# Patient Record
Sex: FEMALE | Race: WHITE | NOT HISPANIC OR LATINO | Employment: FULL TIME | URBAN - METROPOLITAN AREA
[De-identification: names, ages, dates, MRNs, and addresses within clinical notes are randomized per-mention and may not be internally consistent; named-entity substitution may affect disease eponyms.]

---

## 2017-03-21 ENCOUNTER — OFFICE VISIT (OUTPATIENT)
Dept: FAMILY MEDICINE | Facility: CLINIC | Age: 37
End: 2017-03-21
Payer: COMMERCIAL

## 2017-03-21 VITALS
OXYGEN SATURATION: 96 % | WEIGHT: 289 LBS | HEIGHT: 67 IN | BODY MASS INDEX: 45.36 KG/M2 | TEMPERATURE: 97.6 F | DIASTOLIC BLOOD PRESSURE: 86 MMHG | RESPIRATION RATE: 18 BRPM | SYSTOLIC BLOOD PRESSURE: 138 MMHG | HEART RATE: 82 BPM

## 2017-03-21 DIAGNOSIS — N89.8 VAGINAL DISCHARGE: Primary | ICD-10-CM

## 2017-03-21 DIAGNOSIS — Z71.6 ENCOUNTER FOR SMOKING CESSATION COUNSELING: ICD-10-CM

## 2017-03-21 DIAGNOSIS — I10 ESSENTIAL HYPERTENSION WITH GOAL BLOOD PRESSURE LESS THAN 140/90: ICD-10-CM

## 2017-03-21 DIAGNOSIS — G47.33 OSA (OBSTRUCTIVE SLEEP APNEA): ICD-10-CM

## 2017-03-21 LAB
ALBUMIN SERPL-MCNC: 3.5 G/DL (ref 3.4–5)
ALP SERPL-CCNC: 74 U/L (ref 40–150)
ALT SERPL W P-5'-P-CCNC: 24 U/L (ref 0–50)
ANION GAP SERPL CALCULATED.3IONS-SCNC: 7 MMOL/L (ref 3–14)
AST SERPL W P-5'-P-CCNC: 6 U/L (ref 0–45)
BILIRUB SERPL-MCNC: 0.3 MG/DL (ref 0.2–1.3)
BUN SERPL-MCNC: 8 MG/DL (ref 7–30)
CALCIUM SERPL-MCNC: 8.6 MG/DL (ref 8.5–10.1)
CHLORIDE SERPL-SCNC: 108 MMOL/L (ref 94–109)
CHOLEST SERPL-MCNC: 226 MG/DL
CO2 SERPL-SCNC: 26 MMOL/L (ref 20–32)
CREAT SERPL-MCNC: 0.62 MG/DL (ref 0.52–1.04)
GFR SERPL CREATININE-BSD FRML MDRD: NORMAL ML/MIN/1.7M2
GLUCOSE SERPL-MCNC: 92 MG/DL (ref 70–99)
HDLC SERPL-MCNC: 30 MG/DL
LDLC SERPL CALC-MCNC: 155 MG/DL
MICRO REPORT STATUS: NORMAL
NONHDLC SERPL-MCNC: 196 MG/DL
POTASSIUM SERPL-SCNC: 4 MMOL/L (ref 3.4–5.3)
PROT SERPL-MCNC: 6.9 G/DL (ref 6.8–8.8)
SODIUM SERPL-SCNC: 141 MMOL/L (ref 133–144)
SPECIMEN SOURCE: NORMAL
TRIGL SERPL-MCNC: 203 MG/DL
WET PREP SPEC: NORMAL

## 2017-03-21 PROCEDURE — 87624 HPV HI-RISK TYP POOLED RSLT: CPT | Performed by: FAMILY MEDICINE

## 2017-03-21 PROCEDURE — 80053 COMPREHEN METABOLIC PANEL: CPT | Performed by: FAMILY MEDICINE

## 2017-03-21 PROCEDURE — 99214 OFFICE O/P EST MOD 30 MIN: CPT | Performed by: FAMILY MEDICINE

## 2017-03-21 PROCEDURE — 87210 SMEAR WET MOUNT SALINE/INK: CPT | Performed by: FAMILY MEDICINE

## 2017-03-21 PROCEDURE — G0145 SCR C/V CYTO,THINLAYER,RESCR: HCPCS | Performed by: FAMILY MEDICINE

## 2017-03-21 PROCEDURE — 36415 COLL VENOUS BLD VENIPUNCTURE: CPT | Performed by: FAMILY MEDICINE

## 2017-03-21 PROCEDURE — 80061 LIPID PANEL: CPT | Performed by: FAMILY MEDICINE

## 2017-03-21 RX ORDER — LISINOPRIL 40 MG/1
40 TABLET ORAL DAILY
Qty: 90 TABLET | Refills: 3 | Status: SHIPPED | OUTPATIENT
Start: 2017-03-21 | End: 2017-10-13

## 2017-03-21 RX ORDER — HYDROCHLOROTHIAZIDE 25 MG/1
25 TABLET ORAL DAILY
Qty: 90 TABLET | Refills: 3 | Status: SHIPPED | OUTPATIENT
Start: 2017-03-21 | End: 2017-10-13

## 2017-03-21 NOTE — NURSING NOTE
"Chief Complaint   Patient presents with     Hypertension     Vaginal Problem     Referral     Sleep Study      /86  Pulse 82  Temp 97.6  F (36.4  C) (Oral)  Resp 18  Ht 5' 7\" (1.702 m)  Wt 289 lb (131.1 kg)  SpO2 96%  BMI 45.26 kg/m2 Estimated body mass index is 45.26 kg/(m^2) as calculated from the following:    Height as of this encounter: 5' 7\" (1.702 m).    Weight as of this encounter: 289 lb (131.1 kg).  bp completed using cuff size: large       Health Maintenance addressed:  Pap Smear    Possibly completing today per provider review.    Brandee Mesa MA     "

## 2017-03-21 NOTE — MR AVS SNAPSHOT
After Visit Summary   3/21/2017    Danitza Soto    MRN: [id number]           Patient Information     Date Of Birth          1980        Visit Information        Provider Department      3/21/2017 9:15 AM Polly Mcbride MD [facility]        Today's Diagnoses     Vaginal discharge    -  1    Essential hypertension with goal blood pressure less than 140/90        LEONARDO (obstructive sleep apnea)           Follow-ups after your visit        Additional Services     SLEEP EVALUATION & MANAGEMENT REFERRAL - ADULT       Please be aware that coverage of these services is subject to the terms and limitations of your health insurance plan.  Call member services at your health plan with any benefit or coverage questions.      Please bring the following to your appointment:    >>   List of current medications   >>   This referral request   >>   Any documents/labs given to you for this referral    [place] [facility] [place]   [phone] (Age 18 and up)                  Future tests that were ordered for you today     Open Future Orders        Priority Expected Expires Ordered    SLEEP EVALUATION & MANAGEMENT REFERRAL - ADULT Routine  3/21/2018 3/21/2017            Who to contact     If you have questions or need follow up information about today's clinic visit or your schedule please contact [facility] directly at [phone].  Normal or non-critical lab and imaging results will be communicated to you by MyChart, letter or phone within 4 business days after the clinic has received the results. If you do not hear from us within 7 days, please contact the clinic through MyChart or phone. If you have a critical or abnormal lab result, we will notify you by phone as soon as possible.  Submit refill requests through [employer] or call your pharmacy and they will forward the refill request to us. Please allow 3 business days for your refill to be completed.          Additional Information  "About Your Visit        MyChart Information     [employer] gives you secure access to your electronic health record. If you see a primary care provider, you can also send messages to your care team and make appointments. If you have questions, please call your primary care clinic.  If you do not have a primary care provider, please call [phone] and they will assist you.        Care EveryWhere ID     This is your Care EveryWhere ID. This could be used by other organizations to access your [place] medical records  [id number]        Your Vitals Were     Pulse Temperature Respirations Height Pulse Oximetry BMI (Body Mass Index)    82 97.6  F (36.4  C) (Oral) 18 5' 7\" (1.702 m) 96% 45.26 kg/m2       Blood Pressure from Last 3 Encounters:   03/21/17 138/86   08/24/16 134/84   01/13/16 140/83    Weight from Last 3 Encounters:   03/21/17 289 lb (131.1 kg)   08/24/16 275 lb 12.8 oz (125.1 kg)   01/13/16 297 lb (134.7 kg)              We Performed the Following     Comprehensive metabolic panel (BMP + Alb, Alk Phos, ALT, AST, Total. Bili, TP)     Lipid Profile (Chol, Trig, HDL, LDL calc)     Wet prep          Where to get your medicines      These medications were sent to [facility] [id number] [address] AT [facility] [place] [address]     Phone:  [phone]     hydrochlorothiazide 25 MG tablet    lisinopril 40 MG tablet          Primary Care Provider Office Phone # Fax #    Polly Mcbride [phone]       [facility] [address] [facility]   [place] [phone]        Thank you!     Thank you for choosing [facility]  for your care. Our goal is always to provide you with excellent care. Hearing back from our patients is one way we can continue to improve our services. Please take a few minutes to complete the written survey that you may receive in the mail after your visit with us. Thank you!           "   Your Updated Medication List - Protect others around you: Learn how to safely use, store and throw away your medicines at www.disposemymeds.org.          This list is accurate as of: 3/21/17  9:22 AM.  Always use your most recent med list.                   Brand Name Dispense Instructions for use    ADVIL 200 MG tablet   Generic drug:  ibuprofen      TAKE THREE TABLETS BY MOUTH QD-BID PRN       CENTRUM PO          clindamycin 1 % topical gel    CLINDAGEL    60 g    Apply topically daily       hydrochlorothiazide 25 MG tablet    HYDRODIURIL    90 tablet    Take 1 tablet (25 mg) by mouth daily       lisinopril 40 MG tablet    PRINIVIL/ZESTRIL    90 tablet    Take 1 tablet (40 mg) by mouth daily       * metroNIDAZOLE 500 MG tablet    FLAGYL    21 tablet    Take 1 tablet (500 mg) by mouth 3 times daily       * metroNIDAZOLE 500 MG tablet    FLAGYL    14 tablet    Take 1 tablet (500 mg) by mouth 2 times daily       NEXIUM PO          priLOSEC OTC 20 MG tablet   Generic drug:  omeprazole      1 TABLET DAILY       * varenicline 0.5 MG X 11 & 1 MG X 42 tablet    CHANTIX STARTING MONTH WILY    53 tablet    Take 0.5 mg tab daily for 3 days, then 0.5 mg tab twice daily for 4 days, then 1 mg twice daily.       * varenicline 1 MG tablet    CHANTIX    60 tablet    Take 1 tablet (1 mg) by mouth 2 times daily       * Notice:  This list has 4 medication(s) that are the same as other medications prescribed for you. Read the directions carefully, and ask your doctor or other care provider to review them with you.

## 2017-03-21 NOTE — PROGRESS NOTES
SUBJECTIVE:                                                    Danitza Soto is a 36 year old female who presents to clinic today for the following health issues:      Hypertension Follow-up      Outpatient blood pressures are not being checked.    Low Salt Diet: low salt       Amount of exercise or physical activity: 2-3 days/week for an average of 15-30 minutes    Problems taking medications regularly: No    Medication side effects: none    Diet: low salt    Vaginal Symptoms      Duration: a couple of months     Description  vaginal discharge - white clear creamy curd-like, states texture has changed.     Intensity:  moderate    Accompanying signs and symptoms (fever/dysuria/abdominal or back pain): None    History  Sexually active: not at present  Possibility of pregnancy: No  Recent antibiotic use: no     Precipitating or alleviating factors: None    Therapies tried and outcome: none        Patient would also like a sleep study referral states she is waking up more during the night         Problem list and histories reviewed & adjusted, as indicated.  Additional history: as documented    Patient Active Problem List   Diagnosis     Anxiety state     Esophageal reflux     Obesity     Tobacco use disorder     CARDIOVASCULAR SCREENING; LDL GOAL LESS THAN 130     Hypertension goal BP (blood pressure) < 140/90     Acne     LSIL (low grade squamous intraepithelial lesion) on Pap smear     Mixed hyperlipidemia     Past Surgical History:   Procedure Laterality Date     HC TOOTH EXTRACTION W/FORCEP  1995    Gans Teeth Extraction       Social History   Substance Use Topics     Smoking status: Current Every Day Smoker     Years: 1.00     Types: Cigarettes     Smokeless tobacco: Never Used      Comment:  pack or less a day     Alcohol use 0.0 oz/week     0 Standard drinks or equivalent per week      Comment: Occas.     Family History   Problem Relation Age of Onset     HEART DISEASE Mother      ,mother had emphesema  and  at 62     Hypertension Mother      Allergies Mother      Lipids Mother      Obesity Mother      Respiratory Mother      Emphysema     DIABETES Maternal Grandmother      Type 2?     Hypertension Maternal Grandmother      Circulatory Maternal Grandmother      Due to diabetes     Eye Disorder Maternal Grandmother      Macular degeneration/Macular degeneration     Obesity Maternal Grandmother      Hypertension Father      Lipids Father      CANCER Father      CEREBROVASCULAR DISEASE Paternal Grandmother      Alcohol/Drug Maternal Grandfather      Obesity Maternal Grandfather      Psychotic Disorder Paternal Grandfather      Committed Suicide     Allergies Sister      Genitourinary Problems Sister          Current Outpatient Prescriptions   Medication Sig Dispense Refill     hydrochlorothiazide (HYDRODIURIL) 25 MG tablet Take 1 tablet (25 mg) by mouth daily 90 tablet 3     lisinopril (PRINIVIL/ZESTRIL) 40 MG tablet Take 1 tablet (40 mg) by mouth daily 90 tablet 3     varenicline (CHANTIX STARTING MONTH WILY) 0.5 MG X 11 & 1 MG X 42 tablet Take 0.5 mg tab daily for 3 days, then 0.5 mg tab twice daily for 4 days, then 1 mg twice daily. 53 tablet 2     metroNIDAZOLE (FLAGYL) 500 MG tablet Take 1 tablet (500 mg) by mouth 2 times daily 14 tablet 1     clindamycin (CLINDAGEL) 1 % gel Apply topically daily 60 g 11     Esomeprazole Magnesium (NEXIUM PO)        varenicline (CHANTIX) 1 MG tablet Take 1 tablet (1 mg) by mouth 2 times daily 60 tablet 5     metroNIDAZOLE (FLAGYL) 500 MG tablet Take 1 tablet (500 mg) by mouth 3 times daily 21 tablet 1     varenicline (CHANTIX STARTING MONTH WILY) 0.5 MG X 11 & 1 MG X 42 tablet Take 0.5 mg tab daily for 3 days, then 0.5 mg tab twice daily for 4 days, then 1 mg twice daily. 53 tablet 2     Multiple Vitamins-Minerals (CENTRUM PO)        PRILOSEC OTC 20 MG OR TBEC 1 TABLET DAILY       ADVIL 200 MG OR TABS TAKE THREE TABLETS BY MOUTH QD-BID PRN       Allergies   Allergen Reactions  "    Wellbutrin [Bupropion]      Wellbutrin: Panic attacks, heart/chest pain     Recent Labs   Lab Test  03/21/17   0926  08/24/16   0915  01/13/16   1138   06/17/14   1019  10/08/13   0808   A1C   --    --    --    --    --   5.4   LDL  155*  174*  162*   --    --   149*   HDL  30*  29*  33*   --    --   33*   TRIG  203*  231*  207*   --    --   179*   ALT  24   --   26   --    --   26   CR  0.62  0.61  0.59   < >  0.70  0.60   GFRESTIMATED  >90  Non  GFR Calc    >90  Non  GFR Calc    >90  Non  GFR Calc     < >  >90  >90   GFRESTBLACK  >90   GFR Calc    >90   GFR Calc    >90   GFR Calc     < >  >90  >90   POTASSIUM  4.0  3.9  4.0   < >  4.2  3.8   TSH   --    --   1.79   --   1.99   --     < > = values in this interval not displayed.      BP Readings from Last 3 Encounters:   03/21/17 138/86   08/24/16 134/84   01/13/16 140/83    Wt Readings from Last 3 Encounters:   03/21/17 289 lb (131.1 kg)   08/24/16 275 lb 12.8 oz (125.1 kg)   01/13/16 297 lb (134.7 kg)                  Labs reviewed in EPIC    Reviewed and updated as needed this visit by clinical staff  Tobacco  Allergies  Meds  Med Hx  Surg Hx  Fam Hx  Soc Hx      Reviewed and updated as needed this visit by Provider         ROS:  Constitutional, HEENT, cardiovascular, pulmonary, GI, , musculoskeletal, neuro, skin, endocrine and psych systems are negative, except as otherwise noted.    OBJECTIVE:                                                    /86  Pulse 82  Temp 97.6  F (36.4  C) (Oral)  Resp 18  Ht 5' 7\" (1.702 m)  Wt 289 lb (131.1 kg)  SpO2 96%  BMI 45.26 kg/m2  Body mass index is 45.26 kg/(m^2).  GENERAL: healthy, alert and no distress  EYES: Eyes grossly normal to inspection, PERRL and conjunctivae and sclerae normal  HENT: ear canals and TM's normal, nose and mouth without ulcers or lesions  NECK: no adenopathy, no asymmetry, masses, " or scars and thyroid normal to palpation  RESP: lungs clear to auscultation - no rales, rhonchi or wheezes  BREAST: normal without masses, tenderness or nipple discharge and no palpable axillary masses or adenopathy  CV: regular rate and rhythm, normal S1 S2, no S3 or S4, no murmur, click or rub, no peripheral edema and peripheral pulses strong  ABDOMEN: soft, nontender, no hepatosplenomegaly, no masses and bowel sounds normal   (female): normal female external genitalia, normal urethral meatus, vaginal mucosa pink, moist, well rugated, and normal cervix/adnexa/uterus without masses or discharge  MS: no gross musculoskeletal defects noted, no edema  SKIN: no suspicious lesions or rashes  NEURO: Normal strength and tone, mentation intact and speech normal  PSYCH: mentation appears normal, affect normal/bright    Diagnostic Test Results:  Results for orders placed or performed in visit on 03/21/17   Comprehensive metabolic panel (BMP + Alb, Alk Phos, ALT, AST, Total. Bili, TP)   Result Value Ref Range    Sodium 141 133 - 144 mmol/L    Potassium 4.0 3.4 - 5.3 mmol/L    Chloride 108 94 - 109 mmol/L    Carbon Dioxide 26 20 - 32 mmol/L    Anion Gap 7 3 - 14 mmol/L    Glucose 92 70 - 99 mg/dL    Urea Nitrogen 8 7 - 30 mg/dL    Creatinine 0.62 0.52 - 1.04 mg/dL    GFR Estimate >90  Non  GFR Calc   >60 mL/min/1.7m2    GFR Estimate If Black >90   GFR Calc   >60 mL/min/1.7m2    Calcium 8.6 8.5 - 10.1 mg/dL    Bilirubin Total 0.3 0.2 - 1.3 mg/dL    Albumin 3.5 3.4 - 5.0 g/dL    Protein Total 6.9 6.8 - 8.8 g/dL    Alkaline Phosphatase 74 40 - 150 U/L    ALT 24 0 - 50 U/L    AST 6 0 - 45 U/L   Lipid Profile (Chol, Trig, HDL, LDL calc)   Result Value Ref Range    Cholesterol 226 (H) <200 mg/dL    Triglycerides 203 (H) <150 mg/dL    HDL Cholesterol 30 (L) >49 mg/dL    LDL Cholesterol Calculated 155 (H) <100 mg/dL    Non HDL Cholesterol 196 (H) <130 mg/dL   Pap imaged thin layer screen with HPV -  recommended age 30 - 65 years (select HPV order below)   Result Value Ref Range    PAP NIL     Copath Report         Patient Name: RONAN ANNA  MR#: [id number]  Specimen #: [id number]  Collected: 3/21/2017  Received: 3/22/2017  Reported: 3/23/2017 13:30  Ordering Phy(s): LONNY SOLANO    For improved result formatting, select 'View Enhanced Report Format'  under Linked Documents section.    SPECIMEN/STAIN PROCESS:  Pap imaged thin layer prep screening (Surepath, FocalPoint with guided  screening)       Pap-Cyto x 1, HPV ordered x 1    SOURCE: Cervical, endocervical  ----------------------------------------------------------------   Pap imaged thin layer prep screening (Surepath, FocalPoint with guided  screening)  SPECIMEN ADEQUACY:  Satisfactory for evaluation.  -Transformation zone component present.    CYTOLOGIC INTERPRETATION:    Negative for Intraepithelial Lesion or Malignancy    Electronically signed out by:  DAYNE Ratliff (ASCP)    Processed and screened at [facility]    CLINICAL HISTORY:    Previous ASC-US  Date of Last Pap: 1/ 13/16,    Papanicolaou Test Limitations:  Cervical cytology is a screening test  with limited sensitivity; regular screening is critical for cancer  prevention; Pap tests are primarily effective for the  diagnosis/prevention of squamous cell carcinoma, not adenocarcinomas or  other cancers.    TESTING LAB LOCATION:  [address]  [phone]    COLLECTION SITE:  Client:  [facility]  Location: [facility] (B)     Wet prep   Result Value Ref Range    Specimen Description Vagina     Wet Prep       No Trichomonas seen  No clue cells seen  No yeast seen      Micro Report Status FINAL 03/21/2017         ASSESSMENT/PLAN:                                                      1. Essential hypertension with goal blood pressure less  than 140/90  Discussed checking at home, also refilled her meds today , due for labs , will do them today .  - hydrochlorothiazide (HYDRODIURIL) 25 MG tablet; Take 1 tablet (25 mg) by mouth daily  Dispense: 90 tablet; Refill: 3  - lisinopril (PRINIVIL/ZESTRIL) 40 MG tablet; Take 1 tablet (40 mg) by mouth daily  Dispense: 90 tablet; Refill: 3  - Comprehensive metabolic panel (BMP + Alb, Alk Phos, ALT, AST, Total. Bili, TP)  - Lipid Profile (Chol, Trig, HDL, LDL calc)    2. Vaginal discharge  Does have BV and will go ahead and treat ., did her screening PAP today .  - Wet prep  - Pap imaged thin layer screen with HPV - recommended age 30 - 65 years (select HPV order below)    3. LEONARDO (obstructive sleep apnea)  Might have LEONARDO as she snores , daytime sleepiness and also HTN, gave her a referral for sleep study ,   - SLEEP EVALUATION & MANAGEMENT REFERRAL - ADULT; Future    4. Encounter for smoking cessation counseling  Discussed quitting  smoking , she has used the Chantix in the past with good results Rx sent   - varenicline (CHANTIX STARTING MONTH WILY) 0.5 MG X 11 & 1 MG X 42 tablet; Take 0.5 mg tab daily for 3 days, then 0.5 mg tab twice daily for 4 days, then 1 mg twice daily.  Dispense: 53 tablet; Refill: 2    F/u in couple of months, sooner if any concerns , Pt is aware  and comfortable with the current plan.      Polly Mcbride MD  [facility]

## 2017-03-23 LAB
COPATH REPORT: NORMAL
PAP: NORMAL

## 2017-03-24 ENCOUNTER — MYC MEDICAL ADVICE (OUTPATIENT)
Dept: FAMILY MEDICINE | Facility: CLINIC | Age: 37
End: 2017-03-24

## 2017-03-24 DIAGNOSIS — B96.89 BV (BACTERIAL VAGINOSIS): Primary | ICD-10-CM

## 2017-03-24 DIAGNOSIS — N76.0 BV (BACTERIAL VAGINOSIS): Primary | ICD-10-CM

## 2017-03-24 LAB
FINAL DIAGNOSIS: NORMAL
HPV HR 12 DNA CVX QL NAA+PROBE: NEGATIVE
HPV16 DNA SPEC QL NAA+PROBE: NEGATIVE
HPV18 DNA SPEC QL NAA+PROBE: NEGATIVE
SPECIMEN DESCRIPTION: NORMAL

## 2017-03-27 NOTE — TELEPHONE ENCOUNTER
LS,  Please see pt's message below.  I pended pharm and possible flagyl order.  Please advise.  Thanks,  Delia Carter RN    OV notes 3/21  2. Vaginal discharge  Does have BV and will go ahead and treat ., did her screening PAP today .  - Wet prep  - Pap imaged thin layer screen with HPV - recommended age 30 - 65 years (select HPV order below)

## 2017-03-28 RX ORDER — METRONIDAZOLE 500 MG/1
500 TABLET ORAL 2 TIMES DAILY
Qty: 14 TABLET | Refills: 0 | Status: SHIPPED | OUTPATIENT
Start: 2017-03-28 | End: 2017-06-15

## 2017-03-30 ENCOUNTER — OFFICE VISIT (OUTPATIENT)
Dept: SLEEP MEDICINE | Facility: CLINIC | Age: 37
End: 2017-03-30
Attending: FAMILY MEDICINE
Payer: COMMERCIAL

## 2017-03-30 VITALS
BODY MASS INDEX: 45.33 KG/M2 | OXYGEN SATURATION: 93 % | HEIGHT: 67 IN | SYSTOLIC BLOOD PRESSURE: 122 MMHG | WEIGHT: 288.8 LBS | TEMPERATURE: 98.4 F | HEART RATE: 86 BPM | DIASTOLIC BLOOD PRESSURE: 77 MMHG

## 2017-03-30 DIAGNOSIS — G47.33 OSA (OBSTRUCTIVE SLEEP APNEA): ICD-10-CM

## 2017-03-30 DIAGNOSIS — E66.01 MORBID OBESITY DUE TO EXCESS CALORIES (H): Primary | ICD-10-CM

## 2017-03-30 DIAGNOSIS — F17.200 TOBACCO USE DISORDER: ICD-10-CM

## 2017-03-30 PROCEDURE — 99244 OFF/OP CNSLTJ NEW/EST MOD 40: CPT | Performed by: INTERNAL MEDICINE

## 2017-03-30 RX ORDER — CHLORAL HYDRATE 500 MG
2 CAPSULE ORAL EVERY MORNING
COMMUNITY
End: 2021-10-01

## 2017-03-30 NOTE — PROGRESS NOTES
"  Sleep Consultation:    Date on this visit: 3/30/2017    Danitza Soto is sent by Polly Mcbride for a sleep consultation regarding possible LEONARDO.    Primary Physician: Polly Mcbride     She has a history of morbid obesity.    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  In office 7 AM - 3 PM M - F.  Alarm goes off at 5:20 AM and she gets up right away.  Avoids napping because it will ruin her sleep at night. In bed around 8:30 - 9 and reads until around 9:30 PM.  Usually falls asleep within 20 minutes, but sometimes will be awake for 2 - 3 hours.     Weekends going to sleep between 9:30 - 10:30 and sleeps in until 7 - 9 AM.  Doesn't feel better when sleeping in on weekends.      Sleep Disordered Breathing - Wife says she chokes and stops breathing.  Does have snort arousals.  Snoring is reported as \"not too bad\" except when she is on her back.  It is, however, audible on another floor.     Usually has nocturia 1 - 2.  No nocturnal GERD on medication.   Upon waking feels tired.  She reports occasional morning headaches.  Does get morning dry mouth.  No morning sinus congestion.   Patient was counseled on the importance of driving while alert, to pull over if drowsy, or nap before getting into the vehicle if sleepy.    Movement/Behaviors - Childhood somniloquy.  No somnambulism.  No sleep related eating.  No dream enactment behavior.   Patient denies typical restless legs syndrome symptoms.      Alcohol use - Very rare alcohol intake.  Caffeine intake - no regular caffeine.  Tobacco exposure - Just started Chantix 8 days ago and cutting down on cigarettes.  Wife smokes.  Illicit substances - None    Lives with wife (Jono) and step-daughter (age 10).  Has 6 pets, 4 cats and 2 dogs.     No known family history of snoring.     Allergies:    Allergies   Allergen Reactions     Wellbutrin [Bupropion]      Wellbutrin: Panic attacks, heart/chest pain       Medications:    Current Outpatient " Prescriptions   Medication Sig Dispense Refill     fish oil-omega-3 fatty acids 1000 MG capsule Take 2 g by mouth daily       Probiotic Product (PROBIOTIC ADVANCED PO)        metroNIDAZOLE (FLAGYL) 500 MG tablet Take 1 tablet (500 mg) by mouth 2 times daily 14 tablet 0     hydrochlorothiazide (HYDRODIURIL) 25 MG tablet Take 1 tablet (25 mg) by mouth daily 90 tablet 3     lisinopril (PRINIVIL/ZESTRIL) 40 MG tablet Take 1 tablet (40 mg) by mouth daily 90 tablet 3     varenicline (CHANTIX STARTING MONTH PAK) 0.5 MG X 11 & 1 MG X 42 tablet Take 0.5 mg tab daily for 3 days, then 0.5 mg tab twice daily for 4 days, then 1 mg twice daily. 53 tablet 2     metroNIDAZOLE (FLAGYL) 500 MG tablet Take 1 tablet (500 mg) by mouth 2 times daily 14 tablet 1     Esomeprazole Magnesium (NEXIUM PO)        varenicline (CHANTIX) 1 MG tablet Take 1 tablet (1 mg) by mouth 2 times daily 60 tablet 5     metroNIDAZOLE (FLAGYL) 500 MG tablet Take 1 tablet (500 mg) by mouth 3 times daily 21 tablet 1     varenicline (CHANTIX STARTING MONTH PAK) 0.5 MG X 11 & 1 MG X 42 tablet Take 0.5 mg tab daily for 3 days, then 0.5 mg tab twice daily for 4 days, then 1 mg twice daily. 53 tablet 2     Multiple Vitamins-Minerals (CENTRUM PO)        ADVIL 200 MG OR TABS TAKE THREE TABLETS BY MOUTH QD-BID PRN         Problem List:  Patient Active Problem List    Diagnosis Date Noted     Mixed hyperlipidemia 07/06/2015     Priority: Medium     LSIL (low grade squamous intraepithelial lesion) on Pap smear 06/17/2014     Priority: Medium     6/17/14: LSIL, + HPV, unable to type.   8/20/14:[place]:BRISEYDA I. Plan cotest pap & HPV in 1 year.   1/20116 Pap Ascus Neg HPV. Plan: [place].   5/13/16 Colposcopy not completed. Cancelled by patient  3/21/17 NIL/Neg HPV. Plan: pending provider review.        Acne 02/24/2012     Priority: Medium     Hypertension goal BP (blood pressure) < 140/90 01/12/2011     Priority: Medium     CARDIOVASCULAR SCREENING; LDL GOAL LESS THAN 130 10/31/2010      Priority: Medium     Obesity 05/17/2006     Priority: Medium     Problem list name updated by automated process. Provider to review       Tobacco use disorder 05/17/2006     Priority: Medium     Anxiety state      Priority: Medium     Esophageal reflux      Priority: Medium        Past Medical/Surgical History:  Past Medical History:   Diagnosis Date     Esophageal reflux      Hyperlipidemia LDL goal <130 7/6/2015     LSIL (low grade squamous intraepithelial lesion) on Pap smear 6/2014    + HPV, unable to type colp - [facility] I     Mixed hyperlipidemia 8/24/2016     Other anxiety states      Past Surgical History:   Procedure Laterality Date     HC TOOTH EXTRACTION W/FORCEP  1995    [place] Teeth Extraction       Social History:  Social History     Social History     Marital status:      Spouse name: N/A     Number of children: 0     Years of education: Some Colle     Occupational History     Mental Health Counselor      Social History Main Topics     Smoking status: Current Every Day Smoker     Years: 1.00     Types: Cigarettes     Smokeless tobacco: Never Used      Comment:  pack or less a day     Alcohol use 0.0 oz/week     0 Standard drinks or equivalent per week      Comment: Occas.     Drug use: No     Sexual activity: Yes     Partners: Female     Other Topics Concern     Parent/Sibling W/ Cabg, Mi Or Angioplasty Before 65f 55m? No     Social History Narrative    Social Documentation:        Balanced Diet: YES, sometimes    Calcium intake: 2 per day    Caffeine: 5-10 per day    Exercise:  type of activity walking, playtime;  5 times per week    Sunscreen: when remembered    Seatbelts:  Yes    Self Breast Exam:  No    Self Testicular Exam: n/a    Physical/Emotional/Sexual Abuse: No    Do you feel safe in your environment? Yes        Cholesterol screen up to date: No 2006    Eye Exam up to date: Yes    Dental Exam up to date: No    Pap smear up to date: No: 2006    Mammogram up to date: Does Not Apply     "Dexa Scan up to date: Does Not Apply    Colonoscopy up to date: Does Not Apply    Immunizations up to date: Yes,     Glucose screen if over 40:  N/a        Jamila Coyle MA                           Family History:  Family History   Problem Relation Age of Onset     HEART DISEASE Mother      ,mother had emphesema and  at 62     Hypertension Mother      Allergies Mother      Lipids Mother      Obesity Mother      Respiratory Mother      Emphysema     DIABETES Maternal Grandmother      Type 2?     Hypertension Maternal Grandmother      Circulatory Maternal Grandmother      Due to diabetes     Eye Disorder Maternal Grandmother      Macular degeneration/Macular degeneration     Obesity Maternal Grandmother      Hypertension Father      Lipids Father      CANCER Father      CEREBROVASCULAR DISEASE Paternal Grandmother      Alcohol/Drug Maternal Grandfather      Obesity Maternal Grandfather      Psychotic Disorder Paternal Grandfather      Committed Suicide     Allergies Sister      Genitourinary Problems Sister        Review of Systems:  A complete review of systems reviewed by me is negative with the exeption of what has been mentioned in the history of present illness.  Out of breath when lying down supine; high blood pressure  Shortness of breath activity.    Physical Examination:  Vitals: /77 (BP Location: Right arm, Patient Position: Chair, Cuff Size: Adult Regular)  Pulse 86  Temp 98.4  F (36.9  C) (Oral)  Ht 1.702 m (5' 7\")  Wt 131 kg (288 lb 12.8 oz)  SpO2 93%  BMI 45.23 kg/m2  BMI= Body mass index is 45.23 kg/(m^2).    Neck Cir (cm): 43 cm    [place] Total Score 3/30/2017   Total score - [place] 7     General appearance: No apparent distress, well groomed.    HEENT:   Head: Normocephalic, atraumatic.  Eyes: PERRL  Nose: Nares widely patent.  No exudate.  No septal deviation noted.  Mouth: Teeth: Healthy               Tongue: Normal  Oropharynx:  Mallampati Classification: I    Tonsils: Grade " "1 bilateral    Uvula: short    Neck: Supple without bruit.     Neck Cir (cm): 43 cm (3/30/2017  3:04 PM)    Cardiac: Regular rate and rhythm.  No murmurs.  Radial pulses are strong and symmetric.  Pulmonary: Symmetric air movement, lungs clear to auscultation bilaterally.  Musculoskeletal: No edema noted.  No clubbing or cyanosis.  Skin: Warm, dry, intact.  Neurologic: Alert and oriented to person, place and time   Gait is normal.  Psychiatric: Affect pleasant.  Mood \"fine\".     Impression/Plan:  1. LEONARDO (obstructive sleep apnea)  I have a high suspicion for LEONARDO based on presence of snoring, snort arousals, witnessed apneas, enlarged neck girth >= 40 cm for female, and obesity with excessive daytime sleepiness. We discussed pathophysiology of obstructive sleep apnea, testing with overnight polysomnography, and treatment modalities (CPAP only discussed at this visit). We discussed consequences of untreated LEONARDO. Patient is interested in treatment and willing to undergo overnight sleep testing.    Home sleep testing is appropriate for this patient  Study has been ordered today.    - SLEEP EVALUATION & MANAGEMENT REFERRAL - ADULT    2. Morbid obesity due to excess calories (H)  We discussed the link between obesity, sleep apnea, and health outcomes.  Patient was encouraged to decrease caloric intake and increase activity levels to try to move towards a normal weight.  She was encouraged to discuss further strategies with her primary care provider.     3. Tobacco use disorder  We discussed the importance of tobacco cessation, both due to overall health consequences, and also as how it relates to exacerbation of LEONARDO severity.  We discussed cessation strategies.  She was encouraged to discuss further strategies with her primary care provider.    Literature provided regarding sleep apnea.      She will follow up with me in approximately two weeks after her sleep study has been competed to review the results and discuss plan " of care.       Polysomnography reviewed.  Obstructive sleep apnea reviewed.  Complications of untreated sleep apnea were reviewed.    Derrick Coy MD, Sleep Physician  Mar 30, 2017     CC: Polly Mcbride

## 2017-03-30 NOTE — MR AVS SNAPSHOT
"              After Visit Summary   3/30/2017    Danitza Soto    MRN: [id number]           Patient Information     Date Of Birth          1980        Visit Information        Provider Department      3/30/2017 3:00 PM Derrick Coy MD [facility]        Today's Diagnoses     Morbid obesity due to excess calories (H)    -  1    LEONARDO (obstructive sleep apnea)        Tobacco use disorder          Care Instructions    MY TREATMENT INFORMATION FOR SLEEP APNEA-  Danitza Soto    DOCTOR : Derrick Coy  SLEEP CENTER :      MY CONTACT NUMBER:       If I haven't had a sleep study yet, what can I expect?  A personal story from [employer]  https://www.Crowdlinker.com/watch?v=AxPLmlRpnCs    Am I having a home sleep study?  Here is a video in case you get home and want to make sure you have done it correctly  https://www.Crowdlinker.com/watch?v=FTI8I5gLin4&feature=youtu.be    Frequently asked questions:  1. What is Obstructive Sleep Apnea (LEONARDO)? LEONARDO is the most common type of sleep apnea. Apnea literally means, \"without breath.\" It is characterized by repetitive pauses in breathing, despite continued effort to breathe, and is usually associated with a reduction in blood oxygen saturation. Apneas can last 10 to over 60 seconds. It is caused by narrowing or collapse of the upper airway as muscles relax during sleep. Severity of sleep apnea is determined by frequency of breathing events and their effect on your sleep and oxygen levels determined during sleep testing.   2. What are the consequences of LEONARDO? Symptoms include: daytime sleepiness- possibly increasing the risk of falling asleep while driving, unrefreshing/restless sleep, snoring, insomnia, waking frequently to urinate, waking with heartburn or reflux, reduced concentration and memory, and morning headaches. Other health consequences may include development of high blood pressure and other cardiovascular disease in persons who are " susceptible. Untreated LEONARDO  can contribute to heart disease, stroke and diabetes.   3. What are the treatment options? In most situations, sleep apnea is a lifelong disease that must be managed with daily therapy. Medications are not effective for sleep apnea and surgery is generally not performed until other therapies have been tried. Therapy is usually tailored to the individual patient based on many factors including your wishes as well as severity of sleep apnea and severity of obesity. Continuous Positive Airway (CPAP) is the most reliable treatment. An oral device to hold your jaw forward is usually the next most reliable option. Other options include postioning devices (to keep you off your back), weight loss, and surgery including a tongue pacing device. There is more detail about some of these options below.            1. CPAP-  WHAT DOES IT DO AND HOW CAN I LEARN TO WEAR IT?                               BEFORE I START, CAN I WATCH A MOVIE TO GET A PLAN ON HOW TO USE CPAP?  https://www.Elliptic.com/watch?q=z9E87fq085K      Continuous positive airway pressure, or CPAP, is the most effective treatment for obstructive sleep apnea. It works by blowing room air, through a mask, to hold your throat open. A decision to use CPAP is a major step forward in the pursuit of a healthier life. The successful use of CPAP will help you breathe easier, sleep better and live healthier. You can choose CPAP equipment from any durable medical equipment provider that meets your needs.  Using CPAP can be a positive experience if you keep these castrejon points in mind:  1. Commitment  CPAP is not a quick fix for your problem. It involves a long-term commitment to improve your sleep and your health.    2. Communication  Stay in close communication with both your sleep doctor and your CPAP supplier. Ask lots of questions and seek help when you need it.    3. Consistency  Use CPAP all night, every night and for every nap. You will receive  "the maximum health benefits from CPAP when you use it every time that you sleep. This will also make it easier for your body to adjust to the treatment.    4. Correction  The first machine and mask that you try may not be the best ones for you. Work with your sleep doctor and your CPAP supplier to make corrections to your equipment selection. Ask about trying a different type of machine or mask if you have ongoing problems. Make sure that your mask is a good fit and learn to use your equipment properly.    5. Challenge  Tell a family member or close friend to ask you each morning if you used your CPAP the previous night. Have someone to challenge you to give it your best effort.    6. Connection   Your adjustment to CPAP will be easier if you are able to connect with others who use the same treatment. Ask your sleep doctor if there is a support group in your area for people who have sleep apnea, or look for one on the Internet.  7. Comfort   Increase your level of comfort by using a saline spray, decongestant or heated humidifier if CPAP irritates your nose, mouth or throat. Use your unit's \"ramp\" setting to slowly get used to the air pressure level. There may be soft pads you can buy that will fit over your mask straps. Look on www.CPAP.com for accessories that can help make CPAP use more comfortable.  8. Cleaning   Clean your mask, tubing and headgear on a regular basis. Put this time in your schedule so that you don't forget to do it. Check and replace the filters for your CPAP unit and humidifier.    9. Completion   Although you are never finished with CPAP therapy, you should reward yourself by celebrating the completion of your first month of treatment. Expect this first month to be your hardest period of adjustment. It will involve some trial and error as you find the machine, mask and pressure settings that are right for you.    10. Continuation  After your first month of treatment, continue to make a daily " commitment to use your CPAP all night, every night and for every nap.    CPAP-Tips to starting with success:  Begin using your CPAP for short periods of time during the day while you watch TV or read.    Use CPAP every night and for every nap. Using it less often reduces the health benefits and makes it harder for your body to get used to it.    Make small adjustments to your mask, tubing, straps and headgear until you get the right fit. Tightening the mask may actually worsen the leak.  If it leaves significant marks on your face or irritates the bridge of your nose, it may not be the best mask for you.  Speak with the person who supplied the mask and consider trying other masks. Insurances will allow you to try different masks during the first month of starting CPAP.  Insurance also covers a new mask, hose and filter about every 6 months.    Use a saline nasal spray to ease mild nasal congestion. Neti-Pot or saline nasal rinses may also help. Nasal gel sprays can help reduce nasal dryness.  Biotene mouthwash can be helpful to protect your teeth if you experience frequent dry mouth.  Dry mouth may be a sign of air escaping out of your mouth or out of the mask in the case of a full face mask.  Speak with your provider if you expect that is the case.     Take a nasal decongestant to relieve more severe nasal or sinus congestion.  Do not use Afrin (oxymetazoline) nasal spray more than 3 days in a row.  Speak with your sleep doctor if your nasal congestion is chronic.    Use a heated humidifier that fits your CPAP model to enhance your breathing comfort. Adjust the heat setting up if you get a dry nose or throat, down if you get condensation in the hose or mask.  Position the CPAP lower than you so that any condensation in the hose drains back into the machine rather than towards the mask.    Try a system that uses nasal pillows if traditional masks give you problems.    Clean your mask, tubing and headgear once a  week. Make sure the equipment dries fully.    Regularly check and replace the filters for your CPAP unit and humidifier.    Work closely with your sleep provider and your CPAP supplier to make sure that you have the machine, mask and air pressure setting that works best for you. It is better to stop using it and call your provider to solve problems than to lay awake all night frustrated with the device.    BESIDES CPAP, WHAT OTHER THERAPIES ARE THERE?      Positioning Device  Positioning devices are generally used when sleep apnea is mild and only occurs on your back.This example shows a pillow that straps around the waist. It may be appropriate for those whose sleep study shows milder sleep apnea that occurs primarily when lying flat on one's back. Preliminary studies have shown benefit but effectiveness at home may need to be verified by a home sleep test. These devices are generally not covered by medical insurance.                      Oral Appliance  What is oral appliance therapy?  An oral appliance is a small acrylic device that fits over the upper and lower teeth or tongue (similar to an orthodontic retainer or a mouth guard). This device slightly advances the lower jaw or tongue, which moves the base of the tongue forward, opens the airway, improves breathing and can effectively treat snoring and obstructive sleep apnea sleep apnea. The appliance is fabricated and customized by a qualified dentist with experience in treating snoring and sleep apnea. Oral appliances are usually well tolerated and have relatively high compliance by patients1, 2, 3.  When is an oral appliance indicated?  Oral appliance therapy is recommended as a first-line treatment for patients with primary snoring, mild sleep apnea, and for patients with moderate sleep apnea who prefer appliance therapy to use of CPAP4, 5. Severity of sleep apnea is determined by sleep testing and is based on the number of respiratory events per hour of  sleep.   How successful is oral appliance therapy?  The success rate of oral appliance therapy in patients with mild sleep apnea is 75-80% while in patients with moderate sleep apnea it is 50-70%. The chance of success in patients with severe sleep apnea is 40-50%. The research also shows that oral appliances have a beneficial effect on the cardiovascular health of LEONARDO patients at the same magnitude as CPAP therapy7.  Oral appliances should be a second-line treatment in cases of severe sleep apnea, but if not completely successful then a combination therapy utilizing CPAP plus oral appliance therapy may be effective. Oral appliances tend to be effective in a broad range of patients although studies show that the patients who have the highest success are females, younger patients, those with milder disease, and less severe obesity. 3, 6.   The chances of success are lower in patients who have more severe LEONARDO, are older, and those who are morbidly obese.     Example of an oral appliance   Finding a dentist that practices dental sleep medicine  Specific training is available through the American Academy of Dental Sleep Medicine for dentists interested in working in the field of sleep. To find a dentist who is educated in the field of sleep and the use of oral appliances, near you, visit the Web site of the American Academy of Dental Sleep Medicine; also see   http://www.accpstorage.org/newOrganization/patients/oralAppliances.pdf  To search for a dentist certified in these practices:  Http://aadsm.org/FindADentist.aspx?1  1. Vaughn, et al. Objectively measured vs self-reported compliance during oral appliance therapy for sleep-disordered breathing. Chest 2013; 144(5): 7698-6028.  2. Raysa et al. Objective measurement of compliance during oral appliance therapy for sleep-disordered breathing. Thorax 2013; 68(1): 91-96.  3. Jannette et al. Mandibular advancement devices in 620 men and women with LEONARDO and  snoring: tolerability and predictors of treatment success. Chest 2004; 125: 8994-6904.  4. Kathy et al. Oral appliances for snoring and LEONARDO: a review. Sleep 2006; 29: 244-262.  5. German et al. Oral appliance treatment for LEONARDO: an update. J Clin Sleep Med 2014; 10(2): 215-227.  6. Yamila et al. Predictors of OSAH treatment outcome. J Dent Res 2007; 86: 0217-3946.      Weight Loss:    Weight management is a personal decision.  If you are interested in exploring weight loss strategies, the following discussion covers the impact on weight loss on sleep apnea and the approaches that may be successful.    Weight loss decreases severity of sleep apnea in most people with obesity. For those with mild obesity who have developed snoring with weight gain, even 15-30 pound weight loss can improve and occasionally eliminate sleep apnea.  Structured and life-long dietary and health habits are necessary to lose weight and keep healthier weight levels.     Though there may be significant health benefits from weight loss, long-term weight loss is very difficult to achieve- studies show success with dietary management in less than 10% of people. In addition, substantial weight loss may require years of dietary control and may be difficult if patients have severe obesity. In these cases, surgical management may be considered.  Finally, older individuals who have tolerated obesity without health complications may be less likely to benefit from weight loss strategies.    Your BMI is Body mass index is 45.23 kg/(m^2).  Body mass index (BMI) is one way to tell whether you are at a healthy weight, overweight, or obese. It measures your weight in relation to your height.  A BMI of 18.5 to 24.9 is in the healthy range. A person with a BMI of 25 to 29.9 is considered overweight, and someone with a BMI of 30 or greater is considered obese. More than two-thirds of American adults are considered overweight or obese.  Being overweight  or obese increases the risk for further weight gain. Excess weight may lead to heart disease and diabetes.  Creating and following plans for healthy eating and physical activity may help you improve your health.  Weight control is part of healthy lifestyle and includes exercise, emotional health, and healthy eating habits. Careful eating habits lifelong are the mainstay of weight control. Though there are significant health benefits from weight loss, long-term weight loss with diet alone may be very difficult to achieve- studies show long-term success with dietary management in less than 10% of people. Attaining a healthy weight may be especially difficult to achieve in those with severe obesity. In some cases, medications, devices and surgical management might be considered.  What can you do?  If you are overweight or obese and are interested in methods for weight loss, you should discuss this with your provider.     Consider reducing daily calorie intake by 500 calories.     Keep a food journal.     Avoiding skipping meals, consider cutting portions instead.    Diet combined with exercise helps maintain muscle while optimizing fat loss. Strength training is particularly important for building and maintaining muscle mass. Exercise helps reduce stress, increase energy, and improves fitness. Increasing exercise without diet control, however, may not burn enough calories to loose weight.       Start walking three days a week 10-20 minutes at a time    Work towards walking thirty minutes five days a week     Eventually, increase the speed of your walking for 1-2 minutes at time    In addition, we recommend that you review healthy lifestyles and methods for weight loss available through the National Institutes of Health patient information sites:  http://win.niddk.nih.gov/publications/index.htm    And look into health and wellness programs that may be available through your health insurance provider, employer, local  [facility], or Netnui.com.    Weight management plan: Patient was referred to their PCP to discuss a diet and exercise plan.    Surgery:    Upper Airway Surgery for LEONARDO  Surgery for LEONARDO is a second-line treatment option in the management of sleep apnea.  Surgery should be considered for patients who are having a difficult time tolerating CPAP.    Surgery for LEONARDO is directed at areas that are responsible for narrowing or complete obstruction of the airway during sleep.  There are a wide range of procedures available to enlarge and/or stabilize the airway to prevent blockage of breathing in the three major areas where it can occur: the palate, tongue, and nasal regions.  Successful surgical treatment depends on the accurate identification of the factors responsible for obstructive sleep apnea in each person.  A personalized approach is required because there is no single treatment that works well for everyone.  Because of anatomic variation, consultation with an examination by a sleep surgeon is a critical first step in determining what surgical options are best for each patient.  In some cases, examination during sedation may be recommended in order to guide the selection of procedures.  Patients will be counseled about risks and benefits as well as the typical recovery course after surgery. Surgery is typically not a cure for a person s LEONARDO.  However, surgery will often significantly improve one s LEONARDO severity (termed  success rate ).  Even in the absence of a cure, surgery will decrease the cardiovascular risk associated with OSA7; improve overall quality of life8 (sleepiness, functionality, sleep quality, etc).          Palate Procedures:  Patients with LEONARDO often have narrowing of their airway in the region of their tonsils and uvula.  The goals of palate procedures are to widen the airway in this region as well as to help the tissues resist collapse.  Modern palate procedure techniques focus on tissue  conservation and soft tissue rearrangement, rather than tissue removal.  Often the uvula is preserved in this procedure. Residual sleep apnea is common in patient after pharyngoplasty with an average reduction in sleep apnea events of 33%2.      Tongue Procedures:  While patients are awake, the muscles that surround the throat are active and keep this region open for breathing. These muscles relax during sleep, allowing the tongue and other structures to collapse and block breathing.  There are several different tongue procedures available.  Selection of a tongue base procedure depends on characteristics seen on physical exam.  Generally, procedures are aimed at removing bulky tissues in this area or preventing the back of the tongue from falling back during sleep.  Success rates for tongue surgery range from 50-62%3.    Hypoglossal Nerve Stimulation:  Hypoglossal nerve stimulation has recently received approval from the [place] Food and Drug Administration for the treatment of obstructive sleep apnea.  This is based on research showing that the system was safe and effective in treating sleep apnea6.  Results showed that the median AHI score decreased 68%, from 29.3 to 9.0. This therapy uses an implant system that senses breathing patterns and delivers mild stimulation to airway muscles, which keeps the airway open during sleep.  The system consists of three fully implanted components: a small generator (similar in size to a pacemaker), a breathing sensor, and a stimulation lead.  Using a small handheld remote, a patient turns the therapy on before bed and off upon awakening.    Candidates for this device must be greater than 22 years of age, have moderate to severe LEONARDO (AHI between 20-65), BMI less than 32, have tried CPAP/oral appliance without success, and have appropriate upper airway anatomy (determined by a sleep endoscopy performed by Dr. Morales).    Hypoglossal Nerve Stimulation Pathway:    The sleep  surgeon s office will work with the patient through the insurance prior-authorization process (including communications and appeals).    Nasal Procedures:  Nasal obstruction can interfere with nasal breathing during the day and night.  Studies have shown that relief of nasal obstruction can improve the ability of some patients to tolerate positive airway pressure therapy for obstructive sleep apnea1.  Treatment options include medications such as nasal saline, topical corticosteroid and antihistamine sprays, and oral medications such as antihistamines or decongestants. Non-surgical treatments can include external nasal dilators for selected patients. If these are not successful by themselves, surgery can improve the nasal airway either alone or in combination with these other options.      Combination Procedures:  Combination of surgical procedures and other treatments may be recommended, particularly if patients have more than one area of narrowing or persistent positional disease.  The success rate of combination surgery ranges from 66-80%2,3.      1. Santa PEREZ. The Role of the Nose in Snoring and Obstructive Sleep Apnoea: An Update.  Eur Arch Otorhinolaryngol. 2011; 268: 1365-73.  2.  Sol SM; Mora JA; Charline JR; Pallanch JF; Graham MB; Vandana SG; Shawnee BUTLER. Surgical modifications of the upper airway for obstructive sleep apnea in adults: a systematic review and meta-analysis. SLEEP 2010;33(10):1122-5185. Nell ORTIZ. Hypopharyngeal surgery in obstructive sleep apnea: an evidence-based medicine review.  Arch Otolaryngol Head Neck Surg. 2006 Feb;132(2):206-13.  3. Edgardo YH1, Jeremías Y, Richardson CORIE. The efficacy of anatomically based multilevel surgery for obstructive sleep apnea. Otolaryngol Head Neck Surg. 2003 Oct;129(4):327-35.  4. Nell ORTIZ, Goldberg A. Hypopharyngeal Surgery in Obstructive Sleep Apnea: An Evidence-Based Medicine Review. Arch Otolaryngol Head Neck Surg. 2006 Feb;132(2):206-13.  5. Silvia EDMOND  et al. Upper-Airway Stimulation for Obstructive Sleep Apnea.  N Engl J Med. 2014 Jan 9;370(2):139-49.  6. Steve Y et al. Increased Incidence of Cardiovascular Disease in Middle-aged Men with Obstructive Sleep Apnea. Am J Respir Crit Care Med; 2002 166: 159-165  7. Mikey GAMBOA et al. Studying Life Effects and Effectiveness of Palatopharyngoplasty (SLEEP) study: Subjective Outcomes of Isolated Uvulopalatopharyngoplasty. Otolaryngol Head Neck Surg. 2011; 144: 623-631.                      Follow-ups after your visit        Future tests that were ordered for you today     Open Future Orders        Priority Expected Expires Ordered    HST-Home Sleep Apnea Test Routine  9/29/2017 3/30/2017            Who to contact     If you have questions or need follow up information about today's clinic visit or your schedule please contact [facility] directly at [phone].  Normal or non-critical lab and imaging results will be communicated to you by Zazomhart, letter or phone within 4 business days after the clinic has received the results. If you do not hear from us within 7 days, please contact the clinic through Engagort or phone. If you have a critical or abnormal lab result, we will notify you by phone as soon as possible.  Submit refill requests through iwi or call your pharmacy and they will forward the refill request to us. Please allow 3 business days for your refill to be completed.          Additional Information About Your Visit        ZazomharZemanta Information     iwi gives you secure access to your electronic health record. If you see a primary care provider, you can also send messages to your care team and make appointments. If you have questions, please call your primary care clinic.  If you do not have a primary care provider, please call [phone] and they will assist you.        Care EveryWhere ID     This is your Care EveryWhere ID. This could be used by other organizations to access your  "[place] medical records  [id number]        Your Vitals Were     Pulse Temperature Height Pulse Oximetry BMI (Body Mass Index)       86 98.4  F (36.9  C) (Oral) 1.702 m (5' 7\") 93% 45.23 kg/m2        Blood Pressure from Last 3 Encounters:   03/30/17 122/77   03/21/17 138/86   08/24/16 134/84    Weight from Last 3 Encounters:   03/30/17 131 kg (288 lb 12.8 oz)   03/21/17 131.1 kg (289 lb)   08/24/16 125.1 kg (275 lb 12.8 oz)              We Performed the Following     SLEEP EVALUATION & MANAGEMENT REFERRAL - ADULT          Today's Medication Changes          These changes are accurate as of: 3/30/17  4:04 PM.  If you have any questions, ask your nurse or doctor.               Stop taking these medicines if you haven't already. Please contact your care team if you have questions.     clindamycin 1 % topical gel   Commonly known as:  CLINDAGEL   Stopped by:  Derrick Coy MD           priLOSEC OTC 20 MG tablet   Generic drug:  omeprazole   Stopped by:  Derrick Coy MD                    Primary Care Provider Office Phone # Fax #    Polly Mcbride [phone]       [place] [address]        Thank you!     Thank you for choosing [facility]  for your care. Our goal is always to provide you with excellent care. Hearing back from our patients is one way we can continue to improve our services. Please take a few minutes to complete the written survey that you may receive in the mail after your visit with us. Thank you!             Your Updated Medication List - Protect others around you: Learn how to safely use, store and throw away your medicines at www.disposemymeds.org.          This list is accurate as of: 3/30/17  4:04 PM.  Always use your most recent med list.                   Brand Name Dispense Instructions for use    ADVIL 200 MG tablet   Generic drug:  ibuprofen      TAKE THREE TABLETS BY MOUTH " QD-BID PRN       CENTRUM PO          fish oil-omega-3 fatty acids 1000 MG capsule      Take 2 g by mouth daily       hydrochlorothiazide 25 MG tablet    HYDRODIURIL    90 tablet    Take 1 tablet (25 mg) by mouth daily       lisinopril 40 MG tablet    PRINIVIL/ZESTRIL    90 tablet    Take 1 tablet (40 mg) by mouth daily       * metroNIDAZOLE 500 MG tablet    FLAGYL    21 tablet    Take 1 tablet (500 mg) by mouth 3 times daily       * metroNIDAZOLE 500 MG tablet    FLAGYL    14 tablet    Take 1 tablet (500 mg) by mouth 2 times daily       * metroNIDAZOLE 500 MG tablet    FLAGYL    14 tablet    Take 1 tablet (500 mg) by mouth 2 times daily       NEXIUM PO          PROBIOTIC ADVANCED PO          * varenicline 0.5 MG X 11 & 1 MG X 42 tablet    CHANTIX STARTING MONTH WILY    53 tablet    Take 0.5 mg tab daily for 3 days, then 0.5 mg tab twice daily for 4 days, then 1 mg twice daily.       * varenicline 1 MG tablet    CHANTIX    60 tablet    Take 1 tablet (1 mg) by mouth 2 times daily       * varenicline 0.5 MG X 11 & 1 MG X 42 tablet    CHANTIX STARTING MONTH WILY    53 tablet    Take 0.5 mg tab daily for 3 days, then 0.5 mg tab twice daily for 4 days, then 1 mg twice daily.       * Notice:  This list has 6 medication(s) that are the same as other medications prescribed for you. Read the directions carefully, and ask your doctor or other care provider to review them with you.

## 2017-03-30 NOTE — NURSING NOTE
"Chief Complaint   Patient presents with     Sleep Problem     consult       Initial /77 (BP Location: Right arm, Patient Position: Chair, Cuff Size: Adult Regular)  Pulse 86  Temp 98.4  F (36.9  C) (Oral)  Ht 1.702 m (5' 7\")  Wt 131 kg (288 lb 12.8 oz)  SpO2 93%  BMI 45.23 kg/m2 Estimated body mass index is 45.23 kg/(m^2) as calculated from the following:    Height as of this encounter: 1.702 m (5' 7\").    Weight as of this encounter: 131 kg (288 lb 12.8 oz).  Medication Reconciliation: complete     Neck Circumference: 43 CM  ESS: 7  Coral Alba MA  [facility]        "

## 2017-03-30 NOTE — PATIENT INSTRUCTIONS
"MY TREATMENT INFORMATION FOR SLEEP APNEA-  Danitza Soto    DOCTOR : Derrick Coy  SLEEP CENTER :      MY CONTACT NUMBER:       If I haven't had a sleep study yet, what can I expect?  A personal story from Nathan  https://www.Nuritasube.com/watch?v=AxPLmlRpnCs    Am I having a home sleep study?  Here is a video in case you get home and want to make sure you have done it correctly  https://www.Nuritasube.com/watch?v=SXQ0W6aBkp4&feature=youtu.be    Frequently asked questions:  1. What is Obstructive Sleep Apnea (LEONARDO)? LEONARDO is the most common type of sleep apnea. Apnea literally means, \"without breath.\" It is characterized by repetitive pauses in breathing, despite continued effort to breathe, and is usually associated with a reduction in blood oxygen saturation. Apneas can last 10 to over 60 seconds. It is caused by narrowing or collapse of the upper airway as muscles relax during sleep. Severity of sleep apnea is determined by frequency of breathing events and their effect on your sleep and oxygen levels determined during sleep testing.   2. What are the consequences of LEONARDO? Symptoms include: daytime sleepiness- possibly increasing the risk of falling asleep while driving, unrefreshing/restless sleep, snoring, insomnia, waking frequently to urinate, waking with heartburn or reflux, reduced concentration and memory, and morning headaches. Other health consequences may include development of high blood pressure and other cardiovascular disease in persons who are susceptible. Untreated LEONARDO  can contribute to heart disease, stroke and diabetes.   3. What are the treatment options? In most situations, sleep apnea is a lifelong disease that must be managed with daily therapy. Medications are not effective for sleep apnea and surgery is generally not performed until other therapies have been tried. Therapy is usually tailored to the individual patient based on many factors including your wishes as well as severity of sleep " apnea and severity of obesity. Continuous Positive Airway (CPAP) is the most reliable treatment. An oral device to hold your jaw forward is usually the next most reliable option. Other options include postioning devices (to keep you off your back), weight loss, and surgery including a tongue pacing device. There is more detail about some of these options below.            1. CPAP-  WHAT DOES IT DO AND HOW CAN I LEARN TO WEAR IT?                               BEFORE I START, CAN I WATCH A MOVIE TO GET A PLAN ON HOW TO USE CPAP?  https://www.Haozu.com.com/watch?p=t3Z11qk371P      Continuous positive airway pressure, or CPAP, is the most effective treatment for obstructive sleep apnea. It works by blowing room air, through a mask, to hold your throat open. A decision to use CPAP is a major step forward in the pursuit of a healthier life. The successful use of CPAP will help you breathe easier, sleep better and live healthier. You can choose CPAP equipment from any durable medical equipment provider that meets your needs.  Using CPAP can be a positive experience if you keep these castrejon points in mind:  1. Commitment  CPAP is not a quick fix for your problem. It involves a long-term commitment to improve your sleep and your health.    2. Communication  Stay in close communication with both your sleep doctor and your CPAP supplier. Ask lots of questions and seek help when you need it.    3. Consistency  Use CPAP all night, every night and for every nap. You will receive the maximum health benefits from CPAP when you use it every time that you sleep. This will also make it easier for your body to adjust to the treatment.    4. Correction  The first machine and mask that you try may not be the best ones for you. Work with your sleep doctor and your CPAP supplier to make corrections to your equipment selection. Ask about trying a different type of machine or mask if you have ongoing problems. Make sure that your mask is a good  "fit and learn to use your equipment properly.    5. Challenge  Tell a family member or close friend to ask you each morning if you used your CPAP the previous night. Have someone to challenge you to give it your best effort.    6. Connection   Your adjustment to CPAP will be easier if you are able to connect with others who use the same treatment. Ask your sleep doctor if there is a support group in your area for people who have sleep apnea, or look for one on the Internet.  7. Comfort   Increase your level of comfort by using a saline spray, decongestant or heated humidifier if CPAP irritates your nose, mouth or throat. Use your unit's \"ramp\" setting to slowly get used to the air pressure level. There may be soft pads you can buy that will fit over your mask straps. Look on www.CPAP.com for accessories that can help make CPAP use more comfortable.  8. Cleaning   Clean your mask, tubing and headgear on a regular basis. Put this time in your schedule so that you don't forget to do it. Check and replace the filters for your CPAP unit and humidifier.    9. Completion   Although you are never finished with CPAP therapy, you should reward yourself by celebrating the completion of your first month of treatment. Expect this first month to be your hardest period of adjustment. It will involve some trial and error as you find the machine, mask and pressure settings that are right for you.    10. Continuation  After your first month of treatment, continue to make a daily commitment to use your CPAP all night, every night and for every nap.    CPAP-Tips to starting with success:  Begin using your CPAP for short periods of time during the day while you watch TV or read.    Use CPAP every night and for every nap. Using it less often reduces the health benefits and makes it harder for your body to get used to it.    Make small adjustments to your mask, tubing, straps and headgear until you get the right fit. Tightening the mask " may actually worsen the leak.  If it leaves significant marks on your face or irritates the bridge of your nose, it may not be the best mask for you.  Speak with the person who supplied the mask and consider trying other masks. Insurances will allow you to try different masks during the first month of starting CPAP.  Insurance also covers a new mask, hose and filter about every 6 months.    Use a saline nasal spray to ease mild nasal congestion. Neti-Pot or saline nasal rinses may also help. Nasal gel sprays can help reduce nasal dryness.  Biotene mouthwash can be helpful to protect your teeth if you experience frequent dry mouth.  Dry mouth may be a sign of air escaping out of your mouth or out of the mask in the case of a full face mask.  Speak with your provider if you expect that is the case.     Take a nasal decongestant to relieve more severe nasal or sinus congestion.  Do not use Afrin (oxymetazoline) nasal spray more than 3 days in a row.  Speak with your sleep doctor if your nasal congestion is chronic.    Use a heated humidifier that fits your CPAP model to enhance your breathing comfort. Adjust the heat setting up if you get a dry nose or throat, down if you get condensation in the hose or mask.  Position the CPAP lower than you so that any condensation in the hose drains back into the machine rather than towards the mask.    Try a system that uses nasal pillows if traditional masks give you problems.    Clean your mask, tubing and headgear once a week. Make sure the equipment dries fully.    Regularly check and replace the filters for your CPAP unit and humidifier.    Work closely with your sleep provider and your CPAP supplier to make sure that you have the machine, mask and air pressure setting that works best for you. It is better to stop using it and call your provider to solve problems than to lay awake all night frustrated with the device.    BESIDES CPAP, WHAT OTHER THERAPIES ARE  THERE?      Positioning Device  Positioning devices are generally used when sleep apnea is mild and only occurs on your back.This example shows a pillow that straps around the waist. It may be appropriate for those whose sleep study shows milder sleep apnea that occurs primarily when lying flat on one's back. Preliminary studies have shown benefit but effectiveness at home may need to be verified by a home sleep test. These devices are generally not covered by medical insurance.                      Oral Appliance  What is oral appliance therapy?  An oral appliance is a small acrylic device that fits over the upper and lower teeth or tongue (similar to an orthodontic retainer or a mouth guard). This device slightly advances the lower jaw or tongue, which moves the base of the tongue forward, opens the airway, improves breathing and can effectively treat snoring and obstructive sleep apnea sleep apnea. The appliance is fabricated and customized by a qualified dentist with experience in treating snoring and sleep apnea. Oral appliances are usually well tolerated and have relatively high compliance by patients1, 2, 3.  When is an oral appliance indicated?  Oral appliance therapy is recommended as a first-line treatment for patients with primary snoring, mild sleep apnea, and for patients with moderate sleep apnea who prefer appliance therapy to use of CPAP4, 5. Severity of sleep apnea is determined by sleep testing and is based on the number of respiratory events per hour of sleep.   How successful is oral appliance therapy?  The success rate of oral appliance therapy in patients with mild sleep apnea is 75-80% while in patients with moderate sleep apnea it is 50-70%. The chance of success in patients with severe sleep apnea is 40-50%. The research also shows that oral appliances have a beneficial effect on the cardiovascular health of LEONARDO patients at the same magnitude as CPAP therapy7.  Oral appliances should be a  second-line treatment in cases of severe sleep apnea, but if not completely successful then a combination therapy utilizing CPAP plus oral appliance therapy may be effective. Oral appliances tend to be effective in a broad range of patients although studies show that the patients who have the highest success are females, younger patients, those with milder disease, and less severe obesity. 3, 6.   The chances of success are lower in patients who have more severe LEONARDO, are older, and those who are morbidly obese.     Example of an oral appliance   Finding a dentist that practices dental sleep medicine  Specific training is available through the American Academy of Dental Sleep Medicine for dentists interested in working in the field of sleep. To find a dentist who is educated in the field of sleep and the use of oral appliances, near you, visit the Web site of the American Academy of Dental Sleep Medicine; also see   http://www.accpstorage.org/newOrganization/patients/oralAppliances.pdf  To search for a dentist certified in these practices:  Http://aadsm.org/FindADentist.aspx?1  1. Vaughn et al. Objectively measured vs self-reported compliance during oral appliance therapy for sleep-disordered breathing. Chest 2013; 144(5): 7255-5503.  2. Raysa, et al. Objective measurement of compliance during oral appliance therapy for sleep-disordered breathing. Thorax 2013; 68(1): 91-96.  3. Jannette, et al. Mandibular advancement devices in 620 men and women with LEONARDO and snoring: tolerability and predictors of treatment success. Chest 2004; 125: 6872-7870.  4. Kathy, et al. Oral appliances for snoring and LEONARDO: a review. Sleep 2006; 29: 244-262.  5. German et al. Oral appliance treatment for LEONARDO: an update. J Clin Sleep Med 2014; 10(2): 215-227.  6. Yamila et al. Predictors of OSAH treatment outcome. J Dent Res 2007; 86: 4755-9344.      Weight Loss:    Weight management is a personal decision.  If you are  interested in exploring weight loss strategies, the following discussion covers the impact on weight loss on sleep apnea and the approaches that may be successful.    Weight loss decreases severity of sleep apnea in most people with obesity. For those with mild obesity who have developed snoring with weight gain, even 15-30 pound weight loss can improve and occasionally eliminate sleep apnea.  Structured and life-long dietary and health habits are necessary to lose weight and keep healthier weight levels.     Though there may be significant health benefits from weight loss, long-term weight loss is very difficult to achieve- studies show success with dietary management in less than 10% of people. In addition, substantial weight loss may require years of dietary control and may be difficult if patients have severe obesity. In these cases, surgical management may be considered.  Finally, older individuals who have tolerated obesity without health complications may be less likely to benefit from weight loss strategies.    Your BMI is Body mass index is 45.23 kg/(m^2).  Body mass index (BMI) is one way to tell whether you are at a healthy weight, overweight, or obese. It measures your weight in relation to your height.  A BMI of 18.5 to 24.9 is in the healthy range. A person with a BMI of 25 to 29.9 is considered overweight, and someone with a BMI of 30 or greater is considered obese. More than two-thirds of American adults are considered overweight or obese.  Being overweight or obese increases the risk for further weight gain. Excess weight may lead to heart disease and diabetes.  Creating and following plans for healthy eating and physical activity may help you improve your health.  Weight control is part of healthy lifestyle and includes exercise, emotional health, and healthy eating habits. Careful eating habits lifelong are the mainstay of weight control. Though there are significant health benefits from weight  loss, long-term weight loss with diet alone may be very difficult to achieve- studies show long-term success with dietary management in less than 10% of people. Attaining a healthy weight may be especially difficult to achieve in those with severe obesity. In some cases, medications, devices and surgical management might be considered.  What can you do?  If you are overweight or obese and are interested in methods for weight loss, you should discuss this with your provider.     Consider reducing daily calorie intake by 500 calories.     Keep a food journal.     Avoiding skipping meals, consider cutting portions instead.    Diet combined with exercise helps maintain muscle while optimizing fat loss. Strength training is particularly important for building and maintaining muscle mass. Exercise helps reduce stress, increase energy, and improves fitness. Increasing exercise without diet control, however, may not burn enough calories to loose weight.       Start walking three days a week 10-20 minutes at a time    Work towards walking thirty minutes five days a week     Eventually, increase the speed of your walking for 1-2 minutes at time    In addition, we recommend that you review healthy lifestyles and methods for weight loss available through the National Institutes of Health patient information sites:  http://win.niddk.nih.gov/publications/index.htm    And look into health and wellness programs that may be available through your health insurance provider, employer, local community center, or sriram club.    Weight management plan: Patient was referred to their PCP to discuss a diet and exercise plan.    Surgery:    Upper Airway Surgery for LEONARDO  Surgery for LEONARDO is a second-line treatment option in the management of sleep apnea.  Surgery should be considered for patients who are having a difficult time tolerating CPAP.    Surgery for LEONARDO is directed at areas that are responsible for narrowing or complete obstruction  of the airway during sleep.  There are a wide range of procedures available to enlarge and/or stabilize the airway to prevent blockage of breathing in the three major areas where it can occur: the palate, tongue, and nasal regions.  Successful surgical treatment depends on the accurate identification of the factors responsible for obstructive sleep apnea in each person.  A personalized approach is required because there is no single treatment that works well for everyone.  Because of anatomic variation, consultation with an examination by a sleep surgeon is a critical first step in determining what surgical options are best for each patient.  In some cases, examination during sedation may be recommended in order to guide the selection of procedures.  Patients will be counseled about risks and benefits as well as the typical recovery course after surgery. Surgery is typically not a cure for a person s LEONARDO.  However, surgery will often significantly improve one s LEONARDO severity (termed  success rate ).  Even in the absence of a cure, surgery will decrease the cardiovascular risk associated with OSA7; improve overall quality of life8 (sleepiness, functionality, sleep quality, etc).          Palate Procedures:  Patients with LEONARDO often have narrowing of their airway in the region of their tonsils and uvula.  The goals of palate procedures are to widen the airway in this region as well as to help the tissues resist collapse.  Modern palate procedure techniques focus on tissue conservation and soft tissue rearrangement, rather than tissue removal.  Often the uvula is preserved in this procedure. Residual sleep apnea is common in patient after pharyngoplasty with an average reduction in sleep apnea events of 33%2.      Tongue Procedures:  While patients are awake, the muscles that surround the throat are active and keep this region open for breathing. These muscles relax during sleep, allowing the tongue and other structures  to collapse and block breathing.  There are several different tongue procedures available.  Selection of a tongue base procedure depends on characteristics seen on physical exam.  Generally, procedures are aimed at removing bulky tissues in this area or preventing the back of the tongue from falling back during sleep.  Success rates for tongue surgery range from 50-62%3.    Hypoglossal Nerve Stimulation:  Hypoglossal nerve stimulation has recently received approval from the [place] Food and Drug Administration for the treatment of obstructive sleep apnea.  This is based on research showing that the system was safe and effective in treating sleep apnea6.  Results showed that the median AHI score decreased 68%, from 29.3 to 9.0. This therapy uses an implant system that senses breathing patterns and delivers mild stimulation to airway muscles, which keeps the airway open during sleep.  The system consists of three fully implanted components: a small generator (similar in size to a pacemaker), a breathing sensor, and a stimulation lead.  Using a small handheld remote, a patient turns the therapy on before bed and off upon awakening.    Candidates for this device must be greater than 22 years of age, have moderate to severe LEONARDO (AHI between 20-65), BMI less than 32, have tried CPAP/oral appliance without success, and have appropriate upper airway anatomy (determined by a sleep endoscopy performed by Dr. Morales).    Hypoglossal Nerve Stimulation Pathway:    The sleep surgeon s office will work with the patient through the insurance prior-authorization process (including communications and appeals).    Nasal Procedures:  Nasal obstruction can interfere with nasal breathing during the day and night.  Studies have shown that relief of nasal obstruction can improve the ability of some patients to tolerate positive airway pressure therapy for obstructive sleep apnea1.  Treatment options include medications such as nasal  saline, topical corticosteroid and antihistamine sprays, and oral medications such as antihistamines or decongestants. Non-surgical treatments can include external nasal dilators for selected patients. If these are not successful by themselves, surgery can improve the nasal airway either alone or in combination with these other options.      Combination Procedures:  Combination of surgical procedures and other treatments may be recommended, particularly if patients have more than one area of narrowing or persistent positional disease.  The success rate of combination surgery ranges from 66-80%2,3.      1. Santa PEREZ. The Role of the Nose in Snoring and Obstructive Sleep Apnoea: An Update.  Eur Arch Otorhinolaryngol. 2011; 268: 1365-73.  2.  Sol SM; Mora JA; Charline JR; Pallanch JF; Graham MB; Vandana SG; Shawnee BUTLER. Surgical modifications of the upper airway for obstructive sleep apnea in adults: a systematic review and meta-analysis. SLEEP 2010;33(10):6772-5173. Nell ORTIZ. Hypopharyngeal surgery in obstructive sleep apnea: an evidence-based medicine review.  Arch Otolaryngol Head Neck Surg. 2006 Feb;132(2):206-13.  3. Edgardo YH1, Jeremías Y, Richardson CORIE. The efficacy of anatomically based multilevel surgery for obstructive sleep apnea. Otolaryngol Head Neck Surg. 2003 Oct;129(4):327-35.  4. Nell ORTIZ, Goldberg A. Hypopharyngeal Surgery in Obstructive Sleep Apnea: An Evidence-Based Medicine Review. Arch Otolaryngol Head Neck Surg. 2006 Feb;132(2):206-13.  5. Silvia EDMOND et al. Upper-Airway Stimulation for Obstructive Sleep Apnea.  N Engl J Med. 2014 Jan 9;370(2):139-49.  6. Steve Y et al. Increased Incidence of Cardiovascular Disease in Middle-aged Men with Obstructive Sleep Apnea. Am J Respir Crit Care Med; 2002 166: 159-165  7. Mikey GAMBOA et al. Studying Life Effects and Effectiveness of Palatopharyngoplasty (SLEEP) study: Subjective Outcomes of Isolated Uvulopalatopharyngoplasty. Otolaryngol Head Neck Surg. 2011;  144: 623631.

## 2017-04-10 ENCOUNTER — OFFICE VISIT (OUTPATIENT)
Dept: SLEEP MEDICINE | Facility: CLINIC | Age: 37
End: 2017-04-10
Payer: COMMERCIAL

## 2017-04-10 DIAGNOSIS — E66.01 MORBID OBESITY DUE TO EXCESS CALORIES (H): ICD-10-CM

## 2017-04-10 DIAGNOSIS — E66.2 OBESITY HYPOVENTILATION SYNDROME (H): ICD-10-CM

## 2017-04-10 DIAGNOSIS — G47.33 OSA (OBSTRUCTIVE SLEEP APNEA): Primary | ICD-10-CM

## 2017-04-10 PROCEDURE — G0399 HOME SLEEP TEST/TYPE 3 PORTA: HCPCS | Performed by: INTERNAL MEDICINE

## 2017-04-10 NOTE — MR AVS SNAPSHOT
After Visit Summary   4/10/2017    Danitza Soto    MRN: [id number]           Patient Information     Date Of Birth          1980        Visit Information        Provider Department      4/10/2017 11:30 AM BED 7  SLEEP [facility]        Today's Diagnoses     LEONARDO (obstructive sleep apnea)    -  1       Follow-ups after your visit        Your next 10 appointments already scheduled     Apr 11, 2017  3:30 PM CDT   HST Drop Off with  SLEEP CENTER DME   [facility] ([facility])    [address] [id number] [phone]              Who to contact     If you have questions or need follow up information about today's clinic visit or your schedule please contact [facility] directly at [phone].  Normal or non-critical lab and imaging results will be communicated to you by Matchpinhart, letter or phone within 4 business days after the clinic has received the results. If you do not hear from us within 7 days, please contact the clinic through Matchpinhart or phone. If you have a critical or abnormal lab result, we will notify you by phone as soon as possible.  Submit refill requests through CourseWeaver or call your pharmacy and they will forward the refill request to us. Please allow 3 business days for your refill to be completed.          Additional Information About Your Visit        MyChart Information     CourseWeaver gives you secure access to your electronic health record. If you see a primary care provider, you can also send messages to your care team and make appointments. If you have questions, please call your primary care clinic.  If you do not have a primary care provider, please call [phone] and they will assist you.        Care EveryWhere ID     This is your Care EveryWhere ID. This could be used by other organizations to access your [facility] medical records  [id number]         Blood  Pressure from Last 3 Encounters:   03/30/17 122/77   03/21/17 138/86   08/24/16 134/84    Weight from Last 3 Encounters:   03/30/17 131 kg (288 lb 12.8 oz)   03/21/17 131.1 kg (289 lb)   08/24/16 125.1 kg (275 lb 12.8 oz)              Today, you had the following     No orders found for display       Primary Care Provider Office Phone # Fax #    Polly Mcbride [phone]       [facility] [address] [facility]   [facility] [address]        Thank you!     Thank you for choosing [facility]  for your care. Our goal is always to provide you with excellent care. Hearing back from our patients is one way we can continue to improve our services. Please take a few minutes to complete the written survey that you may receive in the mail after your visit with us. Thank you!             Your Updated Medication List - Protect others around you: Learn how to safely use, store and throw away your medicines at www.disposemymeds.org.          This list is accurate as of: 4/10/17 11:59 PM.  Always use your most recent med list.                   Brand Name Dispense Instructions for use    ADVIL 200 MG tablet   Generic drug:  ibuprofen      TAKE THREE TABLETS BY MOUTH QD-BID PRN       CENTRUM PO          fish oil-omega-3 fatty acids 1000 MG capsule      Take 2 g by mouth daily       hydrochlorothiazide 25 MG tablet    HYDRODIURIL    90 tablet    Take 1 tablet (25 mg) by mouth daily       lisinopril 40 MG tablet    PRINIVIL/ZESTRIL    90 tablet    Take 1 tablet (40 mg) by mouth daily       * metroNIDAZOLE 500 MG tablet    FLAGYL    21 tablet    Take 1 tablet (500 mg) by mouth 3 times daily       * metroNIDAZOLE 500 MG tablet    FLAGYL    14 tablet    Take 1 tablet (500 mg) by mouth 2 times daily       * metroNIDAZOLE 500 MG tablet    FLAGYL    14 tablet    Take 1 tablet (500 mg) by mouth 2 times daily       NEXIUM PO          PROBIOTIC ADVANCED PO          * varenicline 0.5 MG X 11 &  1 MG X 42 tablet    CHANTIX STARTING MONTH WILY    53 tablet    Take 0.5 mg tab daily for 3 days, then 0.5 mg tab twice daily for 4 days, then 1 mg twice daily.       * varenicline 1 MG tablet    CHANTIX    60 tablet    Take 1 tablet (1 mg) by mouth 2 times daily       * varenicline 0.5 MG X 11 & 1 MG X 42 tablet    CHANTIX STARTING MONTH WILY    53 tablet    Take 0.5 mg tab daily for 3 days, then 0.5 mg tab twice daily for 4 days, then 1 mg twice daily.       * Notice:  This list has 6 medication(s) that are the same as other medications prescribed for you. Read the directions carefully, and ask your doctor or other care provider to review them with you.

## 2017-04-11 ENCOUNTER — DOCUMENTATION ONLY (OUTPATIENT)
Dept: SLEEP MEDICINE | Facility: CLINIC | Age: 37
End: 2017-04-11

## 2017-04-11 NOTE — PROGRESS NOTES
Patient instructed on HST use. Patient demonstrated and verbalized knowledge of use. Device programmed to start at 9:30PM. Device will be returned tomorrow before noon.      Hien Carroll

## 2017-04-13 ENCOUNTER — TELEPHONE (OUTPATIENT)
Dept: SLEEP MEDICINE | Facility: CLINIC | Age: 37
End: 2017-04-13

## 2017-04-13 DIAGNOSIS — E66.2 OBESITY HYPOVENTILATION SYNDROME (H): ICD-10-CM

## 2017-04-13 DIAGNOSIS — G47.33 OSA (OBSTRUCTIVE SLEEP APNEA): ICD-10-CM

## 2017-04-13 NOTE — TELEPHONE ENCOUNTER
Called to review Home Sleep Apnea Testing results.  Moderate Obstructive Sleep Apnea.  However, she also had prolonged sustained hypoxia episodes concerning for obesity hypoventilation.  Based on findings, has high likelihood of being ineffective. In that situation, the safest and appropriate procedure is to perform in-lab titration with TCM.    Discussed with patient and she is in agreement with plan.    ICD-10-CM    1. Obesity hypoventilation syndrome (H) E66.2 Comprehensive Sleep Study   2. LEONARDO (obstructive sleep apnea) G47.33 Comprehensive Sleep Study    Derrick Coy MD, Sleep Physician  Apr 13, 2017

## 2017-04-13 NOTE — PROCEDURES
"HOME SLEEP STUDY INTERPRETATION    Patient: Danitza Soto  MRN: [id number]  YOB: 1980  Study Date: 4/10/2017  Referring Provider: Polly Mcbride;   Ordering Provider: Derrick Coy MD     Indications for Home Study: Danitza Soto is a 36 year old female with a history of morbid obesity who presents with symptoms suggestive of obstructive sleep apnea.    Estimated body mass index is 45.23 kg/(m^2) as calculated from the following:    Height as of 3/30/17: 1.702 m (5' 7\").    Weight as of 3/30/17: 131 kg (288 lb 12.8 oz).  Total score - [place]: 7 (3/30/2017  3:04 PM)  StopBang Total Score: 6 (3/30/2017  4:00 PM)    Data: A full night home sleep study was performed recording the standard physiologic parameters including body position, movement, sound, nasal pressure, thermal oral airflow, chest and abdominal movements with respiratory inductance plethysmography, and oxygen saturation by pulse oximetry. Pulse rate was estimated by oximetry recording. This study was considered adequate based on > 4 hours of quality oximetry and respiratory recording. As specified by the AASM Manual for the Scoring of Sleep and Associated events, version 2.3, Rule VIII.D 1B, 4% oxygen desaturation scoring for hypopneas is used as a standard of care on all home sleep apnea testing.    Analysis Time:  469 minutes    Respiration:   Sleep Associated Hypoxemia: sustained hypoxemia was present. Baseline oxygen saturation was 92.1%.  Time with saturation less than or equal to 88% was 33.8 minutes. The lowest oxygen saturation was 77%.   Snoring: Snoring was present.  Respiratory events: The home study revealed a presence of 17 obstructive apneas and 3 mixed and central apneas. There were 101 hypopneas resulting in a combined apnea/hypopnea index [AHI] of 15.5 events per hour.  AHI was 12.9 per hour supine, - per hour prone, 20 per hour on left side, and 13.4 per hour on right side.   Pattern: Excluding events noted " above, respiratory rate and pattern was Normal.    Position: Percent of time spent: supine - 39.5%, prone - 0%, on left - 34.6%, on right - 25.7%.    Heart Rate: By pulse oximetry normal rate was noted.     Assessment:   Moderate obstructive sleep apnea.  Evidence of hypoventilation consistent with obesity hypoventilation.  Sleep associated hypoxemia was present.    Recommendations:  - polysomnography with full night PAP titration.  - Suggest optimizing sleep hygiene and avoiding sleep deprivation.  - Weight management.    Diagnosis Code(s): Obstructive Sleep Apnea G47.33, Hypoxemia G47.36, Obesity Hypoventilation Syndrome E66.2    Derrick Coy MD, April 13, 2017   Diplomate, American Board of Internal Medicine, Sleep Medicine

## 2017-04-19 ENCOUNTER — THERAPY VISIT (OUTPATIENT)
Dept: SLEEP MEDICINE | Facility: CLINIC | Age: 37
End: 2017-04-19
Attending: INTERNAL MEDICINE
Payer: COMMERCIAL

## 2017-04-19 ENCOUNTER — MYC MEDICAL ADVICE (OUTPATIENT)
Dept: FAMILY MEDICINE | Facility: CLINIC | Age: 37
End: 2017-04-19

## 2017-04-19 DIAGNOSIS — F17.200 TOBACCO USE DISORDER: Primary | ICD-10-CM

## 2017-04-19 DIAGNOSIS — R06.89 HYPOVENTILATION SYNDROME: ICD-10-CM

## 2017-04-19 DIAGNOSIS — E66.01 MORBID OBESITY DUE TO EXCESS CALORIES (H): ICD-10-CM

## 2017-04-19 DIAGNOSIS — G47.33 OSA (OBSTRUCTIVE SLEEP APNEA): Primary | ICD-10-CM

## 2017-04-19 DIAGNOSIS — E66.2 OBESITY HYPOVENTILATION SYNDROME (H): ICD-10-CM

## 2017-04-19 PROCEDURE — 95811 POLYSOM 6/>YRS CPAP 4/> PARM: CPT | Mod: ZF

## 2017-04-19 PROCEDURE — 95811 POLYSOM 6/>YRS CPAP 4/> PARM: CPT | Performed by: INTERNAL MEDICINE

## 2017-04-20 ENCOUNTER — OFFICE VISIT (OUTPATIENT)
Dept: SLEEP MEDICINE | Facility: CLINIC | Age: 37
End: 2017-04-20
Payer: COMMERCIAL

## 2017-04-20 VITALS
WEIGHT: 288 LBS | HEIGHT: 67 IN | OXYGEN SATURATION: 96 % | BODY MASS INDEX: 45.2 KG/M2 | SYSTOLIC BLOOD PRESSURE: 146 MMHG | DIASTOLIC BLOOD PRESSURE: 76 MMHG

## 2017-04-20 DIAGNOSIS — E66.01 MORBID OBESITY DUE TO EXCESS CALORIES (H): Primary | ICD-10-CM

## 2017-04-20 DIAGNOSIS — E66.2 OBESITY HYPOVENTILATION SYNDROME (H): ICD-10-CM

## 2017-04-20 DIAGNOSIS — I10 HYPERTENSION GOAL BP (BLOOD PRESSURE) < 140/90: ICD-10-CM

## 2017-04-20 DIAGNOSIS — G47.33 OSA (OBSTRUCTIVE SLEEP APNEA): ICD-10-CM

## 2017-04-20 PROCEDURE — 99213 OFFICE O/P EST LOW 20 MIN: CPT | Performed by: INTERNAL MEDICINE

## 2017-04-20 ASSESSMENT — PAIN SCALES - GENERAL: PAINLEVEL: NO PAIN (0)

## 2017-04-20 NOTE — PROCEDURES
"SLEEP STUDY INTERPRETATION  TITRATION STUDY      Patient: Danitza Soto  YOB: 1980  Study Date: 4/19/2017  MRN: [id number]  Referring Provider: Polly Mcbride  Ordering Provider: Derrick Coy MD    Indications for Polysomnography: The patient is a 36 y old Female who is 5' 7\" and weighs 288.0 lbs.  Her BMI is 45.2, [place] sleepiness scale is 7.0 and neck size is 43.0.  Relevant medical history includes morbid obesity.  A diagnostic polysomnogram PAP titration was performed for LEONARDO, hypoventilation and hypoxemia.    Polysomnogram Data:  A full night polysomnogram recorded the standard physiologic parameters including EEG, EOG, EMG, ECG, nasal and oral airflow.  Respiratory parameters of chest and abdominal movements were recorded with respiratory inductance plethysmography.  Oxygen saturation was recorded by pulse oximetry.      Treatment PSG:  Sleep Architecture: Increased sleep latency without sleep aid, normal arousal index, and decreased sleep efficiency.  The total recording time of the study was 479.3 minutes.  The total sleep time was 383.0 minutes.  Sleep latency was increased at 28.4 minutes without the use of a sleep aid.  REM latency was 98.5 minutes.  Arousal index was normal at 15.2 arousals per hour.  Sleep efficiency was decreased at 79.9%.  Wake after sleep onset was 37.5 minutes.   The patient spent 8.7% of total sleep time in Stage N1, 40.9% in Stage N2, 28.6% in Stage N3 and 21.8% in REM.     Respiration: Optimal titration with control of hypoxemia even at low CPAP pressures, and notable absence of hypoventilation on CPAP.    The patient was titrated at pressures ranging from 5 cmH2O up to 11 cmH2O.  The optimal pressure achieved was 11 cmH2O with a residual AHI of 3.7 events per hour.  Time in REM supine on final pressure was 33.5 minutes.     This titration was considered:  - optimal (residual AHI < 5 events per hour and including REM-supine sleep at final " pressure).    Respiratory rate and pattern - was normal.    Sustained Sleep Associated Hypoventilation - Transcutaneous carbon dioxide monitoring was used, however significant hypoventilation was not present with a maximum change of 7 mmHg.    Sleep Associated Hypoxemia - (Greater than 5 minutes O2 sat below 89%) was present.  Baseline oxygen saturation was 93.9%. Lowest oxygen saturation was 83.4%.  Time spent less than or equal to 88% was 0.8 minutes.  Time spent less than or equal to 89% was 1.3 minutes.    Movement Activity: Marked increase in PLM index primarily clustered at the beginning of the study.    Periodic Limb Activity - There were 270 PLMs during the entire study. The PLM index was 42.3 movements per hour.  The PLM Arousal Index was 3.6 per hour.    REM EMG Activity - Excessive muscle activity was not present.    Nocturnal Behavior - Abnormal sleep related behaviors were not noted.    Bruxism - None apparent.    Cardiac Summary: No arrhythmias noted.  The average pulse rate was 77.9 bpm.  The minimum pulse rate was 53.2 bpm while the maximum pulse rate was 103.1 bpm. The rhythm is normal sinus. Arrhythmias were not noted.    Assessment:     Increased sleep latency without sleep aid, normal arousal index, and decreased sleep efficiency.    Optimal titration with control of hypoxemia even at low CPAP pressures, and notable absence of hypoventilation on CPAP.     Marked increase in PLM index primarily clustered at the beginning of the study.     Recommendations:    Treatment of LEONARDO with Auto-titrating PAP therapy with a range of 7 - 15 cmH2O.  Recommend clinical follow up with sleep management team, including review of compliance measures.    Advise regarding the risks of drowsy driving.    Suggest optimizing sleep schedule and avoiding sleep deprivation.    Weight management (if BMI > 30).    Pharmacologic therapy should be used for management of restless legs syndrome only if present and clinically  indicated and not based on the presence of periodic limb movements alone.    Cardiac Comments: Normal Sinus Rhythm  Diagnostic Codes:      Obstructive Sleep Apnea G47.33    Sleep Hypoxemia/Hypoventilation G47.36       _____________________________________   Electronically Signed By: Derrick Coy MD 4/20/2017

## 2017-04-20 NOTE — NURSING NOTE
Completed a all night titration PSG per provider order.    Preliminary AHI 15.5.  A final therapeutic PAP pressure was achieved.    Supine REM was seen on therapeutic pressure.    Patient reports feeling refreshed in AM.

## 2017-04-20 NOTE — PROGRESS NOTES
Sleep Study Follow-Up Visit:    Date on this visit: 4/20/2017    Danitza Soto comes in today for follow-up of her sleep study done on 4/19/2017 at the [facility]  Sleep Center for titration of CPAP/BiPAP for LEONARDO and hypoxia/hypoventilation.    Sleep latency 28 minutes without Ambien.  REM achieved.Sleep efficiency 79.9%.    Sleep Architecture: Increased sleep latency without sleep aid, normal arousal index, and decreased sleep efficiency. The total recording time of the study was 479.3 minutes.  The   total sleep time was 383.0 minutes.  Sleep latency was increased at 28.4 minutes without the use of a sleep aid.  REM latency was 98.5 minutes.  Arousal index was normal at 15.2 arousals per hour.  Sleep efficiency was decreased at 79.9%.  Wake after sleep onset was 37.5 minutes.   The patient spent 8.7% of total sleep time in Stage N1, 40.9% in Stage N2, 28.6% in Stage N3 and 21.8% in REM.     Respiration: Optimal titration with control of hypoxemia even at low CPAP pressures, and notable absence of hypoventilation on CPAP.  The patient was titrated at pressures ranging from 5 cmH2O up to 11 cmH2O.  The optimal pressure achieved was 11 cmH2O with a residual AHI of 3.7 events per hour.  Time in REM supine on final pressure was 33.5 minutes.   This titration was considered:   - optimal (residual AHI < 5 events per hour and including REM-supine sleep at final pressure).  Respiratory rate and pattern - was normal.  Sustained Sleep Associated Hypoventilation - Transcutaneous carbon dioxide monitoring was used, however significant hypoventilation was not present with a maximum change of 7 mmHg.   Sleep Associated Hypoxemia - (Greater than 5 minutes O2 sat below 89%) was present.  Baseline oxygen saturation was 93.9%. Lowest oxygen saturation was 83.4%.  Time spent less than or equal to 88% was 0.8 minutes.  Time spent less than or equal to 89% was 1.3 minutes.    Movement Activity: Marked increase in PLM index  "primarily clustered at the beginning of the study.  Periodic Limb Activity - There were 270 PLMs during the entire study. The PLM index was 42.3 movements per hour.  The PLM Arousal Index was 3.6 per hour.    These findings were reviewed with patient.     Past medical/surgical history, family history, social history, medications and allergies were reviewed.      Problem List:  Patient Active Problem List    Diagnosis Date Noted     LEONARDO (obstructive sleep apnea) 04/13/2017     Priority: Medium     4/10/2017 - Home Sleep Apnea Testing - AHI 15.5/hr; Supine AHI 12.9/hr; SpO2 <= 88% for 33.8 minutes.  Titration Sleep Study 4/19/2017 - (288.0 lbs) CPAP was titrated to a pressure of 11 with an AHI of 3.7.  Time Spent in REM supine at this pressure was 33.5.  Recommending Auto-CPAP 7 - 15 cmH2O.       Obesity hypoventilation syndrome (H) 04/13/2017     Priority: Medium     Mixed hyperlipidemia 07/06/2015     Priority: Medium     LSIL (low grade squamous intraepithelial lesion) on Pap smear 06/17/2014     Priority: Medium     6/17/14: LSIL, + HPV, unable to type.   8/20/14:[facility]:BRISEYDA I. Plan cotest pap & HPV in 1 year.   1/20116 Pap Ascus Neg HPV. Plan: Riva.   5/13/16 Colposcopy not completed. Cancelled by patient  3/21/17 NIL/Neg HPV. Plan: pending provider review.        Acne 02/24/2012     Priority: Medium     Hypertension goal BP (blood pressure) < 140/90 01/12/2011     Priority: Medium     CARDIOVASCULAR SCREENING; LDL GOAL LESS THAN 130 10/31/2010     Priority: Medium     Morbid obesity due to excess calories (H) 05/17/2006     Priority: Medium     Problem list name updated by automated process. Provider to review       Tobacco use disorder 05/17/2006     Priority: Medium     Anxiety state      Priority: Medium     Esophageal reflux      Priority: Medium     /76  Ht 1.702 m (5' 7\")  Wt 130.6 kg (288 lb)  SpO2 96%  BMI 45.11 kg/m2     Impression/Plan:  1. Obesity hypoventilation syndrome (H)  Controlled on " CPAP per #2    2. LEONARDO (obstructive sleep apnea)  I reviewed the diagnosis of obstructive sleep apnea with patient.  We discussed health consequences of untreated sleep apnea and benefits of treatment.  She is interested in starting treatment of LEONARDO with PAP therapy.  Reviewed importance of nightly therapy for effective treatment of LEONARDO, and she voiced understanding and agreement.  We also reviewed importance of using PAP during the entire sleep duration to achieve maximal benefits, but reviewed that a minimum of 4 hours per night was required.  She is confident that she can achieve these goals and is willing to start therapy as soon as possible.  - Comprehensive DME    3. Morbid obesity due to excess calories (H)  We discussed the link between obesity, sleep apnea, and health outcomes.  Patient was encouraged to decrease caloric intake and increase activity levels to try to move towards a normal weight.  She was encouraged to discuss further strategies with her primary care provider.     4. Hypertension goal BP (blood pressure) < 140/90  Patient was counseled on the effects of untreated/sub-optimally treated hypertension.  She was told to discuss findings with her PCP.   Patient to follow up with Primary Care provider regarding elevated blood pressure.   - Comprehensive DME    She will follow up with me per [facility] care plan.    Fifteen minutes spent with patient, all of which were spent face-to-face counseling, consulting, coordinating plan of care.      Derrick Coy MD, Sleep Physician  Apr 20, 2017     CC: Polly Mcbride

## 2017-04-20 NOTE — PATIENT INSTRUCTIONS
"MY TREATMENT INFORMATION FOR SLEEP APNEA-  Danitza Soto    DOCTOR : Derrick Coy  SLEEP CENTER :      MY CONTACT NUMBER:       If I haven't had a sleep study yet, what can I expect?  A personal story from Nathan  https://www.Compliance Scienceube.com/watch?v=AxPLmlRpnCs    Am I having a home sleep study?  Here is a video in case you get home and want to make sure you have done it correctly  https://www.[employer]iance Science[employer].com/watch?v=ICB2L9iYor1&feature=youtu.be    Frequently asked questions:  1. What is Obstructive Sleep Apnea (LEONARDO)? LEONARDO is the most common type of sleep apnea. Apnea literally means, \"without breath.\" It is characterized by repetitive pauses in breathing, despite continued effort to breathe, and is usually associated with a reduction in blood oxygen saturation. Apneas can last 10 to over 60 seconds. It is caused by narrowing or collapse of the upper airway as muscles relax during sleep. Severity of sleep apnea is determined by frequency of breathing events and their effect on your sleep and oxygen levels determined during sleep testing.   2. What are the consequences of LEONARDO? Symptoms include: daytime sleepiness- possibly increasing the risk of falling asleep while driving, unrefreshing/restless sleep, snoring, insomnia, waking frequently to urinate, waking with heartburn or reflux, reduced concentration and memory, and morning headaches. Other health consequences may include development of high blood pressure and other cardiovascular disease in persons who are susceptible. Untreated LEONARDO  can contribute to heart disease, stroke and diabetes.   3. What are the treatment options? In most situations, sleep apnea is a lifelong disease that must be managed with daily therapy. Medications are not effective for sleep apnea and surgery is generally not performed until other therapies have been tried. Therapy is usually tailored to the individual patient based on many factors including your wishes as well as severity of sleep " apnea and severity of obesity. Continuous Positive Airway (CPAP) is the most reliable treatment. An oral device to hold your jaw forward is usually the next most reliable option. Other options include postioning devices (to keep you off your back), weight loss, and surgery including a tongue pacing device. There is more detail about some of these options below.            1. CPAP-  WHAT DOES IT DO AND HOW CAN I LEARN TO WEAR IT?                               BEFORE I START, CAN I WATCH A MOVIE TO GET A PLAN ON HOW TO USE CPAP?  https://www.Schoo.com/watch?x=x3Y92oi064D      Continuous positive airway pressure, or CPAP, is the most effective treatment for obstructive sleep apnea. It works by blowing room air, through a mask, to hold your throat open. A decision to use CPAP is a major step forward in the pursuit of a healthier life. The successful use of CPAP will help you breathe easier, sleep better and live healthier. You can choose CPAP equipment from any durable medical equipment provider that meets your needs.  Using CPAP can be a positive experience if you keep these castrejon points in mind:  1. Commitment  CPAP is not a quick fix for your problem. It involves a long-term commitment to improve your sleep and your health.    2. Communication  Stay in close communication with both your sleep doctor and your CPAP supplier. Ask lots of questions and seek help when you need it.    3. Consistency  Use CPAP all night, every night and for every nap. You will receive the maximum health benefits from CPAP when you use it every time that you sleep. This will also make it easier for your body to adjust to the treatment.    4. Correction  The first machine and mask that you try may not be the best ones for you. Work with your sleep doctor and your CPAP supplier to make corrections to your equipment selection. Ask about trying a different type of machine or mask if you have ongoing problems. Make sure that your mask is a good  "fit and learn to use your equipment properly.    5. Challenge  Tell a family member or close friend to ask you each morning if you used your CPAP the previous night. Have someone to challenge you to give it your best effort.    6. Connection   Your adjustment to CPAP will be easier if you are able to connect with others who use the same treatment. Ask your sleep doctor if there is a support group in your area for people who have sleep apnea, or look for one on the Internet.  7. Comfort   Increase your level of comfort by using a saline spray, decongestant or heated humidifier if CPAP irritates your nose, mouth or throat. Use your unit's \"ramp\" setting to slowly get used to the air pressure level. There may be soft pads you can buy that will fit over your mask straps. Look on www.CPAP.com for accessories that can help make CPAP use more comfortable.  8. Cleaning   Clean your mask, tubing and headgear on a regular basis. Put this time in your schedule so that you don't forget to do it. Check and replace the filters for your CPAP unit and humidifier.    9. Completion   Although you are never finished with CPAP therapy, you should reward yourself by celebrating the completion of your first month of treatment. Expect this first month to be your hardest period of adjustment. It will involve some trial and error as you find the machine, mask and pressure settings that are right for you.    10. Continuation  After your first month of treatment, continue to make a daily commitment to use your CPAP all night, every night and for every nap.    CPAP-Tips to starting with success:  Begin using your CPAP for short periods of time during the day while you watch TV or read.    Use CPAP every night and for every nap. Using it less often reduces the health benefits and makes it harder for your body to get used to it.    Make small adjustments to your mask, tubing, straps and headgear until you get the right fit. Tightening the mask " may actually worsen the leak.  If it leaves significant marks on your face or irritates the bridge of your nose, it may not be the best mask for you.  Speak with the person who supplied the mask and consider trying other masks. Insurances will allow you to try different masks during the first month of starting CPAP.  Insurance also covers a new mask, hose and filter about every 6 months.    Use a saline nasal spray to ease mild nasal congestion. Neti-Pot or saline nasal rinses may also help. Nasal gel sprays can help reduce nasal dryness.  Biotene mouthwash can be helpful to protect your teeth if you experience frequent dry mouth.  Dry mouth may be a sign of air escaping out of your mouth or out of the mask in the case of a full face mask.  Speak with your provider if you expect that is the case.     Take a nasal decongestant to relieve more severe nasal or sinus congestion.  Do not use Afrin (oxymetazoline) nasal spray more than 3 days in a row.  Speak with your sleep doctor if your nasal congestion is chronic.    Use a heated humidifier that fits your CPAP model to enhance your breathing comfort. Adjust the heat setting up if you get a dry nose or throat, down if you get condensation in the hose or mask.  Position the CPAP lower than you so that any condensation in the hose drains back into the machine rather than towards the mask.    Try a system that uses nasal pillows if traditional masks give you problems.    Clean your mask, tubing and headgear once a week. Make sure the equipment dries fully.    Regularly check and replace the filters for your CPAP unit and humidifier.    Work closely with your sleep provider and your CPAP supplier to make sure that you have the machine, mask and air pressure setting that works best for you. It is better to stop using it and call your provider to solve problems than to lay awake all night frustrated with the device.    BESIDES CPAP, WHAT OTHER THERAPIES ARE THERE?       Positioning Device  Positioning devices are generally used when sleep apnea is mild and only occurs on your back.This example shows a pillow that straps around the waist. It may be appropriate for those whose sleep study shows milder sleep apnea that occurs primarily when lying flat on one's back. Preliminary studies have shown benefit but effectiveness at home may need to be verified by a home sleep test. These devices are generally not covered by medical insurance.                      Oral Appliance  What is oral appliance therapy?  An oral appliance is a small acrylic device that fits over the upper and lower teeth or tongue (similar to an orthodontic retainer or a mouth guard). This device slightly advances the lower jaw or tongue, which moves the base of the tongue forward, opens the airway, improves breathing and can effectively treat snoring and obstructive sleep apnea sleep apnea. The appliance is fabricated and customized by a qualified dentist with experience in treating snoring and sleep apnea. Oral appliances are usually well tolerated and have relatively high compliance by patients1, 2, 3.  When is an oral appliance indicated?  Oral appliance therapy is recommended as a first-line treatment for patients with primary snoring, mild sleep apnea, and for patients with moderate sleep apnea who prefer appliance therapy to use of CPAP4, 5. Severity of sleep apnea is determined by sleep testing and is based on the number of respiratory events per hour of sleep.   How successful is oral appliance therapy?  The success rate of oral appliance therapy in patients with mild sleep apnea is 75-80% while in patients with moderate sleep apnea it is 50-70%. The chance of success in patients with severe sleep apnea is 40-50%. The research also shows that oral appliances have a beneficial effect on the cardiovascular health of LEONARDO patients at the same magnitude as CPAP therapy7.  Oral appliances should be a second-line  treatment in cases of severe sleep apnea, but if not completely successful then a combination therapy utilizing CPAP plus oral appliance therapy may be effective. Oral appliances tend to be effective in a broad range of patients although studies show that the patients who have the highest success are females, younger patients, those with milder disease, and less severe obesity. 3, 6.   The chances of success are lower in patients who have more severe LEONARDO, are older, and those who are morbidly obese.     Example of an oral appliance   Finding a dentist that practices dental sleep medicine  Specific training is available through the American Academy of Dental Sleep Medicine for dentists interested in working in the field of sleep. To find a dentist who is educated in the field of sleep and the use of oral appliances, near you, visit the Web site of the American Academy of Dental Sleep Medicine; also see   http://www.accpstorage.org/newOrganization/patients/oralAppliances.pdf  To search for a dentist certified in these practices:  Http://aadsm.org/FindADentist.aspx?1  1. Vaughn et al. Objectively measured vs self-reported compliance during oral appliance therapy for sleep-disordered breathing. Chest 2013; 144(5): 6944-4896.  2. Raysa, et al. Objective measurement of compliance during oral appliance therapy for sleep-disordered breathing. Thorax 2013; 68(1): 91-96.  3. Jannette et al. Mandibular advancement devices in 620 men and women with LEONARDO and snoring: tolerability and predictors of treatment success. Chest 2004; 125: 1491-9225.  4. Chavze, et al. Oral appliances for snoring and LEONARDO: a review. Sleep 2006; 29: 244-262.  5. German, et al. Oral appliance treatment for LEONARDO: an update. J Clin Sleep Med 2014; 10(2): 215-227.  6. Yamila, et al. Predictors of OSAH treatment outcome. J Dent Res 2007; 86: 9155-3908.      Weight Loss:    Weight management is a personal decision.  If you are interested in  exploring weight loss strategies, the following discussion covers the impact on weight loss on sleep apnea and the approaches that may be successful.    Weight loss decreases severity of sleep apnea in most people with obesity. For those with mild obesity who have developed snoring with weight gain, even 15-30 pound weight loss can improve and occasionally eliminate sleep apnea.  Structured and life-long dietary and health habits are necessary to lose weight and keep healthier weight levels.     Though there may be significant health benefits from weight loss, long-term weight loss is very difficult to achieve- studies show success with dietary management in less than 10% of people. In addition, substantial weight loss may require years of dietary control and may be difficult if patients have severe obesity. In these cases, surgical management may be considered.  Finally, older individuals who have tolerated obesity without health complications may be less likely to benefit from weight loss strategies.    Your BMI is There is no height or weight on file to calculate BMI.  Body mass index (BMI) is one way to tell whether you are at a healthy weight, overweight, or obese. It measures your weight in relation to your height.  A BMI of 18.5 to 24.9 is in the healthy range. A person with a BMI of 25 to 29.9 is considered overweight, and someone with a BMI of 30 or greater is considered obese. More than two-thirds of American adults are considered overweight or obese.  Being overweight or obese increases the risk for further weight gain. Excess weight may lead to heart disease and diabetes.  Creating and following plans for healthy eating and physical activity may help you improve your health.  Weight control is part of healthy lifestyle and includes exercise, emotional health, and healthy eating habits. Careful eating habits lifelong are the mainstay of weight control. Though there are significant health benefits from  weight loss, long-term weight loss with diet alone may be very difficult to achieve- studies show long-term success with dietary management in less than 10% of people. Attaining a healthy weight may be especially difficult to achieve in those with severe obesity. In some cases, medications, devices and surgical management might be considered.  What can you do?  If you are overweight or obese and are interested in methods for weight loss, you should discuss this with your provider.     Consider reducing daily calorie intake by 500 calories.     Keep a food journal.     Avoiding skipping meals, consider cutting portions instead.    Diet combined with exercise helps maintain muscle while optimizing fat loss. Strength training is particularly important for building and maintaining muscle mass. Exercise helps reduce stress, increase energy, and improves fitness. Increasing exercise without diet control, however, may not burn enough calories to loose weight.       Start walking three days a week 10-20 minutes at a time    Work towards walking thirty minutes five days a week     Eventually, increase the speed of your walking for 1-2 minutes at time    In addition, we recommend that you review healthy lifestyles and methods for weight loss available through the National Institutes of Health patient information sites:  http://win.niddk.nih.gov/publications/index.htm    And look into health and wellness programs that may be available through your health insurance provider, employer, local community center, or sriram club.    Weight management plan: Patient was referred to their PCP to discuss a diet and exercise plan.    Surgery:    Upper Airway Surgery for LEONARDO  Surgery for LEONARDO is a second-line treatment option in the management of sleep apnea.  Surgery should be considered for patients who are having a difficult time tolerating CPAP.    Surgery for LEONARDO is directed at areas that are responsible for narrowing or complete  obstruction of the airway during sleep.  There are a wide range of procedures available to enlarge and/or stabilize the airway to prevent blockage of breathing in the three major areas where it can occur: the palate, tongue, and nasal regions.  Successful surgical treatment depends on the accurate identification of the factors responsible for obstructive sleep apnea in each person.  A personalized approach is required because there is no single treatment that works well for everyone.  Because of anatomic variation, consultation with an examination by a sleep surgeon is a critical first step in determining what surgical options are best for each patient.  In some cases, examination during sedation may be recommended in order to guide the selection of procedures.  Patients will be counseled about risks and benefits as well as the typical recovery course after surgery. Surgery is typically not a cure for a person s LEONARDO.  However, surgery will often significantly improve one s LEONARDO severity (termed  success rate ).  Even in the absence of a cure, surgery will decrease the cardiovascular risk associated with OSA7; improve overall quality of life8 (sleepiness, functionality, sleep quality, etc).          Palate Procedures:  Patients with LEONARDO often have narrowing of their airway in the region of their tonsils and uvula.  The goals of palate procedures are to widen the airway in this region as well as to help the tissues resist collapse.  Modern palate procedure techniques focus on tissue conservation and soft tissue rearrangement, rather than tissue removal.  Often the uvula is preserved in this procedure. Residual sleep apnea is common in patient after pharyngoplasty with an average reduction in sleep apnea events of 33%2.      Tongue Procedures:  While patients are awake, the muscles that surround the throat are active and keep this region open for breathing. These muscles relax during sleep, allowing the tongue and other  structures to collapse and block breathing.  There are several different tongue procedures available.  Selection of a tongue base procedure depends on characteristics seen on physical exam.  Generally, procedures are aimed at removing bulky tissues in this area or preventing the back of the tongue from falling back during sleep.  Success rates for tongue surgery range from 50-62%3.    Hypoglossal Nerve Stimulation:  Hypoglossal nerve stimulation has recently received approval from the [place] Food and Drug Administration for the treatment of obstructive sleep apnea.  This is based on research showing that the system was safe and effective in treating sleep apnea6.  Results showed that the median AHI score decreased 68%, from 29.3 to 9.0. This therapy uses an implant system that senses breathing patterns and delivers mild stimulation to airway muscles, which keeps the airway open during sleep.  The system consists of three fully implanted components: a small generator (similar in size to a pacemaker), a breathing sensor, and a stimulation lead.  Using a small handheld remote, a patient turns the therapy on before bed and off upon awakening.    Candidates for this device must be greater than 22 years of age, have moderate to severe LEONARDO (AHI between 20-65), BMI less than 32, have tried CPAP/oral appliance without success, and have appropriate upper airway anatomy (determined by a sleep endoscopy performed by Dr. Morales).    Hypoglossal Nerve Stimulation Pathway:    The sleep surgeon s office will work with the patient through the insurance prior-authorization process (including communications and appeals).    Nasal Procedures:  Nasal obstruction can interfere with nasal breathing during the day and night.  Studies have shown that relief of nasal obstruction can improve the ability of some patients to tolerate positive airway pressure therapy for obstructive sleep apnea1.  Treatment options include medications such  as nasal saline, topical corticosteroid and antihistamine sprays, and oral medications such as antihistamines or decongestants. Non-surgical treatments can include external nasal dilators for selected patients. If these are not successful by themselves, surgery can improve the nasal airway either alone or in combination with these other options.      Combination Procedures:  Combination of surgical procedures and other treatments may be recommended, particularly if patients have more than one area of narrowing or persistent positional disease.  The success rate of combination surgery ranges from 66-80%2,3.      1. Santa PEREZ. The Role of the Nose in Snoring and Obstructive Sleep Apnoea: An Update.  Eur Arch Otorhinolaryngol. 2011; 268: 1365-73.  2.  Sol SM; Mora JA; Charline JR; Pallanch JF; Graham MB; Vandana SG; Shawnee BUTLER. Surgical modifications of the upper airway for obstructive sleep apnea in adults: a systematic review and meta-analysis. SLEEP 2010;33(10):0260-6238. Nell ORTIZ. Hypopharyngeal surgery in obstructive sleep apnea: an evidence-based medicine review.  Arch Otolaryngol Head Neck Surg. 2006 Feb;132(2):206-13.  3. Edgardo YH1, Jeremías Y, Richardson CORIE. The efficacy of anatomically based multilevel surgery for obstructive sleep apnea. Otolaryngol Head Neck Surg. 2003 Oct;129(4):327-35.  4. Nell ORTIZ, Goldberg A. Hypopharyngeal Surgery in Obstructive Sleep Apnea: An Evidence-Based Medicine Review. Arch Otolaryngol Head Neck Surg. 2006 Feb;132(2):206-13.  5. Silvia EDMOND et al. Upper-Airway Stimulation for Obstructive Sleep Apnea.  N Engl J Med. 2014 Jan 9;370(2):139-49.  6. Steve Y et al. Increased Incidence of Cardiovascular Disease in Middle-aged Men with Obstructive Sleep Apnea. Am J Respir Crit Care Med; 2002 166: 159-165  7. Mikey GAMBOA et al. Studying Life Effects and Effectiveness of Palatopharyngoplasty (SLEEP) study: Subjective Outcomes of Isolated Uvulopalatopharyngoplasty. Otolaryngol Head Neck Surg.  2011; 144: 623-631.          Your BMI is Body mass index is 45.11 kg/(m^2).  Weight management is a personal decision.  If you are interested in exploring weight loss strategies, the following discussion covers the approaches that may be successful. Body mass index (BMI) is one way to tell whether you are at a healthy weight, overweight, or obese. It measures your weight in relation to your height.  A BMI of 18.5 to 24.9 is in the healthy range. A person with a BMI of 25 to 29.9 is considered overweight, and someone with a BMI of 30 or greater is considered obese. More than two-thirds of American adults are considered overweight or obese.  Being overweight or obese increases the risk for further weight gain. Excess weight may lead to heart disease and diabetes.  Creating and following plans for healthy eating and physical activity may help you improve your health.  Weight control is part of healthy lifestyle and includes exercise, emotional health, and healthy eating habits. Careful eating habits lifelong are the mainstay of weight control. Though there are significant health benefits from weight loss, long-term weight loss with diet alone may be very difficult to achieve- studies show long-term success with dietary management in less than 10% of people. Attaining a healthy weight may be especially difficult to achieve in those with severe obesity. In some cases, medications, devices and surgical management might be considered.  What can you do?  If you are overweight or obese and are interested in methods for weight loss, you should discuss this with your provider.     Consider reducing daily calorie intake by 500 calories.     Keep a food journal.     Avoiding skipping meals, consider cutting portions instead.    Diet combined with exercise helps maintain muscle while optimizing fat loss. Strength training is particularly important for building and maintaining muscle mass. Exercise helps reduce stress, increase  energy, and improves fitness. Increasing exercise without diet control, however, may not burn enough calories to loose weight.       Start walking three days a week 10-20 minutes at a time    Work towards walking thirty minutes five days a week     Eventually, increase the speed of your walking for 1-2 minutes at time    In addition, we recommend that you review healthy lifestyles and methods for weight loss available through the National Institutes of Health patient information sites:  http://win.niddk.nih.gov/publications/index.htm    And look into health and wellness programs that may be available through your health insurance provider, employer, local community center, or [employer]rir[employer] club.    Weight management plan: Patient was referred to their PCP to discuss a diet and exercise plan.

## 2017-04-20 NOTE — PATIENT INSTRUCTIONS
[place] [facility]    1. Your sleep study will be reviewed by a sleep physician within the next few days.     2. Please follow up in the sleep clinic as scheduled, or, make an appointment with your sleep provider to be seen within two weeks to discuss the results of the sleep study.    3. If you have any questions or problems with your treatment plan, please contact your sleep clinic provider at [phone] to further manage your condition.    4. Please review your attached medication list, and, at your follow-up appointment advise your sleep clinic provider about any changes.    5. Go to [email or web address] for more information about your sleep [email or web address]    Geri Molina, RPSGT  April 19, 2017

## 2017-04-20 NOTE — MR AVS SNAPSHOT
"              After Visit Summary   4/20/2017    Danitza Soto    MRN: [id number]           Patient Information     Date Of Birth          1980        Visit Information        Provider Department      4/20/2017 1:00 PM Derrick Coy MD [facility]        Today's Diagnoses     Morbid obesity due to excess calories (H)    -  1    Obesity hypoventilation syndrome (H)        LEONARDO (obstructive sleep apnea)        Hypertension goal BP (blood pressure) < 140/90          Care Instructions    MY TREATMENT INFORMATION FOR SLEEP APNEA-  Danitza Soto    DOCTOR : Derrick Coy  SLEEP CENTER :      MY CONTACT NUMBER:       If I haven't had a sleep study yet, what can I expect?  A personal story from [employer]  https://www.Recorrido.com/watch?v=AxPLmlRpnCs    Am I having a home sleep study?  Here is a video in case you get home and want to make sure you have done it correctly  https://www.Recorrido.com/watch?v=UNN3J9gJxf1&feature=youtu.be    Frequently asked questions:  1. What is Obstructive Sleep Apnea (LEONARDO)? LEONARDO is the most common type of sleep apnea. Apnea literally means, \"without breath.\" It is characterized by repetitive pauses in breathing, despite continued effort to breathe, and is usually associated with a reduction in blood oxygen saturation. Apneas can last 10 to over 60 seconds. It is caused by narrowing or collapse of the upper airway as muscles relax during sleep. Severity of sleep apnea is determined by frequency of breathing events and their effect on your sleep and oxygen levels determined during sleep testing.   2. What are the consequences of LEONARDO? Symptoms include: daytime sleepiness- possibly increasing the risk of falling asleep while driving, unrefreshing/restless sleep, snoring, insomnia, waking frequently to urinate, waking with heartburn or reflux, reduced concentration and memory, and morning headaches. Other health consequences may include development of high " blood pressure and other cardiovascular disease in persons who are susceptible. Untreated LEONARDO  can contribute to heart disease, stroke and diabetes.   3. What are the treatment options? In most situations, sleep apnea is a lifelong disease that must be managed with daily therapy. Medications are not effective for sleep apnea and surgery is generally not performed until other therapies have been tried. Therapy is usually tailored to the individual patient based on many factors including your wishes as well as severity of sleep apnea and severity of obesity. Continuous Positive Airway (CPAP) is the most reliable treatment. An oral device to hold your jaw forward is usually the next most reliable option. Other options include postioning devices (to keep you off your back), weight loss, and surgery including a tongue pacing device. There is more detail about some of these options below.            1. CPAP-  WHAT DOES IT DO AND HOW CAN I LEARN TO WEAR IT?                               BEFORE I START, CAN I WATCH A MOVIE TO GET A PLAN ON HOW TO USE CPAP?  https://www.Flypaper.com/watch?p=d2Q59xo534I      Continuous positive airway pressure, or CPAP, is the most effective treatment for obstructive sleep apnea. It works by blowing room air, through a mask, to hold your throat open. A decision to use CPAP is a major step forward in the pursuit of a healthier life. The successful use of CPAP will help you breathe easier, sleep better and live healthier. You can choose CPAP equipment from any durable medical equipment provider that meets your needs.  Using CPAP can be a positive experience if you keep these castrejon points in mind:  1. Commitment  CPAP is not a quick fix for your problem. It involves a long-term commitment to improve your sleep and your health.    2. Communication  Stay in close communication with both your sleep doctor and your CPAP supplier. Ask lots of questions and seek help when you need  "it.    3. Consistency  Use CPAP all night, every night and for every nap. You will receive the maximum health benefits from CPAP when you use it every time that you sleep. This will also make it easier for your body to adjust to the treatment.    4. Correction  The first machine and mask that you try may not be the best ones for you. Work with your sleep doctor and your CPAP supplier to make corrections to your equipment selection. Ask about trying a different type of machine or mask if you have ongoing problems. Make sure that your mask is a good fit and learn to use your equipment properly.    5. Challenge  Tell a family member or close friend to ask you each morning if you used your CPAP the previous night. Have someone to challenge you to give it your best effort.    6. Connection   Your adjustment to CPAP will be easier if you are able to connect with others who use the same treatment. Ask your sleep doctor if there is a support group in your area for people who have sleep apnea, or look for one on the Internet.  7. Comfort   Increase your level of comfort by using a saline spray, decongestant or heated humidifier if CPAP irritates your nose, mouth or throat. Use your unit's \"ramp\" setting to slowly get used to the air pressure level. There may be soft pads you can buy that will fit over your mask straps. Look on www.CPAP.com for accessories that can help make CPAP use more comfortable.  8. Cleaning   Clean your mask, tubing and headgear on a regular basis. Put this time in your schedule so that you don't forget to do it. Check and replace the filters for your CPAP unit and humidifier.    9. Completion   Although you are never finished with CPAP therapy, you should reward yourself by celebrating the completion of your first month of treatment. Expect this first month to be your hardest period of adjustment. It will involve some trial and error as you find the machine, mask and pressure settings that are right " for you.    10. Continuation  After your first month of treatment, continue to make a daily commitment to use your CPAP all night, every night and for every nap.    CPAP-Tips to starting with success:  Begin using your CPAP for short periods of time during the day while you watch TV or read.    Use CPAP every night and for every nap. Using it less often reduces the health benefits and makes it harder for your body to get used to it.    Make small adjustments to your mask, tubing, straps and headgear until you get the right fit. Tightening the mask may actually worsen the leak.  If it leaves significant marks on your face or irritates the bridge of your nose, it may not be the best mask for you.  Speak with the person who supplied the mask and consider trying other masks. Insurances will allow you to try different masks during the first month of starting CPAP.  Insurance also covers a new mask, hose and filter about every 6 months.    Use a saline nasal spray to ease mild nasal congestion. Neti-Pot or saline nasal rinses may also help. Nasal gel sprays can help reduce nasal dryness.  Biotene mouthwash can be helpful to protect your teeth if you experience frequent dry mouth.  Dry mouth may be a sign of air escaping out of your mouth or out of the mask in the case of a full face mask.  Speak with your provider if you expect that is the case.     Take a nasal decongestant to relieve more severe nasal or sinus congestion.  Do not use Afrin (oxymetazoline) nasal spray more than 3 days in a row.  Speak with your sleep doctor if your nasal congestion is chronic.    Use a heated humidifier that fits your CPAP model to enhance your breathing comfort. Adjust the heat setting up if you get a dry nose or throat, down if you get condensation in the hose or mask.  Position the CPAP lower than you so that any condensation in the hose drains back into the machine rather than towards the mask.    Try a system that uses nasal pillows  if traditional masks give you problems.    Clean your mask, tubing and headgear once a week. Make sure the equipment dries fully.    Regularly check and replace the filters for your CPAP unit and humidifier.    Work closely with your sleep provider and your CPAP supplier to make sure that you have the machine, mask and air pressure setting that works best for you. It is better to stop using it and call your provider to solve problems than to lay awake all night frustrated with the device.    BESIDES CPAP, WHAT OTHER THERAPIES ARE THERE?      Positioning Device  Positioning devices are generally used when sleep apnea is mild and only occurs on your back.This example shows a pillow that straps around the waist. It may be appropriate for those whose sleep study shows milder sleep apnea that occurs primarily when lying flat on one's back. Preliminary studies have shown benefit but effectiveness at home may need to be verified by a home sleep test. These devices are generally not covered by medical insurance.                      Oral Appliance  What is oral appliance therapy?  An oral appliance is a small acrylic device that fits over the upper and lower teeth or tongue (similar to an orthodontic retainer or a mouth guard). This device slightly advances the lower jaw or tongue, which moves the base of the tongue forward, opens the airway, improves breathing and can effectively treat snoring and obstructive sleep apnea sleep apnea. The appliance is fabricated and customized by a qualified dentist with experience in treating snoring and sleep apnea. Oral appliances are usually well tolerated and have relatively high compliance by patients1, 2, 3.  When is an oral appliance indicated?  Oral appliance therapy is recommended as a first-line treatment for patients with primary snoring, mild sleep apnea, and for patients with moderate sleep apnea who prefer appliance therapy to use of CPAP4, 5. Severity of sleep apnea is  determined by sleep testing and is based on the number of respiratory events per hour of sleep.   How successful is oral appliance therapy?  The success rate of oral appliance therapy in patients with mild sleep apnea is 75-80% while in patients with moderate sleep apnea it is 50-70%. The chance of success in patients with severe sleep apnea is 40-50%. The research also shows that oral appliances have a beneficial effect on the cardiovascular health of LEONARDO patients at the same magnitude as CPAP therapy7.  Oral appliances should be a second-line treatment in cases of severe sleep apnea, but if not completely successful then a combination therapy utilizing CPAP plus oral appliance therapy may be effective. Oral appliances tend to be effective in a broad range of patients although studies show that the patients who have the highest success are females, younger patients, those with milder disease, and less severe obesity. 3, 6.   The chances of success are lower in patients who have more severe LEONARDO, are older, and those who are morbidly obese.     Example of an oral appliance   Finding a dentist that practices dental sleep medicine  Specific training is available through the American Academy of Dental Sleep Medicine for dentists interested in working in the field of sleep. To find a dentist who is educated in the field of sleep and the use of oral appliances, near you, visit the Web site of the American Academy of Dental Sleep Medicine; also see   http://www.accpstorage.org/newOrganization/patients/oralAppliances.pdf  To search for a dentist certified in these practices:  Http://aadsm.org/FindADentist.aspx?1  1. Vaughn, et al. Objectively measured vs self-reported compliance during oral appliance therapy for sleep-disordered breathing. Chest 2013; 144(5): 8763-7465.  2. Raysa et al. Objective measurement of compliance during oral appliance therapy for sleep-disordered breathing. Thorax 2013; 68(1): 91-96.  3.  Jannette et al. Mandibular advancement devices in 620 men and women with LEONARDO and snoring: tolerability and predictors of treatment success. Chest 2004; 125: 6983-1364.  4. Kathy et al. Oral appliances for snoring and LEONARDO: a review. Sleep 2006; 29: 244-262.  5. German et al. Oral appliance treatment for LEONARDO: an update. J Clin Sleep Med 2014; 10(2): 215-227.  6. Yamila et al. Predictors of OSAH treatment outcome. J Dent Res 2007; 86: 5727-7572.      Weight Loss:    Weight management is a personal decision.  If you are interested in exploring weight loss strategies, the following discussion covers the impact on weight loss on sleep apnea and the approaches that may be successful.    Weight loss decreases severity of sleep apnea in most people with obesity. For those with mild obesity who have developed snoring with weight gain, even 15-30 pound weight loss can improve and occasionally eliminate sleep apnea.  Structured and life-long dietary and health habits are necessary to lose weight and keep healthier weight levels.     Though there may be significant health benefits from weight loss, long-term weight loss is very difficult to achieve- studies show success with dietary management in less than 10% of people. In addition, substantial weight loss may require years of dietary control and may be difficult if patients have severe obesity. In these cases, surgical management may be considered.  Finally, older individuals who have tolerated obesity without health complications may be less likely to benefit from weight loss strategies.    Your BMI is There is no height or weight on file to calculate BMI.  Body mass index (BMI) is one way to tell whether you are at a healthy weight, overweight, or obese. It measures your weight in relation to your height.  A BMI of 18.5 to 24.9 is in the healthy range. A person with a BMI of 25 to 29.9 is considered overweight, and someone with a BMI of 30 or greater is  considered obese. More than two-thirds of American adults are considered overweight or obese.  Being overweight or obese increases the risk for further weight gain. Excess weight may lead to heart disease and diabetes.  Creating and following plans for healthy eating and physical activity may help you improve your health.  Weight control is part of healthy lifestyle and includes exercise, emotional health, and healthy eating habits. Careful eating habits lifelong are the mainstay of weight control. Though there are significant health benefits from weight loss, long-term weight loss with diet alone may be very difficult to achieve- studies show long-term success with dietary management in less than 10% of people. Attaining a healthy weight may be especially difficult to achieve in those with severe obesity. In some cases, medications, devices and surgical management might be considered.  What can you do?  If you are overweight or obese and are interested in methods for weight loss, you should discuss this with your provider.     Consider reducing daily calorie intake by 500 calories.     Keep a food journal.     Avoiding skipping meals, consider cutting portions instead.    Diet combined with exercise helps maintain muscle while optimizing fat loss. Strength training is particularly important for building and maintaining muscle mass. Exercise helps reduce stress, increase energy, and improves fitness. Increasing exercise without diet control, however, may not burn enough calories to loose weight.       Start walking three days a week 10-20 minutes at a time    Work towards walking thirty minutes five days a week     Eventually, increase the speed of your walking for 1-2 minutes at time    In addition, we recommend that you review healthy lifestyles and methods for weight loss available through the National Institutes of Health patient information sites:  http://win.niddk.nih.gov/publications/index.htm    And look  into health and wellness programs that may be available through your health insurance provider, employer, local community center, or sriram club.    Weight management plan: Patient was referred to their PCP to discuss a diet and exercise plan.    Surgery:    Upper Airway Surgery for LEONARDO  Surgery for LEONARDO is a second-line treatment option in the management of sleep apnea.  Surgery should be considered for patients who are having a difficult time tolerating CPAP.    Surgery for LEONARDO is directed at areas that are responsible for narrowing or complete obstruction of the airway during sleep.  There are a wide range of procedures available to enlarge and/or stabilize the airway to prevent blockage of breathing in the three major areas where it can occur: the palate, tongue, and nasal regions.  Successful surgical treatment depends on the accurate identification of the factors responsible for obstructive sleep apnea in each person.  A personalized approach is required because there is no single treatment that works well for everyone.  Because of anatomic variation, consultation with an examination by a sleep surgeon is a critical first step in determining what surgical options are best for each patient.  In some cases, examination during sedation may be recommended in order to guide the selection of procedures.  Patients will be counseled about risks and benefits as well as the typical recovery course after surgery. Surgery is typically not a cure for a person s LEONARDO.  However, surgery will often significantly improve one s LEONARDO severity (termed  success rate ).  Even in the absence of a cure, surgery will decrease the cardiovascular risk associated with OSA7; improve overall quality of life8 (sleepiness, functionality, sleep quality, etc).          Palate Procedures:  Patients with LEONARDO often have narrowing of their airway in the region of their tonsils and uvula.  The goals of palate procedures are to widen the airway in this  region as well as to help the tissues resist collapse.  Modern palate procedure techniques focus on tissue conservation and soft tissue rearrangement, rather than tissue removal.  Often the uvula is preserved in this procedure. Residual sleep apnea is common in patient after pharyngoplasty with an average reduction in sleep apnea events of 33%2.      Tongue Procedures:  While patients are awake, the muscles that surround the throat are active and keep this region open for breathing. These muscles relax during sleep, allowing the tongue and other structures to collapse and block breathing.  There are several different tongue procedures available.  Selection of a tongue base procedure depends on characteristics seen on physical exam.  Generally, procedures are aimed at removing bulky tissues in this area or preventing the back of the tongue from falling back during sleep.  Success rates for tongue surgery range from 50-62%3.    Hypoglossal Nerve Stimulation:  Hypoglossal nerve stimulation has recently received approval from the [place] Food and Drug Administration for the treatment of obstructive sleep apnea.  This is based on research showing that the system was safe and effective in treating sleep apnea6.  Results showed that the median AHI score decreased 68%, from 29.3 to 9.0. This therapy uses an implant system that senses breathing patterns and delivers mild stimulation to airway muscles, which keeps the airway open during sleep.  The system consists of three fully implanted components: a small generator (similar in size to a pacemaker), a breathing sensor, and a stimulation lead.  Using a small handheld remote, a patient turns the therapy on before bed and off upon awakening.    Candidates for this device must be greater than 22 years of age, have moderate to severe LEONARDO (AHI between 20-65), BMI less than 32, have tried CPAP/oral appliance without success, and have appropriate upper airway anatomy  (determined by a sleep endoscopy performed by Dr. Morales).    Hypoglossal Nerve Stimulation Pathway:    The sleep surgeon s office will work with the patient through the insurance prior-authorization process (including communications and appeals).    Nasal Procedures:  Nasal obstruction can interfere with nasal breathing during the day and night.  Studies have shown that relief of nasal obstruction can improve the ability of some patients to tolerate positive airway pressure therapy for obstructive sleep apnea1.  Treatment options include medications such as nasal saline, topical corticosteroid and antihistamine sprays, and oral medications such as antihistamines or decongestants. Non-surgical treatments can include external nasal dilators for selected patients. If these are not successful by themselves, surgery can improve the nasal airway either alone or in combination with these other options.      Combination Procedures:  Combination of surgical procedures and other treatments may be recommended, particularly if patients have more than one area of narrowing or persistent positional disease.  The success rate of combination surgery ranges from 66-80%2,3.      1. Santa PEREZ. The Role of the Nose in Snoring and Obstructive Sleep Apnoea: An Update.  Eur Arch Otorhinolaryngol. 2011; 268: 1365-73.  2.  Sol SM; Mora JA; Charline JR; Pallanch JF; Graham MB; Vandana SG; Shawnee BUTLER. Surgical modifications of the upper airway for obstructive sleep apnea in adults: a systematic review and meta-analysis. SLEEP 2010;33(10):3049-1029. Nell ORTIZ. Hypopharyngeal surgery in obstructive sleep apnea: an evidence-based medicine review.  Arch Otolaryngol Head Neck Surg. 2006 Feb;132(2):206-13.  3. Edgardo YH1, Jeremías Y, Richardson CORIE. The efficacy of anatomically based multilevel surgery for obstructive sleep apnea. Otolaryngol Head Neck Surg. 2003 Oct;129(4):327-35.  4. Nell ORTIZ, Goldberg A. Hypopharyngeal Surgery in Obstructive Sleep  Apnea: An Evidence-Based Medicine Review. Arch Otolaryngol Head Neck Surg. 2006 Feb;132(2):206-13.  5. Silvia EDMOND et al. Upper-Airway Stimulation for Obstructive Sleep Apnea.  N Engl J Med. 2014 Jan 9;370(2):139-49.  6. Steve Y et al. Increased Incidence of Cardiovascular Disease in Middle-aged Men with Obstructive Sleep Apnea. Am J Respir Crit Care Med; 2002 166: 159-165  7. Mikey EM et al. Studying Life Effects and Effectiveness of Palatopharyngoplasty (SLEEP) study: Subjective Outcomes of Isolated Uvulopalatopharyngoplasty. Otolaryngol Head Neck Surg. 2011; 144: 623-631.          Your BMI is Body mass index is 45.11 kg/(m^2).  Weight management is a personal decision.  If you are interested in exploring weight loss strategies, the following discussion covers the approaches that may be successful. Body mass index (BMI) is one way to tell whether you are at a healthy weight, overweight, or obese. It measures your weight in relation to your height.  A BMI of 18.5 to 24.9 is in the healthy range. A person with a BMI of 25 to 29.9 is considered overweight, and someone with a BMI of 30 or greater is considered obese. More than two-thirds of American adults are considered overweight or obese.  Being overweight or obese increases the risk for further weight gain. Excess weight may lead to heart disease and diabetes.  Creating and following plans for healthy eating and physical activity may help you improve your health.  Weight control is part of healthy lifestyle and includes exercise, emotional health, and healthy eating habits. Careful eating habits lifelong are the mainstay of weight control. Though there are significant health benefits from weight loss, long-term weight loss with diet alone may be very difficult to achieve- studies show long-term success with dietary management in less than 10% of people. Attaining a healthy weight may be especially difficult to achieve in those with severe obesity. In some cases,  medications, devices and surgical management might be considered.  What can you do?  If you are overweight or obese and are interested in methods for weight loss, you should discuss this with your provider.     Consider reducing daily calorie intake by 500 calories.     Keep a food journal.     Avoiding skipping meals, consider cutting portions instead.    Diet combined with exercise helps maintain muscle while optimizing fat loss. Strength training is particularly important for building and maintaining muscle mass. Exercise helps reduce stress, increase energy, and improves fitness. Increasing exercise without diet control, however, may not burn enough calories to loose weight.       Start walking three days a week 10-20 minutes at a time    Work towards walking thirty minutes five days a week     Eventually, increase the speed of your walking for 1-2 minutes at time    In addition, we recommend that you review healthy lifestyles and methods for weight loss available through the National Institutes of Health patient information sites:  http://win.niddk.nih.gov/publications/index.htm    And look into health and wellness programs that may be available through your health insurance provider, employer, local community center, or sriram club.    Weight management plan: Patient was referred to their PCP to discuss a diet and exercise plan.                  Follow-ups after your visit        Who to contact     If you have questions or need follow up information about today's clinic visit or your schedule please contact [facility] directly at [phone].  Normal or non-critical lab and imaging results will be communicated to you by MyChart, letter or phone within 4 business days after the clinic has received the results. If you do not hear from us within 7 days, please contact the clinic through MyChart or phone. If you have a critical or abnormal lab result, we will notify you by phone as soon as  "possible.  Submit refill requests through [employer] or call your pharmacy and they will forward the refill request to us. Please allow 3 business days for your refill to be completed.          Additional Information About Your Visit        [employer] Information     [employer] gives you secure access to your electronic health record. If you see a primary care provider, you can also send messages to your care team and make appointments. If you have questions, please call your primary care clinic.  If you do not have a primary care provider, please call [phone] and they will assist you.        Care EveryWhere ID     This is your Care EveryWhere ID. This could be used by other organizations to access your [place] medical records  [id number]        Your Vitals Were     Height Pulse Oximetry BMI (Body Mass Index)             1.702 m (5' 7\") 96% 45.11 kg/m2          Blood Pressure from Last 3 Encounters:   04/20/17 146/76   03/30/17 122/77   03/21/17 138/86    Weight from Last 3 Encounters:   04/20/17 130.6 kg (288 lb)   03/30/17 131 kg (288 lb 12.8 oz)   03/21/17 131.1 kg (289 lb)              We Performed the Following     Comprehensive DME        Primary Care Provider Office Phone # Fax #    Polly Mcbride [phone]       [facility] [address] [place] [address]        Thank you!     Thank you for choosing [facility]  for your care. Our goal is always to provide you with excellent care. Hearing back from our patients is one way we can continue to improve our services. Please take a few minutes to complete the written survey that you may receive in the mail after your visit with us. Thank you!             Your Updated Medication List - Protect others around you: Learn how to safely use, store and throw away your medicines at www.disposemymeds.org.          This list is accurate as of: 4/20/17  1:09 PM.  Always use your most recent med list.             "       Brand Name Dispense Instructions for use    ADVIL 200 MG tablet   Generic drug:  ibuprofen      TAKE THREE TABLETS BY MOUTH QD-BID PRN       CENTRUM PO          fish oil-omega-3 fatty acids 1000 MG capsule      Take 2 g by mouth daily       hydrochlorothiazide 25 MG tablet    HYDRODIURIL    90 tablet    Take 1 tablet (25 mg) by mouth daily       lisinopril 40 MG tablet    PRINIVIL/ZESTRIL    90 tablet    Take 1 tablet (40 mg) by mouth daily       * metroNIDAZOLE 500 MG tablet    FLAGYL    21 tablet    Take 1 tablet (500 mg) by mouth 3 times daily       * metroNIDAZOLE 500 MG tablet    FLAGYL    14 tablet    Take 1 tablet (500 mg) by mouth 2 times daily       * metroNIDAZOLE 500 MG tablet    FLAGYL    14 tablet    Take 1 tablet (500 mg) by mouth 2 times daily       NEXIUM PO          PROBIOTIC ADVANCED PO          * varenicline 0.5 MG X 11 & 1 MG X 42 tablet    CHANTIX STARTING MONTH WILY    53 tablet    Take 0.5 mg tab daily for 3 days, then 0.5 mg tab twice daily for 4 days, then 1 mg twice daily.       * varenicline 1 MG tablet    CHANTIX    60 tablet    Take 1 tablet (1 mg) by mouth 2 times daily       * varenicline 0.5 MG X 11 & 1 MG X 42 tablet    CHANTIX STARTING MONTH WILY    53 tablet    Take 0.5 mg tab daily for 3 days, then 0.5 mg tab twice daily for 4 days, then 1 mg twice daily.       * Notice:  This list has 6 medication(s) that are the same as other medications prescribed for you. Read the directions carefully, and ask your doctor or other care provider to review them with you.

## 2017-04-20 NOTE — TELEPHONE ENCOUNTER
LS,  See below [employer] message.  Pt needing Rx for Chantix continuing pushpa.  Starting pushpa Rx sent 3/21/2017  Order pended if you approve.  Mady LOO RN

## 2017-04-21 ENCOUNTER — TELEPHONE (OUTPATIENT)
Dept: FAMILY MEDICINE | Facility: CLINIC | Age: 37
End: 2017-04-21

## 2017-04-21 RX ORDER — VARENICLINE TARTRATE 1 MG/1
1 TABLET, FILM COATED ORAL 2 TIMES DAILY
Qty: 56 TABLET | Refills: 2 | Status: SHIPPED | OUTPATIENT
Start: 2017-04-21 | End: 2017-10-13

## 2017-04-21 NOTE — TELEPHONE ENCOUNTER
Notes Recorded by Janett Lock RN on 4/21/2017 at 9:12 AM  3rd request.  Janett Lock  Pap Tracking RN    ------    Notes Recorded by Janett Lock RN on 4/7/2017 at 10:35 AM  2nd request.  Janett Lock  Pap Tracking RN    ------    Notes Recorded by Maribell Bazan, RN on 3/28/2017 at 9:30 AM  Dr. Mcbride    Please review and advise.    NIL/Neg HPV in 36 yr old.  Was to have colposcopy completed last year, but never did.   See pap hx below.  What would you recommend for follow up?    6/17/14: LSIL, + HPV, unable to type.   8/20/14:[place]:BRISEYDA I. Plan cotest pap & HPV in 1 year.   1/20116 Pap Ascus Neg HPV. Plan: [place].   5/13/16 Colposcopy not completed. Cancelled by patient  3/21/17 NIL/Neg HPV. Plan: pending provider review.     Thanks!  Maribell Bazan, RN, BSN  Pap Tracking

## 2017-04-25 ENCOUNTER — TELEPHONE (OUTPATIENT)
Dept: SLEEP MEDICINE | Facility: CLINIC | Age: 37
End: 2017-04-25

## 2017-04-26 ENCOUNTER — DOCUMENTATION ONLY (OUTPATIENT)
Dept: SLEEP MEDICINE | Facility: CLINIC | Age: 37
End: 2017-04-26

## 2017-04-26 DIAGNOSIS — E66.2 OBESITY HYPOVENTILATION SYNDROME (H): ICD-10-CM

## 2017-04-26 DIAGNOSIS — G47.33 OSA (OBSTRUCTIVE SLEEP APNEA): ICD-10-CM

## 2017-04-26 NOTE — PROGRESS NOTES
Patient was offered choice of vendor and chose [facility].  Patient Danitza Soto was set up at [place] on April 26, 2017. Patient received a Radha Respironics DreamStation Auto. Pressures were set at 7-15 cm H2O.   Patient s ramp is 7 cm H2O for Auto and FLEX/EPR is 2.  Patient received a Resmed Mask name: AIRFIT N20  Nasal mask Size Medium, heated tubing and heated humidifier.  Patient is enrolled in the STM Program and does need to meet compliance.   Tiana Rizzo

## 2017-05-01 ENCOUNTER — DOCUMENTATION ONLY (OUTPATIENT)
Dept: SLEEP MEDICINE | Facility: CLINIC | Age: 37
End: 2017-05-01

## 2017-05-01 DIAGNOSIS — E66.2 OBESITY HYPOVENTILATION SYNDROME (H): ICD-10-CM

## 2017-05-01 DIAGNOSIS — G47.33 OSA (OBSTRUCTIVE SLEEP APNEA): ICD-10-CM

## 2017-05-01 NOTE — PROGRESS NOTES
3 DAY STM VISIT    Patient contacted for 3 day STM visit  Message left for patient to return call    Current settings:  EPAP Min Auto CPAP: 7 (CPAP Min Auto CPAP )      EPAP Max Auto CPAP: 15 (CPAP Max Auto CPAP)       Assessment: NIghtly usage over four hours.  Action plan: Pt to have f/u 14 day STM visit.

## 2017-05-01 NOTE — PROGRESS NOTES
Patient returned call.    Subjective measures:  Pt having some issues with soreness.  Pt is feeling she is sleeping better.  Sometimes she does feel that she is not starting high enough but wants to give it a little more time before any changes.    Action plan:   Pt to try and use barrier between mask and face. Pt to have 14 day STM and will call sooner with any additional questions or concerns.

## 2017-05-11 ENCOUNTER — DOCUMENTATION ONLY (OUTPATIENT)
Dept: SLEEP MEDICINE | Facility: CLINIC | Age: 37
End: 2017-05-11

## 2017-05-11 DIAGNOSIS — E66.2 OBESITY HYPOVENTILATION SYNDROME (H): ICD-10-CM

## 2017-05-11 DIAGNOSIS — G47.33 OSA (OBSTRUCTIVE SLEEP APNEA): Primary | ICD-10-CM

## 2017-05-11 NOTE — Clinical Note
PLease sign pended order to increase pressure for comfort.  She is a patient of Dr. CINDA hawkins and he is out so can you please sign for him?  THanks  Minnie

## 2017-05-11 NOTE — PROGRESS NOTES
14 DAY [facility] VISIT    Subjective measures:   Pt feels that things are going great.  She does feels air hunger when she is first starting out and would like an increase in pressure.     Assessment: Pt meeting objective benchmarks.  Patient meeting subjective benchmarks. pressure issues  Action plan: Pt to have 30 day [facility] visit. and Order placed to provider for pressure change (her normal provider is out and chart routed to Dr. Sauer for signature)    Device settings:    EPAP Min Auto CPAP: 7 (CPAP Min Auto CPAP)    EPAP Max Auto CPAP: 15 (CPAP Max Auto CPAP)    Avgpressure (90th %ile) 14 day average (Radha): 9.9cm H20    Objective measures: 14 day rolling measures      % compliance greater than four hours rolling average 14 days: 100 %     Average % of night in large leak Rolling Average 14 days (RADHA):0%  last data upload     AHI Rolling Average 14 Day: 2.46 last data upload      Time mask on face 14 day average: 525 min          Objective measure goal  Compliance   Goal >70%  Leak   Goal < 10%  AHI  Goal < 5  Usage  Goal >240

## 2017-05-26 ENCOUNTER — DOCUMENTATION ONLY (OUTPATIENT)
Dept: SLEEP MEDICINE | Facility: CLINIC | Age: 37
End: 2017-05-26

## 2017-05-26 DIAGNOSIS — G47.33 OSA (OBSTRUCTIVE SLEEP APNEA): ICD-10-CM

## 2017-05-26 DIAGNOSIS — E66.2 OBESITY HYPOVENTILATION SYNDROME (H): ICD-10-CM

## 2017-05-26 NOTE — PROGRESS NOTES
30 DAY STM VISIT    Subjective measures:   Pt states things are going well and has no issues or complaints.  Pt is benefiting from therapy.      Assessment: Pt meeting objective benchmarks.  Patient meeting subjective benchmarks.   Action plan: Pt to have 6 month STM visit  Patient has a follow up visit with Dr. Coy on 06/15/2017.   Device settings:    EPAP Min Auto CPAP: 10 (CPAP Min Auto CPAP)    EPAP Max Auto CPAP: 15 (CPAP Max Auto CPAP)    Avg pressure (90th %ile) 14 day average (Radha): 11.4cm H20    Objective measures: 14 day rolling measures        % compliance greater than four hours rolling average 14 days: 100 %         Average % of night in large leak Rolling Average 14 days (RADHA): 0%  last upload      AHI Rolling Average 14 Day: 2.11 last upload       Time mask on face 14 day average: 500 min        Objective measure goal  Compliance   Goal >70%  Leak   Goal < 10%  AHI  Goal < 5  Usage  Goal >240

## 2017-06-15 ENCOUNTER — OFFICE VISIT (OUTPATIENT)
Dept: SLEEP MEDICINE | Facility: CLINIC | Age: 37
End: 2017-06-15
Payer: COMMERCIAL

## 2017-06-15 VITALS
HEIGHT: 67 IN | OXYGEN SATURATION: 97 % | WEIGHT: 293 LBS | TEMPERATURE: 97.8 F | SYSTOLIC BLOOD PRESSURE: 112 MMHG | HEART RATE: 75 BPM | DIASTOLIC BLOOD PRESSURE: 65 MMHG | BODY MASS INDEX: 45.99 KG/M2

## 2017-06-15 DIAGNOSIS — G47.33 OSA (OBSTRUCTIVE SLEEP APNEA): ICD-10-CM

## 2017-06-15 DIAGNOSIS — E66.2 OBESITY HYPOVENTILATION SYNDROME (H): ICD-10-CM

## 2017-06-15 PROCEDURE — 99213 OFFICE O/P EST LOW 20 MIN: CPT | Performed by: INTERNAL MEDICINE

## 2017-06-15 NOTE — PROGRESS NOTES
Obstructive Sleep Apnea - PAP Follow-Up Visit:    Chief Complaint   Patient presents with     CPAP Follow Up     follow up candis       Danitza Soto comes in today for follow-up of their moderate sleep apnea, managed with CPAP.     Overall, she rates the experience with PAP as 9 (0 poor, 10 great). The mask is comfortable. The mask is leaking, a few nights per week.  She is snoring with the mask on. She is having gasp arousals.  She is having significant oral/nasal dryness. The pressure settings are comfortable.     She uses nasal mask.     She does feel rested in the morning.    Total score - [place]: 0 (6/15/2017  1:00 PM)    Respironics  Auto-PAP 10-15 cmH2O download:  30 total days of use. 0 nonuse days.  Average use 8 hours 25 minutes per day.  30 days with >4 hours use.  Large leak 0 per day average. CPAP 90% pressure 11.7 cm. AHI 1.8          Reviewed by team: Allergies  Meds  Problems       Reviewed by provider: Allergies  Meds  Problems         Problem List:  Patient Active Problem List    Diagnosis Date Noted     CANDIS (obstructive sleep apnea) 04/13/2017     Priority: Medium     4/10/2017 - Home Sleep Apnea Testing - AHI 15.5/hr; Supine AHI 12.9/hr; SpO2 <= 88% for 33.8 minutes.  Titration Sleep Study 4/19/2017 - (288.0 lbs) CPAP was titrated to a pressure of 11 with an AHI of 3.7.  Time Spent in REM supine at this pressure was 33.5.  Recommending Auto-CPAP 7 - 15 cmH2O.       Obesity hypoventilation syndrome (H) 04/13/2017     Priority: Medium     Mixed hyperlipidemia 07/06/2015     Priority: Medium     Papanicolaou smear of cervix with low grade squamous intraepithelial lesion (LGSIL) 06/17/2014     Priority: Medium     6/17/14: LSIL, + HPV, unable to type.   8/20/14:[place]:BRISEYDA I. Plan cotest pap & HPV in 1 year.   1/20116 Pap Ascus Neg HPV. Plan: [place].   5/13/16 Colposcopy not completed. Cancelled by patient  3/21/17 NIL/Neg HPV. Plan: cotest in 1 year per provider.       Acne 02/24/2012      "Priority: Medium     Hypertension goal BP (blood pressure) < 140/90 01/12/2011     Priority: Medium     CARDIOVASCULAR SCREENING; LDL GOAL LESS THAN 130 10/31/2010     Priority: Medium     Morbid obesity due to excess calories (H) 05/17/2006     Priority: Medium     Problem list name updated by automated process. Provider to review       Tobacco use disorder 05/17/2006     Priority: Medium     Anxiety state      Priority: Medium     Esophageal reflux      Priority: Medium        /65  Pulse 75  Temp 97.8  F (36.6  C) (Oral)  Ht 1.702 m (5' 7\")  Wt 134.7 kg (297 lb)  SpO2 97%  BMI 46.52 kg/m2    Impression/Plan:  1. Obesity hypoventilation syndrome (H)  2. LEONARDO (obstructive sleep apnea)     Moderate sleep apnea. Tolerating PAP well. Daytime symptoms are improved..     Danitza Soto will follow up in about 1 year(s).     Fifteen minutes spent with patient, all of which were spent face-to-face counseling, consulting, coordinating plan of care.      Derrick Coy MD    CC:  Polly Mcbride,   "

## 2017-06-15 NOTE — LETTER
[facility]    [place]   [address] [place] [address]  [phone]       Etelvina 15, 2017    Danitza Soto  [address]  [facility] [address]  [id number]  : 1980    To whom it may concern,    Danitza Soto requires the regular use of her CPAP as a life-sustaining device for her medical condition.  If you have any questions, please feel free to contact us at the number listed above.    Sincerely,      Derrick Coy MD    [facility]                    To

## 2017-06-15 NOTE — PATIENT INSTRUCTIONS

## 2017-06-15 NOTE — NURSING NOTE
"Chief Complaint   Patient presents with     CPAP Follow Up     follow up candis       Initial /65  Pulse 75  Temp 97.8  F (36.6  C) (Oral)  Ht 1.702 m (5' 7\")  Wt 134.7 kg (297 lb)  SpO2 97%  BMI 46.52 kg/m2 Estimated body mass index is 46.52 kg/(m^2) as calculated from the following:    Height as of this encounter: 1.702 m (5' 7\").    Weight as of this encounter: 134.7 kg (297 lb).  Medication Reconciliation: complete   ESS 0/24  Laxmi Hernández MA      "

## 2017-06-15 NOTE — MR AVS SNAPSHOT
After Visit Summary   6/15/2017    Danitza Soto    MRN: [id number]           Patient Information     Date Of Birth          1980        Visit Information        Provider Department      6/15/2017 1:30 PM Derrick Coy MD [facility]        Today's Diagnoses     Obesity hypoventilation syndrome (H)        LEONARDO (obstructive sleep apnea)          Care Instructions      Your BMI is Body mass index is 46.52 kg/(m^2).  Weight management is a personal decision.  If you are interested in exploring weight loss strategies, the following discussion covers the approaches that may be successful. Body mass index (BMI) is one way to tell whether you are at a healthy weight, overweight, or obese. It measures your weight in relation to your height.  A BMI of 18.5 to 24.9 is in the healthy range. A person with a BMI of 25 to 29.9 is considered overweight, and someone with a BMI of 30 or greater is considered obese. More than two-thirds of American adults are considered overweight or obese.  Being overweight or obese increases the risk for further weight gain. Excess weight may lead to heart disease and diabetes.  Creating and following plans for healthy eating and physical activity may help you improve your health.  Weight control is part of healthy lifestyle and includes exercise, emotional health, and healthy eating habits. Careful eating habits lifelong are the mainstay of weight control. Though there are significant health benefits from weight loss, long-term weight loss with diet alone may be very difficult to achieve- studies show long-term success with dietary management in less than 10% of people. Attaining a healthy weight may be especially difficult to achieve in those with severe obesity. In some cases, medications, devices and surgical management might be considered.  What can you do?  If you are overweight or obese and are interested in methods for weight loss, you  should discuss this with your provider.     Consider reducing daily calorie intake by 500 calories.     Keep a food journal.     Avoiding skipping meals, consider cutting portions instead.    Diet combined with exercise helps maintain muscle while optimizing fat loss. Strength training is particularly important for building and maintaining muscle mass. Exercise helps reduce stress, increase energy, and improves fitness. Increasing exercise without diet control, however, may not burn enough calories to loose weight.       Start walking three days a week 10-20 minutes at a time    Work towards walking thirty minutes five days a week     Eventually, increase the speed of your walking for 1-2 minutes at time    In addition, we recommend that you review healthy lifestyles and methods for weight loss available through the National Institutes of Health patient information sites:  http://win.niddk.nih.gov/publications/index.htm    And look into health and wellness programs that may be available through your health insurance provider, employer, local community center, or sriram club.    Weight management plan: Patient was referred to their PCP to discuss a diet and exercise plan.              Follow-ups after your visit        Follow-up notes from your care team     Return in 1 year (on 6/15/2018) for PAP follow up.      Who to contact     If you have questions or need follow up information about today's clinic visit or your schedule please contact [facility] directly at [phone].  Normal or non-critical lab and imaging results will be communicated to you by MyChart, letter or phone within 4 business days after the clinic has received the results. If you do not hear from us within 7 days, please contact the clinic through MyChart or phone. If you have a critical or abnormal lab result, we will notify you by phone as soon as possible.  Submit refill requests through [employer] or call your pharmacy and they  "will forward the refill request to us. Please allow 3 business days for your refill to be completed.          Additional Information About Your Visit        [employer] Information     [employer] gives you secure access to your electronic health record. If you see a primary care provider, you can also send messages to your care team and make appointments. If you have questions, please call your primary care clinic.  If you do not have a primary care provider, please call [phone] and they will assist you.        Care EveryWhere ID     This is your Care EveryWhere ID. This could be used by other organizations to access your [place] medical records  [id number]        Your Vitals Were     Pulse Temperature Height Pulse Oximetry BMI (Body Mass Index)       75 97.8  F (36.6  C) (Oral) 1.702 m (5' 7\") 97% 46.52 kg/m2        Blood Pressure from Last 3 Encounters:   06/15/17 112/65   04/20/17 146/76   03/30/17 122/77    Weight from Last 3 Encounters:   06/15/17 134.7 kg (297 lb)   04/20/17 130.6 kg (288 lb)   03/30/17 131 kg (288 lb 12.8 oz)              Today, you had the following     No orders found for display         Today's Medication Changes          These changes are accurate as of: 6/15/17  1:48 PM.  If you have any questions, ask your nurse or doctor.               These medicines have changed or have updated prescriptions.        Dose/Directions    varenicline 1 MG tablet   Commonly known as:  CHANTIX   This may have changed:  Another medication with the same name was removed. Continue taking this medication, and follow the directions you see here.   Used for:  Tobacco use disorder        Dose:  1 mg   Take 1 tablet (1 mg) by mouth 2 times daily   Quantity:  56 tablet   Refills:  2         Stop taking these medicines if you haven't already. Please contact your care team if you have questions.     metroNIDAZOLE 500 MG tablet   Commonly known as:  FLAGYL                    Primary Care Provider Office Phone # Fax # "    Polly Mcbride [phone]       [facility] [address]   [facility] [address]        Thank you!     Thank you for choosing [facility]  for your care. Our goal is always to provide you with excellent care. Hearing back from our patients is one way we can continue to improve our services. Please take a few minutes to complete the written survey that you may receive in the mail after your visit with us. Thank you!             Your Updated Medication List - Protect others around you: Learn how to safely use, store and throw away your medicines at www.disposemymeds.org.          This list is accurate as of: 6/15/17  1:48 PM.  Always use your most recent med list.                   Brand Name Dispense Instructions for use    ADVIL 200 MG tablet   Generic drug:  ibuprofen      TAKE THREE TABLETS BY MOUTH QD-BID PRN       CENTRUM PO          fish oil-omega-3 fatty acids 1000 MG capsule      Take 2 g by mouth daily       hydrochlorothiazide 25 MG tablet    HYDRODIURIL    90 tablet    Take 1 tablet (25 mg) by mouth daily       lisinopril 40 MG tablet    PRINIVIL/ZESTRIL    90 tablet    Take 1 tablet (40 mg) by mouth daily       NEXIUM PO          PROBIOTIC ADVANCED PO          varenicline 1 MG tablet    CHANTIX    56 tablet    Take 1 tablet (1 mg) by mouth 2 times daily

## 2017-06-16 DIAGNOSIS — I10 ESSENTIAL HYPERTENSION WITH GOAL BLOOD PRESSURE LESS THAN 140/90: ICD-10-CM

## 2017-06-19 RX ORDER — LISINOPRIL 40 MG/1
TABLET ORAL
Start: 2017-06-19

## 2017-06-19 NOTE — TELEPHONE ENCOUNTER
Too soon.  Rx sent to [facility] #[id number] on 3/21/17 for #90 with 3 refills.  Another [facility] requesting today.  Informed them of where Rx is and to transfer if patient requests.  Ami Garcia RN

## 2017-08-10 ENCOUNTER — TRANSFERRED RECORDS (OUTPATIENT)
Dept: HEALTH INFORMATION MANAGEMENT | Facility: CLINIC | Age: 37
End: 2017-08-10

## 2017-10-13 ENCOUNTER — OFFICE VISIT (OUTPATIENT)
Dept: FAMILY MEDICINE | Facility: CLINIC | Age: 37
End: 2017-10-13
Payer: COMMERCIAL

## 2017-10-13 VITALS
WEIGHT: 293 LBS | TEMPERATURE: 98.5 F | DIASTOLIC BLOOD PRESSURE: 74 MMHG | OXYGEN SATURATION: 96 % | RESPIRATION RATE: 14 BRPM | HEART RATE: 95 BPM | HEIGHT: 67 IN | BODY MASS INDEX: 45.99 KG/M2 | SYSTOLIC BLOOD PRESSURE: 110 MMHG

## 2017-10-13 DIAGNOSIS — F17.200 TOBACCO USE DISORDER: ICD-10-CM

## 2017-10-13 DIAGNOSIS — N89.8 VAGINAL DISCHARGE: Primary | ICD-10-CM

## 2017-10-13 DIAGNOSIS — N76.0 BV (BACTERIAL VAGINOSIS): ICD-10-CM

## 2017-10-13 DIAGNOSIS — F41.9 ANXIETY: ICD-10-CM

## 2017-10-13 DIAGNOSIS — B96.89 BV (BACTERIAL VAGINOSIS): ICD-10-CM

## 2017-10-13 DIAGNOSIS — I10 ESSENTIAL HYPERTENSION WITH GOAL BLOOD PRESSURE LESS THAN 140/90: ICD-10-CM

## 2017-10-13 PROCEDURE — 90686 IIV4 VACC NO PRSV 0.5 ML IM: CPT | Performed by: FAMILY MEDICINE

## 2017-10-13 PROCEDURE — 90471 IMMUNIZATION ADMIN: CPT | Performed by: FAMILY MEDICINE

## 2017-10-13 PROCEDURE — 36415 COLL VENOUS BLD VENIPUNCTURE: CPT | Performed by: FAMILY MEDICINE

## 2017-10-13 PROCEDURE — 80053 COMPREHEN METABOLIC PANEL: CPT | Performed by: FAMILY MEDICINE

## 2017-10-13 PROCEDURE — 99214 OFFICE O/P EST MOD 30 MIN: CPT | Mod: 25 | Performed by: FAMILY MEDICINE

## 2017-10-13 RX ORDER — METRONIDAZOLE 500 MG/1
500 TABLET ORAL 2 TIMES DAILY
Qty: 14 TABLET | Refills: 0 | Status: SHIPPED | OUTPATIENT
Start: 2017-10-13 | End: 2018-09-24

## 2017-10-13 RX ORDER — MULTIVIT-MIN/IRON/FOLIC ACID/K 18-600-40
2 CAPSULE ORAL DAILY
COMMUNITY
End: 2018-11-06

## 2017-10-13 RX ORDER — HYDROCHLOROTHIAZIDE 25 MG/1
25 TABLET ORAL DAILY
Qty: 90 TABLET | Refills: 3 | Status: SHIPPED | OUTPATIENT
Start: 2017-10-13 | End: 2018-06-19

## 2017-10-13 RX ORDER — VARENICLINE TARTRATE 1 MG/1
1 TABLET, FILM COATED ORAL 2 TIMES DAILY
Qty: 56 TABLET | Refills: 4 | Status: SHIPPED | OUTPATIENT
Start: 2017-10-13 | End: 2018-08-06

## 2017-10-13 RX ORDER — LISINOPRIL 40 MG/1
40 TABLET ORAL DAILY
Qty: 90 TABLET | Refills: 3 | Status: SHIPPED | OUTPATIENT
Start: 2017-10-13 | End: 2018-06-19

## 2017-10-13 RX ORDER — LORAZEPAM 0.5 MG/1
0.5 TABLET ORAL EVERY 8 HOURS PRN
Qty: 20 TABLET | Refills: 0 | Status: SHIPPED | OUTPATIENT
Start: 2017-10-13 | End: 2020-12-03

## 2017-10-13 NOTE — PROGRESS NOTES
SUBJECTIVE:   Danitza Soto is a 37 year old female who presents to clinic today for the following health issues:      Vaginal Symptoms- she has a hx of BV and thinks it is the same , same discharge and odor and last treatment was back in June .    Duration: 1 yr-chronic problem    Description  vaginal discharge - white creamy and odor    Intensity:  moderate    Accompanying signs and symptoms (fever/dysuria/abdominal or back pain): None flagyl given previously  2x did not work    History  Sexually active: not at present  Possibility of pregnancy: No  Recent antibiotic use: no     Precipitating or alleviating factors: None    Therapies tried and outcome: Flagyl (metronidazole)   Outcome: no help    Discuss stress and smoking cessation- she used Chantix in the past and feels towards the end of the third month almost quit smoking but then restarted again and this time she wants to continue Chantix for four or five months so that she is able to quit   3) anxiety , she seems stressed out at work and at times gets panic attacks so bad she has to go home , not daily but wondering if she can use Ativan as needed .          Problem list and histories reviewed & adjusted, as indicated.  Additional history: as documented    Patient Active Problem List   Diagnosis     Anxiety state     Esophageal reflux     Morbid obesity due to excess calories (H)     Tobacco use disorder     CARDIOVASCULAR SCREENING; LDL GOAL LESS THAN 130     Hypertension goal BP (blood pressure) < 140/90     Acne     Papanicolaou smear of cervix with low grade squamous intraepithelial lesion (LGSIL)     Mixed hyperlipidemia     LEONARDO (obstructive sleep apnea)     Obesity hypoventilation syndrome (H)     Past Surgical History:   Procedure Laterality Date     HC TOOTH EXTRACTION W/FORCEP  1995    [facility]       Social History   Substance Use Topics     Smoking status: Current Every Day Smoker     Years: 1.00     Types: Cigarettes      Smokeless tobacco: Never Used      Comment:  pack or less a day     Alcohol use 0.0 oz/week     0 Standard drinks or equivalent per week      Comment: Occas.     Family History   Problem Relation Age of Onset     HEART DISEASE Mother      ,mother had emphesema and  at 62     Hypertension Mother      Allergies Mother      Lipids Mother      Obesity Mother      Respiratory Mother      Emphysema     DIABETES Maternal Grandmother      Type 2?     Hypertension Maternal Grandmother      Circulatory Maternal Grandmother      Due to diabetes     Eye Disorder Maternal Grandmother      Macular degeneration/Macular degeneration     Obesity Maternal Grandmother      Hypertension Father      Lipids Father      CANCER Father      CEREBROVASCULAR DISEASE Paternal Grandmother      Alcohol/Drug Maternal Grandfather      Obesity Maternal Grandfather      Psychotic Disorder Paternal Grandfather      Committed Suicide     Allergies Sister      Genitourinary Problems Sister          Current Outpatient Prescriptions   Medication Sig Dispense Refill     Vitamin D, Cholecalciferol, 1000 UNITS TABS Take 2 capsules by mouth daily       varenicline (CHANTIX) 1 MG tablet Take 1 tablet (1 mg) by mouth 2 times daily 56 tablet 4     hydrochlorothiazide (HYDRODIURIL) 25 MG tablet Take 1 tablet (25 mg) by mouth daily 90 tablet 3     lisinopril (PRINIVIL/ZESTRIL) 40 MG tablet Take 1 tablet (40 mg) by mouth daily 90 tablet 3     clindamycin (CLEOCIN) 100 MG vaginal suppository Place 1 suppository (100 mg) vaginally At Bedtime for 3 days 3 suppository 0     metroNIDAZOLE (FLAGYL) 500 MG tablet Take 1 tablet (500 mg) by mouth 2 times daily 14 tablet 0     LORazepam (ATIVAN) 0.5 MG tablet Take 1 tablet (0.5 mg) by mouth every 8 hours as needed for anxiety 20 tablet 0     fish oil-omega-3 fatty acids 1000 MG capsule Take 2 g by mouth daily       Esomeprazole Magnesium (NEXIUM PO)        Multiple Vitamins-Minerals (CENTRUM PO)        ADVIL 200 MG OR  "TABS TAKE THREE TABLETS BY MOUTH QD-BID PRN       Probiotic Product (PROBIOTIC ADVANCED PO)        [DISCONTINUED] hydrochlorothiazide (HYDRODIURIL) 25 MG tablet Take 1 tablet (25 mg) by mouth daily 90 tablet 3     [DISCONTINUED] lisinopril (PRINIVIL/ZESTRIL) 40 MG tablet Take 1 tablet (40 mg) by mouth daily 90 tablet 3     Allergies   Allergen Reactions     Wellbutrin [Bupropion]      Wellbutrin: Panic attacks, heart/chest pain     Recent Labs   Lab Test  03/21/17   0926  08/24/16   0915  01/13/16   1138   06/17/14   1019  10/08/13   0808   A1C   --    --    --    --    --   5.4   LDL  155*  174*  162*   --    --   149*   HDL  30*  29*  33*   --    --   33*   TRIG  203*  231*  207*   --    --   179*   ALT  24   --   26   --    --   26   CR  0.62  0.61  0.59   < >  0.70  0.60   GFRESTIMATED  >90  Non  GFR Calc    >90  Non  GFR Calc    >90  Non  GFR Calc     < >  >90  >90   GFRESTBLACK  >90   GFR Calc    >90   GFR Calc    >90   GFR Calc     < >  >90  >90   POTASSIUM  4.0  3.9  4.0   < >  4.2  3.8   TSH   --    --   1.79   --   1.99   --     < > = values in this interval not displayed.      BP Readings from Last 3 Encounters:   10/13/17 110/74   06/15/17 112/65   04/20/17 146/76    Wt Readings from Last 3 Encounters:   10/13/17 300 lb (136.1 kg)   06/15/17 297 lb (134.7 kg)   04/20/17 288 lb (130.6 kg)                  Labs reviewed in EPIC          Reviewed and updated as needed this visit by clinical staffTobacco  Allergies  Meds  Med Hx  Surg Hx  Fam Hx  Soc Hx      Reviewed and updated as needed this visit by Provider         ROS:  Constitutional, HEENT, cardiovascular, pulmonary, GI, , musculoskeletal, neuro, skin, endocrine and psych systems are negative, except as otherwise noted.      OBJECTIVE:   /74  Pulse 95  Temp 98.5  F (36.9  C) (Oral)  Resp 14  Ht 5' 7\" (1.702 m)  Wt 300 lb (136.1 kg)  SpO2 " 96%  Breastfeeding? No  BMI 46.99 kg/m2  Body mass index is 46.99 kg/(m^2).  GENERAL: healthy, alert and no distress  EYES: Eyes grossly normal to inspection, PERRL and conjunctivae and sclerae normal  NECK: no adenopathy, no asymmetry, masses, or scars and thyroid normal to palpation  RESP: lungs clear to auscultation - no rales, rhonchi or wheezes  CV: regular rate and rhythm, normal S1 S2, no S3 or S4, no murmur, click or rub, no peripheral edema and peripheral pulses strong  ABDOMEN: soft, nontender, no hepatosplenomegaly, no masses and bowel sounds normal  MS: no gross musculoskeletal defects noted, no edema  NEURO: Normal strength and tone, mentation intact and speech normal    Diagnostic Test Results:  No results found for this or any previous visit (from the past 24 hour(s)).    ASSESSMENT/PLAN:       1. Tobacco use disorder  Will start again   - varenicline (CHANTIX) 1 MG tablet; Take 1 tablet (1 mg) by mouth 2 times daily  Dispense: 56 tablet; Refill: 4  - HC FLU VAC PRESRV FREE QUAD SPLIT VIR 3+YRS IM      3. Anxiety  Will only use for panic attacks and not daily , discussed risks and side effects .  - LORazepam (ATIVAN) 0.5 MG tablet; Take 1 tablet (0.5 mg) by mouth every 8 hours as needed for anxiety  Dispense: 20 tablet; Refill: 0    4. Essential hypertension with goal blood pressure less than 140/90  Continue same , she will check labs today but she does not have any side effects .  - hydrochlorothiazide (HYDRODIURIL) 25 MG tablet; Take 1 tablet (25 mg) by mouth daily  Dispense: 90 tablet; Refill: 3  - lisinopril (PRINIVIL/ZESTRIL) 40 MG tablet; Take 1 tablet (40 mg) by mouth daily  Dispense: 90 tablet; Refill: 3  - Comprehensive metabolic panel (BMP + Alb, Alk Phos, ALT, AST, Total. Bili, TP)    5. BV (bacterial vaginosis)  lelo try the clindamycin first and of no results then use the Flagyl   - clindamycin (CLEOCIN) 100 MG vaginal suppository; Place 1 suppository (100 mg) vaginally At Bedtime for 3  days  Dispense: 3 suppository; Refill: 0  - metroNIDAZOLE (FLAGYL) 500 MG tablet; Take 1 tablet (500 mg) by mouth 2 times daily  Dispense: 14 tablet; Refill: 0    RTC if no improving or worsening.  Pt is aware  and comfortable with the current plan.      Polly Mcbride MD  [facility]

## 2017-10-13 NOTE — NURSING NOTE
"Chief Complaint   Patient presents with     Vaginal Problem     Stress     Smoking Cessation       Initial /74  Pulse 95  Temp 98.5  F (36.9  C) (Oral)  Resp 14  Ht 5' 7\" (1.702 m)  Wt 300 lb (136.1 kg)  SpO2 96%  Breastfeeding? No  BMI 46.99 kg/m2 Estimated body mass index is 46.99 kg/(m^2) as calculated from the following:    Height as of this encounter: 5' 7\" (1.702 m).    Weight as of this encounter: 300 lb (136.1 kg).  BP completed using cuff size: large    Health Maintenance that is potentially due pending provider review:  Health Maintenance Due   Topic Date Due     INFLUENZA VACCINE (SYSTEM ASSIGNED)  09/01/2017         Given today  "

## 2017-10-13 NOTE — MR AVS SNAPSHOT
"              After Visit Summary   10/13/2017    Danitza Soto    MRN: [id number]           Patient Information     Date Of Birth          1980        Visit Information        Provider Department      10/13/2017 2:00 PM Polly Mcbride MD [facility]        Today's Diagnoses     Vaginal discharge    -  1    Tobacco use disorder        Anxiety        Essential hypertension with goal blood pressure less than 140/90        BV (bacterial vaginosis)           Follow-ups after your visit        Who to contact     If you have questions or need follow up information about today's clinic visit or your schedule please contact [facility] directly at [phone].  Normal or non-critical lab and imaging results will be communicated to you by MyChart, letter or phone within 4 business days after the clinic has received the results. If you do not hear from us within 7 days, please contact the clinic through [employer] or phone. If you have a critical or abnormal lab result, we will notify you by phone as soon as possible.  Submit refill requests through [employer] or call your pharmacy and they will forward the refill request to us. Please allow 3 business days for your refill to be completed.          Additional Information About Your Visit        MyChart Information     [employer] gives you secure access to your electronic health record. If you see a primary care provider, you can also send messages to your care team and make appointments. If you have questions, please call your primary care clinic.  If you do not have a primary care provider, please call [phone] and they will assist you.        Care EveryWhere ID     This is your Care EveryWhere ID. This could be used by other organizations to access your [facility] medical records  [id number]        Your Vitals Were     Pulse Temperature Respirations Height Pulse Oximetry Breastfeeding?    95 98.5  F (36.9  C) (Oral) 14 5' 7\" (1.702 m) 96% No    BMI " (Body Mass Index)                   46.99 kg/m2            Blood Pressure from Last 3 Encounters:   10/13/17 110/74   06/15/17 112/65   04/20/17 146/76    Weight from Last 3 Encounters:   10/13/17 300 lb (136.1 kg)   06/15/17 297 lb (134.7 kg)   04/20/17 288 lb (130.6 kg)              We Performed the Following     Comprehensive metabolic panel (BMP + Alb, Alk Phos, ALT, AST, Total. Bili, TP)     HC FLU VAC PRESRV FREE QUAD SPLIT VIR 3+YRS IM          Today's Medication Changes          These changes are accurate as of: 10/13/17  3:00 PM.  If you have any questions, ask your nurse or doctor.               Start taking these medicines.        Dose/Directions    clindamycin 100 MG vaginal suppository   Commonly known as:  CLEOCIN   Used for:  BV (bacterial vaginosis)   Started by:  Polly Mcbride MD        Dose:  100 mg   Place 1 suppository (100 mg) vaginally At Bedtime for 3 days   Quantity:  3 suppository   Refills:  0       LORazepam 0.5 MG tablet   Commonly known as:  ATIVAN   Used for:  Anxiety   Started by:  Polly Mcbride MD        Dose:  0.5 mg   Take 1 tablet (0.5 mg) by mouth every 8 hours as needed for anxiety   Quantity:  20 tablet   Refills:  0       metroNIDAZOLE 500 MG tablet   Commonly known as:  FLAGYL   Used for:  BV (bacterial vaginosis)   Started by:  Polly Mcbride MD        Dose:  500 mg   Take 1 tablet (500 mg) by mouth 2 times daily   Quantity:  14 tablet   Refills:  0            Where to get your medicines      These medications were sent to [facility] [address] [place] [address] [place], [place] - [address] AT [facility] OF  [address]  [address] [facility] [place] [address]     Phone:  [phone]     clindamycin 100 MG vaginal suppository    hydrochlorothiazide 25 MG tablet    lisinopril 40 MG tablet    metroNIDAZOLE 500 MG tablet    varenicline 1 MG tablet         Some of these will need a paper prescription and others can be bought over the counter.  Ask your nurse if you have  questions.     Bring a paper prescription for each of these medications     LORazepam 0.5 MG tablet                Primary Care Provider Office Phone # Fax #    Polly Mcbride [phone]       [facility] [address]        Equal Access to Services     [facility] ESTER : Hadii deepika ku hadmarciao Soomaali, waaxda luqadaha, qaybta kaalmada adeegyada, waxnba meier antoinettenilton mossmariahsourav shah. So [facility] [phone].    ATENCIÓN: Si habla español, tiene a bailey disposición servicios gratuitos de asistencia lingüística. Marie al [phone].    We comply with applicable federal civil rights laws and [place] laws. We do not discriminate on the basis of race, color, national origin, age, disability, sex, sexual orientation, or gender identity.            Thank you!     Thank you for choosing [facility]  for your care. Our goal is always to provide you with excellent care. Hearing back from our patients is one way we can continue to improve our services. Please take a few minutes to complete the written survey that you may receive in the mail after your visit with us. Thank you!             Your Updated Medication List - Protect others around you: Learn how to safely use, store and throw away your medicines at www.disposemymeds.org.          This list is accurate as of: 10/13/17  3:00 PM.  Always use your most recent med list.                   Brand Name Dispense Instructions for use Diagnosis    ADVIL 200 MG tablet   Generic drug:  ibuprofen      TAKE THREE TABLETS BY MOUTH QD-BID PRN        CENTRUM PO           clindamycin 100 MG vaginal suppository    CLEOCIN    3 suppository    Place 1 suppository (100 mg) vaginally At Bedtime for 3 days    BV (bacterial vaginosis)       fish oil-omega-3 fatty acids 1000 MG capsule      Take 2 g by mouth daily        hydrochlorothiazide 25 MG tablet    HYDRODIURIL    90 tablet    Take 1 tablet (25 mg) by mouth daily    Essential hypertension  with goal blood pressure less than 140/90       lisinopril 40 MG tablet    PRINIVIL/ZESTRIL    90 tablet    Take 1 tablet (40 mg) by mouth daily    Essential hypertension with goal blood pressure less than 140/90       LORazepam 0.5 MG tablet    ATIVAN    20 tablet    Take 1 tablet (0.5 mg) by mouth every 8 hours as needed for anxiety    Anxiety       metroNIDAZOLE 500 MG tablet    FLAGYL    14 tablet    Take 1 tablet (500 mg) by mouth 2 times daily    BV (bacterial vaginosis)       NEXIUM PO           PROBIOTIC ADVANCED PO           varenicline 1 MG tablet    CHANTIX    56 tablet    Take 1 tablet (1 mg) by mouth 2 times daily    Tobacco use disorder       Vitamin D (Cholecalciferol) 1000 UNITS Tabs      Take 2 capsules by mouth daily

## 2017-10-14 LAB
ALBUMIN SERPL-MCNC: 3.8 G/DL (ref 3.4–5)
ALP SERPL-CCNC: 79 U/L (ref 40–150)
ALT SERPL W P-5'-P-CCNC: 24 U/L (ref 0–50)
ANION GAP SERPL CALCULATED.3IONS-SCNC: 7 MMOL/L (ref 3–14)
AST SERPL W P-5'-P-CCNC: 11 U/L (ref 0–45)
BILIRUB SERPL-MCNC: 0.3 MG/DL (ref 0.2–1.3)
BUN SERPL-MCNC: 12 MG/DL (ref 7–30)
CALCIUM SERPL-MCNC: 9.3 MG/DL (ref 8.5–10.1)
CHLORIDE SERPL-SCNC: 105 MMOL/L (ref 94–109)
CO2 SERPL-SCNC: 26 MMOL/L (ref 20–32)
CREAT SERPL-MCNC: 0.72 MG/DL (ref 0.52–1.04)
GFR SERPL CREATININE-BSD FRML MDRD: >90 ML/MIN/1.7M2
GLUCOSE SERPL-MCNC: 72 MG/DL (ref 70–99)
POTASSIUM SERPL-SCNC: 3.8 MMOL/L (ref 3.4–5.3)
PROT SERPL-MCNC: 7.7 G/DL (ref 6.8–8.8)
SODIUM SERPL-SCNC: 138 MMOL/L (ref 133–144)

## 2017-12-04 ENCOUNTER — OFFICE VISIT (OUTPATIENT)
Dept: AUDIOLOGY | Facility: CLINIC | Age: 37
End: 2017-12-04
Payer: COMMERCIAL

## 2017-12-04 ENCOUNTER — OFFICE VISIT (OUTPATIENT)
Dept: OTOLARYNGOLOGY | Facility: CLINIC | Age: 37
End: 2017-12-04
Payer: COMMERCIAL

## 2017-12-04 VITALS — WEIGHT: 293 LBS | RESPIRATION RATE: 12 BRPM | HEIGHT: 67 IN | BODY MASS INDEX: 45.99 KG/M2

## 2017-12-04 DIAGNOSIS — H90.3 SENSORINEURAL HEARING LOSS (SNHL) OF BOTH EARS: Primary | ICD-10-CM

## 2017-12-04 DIAGNOSIS — H90.A12 CONDUCTIVE HEARING LOSS OF LEFT EAR WITH RESTRICTED HEARING OF RIGHT EAR: Primary | ICD-10-CM

## 2017-12-04 PROCEDURE — 99203 OFFICE O/P NEW LOW 30 MIN: CPT | Performed by: OTOLARYNGOLOGY

## 2017-12-04 PROCEDURE — 92567 TYMPANOMETRY: CPT | Performed by: AUDIOLOGIST

## 2017-12-04 PROCEDURE — 99207 ZZC NO CHARGE LOS: CPT | Performed by: AUDIOLOGIST

## 2017-12-04 PROCEDURE — 92557 COMPREHENSIVE HEARING TEST: CPT | Performed by: AUDIOLOGIST

## 2017-12-04 NOTE — LETTER
12/4/2017         RE: Danitza Soto  [address]  [facility] [address]        Dear Colleague,    Thank you for referring your patient, Danitza Soto, to the [facility]. Please see a copy of my visit note below.    History of Present Illness - Danitza Soto is a 37 year old female here to see me for the first time for ear issues. Specifically she reports that for the last ten years she has perceived that she cannot seem to understand people and has to ask people to repeat themselves a lot more.  She thinks that it has slowly gotten worse.     She has had really no noise exposures, no chemo, no radiation therapy, no head injuries, no IV abx.  No pain, bleeding, no drainage, from the ears.    Past Medical History -   Patient Active Problem List   Diagnosis     Anxiety state     Esophageal reflux     Morbid obesity due to excess calories (H)     Tobacco use disorder     CARDIOVASCULAR SCREENING; LDL GOAL LESS THAN 130     Hypertension goal BP (blood pressure) < 140/90     Acne     Papanicolaou smear of cervix with low grade squamous intraepithelial lesion (LGSIL)     Mixed hyperlipidemia     LEONARDO (obstructive sleep apnea)     Obesity hypoventilation syndrome (H)       Current Medications -   Current Outpatient Prescriptions:      Vitamin D, Cholecalciferol, 1000 UNITS TABS, Take 2 capsules by mouth daily, Disp: , Rfl:      varenicline (CHANTIX) 1 MG tablet, Take 1 tablet (1 mg) by mouth 2 times daily, Disp: 56 tablet, Rfl: 4     hydrochlorothiazide (HYDRODIURIL) 25 MG tablet, Take 1 tablet (25 mg) by mouth daily, Disp: 90 tablet, Rfl: 3     lisinopril (PRINIVIL/ZESTRIL) 40 MG tablet, Take 1 tablet (40 mg) by mouth daily, Disp: 90 tablet, Rfl: 3     metroNIDAZOLE (FLAGYL) 500 MG tablet, Take 1 tablet (500 mg) by mouth 2 times daily, Disp: 14 tablet, Rfl: 0     LORazepam (ATIVAN) 0.5 MG tablet, Take 1 tablet (0.5 mg) by mouth every 8 hours as needed for anxiety, Disp: 20 tablet, Rfl:  0     fish oil-omega-3 fatty acids 1000 MG capsule, Take 2 g by mouth daily, Disp: , Rfl:      Probiotic Product (PROBIOTIC ADVANCED PO), , Disp: , Rfl:      Esomeprazole Magnesium (NEXIUM PO), , Disp: , Rfl:      Multiple Vitamins-Minerals (CENTRUM PO), , Disp: , Rfl:      ADVIL 200 MG OR TABS, TAKE THREE TABLETS BY MOUTH QD-BID PRN, Disp: , Rfl:     Allergies -   Allergies   Allergen Reactions     Wellbutrin [Bupropion]      Wellbutrin: Panic attacks, heart/chest pain       Social History -   Social History     Social History     Marital status:      Spouse name: N/A     Number of children: 0     Years of education: Some Colle     Occupational History     Mental Health Counselor      Social History Main Topics     Smoking status: Current Every Day Smoker     Years: 1.00     Types: Cigarettes     Smokeless tobacco: Never Used      Comment:  pack or less a day     Alcohol use 0.0 oz/week     0 Standard drinks or equivalent per week      Comment: Occas.     Drug use: No     Sexual activity: Yes     Partners: Female     Other Topics Concern     Parent/Sibling W/ Cabg, Mi Or Angioplasty Before 65f 55m? No     Social History Narrative    Social Documentation:        Balanced Diet: YES, sometimes    Calcium intake: 2 per day    Caffeine: 5-10 per day    Exercise:  type of activity walking, playtime;  5 times per week    Sunscreen: when remembered    Seatbelts:  Yes    Self Breast Exam:  No    Self Testicular Exam: n/a    Physical/Emotional/Sexual Abuse: No    Do you feel safe in your environment? Yes        Cholesterol screen up to date: No 2006    Eye Exam up to date: Yes    Dental Exam up to date: No    Pap smear up to date: No: 2006    Mammogram up to date: Does Not Apply    Dexa Scan up to date: Does Not Apply    Colonoscopy up to date: Does Not Apply    Immunizations up to date: Yes, 2003    Glucose screen if over 40:  N/a        Jamila Coyle MA                           Family History -   Family History  "  Problem Relation Age of Onset     HEART DISEASE Mother      ,mother had emphesema and  at 62     Hypertension Mother      Allergies Mother      Lipids Mother      Obesity Mother      Respiratory Mother      Emphysema     DIABETES Maternal Grandmother      Type 2?     Hypertension Maternal Grandmother      Circulatory Maternal Grandmother      Due to diabetes     Eye Disorder Maternal Grandmother      Macular degeneration/Macular degeneration     Obesity Maternal Grandmother      Hypertension Father      Lipids Father      CANCER Father      CEREBROVASCULAR DISEASE Paternal Grandmother      Alcohol/Drug Maternal Grandfather      Obesity Maternal Grandfather      Psychotic Disorder Paternal Grandfather      Committed Suicide     Allergies Sister      Genitourinary Problems Sister        Review of Systems - As per HPI and PMHx, otherwise 10+ system review of the head and neck, and general constitution is negative.    Physical Exam  B/P: Data Unavailable, T: Data Unavailable, P: Data Unavailable, R: 12  Vitals: Resp 12  Ht 1.702 m (5' 7\")  Wt (!) 140.6 kg (310 lb)  BMI 48.55 kg/m2  BMI= Body mass index is 48.55 kg/(m^2).    General - The patient is well nourished and well developed, and appears to have good nutritional status.  Alert and oriented to person and place, answers questions and cooperates with examination appropriately.   Head and Face - Normocephalic and atraumatic, with no gross asymmetry noted of the contour of the facial features.  The facial nerve is intact, with strong symmetric movements.  Voice and Breathing - The patient was breathing comfortably without the use of accessory muscles. There was no wheezing, stridor, or stertor.  The patients voice was clear and strong, and had appropriate pitch and quality.  Ears - The tympanic membranes are normal in appearance, bony landmarks are intact.  No retraction, perforation, or masses.  Normal mobility on valsalva maneuver, with no reports of " dizziness on insuflation.  No fluid or purulence was seen in the external canal or the middle ear. No evidence of infection of the middle ear or external canal, cerumen was normal in appearance.  Eyes - Extraocular movements intact, and the pupils were reactive to light.  Sclera were not icteric or injected, conjunctiva were pink and moist.  Mouth - Examination of the oral cavity showed pink, healthy oral mucosa. No lesions or ulcerations noted.  The tongue was mobile and midline, and the dentition were in good condition.    Throat - The walls of the oropharynx were smooth, pink, moist, symmetric, and had no lesions or ulcerations.  The tonsillar pillars and soft palate were symmetric.  The uvula was midline on elevation.    Neck - Normal midline excursion of the laryngotracheal complex during swallowing.  Full range of motion on passive movement.  Palpation of the occipital, submental, submandibular, internal jugular chain, and supraclavicular nodes did not demonstrate any abnormal lymph nodes or masses.  The carotid pulse was palpable bilaterally.  Palpation of the thyroid was soft and smooth, with no nodules or goiter appreciated.  The trachea was mobile and midline.  Nose - External contour is symmetric, no gross deflection or scars.  Nasal mucosa is pink and moist with no abnormal mucus.  The septum was midline and non-obstructive, turbinates of normal size and position.  No polyps, masses, or purulence noted on examination.    Audiologic Studies - An audiogram and tympanogram were performed today as part of the evaluation and personally reviewed. The tympanogram shows a normal Type A curve, with normal canal volume and middle ear pressure.  There is no sign of eustachian tube dysfunction or middle ear effusion.  The audiogram showed a symmetric cookie bite at 3000Hz, down to 40dB threshold, with no conductive hearing loss.      A/P - Danitza Soto is a 37 year old female  (H90.3) Sensorineural hearing loss  (SNHL) of both ears  (primary encounter diagnosis)  Comment: With no explanation other than genetics, it seems she is having early onset sensorineural hearing loss. I discussed further work up such as MRI or CT, but at this point I don't think there are any signs of acute CNS disease.    We spent the remainder of today's visit discussing the current leading theory on tinnitus, in that it originates from the central nervous system itself, similar to Phantom Limb Syndrome.  Also discussed were steps that can be taken to mask the noise, such as a low volume de-tuned radio, a fan in the background, and hearing aids.  Correlation with stress, anxiety, depression, and high blood pressure were also discussed.  The patient was also cautioned on the numerous expensive non-pharmaceutical options that are advertised, and have no proven benefit.    The patient will follow up as necessary for worsening symptoms, changes in symptoms, or signs of infection.  I have also recommended yearly audiograms, and consideration of a hearing aid evaluation.    Follow up in one year with a new hearing test.    Again, thank you for allowing me to participate in the care of your patient.        Sincerely,        Artis Rasmussen MD

## 2017-12-04 NOTE — PROGRESS NOTES
History of Present Illness - Danitza Soto is a 37 year old female here to see me for the first time for ear issues. Specifically she reports that for the last ten years she has perceived that she cannot seem to understand people and has to ask people to repeat themselves a lot more.  She thinks that it has slowly gotten worse.     She has had really no noise exposures, no chemo, no radiation therapy, no head injuries, no IV abx.  No pain, bleeding, no drainage, from the ears.    Past Medical History -   Patient Active Problem List   Diagnosis     Anxiety state     Esophageal reflux     Morbid obesity due to excess calories (H)     Tobacco use disorder     CARDIOVASCULAR SCREENING; LDL GOAL LESS THAN 130     Hypertension goal BP (blood pressure) < 140/90     Acne     Papanicolaou smear of cervix with low grade squamous intraepithelial lesion (LGSIL)     Mixed hyperlipidemia     LEONARDO (obstructive sleep apnea)     Obesity hypoventilation syndrome (H)       Current Medications -   Current Outpatient Prescriptions:      Vitamin D, Cholecalciferol, 1000 UNITS TABS, Take 2 capsules by mouth daily, Disp: , Rfl:      varenicline (CHANTIX) 1 MG tablet, Take 1 tablet (1 mg) by mouth 2 times daily, Disp: 56 tablet, Rfl: 4     hydrochlorothiazide (HYDRODIURIL) 25 MG tablet, Take 1 tablet (25 mg) by mouth daily, Disp: 90 tablet, Rfl: 3     lisinopril (PRINIVIL/ZESTRIL) 40 MG tablet, Take 1 tablet (40 mg) by mouth daily, Disp: 90 tablet, Rfl: 3     metroNIDAZOLE (FLAGYL) 500 MG tablet, Take 1 tablet (500 mg) by mouth 2 times daily, Disp: 14 tablet, Rfl: 0     LORazepam (ATIVAN) 0.5 MG tablet, Take 1 tablet (0.5 mg) by mouth every 8 hours as needed for anxiety, Disp: 20 tablet, Rfl: 0     fish oil-omega-3 fatty acids 1000 MG capsule, Take 2 g by mouth daily, Disp: , Rfl:      Probiotic Product (PROBIOTIC ADVANCED PO), , Disp: , Rfl:      Esomeprazole Magnesium (NEXIUM PO), , Disp: , Rfl:      Multiple Vitamins-Minerals (CENTRUM  PO), , Disp: , Rfl:      ADVIL 200 MG OR TABS, TAKE THREE TABLETS BY MOUTH QD-BID PRN, Disp: , Rfl:     Allergies -   Allergies   Allergen Reactions     Wellbutrin [Bupropion]      Wellbutrin: Panic attacks, heart/chest pain       Social History -   Social History     Social History     Marital status:      Spouse name: N/A     Number of children: 0     Years of education: Some Colle     Occupational History     Mental Health Counselor      Social History Main Topics     Smoking status: Current Every Day Smoker     Years: 1.00     Types: Cigarettes     Smokeless tobacco: Never Used      Comment:  pack or less a day     Alcohol use 0.0 oz/week     0 Standard drinks or equivalent per week      Comment: Occas.     Drug use: No     Sexual activity: Yes     Partners: Female     Other Topics Concern     Parent/Sibling W/ Cabg, Mi Or Angioplasty Before 65f 55m? No     Social History Narrative    Social Documentation:        Balanced Diet: YES, sometimes    Calcium intake: 2 per day    Caffeine: 5-10 per day    Exercise:  type of activity walking, playtime;  5 times per week    Sunscreen: when remembered    Seatbelts:  Yes    Self Breast Exam:  No    Self Testicular Exam: n/a    Physical/Emotional/Sexual Abuse: No    Do you feel safe in your environment? Yes        Cholesterol screen up to date: No 2006    Eye Exam up to date: Yes    Dental Exam up to date: No    Pap smear up to date: No:     Mammogram up to date: Does Not Apply    Dexa Scan up to date: Does Not Apply    Colonoscopy up to date: Does Not Apply    Immunizations up to date: Yes,     Glucose screen if over 40:  N/a        Jamila Coyle MA                           Family History -   Family History   Problem Relation Age of Onset     HEART DISEASE Mother      ,mother had emphesema and  at 62     Hypertension Mother      Allergies Mother      Lipids Mother      Obesity Mother      Respiratory Mother      Emphysema     DIABETES Maternal  "Grandmother      Type 2?     Hypertension Maternal Grandmother      Circulatory Maternal Grandmother      Due to diabetes     Eye Disorder Maternal Grandmother      Macular degeneration/Macular degeneration     Obesity Maternal Grandmother      Hypertension Father      Lipids Father      CANCER Father      CEREBROVASCULAR DISEASE Paternal Grandmother      Alcohol/Drug Maternal Grandfather      Obesity Maternal Grandfather      Psychotic Disorder Paternal Grandfather      Committed Suicide     Allergies Sister      Genitourinary Problems Sister        Review of Systems - As per HPI and PMHx, otherwise 10+ system review of the head and neck, and general constitution is negative.    Physical Exam  B/P: Data Unavailable, T: Data Unavailable, P: Data Unavailable, R: 12  Vitals: Resp 12  Ht 1.702 m (5' 7\")  Wt (!) 140.6 kg (310 lb)  BMI 48.55 kg/m2  BMI= Body mass index is 48.55 kg/(m^2).    General - The patient is well nourished and well developed, and appears to have good nutritional status.  Alert and oriented to person and place, answers questions and cooperates with examination appropriately.   Head and Face - Normocephalic and atraumatic, with no gross asymmetry noted of the contour of the facial features.  The facial nerve is intact, with strong symmetric movements.  Voice and Breathing - The patient was breathing comfortably without the use of accessory muscles. There was no wheezing, stridor, or stertor.  The patients voice was clear and strong, and had appropriate pitch and quality.  Ears - The tympanic membranes are normal in appearance, bony landmarks are intact.  No retraction, perforation, or masses.  Normal mobility on valsalva maneuver, with no reports of dizziness on insuflation.  No fluid or purulence was seen in the external canal or the middle ear. No evidence of infection of the middle ear or external canal, cerumen was normal in appearance.  Eyes - Extraocular movements intact, and the pupils " were reactive to light.  Sclera were not icteric or injected, conjunctiva were pink and moist.  Mouth - Examination of the oral cavity showed pink, healthy oral mucosa. No lesions or ulcerations noted.  The tongue was mobile and midline, and the dentition were in good condition.    Throat - The walls of the oropharynx were smooth, pink, moist, symmetric, and had no lesions or ulcerations.  The tonsillar pillars and soft palate were symmetric.  The uvula was midline on elevation.    Neck - Normal midline excursion of the laryngotracheal complex during swallowing.  Full range of motion on passive movement.  Palpation of the occipital, submental, submandibular, internal jugular chain, and supraclavicular nodes did not demonstrate any abnormal lymph nodes or masses.  The carotid pulse was palpable bilaterally.  Palpation of the thyroid was soft and smooth, with no nodules or goiter appreciated.  The trachea was mobile and midline.  Nose - External contour is symmetric, no gross deflection or scars.  Nasal mucosa is pink and moist with no abnormal mucus.  The septum was midline and non-obstructive, turbinates of normal size and position.  No polyps, masses, or purulence noted on examination.    Audiologic Studies - An audiogram and tympanogram were performed today as part of the evaluation and personally reviewed. The tympanogram shows a normal Type A curve, with normal canal volume and middle ear pressure.  There is no sign of eustachian tube dysfunction or middle ear effusion.  The audiogram showed a symmetric cookie bite at 3000Hz, down to 40dB threshold, with no conductive hearing loss.      A/P - Danitza Soto is a 37 year old female  (H90.3) Sensorineural hearing loss (SNHL) of both ears  (primary encounter diagnosis)  Comment: With no explanation other than genetics, it seems she is having early onset sensorineural hearing loss. I discussed further work up such as MRI or CT, but at this point I don't think  there are any signs of acute CNS disease.    We spent the remainder of today's visit discussing the current leading theory on tinnitus, in that it originates from the central nervous system itself, similar to Phantom Limb Syndrome.  Also discussed were steps that can be taken to mask the noise, such as a low volume de-tuned radio, a fan in the background, and hearing aids.  Correlation with stress, anxiety, depression, and high blood pressure were also discussed.  The patient was also cautioned on the numerous expensive non-pharmaceutical options that are advertised, and have no proven benefit.    The patient will follow up as necessary for worsening symptoms, changes in symptoms, or signs of infection.  I have also recommended yearly audiograms, and consideration of a hearing aid evaluation.    Follow up in one year with a new hearing test.

## 2017-12-04 NOTE — PATIENT INSTRUCTIONS
Scheduling Information  To schedule your CT/MRI scan, please contact [facility] at [phone] OR [facility] at [phone]    To schedule your Surgery, please contact our Specialty Schedulers at [phone]      ENT Clinic Locations Clinic Hours Telephone Number     [facility]  [address] [place] [address]   Monday:           1:00pm -- 5:00pm    Friday:              8:00am - 12:00pm   To schedule/reschedule an appointment with   Dr. Rasmussen,   please contact our   Specialty Scheduling Department at:     [phone]       [facility]  [address] [place] [address] Tuesday:          8:00am -- 2:00pm         Urgent Care Locations Clinic Hours Telephone Numbers     [facility]  [address] [place] [address]     Monday-Friday:     11:00am - 9:00pm    Saturday-Sunday:  9:00am - 5:00pm   [phone]     [facility] [place] [facility]  [address] [place] [address]     Monday-Friday:      5:00pm - 9:00pm     Saturday-Sunday:  9:00am - 5:00pm   [phone]

## 2017-12-04 NOTE — MR AVS SNAPSHOT
After Visit Summary   12/4/2017    Danitza Soto    MRN: [id number]           Patient Information     Date Of Birth          1980        Visit Information        Provider Department      12/4/2017 2:45 PM Ignacio Allison AuD [facility]        Today's Diagnoses     Conductive hearing loss of left ear with restricted hearing of right ear    -  1       Follow-ups after your visit        Your next 10 appointments already scheduled     Dec 04, 2017  3:15 PM CST   New Visit with Artis Rasmussen MD   [facility] ([facility])    [address] [place] [id number]   [phone]              Who to contact     If you have questions or need follow up information about today's clinic visit or your schedule please contact [facility] directly at [phone].  Normal or non-critical lab and imaging results will be communicated to you by MyChart, letter or phone within 4 business days after the clinic has received the results. If you do not hear from us within 7 days, please contact the clinic through [employer] or phone. If you have a critical or abnormal lab result, we will notify you by phone as soon as possible.  Submit refill requests through [employer] or call your pharmacy and they will forward the refill request to us. Please allow 3 business days for your refill to be completed.          Additional Information About Your Visit        MyChart Information     [employer] gives you secure access to your electronic health record. If you see a primary care provider, you can also send messages to your care team and make appointments. If you have questions, please call your primary care clinic.  If you do not have a primary care provider, please call [phone] and they will assist you.        Care EveryWhere ID     This is your Care EveryWhere ID. This could be used by other organizations to access your [facility]  records  [id number]         Blood Pressure from Last 3 Encounters:   10/13/17 110/74   06/15/17 112/65   04/20/17 146/76    Weight from Last 3 Encounters:   10/13/17 300 lb (136.1 kg)   06/15/17 297 lb (134.7 kg)   04/20/17 288 lb (130.6 kg)              We Performed the Following     AUDIOGRAM/TYMPANOGRAM - INTERFACE     COMPREHENSIVE HEARING TEST     TYMPANOMETRY        Primary Care Provider Office Phone # Fax #    Polly Mcbride [phone] [address] [facility]   [facility] [address]        Equal Access to Services     [facility] : Hadii aad ku hadasho Sosangitaali, waaxda luqadaha, qaybta kaalmada adecristianoyada, christiano garcia . So [facility] [phone].    ATENCIÓN: Si habla español, tiene a bailey disposición servicios gratuitos de asistencia lingüística. [facility] [phone].    We comply with applicable federal civil rights laws and [place] laws. We do not discriminate on the basis of race, color, national origin, age, disability, sex, sexual orientation, or gender identity.            Thank you!     Thank you for choosing [facility] [place]  for your care. Our goal is always to provide you with excellent care. Hearing back from our patients is one way we can continue to improve our services. Please take a few minutes to complete the written survey that you may receive in the mail after your visit with us. Thank you!             Your Updated Medication List - Protect others around you: Learn how to safely use, store and throw away your medicines at www.disposemymeds.org.          This list is accurate as of: 12/4/17  3:11 PM.  Always use your most recent med list.                   Brand Name Dispense Instructions for use Diagnosis    ADVIL 200 MG tablet   Generic drug:  ibuprofen      TAKE THREE TABLETS BY MOUTH QD-BID PRN        CENTRUM PO           fish oil-omega-3 fatty acids 1000 MG capsule      Take 2 g by mouth daily        hydrochlorothiazide 25 MG  tablet    HYDRODIURIL    90 tablet    Take 1 tablet (25 mg) by mouth daily    Essential hypertension with goal blood pressure less than 140/90       lisinopril 40 MG tablet    PRINIVIL/ZESTRIL    90 tablet    Take 1 tablet (40 mg) by mouth daily    Essential hypertension with goal blood pressure less than 140/90       LORazepam 0.5 MG tablet    ATIVAN    20 tablet    Take 1 tablet (0.5 mg) by mouth every 8 hours as needed for anxiety    Anxiety       metroNIDAZOLE 500 MG tablet    FLAGYL    14 tablet    Take 1 tablet (500 mg) by mouth 2 times daily    BV (bacterial vaginosis)       NEXIUM PO           PROBIOTIC ADVANCED PO           varenicline 1 MG tablet    CHANTIX    56 tablet    Take 1 tablet (1 mg) by mouth 2 times daily    Tobacco use disorder       Vitamin D (Cholecalciferol) 1000 UNITS Tabs      Take 2 capsules by mouth daily

## 2017-12-04 NOTE — NURSING NOTE
"Chief Complaint   Patient presents with     Consult     hearing problem       Initial Resp 12  Ht 1.702 m (5' 7\")  Wt (!) 140.6 kg (310 lb)  BMI 48.55 kg/m2 Estimated body mass index is 48.55 kg/(m^2) as calculated from the following:    Height as of this encounter: 1.702 m (5' 7\").    Weight as of this encounter: 140.6 kg (310 lb).  Medication Reconciliation: complete     Gilmar Yen CMA      "

## 2017-12-04 NOTE — MR AVS SNAPSHOT
After Visit Summary   12/4/2017    Danitza Soto    MRN: [id number]           Patient Information     Date Of Birth          1980        Visit Information        Provider Department      12/4/2017 3:15 PM [facility]        Today's Diagnoses     Sensorineural hearing loss (SNHL) of both ears    -  1      Care Instructions    Scheduling Information  To schedule your CT/MRI scan, please contact [facility] at [phone] OR [facility] at [phone]    To schedule your Surgery, please contact our Specialty Schedulers at [phone]      ENT Clinic Locations Clinic Hours Telephone Number     [facility]  [address]  [place] [address]   Monday:           1:00pm -- 5:00pm    Friday:              8:00am - 12:00pm   To schedule/reschedule an appointment with   Dr. Rasmussen,   please contact our   Specialty Scheduling Department at:     [phone]       [facility] [place]  [address] [place] [address] Tuesday:          8:00am -- 2:00pm         Urgent Care Locations Clinic Hours Telephone Numbers     [facility] [place]  [address] [place] [address]     Monday-Friday:     11:00am - 9:00pm    Saturday-Sunday:  9:00am - 5:00pm   [phone]     [facility]  [address] [place] [address]     Monday-Friday:      5:00pm - 9:00pm     Saturday-Sunday:  9:00am - 5:00pm   [phone]                 Follow-ups after your visit        Who to contact     If you have questions or need follow up information about today's clinic visit or your schedule please contact [facility] directly at [phone].  Normal or non-critical lab and imaging results will be communicated to you by MyChart, letter or phone within 4 business days after the clinic has received the results. If you do not hear from us within 7 days, please contact the clinic through MyChart or phone. If you have a critical  "or abnormal lab result, we will notify you by phone as soon as possible.  Submit refill requests through [employer] or call your pharmacy and they will forward the refill request to us. Please allow 3 business days for your refill to be completed.          Additional Information About Your Visit        [employer] Information     [employer] gives you secure access to your electronic health record. If you see a primary care provider, you can also send messages to your care team and make appointments. If you have questions, please call your primary care clinic.  If you do not have a primary care provider, please call [phone] and they will assist you.        Care EveryWhere ID     This is your Care EveryWhere ID. This could be used by other organizations to access your [place] medical records  [id number]        Your Vitals Were     Respirations Height BMI (Body Mass Index)             12 1.702 m (5' 7\") 48.55 kg/m2          Blood Pressure from Last 3 Encounters:   10/13/17 110/74   06/15/17 112/65   04/20/17 146/76    Weight from Last 3 Encounters:   12/04/17 (!) 140.6 kg (310 lb)   10/13/17 136.1 kg (300 lb)   06/15/17 134.7 kg (297 lb)              Today, you had the following     No orders found for display       Primary Care Provider Office Phone # Fax #    Polly Mcbride [phone] [address] [address]        Equal Access to Services     [facility] : Hadii deepika cheungo Sodeni, waaxda luqadaha, qaybta kaalmada adeegyada, christiano shah. So [facility] [phone].    ATENCIÓN: Si habla español, tiene a bailey disposición servicios gratuitos de asistencia lingüística. Llame al [phone].    We comply with applicable federal civil rights laws and [place] laws. We do not discriminate on the basis of race, color, national origin, age, disability, sex, sexual orientation, or gender identity.            Thank you!     Thank you for choosing " [facility]  for your care. Our goal is always to provide you with excellent care. Hearing back from our patients is one way we can continue to improve our services. Please take a few minutes to complete the written survey that you may receive in the mail after your visit with us. Thank you!             Your Updated Medication List - Protect others around you: Learn how to safely use, store and throw away your medicines at www.disposemymeds.org.          This list is accurate as of: 12/4/17  3:41 PM.  Always use your most recent med list.                   Brand Name Dispense Instructions for use Diagnosis    ADVIL 200 MG tablet   Generic drug:  ibuprofen      TAKE THREE TABLETS BY MOUTH QD-BID PRN        CENTRUM PO           fish oil-omega-3 fatty acids 1000 MG capsule      Take 2 g by mouth daily        hydrochlorothiazide 25 MG tablet    HYDRODIURIL    90 tablet    Take 1 tablet (25 mg) by mouth daily    Essential hypertension with goal blood pressure less than 140/90       lisinopril 40 MG tablet    PRINIVIL/ZESTRIL    90 tablet    Take 1 tablet (40 mg) by mouth daily    Essential hypertension with goal blood pressure less than 140/90       LORazepam 0.5 MG tablet    ATIVAN    20 tablet    Take 1 tablet (0.5 mg) by mouth every 8 hours as needed for anxiety    Anxiety       metroNIDAZOLE 500 MG tablet    FLAGYL    14 tablet    Take 1 tablet (500 mg) by mouth 2 times daily    BV (bacterial vaginosis)       NEXIUM PO           PROBIOTIC ADVANCED PO           varenicline 1 MG tablet    CHANTIX    56 tablet    Take 1 tablet (1 mg) by mouth 2 times daily    Tobacco use disorder       Vitamin D (Cholecalciferol) 1000 UNITS Tabs      Take 2 capsules by mouth daily

## 2017-12-04 NOTE — PROGRESS NOTES
"AUDIOLOGY REPORT:    Patient was referred to Audiology from ENT by Dr. Rasmussen for a hearing examination. Patient reports increased difficulty hearing over the past year. Patient reports having her hearing tested 10 years ago and she was informed she was \"borderline\" normal at that time. Patient reports she works in an environment with significant background noise and she is having difficulty communicating.      Testing:    Otoscopy:   Otoscopic exam indicates ears are clear of cerumen bilaterally     Tympanograms:    RIGHT: normal eardrum mobility     LEFT:   normal eardrum mobility    Thresholds:   Pure Tone Thresholds assessed using conventional audiometry with good  reliability from 250-8000 Hz bilaterally using insert earphones     RIGHT:  normal and borderline-normal hearing sensitivity for most frequencies tested with the exception of a mild sensorineural hearing loss at 2000 & 3000 Hz only.     LEFT:    normal and borderline-normal hearing sensitivity for most all frequencies tested with the exception of a mild conductive hearing loss at 3000 Hz only    NOTE: air-bone gaps of 15 dB present at 1000 & 3000 Hz    Speech Reception Threshold:    RIGHT: 25 dB HL    LEFT:   20 dB HL    Word Recognition Score:     RIGHT: 96% at 60 dB HL using NU-6 recorded word list.    LEFT:   100% at 60 dB HL using NU-6 recorded word list.    Discussed results with the patient.     Patient was returned to ENT for follow up.     Ignacio May CCC-A  Licensed Audiologist  12/4/2017                                            "

## 2018-03-21 DIAGNOSIS — I10 ESSENTIAL HYPERTENSION WITH GOAL BLOOD PRESSURE LESS THAN 140/90: ICD-10-CM

## 2018-03-21 RX ORDER — HYDROCHLOROTHIAZIDE 25 MG/1
TABLET ORAL
Start: 2018-03-21

## 2018-03-21 RX ORDER — LISINOPRIL 40 MG/1
TABLET ORAL
Start: 2018-03-21

## 2018-03-21 NOTE — TELEPHONE ENCOUNTER
"Too soon.  Rx sent for both 10/13/17 for #90 with 3 refills.  Ami Garcia RN  Requested Prescriptions   Refused Prescriptions Disp Refills     lisinopril (PRINIVIL/ZESTRIL) 40 MG tablet [Pharmacy Med Name: LISINOPRIL 40MG TABLETS]       Sig: TAKE 1 TABLET(40 MG) BY MOUTH DAILY    ACE Inhibitors (Including Combos) Protocol Passed    3/21/2018 10:07 AM       Passed - Blood pressure under 140/90 in past 12 months    BP Readings from Last 3 Encounters:   10/13/17 110/74   06/15/17 112/65   04/20/17 146/76                Passed - Recent (12 mo) or future (30 days) visit within the authorizing provider's specialty    Patient had office visit in the last 12 months or has a visit in the next 30 days with authorizing provider or within the authorizing provider's specialty.  See \"Patient Info\" tab in inbasket, or \"Choose Columns\" in Meds & Orders section of the refill encounter.           Passed - Patient is age 18 or older       Passed - No active pregnancy on record       Passed - Normal serum creatinine on file in past 12 months    Recent Labs   Lab Test  10/13/17   1445   CR  0.72            Passed - Normal serum potassium on file in past 12 months    Recent Labs   Lab Test  10/13/17   1445   POTASSIUM  3.8            Passed - No positive pregnancy test in past 12 months        hydrochlorothiazide (HYDRODIURIL) 25 MG tablet [Pharmacy Med Name: HYDROCHLOROTHIAZIDE 25MG TABLETS]       Sig: TAKE 1 TABLET(25 MG) BY MOUTH DAILY    Diuretics (Including Combos) Protocol Passed    3/21/2018 10:07 AM       Passed - Blood pressure under 140/90 in past 12 months    BP Readings from Last 3 Encounters:   10/13/17 110/74   06/15/17 112/65   04/20/17 146/76                Passed - Recent (12 mo) or future (30 days) visit within the authorizing provider's specialty    Patient had office visit in the last 12 months or has a visit in the next 30 days with authorizing provider or within the authorizing provider's specialty.  See \"Patient " "Info\" tab in inbasket, or \"Choose Columns\" in Meds & Orders section of the refill encounter.           Passed - Patient is age 18 or older       Passed - No active pregancy on record       Passed - Normal serum creatinine on file in past 12 months    Recent Labs   Lab Test  10/13/17   1445   CR  0.72             Passed - Normal serum potassium on file in past 12 months    Recent Labs   Lab Test  10/13/17   1445   POTASSIUM  3.8                   Passed - Normal serum sodium on file in past 12 months    Recent Labs   Lab Test  10/13/17   1445   NA  138             Passed - No positive pregnancy test in past 12 months          "

## 2018-04-23 ENCOUNTER — HEALTH MAINTENANCE LETTER (OUTPATIENT)
Age: 38
End: 2018-04-23

## 2018-05-27 ENCOUNTER — HEALTH MAINTENANCE LETTER (OUTPATIENT)
Age: 38
End: 2018-05-27

## 2018-06-19 ENCOUNTER — OFFICE VISIT (OUTPATIENT)
Dept: FAMILY MEDICINE | Facility: CLINIC | Age: 38
End: 2018-06-19
Payer: COMMERCIAL

## 2018-06-19 ENCOUNTER — RADIANT APPOINTMENT (OUTPATIENT)
Dept: GENERAL RADIOLOGY | Facility: CLINIC | Age: 38
End: 2018-06-19
Attending: FAMILY MEDICINE
Payer: COMMERCIAL

## 2018-06-19 VITALS
RESPIRATION RATE: 20 BRPM | HEIGHT: 67 IN | OXYGEN SATURATION: 95 % | TEMPERATURE: 98.6 F | DIASTOLIC BLOOD PRESSURE: 80 MMHG | HEART RATE: 81 BPM | WEIGHT: 293 LBS | SYSTOLIC BLOOD PRESSURE: 138 MMHG | BODY MASS INDEX: 45.99 KG/M2

## 2018-06-19 DIAGNOSIS — I10 ESSENTIAL HYPERTENSION WITH GOAL BLOOD PRESSURE LESS THAN 140/90: ICD-10-CM

## 2018-06-19 DIAGNOSIS — E66.01 MORBID OBESITY DUE TO EXCESS CALORIES (H): ICD-10-CM

## 2018-06-19 DIAGNOSIS — R07.89 CHEST PRESSURE: ICD-10-CM

## 2018-06-19 DIAGNOSIS — Z71.6 ENCOUNTER FOR SMOKING CESSATION COUNSELING: Primary | ICD-10-CM

## 2018-06-19 DIAGNOSIS — I10 HYPERTENSION GOAL BP (BLOOD PRESSURE) < 140/90: ICD-10-CM

## 2018-06-19 LAB
ALBUMIN SERPL-MCNC: 3.8 G/DL (ref 3.4–5)
ALP SERPL-CCNC: 76 U/L (ref 40–150)
ALT SERPL W P-5'-P-CCNC: 33 U/L (ref 0–50)
ANION GAP SERPL CALCULATED.3IONS-SCNC: 10 MMOL/L (ref 3–14)
AST SERPL W P-5'-P-CCNC: 15 U/L (ref 0–45)
BILIRUB SERPL-MCNC: 0.4 MG/DL (ref 0.2–1.3)
BUN SERPL-MCNC: 14 MG/DL (ref 7–30)
CALCIUM SERPL-MCNC: 10 MG/DL (ref 8.5–10.1)
CHLORIDE SERPL-SCNC: 106 MMOL/L (ref 94–109)
CO2 SERPL-SCNC: 24 MMOL/L (ref 20–32)
CREAT SERPL-MCNC: 0.65 MG/DL (ref 0.52–1.04)
GFR SERPL CREATININE-BSD FRML MDRD: >90 ML/MIN/1.7M2
GLUCOSE SERPL-MCNC: 95 MG/DL (ref 70–99)
POTASSIUM SERPL-SCNC: 3.9 MMOL/L (ref 3.4–5.3)
PROT SERPL-MCNC: 7.8 G/DL (ref 6.8–8.8)
SODIUM SERPL-SCNC: 140 MMOL/L (ref 133–144)

## 2018-06-19 PROCEDURE — 80053 COMPREHEN METABOLIC PANEL: CPT | Performed by: FAMILY MEDICINE

## 2018-06-19 PROCEDURE — 71046 X-RAY EXAM CHEST 2 VIEWS: CPT | Mod: FY

## 2018-06-19 PROCEDURE — 99215 OFFICE O/P EST HI 40 MIN: CPT | Performed by: FAMILY MEDICINE

## 2018-06-19 PROCEDURE — 36415 COLL VENOUS BLD VENIPUNCTURE: CPT | Performed by: FAMILY MEDICINE

## 2018-06-19 PROCEDURE — 93000 ELECTROCARDIOGRAM COMPLETE: CPT | Performed by: FAMILY MEDICINE

## 2018-06-19 RX ORDER — NICOTINE 21 MG/24HR
1 PATCH, TRANSDERMAL 24 HOURS TRANSDERMAL EVERY 24 HOURS
Qty: 30 PATCH | Refills: 0 | Status: SHIPPED | OUTPATIENT
Start: 2018-06-19 | End: 2018-11-06

## 2018-06-19 RX ORDER — LISINOPRIL 40 MG/1
40 TABLET ORAL DAILY
Qty: 90 TABLET | Refills: 3 | Status: SHIPPED | OUTPATIENT
Start: 2018-06-19 | End: 2019-08-08

## 2018-06-19 RX ORDER — HYDROCHLOROTHIAZIDE 25 MG/1
25 TABLET ORAL DAILY
Qty: 90 TABLET | Refills: 3 | Status: SHIPPED | OUTPATIENT
Start: 2018-06-19 | End: 2018-10-22

## 2018-06-19 NOTE — MR AVS SNAPSHOT
After Visit Summary   6/19/2018    Danitza Soto    MRN: [id number]           Patient Information     Date Of Birth          1980        Visit Information        Provider Department      6/19/2018 9:00 AM Polly Mcbride MD [facility]        Today's Diagnoses     Encounter for smoking cessation counseling    -  1    Morbid obesity due to excess calories (H)        Chest pressure        Hypertension goal BP (blood pressure) < 140/90        Essential hypertension with goal blood pressure less than 140/90           Follow-ups after your visit        Additional Services     BARIATRIC ADULT REFERRAL       Your provider has referred you to: FMG: [facility] [place] [phone]  [email or web address]  [facility]: [facility] [phone]. [email or web address]    Please be aware that coverage of these services is subject to the terms and limitations of your health insurance plan.  Call member services at your health plan with any benefit or coverage questions.      Please bring the following with you to your appointment:      (1) List of current medications   (2) This referral request   (3) Any documents/labs given to you for this referral            CALL IT QUITS (QUITPLAN) REFERRAL       [place] [employer]AC[employer] QUITLINES FAX FORM  Fax form to: [phone]    The clinic will facilitate the referral to the quitline.    Provider Information:  ===============================================================  Polly Mcbride MD  ID#: [id number] - [facility]: [facility] [place] [phone] Fax: [phone]   [email or web address]  Payor: [employer] / Plan: tab ticketbroker [place] ADVANTAGE / Product Type: HMO /    ===============================================================    The Public Health Service Guideline does not recommend providing over-the-counter nicotine replacement therapy products without physician authorization to patients with the following conditions: pregnancy, uncontrolled high blood pressure, or cardiovascular diseases.     I authorize the [place] Tobacco Quitlines to provide over-the-counter nicotine replacement products for the patient listed below if the patient's health plan benefits cover NRT or if the patient is eligible for QUITPLAN services.    Patient Consented to:  ===============================================================  - YES - I am ready to quit tobacco and request the above information be given to the quitline so they may contact me.  I understand that one of [place]'s Tobacco Quitlines will inform my provider about my participation.  ===============================================================  Please check the BEST 3-hour call window for them to reach you: 5pm - 8pm  May we leave a message?  YES  Language Preference:  English  Phone Number: Home Phone      [phone]  Mobile          [phone]     E-mail Address: [email or web address]    ========================================================================  FOR QUITLINE USE ONLY:  THIS INFORMATION WILL BE PROVIDED BACK TO THE PROVIDER  Contact date: __/ __/__ or ____ Did not reach after three attempts.    Outcome:  __ Enrolled in telephone counseling program  __ Declined  __ Not Reached    Stage of readiness: _______________________  Planned Quit Date: ___/ ___/ ___  Comments:      2011 [facility]   This message funded by [employer] [place], an independent licensee of the Blue Cross and Blue [employer] Association. Rev. 11/1/12            CARDIOLOGY EVAL ADULT REFERRAL       Preferred location:  [facility] [phone]    [email or web address]    Please be aware that coverage of these services is subject to the terms and limitations of your health insurance plan.  Call member services at your health plan with any benefit or coverage questions.      Please bring the following to your appointment:  Any x-rays, CTs or MRIs which have been performed. Contact the facility where they were done to arrange for  prior to your scheduled appointment.    List of current medications  This referral request   Any documents/labs given to you for this referral                  Who to contact     If you have questions or need follow up information about today's clinic visit or your schedule please contact [facility] directly at [phone].  Normal or non-critical lab and imaging results will be communicated to you by MyChart, letter or phone within 4 business days after the clinic has received the results. If you do not hear from us within 7 days, please contact the clinic through [employer] or phone. If you have a critical or abnormal lab result, we will notify you by phone as soon as possible.  Submit refill requests through [employer] or call your pharmacy and they will forward the refill request to us. Please allow 3 business days for your refill to be completed.          Additional Information About Your Visit        [employer] Information     [employer] gives you secure access to your electronic health record. If you see a primary care provider, you can also send messages to your care team and make appointments. If you have questions, please call your primary care clinic.  If you do not have a primary care provider, please call [phone] and they will assist you.        Care EveryWhere ID     This is your Care EveryWhere ID. This could be used by other organizations to access your [place] medical records  [id number]        Your Vitals Were     Pulse Temperature Respirations Height  "Pulse Oximetry Breastfeeding?    81 98.6  F (37  C) (Oral) 20 5' 6.5\" (1.689 m) 95% No    BMI (Body Mass Index)                   49.13 kg/m2            Blood Pressure from Last 3 Encounters:   06/19/18 138/80   10/13/17 110/74   06/15/17 112/65    Weight from Last 3 Encounters:   06/19/18 309 lb (140.2 kg)   12/04/17 (!) 310 lb (140.6 kg)   10/13/17 300 lb (136.1 kg)              We Performed the Following     BARIATRIC ADULT REFERRAL     CALL IT QUITS (QUITPLAN) REFERRAL     CARDIOLOGY EVAL ADULT REFERRAL     Comprehensive metabolic panel (BMP + Alb, Alk Phos, ALT, AST, Total. Bili, TP)     EKG 12-lead complete w/read - Clinics          Today's Medication Changes          These changes are accurate as of 6/19/18  9:48 AM.  If you have any questions, ask your nurse or doctor.               Start taking these medicines.        Dose/Directions    * nicotine 21 MG/24HR 24 hr patch   Commonly known as:  NICODERM CQ   Used for:  Encounter for smoking cessation counseling   Started by:  Polly Mcbride MD        Dose:  1 patch   Place 1 patch onto the skin every 24 hours   Quantity:  30 patch   Refills:  0       * nicotine 14 MG/24HR 24 hr patch   Commonly known as:  NICODERM CQ   Used for:  Encounter for smoking cessation counseling   Started by:  Polly Mcbride MD        Dose:  1 patch   Place 1 patch onto the skin every 24 hours   Quantity:  30 patch   Refills:  0       * nicotine 7 MG/24HR 24 hr patch   Commonly known as:  NICODERM CQ   Used for:  Encounter for smoking cessation counseling   Started by:  Polly Mcbride MD        Dose:  1 patch   Place 1 patch onto the skin every 24 hours   Quantity:  30 patch   Refills:  0       * Notice:  This list has 3 medication(s) that are the same as other medications prescribed for you. Read the directions carefully, and ask your doctor or other care provider to review them with you.         Where to get your medicines      These medications were sent to [facility] " [id number] [facility] [place] - [address] AT [facility] [address]  [address] [address]     Phone:  [phone]     hydrochlorothiazide 25 MG tablet    lisinopril 40 MG tablet    nicotine 14 MG/24HR 24 hr patch    nicotine 21 MG/24HR 24 hr patch    nicotine 7 MG/24HR 24 hr patch                Primary Care Provider Office Phone # Fax #    Polly Mcbride [phone]       [facility] [address] [address]        Equal Access to Services     [facility]: Hadii aad ku hadasho Soomaali, waaxda luqadaha, qaybta kaalmada adeegyada, waxay idiin hayaan adeeg kharasourav lashelby . So [facility] [phone].    ATENCIÓN: Si habla español, tiene a bailey disposición servicios gratuitos de asistencia lingüística. [facility] [phone].    We comply with applicable federal civil rights laws and [place] laws. We do not discriminate on the basis of race, color, national origin, age, disability, sex, sexual orientation, or gender identity.            Thank you!     Thank you for choosing [facility]  for your care. Our goal is always to provide you with excellent care. Hearing back from our patients is one way we can continue to improve our services. Please take a few minutes to complete the written survey that you may receive in the mail after your visit with us. Thank you!             Your Updated Medication List - Protect others around you: Learn how to safely use, store and throw away your medicines at www.disposemymeds.org.          This list is accurate as of 6/19/18  9:48 AM.  Always use your most recent med list.                   Brand Name Dispense Instructions for use Diagnosis    ADVIL 200 MG tablet   Generic drug:  ibuprofen      TAKE THREE TABLETS BY MOUTH QD-BID PRN        CENTRUM PO           fish oil-omega-3 fatty acids 1000 MG capsule      Take 2 g by mouth daily        hydrochlorothiazide 25 MG tablet    HYDRODIURIL    90 tablet    Take 1 tablet  (25 mg) by mouth daily    Essential hypertension with goal blood pressure less than 140/90       lisinopril 40 MG tablet    PRINIVIL/ZESTRIL    90 tablet    Take 1 tablet (40 mg) by mouth daily    Essential hypertension with goal blood pressure less than 140/90       LORazepam 0.5 MG tablet    ATIVAN    20 tablet    Take 1 tablet (0.5 mg) by mouth every 8 hours as needed for anxiety    Anxiety       metroNIDAZOLE 500 MG tablet    FLAGYL    14 tablet    Take 1 tablet (500 mg) by mouth 2 times daily    BV (bacterial vaginosis)       NEXIUM PO           * nicotine 21 MG/24HR 24 hr patch    NICODERM CQ    30 patch    Place 1 patch onto the skin every 24 hours    Encounter for smoking cessation counseling       * nicotine 14 MG/24HR 24 hr patch    NICODERM CQ    30 patch    Place 1 patch onto the skin every 24 hours    Encounter for smoking cessation counseling       * nicotine 7 MG/24HR 24 hr patch    NICODERM CQ    30 patch    Place 1 patch onto the skin every 24 hours    Encounter for smoking cessation counseling       PROBIOTIC ADVANCED PO           varenicline 1 MG tablet    CHANTIX    56 tablet    Take 1 tablet (1 mg) by mouth 2 times daily    Tobacco use disorder       Vitamin D (Cholecalciferol) 1000 units Tabs      Take 2 capsules by mouth daily        * Notice:  This list has 3 medication(s) that are the same as other medications prescribed for you. Read the directions carefully, and ask your doctor or other care provider to review them with you.

## 2018-06-19 NOTE — LETTER
[facility]  [address] [place]  [address]  [place], [place] [address]  [phone]    July 24, 2018    RE:  Danitza RAMIRO [place]                                                                                                                                                       [address]  [facility] [address]              [facility],     I am the primary care provider for Danitza Soto and I support her  having weight loss surgery.   Let me know if I could be of any help in the process.        Sincerely,        Polly Mcbride MD      Clinic hours:  Monday 7:30 AM - 5:00 PM    Tuesday  7:00 AM - 7:00 PM    Wednesday  7:00 AM - 5:00 PM    Thursday  7:30 AM -  7:00 PM    Friday   7:30 AM -  5:00 PM

## 2018-06-19 NOTE — PROGRESS NOTES
SUBJECTIVE:   Danitza Soto is a 37 year old female who presents to clinic today for the following health issues:      1. Weight Problem- would like to discuss options for weight loss.    2. Smoking Cessation- patient state that she has been on Chantix for a long time and the mediation has helped her cut down but she is unable to quit smoking--would like to discuss options.     3. Patient state that she has been having lightheaded and chest pressure that has been ongoing for six months.  BP has been under control at home but she does not check regualrly , she denies any passing out , no chest pain, no wheezing no nause a, no vomiting etc         Problem list and histories reviewed & adjusted, as indicated.  Additional history: as documented    Patient Active Problem List   Diagnosis     Anxiety state     Esophageal reflux     Morbid obesity due to excess calories (H)     Tobacco use disorder     CARDIOVASCULAR SCREENING; LDL GOAL LESS THAN 130     Hypertension goal BP (blood pressure) < 140/90     Acne     Papanicolaou smear of cervix with low grade squamous intraepithelial lesion (LGSIL)     Mixed hyperlipidemia     LEONARDO (obstructive sleep apnea)     Obesity hypoventilation syndrome (H)     Past Surgical History:   Procedure Laterality Date     HC TOOTH EXTRACTION W/FORCEP      Glen Teeth Extraction       Social History   Substance Use Topics     Smoking status: Current Every Day Smoker     Years: 1.00     Types: Cigarettes     Smokeless tobacco: Never Used      Comment:  pack or less a day     Alcohol use 0.0 oz/week     0 Standard drinks or equivalent per week      Comment: Occas.     Family History   Problem Relation Age of Onset     HEART DISEASE Mother      ,mother had emphesema and  at 62     Hypertension Mother      Allergies Mother      Lipids Mother      Obesity Mother      Respiratory Mother      Emphysema     Diabetes Maternal Grandmother      Type 2?     Hypertension Maternal Grandmother       Circulatory Maternal Grandmother      Due to diabetes     Eye Disorder Maternal Grandmother      Macular degeneration/Macular degeneration     Obesity Maternal Grandmother      Hypertension Father      Lipids Father      Cancer Father      Cerebrovascular Disease Paternal Grandmother      Alcohol/Drug Maternal Grandfather      Obesity Maternal Grandfather      Psychotic Disorder Paternal Grandfather      Committed Suicide     Allergies Sister      Genitourinary Problems Sister          Current Outpatient Prescriptions   Medication Sig Dispense Refill     ADVIL 200 MG OR TABS TAKE THREE TABLETS BY MOUTH QD-BID PRN       Esomeprazole Magnesium (NEXIUM PO)        fish oil-omega-3 fatty acids 1000 MG capsule Take 2 g by mouth daily       hydrochlorothiazide (HYDRODIURIL) 25 MG tablet Take 1 tablet (25 mg) by mouth daily 90 tablet 3     lisinopril (PRINIVIL/ZESTRIL) 40 MG tablet Take 1 tablet (40 mg) by mouth daily 90 tablet 3     LORazepam (ATIVAN) 0.5 MG tablet Take 1 tablet (0.5 mg) by mouth every 8 hours as needed for anxiety 20 tablet 0     Multiple Vitamins-Minerals (CENTRUM PO)        nicotine (NICODERM CQ) 14 MG/24HR 24 hr patch Place 1 patch onto the skin every 24 hours 30 patch 0     nicotine (NICODERM CQ) 21 MG/24HR 24 hr patch Place 1 patch onto the skin every 24 hours 30 patch 0     nicotine (NICODERM CQ) 7 MG/24HR 24 hr patch Place 1 patch onto the skin every 24 hours 30 patch 0     Probiotic Product (PROBIOTIC ADVANCED PO)        Vitamin D, Cholecalciferol, 1000 UNITS TABS Take 2 capsules by mouth daily       metroNIDAZOLE (FLAGYL) 500 MG tablet Take 1 tablet (500 mg) by mouth 2 times daily (Patient not taking: Reported on 6/19/2018) 14 tablet 0     varenicline (CHANTIX) 1 MG tablet Take 1 tablet (1 mg) by mouth 2 times daily (Patient not taking: Reported on 6/19/2018) 56 tablet 4     Allergies   Allergen Reactions     Wellbutrin [Bupropion]      Wellbutrin: Panic attacks, heart/chest pain     Recent  "Labs   Lab Test  06/19/18   0952  10/13/17   1445  03/21/17   0926  08/24/16   0915  01/13/16   1138   06/17/14   1019  10/08/13   0808   A1C   --    --    --    --    --    --    --   5.4   LDL   --    --   155*  174*  162*   --    --   149*   HDL   --    --   30*  29*  33*   --    --   33*   TRIG   --    --   203*  231*  207*   --    --   179*   ALT  33  24  24   --   26   --    --   26   CR  0.65  0.72  0.62  0.61  0.59   < >  0.70  0.60   GFRESTIMATED  >90  >90  >90  Non African American GFR Calc    >90  Non  GFR Calc    >90  Non  GFR Calc     < >  >90  >90   GFRESTBLACK  >90  >90  >90  African American GFR Calc    >90   GFR Calc    >90   GFR Calc     < >  >90  >90   POTASSIUM  3.9  3.8  4.0  3.9  4.0   < >  4.2  3.8   TSH   --    --    --    --   1.79   --   1.99   --     < > = values in this interval not displayed.      BP Readings from Last 3 Encounters:   06/19/18 138/80   10/13/17 110/74   06/15/17 112/65    Wt Readings from Last 3 Encounters:   06/19/18 309 lb (140.2 kg)   12/04/17 (!) 310 lb (140.6 kg)   10/13/17 300 lb (136.1 kg)                  Labs reviewed in EPIC    Reviewed and updated as needed this visit by clinical staff  Tobacco  Allergies  Meds       Reviewed and updated as needed this visit by Provider         ROS:  Constitutional, HEENT, cardiovascular, pulmonary, GI, , musculoskeletal, neuro, skin, endocrine and psych systems are negative, except as otherwise noted.    OBJECTIVE:     /80 (BP Location: Right arm, Patient Position: Sitting, Cuff Size: Adult Large)  Pulse 81  Temp 98.6  F (37  C) (Oral)  Resp 20  Ht 5' 6.5\" (1.689 m)  Wt 309 lb (140.2 kg)  SpO2 95%  Breastfeeding? No  BMI 49.13 kg/m2  Body mass index is 49.13 kg/(m^2).  GENERAL: healthy, alert and no distress  EYES: Eyes grossly normal to inspection, PERRL and conjunctivae and sclerae normal  HENT: ear canals and TM's normal, nose and mouth " without ulcers or lesions  NECK: no adenopathy, no asymmetry, masses, or scars and thyroid normal to palpation  RESP: lungs clear to auscultation - no rales, rhonchi or wheezes  CV: regular rate and rhythm, normal S1 S2, no S3 or S4, no murmur, click or rub, no peripheral edema and peripheral pulses strong  ABDOMEN: soft, nontender, no hepatosplenomegaly, no masses and bowel sounds normal  MS: no gross musculoskeletal defects noted, no edema  NEURO: Normal strength and tone, mentation intact and speech normal    Diagnostic Test Results:  Results for orders placed or performed in visit on 06/19/18   Comprehensive metabolic panel (BMP + Alb, Alk Phos, ALT, AST, Total. Bili, TP)   Result Value Ref Range    Sodium 140 133 - 144 mmol/L    Potassium 3.9 3.4 - 5.3 mmol/L    Chloride 106 94 - 109 mmol/L    Carbon Dioxide 24 20 - 32 mmol/L    Anion Gap 10 3 - 14 mmol/L    Glucose 95 70 - 99 mg/dL    Urea Nitrogen 14 7 - 30 mg/dL    Creatinine 0.65 0.52 - 1.04 mg/dL    GFR Estimate >90 >60 mL/min/1.7m2    GFR Estimate If Black >90 >60 mL/min/1.7m2    Calcium 10.0 8.5 - 10.1 mg/dL    Bilirubin Total 0.4 0.2 - 1.3 mg/dL    Albumin 3.8 3.4 - 5.0 g/dL    Protein Total 7.8 6.8 - 8.8 g/dL    Alkaline Phosphatase 76 40 - 150 U/L    ALT 33 0 - 50 U/L    AST 15 0 - 45 U/L       ASSESSMENT/PLAN:             1. Encounter for smoking cessation counseling  We discussed quitting and she has tried Chantix in the past a few times , she gets down to a few cigarettes a day but does not completely quit so wants to try something else and I have referred her to the Quit plan line and also will try the patches , Rx for those sent .  - CALL IT QUITS (QUITPLAN) REFERRAL  - nicotine (NICODERM CQ) 21 MG/24HR 24 hr patch; Place 1 patch onto the skin every 24 hours  Dispense: 30 patch; Refill: 0  - nicotine (NICODERM CQ) 14 MG/24HR 24 hr patch; Place 1 patch onto the skin every 24 hours  Dispense: 30 patch; Refill: 0  - nicotine (NICODERM CQ) 7 MG/24HR  24 hr patch; Place 1 patch onto the skin every 24 hours  Dispense: 30 patch; Refill: 0    2. Morbid obesity due to excess calories (H)  We discussed referral for the bariatric surgery specialist as her BMI is over 40 and s he does have co morbidities - referral given .  - BARIATRIC ADULT REFERRAL    3. Chest pressure  cxr and EKG are normal per my reading but I recommend she sees a cardiology as she has had elevated BP for a while and also a smoker so has risk factors , referral given .  - XR Chest 2 Views; Future  - EKG 12-lead complete w/read - Clinics  - CARDIOLOGY EVAL ADULT REFERRAL    4. Hypertension goal BP (blood pressure) < 140/90  Will check CMP today and she is on HCTZ 25 mg  and lisinopril 40mg   - Comprehensive metabolic panel (BMP + Alb, Alk Phos, ALT, AST, Total. Bili, TP)    5. Essential hypertension with goal blood pressure less than 140/90  Refilled her meds and also will check labs   - hydrochlorothiazide (HYDRODIURIL) 25 MG tablet; Take 1 tablet (25 mg) by mouth daily  Dispense: 90 tablet; Refill: 3  - lisinopril (PRINIVIL/ZESTRIL) 40 MG tablet; Take 1 tablet (40 mg) by mouth daily  Dispense: 90 tablet; Refill: 3    RTC if no improving or worsening.  Pt is aware  and comfortable with the current plan.      Polly Mcbirde MD  [facility]

## 2018-07-20 ENCOUNTER — CARE COORDINATION (OUTPATIENT)
Dept: SURGERY | Facility: CLINIC | Age: 38
End: 2018-07-20

## 2018-07-24 ENCOUNTER — OFFICE VISIT (OUTPATIENT)
Dept: SURGERY | Facility: CLINIC | Age: 38
End: 2018-07-24
Payer: COMMERCIAL

## 2018-07-24 ENCOUNTER — ALLIED HEALTH/NURSE VISIT (OUTPATIENT)
Dept: SURGERY | Facility: CLINIC | Age: 38
End: 2018-07-24
Payer: COMMERCIAL

## 2018-07-24 VITALS
HEART RATE: 80 BPM | BODY MASS INDEX: 45.99 KG/M2 | TEMPERATURE: 98.6 F | WEIGHT: 293 LBS | DIASTOLIC BLOOD PRESSURE: 70 MMHG | OXYGEN SATURATION: 95 % | HEIGHT: 67 IN | SYSTOLIC BLOOD PRESSURE: 125 MMHG

## 2018-07-24 DIAGNOSIS — E66.01 MORBID OBESITY (H): ICD-10-CM

## 2018-07-24 DIAGNOSIS — M54.5 CHRONIC BILATERAL LOW BACK PAIN, WITH SCIATICA PRESENCE UNSPECIFIED: ICD-10-CM

## 2018-07-24 DIAGNOSIS — G89.29 CHRONIC BILATERAL LOW BACK PAIN, WITH SCIATICA PRESENCE UNSPECIFIED: ICD-10-CM

## 2018-07-24 DIAGNOSIS — E66.01 MORBID OBESITY (H): Primary | ICD-10-CM

## 2018-07-24 DIAGNOSIS — G89.29 CHRONIC PAIN OF LEFT KNEE: ICD-10-CM

## 2018-07-24 DIAGNOSIS — M25.562 CHRONIC PAIN OF LEFT KNEE: ICD-10-CM

## 2018-07-24 DIAGNOSIS — R53.81 PHYSICAL DECONDITIONING: ICD-10-CM

## 2018-07-24 LAB
ALBUMIN SERPL-MCNC: 3.7 G/DL (ref 3.4–5)
ALP SERPL-CCNC: 83 U/L (ref 40–150)
ALT SERPL W P-5'-P-CCNC: 22 U/L (ref 0–50)
ANION GAP SERPL CALCULATED.3IONS-SCNC: 6 MMOL/L (ref 3–14)
AST SERPL W P-5'-P-CCNC: 8 U/L (ref 0–45)
BILIRUB SERPL-MCNC: 0.4 MG/DL (ref 0.2–1.3)
BUN SERPL-MCNC: 12 MG/DL (ref 7–30)
CALCIUM SERPL-MCNC: 8.6 MG/DL (ref 8.5–10.1)
CHLORIDE SERPL-SCNC: 106 MMOL/L (ref 94–109)
CO2 SERPL-SCNC: 26 MMOL/L (ref 20–32)
CREAT SERPL-MCNC: 0.63 MG/DL (ref 0.52–1.04)
DEPRECATED CALCIDIOL+CALCIFEROL SERPL-MC: 37 UG/L (ref 20–75)
ERYTHROCYTE [DISTWIDTH] IN BLOOD BY AUTOMATED COUNT: 14.5 % (ref 10–15)
GFR SERPL CREATININE-BSD FRML MDRD: >90 ML/MIN/1.7M2
GLUCOSE SERPL-MCNC: 95 MG/DL (ref 70–99)
HBA1C MFR BLD: 6.1 % (ref 0–5.6)
HCT VFR BLD AUTO: 43 % (ref 35–47)
HGB BLD-MCNC: 14 G/DL (ref 11.7–15.7)
MCH RBC QN AUTO: 28.6 PG (ref 26.5–33)
MCHC RBC AUTO-ENTMCNC: 32.6 G/DL (ref 31.5–36.5)
MCV RBC AUTO: 88 FL (ref 78–100)
PLATELET # BLD AUTO: 318 10E9/L (ref 150–450)
POTASSIUM SERPL-SCNC: 3.7 MMOL/L (ref 3.4–5.3)
PROT SERPL-MCNC: 7.4 G/DL (ref 6.8–8.8)
PTH-INTACT SERPL-MCNC: 53 PG/ML (ref 18–80)
RBC # BLD AUTO: 4.9 10E12/L (ref 3.8–5.2)
SODIUM SERPL-SCNC: 138 MMOL/L (ref 133–144)
VIT B12 SERPL-MCNC: 405 PG/ML (ref 193–986)
WBC # BLD AUTO: 10.5 10E9/L (ref 4–11)

## 2018-07-24 RX ORDER — ALBUTEROL SULFATE 90 UG/1
AEROSOL, METERED RESPIRATORY (INHALATION)
Refills: 0 | COMMUNITY
Start: 2017-08-10 | End: 2021-11-02

## 2018-07-24 NOTE — MR AVS SNAPSHOT
After Visit Summary   7/24/2018    Danitza Soto    MRN: [id number]           Patient Information     Date Of Birth          1980        Visit Information        Provider Department      7/24/2018 8:00 AM Sandy Cervantes PA-C [facility]        Today's Diagnoses     Morbid obesity (H)    -  1    Physical deconditioning        Chronic pain of left knee        Chronic bilateral low back pain, with sciatica presence unspecified          Care Instructions    See dietitian in 1 month    See Sandy in 2 months for Pre surgery appt to follow up on topiramate    Start topiramate ramp to 75 mg    Labs today first floor    Smoking cessation help:  Call [employer] at [phone] ([phone]) or visit their website at [email or web address]    Bariatric Task List  Status:  Is patient a candidate for bariatric surgery?:  Yes -    Status:  surgery evaluation in process -     Surgeon: Dr Tse -     Tentative surgery month/year: Oct 2018 -        Insurance: Insurance:   -      Referral:  Needed -        Patient Info: Initial Weight:  308 -     Date of Initial Weight/Height:  7/24/2018 -     Goal Weight (lbs):  288 -     Required Weight Loss:  20 -     Surgery Type:  sleeve gastrectomy -        Dietician Visits: Structured weight loss required by insurance?:  Yes -     Dietician Visit 1:  Completed -     Dietician Visit 2:  Needed -     Dietician Visit 3:  Needed -        Psychological Evaluation: Psych eval:  Needed -        Lab Work: Complete Blood Count:  Needed -     Comprehensive Metabolic Panel:  Needed -     Vitamin D:  Needed -     Hgb A1c:  Needed -     PTH:  Needed -     Nicotine Testing:  Needed -        Consults/ Clearance: Sleep Medicine:  Needed -     Medical Weight Management: Needed - help to lose 20 lbs before weight loss surgery      PCP: Establish care with PCP:  Completed -     PCP letter of support:  Needed -        Smoking: Quit tobacco use (3 months  "smoke free)?:  Needed -        Patient Education:  Information Session:  Completed -     Given \"Making your decision\" handout?:  Yes -     Given support group information?:  Yes -     Support plan in place?:  Completed -     Research consents signed?:  Yes -        Final Tasks:  Before surgery online class:  Needed -     Before surgery online class website link:  [email or web address]   After surgery online class:  Needed -     After surgery online class website link:  [email or web address]   Nurse visit for weigh-in and information:  Needed -     Pre-assessment clinic visit with anesthesia team for H&P:  Needed -     Final labs (Hgb, plt, T&S, UA):  Needed -        Notes:   -         MEDICATION STARTED AT THIS APPOINTMENT  We are starting topiramate at bedtime.  Start one tab, 25 mg, for a week. Go up to 50 mg (2 tabs) for the next week. At the third week, take   3 tabs (75 mg).  Stay at 3 tabs until you are seen again. Call the nurse at [phone] if you have any questions or concerns. (Do not stop taking it if you don't think it's working. For some people it works even though they do not feel much different.)    Topiramate (Topamax) is a medication that is used most often to treat migraine headaches or for seizures. It has also been found to help with weight loss. Although it's not currently FDA approved for weight loss, it has been used safely for a number of years to help people who are carrying extra weight.     Just how topiramate helps with weight loss has not been exactly determined. However it seems to work on areas of the brain to quiet down signals related to eating.      Topiramate may make you:    >feel less interest in eating in between meals   >think less about food and eating   >find it easier to push the plate away   >find giving up pop easier    >have an easier time eating less    For some of our patients, the pills work right away. They feel and think quite " differently about food. Other patients don't feel much of a change but find in fact they have lost weight! Like all weight loss medications, topiramate works best when you help it work.  This means:    1) Have less tempting high calorie (fattening) food around the house or office    2) Have lower calorie food (fruits, vegetables,low fat meats and dairy) for snacks    3) Eat out only one time or less each week.   4) Eat your meals at a table with the TV or computer off.    Side-effects. Topiramate is generally well tolerated. The main side-effects we see are:   Tingling in hands,feet, or face (usually not very troublesome)   Mental confusion and word finding trouble (about 10% of patients have this.)     Feeling sleepy or a bit dopey- this goes away very soon after starting.    One of the dangers of topiramate is the possibility of birth defects--if you get pregnant when you are on it, there is the risk that your baby will be born with a cleft lip or palate.  If you are on topiramate and of child bearing age, you need to be on a reliable form of birth control or refrain from sexual intercourse.     Please refer to the pharmacy insert for more information on side-effects. Since many pharmacists are not familiar with the use of topiramate in weight loss, calling the clinic will get you the most accurate information on the use of this medication for weight loss.     In order to get refills of this or any medication we prescribe you must be seen in the medical weight mgmt clinic every 2-3 months. Please have your pharmacy fax a refill request to [phone].              Follow-ups after your visit        Additional Services     [facility] PT, HAND, AND CHIROPRACTIC REFERRAL       **This order will print in the [facility] Scheduling Office**    Physical Therapy, Hand Therapy and Chiropractic Care are available through:    *[facility]  *[facility]  *[facility]    Call one number  to schedule at any of the above locations: [phone].    Your provider has referred you to: Physical Therapy at [facility] or [facility]    Indication/Reason for Referral: Left Knee Pain and Low Back Pain  GET MOVING PROGRAM  Onset of Illness:   Therapy Orders: Evaluate and Treat  Special Programs: None  Special Request: None    Erin Khan      Additional Comments for the Therapist or Chiropractor:     Please be aware that coverage of these services is subject to the terms and limitations of your health insurance plan.  Call member services at your health plan with any benefit or coverage questions.      Please bring the following to your appointment:    *Your personal calendar for scheduling future appointments  *Comfortable clothing                  Your next 10 appointments already scheduled     Jul 24, 2018  9:00 AM CDT   (Arrive by 8:45 AM)   NUTRITION VISIT with DAVIE Gonzáles [facility] ([facility])    [address] [place] [id number] [phone]              Future tests that were ordered for you today     Open Future Orders        Priority Expected Expires Ordered    Vitamin B12 Routine 7/24/2018 8/24/2018 7/24/2018    Vitamin D Deficiency Routine 7/24/2018 8/24/2018 7/24/2018    CBC with platelets Routine 7/24/2018 8/24/2018 7/24/2018    Comprehensive metabolic panel Routine 7/24/2018 8/24/2018 7/24/2018    Hemoglobin A1c Routine 7/24/2018 8/24/2018 7/24/2018    Parathyroid Hormone Intact Routine 7/24/2018 8/24/2018 7/24/2018    Vitamin A Routine 7/24/2018 8/24/2018 7/24/2018            Who to contact     Please call your clinic at [phone] to:    Ask questions about your health    Make or cancel appointments    Discuss your medicines    Learn about your test results    Speak to your doctor            Additional Information About Your Visit        [employer] Information     [employer] gives you secure access to your electronic  "health record. If you see a primary care provider, you can also send messages to your care team and make appointments. If you have questions, please call your primary care clinic.  If you do not have a primary care provider, please call [phone] and they will assist you.      [employer] is an electronic gateway that provides easy, online access to your medical records. With [employer], you can request a clinic appointment, read your test results, renew a prescription or communicate with your care team.     To access your existing account, please contact your [facility] or call [phone] for assistance.        Care EveryWhere ID     This is your Care EveryWhere ID. This could be used by other organizations to access your [facility] medical records  [id number]        Your Vitals Were     Pulse Temperature Height Pulse Oximetry BMI (Body Mass Index)       80 98.6  F (37  C) (Oral) 5' 6.93\" 95% 48.47 kg/m2        Blood Pressure from Last 3 Encounters:   07/24/18 125/70   06/19/18 138/80   10/13/17 110/74    Weight from Last 3 Encounters:   07/24/18 308 lb 12.8 oz   06/19/18 309 lb   12/04/17 (!) 310 lb              We Performed the Following     [facility] PT, HAND, AND CHIROPRACTIC REFERRAL        Primary Care Provider Office Phone # Fax #    Polly Mcbride [phone] [address] [address]        Equal Access to Services     [facility] : Hadii aad deniz cheungo Sodeni, waaxda luqadaha, qaybta kaalmada adeegyada, christiano garcia . So [facility] [phone].    ATENCIÓN: Si habla español, tiene a bailey disposición servicios gratuitos de asistencia lingüística. Llame al [phone].    We comply with applicable federal civil rights laws and [place] laws. We do not discriminate on the basis of race, color, national origin, age, disability, sex, sexual orientation, or gender identity.            Thank you!     Thank you for " choosing [facility]  for your care. Our goal is always to provide you with excellent care. Hearing back from our patients is one way we can continue to improve our services. Please take a few minutes to complete the written survey that you may receive in the mail after your visit with us. Thank you!             Your Updated Medication List - Protect others around you: Learn how to safely use, store and throw away your medicines at www.disposemymeds.org.          This list is accurate as of 7/24/18  8:41 AM.  Always use your most recent med list.                   Brand Name Dispense Instructions for use Diagnosis    ADVIL 200 MG tablet   Generic drug:  ibuprofen      TAKE THREE TABLETS BY MOUTH QD-BID PRN        CENTRUM PO           fish oil-omega-3 fatty acids 1000 MG capsule      Take 2 g by mouth daily        hydrochlorothiazide 25 MG tablet    HYDRODIURIL    90 tablet    Take 1 tablet (25 mg) by mouth daily    Essential hypertension with goal blood pressure less than 140/90       lisinopril 40 MG tablet    PRINIVIL/ZESTRIL    90 tablet    Take 1 tablet (40 mg) by mouth daily    Essential hypertension with goal blood pressure less than 140/90       LORazepam 0.5 MG tablet    ATIVAN    20 tablet    Take 1 tablet (0.5 mg) by mouth every 8 hours as needed for anxiety    Anxiety       metroNIDAZOLE 500 MG tablet    FLAGYL    14 tablet    Take 1 tablet (500 mg) by mouth 2 times daily    BV (bacterial vaginosis)       NEXIUM PO           * nicotine 21 MG/24HR 24 hr patch    NICODERM CQ    30 patch    Place 1 patch onto the skin every 24 hours    Encounter for smoking cessation counseling       * nicotine 14 MG/24HR 24 hr patch    NICODERM CQ    30 patch    Place 1 patch onto the skin every 24 hours    Encounter for smoking cessation counseling       * nicotine 7 MG/24HR 24 hr patch    NICODERM CQ    30 patch    Place 1 patch onto the skin every 24 hours    Encounter for smoking cessation  counseling       PROBIOTIC ADVANCED PO           varenicline 1 MG tablet    CHANTIX    56 tablet    Take 1 tablet (1 mg) by mouth 2 times daily    Tobacco use disorder       VENTOLIN  (90 Base) MCG/ACT Inhaler   Generic drug:  albuterol      INHALE 1-2 PUFFS EVERY 4-6 HOURS AS NEEDED FOR WHEEZING        Vitamin D (Cholecalciferol) 1000 units Tabs      Take 2 capsules by mouth daily        * Notice:  This list has 3 medication(s) that are the same as other medications prescribed for you. Read the directions carefully, and ask your doctor or other care provider to review them with you.

## 2018-07-24 NOTE — LETTER
"2018       RE: Danitza Soto  [address]  [facility] [address]     Dear Colleague,    Thank you for referring your patient, Danitza Soto, to the [facility] SURGICAL WEIGHT MANAGEMENT at [facility]. Please see a copy of my visit note below.    New Bariatric Surgery Consultation Note    RE: Danitza Soto  MR#: [id number]  : 1980      Referring provider:       2018   Who referred you? Dr. Polly Mcbride       Chief Complaint/Reason for visit: evaluation for possible weight loss surgery    Dear Polly Mcbride MD (General),    I had the pleasure of seeing your patient, Danitza Soto, to evaluate her obesity and consider her for possible weight loss surgery. As you know, Danitza Soto is 37 year old.  She has a height of 5' 6.929\", a weight of 308 lbs 12.8 oz, and calculated Body mass index is 48.47 kg/(m^2).    HISTORY OF PRESENT ILLNESS:  Weight Loss History Reviewed with Patient 2018   How long have you been overweight? Since puberty   What is the most that you have ever weighed? 315   What is the most weight you have lost? 30   I have tried the following methods to lose weight Watching portions or calories, Exercise   I have tried the following weight loss medications? (Check all that apply) None   Have you ever had weight loss surgery? No   Gained 30 lbs over the last 2 years with work stress    CO-MORBIDITIES OF OBESITY INCLUDE:     2018   I have the following co-morbidities associated with obesity: High Blood Pressure, High Cholesterol, Sleep Apnea, GERD (Reflux), Weight Bearing Joint Pain   Do you use a CPAP? Yes   Are you taking daily medication for heartburn, acid reflux, or GERD (acid reflux disease)? Yes       PAST MEDICAL HISTORY:  Past Medical History:   Diagnosis Date     Depressive disorder      Esophageal reflux      Hearing problem      Hyperlipidemia LDL goal <130 2015     Hypertension      LSIL (low " grade squamous intraepithelial lesion) on Pap smear 2014    + HPV, unable to type colp - BRISEYDA I     Mixed hyperlipidemia 2016     Other anxiety states        PAST SURGICAL HISTORY:  Past Surgical History:   Procedure Laterality Date     HC TOOTH EXTRACTION W/FORCEP      [place] Teeth Extraction       FAMILY HISTORY:   Family History   Problem Relation Age of Onset     HEART DISEASE Mother      ,mother had emphesema and  at 62     Hypertension Mother      Allergies Mother      Lipids Mother      Obesity Mother      Respiratory Mother      Emphysema     Diabetes Maternal Grandmother      Type 2?     Hypertension Maternal Grandmother      Circulatory Maternal Grandmother      Due to diabetes     Eye Disorder Maternal Grandmother      Macular degeneration/Macular degeneration     Obesity Maternal Grandmother      Hypertension Father      Lipids Father      Cancer Father      Prostate Cancer Father      Other Cancer Father      Cerebrovascular Disease Paternal Grandmother      Alcohol/Drug Maternal Grandfather      Obesity Maternal Grandfather      Psychotic Disorder Paternal Grandfather      Committed Suicide     Depression Paternal Grandfather      Allergies Sister      Genitourinary Problems Sister        SOCIAL HISTORY:   Social History Questions Reviewed With Patient 2018   Which best describes your employment status (select all that apply) I work full-time   If you work, 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 Yes   Who do you have in your support network that can be available to help you for the first 2 weeks after surgery? my spouse, two sisters, several friends   Who can you count on for support throughout your weight loss surgery journey? my spouse, two sisters, several friends   Can you afford 3 meals a day?  Yes   Can you afford 50-60 dollars a month for vitamins? Yes   1 step child age  "11    HABITS:     7/23/2018   How often do you drink alcohol? Never   Do you currently use any of the following Nicotine products? cigarettes   Have you ever used any of the following nicotine products? Cigarettes   Have you or are you currently using street drugs or prescription strength medication for which you do not have a prescription for? No   Do you have a history of chemical dependency (alcohol or drug abuse)? No   Smokes 1 ppd    PSYCHOLOGICAL HISTORY:   Psychological History Reviewed With Patient 7/23/2018   Have you ever attempted suicide? Never.   Have you had thoughts of suicide in the past year? No   Have you ever been hospitalized for mental illness or a suicide attempt? Never.   Do you have a history of chronic pain? Yes   Have you ever been diagnosed with fibromyalgia? No   Are you currently being treated for any of the following? (select all that apply) I do not have a mental illness       ROS:     7/23/2018   Skin:  Skin fold rashes (groin or other folds)   HEENT: Dizziness/lightheadedness   Musculoskeletal: Joint Pain, Back pain   Cardiovascular: Heart murmurs   Pulmonary: Shortness of breath with activity, Snoring   Gastrointestinal: Heartburn   Genitourinary: None of the above   Hematological: None of the above   Neurological: None of the above   Female only: None of the above       EATING BEHAVIORS:     7/23/2018   Have you or anyone else thought that you had an eating disorder? Yes   If you answered yes to the previous eating disorder question, select the types that apply from this list: Other   If you answered \"Other\" to the type of eating disorder question above, please describe what it is: food addiction   Do you currently binge eat (eat a large amount of food in a short time)? No   Are you an emotional eater? Yes   Do you get up to eat after falling asleep? No       EXERCISE:     7/23/2018   How often do you exercise? 1 to 2 times per week   What is the duration of your exercise (in " "minutes)? 30 Minutes   What types of exercise do you do? walking, climbing stairs at work   What keeps you from being more active?  Pain       MEDICATIONS:  Current Outpatient Prescriptions   Medication     ADVIL 200 MG OR TABS     Esomeprazole Magnesium (NEXIUM PO)     fish oil-omega-3 fatty acids 1000 MG capsule     hydrochlorothiazide (HYDRODIURIL) 25 MG tablet     lisinopril (PRINIVIL/ZESTRIL) 40 MG tablet     Multiple Vitamins-Minerals (CENTRUM PO)     Probiotic Product (PROBIOTIC ADVANCED PO)     Vitamin D, Cholecalciferol, 1000 UNITS TABS     LORazepam (ATIVAN) 0.5 MG tablet     metroNIDAZOLE (FLAGYL) 500 MG tablet     nicotine (NICODERM CQ) 14 MG/24HR 24 hr patch     nicotine (NICODERM CQ) 21 MG/24HR 24 hr patch     nicotine (NICODERM CQ) 7 MG/24HR 24 hr patch     varenicline (CHANTIX) 1 MG tablet     VENTOLIN  (90 Base) MCG/ACT Inhaler     No current facility-administered medications for this visit.        ALLERGIES:  Allergies   Allergen Reactions     Wellbutrin [Bupropion]      Wellbutrin: Panic attacks, heart/chest pain       LABS/IMAGING/MEDICAL RECORDS REVIEW:     PHYSICAL EXAM:  /70  Pulse 80  Temp 98.6  F (37  C) (Oral)  Ht 5' 6.93\"  Wt 308 lb 12.8 oz  SpO2 95%  BMI 48.47 kg/m2  General: NAD  Neurologic: A & O x 3, gait normal  Head: normocephalic, atraumatic  HEENT: PERRL, EOMI.   Respiratory: respirations unlabored  Abdomen: Obese, Soft NT ND   Extremities: No LE swelling   Skin: warm and dry.  No rashes on exposed skin  Psychiatric: Mentation and Affect appear normal      In summary, Danitza Soto has Class III obesity with a body mass index of Body mass index is 48.47 kg/(m^2). kg/m2 and the comorbidities stated above. She completed an informational seminar and is a possible candidate for the laparoscopic gastric sleeve.  She will have to complete the following pre-requisites:  See dietitian in 1 month    See Sandy in 2 months for Pre surgery appt to follow up on " "topiramate    Start topiramate ramp to 75 mg    Labs today first floor    Smoking cessation help:  Call [employer] at [phone] ([phone]) or visit their website at [email or web address]    Bariatric Task List  Status:  Is patient a candidate for bariatric surgery?:  Yes -    Status:  surgery evaluation in process -     Surgeon: Dr Tse -     Tentative surgery month/year: Oct 2018 -        Insurance: Insurance:  HP -     HP Referral:  Needed -        Patient Info: Initial Weight:  308 -     Date of Initial Weight/Height:  7/24/2018 -     Goal Weight (lbs):  288 -     Required Weight Loss:  20 -     Surgery Type:  sleeve gastrectomy -        Dietician Visits: Structured weight loss required by insurance?:  Yes -     Dietician Visit 1:  Completed -     Dietician Visit 2:  Needed -     Dietician Visit 3:  Needed -        Psychological Evaluation: Psych eval:  Needed -        Lab Work: Complete Blood Count:  Needed -     Comprehensive Metabolic Panel:  Needed -     Vitamin D:  Needed -     Hgb A1c:  Needed -     PTH:  Needed -     Nicotine Testing:  Needed -        Consults/ Clearance: Sleep Medicine:  Needed -     Medical Weight Management: Needed - help to lose 20 lbs before weight loss surgery      PCP: Establish care with PCP:  Completed -     PCP letter of support:  Needed -        Smoking: Quit tobacco use (3 months smoke free)?:  Needed -        Patient Education:  Information Session:  Completed -     Given \"Making your decision\" handout?:  Yes -     Given support group information?:  Yes -     Support plan in place?:  Completed -     Research consents signed?:  Yes -        Final Tasks:  Before surgery online class:  Needed -     Before surgery online class website link:  [email or web address]   After surgery online class:  Needed -     After surgery online class website link:  [email or web address]   Nurse visit for weigh-in and information:  Needed -     Pre-assessment " clinic visit with anesthesia team for H&P:  Needed -     Final labs (Hgb, plt, T&S, UA):  Needed -        Notes:   -         MEDICATION STARTED AT THIS APPOINTMENT  We are starting topiramate at bedtime.  Start one tab, 25 mg, for a week. Go up to 50 mg (2 tabs) for the next week. At the third week, take   3 tabs (75 mg).  Stay at 3 tabs until you are seen again. Call the nurse at [phone] if you have any questions or concerns. (Do not stop taking it if you don't think it's working. For some people it works even though they do not feel much different.)    Topiramate (Topamax) is a medication that is used most often to treat migraine headaches or for seizures. It has also been found to help with weight loss. Although it's not currently FDA approved for weight loss, it has been used safely for a number of years to help people who are carrying extra weight.     Just how topiramate helps with weight loss has not been exactly determined. However it seems to work on areas of the brain to quiet down signals related to eating.      Topiramate may make you:    >feel less interest in eating in between meals   >think less about food and eating   >find it easier to push the plate away   >find giving up pop easier    >have an easier time eating less    For some of our patients, the pills work right away. They feel and think quite differently about food. Other patients don't feel much of a change but find in fact they have lost weight! Like all weight loss medications, topiramate works best when you help it work.  This means:    1) Have less tempting high calorie (fattening) food around the house or office    2) Have lower calorie food (fruits, vegetables,low fat meats and dairy) for snacks    3) Eat out only one time or less each week.   4) Eat your meals at a table with the TV or computer off.    Side-effects. Topiramate is generally well tolerated. The main side-effects we see are:   Tingling in hands,feet, or face  (usually not very troublesome)   Mental confusion and word finding trouble (about 10% of patients have this.)     Feeling sleepy or a bit dopey- this goes away very soon after starting.    One of the dangers of topiramate is the possibility of birth defects--if you get pregnant when you are on it, there is the risk that your baby will be born with a cleft lip or palate.  If you are on topiramate and of child bearing age, you need to be on a reliable form of birth control or refrain from sexual intercourse.     Please refer to the pharmacy insert for more information on side-effects. Since many pharmacists are not familiar with the use of topiramate in weight loss, calling the clinic will get you the most accurate information on the use of this medication for weight loss.     In order to get refills of this or any medication we prescribe you must be seen in the medical weight mgmt clinic every 2-3 months. Please have your pharmacy fax a refill request to [phone].    Today in the office we discussed gastric sleeve surgery. Preoperative, perioperative, and postoperative processes, management, and follow up were addressed.  Risks and benefits were outlined including the risk of death, staple line leak (1-2%), PE, DVT, ulcer, worsening GERD, N/V, stricture, hernia, wound infection, weight regain, and vitamin deficiencies. I emphasized exercise and activity along with appropriate food choice as the main foundation for weight loss with surgery providing surgical reinforcement of this.  All questions were answered.  A goal sheet and support group handout were given to the patient.    Once the patient has completed the requirements in their task list and there are no further recommendations, the pt will be allowed to see the surgeon of her choice for consultation on the laparoscopic gastric sleeve surgery. Patient verbalizes understanding of the process to surgery and expectations for the postoperative period including  the need for lifelong lifestyle changes, vitamin supplementation, and laboratory monitoring.       Sincerely,    Sandy Cervantes PA-C    I spent a total of 45 minutes face to face with Danitza during today's office visit. Over 50% of this time was spent counseling the patient and/or coordinating care.

## 2018-07-24 NOTE — PROGRESS NOTES
"New Bariatric Surgery Consultation Note    RE: Danitza Soto  MR#: [id number]  : 1980      Referring provider:       2018   Who referred you? Dr. Polly Mcbride       Chief Complaint/Reason for visit: evaluation for possible weight loss surgery    Dear Polly Mcbride MD (General),    I had the pleasure of seeing your patient, Danitza Soto, to evaluate her obesity and consider her for possible weight loss surgery. As you know, Danitza Soto is 37 year old.  She has a height of 5' 6.929\", a weight of 308 lbs 12.8 oz, and calculated Body mass index is 48.47 kg/(m^2).    HISTORY OF PRESENT ILLNESS:  Weight Loss History Reviewed with Patient 2018   How long have you been overweight? Since puberty   What is the most that you have ever weighed? 315   What is the most weight you have lost? 30   I have tried the following methods to lose weight Watching portions or calories, Exercise   I have tried the following weight loss medications? (Check all that apply) None   Have you ever had weight loss surgery? No   Gained 30 lbs over the last 2 years with work stress    CO-MORBIDITIES OF OBESITY INCLUDE:     2018   I have the following co-morbidities associated with obesity: High Blood Pressure, High Cholesterol, Sleep Apnea, GERD (Reflux), Weight Bearing Joint Pain   Do you use a CPAP? Yes   Are you taking daily medication for heartburn, acid reflux, or GERD (acid reflux disease)? Yes       PAST MEDICAL HISTORY:  Past Medical History:   Diagnosis Date     Depressive disorder      Esophageal reflux      Hearing problem      Hyperlipidemia LDL goal <130 2015     Hypertension      LSIL (low grade squamous intraepithelial lesion) on Pap smear 2014    + HPV, unable to type colp - BRISEYDA I     Mixed hyperlipidemia 2016     Other anxiety states        PAST SURGICAL HISTORY:  Past Surgical History:   Procedure Laterality Date     HC TOOTH EXTRACTION W/FORCEP      [place] Teeth Extraction "       FAMILY HISTORY:   Family History   Problem Relation Age of Onset     HEART DISEASE Mother      ,mother had emphesema and  at 62     Hypertension Mother      Allergies Mother      Lipids Mother      Obesity Mother      Respiratory Mother      Emphysema     Diabetes Maternal Grandmother      Type 2?     Hypertension Maternal Grandmother      Circulatory Maternal Grandmother      Due to diabetes     Eye Disorder Maternal Grandmother      Macular degeneration/Macular degeneration     Obesity Maternal Grandmother      Hypertension Father      Lipids Father      Cancer Father      Prostate Cancer Father      Other Cancer Father      Cerebrovascular Disease Paternal Grandmother      Alcohol/Drug Maternal Grandfather      Obesity Maternal Grandfather      Psychotic Disorder Paternal Grandfather      Committed Suicide     Depression Paternal Grandfather      Allergies Sister      Genitourinary Problems Sister        SOCIAL HISTORY:   Social History Questions Reviewed With Patient 2018   Which best describes your employment status (select all that apply) I work full-time   If you work, 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 Yes   Who do you have in your support network that can be available to help you for the first 2 weeks after surgery? my spouse, two sisters, several friends   Who can you count on for support throughout your weight loss surgery journey? my spouse, two sisters, several friends   Can you afford 3 meals a day?  Yes   Can you afford 50-60 dollars a month for vitamins? Yes   1 step child age 11    HABITS:     2018   How often do you drink alcohol? Never   Do you currently use any of the following Nicotine products? cigarettes   Have you ever used any of the following nicotine products? Cigarettes   Have you or are you currently using street drugs or prescription strength medication for which you do  "not have a prescription for? No   Do you have a history of chemical dependency (alcohol or drug abuse)? No   Smokes 1 ppd    PSYCHOLOGICAL HISTORY:   Psychological History Reviewed With Patient 7/23/2018   Have you ever attempted suicide? Never.   Have you had thoughts of suicide in the past year? No   Have you ever been hospitalized for mental illness or a suicide attempt? Never.   Do you have a history of chronic pain? Yes   Have you ever been diagnosed with fibromyalgia? No   Are you currently being treated for any of the following? (select all that apply) I do not have a mental illness       ROS:     7/23/2018   Skin:  Skin fold rashes (groin or other folds)   HEENT: Dizziness/lightheadedness   Musculoskeletal: Joint Pain, Back pain   Cardiovascular: Heart murmurs   Pulmonary: Shortness of breath with activity, Snoring   Gastrointestinal: Heartburn   Genitourinary: None of the above   Hematological: None of the above   Neurological: None of the above   Female only: None of the above       EATING BEHAVIORS:     7/23/2018   Have you or anyone else thought that you had an eating disorder? Yes   If you answered yes to the previous eating disorder question, select the types that apply from this list: Other   If you answered \"Other\" to the type of eating disorder question above, please describe what it is: food addiction   Do you currently binge eat (eat a large amount of food in a short time)? No   Are you an emotional eater? Yes   Do you get up to eat after falling asleep? No       EXERCISE:     7/23/2018   How often do you exercise? 1 to 2 times per week   What is the duration of your exercise (in minutes)? 30 Minutes   What types of exercise do you do? walking, climbing stairs at work   What keeps you from being more active?  Pain       MEDICATIONS:  Current Outpatient Prescriptions   Medication     ADVIL 200 MG OR TABS     Esomeprazole Magnesium (NEXIUM PO)     fish oil-omega-3 fatty acids 1000 MG capsule     " "hydrochlorothiazide (HYDRODIURIL) 25 MG tablet     lisinopril (PRINIVIL/ZESTRIL) 40 MG tablet     Multiple Vitamins-Minerals (CENTRUM PO)     Probiotic Product (PROBIOTIC ADVANCED PO)     Vitamin D, Cholecalciferol, 1000 UNITS TABS     LORazepam (ATIVAN) 0.5 MG tablet     metroNIDAZOLE (FLAGYL) 500 MG tablet     nicotine (NICODERM CQ) 14 MG/24HR 24 hr patch     nicotine (NICODERM CQ) 21 MG/24HR 24 hr patch     nicotine (NICODERM CQ) 7 MG/24HR 24 hr patch     varenicline (CHANTIX) 1 MG tablet     VENTOLIN  (90 Base) MCG/ACT Inhaler     No current facility-administered medications for this visit.        ALLERGIES:  Allergies   Allergen Reactions     Wellbutrin [Bupropion]      Wellbutrin: Panic attacks, heart/chest pain       LABS/IMAGING/MEDICAL RECORDS REVIEW:     PHYSICAL EXAM:  /70  Pulse 80  Temp 98.6  F (37  C) (Oral)  Ht 5' 6.93\"  Wt 308 lb 12.8 oz  SpO2 95%  BMI 48.47 kg/m2  General: NAD  Neurologic: A & O x 3, gait normal  Head: normocephalic, atraumatic  HEENT: PERRL, EOMI.   Respiratory: respirations unlabored  Abdomen: Obese, Soft NT ND   Extremities: No LE swelling   Skin: warm and dry.  No rashes on exposed skin  Psychiatric: Mentation and Affect appear normal      In summary, Danitza Soto has Class III obesity with a body mass index of Body mass index is 48.47 kg/(m^2). kg/m2 and the comorbidities stated above. She completed an informational seminar and is a possible candidate for the laparoscopic gastric sleeve.  She will have to complete the following pre-requisites:  See dietitian in 1 month    See Sandy in 2 months for Pre surgery appt to follow up on topiramate    Start topiramate ramp to 75 mg    Labs today first floor    Smoking cessation help:  Call [employer] at [phone] ([phone]) or visit their website at [email or web address]    Bariatric Task List  Status:  Is patient a candidate for bariatric surgery?:  Yes -    Status:  surgery evaluation in process -   " "  Surgeon: Dr Tse -     Tentative surgery month/year: Oct 2018 -        Insurance: Insurance:  HP -     HP Referral:  Needed -        Patient Info: Initial Weight:  308 -     Date of Initial Weight/Height:  7/24/2018 -     Goal Weight (lbs):  288 -     Required Weight Loss:  20 -     Surgery Type:  sleeve gastrectomy -        Dietician Visits: Structured weight loss required by insurance?:  Yes -     Dietician Visit 1:  Completed -     Dietician Visit 2:  Needed -     Dietician Visit 3:  Needed -        Psychological Evaluation: Psych eval:  Needed -        Lab Work: Complete Blood Count:  Needed -     Comprehensive Metabolic Panel:  Needed -     Vitamin D:  Needed -     Hgb A1c:  Needed -     PTH:  Needed -     Nicotine Testing:  Needed -        Consults/ Clearance: Sleep Medicine:  Needed -     Medical Weight Management: Needed - help to lose 20 lbs before weight loss surgery      PCP: Establish care with PCP:  Completed -     PCP letter of support:  Needed -        Smoking: Quit tobacco use (3 months smoke free)?:  Needed -        Patient Education:  Information Session:  Completed -     Given \"Making your decision\" handout?:  Yes -     Given support group information?:  Yes -     Support plan in place?:  Completed -     Research consents signed?:  Yes -        Final Tasks:  Before surgery online class:  Needed -     Before surgery online class website link:  [email or web address]   After surgery online class:  Needed -     After surgery online class website link:  [email or web address]   Nurse visit for weigh-in and information:  Needed -     Pre-assessment clinic visit with anesthesia team for H&P:  Needed -     Final labs (Hgb, plt, T&S, UA):  Needed -        Notes:   -         MEDICATION STARTED AT THIS APPOINTMENT  We are starting topiramate at bedtime.  Start one tab, 25 mg, for a week. Go up to 50 mg (2 tabs) for the next week. At the third week, take   3 tabs (75 mg).  " Stay at 3 tabs until you are seen again. Call the nurse at [phone] if you have any questions or concerns. (Do not stop taking it if you don't think it's working. For some people it works even though they do not feel much different.)    Topiramate (Topamax) is a medication that is used most often to treat migraine headaches or for seizures. It has also been found to help with weight loss. Although it's not currently FDA approved for weight loss, it has been used safely for a number of years to help people who are carrying extra weight.     Just how topiramate helps with weight loss has not been exactly determined. However it seems to work on areas of the brain to quiet down signals related to eating.      Topiramate may make you:    >feel less interest in eating in between meals   >think less about food and eating   >find it easier to push the plate away   >find giving up pop easier    >have an easier time eating less    For some of our patients, the pills work right away. They feel and think quite differently about food. Other patients don't feel much of a change but find in fact they have lost weight! Like all weight loss medications, topiramate works best when you help it work.  This means:    1) Have less tempting high calorie (fattening) food around the house or office    2) Have lower calorie food (fruits, vegetables,low fat meats and dairy) for snacks    3) Eat out only one time or less each week.   4) Eat your meals at a table with the TV or computer off.    Side-effects. Topiramate is generally well tolerated. The main side-effects we see are:   Tingling in hands,feet, or face (usually not very troublesome)   Mental confusion and word finding trouble (about 10% of patients have this.)     Feeling sleepy or a bit dopey- this goes away very soon after starting.    One of the dangers of topiramate is the possibility of birth defects--if you get pregnant when you are on it, there is the risk that your baby  will be born with a cleft lip or palate.  If you are on topiramate and of child bearing age, you need to be on a reliable form of birth control or refrain from sexual intercourse.     Please refer to the pharmacy insert for more information on side-effects. Since many pharmacists are not familiar with the use of topiramate in weight loss, calling the clinic will get you the most accurate information on the use of this medication for weight loss.     In order to get refills of this or any medication we prescribe you must be seen in the medical weight mgmt clinic every 2-3 months. Please have your pharmacy fax a refill request to [phone].    Today in the office we discussed gastric sleeve surgery. Preoperative, perioperative, and postoperative processes, management, and follow up were addressed.  Risks and benefits were outlined including the risk of death, staple line leak (1-2%), PE, DVT, ulcer, worsening GERD, N/V, stricture, hernia, wound infection, weight regain, and vitamin deficiencies. I emphasized exercise and activity along with appropriate food choice as the main foundation for weight loss with surgery providing surgical reinforcement of this.  All questions were answered.  A goal sheet and support group handout were given to the patient.    Once the patient has completed the requirements in their task list and there are no further recommendations, the pt will be allowed to see the surgeon of her choice for consultation on the laparoscopic gastric sleeve surgery. Patient verbalizes understanding of the process to surgery and expectations for the postoperative period including the need for lifelong lifestyle changes, vitamin supplementation, and laboratory monitoring.       Sincerely,    Sandy Cervantes PA-C    I spent a total of 45 minutes face to face with Danitza during today's office visit. Over 50% of this time was spent counseling the patient and/or coordinating care.

## 2018-07-24 NOTE — PROGRESS NOTES
"New Bariatric Nutrition Consultation Note    Reason For Visit: Nutrition Assessment    Danitza Soto is a 37 year old presenting today for new bariatric nutrition consult.   Pt is interested in laparoscopic sleeve gastrectomy with Dr. Tse expected surgery in October.  This is pt's first of 3 required nutrition visits prior to surgery. Pt referred by Sandy LANG(7/24/18).     Support System Reviewed With Patient 7/23/2018   Who do you have in your support network that can be available to help you for the first 2 weeks after surgery? my spouse, two sisters, several friends   Who can you count on for support throughout your weight loss surgery journey? my spouse, two sisters, several friends       ANTHROPOMETRICS:  Estimated body mass index is 48.47 kg/(m^2) as calculated from the following:    Height as of an earlier encounter on 7/24/18: 1.7 m (5' 6.93\").    Weight as of an earlier encounter on 7/24/18: 140.1 kg (308 lb 12.8 oz).    Required weight loss goal pre-op: 20 lbs from initial consult weight (goal weight 288 lbs or less before surgery)       7/23/2018   I have tried the following methods to lose weight Watching portions or calories, Exercise       Weight Loss Questions Reviewed With Patient 7/23/2018   How long have you been overweight? Since puberty       SUPPLEMENT INFORMATION:  Multivitamin, Vit D3 9340-3370 international units (in the winter months), fish oil    NUTRITION HISTORY:  Recall Diet Questions Reviewed With Patient 7/23/2018   Describe what you typically consume for breakfast (typical or most recent): eggs, toast, fruit, yogurt, nutrigrain bar   Describe what you typically consume for lunch (typical or most recent): sandwich, varies (made at group home - work)   Describe what you typically consume for supper (typical or most recent): meat, fish, vegetables, pasta, pizza, varies   Describe what you typically consume as snacks (typical or most recent): nuts, fruit, candy, chips   How " many ounces of water, or other low calorie drinks, do you drink daily (8 oz=1 glass)? 64 oz or more   How many ounces of carbonated (pop, beer, sparkling water) drinks do you drinky daily (8 oz=1 glass)? 64 oz or more   How often do you drink alcohol? Never       Eating Habits 7/23/2018   Do you have any dietary restrictions? No   Do you currently binge eat (eat a large amount of food in a short time)? No   Are you an emotional eater? Yes   Do you get up to eat after falling asleep? No   What foods do you crave? carbohydrates and meat       ADDITIONAL INFORMATION:  Patient stated that she is planning on  from her wife but currently living together - patient does her own cooking.    Dining Out History Reviewed With Patient 7/23/2018   How often do you dine out? Around once a week.   Where do you dine out? (select all that apply) sit-down restaurants, fast food chains   What types of food do you order when you dine out? pasta, burgers, pizza, salad, fish       Physical Activity Reviewed With Patient 7/23/2018   How often do you exercise? 1 to 2 times per week   What is the duration of your exercise (in minutes)? 30 Minutes   What types of exercise do you do? walking, climbing stairs at work   What keeps you from being more active?  Pain       NUTRITION DIAGNOSIS:  Obesity r/t long history of self-monitoring deficit and excessive energy intake aeb BMI >30 kg/m2.    INTERVENTION:  Intervention Provided/Education Provided on post-op diet guidelines, vitamins/minerals essential post-operatively, GI anatomy of bariatric surgeries, ways to help prepare for post-op diet guidelines pre-operatively, portion/calorie-control, mindful eating and sources of protein.  Patient was interested in discussing protein shakes, talking with patient about products available in clinic, patient stated she is unable to afford shakes from clinic discussed other options per patient request.  Provided pt with list of goals RD contact  "information.      Questions Reviewed With Patient 7/23/2018   How ready are you to make changes regarding your weight? Number 1 = Not ready at all to make changes up to 10 = very ready. 9   How confident are you that you can change? 1 = Not confident that you will be successful making changes up to 10 = very confident. 8       Patient Understanding: good  Expected Compliance: good   Follow up: activity goal, decrease carbonated beverages    GOALS:  Relating To Eating:  Eat slowly (20-30 minutes per meal), chewing foods well (25 chews per bite/applesauce consistency)  9\" Plate method (1/2 plate non-starchy vegetables/fruit, 1/4 plate lean protein, 1/4 plate whole grain starch - no more than 1/2 cup carb/meal)  Avoid snacking on candy/carbohydrates - choose protein    Relating to beverages:  Separate fluids from meals by 30 minutes before, during, and after eating  Drink at least three glasses of water per day    Relating to activity:  Increase activity as able    *Protein Shake Criteria: no more than 210 Calories, at least 20 grams of protein, and less than 10 grams of sugar     Meal Replacement Shake Options:    [employer] [facility] smoothie (160 Calories, 20 g protein)   Premier Protein (160 Calories, 30 g protein)  Slim Fast Advanced Nutrition (180 Calories, 20 g protein)  Muscle Milk, lactose-free, 17 oz bottle (210 Calories, 30 g protein)  Integrated Supplements, no artificial sugars (110 Calories, 20 g protein)    Frozen Meal Replacements  Healthy Choice  Lean Cuisine  Atkins Meals  Smart Ones      Time spent with patient: 45 minutes.  Kelsey Sousa, DAVIE, LD    "

## 2018-07-24 NOTE — PATIENT INSTRUCTIONS
"See dietitian in 1 month    See Sandy in 2 months for Pre surgery appt to follow up on topiramate    Start topiramate ramp to 75 mg    Labs today first floor    Smoking cessation help:  Call [employer] at [phone] or visit their website at [email or web address]    Bariatric Task List  Status:  Is patient a candidate for bariatric surgery?:  Yes -    Status:  surgery evaluation in process -     Surgeon: Dr Tse -     Tentative surgery month/year: Oct 2018 -        Insurance: Insurance:  HP -     HP Referral:  Needed -        Patient Info: Initial Weight:  308 -     Date of Initial Weight/Height:  7/24/2018 -     Goal Weight (lbs):  288 -     Required Weight Loss:  20 -     Surgery Type:  sleeve gastrectomy -        Dietician Visits: Structured weight loss required by insurance?:  Yes -     Dietician Visit 1:  Completed -     Dietician Visit 2:  Needed -     Dietician Visit 3:  Needed -        Psychological Evaluation: Psych eval:  Needed -        Lab Work: Complete Blood Count:  Needed -     Comprehensive Metabolic Panel:  Needed -     Vitamin D:  Needed -     Hgb A1c:  Needed -     PTH:  Needed -     Nicotine Testing:  Needed -        Consults/ Clearance: Sleep Medicine:  Needed -     Medical Weight Management: Needed - help to lose 20 lbs before weight loss surgery      PCP: Establish care with PCP:  Completed -     PCP letter of support:  Needed -        Smoking: Quit tobacco use (3 months smoke free)?:  Needed -        Patient Education:  Information Session:  Completed -     Given \"Making your decision\" handout?:  Yes -     Given support group information?:  Yes -     Support plan in place?:  Completed -     Research consents signed?:  Yes -        Final Tasks:  Before surgery online class:  Needed -     Before surgery online class website link:  [email or web address]   After surgery online class:  Needed -     After surgery online class website link:  " [email or web address]   Nurse visit for weigh-in and information:  Needed -     Pre-assessment clinic visit with anesthesia team for H&P:  Needed -     Final labs (Hgb, plt, T&S, UA):  Needed -        Notes:   -         MEDICATION STARTED AT THIS APPOINTMENT  We are starting topiramate at bedtime.  Start one tab, 25 mg, for a week. Go up to 50 mg (2 tabs) for the next week. At the third week, take   3 tabs (75 mg).  Stay at 3 tabs until you are seen again. Call the nurse at [phone] if you have any questions or concerns. (Do not stop taking it if you don't think it's working. For some people it works even though they do not feel much different.)    Topiramate (Topamax) is a medication that is used most often to treat migraine headaches or for seizures. It has also been found to help with weight loss. Although it's not currently FDA approved for weight loss, it has been used safely for a number of years to help people who are carrying extra weight.     Just how topiramate helps with weight loss has not been exactly determined. However it seems to work on areas of the brain to quiet down signals related to eating.      Topiramate may make you:    >feel less interest in eating in between meals   >think less about food and eating   >find it easier to push the plate away   >find giving up pop easier    >have an easier time eating less    For some of our patients, the pills work right away. They feel and think quite differently about food. Other patients don't feel much of a change but find in fact they have lost weight! Like all weight loss medications, topiramate works best when you help it work.  This means:    1) Have less tempting high calorie (fattening) food around the house or office    2) Have lower calorie food (fruits, vegetables,low fat meats and dairy) for snacks    3) Eat out only one time or less each week.   4) Eat your meals at a table with the TV or computer off.    Side-effects.  Topiramate is generally well tolerated. The main side-effects we see are:   Tingling in hands,feet, or face (usually not very troublesome)   Mental confusion and word finding trouble (about 10% of patients have this.)     Feeling sleepy or a bit dopey- this goes away very soon after starting.    One of the dangers of topiramate is the possibility of birth defects--if you get pregnant when you are on it, there is the risk that your baby will be born with a cleft lip or palate.  If you are on topiramate and of child bearing age, you need to be on a reliable form of birth control or refrain from sexual intercourse.     Please refer to the pharmacy insert for more information on side-effects. Since many pharmacists are not familiar with the use of topiramate in weight loss, calling the clinic will get you the most accurate information on the use of this medication for weight loss.     In order to get refills of this or any medication we prescribe you must be seen in the medical weight mgmt clinic every 2-3 months. Please have your pharmacy fax a refill request to [phone].

## 2018-07-24 NOTE — NURSING NOTE
"(   Chief Complaint   Patient presents with     Consult     NBS, BMI 50.0    )    ( Weight: 308 lb 12.8 oz )  ( Height: 5' 6.93\" )  ( BMI (Calculated): 48.57 )  ( Initial Weight: 308 lb 12.8 oz )  ( Cumulative weight loss (lbs): 0 )  (   )  (   )  ( Waist Circumference (cm): 138 cm )  (   )    ( BP: 125/70 )  (   )  ( Temp: 98.6  F (37  C) )  ( Temp src: Oral )  ( Pulse: 80 )  (   )  ( SpO2: 95 % )    (   Patient Active Problem List   Diagnosis     Anxiety state     Esophageal reflux     Morbid obesity due to excess calories (H)     Tobacco use disorder     CARDIOVASCULAR SCREENING; LDL GOAL LESS THAN 130     Hypertension goal BP (blood pressure) < 140/90     Acne     Papanicolaou smear of cervix with low grade squamous intraepithelial lesion (LGSIL)     Mixed hyperlipidemia     LEONARDO (obstructive sleep apnea)     Obesity hypoventilation syndrome (H)    )  (   Current Outpatient Prescriptions   Medication Sig Dispense Refill     ADVIL 200 MG OR TABS TAKE THREE TABLETS BY MOUTH QD-BID PRN       Esomeprazole Magnesium (NEXIUM PO)        fish oil-omega-3 fatty acids 1000 MG capsule Take 2 g by mouth daily       hydrochlorothiazide (HYDRODIURIL) 25 MG tablet Take 1 tablet (25 mg) by mouth daily 90 tablet 3     lisinopril (PRINIVIL/ZESTRIL) 40 MG tablet Take 1 tablet (40 mg) by mouth daily 90 tablet 3     Multiple Vitamins-Minerals (CENTRUM PO)        Probiotic Product (PROBIOTIC ADVANCED PO)        Vitamin D, Cholecalciferol, 1000 UNITS TABS Take 2 capsules by mouth daily       LORazepam (ATIVAN) 0.5 MG tablet Take 1 tablet (0.5 mg) by mouth every 8 hours as needed for anxiety (Patient not taking: Reported on 7/24/2018) 20 tablet 0     metroNIDAZOLE (FLAGYL) 500 MG tablet Take 1 tablet (500 mg) by mouth 2 times daily (Patient not taking: Reported on 6/19/2018) 14 tablet 0     nicotine (NICODERM CQ) 14 MG/24HR 24 hr patch Place 1 patch onto the skin every 24 hours (Patient not taking: Reported on 7/24/2018) 30 patch 0     " nicotine (NICODERM CQ) 21 MG/24HR 24 hr patch Place 1 patch onto the skin every 24 hours (Patient not taking: Reported on 7/24/2018) 30 patch 0     nicotine (NICODERM CQ) 7 MG/24HR 24 hr patch Place 1 patch onto the skin every 24 hours (Patient not taking: Reported on 7/24/2018) 30 patch 0     varenicline (CHANTIX) 1 MG tablet Take 1 tablet (1 mg) by mouth 2 times daily (Patient not taking: Reported on 6/19/2018) 56 tablet 4     VENTOLIN  (90 Base) MCG/ACT Inhaler INHALE 1-2 PUFFS EVERY 4-6 HOURS AS NEEDED FOR WHEEZING  0    )  ( Diabetes Eval:    )    ( Pain Eval:  Data Unavailable )    ( Wound Eval:       )    (   History   Smoking Status     Current Every Day Smoker     Packs/day: 1.00     Years: 10.00     Types: Cigarettes     Start date: 1/1/2004   Smokeless Tobacco     Never Used     Comment:  pack or less a day    )    ( Signed By:  Emmanuelle Kingsley; July 24, 2018; 7:46 AM )

## 2018-07-24 NOTE — PATIENT INSTRUCTIONS
"GOALS:  Relating To Eating:  Eat slowly (20-30 minutes per meal), chewing foods well (25 chews per bite/applesauce consistency)  9\" Plate method (1/2 plate non-starchy vegetables/fruit, 1/4 plate lean protein, 1/4 plate whole grain starch - no more than 1/2 cup carb/meal)  Avoid snacking on candy/carbohydrates - choose protein    Relating to beverages:  Separate fluids from meals by 30 minutes before, during, and after eating  Drink at least three glasses of water per day    Relating to activity:  Increase activity as able    *Protein Shake Criteria: no more than 210 Calories, at least 20 grams of protein, and less than 10 grams of sugar     Meal Replacement Shake Options:    [facility] smoothie (160 Calories, 20 g protein)   Premier Protein (160 Calories, 30 g protein)  Slim Fast Advanced Nutrition (180 Calories, 20 g protein)  Muscle Milk, lactose-free, 17 oz bottle (210 Calories, 30 g protein)  Integrated Supplements, no artificial sugars (110 Calories, 20 g protein)    Frozen Meal Replacements  Healthy Choice  Lean Neptali  Atkins Meals  Smart Ones    Follow up with RD in one month    Kelsey Sousa RD, LD  If you need to schedule or reschedule with a dietitian please call [phone] or [phone].   "

## 2018-07-24 NOTE — MR AVS SNAPSHOT
"                  MRN:[id number]                      After Visit Summary   7/24/2018    Danitza Soto    MRN: [id number]           Visit Information        Provider Department      7/24/2018 9:00 AM Kelsey Sousa RD [facility]        Care Instructions    GOALS:  Relating To Eating:  Eat slowly (20-30 minutes per meal), chewing foods well (25 chews per bite/applesauce consistency)  9\" Plate method (1/2 plate non-starchy vegetables/fruit, 1/4 plate lean protein, 1/4 plate whole grain starch - no more than 1/2 cup carb/meal)  Avoid snacking on candy/carbohydrates - choose protein    Relating to beverages:  Separate fluids from meals by 30 minutes before, during, and after eating  Drink at least three glasses of water per day    Relating to activity:  Increase activity as able    *Protein Shake Criteria: no more than 210 Calories, at least 20 grams of protein, and less than 10 grams of sugar     Meal Replacement Shake Options:   [facility] smoothie (160 Calories, 20 g protein)   Premier Protein (160 Calories, 30 g protein)  Slim Fast Advanced Nutrition (180 Calories, 20 g protein)  Muscle Milk, lactose-free, 17 oz bottle (210 Calories, 30 g protein)  Integrated Supplements, no artificial sugars (110 Calories, 20 g protein)    Frozen Meal Replacements  Healthy Choice  Lean Cuisine  Atkins Meals  Smart Ones    Follow up with RD in one month    Kelsey Sousa RD, LD  If you need to schedule or reschedule with a dietitian please call [phone] or [phone].          Act[employer] Information     [employer] gives you secure access to your electronic health record. If you see a primary care provider, you can also send messages to your care team and make appointments. If you have questions, please call your primary care clinic.  If you do not have a primary care provider, please call [phone] and they will assist you.      Act[employer] is an electronic gateway that provides easy, online access " to your medical records. With [employer], you can request a clinic appointment, read your test results, renew a prescription or communicate with your care team.     To access your existing account, please contact your [facility] or call [phone] for assistance.        Care EveryWhere ID     This is your Care EveryWhere ID. This could be used by other organizations to access your [place] medical records  [id number]        Equal Access to Services     ELDA NORMAN : Xavier Storey, addison yeager, ryan ferris, christiano shah. So [facility] [phone].    ATENCIÓN: Si habla lillie, tiene a [place] disposición servicios gratuitos de asistencia lingüística. Llame al [phone].    We comply with applicable federal civil rights laws and [place] laws. We do not discriminate on the basis of race, color, national origin, age, disability, sex, sexual orientation, or gender identity.

## 2018-07-27 LAB
ANNOTATION COMMENT IMP: NORMAL
RETINYL PALMITATE SERPL-MCNC: <0.02 MG/L (ref 0–0.1)
VIT A SERPL-MCNC: 0.49 MG/L (ref 0.3–1.2)

## 2018-07-31 ENCOUNTER — THERAPY VISIT (OUTPATIENT)
Dept: PHYSICAL THERAPY | Facility: CLINIC | Age: 38
End: 2018-07-31
Payer: COMMERCIAL

## 2018-07-31 DIAGNOSIS — M54.50 LUMBAGO: Primary | ICD-10-CM

## 2018-07-31 DIAGNOSIS — M25.562 LEFT ANTERIOR KNEE PAIN: ICD-10-CM

## 2018-07-31 PROCEDURE — 97112 NEUROMUSCULAR REEDUCATION: CPT | Mod: GP | Performed by: PHYSICAL THERAPIST

## 2018-07-31 PROCEDURE — 97110 THERAPEUTIC EXERCISES: CPT | Mod: GP | Performed by: PHYSICAL THERAPIST

## 2018-07-31 PROCEDURE — 97161 PT EVAL LOW COMPLEX 20 MIN: CPT | Mod: GP | Performed by: PHYSICAL THERAPIST

## 2018-07-31 ASSESSMENT — ACTIVITIES OF DAILY LIVING (ADL)
SIT WITH YOUR KNEE BENT: ACTIVITY IS NOT DIFFICULT
GIVING WAY, BUCKLING OR SHIFTING OF KNEE: THE SYMPTOM AFFECTS MY ACTIVITY SLIGHTLY
KNEEL ON THE FRONT OF YOUR KNEE: ACTIVITY IS VERY DIFFICULT
KNEE_ACTIVITY_OF_DAILY_LIVING_SCORE: 58.57
STIFFNESS: THE SYMPTOM AFFECTS MY ACTIVITY MODERATELY
SQUAT: ACTIVITY IS FAIRLY DIFFICULT
KNEE_ACTIVITY_OF_DAILY_LIVING_SUM: 41
GO DOWN STAIRS: ACTIVITY IS FAIRLY DIFFICULT
RAW_SCORE: 41
SWELLING: THE SYMPTOM AFFECTS MY ACTIVITY MODERATELY
STAND: ACTIVITY IS MINIMALLY DIFFICULT
LIMPING: THE SYMPTOM AFFECTS MY ACTIVITY SLIGHTLY
HOW_WOULD_YOU_RATE_THE_OVERALL_FUNCTION_OF_YOUR_KNEE_DURING_YOUR_USUAL_DAILY_ACTIVITIES?: ABNORMAL
WALK: ACTIVITY IS MINIMALLY DIFFICULT
AS_A_RESULT_OF_YOUR_KNEE_INJURY,_HOW_WOULD_YOU_RATE_YOUR_CURRENT_LEVEL_OF_DAILY_ACTIVITY?: ABNORMAL
GO UP STAIRS: ACTIVITY IS SOMEWHAT DIFFICULT
PAIN: THE SYMPTOM AFFECTS MY ACTIVITY MODERATELY
WEAKNESS: I HAVE THE SYMPTOM BUT IT DOES NOT AFFECT MY ACTIVITY
RISE FROM A CHAIR: ACTIVITY IS MINIMALLY DIFFICULT
HOW_WOULD_YOU_RATE_THE_CURRENT_FUNCTION_OF_YOUR_KNEE_DURING_YOUR_USUAL_DAILY_ACTIVITIES_ON_A_SCALE_FROM_0_TO_100_WITH_100_BEING_YOUR_LEVEL_OF_KNEE_FUNCTION_PRIOR_TO_YOUR_INJURY_AND_0_BEING_THE_INABILITY_TO_PERFORM_ANY_OF_YOUR_USUAL_DAILY_ACTIVITIES?: 30

## 2018-07-31 NOTE — MR AVS SNAPSHOT
After Visit Summary   7/31/2018    Danitza Soto    MRN: [id number]           Patient Information     Date Of Birth          1980        Visit Information        Provider Department      7/31/2018 11:10 AM Kaykay Lou PT [facility]        Today's Diagnoses     Lumbago    -  1    Left anterior knee pain           Follow-ups after your visit        Your next 10 appointments already scheduled     Aug 10, 2018 10:10 AM CDT   REN Extremity with Kaykay Lou PT   [facility]    [address] [facility] [address]   [phone]            Aug 16, 2018  2:40 PM CDT   REN Extremity with Kaykay Lou PT   [facility]    [address] [facility] [address]   [phone]            Aug 28, 2018  8:00 AM CDT   (Arrive by 7:45 AM)   NUTRITION VISIT with DAVIE Gonzáles [facility] ([facility])    [address] [place] [address] [phone]            Oct 04, 2018  8:45 AM CDT   (Arrive by 8:30 AM)   Return Bariatric Visit with DESTINI Harrell [facility] ([facility])    [address] [place] [id number] [phone]              Who to contact     If you have questions or need follow up information about today's clinic visit or your schedule please contact [facility] directly at [phone].  Normal or non-critical lab and imaging results will be communicated to you by MyChart, letter or phone within 4 business days after the clinic has received the results. If you do not hear from us within 7 days, please contact the clinic through MyChart or phone. If you have a critical or abnormal lab result,  we will notify you by phone as soon as possible.  Submit refill requests through [employer] [place] or call your pharmacy and they will forward the refill request to us. Please allow 3 business days for your refill to be completed.          Additional Information About Your Visit        [employer] Information     [employer] [place] gives you secure access to your electronic health record. If you see a primary care provider, you can also send messages to your care team and make appointments. If you have questions, please call your primary care clinic.  If you do not have a primary care provider, please call [phone] and they will assist you.        Care EveryWhere ID     This is your Care EveryWhere ID. This could be used by other organizations to access your [place] medical records  [id number]         Blood Pressure from Last 3 Encounters:   07/24/18 125/70   06/19/18 138/80   10/13/17 110/74    Weight from Last 3 Encounters:   07/24/18 140.1 kg (308 lb 12.8 oz)   06/19/18 140.2 kg (309 lb)   12/04/17 (!) 140.6 kg (310 lb)              We Performed the Following     HC PT EVAL, LOW COMPLEXITY     NEUROMUSCULAR RE-EDUCATION     THERAPEUTIC EXERCISES        Primary Care Provider Office Phone # Fax #    Polly Mcbride [phone]       [address] [address]        Equal Access to Services     ELDA NORMAN : Hadii aad ku hadasho Soomaali, waaxda luqadaha, qaybta kaalmada adeegyada, christiano garcia . So [facility] [phone].    ATENCIÓN: Si habla español, tiene a [place] disposición servicios gratuitos de asistencia lingüística. Llame al [phone].    We comply with applicable federal civil rights laws and [place] laws. We do not discriminate on the basis of race, color, national origin, age, disability, sex, sexual orientation, or gender identity.            Thank you!     Thank you for choosing [facility]  for your care. Our goal is  always to provide you with excellent care. Hearing back from our patients is one way we can continue to improve our services. Please take a few minutes to complete the written survey that you may receive in the mail after your visit with us. Thank you!             Your Updated Medication List - Protect others around you: Learn how to safely use, store and throw away your medicines at www.disposemymeds.org.          This list is accurate as of 7/31/18 11:59 PM.  Always use your most recent med list.                   Brand Name Dispense Instructions for use Diagnosis    ADVIL 200 MG tablet   Generic drug:  ibuprofen      TAKE THREE TABLETS BY MOUTH QD-BID PRN        CENTRUM PO           fish oil-omega-3 fatty acids 1000 MG capsule      Take 2 g by mouth daily        hydrochlorothiazide 25 MG tablet    HYDRODIURIL    90 tablet    Take 1 tablet (25 mg) by mouth daily    Essential hypertension with goal blood pressure less than 140/90       lisinopril 40 MG tablet    PRINIVIL/ZESTRIL    90 tablet    Take 1 tablet (40 mg) by mouth daily    Essential hypertension with goal blood pressure less than 140/90       LORazepam 0.5 MG tablet    ATIVAN    20 tablet    Take 1 tablet (0.5 mg) by mouth every 8 hours as needed for anxiety    Anxiety       metroNIDAZOLE 500 MG tablet    FLAGYL    14 tablet    Take 1 tablet (500 mg) by mouth 2 times daily    BV (bacterial vaginosis)       NEXIUM PO           * nicotine 21 MG/24HR 24 hr patch    NICODERM CQ    30 patch    Place 1 patch onto the skin every 24 hours    Encounter for smoking cessation counseling       * nicotine 14 MG/24HR 24 hr patch    NICODERM CQ    30 patch    Place 1 patch onto the skin every 24 hours    Encounter for smoking cessation counseling       * nicotine 7 MG/24HR 24 hr patch    NICODERM CQ    30 patch    Place 1 patch onto the skin every 24 hours    Encounter for smoking cessation counseling       PROBIOTIC ADVANCED PO           varenicline 1 MG tablet     CHANTIX    56 tablet    Take 1 tablet (1 mg) by mouth 2 times daily    Tobacco use disorder       VENTOLIN  (90 Base) MCG/ACT Inhaler   Generic drug:  albuterol      INHALE 1-2 PUFFS EVERY 4-6 HOURS AS NEEDED FOR WHEEZING        Vitamin D (Cholecalciferol) 1000 units Tabs      Take 2 capsules by mouth daily        * Notice:  This list has 3 medication(s) that are the same as other medications prescribed for you. Read the directions carefully, and ask your doctor or other care provider to review them with you.

## 2018-07-31 NOTE — PROGRESS NOTES
[facility] Initial Evaluation  Subjective:  Patient is a 38 year old female presenting with rehab back hpi and rehab left knee hpi. The history is provided by the patient.   Danitza Soto is a 38 year old female with a lumbar condition.  Condition occurred with:  Insidious onset (possibly from falling on stairs 20 years ago).  Condition occurred: for unknown reasons.  This is a chronic condition  Pain began years ago. PT referral on 7/24/18.    Patient reports pain:  Lower thoracic spine, lumbar spine right and lower lumbar spine.  Radiates to:  Gluteals right and other (R hip).  Pain is described as aching and sharp and is intermittent and reported as 8/10 (at worst).  Associated symptoms:  Loss of motion/stiffness and loss of strength. Pain is worse in the A.M..  Symptoms are exacerbated by bending, sitting, standing, lying down and walking and relieved by heat and NSAID's.  Since onset symptoms are gradually worsening.        General health as reported by patient is fair.  Pertinent medical history includes:  Depression, high blood pressure, smoking, sleep disorder/apnea and overweight.  Medical allergies: yes (Welbutrin).  Other surgeries include:  No.  Current medications:  Pain medication, high blood pressure medication and anti-inflammatory.  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    Primary job tasks include:  Other (computer work).    Barriers include:  None as reported by the patient.    Red flags:  None as reported by the patient.      Danitza Soto is a 38 year old female with a left knee condition.  Condition occurred with:  Insidious onset.  Condition occurred: for unknown reasons.  This is a chronic condition  Pain began years ago. PT referral on 7/24/18.    Patient reports pain:  Medial and anterior.    Pain is described as aching and is intermittent and reported as 6/10.  Associated symptoms:  Loss of motion/stiffness and loss of strength. Worse during: no pattern.   Symptoms are exacerbated by bending/squatting, kneeling, descending stairs, ascending stairs and transfers and relieved by NSAID's, ice and rest.  Since onset symptoms are gradually worsening.                                                      Objective:  System         Lumbar/SI Evaluation  ROM:  AROM Lumbar: normal      Lumbar Myotomes:  normal                Lumbar Dermtomes:  normal                Neural Tension/Mobility:        Right side:   SLR w/DF; Slump or SLR  negative.   Lumbar Palpation:    Tenderness present at Left:    Piriformis; Gluteus Medius and Vertebral  Tenderness present at Right: Piriformis; Gluteus Medius and Vertebral        SI joint/Sacrum:          Left negative at:    Thigh thrust and Sacral thrust    Right negative at:  Thigh thrust and Sacral thrust                                      Hip Evaluation  HIP AROM:  AROM:    Left Hip:     Normal    Right Hip:   Normal                    Hip Strength:    Flexion:   Left: 5/5   Pain:  Right: 5/5   Pain:                    Extension:  Left: 3+/5  Pain:Right: 4-/5    Pain:    Abduction:  Left: 4/5     Pain:Right: 4+/5    Pain:  Adduction:  Left: 5/5    Pain:Right: 5/5   Pain:  Internal Rotation:  Left: 5/5    Pain:Right: 5/5   Pain:  External Rotation:  Left: 4+/5   Pain:  Right: 5/5   Pain:  Knee Flexion:  Left: 5/5   Pain:Right: 5/5   Pain:  Knee Extension:  Left: 4+/5   Pain:Right: 5/5    Pain:            Functional Testing:          Quad:      Bilateral leg squat:  Excessive anterior knee excursion and mild loss of control            Knee Evaluation:  ROM:  AROM: normal              Ligament Testing:  Normal                Special Tests:   Left knee positive for the following special tests:  Patellar Compression; Patellar Tracking-Abduction Lateral; Phyllis's and IT Band Friction  Left knee negative for the following special tests:  Meninscal    Right knee negative for the following special tests:  Meniscal; Patellar Compression and Patellar  Tracking-Abduction Lateral  Palpation:    Left knee tenderness present at:  Gluteus Medius and Patellar Medial  Left knee tenderness not present at:  Medial Joint Line; Lateral Joint Line and Patellar Tendon    Edema:  Normal    Mobility Testing:        Patellofemoral Lateral:  Left: hypermobile                General     ROS    Assessment/Plan:    Patient is a 38 year old female with lumbar and left side knee complaints.    Patient has the following significant findings with corresponding treatment plan.                Diagnosis 1:  LBP  Pain -  hot/cold therapy, mechanical traction, manual therapy, self management, education and home program  Decreased ROM/flexibility - manual therapy, therapeutic exercise and home program  Decreased strength - therapeutic exercise, therapeutic activities and home program  Diagnosis 2:  L knee   Pain -  hot/cold therapy, manual therapy, self management, education and home program  Decreased ROM/flexibility - manual therapy, therapeutic exercise and home program  Decreased strength - therapeutic exercise, therapeutic activities and home program    Therapy Evaluation Codes:   1) History comprised of:   Personal factors that impact the plan of care:      None.    Comorbidity factors that impact the plan of care are:      None.     Medications impacting care: None.  2) Examination of Body Systems comprised of:   Body structures and functions that impact the plan of care:      Hip, Knee, Lumbar spine and Thoracic Spine.   Activity limitations that impact the plan of care are:      Bending, Reading/Computer work, Sitting, Sports, Squatting/kneeling, Stairs, Standing, Walking, Working and Sleeping.  3) Clinical presentation characteristics are:   Stable/Uncomplicated.  4) Decision-Making    Low complexity using standardized patient assessment instrument and/or measureable assessment of functional outcome.  Cumulative Therapy Evaluation is: Low complexity.    Previous and current  functional limitations:  (See Goal Flow Sheet for this information)    Short term and Long term goals: (See Goal Flow Sheet for this information)      Communication ability:  Patient appears to be able to clearly communicate and understand verbal and written communication and follow directions correctly.  Treatment Explanation - The following has been discussed with the patient: RX ordered/plan of care  Anticipated outcomes  Possible risks and side effects  This patient would benefit from PT intervention to resume normal activities.   Rehab potential is good.    Frequency:  1 X week, once daily  Duration:  for 8 weeks  Discharge Plan:  Achieve all LTG.  Independent in home treatment program.  Reach maximal therapeutic benefit.    Please refer to the daily flowsheet for treatment today, total treatment time and time spent performing 1:1 timed codes.

## 2018-08-01 PROBLEM — M25.562 LEFT ANTERIOR KNEE PAIN: Status: ACTIVE | Noted: 2018-08-01

## 2018-08-01 PROBLEM — M54.50 LUMBAGO: Status: ACTIVE | Noted: 2018-08-01

## 2018-08-03 ENCOUNTER — TRANSFERRED RECORDS (OUTPATIENT)
Dept: HEALTH INFORMATION MANAGEMENT | Facility: CLINIC | Age: 38
End: 2018-08-03

## 2018-08-06 ENCOUNTER — MYC MEDICAL ADVICE (OUTPATIENT)
Dept: FAMILY MEDICINE | Facility: CLINIC | Age: 38
End: 2018-08-06

## 2018-08-06 ENCOUNTER — MYC MEDICAL ADVICE (OUTPATIENT)
Dept: SURGERY | Facility: CLINIC | Age: 38
End: 2018-08-06

## 2018-08-06 DIAGNOSIS — F17.200 TOBACCO USE DISORDER: ICD-10-CM

## 2018-08-06 RX ORDER — TOPIRAMATE 25 MG/1
TABLET, FILM COATED ORAL
Qty: 90 TABLET | Refills: 3 | COMMUNITY
Start: 2018-08-06 | End: 2018-10-04

## 2018-08-06 RX ORDER — VARENICLINE TARTRATE 1 MG/1
1 TABLET, FILM COATED ORAL 2 TIMES DAILY
Qty: 60 TABLET | Refills: 1 | Status: SHIPPED | OUTPATIENT
Start: 2018-08-06 | End: 2018-11-06

## 2018-08-06 NOTE — TELEPHONE ENCOUNTER
LS  Please see [facility] OTR message below.  Last Chantix Rx 10/13/17 for #[phone] with 4 refills.  Thanks, Ami Garcia RN

## 2018-08-06 NOTE — TELEPHONE ENCOUNTER
Patient requesting Topiramate start ramp to 75mg. Was discussed at July appointment, rx was not ordered. Called into pharmacy.

## 2018-08-10 ENCOUNTER — THERAPY VISIT (OUTPATIENT)
Dept: PHYSICAL THERAPY | Facility: CLINIC | Age: 38
End: 2018-08-10
Payer: COMMERCIAL

## 2018-08-10 DIAGNOSIS — M25.562 LEFT ANTERIOR KNEE PAIN: ICD-10-CM

## 2018-08-10 DIAGNOSIS — M54.50 LUMBAGO: ICD-10-CM

## 2018-08-10 PROCEDURE — 97110 THERAPEUTIC EXERCISES: CPT | Mod: GP | Performed by: PHYSICAL THERAPIST

## 2018-08-10 PROCEDURE — 97112 NEUROMUSCULAR REEDUCATION: CPT | Mod: GP | Performed by: PHYSICAL THERAPIST

## 2018-08-16 ENCOUNTER — OFFICE VISIT (OUTPATIENT)
Dept: CARDIOLOGY | Facility: CLINIC | Age: 38
End: 2018-08-16
Payer: COMMERCIAL

## 2018-08-16 ENCOUNTER — THERAPY VISIT (OUTPATIENT)
Dept: PHYSICAL THERAPY | Facility: CLINIC | Age: 38
End: 2018-08-16
Payer: COMMERCIAL

## 2018-08-16 VITALS
DIASTOLIC BLOOD PRESSURE: 85 MMHG | WEIGHT: 293 LBS | SYSTOLIC BLOOD PRESSURE: 131 MMHG | HEART RATE: 74 BPM | OXYGEN SATURATION: 96 % | BODY MASS INDEX: 47.4 KG/M2

## 2018-08-16 DIAGNOSIS — M25.562 LEFT ANTERIOR KNEE PAIN: ICD-10-CM

## 2018-08-16 DIAGNOSIS — R00.2 PALPITATIONS: ICD-10-CM

## 2018-08-16 DIAGNOSIS — M54.50 LUMBAGO: ICD-10-CM

## 2018-08-16 DIAGNOSIS — R07.89 ATYPICAL CHEST PAIN: Primary | ICD-10-CM

## 2018-08-16 PROCEDURE — 97110 THERAPEUTIC EXERCISES: CPT | Mod: GP | Performed by: PHYSICAL THERAPIST

## 2018-08-16 PROCEDURE — 0296T ZZHC  EXT ECG > 48HR TO 21 DAY RCRD W/CONECT INTL RCRD: CPT | Performed by: INTERNAL MEDICINE

## 2018-08-16 PROCEDURE — 97112 NEUROMUSCULAR REEDUCATION: CPT | Mod: GP | Performed by: PHYSICAL THERAPIST

## 2018-08-16 PROCEDURE — 99205 OFFICE O/P NEW HI 60 MIN: CPT | Mod: 25 | Performed by: INTERNAL MEDICINE

## 2018-08-16 PROCEDURE — 0298T ZZC EXT ECG > 48HR TO 21 DAY REVIEW AND INTERPRETATN: CPT | Performed by: INTERNAL MEDICINE

## 2018-08-16 PROCEDURE — 97140 MANUAL THERAPY 1/> REGIONS: CPT | Mod: GP | Performed by: PHYSICAL THERAPIST

## 2018-08-16 NOTE — PATIENT INSTRUCTIONS
Thank you for coming to the [facility] @ [facility] [facility]; please note the following instructions:    1.  We have applied a holter (heart) monitor for you to wear for 14 days.  You may remove the heart monitor on 30th at 9:30am.  Please see the enclosed instructions for further information, as well as instructions for removal of the heart monitor and return mailing directions.  If you should have questions regarding your monitor, please call [employer] at [phone].  The Cardiology Nurse will contact you with your results (please see result notification details at bottom of summary).    *PLEASE DO NOT SHOWER OR INDUCE EXCESSIVE SWEATING IN THE FIRST 24 HOURS OF WEARING*    2. Lexiscan scheduled on Monday August 27 at 1:00 PM. Clinic will call with result      If you have any questions regarding your visit please contact your care team:     Cardiology  Telephone Number   Hannah YEPEZ, BOBBY BUNCH, BOBBY RAMIREZ, LLOYD HUGHES MA   [phone]    *After hours: [phone]   For scheduling appts:     [phone] or    [phone] (select option [phone])    *After hours: [phone]     For the Device Clinic (Pacemakers and ICD's)  RN's :  Hanh Cochran   During business hours: [phone]    *After business hours:  [phone] (select option 4)      Normal test result notifications will be released via [employer] or mailed within 7 business days.  All other test results, will be communicated via telephone once reviewed by your cardiologist.    If you need a medication refill please contact your pharmacy.  Please allow 3 business days for your refill to be completed.    As always, thank you for trusting us with your health care needs!

## 2018-08-16 NOTE — NURSING NOTE
2 week ZioXT applied to patient today. Instructions on use and removal given and mail back instructions discussed. All questions answered. Consent form signed. Device registered. Form faxed to [employer].  Device number: [id number].    Elena Marcelo MA

## 2018-08-16 NOTE — PROGRESS NOTES
SUBJECTIVE:  Danitza Soto is a 38 year old female who presents for evaluation of chest pain and palpitation.    BMI 48.5. Not very active due to Knee arthritis. Non smoker. HTN+. High VAA159/low HDL 30,elevated TG. No DM. No premature CAD in family.    States she get chest pain on and off at rest. No achange with walking. Last 2 hours or so. No associated symptoms. Non progressive. Also get skipped beats and fast heart beat lasting over 2 hrs or so. No dizziness unless she is standing. No LOC. No associated CP/SOB.No LOC.  No excess alcohol,coffee. Frequency twice or so a month.    Patient Active Problem List    Diagnosis Date Noted     Lumbago 08/01/2018     Priority: Medium     Left anterior knee pain 08/01/2018     Priority: Medium     LEONARDO (obstructive sleep apnea) 04/13/2017     Priority: Medium     4/10/2017 - Home Sleep Apnea Testing - AHI 15.5/hr; Supine AHI 12.9/hr; SpO2 <= 88% for 33.8 minutes.  Titration Sleep Study 4/19/2017 - (288.0 lbs) CPAP was titrated to a pressure of 11 with an AHI of 3.7.  Time Spent in REM supine at this pressure was 33.5.  Recommending Auto-CPAP 7 - 15 cmH2O.       Obesity hypoventilation syndrome (H) 04/13/2017     Priority: Medium     Mixed hyperlipidemia 07/06/2015     Priority: Medium     Papanicolaou smear of cervix with low grade squamous intraepithelial lesion (LGSIL) 06/17/2014     Priority: Medium     6/17/14: LSIL, + HPV, unable to type.   8/20/14:[place]:BRISEYDA I. Plan cotest pap & HPV in 1 year.   1/20116 Pap Ascus Neg HPV. Plan: [place].   5/13/16 Colposcopy not completed. Cancelled by patient  3/21/17 NIL/Neg HPV. Plan: cotest in 1 year per provider.       Acne 02/24/2012     Priority: Medium     Hypertension goal BP (blood pressure) < 140/90 01/12/2011     Priority: Medium     CARDIOVASCULAR SCREENING; LDL GOAL LESS THAN 130 10/31/2010     Priority: Medium     Morbid obesity due to excess calories (H) 05/17/2006     Priority: Medium     Problem list name updated by  automated process. Provider to review       Tobacco use disorder 05/17/2006     Priority: Medium     Anxiety state      Priority: Medium     Esophageal reflux      Priority: Medium    .  Current Outpatient Prescriptions   Medication Sig     ADVIL 200 MG OR TABS TAKE THREE TABLETS BY MOUTH QD-BID PRN     Esomeprazole Magnesium (NEXIUM PO)      fish oil-omega-3 fatty acids 1000 MG capsule Take 2 g by mouth daily     hydrochlorothiazide (HYDRODIURIL) 25 MG tablet Take 1 tablet (25 mg) by mouth daily     lisinopril (PRINIVIL/ZESTRIL) 40 MG tablet Take 1 tablet (40 mg) by mouth daily     topiramate (TOPAMAX) 25 MG tablet 25 mg at bedtime for 1 week, 50 mg at bedtime for 1 week and 75 mg daily at bedtime thereafter     varenicline (CHANTIX) 1 MG tablet Take 1 tablet (1 mg) by mouth 2 times daily     VENTOLIN  (90 Base) MCG/ACT Inhaler INHALE 1-2 PUFFS EVERY 4-6 HOURS AS NEEDED FOR WHEEZING     Vitamin D, Cholecalciferol, 1000 UNITS TABS Take 2 capsules by mouth daily     LORazepam (ATIVAN) 0.5 MG tablet Take 1 tablet (0.5 mg) by mouth every 8 hours as needed for anxiety (Patient not taking: Reported on 8/16/2018)     metroNIDAZOLE (FLAGYL) 500 MG tablet Take 1 tablet (500 mg) by mouth 2 times daily (Patient not taking: Reported on 6/19/2018)     Multiple Vitamins-Minerals (CENTRUM PO)      nicotine (NICODERM CQ) 14 MG/24HR 24 hr patch Place 1 patch onto the skin every 24 hours (Patient not taking: Reported on 7/24/2018)     nicotine (NICODERM CQ) 21 MG/24HR 24 hr patch Place 1 patch onto the skin every 24 hours (Patient not taking: Reported on 7/24/2018)     nicotine (NICODERM CQ) 7 MG/24HR 24 hr patch Place 1 patch onto the skin every 24 hours (Patient not taking: Reported on 7/24/2018)     Probiotic Product (PROBIOTIC ADVANCED PO)      No current facility-administered medications for this visit.      Past Medical History:   Diagnosis Date     Depressive disorder 1990     Esophageal reflux      Hearing problem  2018     Hyperlipidemia LDL goal <130 7/6/2015     Hypertension      LSIL (low grade squamous intraepithelial lesion) on Pap smear 6/2014    + HPV, unable to type colp - BRISEYDA I     Mixed hyperlipidemia 8/24/2016     Other anxiety states      Past Surgical History:   Procedure Laterality Date     HC TOOTH EXTRACTION W/FORCEP  1995    [facility] Teeth Extraction     Allergies   Allergen Reactions     Wellbutrin [Bupropion]      Wellbutrin: Panic attacks, heart/chest pain     Social History     Social History     Marital status:      Spouse name: N/A     Number of children: 0     Years of education: Some Colle     Occupational History     Mental Health Counselor      Social History Main Topics     Smoking status: Current Every Day Smoker     Packs/day: 1.00     Years: 10.00     Types: Cigarettes     Start date: 1/1/2004     Smokeless tobacco: Never Used      Comment:  pack or less a day     Alcohol use 0.0 oz/week     0 Standard drinks or equivalent per week      Comment: Occas.     Drug use: No     Sexual activity: Not Currently     Partners: Female     Birth control/ protection: None     Other Topics Concern     Parent/Sibling W/ Cabg, Mi Or Angioplasty Before 65f 55m? No     Social History Narrative    Social Documentation:        Balanced Diet: YES, sometimes    Calcium intake: 2 per day    Caffeine: 5-10 per day    Exercise:  type of activity walking, playtime;  5 times per week    Sunscreen: when remembered    Seatbelts:  Yes    Self Breast Exam:  No    Self Testicular Exam: n/a    Physical/Emotional/Sexual Abuse: No    Do you feel safe in your environment? Yes        Cholesterol screen up to date: No 2006    Eye Exam up to date: Yes    Dental Exam up to date: No    Pap smear up to date: No: 2006    Mammogram up to date: Does Not Apply    Dexa Scan up to date: Does Not Apply    Colonoscopy up to date: Does Not Apply    Immunizations up to date: Yes, 2003    Glucose screen if over 40:  N/a        Jamila Coyle  MA                         Family History   Problem Relation Age of Onset     HEART DISEASE Mother      ,mother had emphesema and  at 62     Hypertension Mother      Allergies Mother      Lipids Mother      Obesity Mother      Respiratory Mother      Emphysema     Diabetes Maternal Grandmother      Type 2?     Hypertension Maternal Grandmother      Circulatory Maternal Grandmother      Due to diabetes     Eye Disorder Maternal Grandmother      Macular degeneration/Macular degeneration     Obesity Maternal Grandmother      Hypertension Father      Lipids Father      Cancer Father      Prostate Cancer Father      Other Cancer Father      Cerebrovascular Disease Paternal Grandmother      Alcohol/Drug Maternal Grandfather      Obesity Maternal Grandfather      Psychotic Disorder Paternal Grandfather      Committed Suicide     Depression Paternal Grandfather      Allergies Sister      Genitourinary Problems Sister           REVIEW OF SYSTEMS:  General: negative, fever, chills, night sweats  Skin: negative, acne, rash and scaling  Eyes: negative, double vision, eye pain and photophobia  Ears/Nose/Throat: negative, nasal congestion and purulent rhinorrhea  Respiratory: No dyspnea on exertion, No cough, No hemoptysis and negative  Cardiovascular: negative, exertional chest pain or pressure, paroxysmal nocturnal dyspnea, dyspnea on exertion and orthopnea  Gastrointestinal: negative, dysphagia, nausea and vomiting  Genitourinary: negative, nocturia, dysuria, frequency and urgency  Musculoskeletal: negative, fracture, back pain and neck pain  Neurologic: negative, headaches, syncope, stroke and seizures  Psychiatric: negative, nervous breakdown, thoughts of self-harm and thoughts of hurting someone else  Hematologic/Lymphatic/Immunologic: negative, bleeding disorder, chills and fever  Endocrine: negative, cold intolerance, heat intolerance and hot flashes       OBJECTIVE:  Blood pressure 131/85, pulse 74, weight 137 kg  (302 lb), SpO2 96 %, not currently breastfeeding.  General Appearance: alert and no distress  Head: Normocephalic. No masses, lesions, tenderness or abnormalities  Eyes: conjuctiva clear, PERRL, EOM intact  Ears: External ears normal. Canals clear. TM's normal.  Nose: Nares normal  Mouth: normal  Neck: Supple, no cervical adenopathy, no thyromegaly  Lungs: clear to auscultation  Cardiac: regular rate and rhythm, normal S1 and S2, no murmur  Abdomen: Soft, nontender.  Normal bowel sounds.  No hepatosplenomegaly or abnormal masses  Extremities: no peripheral edema, peripheral pulses normal  Musculoskeletal: negative  Neurological: Cranial nerves 2-12 intact, motor strength intact       ASSESSMENT/PLAN:  Obese patient with Atypical chest pain and palpitation at rest.  No exertional symptoms.  HTN+. HLD+.  No excess alcohol,coffee.  No LOC.  Reviewed EKG. NSR. Normal. Occasional PAC.  Had completely normal coronary CTA in 2013.  LDL high,Low HFL and elevated TG. Discussed implications.  Planning for Bariatric surgery and states this will take care of all lipid issues.  Not interested in statin at this time.  Will plan for a 2 week zio and a Lexiscan as she is unavle to exercise and echo images may be suboptimal.  Per orders.   Return to Clinic  PRN.

## 2018-08-16 NOTE — LETTER
8/16/2018      RE: Danitza Soto  [address]  [facility] [address]       Dear Colleague,    Thank you for the opportunity to participate in the care of your patient, Danitza Soto, at the [facility] AT [facility]. Please see a copy of my visit note below.       SUBJECTIVE:  Danitza Soto is a 38 year old female who presents for evaluation of chest pain and palpitation.    BMI 48.5. Not very active due to Knee arthritis. Non smoker. HTN+. High [id number]/low HDL 30,elevated TG. No DM. No premature CAD in family.    States she get chest pain on and off at rest. No achange with walking. Last 2 hours or so. No associated symptoms. Non progressive. Also get skipped beats and fast heart beat lasting over 2 hrs or so. No dizziness unless she is standing. No LOC. No associated CP/SOB.No LOC.  No excess alcohol,coffee. Frequency twice or so a month.    Patient Active Problem List    Diagnosis Date Noted     Lumbago 08/01/2018     Priority: Medium     Left anterior knee pain 08/01/2018     Priority: Medium     LEONARDO (obstructive sleep apnea) 04/13/2017     Priority: Medium     4/10/2017 - Home Sleep Apnea Testing - AHI 15.5/hr; Supine AHI 12.9/hr; SpO2 <= 88% for 33.8 minutes.  Titration Sleep Study 4/19/2017 - (288.0 lbs) CPAP was titrated to a pressure of 11 with an AHI of 3.7.  Time Spent in REM supine at this pressure was 33.5.  Recommending Auto-CPAP 7 - 15 cmH2O.       Obesity hypoventilation syndrome (H) 04/13/2017     Priority: Medium     Mixed hyperlipidemia 07/06/2015     Priority: Medium     Papanicolaou smear of cervix with low grade squamous intraepithelial lesion (LGSIL) 06/17/2014     Priority: Medium     6/17/14: LSIL, + HPV, unable to type.   8/20/14:[facility]:BRISEYDA I. Plan cotest pap & HPV in 1 year.   1/20116 Pap Ascus Neg HPV. Plan: [facility].   5/13/16 Colposcopy not completed. Cancelled by patient  3/21/17 NIL/Neg  HPV. Plan: cotest in 1 year per provider.       Acne 02/24/2012     Priority: Medium     Hypertension goal BP (blood pressure) < 140/90 01/12/2011     Priority: Medium     CARDIOVASCULAR SCREENING; LDL GOAL LESS THAN 130 10/31/2010     Priority: Medium     Morbid obesity due to excess calories (H) 05/17/2006     Priority: Medium     Problem list name updated by automated process. Provider to review       Tobacco use disorder 05/17/2006     Priority: Medium     Anxiety state      Priority: Medium     Esophageal reflux      Priority: Medium    .  Current Outpatient Prescriptions   Medication Sig     ADVIL 200 MG OR TABS TAKE THREE TABLETS BY MOUTH QD-BID PRN     Esomeprazole Magnesium (NEXIUM PO)      fish oil-omega-3 fatty acids 1000 MG capsule Take 2 g by mouth daily     hydrochlorothiazide (HYDRODIURIL) 25 MG tablet Take 1 tablet (25 mg) by mouth daily     lisinopril (PRINIVIL/ZESTRIL) 40 MG tablet Take 1 tablet (40 mg) by mouth daily     topiramate (TOPAMAX) 25 MG tablet 25 mg at bedtime for 1 week, 50 mg at bedtime for 1 week and 75 mg daily at bedtime thereafter     varenicline (CHANTIX) 1 MG tablet Take 1 tablet (1 mg) by mouth 2 times daily     VENTOLIN  (90 Base) MCG/ACT Inhaler INHALE 1-2 PUFFS EVERY 4-6 HOURS AS NEEDED FOR WHEEZING     Vitamin D, Cholecalciferol, 1000 UNITS TABS Take 2 capsules by mouth daily     LORazepam (ATIVAN) 0.5 MG tablet Take 1 tablet (0.5 mg) by mouth every 8 hours as needed for anxiety (Patient not taking: Reported on 8/16/2018)     metroNIDAZOLE (FLAGYL) 500 MG tablet Take 1 tablet (500 mg) by mouth 2 times daily (Patient not taking: Reported on 6/19/2018)     Multiple Vitamins-Minerals (CENTRUM PO)      nicotine (NICODERM CQ) 14 MG/24HR 24 hr patch Place 1 patch onto the skin every 24 hours (Patient not taking: Reported on 7/24/2018)     nicotine (NICODERM CQ) 21 MG/24HR 24 hr patch Place 1 patch onto the skin every 24 hours (Patient not taking: Reported on 7/24/2018)      nicotine (NICODERM CQ) 7 MG/24HR 24 hr patch Place 1 patch onto the skin every 24 hours (Patient not taking: Reported on 7/24/2018)     Probiotic Product (PROBIOTIC ADVANCED PO)      No current facility-administered medications for this visit.      Past Medical History:   Diagnosis Date     Depressive disorder 1990     Esophageal reflux      Hearing problem 2018     Hyperlipidemia LDL goal <130 7/6/2015     Hypertension      LSIL (low grade squamous intraepithelial lesion) on Pap smear 6/2014    + HPV, unable to type colp - BRISEYDA I     Mixed hyperlipidemia 8/24/2016     Other anxiety states      Past Surgical History:   Procedure Laterality Date     HC TOOTH EXTRACTION W/FORCEP  1995    [place] Teeth Extraction     Allergies   Allergen Reactions     Wellbutrin [Bupropion]      Wellbutrin: Panic attacks, heart/chest pain     Social History     Social History     Marital status:      Spouse name: N/A     Number of children: 0     Years of education: Some Colle     Occupational History     Mental Health Counselor      Social History Main Topics     Smoking status: Current Every Day Smoker     Packs/day: 1.00     Years: 10.00     Types: Cigarettes     Start date: 1/1/2004     Smokeless tobacco: Never Used      Comment:  pack or less a day     Alcohol use 0.0 oz/week     0 Standard drinks or equivalent per week      Comment: Occas.     Drug use: No     Sexual activity: Not Currently     Partners: Female     Birth control/ protection: None     Other Topics Concern     Parent/Sibling W/ Cabg, Mi Or Angioplasty Before 65f 55m? No     Social History Narrative    Social Documentation:        Balanced Diet: YES, sometimes    Calcium intake: 2 per day    Caffeine: 5-10 per day    Exercise:  type of activity walking, playtime;  5 times per week    Sunscreen: when remembered    Seatbelts:  Yes    Self Breast Exam:  No    Self Testicular Exam: n/a    Physical/Emotional/Sexual Abuse: No    Do you feel safe in your environment?  Yes        Cholesterol screen up to date: No 2006    Eye Exam up to date: Yes    Dental Exam up to date: No    Pap smear up to date: No: 2006    Mammogram up to date: Does Not Apply    Dexa Scan up to date: Does Not Apply    Colonoscopy up to date: Does Not Apply    Immunizations up to date: Yes,     Glucose screen if over 40:  N/a        Jamila Coyle MA                         Family History   Problem Relation Age of Onset     HEART DISEASE Mother      ,mother had emphesema and  at 62     Hypertension Mother      Allergies Mother      Lipids Mother      Obesity Mother      Respiratory Mother      Emphysema     Diabetes Maternal Grandmother      Type 2?     Hypertension Maternal Grandmother      Circulatory Maternal Grandmother      Due to diabetes     Eye Disorder Maternal Grandmother      Macular degeneration/Macular degeneration     Obesity Maternal Grandmother      Hypertension Father      Lipids Father      Cancer Father      Prostate Cancer Father      Other Cancer Father      Cerebrovascular Disease Paternal Grandmother      Alcohol/Drug Maternal Grandfather      Obesity Maternal Grandfather      Psychotic Disorder Paternal Grandfather      Committed Suicide     Depression Paternal Grandfather      Allergies Sister      Genitourinary Problems Sister           REVIEW OF SYSTEMS:  General: negative, fever, chills, night sweats  Skin: negative, acne, rash and scaling  Eyes: negative, double vision, eye pain and photophobia  Ears/Nose/Throat: negative, nasal congestion and purulent rhinorrhea  Respiratory: No dyspnea on exertion, No cough, No hemoptysis and negative  Cardiovascular: negative, exertional chest pain or pressure, paroxysmal nocturnal dyspnea, dyspnea on exertion and orthopnea  Gastrointestinal: negative, dysphagia, nausea and vomiting  Genitourinary: negative, nocturia, dysuria, frequency and urgency  Musculoskeletal: negative, fracture, back pain and neck pain  Neurologic: negative,  headaches, syncope, stroke and seizures  Psychiatric: negative, nervous breakdown, thoughts of self-harm and thoughts of hurting someone else  Hematologic/Lymphatic/Immunologic: negative, bleeding disorder, chills and fever  Endocrine: negative, cold intolerance, heat intolerance and hot flashes       OBJECTIVE:  Blood pressure 131/85, pulse 74, weight 137 kg (302 lb), SpO2 96 %, not currently breastfeeding.  General Appearance: alert and no distress  Head: Normocephalic. No masses, lesions, tenderness or abnormalities  Eyes: conjuctiva clear, PERRL, EOM intact  Ears: External ears normal. Canals clear. TM's normal.  Nose: Nares normal  Mouth: normal  Neck: Supple, no cervical adenopathy, no thyromegaly  Lungs: clear to auscultation  Cardiac: regular rate and rhythm, normal S1 and S2, no murmur  Abdomen: Soft, nontender.  Normal bowel sounds.  No hepatosplenomegaly or abnormal masses  Extremities: no peripheral edema, peripheral pulses normal  Musculoskeletal: negative  Neurological: Cranial nerves 2-12 intact, motor strength intact       ASSESSMENT/PLAN:  Obese patient with Atypical chest pain and palpitation at rest.  No exertional symptoms.  HTN+. HLD+.  No excess alcohol,coffee.  No LOC.  Reviewed EKG. NSR. Normal. Occasional PAC.  Had completely normal coronary CTA in 2013.  LDL high,Low HFL and elevated TG. Discussed implications.  Planning for Bariatric surgery and states this will take care of all lipid issues.  Not interested in statin at this time.  Will plan for a 2 week zio and a Lexiscan as she is unavle to exercise and echo images may be suboptimal.  Per orders.   Return to Clinic  PRN.    Please do not hesitate to contact me if you have any questions/concerns.     Sincerely,     CINDA Prather MD

## 2018-08-16 NOTE — NURSING NOTE
Cardiac Testing: Per Dr Smith  Patient given instructions regarding Stress MPI scheduled on August 27 at 1:00 PM in [place]. Discussed purpose, preparation, procedure and when to expect results reported back to the patient. Patient demonstrated understanding of this information and agreed to call with further questions or concerns.    2 weeks ZIO per Dr Smith     Med Reconcile: Reviewed and verified all current medications with the patient. The updated medication list was printed and given to the patient.    Return Appointment: Test result will determine follow-up appointment with Dr Smith    Patient stated she understood all health information given and agreed to call with further questions or concerns.    Silva Urbina RN

## 2018-08-16 NOTE — MR AVS SNAPSHOT
After Visit Summary   8/16/2018    Danitza Soto    MRN: [id number]           Patient Information     Date Of Birth          1980        Visit Information        Provider Department      8/16/2018 9:00 AM CINDA Prather MD [facility]YS[facility]IANS HEART AT [facility]        Today's Diagnoses     Atypical chest pain    -  1      Care Instructions    Thank you for coming to the [facility] @ [facility]; please note the following instructions:    1.  We have applied a holter (heart) monitor for you to wear for 14 days.  You may remove the heart monitor on 30th at 9:30am.  Please see the enclosed instructions for further information, as well as instructions for removal of the heart monitor and return mailing directions.  If you should have questions regarding your monitor, please call [employer] at [phone].  The Cardiology Nurse will contact you with your results (please see result notification details at bottom of summary).    *PLEASE DO NOT SHOWER OR INDUCE EXCESSIVE SWEATING IN THE FIRST 24 HOURS OF WEARING*    2. Lexiscan scheduled on Monday August 27 at 1:00 PM. Clinic will call with result      If you have any questions regarding your visit please contact your care team:     Cardiology  Telephone Number   Hannah YEPEZ, BOBBY BUNCH, BOBBY RAMIREZ, NAPOLEON HUGHES MA   [phone]    *After hours: [phone]   For scheduling appts:     [phone] or    [phone] (select option 1)    *After hours: [phone]     For the Device Clinic (Pacemakers and ICD's)  RN's :  Hanh Cochran   During business hours: [phone]    *After business hours:  [phone] (select option 4)      Normal test result notifications will be released via [employer] or mailed within 7 business days.  All other test results, will be communicated via telephone once reviewed by your cardiologist.    If you need a medication refill please contact  your pharmacy.  Please allow 3 business days for your refill to be completed.    As always, thank you for trusting us with your health care needs!            Follow-ups after your visit        Your next 10 appointments already scheduled     Aug 16, 2018  2:40 PM CDT   REN Extremity with Kaykay Lou PT   [facility] ([facility] [place] [facility])    [address]  [facility] [id number] [phone]            Aug 17, 2018  2:30 PM CDT   MyChart Sleep Medicine Return with Derrick Coy MD   [facility] ([facility])    [address] [place] [id number] [phone]            Aug 23, 2018 10:50 AM CDT   REN Extremity with Kaykay Lou PT   [facility] ([facility] [place] [facility])    [address] [facility] [id number] [phone]            Aug 27, 2018  1:00 PM CDT   NM INJECTION with MGNMINJ1   [facility])    [id number] [place] [address] [phone]            Aug 27, 2018  1:45 PM CDT   NM SCAN3 with MGNM1   [facility])    [address] [place] [address] [phone]            Aug 27, 2018  2:15 PM CDT   Ekg Stress Nm Lexiscan with MG STRESS RM, MG IMAGING NURSE, MG CARD, MG CV TECH   [facility] ([facility])    [address] [place] [address] [phone]            Aug 27, 2018  2:45 PM CDT   NM MPI WITH LEXISCAN with MGNM1   [facility])    [address] [place] [address] [phone]           For a ONE day exam: Allow 3-4 hours for test. For a TWO day exam: Allow  minutes PER day for test.  On the day of your resting scan: Please stop eating 3 hours before the test. You may drink water or  juice.  On the day of your stress test: Stop all caffeine 12 hours before the test. This includes coffee, tea, soda pop, chocolate and certain medicines (such as Anacin, Excedrin and NoDoz). Also avoid decaf coffee and tea, as these contain small amounts of caffeine.  Stop eating 3 hours before the test. You may drink water.  You may need to stop some medicines before the test. Follow your doctor s orders. - If you take a beta blocker: Do not take your beta-blocker on the day before your test, unless specifically told to by your doctor. And do not take it on the day of your test. Bring it with you to take after the test. - If you take Aggrenox or dipyridamole (Persantine, Permole), stop taking it 48 hours before your test. - If you take Viagra, Cialis or Levitra, stop taking it 48 hours before your test. - If you take theophylline or aminophylline, stop taking it 12 hours before your test.  Do not take nitrates on the day of your test. Do not wear your Nitro-Patch.  Please wear a loose two-piece outfit. If you will have an exercise test, bring rubber-soled walking shoes.  When you arrive, please tell us if you have a pacemaker or ICD (implantable defibrillator).  Please call your Imaging Department at your exam site with any questions.              Future tests that were ordered for you today     Open Future Orders        Priority Expected Expires Ordered    NM Lexiscan stress test Routine  12/15/2018 8/16/2018    Zio Patch Holter Routine  12/15/2018 8/16/2018            Who to contact     If you have questions or need follow up information about today's clinic visit or your schedule please contact [facility] PHYS[facility] AT [facility] directly at [phone].  Normal or non-critical lab and imaging results will be communicated to you by MyChart, letter or phone within 4 business days after the clinic has received the results. If you do not hear from us within 7 days, please contact the clinic  through [employer] or phone. If you have a critical or abnormal lab result, we will notify you by phone as soon as possible.  Submit refill requests through [employer] or call your pharmacy and they will forward the refill request to us. Please allow 3 business days for your refill to be completed.          Additional Information About Your Visit        [employer]hart Information     [employer] gives you secure access to your electronic health record. If you see a primary care provider, you can also send messages to your care team and make appointments. If you have questions, please call your primary care clinic.  If you do not have a primary care provider, please call [phone] and they will assist you.        Care EveryWhere ID     This is your Care EveryWhere ID. This could be used by other organizations to access your [facility] medical records  [id number]        Your Vitals Were     Pulse Pulse Oximetry BMI (Body Mass Index)             74 96% 47.4 kg/m2          Blood Pressure from Last 3 Encounters:   08/16/18 131/85   07/24/18 125/70   06/19/18 138/80    Weight from Last 3 Encounters:   08/16/18 137 kg (302 lb)   07/24/18 140.1 kg (308 lb 12.8 oz)   06/19/18 140.2 kg (309 lb)               Primary Care Provider Office Phone # Fax #    Polly Mcbride [phone] [address] [address]        Equal Access to Services     ELDA [facility] HospitalSOFIA : Hadii deepika ku hadasho Soomaali, waaxda luqadaha, qaybta kaalmada adecristianoyada, christiano garcia . So [facility] [phone].    ATENCIÓN: Si habla español, tiene a bailey disposición servicios gratuitos de asistencia lingüística. Llame al [phone].    We comply with applicable federal civil rights laws and [place] laws. We do not discriminate on the basis of race, color, national origin, age, disability, sex, sexual orientation, or gender identity.            Thank you!     Thank you for choosing [facility]  PHYSICIANS HEART AT [facility]  for your care. Our goal is always to provide you with excellent care. Hearing back from our patients is one way we can continue to improve our services. Please take a few minutes to complete the written survey that you may receive in the mail after your visit with us. Thank you!             Your Updated Medication List - Protect others around you: Learn how to safely use, store and throw away your medicines at www.disposemymeds.org.          This list is accurate as of 8/16/18  9:22 AM.  Always use your most recent med list.                   Brand Name Dispense Instructions for use Diagnosis    ADVIL 200 MG tablet   Generic drug:  ibuprofen      TAKE THREE TABLETS BY MOUTH QD-BID PRN        CENTRUM PO           fish oil-omega-3 fatty acids 1000 MG capsule      Take 2 g by mouth daily        hydrochlorothiazide 25 MG tablet    HYDRODIURIL    90 tablet    Take 1 tablet (25 mg) by mouth daily    Essential hypertension with goal blood pressure less than 140/90       lisinopril 40 MG tablet    PRINIVIL/ZESTRIL    90 tablet    Take 1 tablet (40 mg) by mouth daily    Essential hypertension with goal blood pressure less than 140/90       LORazepam 0.5 MG tablet    ATIVAN    20 tablet    Take 1 tablet (0.5 mg) by mouth every 8 hours as needed for anxiety    Anxiety       metroNIDAZOLE 500 MG tablet    FLAGYL    14 tablet    Take 1 tablet (500 mg) by mouth 2 times daily    BV (bacterial vaginosis)       NEXIUM PO           * nicotine 21 MG/24HR 24 hr patch    NICODERM CQ    30 patch    Place 1 patch onto the skin every 24 hours    Encounter for smoking cessation counseling       * nicotine 14 MG/24HR 24 hr patch    NICODERM CQ    30 patch    Place 1 patch onto the skin every 24 hours    Encounter for smoking cessation counseling       * nicotine 7 MG/24HR 24 hr patch    NICODERM CQ    30 patch    Place 1 patch onto the skin every 24 hours    Encounter for smoking cessation counseling        PROBIOTIC ADVANCED PO           topiramate 25 MG tablet    TOPAMAX    90 tablet    25 mg at bedtime for 1 week, 50 mg at bedtime for 1 week and 75 mg daily at bedtime thereafter        varenicline 1 MG tablet    CHANTIX    60 tablet    Take 1 tablet (1 mg) by mouth 2 times daily    Tobacco use disorder       VENTOLIN  (90 Base) MCG/ACT inhaler   Generic drug:  albuterol      INHALE 1-2 PUFFS EVERY 4-6 HOURS AS NEEDED FOR WHEEZING        Vitamin D (Cholecalciferol) 1000 units Tabs      Take 2 capsules by mouth daily        * Notice:  This list has 3 medication(s) that are the same as other medications prescribed for you. Read the directions carefully, and ask your doctor or other care provider to review them with you.

## 2018-08-16 NOTE — NURSING NOTE
"Chief Complaint   Patient presents with     Palpitations     NEW patient. referred by primary for chest pressure. Hx of HL, HTN, smoker. Recent EKG showing NSR with rare PAC. A CT coronary was [facility] at [facility] in 2013 showing normal coronary arteries       Initial /85 (BP Location: Right arm, Patient Position: Chair, Cuff Size: Adult Large)  Pulse 74  Wt 137 kg (302 lb)  SpO2 96%  BMI 47.4 kg/m2 Estimated body mass index is 47.4 kg/(m^2) as calculated from the following:    Height as of 7/24/18: 1.7 m (5' 6.93\").    Weight as of this encounter: 137 kg (302 lb)..  BP completed using cuff size: shea Marcelo MA    "

## 2018-08-17 ENCOUNTER — OFFICE VISIT (OUTPATIENT)
Dept: SLEEP MEDICINE | Facility: CLINIC | Age: 38
End: 2018-08-17
Payer: COMMERCIAL

## 2018-08-17 VITALS
WEIGHT: 293 LBS | DIASTOLIC BLOOD PRESSURE: 76 MMHG | HEART RATE: 79 BPM | HEIGHT: 67 IN | RESPIRATION RATE: 16 BRPM | OXYGEN SATURATION: 95 % | SYSTOLIC BLOOD PRESSURE: 115 MMHG | BODY MASS INDEX: 45.99 KG/M2

## 2018-08-17 DIAGNOSIS — G47.33 OSA (OBSTRUCTIVE SLEEP APNEA): ICD-10-CM

## 2018-08-17 DIAGNOSIS — E66.2 OBESITY HYPOVENTILATION SYNDROME (H): ICD-10-CM

## 2018-08-17 PROCEDURE — 99213 OFFICE O/P EST LOW 20 MIN: CPT | Performed by: INTERNAL MEDICINE

## 2018-08-17 NOTE — NURSING NOTE
"Chief Complaint   Patient presents with     CPAP Follow Up     Follow up candis, letter for bariatric surgery       Initial /76  Pulse 79  Resp 16  Ht 1.7 m (5' 6.93\")  Wt 135.6 kg (299 lb)  SpO2 95%  BMI 46.93 kg/m2 Estimated body mass index is 46.93 kg/(m^2) as calculated from the following:    Height as of this encounter: 1.7 m (5' 6.93\").    Weight as of this encounter: 135.6 kg (299 lb).    Medication Reconciliation: complete    ESS 0    Laxmi Hernández MA      "

## 2018-08-17 NOTE — PATIENT INSTRUCTIONS

## 2018-08-17 NOTE — MR AVS SNAPSHOT
After Visit Summary   8/17/2018    Danitza Soto    MRN: [id number]           Patient Information     Date Of Birth          1980        Visit Information        Provider Department      8/17/2018 2:30 PM Derrick Coy MD [facility] [place]        Today's Diagnoses     Obesity hypoventilation syndrome (H)        LEONARDO (obstructive sleep apnea)          Care Instructions      Your BMI is Body mass index is 46.93 kg/(m^2).  Weight management is a personal decision.  If you are interested in exploring weight loss strategies, the following discussion covers the approaches that may be successful. Body mass index (BMI) is one way to tell whether you are at a healthy weight, overweight, or obese. It measures your weight in relation to your height.  A BMI of 18.5 to 24.9 is in the healthy range. A person with a BMI of 25 to 29.9 is considered overweight, and someone with a BMI of 30 or greater is considered obese. More than two-thirds of American adults are considered overweight or obese.  Being overweight or obese increases the risk for further weight gain. Excess weight may lead to heart disease and diabetes.  Creating and following plans for healthy eating and physical activity may help you improve your health.  Weight control is part of healthy lifestyle and includes exercise, emotional health, and healthy eating habits. Careful eating habits lifelong are the mainstay of weight control. Though there are significant health benefits from weight loss, long-term weight loss with diet alone may be very difficult to achieve- studies show long-term success with dietary management in less than 10% of people. Attaining a healthy weight may be especially difficult to achieve in those with severe obesity. In some cases, medications, devices and surgical management might be considered.  What can you do?  If you are overweight or obese and are interested in methods for weight loss, you  should discuss this with your provider.     Consider reducing daily calorie intake by 500 calories.     Keep a food journal.     Avoiding skipping meals, consider cutting portions instead.    Diet combined with exercise helps maintain muscle while optimizing fat loss. Strength training is particularly important for building and maintaining muscle mass. Exercise helps reduce stress, increase energy, and improves fitness. Increasing exercise without diet control, however, may not burn enough calories to loose weight.       Start walking three days a week 10-20 minutes at a time    Work towards walking thirty minutes five days a week     Eventually, increase the speed of your walking for 1-2 minutes at time    In addition, we recommend that you review healthy lifestyles and methods for weight loss available through the National Institutes of Health patient information sites:  http://win.niddk.nih.gov/publications/index.htm    And look into health and wellness programs that may be available through your health insurance provider, employer, local community center, or sriram club.    Weight management plan: Patient was referred to their PCP to discuss a diet and exercise plan.          Your Body mass index is 46.93 kg/(m^2).  Weight management is a personal decision.  If you are interested in exploring weight loss strategies, the following discussion covers the approaches that may be successful. Body mass index (BMI) is one way to tell whether you are at a healthy weight, overweight, or obese. It measures your weight in relation to your height.  A BMI of 18.5 to 24.9 is in the healthy range. A person with a BMI of 25 to 29.9 is considered overweight, and someone with a BMI of 30 or greater is considered obese. More than two-thirds of American adults are considered overweight or obese.  Being overweight or obese increases the risk for further weight gain. Excess weight may lead to heart disease and diabetes.  Creating and  following plans for healthy eating and physical activity may help you improve your health.  Weight control is part of healthy lifestyle and includes exercise, emotional health, and healthy eating habits. Careful eating habits lifelong are the mainstay of weight control. Though there are significant health benefits from weight loss, long-term weight loss with diet alone may be very difficult to achieve- studies show long-term success with dietary management in less than 10% of people. Attaining a healthy weight may be especially difficult to achieve in those with severe obesity. In some cases, medications, devices and surgical management might be considered.  What can you do?  If you are overweight or obese and are interested in methods for weight loss, you should discuss this with your provider.     Consider reducing daily calorie intake by 500 calories.     Keep a food journal.     Avoiding skipping meals, consider cutting portions instead.    Diet combined with exercise helps maintain muscle while optimizing fat loss. Strength training is particularly important for building and maintaining muscle mass. Exercise helps reduce stress, increase energy, and improves fitness. Increasing exercise without diet control, however, may not burn enough calories to loose weight.       Start walking three days a week 10-20 minutes at a time    Work towards walking thirty minutes five days a week     Eventually, increase the speed of your walking for 1-2 minutes at time    In addition, we recommend that you review healthy lifestyles and methods for weight loss available through the National Institutes of Health patient information sites:  http://win.niddk.nih.gov/publications/index.htm    And look into health and wellness programs that may be available through your health insurance provider, employer, local community center, or sriram club.                  Follow-ups after your visit        Follow-up notes from your care team      Return in 1 year (on 8/17/2019) for PAP follow up.      Your next 10 appointments already scheduled     Aug 23, 2018 10:50 AM CDT   REN Extremity with Kaykay Lou PT   [facility] ([facility] [place] [facility])    [address]  [facility] [id number] [phone]            Aug 27, 2018  1:00 PM CDT   [facility]    [id number] [place] [id number] [phone]            Aug 27, 2018  1:45 PM CDT   NM SCAN3 with MGNM1   [facility])    [id number] [place] [id number] [phone]            Aug 27, 2018  2:15 PM CDT   Ekg Stress Nm Lexiscan with MG STRESS RM, MG IMAGING NURSE, MG CARD, MG CV TECH   [facility])    [id number] [place] [address] [phone]            Aug 27, 2018  2:45 PM CDT   NM MPI WITH LEXISCAN with MGNM1   [facility])    [address] [place] [address] [phone]           For a ONE day exam: Allow 3-4 hours for test. For a TWO day exam: Allow  minutes PER day for test.  On the day of your resting scan: Please stop eating 3 hours before the test. You may drink water or juice.  On the day of your stress test: Stop all caffeine 12 hours before the test. This includes coffee, tea, soda pop, chocolate and certain medicines (such as Anacin, Excedrin and NoDoz). Also avoid decaf coffee and tea, as these contain small amounts of caffeine.  Stop eating 3 hours before the test. You may drink water.  You may need to stop some medicines before the test. Follow your doctor s orders. - If you take a beta blocker: Do not take your beta-blocker on the day before your test, unless specifically told to by your doctor. And do not take it on the day of your test. Bring it  with you to take after the test. - If you take Aggrenox or dipyridamole (Persantine, Permole), stop taking it 48 hours before your test. - If you take Viagra, Cialis or Levitra, stop taking it 48 hours before your test. - If you take theophylline or aminophylline, stop taking it 12 hours before your test.  Do not take nitrates on the day of your test. Do not wear your Nitro-Patch.  Please wear a loose two-piece outfit. If you will have an exercise test, bring rubber-soled walking shoes.  When you arrive, please tell us if you have a pacemaker or ICD (implantable defibrillator).  Please call your Imaging Department at your exam site with any questions.            Aug 28, 2018  8:00 AM CDT   (Arrive by 7:45 AM)   NUTRITION VISIT with DAVIE Gonzáles [facility] ([facility])    [address] [place] [address] [phone]            Oct 04, 2018  8:45 AM CDT   (Arrive by 8:30 AM)   Return Bariatric Visit with DESTINI Harrell [facility] ([facility])    [address] [place] [id number] [phone]              Future tests that were ordered for you today     Open Future Orders        Priority Expected Expires Ordered    NM Lexiscan stress test Routine  12/15/2018 8/16/2018            Who to contact     If you have questions or need follow up information about today's clinic visit or your schedule please contact [facility] directly at [phone].  Normal or non-critical lab and imaging results will be communicated to you by MyChart, letter or phone within 4 business days after the clinic has received the results. If you do not hear from us within 7 days, please contact the clinic through MyChart or phone. If you have a critical or abnormal lab result, we will notify you by phone as soon as possible.  Submit refill requests  "through [employer] or call your pharmacy and they will forward the refill request to us. Please allow 3 business days for your refill to be completed.          Additional Information About Your Visit        [facility] Information     [employer] gives you secure access to your electronic health record. If you see a primary care provider, you can also send messages to your care team and make appointments. If you have questions, please call your primary care clinic.  If you do not have a primary care provider, please call [phone] and they will assist you.        Care EveryWhere ID     This is your Care EveryWhere ID. This could be used by other organizations to access your [place] medical records  [id number]        Your Vitals Were     Pulse Respirations Height Pulse Oximetry BMI (Body Mass Index)       79 16 1.7 m (5' 6.93\") 95% 46.93 kg/m2        Blood Pressure from Last 3 Encounters:   08/17/18 115/76   08/16/18 131/85   07/24/18 125/70    Weight from Last 3 Encounters:   08/17/18 135.6 kg (299 lb)   08/16/18 137 kg (302 lb)   07/24/18 140.1 kg (308 lb 12.8 oz)              Today, you had the following     No orders found for display       Primary Care Provider Office Phone # Fax #    Polly Mcbride [phone] [address] [address]        Equal Access to Services     [facility]: Hadii deepika ku hadasho Sodeni, waaxda luqadaha, qaybta kaalmada adecristianoyada, christiano garcia . So [facility] [phone].    ATENCIÓN: Si habla español, tiene a bailey disposición servicios gratuitos de asistencia lingüística. Llame al [phone].    We comply with applicable federal civil rights laws and [place] laws. We do not discriminate on the basis of race, color, national origin, age, disability, sex, sexual orientation, or gender identity.            Thank you!     Thank you for choosing [facility]  for your care. Our goal is always to provide you " with excellent care. Hearing back from our patients is one way we can continue to improve our services. Please take a few minutes to complete the written survey that you may receive in the mail after your visit with us. Thank you!             Your Updated Medication List - Protect others around you: Learn how to safely use, store and throw away your medicines at www.disposemymeds.org.          This list is accurate as of 8/17/18  2:46 PM.  Always use your most recent med list.                   Brand Name Dispense Instructions for use Diagnosis    ADVIL 200 MG tablet   Generic drug:  ibuprofen      TAKE THREE TABLETS BY MOUTH QD-BID PRN        CENTRUM PO           fish oil-omega-3 fatty acids 1000 MG capsule      Take 2 g by mouth daily        hydrochlorothiazide 25 MG tablet    HYDRODIURIL    90 tablet    Take 1 tablet (25 mg) by mouth daily    Essential hypertension with goal blood pressure less than 140/90       lisinopril 40 MG tablet    PRINIVIL/ZESTRIL    90 tablet    Take 1 tablet (40 mg) by mouth daily    Essential hypertension with goal blood pressure less than 140/90       LORazepam 0.5 MG tablet    ATIVAN    20 tablet    Take 1 tablet (0.5 mg) by mouth every 8 hours as needed for anxiety    Anxiety       metroNIDAZOLE 500 MG tablet    FLAGYL    14 tablet    Take 1 tablet (500 mg) by mouth 2 times daily    BV (bacterial vaginosis)       NEXIUM PO           * nicotine 21 MG/24HR 24 hr patch    NICODERM CQ    30 patch    Place 1 patch onto the skin every 24 hours    Encounter for smoking cessation counseling       * nicotine 14 MG/24HR 24 hr patch    NICODERM CQ    30 patch    Place 1 patch onto the skin every 24 hours    Encounter for smoking cessation counseling       * nicotine 7 MG/24HR 24 hr patch    NICODERM CQ    30 patch    Place 1 patch onto the skin every 24 hours    Encounter for smoking cessation counseling       PROBIOTIC ADVANCED PO           topiramate 25 MG tablet    TOPAMAX    90 tablet     25 mg at bedtime for 1 week, 50 mg at bedtime for 1 week and 75 mg daily at bedtime thereafter        varenicline 1 MG tablet    CHANTIX    60 tablet    Take 1 tablet (1 mg) by mouth 2 times daily    Tobacco use disorder       VENTOLIN  (90 Base) MCG/ACT inhaler   Generic drug:  albuterol      INHALE 1-2 PUFFS EVERY 4-6 HOURS AS NEEDED FOR WHEEZING        Vitamin D (Cholecalciferol) 1000 units Tabs      Take 2 capsules by mouth daily        * Notice:  This list has 3 medication(s) that are the same as other medications prescribed for you. Read the directions carefully, and ask your doctor or other care provider to review them with you.

## 2018-08-17 NOTE — PROGRESS NOTES
Obstructive Sleep Apnea - PAP Follow-Up Visit:    Chief Complaint   Patient presents with     CPAP Follow Up     Follow up candis, letter for bariatric surgery       Danitza Soto comes in today for follow-up of their moderate sleep apnea, managed with CPAP.     Overall, she rates the experience with PAP as 10 (0 poor, 10 great). The mask is comfortable.    The mask is not leaking .  She is not snoring with the mask on. She is not having gasp arousals.  She is not having significant oral/nasal dryness. The pressure is comfortable.     Her PAP interface is Nasal Mask.    Bedtime is typically 2030. Usually it takes about 10 min minutes to fall asleep with the mask on. Wake time is typically 0500.  Patient is using PAP therapy 8.5 hours per night. The patient is usually getting 8 hours of sleep per night.    She does feel rested in the morning.    Total score - [place]: 0 (8/17/2018  2:25 PM)    [employer]iron[employer]  Auto-PAP 10 - 15 cmH2O 30 day usage data:    90% of days with > 4 hours of use. 2/30 days with no use.   Average use 483 minutes per day.   Average leak 29.13 LPM.  Average % of night in large leak 0%.    CPAP 90% pressure 11.38cm.   AHI 1.6 events per hour.    Reviewed by team: Tobacco  Allergies  Meds       Reviewed by provider:        Problem List:  Patient Active Problem List    Diagnosis Date Noted     Lumbago 08/01/2018     Priority: Medium     Left anterior knee pain 08/01/2018     Priority: Medium     CANDIS (obstructive sleep apnea) 04/13/2017     Priority: Medium     4/10/2017 - Home Sleep Apnea Testing - AHI 15.5/hr; Supine AHI 12.9/hr; SpO2 <= 88% for 33.8 minutes.  Titration Sleep Study 4/19/2017 - (288.0 lbs) CPAP was titrated to a pressure of 11 with an AHI of 3.7.  Time Spent in REM supine at this pressure was 33.5.  Recommending Auto-CPAP 7 - 15 cmH2O.       Obesity hypoventilation syndrome (H) 04/13/2017     Priority: Medium     Mixed hyperlipidemia 07/06/2015     Priority: Medium      "Papanicolaou smear of cervix with low grade squamous intraepithelial lesion (LGSIL) 06/17/2014     Priority: Medium     6/17/14: LSIL, + HPV, unable to type.   8/20/14:[place]:BRISEYDA I. Plan cotest pap & HPV in 1 year.   1/20116 Pap Ascus Neg HPV. Plan: [place].   5/13/16 Colposcopy not completed. Cancelled by patient  3/21/17 NIL/Neg HPV. Plan: cotest in 1 year per provider.       Acne 02/24/2012     Priority: Medium     Hypertension goal BP (blood pressure) < 140/90 01/12/2011     Priority: Medium     CARDIOVASCULAR SCREENING; LDL GOAL LESS THAN 130 10/31/2010     Priority: Medium     Morbid obesity due to excess calories (H) 05/17/2006     Priority: Medium     Problem list name updated by automated process. Provider to review       Tobacco use disorder 05/17/2006     Priority: Medium     Anxiety state      Priority: Medium     Esophageal reflux      Priority: Medium          /76  Pulse 79  Resp 16  Ht 1.7 m (5' 6.93\")  Wt 135.6 kg (299 lb)  SpO2 95%  BMI 46.93 kg/m2    Impression/Plan:  1. Obesity hypoventilation syndrome (H)  2. LEONARDO (obstructive sleep apnea)  From sleep medicine standpoint, Trish is considered optimized prior to surgery. She uses CPAP nightly and effectively and her Obstructive Sleep Apnea and hypoventilation are well managed.  In the perioperative period, patient is recommended to wear CPAP as much as possible, including in recovery.  If unable to tolerate PAP in recovery, she should sleep with head of bed elevated, oxygen titrated to keep saturations > 92%, and have continuous pulse oximetry while on opioid analgesics.        Moderate sleep apnea. Tolerating PAP well. Daytime symptoms are improved..     Danitza Soto will follow up in about 1 year(s).     Fifteen minutes spent with patient, all of which were spent face-to-face counseling, consulting, coordinating plan of care.      Derrick Coy MD    CC:  Polly Mcbride,   "

## 2018-08-17 NOTE — LETTER
8/17/2018        RE: Bariatric Surgery - Sleep Evaluation and Optimization   Danitza Soto  [address]  [facility] [address]            Obstructive Sleep Apnea - PAP Follow-Up Visit:    Chief Complaint   Patient presents with     CPAP Follow Up     Follow up leonardo, letter for bariatric surgery       Danitza Soto comes in today for follow-up of their moderate sleep apnea, managed with CPAP.     Overall, she rates the experience with PAP as 10 (0 poor, 10 great). The mask is comfortable.    The mask is not leaking .  She is not snoring with the mask on. She is not having gasp arousals.  She is not having significant oral/nasal dryness. The pressure is comfortable.     Her PAP interface is Nasal Mask.    Bedtime is typically 2030. Usually it takes about 10 min minutes to fall asleep with the mask on. Wake time is typically 0500.  Patient is using PAP therapy 8.5 hours per night. The patient is usually getting 8 hours of sleep per night.    She does feel rested in the morning.    Total score - [place]: 0 (8/17/2018  2:25 PM)    Respironics  Auto-PAP 10 - 15 cmH2O 30 day usage data:    90% of days with > 4 hours of use. 2/30 days with no use.   Average use 483 minutes per day.   Average leak 29.13 LPM.  Average % of night in large leak 0%.    CPAP 90% pressure 11.38cm.   AHI 1.6 events per hour.    Reviewed by team: Tobacco  Allergies  Meds       Reviewed by provider:        Problem List:  Patient Active Problem List    Diagnosis Date Noted     Lumbago 08/01/2018     Priority: Medium     Left anterior knee pain 08/01/2018     Priority: Medium     LEONARDO (obstructive sleep apnea) 04/13/2017     Priority: Medium     4/10/2017 - Home Sleep Apnea Testing - AHI 15.5/hr; Supine AHI 12.9/hr; SpO2 <= 88% for 33.8 minutes.  Titration Sleep Study 4/19/2017 - (288.0 lbs) CPAP was titrated to a pressure of 11 with an AHI of 3.7.  Time Spent in REM supine at this pressure was 33.5.  Recommending Auto-CPAP 7 - 15  "cmH2O.       Obesity hypoventilation syndrome (H) 04/13/2017     Priority: Medium     Mixed hyperlipidemia 07/06/2015     Priority: Medium     Papanicolaou smear of cervix with low grade squamous intraepithelial lesion (LGSIL) 06/17/2014     Priority: Medium     6/17/14: LSIL, + HPV, unable to type.   8/20/14:[place]:BRISEYDA I. Plan cotest pap & HPV in 1 year.   1/20116 Pap Ascus Neg HPV. Plan: [place].   5/13/16 Colposcopy not completed. Cancelled by patient  3/21/17 NIL/Neg HPV. Plan: cotest in 1 year per provider.       Acne 02/24/2012     Priority: Medium     Hypertension goal BP (blood pressure) < 140/90 01/12/2011     Priority: Medium     CARDIOVASCULAR SCREENING; LDL GOAL LESS THAN 130 10/31/2010     Priority: Medium     Morbid obesity due to excess calories (H) 05/17/2006     Priority: Medium     Problem list name updated by automated process. Provider to review       Tobacco use disorder 05/17/2006     Priority: Medium     Anxiety state      Priority: Medium     Esophageal reflux      Priority: Medium          /76  Pulse 79  Resp 16  Ht 1.7 m (5' 6.93\")  Wt 135.6 kg (299 lb)  SpO2 95%  BMI 46.93 kg/m2    Impression/Plan:  1. Obesity hypoventilation syndrome (H)  2. LEONARDO (obstructive sleep apnea)  From sleep medicine standpoint, Trish is considered optimized prior to surgery. She uses CPAP nightly and effectively and her Obstructive Sleep Apnea and hypoventilation are well managed.  In the perioperative period, patient is recommended to wear CPAP as much as possible, including in recovery.  If unable to tolerate PAP in recovery, she should sleep with head of bed elevated, oxygen titrated to keep saturations > 92%, and have continuous pulse oximetry while on opioid analgesics.        Moderate sleep apnea. Tolerating PAP well. Daytime symptoms are improved..     Danitza RAMIRO VeraCharles will follow up in about 1 year(s).     Fifteen minutes spent with patient, all of which were spent face-to-face counseling, " consulting, coordinating plan of care.      Derrick Coy MD    CC:  Polly Mcbride,       Sincerely,        Derrick Coy MD

## 2018-08-23 ENCOUNTER — THERAPY VISIT (OUTPATIENT)
Dept: PHYSICAL THERAPY | Facility: CLINIC | Age: 38
End: 2018-08-23
Payer: COMMERCIAL

## 2018-08-23 DIAGNOSIS — M54.50 LUMBAGO: ICD-10-CM

## 2018-08-23 DIAGNOSIS — M25.562 LEFT ANTERIOR KNEE PAIN: ICD-10-CM

## 2018-08-23 PROCEDURE — 97112 NEUROMUSCULAR REEDUCATION: CPT | Mod: GP | Performed by: PHYSICAL THERAPIST

## 2018-08-23 PROCEDURE — 97110 THERAPEUTIC EXERCISES: CPT | Mod: GP | Performed by: PHYSICAL THERAPIST

## 2018-08-23 NOTE — MR AVS SNAPSHOT
After Visit Summary   8/23/2018    Danitza Soto    MRN: [id number]           Patient Information     Date Of Birth          1980        Visit Information        Provider Department      8/23/2018 10:50 AM Kaykay Lou, PT [facility]        Today's Diagnoses     Lumbago        Left anterior knee pain           Follow-ups after your visit        Your next 10 appointments already scheduled     Aug 28, 2018  8:00 AM CDT   (Arrive by 7:45 AM)   NUTRITION VISIT with DAVIE Gonzáles [facility] Surgical Weight Management ([facility])    [address] [place] [address]  4th [facility] [address]   [phone]            Sep 06, 2018 10:00 AM CDT   NM INJECTION with UUNMINJ1   [facility], Nuclear Medicine ([facility])    [address] [place] [id number]   [phone]            Sep 06, 2018 10:45 AM CDT   NM SCAN3 with UUNM1   [facility], Nuclear Medicine ([facility])    [address] [place] [address]   [phone]            Sep 06, 2018 11:15 AM CDT   Ekg Stress Nm Lexiscan with UUEKGNMS   [facility] ([facility])    [address] [place] [id number]               Sep 06, 2018 12:15 PM CDT   NM MPI WITH LEXISCAN with UUNM1   [facility], Nuclear Medicine ([facility])    [address] [place] [address]   [phone]           For a ONE day exam: Allow 3-4 hours for test. For a TWO day exam: Allow  minutes PER day for test.  On the day of your resting scan: Please stop eating 3 hours before the test. You may drink water or juice.  On the day of your stress test: Stop all caffeine 12 hours before the test. This includes coffee, tea, soda pop, chocolate and certain medicines  (such as Anacin, Excedrin and NoDoz). Also avoid decaf coffee and tea, as these contain small amounts of caffeine.  Stop eating 3 hours before the test. You may drink water.  You may need to stop some medicines before the test. Follow your doctor s orders. - If you take a beta blocker: Do not take your beta-blocker on the day before your test, unless specifically told to by your doctor. And do not take it on the day of your test. Bring it with you to take after the test. - If you take Aggrenox or dipyridamole (Persantine, Permole), stop taking it 48 hours before your test. - If you take Viagra, Cialis or Levitra, stop taking it 48 hours before your test. - If you take theophylline or aminophylline, stop taking it 12 hours before your test.  Do not take nitrates on the day of your test. Do not wear your Nitro-Patch.  Please wear a loose two-piece outfit. If you will have an exercise test, bring rubber-soled walking shoes.  When you arrive, please tell us if you have a pacemaker or ICD (implantable defibrillator).  Please call your Imaging Department at your exam site with any questions.            Oct 04, 2018  8:45 AM CDT   (Arrive by 8:30 AM)   Pre-Surgical Evaluation with Sandy Cervantes PA-C   [facility] ([facility])    [address] [place] [id number] [phone]              Future tests that were ordered for you today     Open Future Orders        Priority Expected Expires Ordered    Nicotine and Cotinine Urine Routine  11/25/2018 8/27/2018            Who to contact     If you have questions or need follow up information about today's clinic visit or your schedule please contact [facility] directly at [phone].  Normal or non-critical lab and imaging results will be communicated to you by MyChart, letter or phone within 4 business days after the clinic has received the results. If  you do not hear from us within 7 days, please contact the clinic through [employer] or phone. If you have a critical or abnormal lab result, we will notify you by phone as soon as possible.  Submit refill requests through [employer] or call your pharmacy and they will forward the refill request to us. Please allow 3 business days for your refill to be completed.          Additional Information About Your Visit        [employer] Information     [employer] gives you secure access to your electronic health record. If you see a primary care provider, you can also send messages to your care team and make appointments. If you have questions, please call your primary care clinic.  If you do not have a primary care provider, please call [phone] and they will assist you.        Care EveryWhere ID     This is your Care EveryWhere ID. This could be used by other organizations to access your [place] medical records  [id number]         Blood Pressure from Last 3 Encounters:   08/17/18 115/76   08/16/18 131/85   07/24/18 125/70    Weight from Last 3 Encounters:   08/17/18 135.6 kg (299 lb)   08/16/18 137 kg (302 lb)   07/24/18 140.1 kg (308 lb 12.8 oz)              We Performed the Following     NEUROMUSCULAR RE-EDUCATION     THERAPEUTIC EXERCISES        Primary Care Provider Office Phone # Fax #    Polly Mcbride [phone] [address] [address]        Equal Access to Services     [facility]: Hadii aad ku hadasho Soomaali, waaxda luqadaha, qaybta kaalmada adeegyada, christiano garcia . So [facility] [phone].    ATENCIÓN: Si habla español, tiene a bailey disposición servicios gratuitos de asistencia lingüística. Llame al [phone].    We comply with applicable federal civil rights laws and [place] laws. We do not discriminate on the basis of race, color, national origin, age, disability, sex, sexual orientation, or gender identity.            Thank you!      Thank you for choosing [facility]  for your care. Our goal is always to provide you with excellent care. Hearing back from our patients is one way we can continue to improve our services. Please take a few minutes to complete the written survey that you may receive in the mail after your visit with us. Thank you!             Your Updated Medication List - Protect others around you: Learn how to safely use, store and throw away your medicines at www.disposemymeds.org.          This list is accurate as of 8/23/18 11:59 PM.  Always use your most recent med list.                   Brand Name Dispense Instructions for use Diagnosis    ADVIL 200 MG tablet   Generic drug:  ibuprofen      TAKE THREE TABLETS BY MOUTH QD-BID PRN        CENTRUM PO           fish oil-omega-3 fatty acids 1000 MG capsule      Take 2 g by mouth daily        hydrochlorothiazide 25 MG tablet    HYDRODIURIL    90 tablet    Take 1 tablet (25 mg) by mouth daily    Essential hypertension with goal blood pressure less than 140/90       lisinopril 40 MG tablet    PRINIVIL/ZESTRIL    90 tablet    Take 1 tablet (40 mg) by mouth daily    Essential hypertension with goal blood pressure less than 140/90       LORazepam 0.5 MG tablet    ATIVAN    20 tablet    Take 1 tablet (0.5 mg) by mouth every 8 hours as needed for anxiety    Anxiety       metroNIDAZOLE 500 MG tablet    FLAGYL    14 tablet    Take 1 tablet (500 mg) by mouth 2 times daily    BV (bacterial vaginosis)       NEXIUM PO           * nicotine 21 MG/24HR 24 hr patch    NICODERM CQ    30 patch    Place 1 patch onto the skin every 24 hours    Encounter for smoking cessation counseling       * nicotine 14 MG/24HR 24 hr patch    NICODERM CQ    30 patch    Place 1 patch onto the skin every 24 hours    Encounter for smoking cessation counseling       * nicotine 7 MG/24HR 24 hr patch    NICODERM CQ    30 patch    Place 1 patch onto the skin every 24 hours     Encounter for smoking cessation counseling       PROBIOTIC ADVANCED PO           topiramate 25 MG tablet    TOPAMAX    90 tablet    25 mg at bedtime for 1 week, 50 mg at bedtime for 1 week and 75 mg daily at bedtime thereafter        varenicline 1 MG tablet    CHANTIX    60 tablet    Take 1 tablet (1 mg) by mouth 2 times daily    Tobacco use disorder       VENTOLIN  (90 Base) MCG/ACT inhaler   Generic drug:  albuterol      INHALE 1-2 PUFFS EVERY 4-6 HOURS AS NEEDED FOR WHEEZING        Vitamin D (Cholecalciferol) 1000 units Tabs      Take 2 capsules by mouth daily        * Notice:  This list has 3 medication(s) that are the same as other medications prescribed for you. Read the directions carefully, and ask your doctor or other care provider to review them with you.

## 2018-08-27 ENCOUNTER — CARE COORDINATION (OUTPATIENT)
Dept: SURGERY | Facility: CLINIC | Age: 38
End: 2018-08-27

## 2018-08-27 DIAGNOSIS — Z72.0 TOBACCO ABUSE: Primary | ICD-10-CM

## 2018-08-27 NOTE — PROGRESS NOTES
"Tasklist updated.    Bariatric Task List  Status:  Is patient a candidate for bariatric surgery?:  Yes - possible   Cleared to schedule surgeon consult?:    -     Status:  surgery evaluation in process -     Surgeon: Dr Tse -     Tentative surgery month/year: Oct 2018 -        Insurance: Insurance:  HP -     HP Referral:  Needed -     Other:    -  8/23/18 = 2nd call of 5.      Patient Info: Initial Weight:  308 -     Date of Initial Weight/Height:  7/24/2018 -     Goal Weight (lbs):  288 -     Required Weight Loss:  20 -     Surgery Type:  sleeve gastrectomy -        Dietician Visits: Structured weight loss required by insurance?:  Yes -     Dietician Visit 1:  Completed - 7/24/18 in Epic. bks   Dietician Visit 2:  Needed - 8/28/18 appt.   Dietician Visit 3:  Needed -     Dietician Visit additional:    -  Monthly until at goal weight.      Psychological Evaluation: Psych eval:  Needed -        Lab Work: Complete Blood Count:  Completed - 7/24/18 in [facility]. bks   Comprehensive Metabolic Panel:  Completed - 7/24/18 in Epic. bks   Vitamin D:  Completed - 7/24/18 in Epic. bks   Hgb A1c:  Completed - 7/24/18 in Epic. bks   PTH:  Completed - 7/24/18 in Epic. bks   H. pylori:  Completed -     Nicotine Testing:  Needed -        Consults/ Clearance: Sleep Medicine:  Completed - 8/17 Sleep Derrick Coy MD   Medical Weight Management: Completed - help to lose 20 lbs before weight loss surgery; 10/4 appt and ongoing.      PCP: Establish care with PCP:  Completed -     Follow up with PCP:    -     PCP letter of support:  Completed - 7/24/18 Polly Mcbride MD      Smoking: Quit tobacco use (3 months smoke free)?:  Needed -     Quit date:    -        Patient Education:  Information Session:  Completed -     Given \"Making your decision\" handout?:  Yes -     Given support group information?:  Yes -     Attended support group?:    -     Support plan in place?:  Completed -     Research consents signed?:  Yes -        Final " Tasks:  Before surgery online class:  Needed -     Before surgery online class website link:  [email or web address]   After surgery online class:  Needed -     After surgery online class website link:  [email or web address]   Nurse visit for weigh-in and information:  Needed -     Pre-assessment clinic visit with anesthesia team for H&P:  Needed -     Final labs (Hgb, plt, T&S, UA):  Needed -        Notes:   -

## 2018-08-28 ENCOUNTER — ALLIED HEALTH/NURSE VISIT (OUTPATIENT)
Dept: SURGERY | Facility: CLINIC | Age: 38
End: 2018-08-28
Payer: COMMERCIAL

## 2018-08-28 VITALS — BODY MASS INDEX: 47.04 KG/M2 | WEIGHT: 293 LBS

## 2018-08-28 NOTE — PROGRESS NOTES
"Bariatric Nutrition Consultation Note    Reason For Visit: Nutrition Assessment    Danitza Soto is a 38 year old presenting today for bariatric nutrition consult.   Pt is interested in laparoscopic sleeve gastrectomy with Dr. Tse expected surgery in October.  This is pt's 2nd of 3 required nutrition visits prior to surgery. Pt referred by Sandy LANG(7/24/18).     ANTHROPOMETRICS:  Estimated body mass index is 48.47 kg/(m^2) as calculated from the following:    Height as of an earlier encounter on 7/24/18: 1.7 m (5' 6.93\").    Weight as of an earlier encounter on 7/24/18: 140.1 kg (308 lb 12.8 oz).  Today's weight: 299.7 lbs (-9.1 lbs in the past month)    Required weight loss goal pre-op: 20 lbs from initial consult weight (goal weight 288 lbs or less before surgery)      SUPPLEMENT INFORMATION:  Multivitamin, Vit D3 5,000 international units, fish oil    NUTRITION HISTORY:  Lunch: occasional shake and or salad  Dinner: beans, chicken lentil soup    GOALS:  Relating To Eating:  Eat slowly (20-30 minutes per meal), chewing foods well (25 chews per bite/applesauce consistency) continues  9\" Plate method (1/2 plate non-starchy vegetables/fruit, 1/4 plate lean protein, 1/4 plate whole grain starch - no more than 1/2 cup carb/meal) - 1-2 shakes per day(breakfast) premier protein  Avoid snacking on candy/carbohydrates - choose protein - occasional snacks(portioned)    Relating to beverages:  Separate fluids from meals by 30 minutes before, during, and after eating -unable to separate from meals at this time  Drink at least three glasses of water per day - 8-12 cups, down to 4-6 pops per day    Relating to activity:  Increase activity as able 3,000-5,000 steps per day      NUTRITION DIAGNOSIS:  Obesity r/t long history of self-monitoring deficit and excessive energy intake aeb BMI >30 kg/m2.    INTERVENTION:  Intervention Provided/Education Provided.  Reviewed previous goals, praised patient on weight loss " "over the past month.  Patient reported difficulty with  beverages and consistent increased activity, discussed ideas to help with both.  Patient wanted to make goal to include more water daily.   Provided pt with list of goals RD contact information.      *patient stated she is unable to afford shakes from clinic discussed other options per patient request    Patient Understanding: good  Expected Compliance: good   Follow up: activity goal, decrease carbonated beverages    GOALS:  Relating To Eating:  Eat slowly (20-30 minutes per meal), chewing foods well (25 chews per bite/applesauce consistency)  9\" Plate method (1/2 plate non-starchy vegetables/fruit, 1/4 plate lean protein, 1/4 plate whole grain starch - no more than 1/2 cup carb/meal)  Avoid snacking on candy/carbohydrates - choose protein    Relating to beverages:  Separate fluids from meals by 30 minutes before, during, and after eating - separate beverages at least twice per week  Drink 48-64 oz of water per day    Relating to activity:  Increase activity as able try to get at least 5,000 steps per day      Time spent with patient: 30 minutes.  Kelsey Sousa, RD, LD  "

## 2018-08-28 NOTE — PATIENT INSTRUCTIONS
"GOALS:  Relating To Eating:  Eat slowly (20-30 minutes per meal), chewing foods well (25 chews per bite/applesauce consistency)  9\" Plate method (1/2 plate non-starchy vegetables/fruit, 1/4 plate lean protein, 1/4 plate whole grain starch - no more than 1/2 cup carb/meal)  Avoid snacking on candy/carbohydrates - choose protein    Relating to beverages:  Separate fluids from meals by 30 minutes before, during, and after eating - separate beverages at least twice per week  Drink 48-64 oz of water per day    Relating to activity:  Increase activity as able try to get at least 5,000 steps per day    Follow up with RD in one month    Kelsey Sousa RD, LD  If you need to schedule or reschedule with a dietitian please call [phone] or [phone].   "

## 2018-08-31 ENCOUNTER — CARE COORDINATION (OUTPATIENT)
Dept: SURGERY | Facility: CLINIC | Age: 38
End: 2018-08-31

## 2018-09-06 ENCOUNTER — HOSPITAL ENCOUNTER (OUTPATIENT)
Dept: NUCLEAR MEDICINE | Facility: CLINIC | Age: 38
Setting detail: NUCLEAR MEDICINE
End: 2018-09-06
Attending: INTERNAL MEDICINE
Payer: COMMERCIAL

## 2018-09-06 ENCOUNTER — HOSPITAL ENCOUNTER (OUTPATIENT)
Dept: CARDIOLOGY | Facility: CLINIC | Age: 38
Discharge: HOME | End: 2018-09-06
Attending: INTERNAL MEDICINE | Admitting: INTERNAL MEDICINE
Payer: COMMERCIAL

## 2018-09-06 ENCOUNTER — TELEPHONE (OUTPATIENT)
Dept: CARDIOLOGY | Facility: CLINIC | Age: 38
End: 2018-09-06

## 2018-09-06 DIAGNOSIS — R07.89 ATYPICAL CHEST PAIN: ICD-10-CM

## 2018-09-06 PROCEDURE — 25000128 H RX IP 250 OP 636: Performed by: INTERNAL MEDICINE

## 2018-09-06 PROCEDURE — 94618 PULMONARY STRESS TESTING: CPT | Mod: 26 | Performed by: INTERNAL MEDICINE

## 2018-09-06 PROCEDURE — A9502 TC99M TETROFOSMIN: HCPCS | Performed by: INTERNAL MEDICINE

## 2018-09-06 PROCEDURE — 34300033 ZZH RX 343: Performed by: INTERNAL MEDICINE

## 2018-09-06 PROCEDURE — 93017 CV STRESS TEST TRACING ONLY: CPT

## 2018-09-06 PROCEDURE — 78452 HT MUSCLE IMAGE SPECT MULT: CPT

## 2018-09-06 RX ORDER — AMINOPHYLLINE 25 MG/ML
50-100 INJECTION, SOLUTION INTRAVENOUS
Status: DISCONTINUED | OUTPATIENT
Start: 2018-09-06 | End: 2018-09-07 | Stop reason: HOSPADM

## 2018-09-06 RX ORDER — ACYCLOVIR 200 MG/1
0-1 CAPSULE ORAL
Status: DISCONTINUED | OUTPATIENT
Start: 2018-09-06 | End: 2018-09-07 | Stop reason: HOSPADM

## 2018-09-06 RX ORDER — ALBUTEROL SULFATE 90 UG/1
2 AEROSOL, METERED RESPIRATORY (INHALATION) EVERY 5 MIN PRN
Status: DISCONTINUED | OUTPATIENT
Start: 2018-09-06 | End: 2018-09-07 | Stop reason: HOSPADM

## 2018-09-06 RX ORDER — REGADENOSON 0.08 MG/ML
0.4 INJECTION, SOLUTION INTRAVENOUS ONCE
Status: COMPLETED | OUTPATIENT
Start: 2018-09-06 | End: 2018-09-06

## 2018-09-06 RX ADMIN — TETROFOSMIN 10.4 MCI.: 1.38 INJECTION, POWDER, LYOPHILIZED, FOR SOLUTION INTRAVENOUS at 10:05

## 2018-09-06 RX ADMIN — TETROFOSMIN 42 MCI.: 1.38 INJECTION, POWDER, LYOPHILIZED, FOR SOLUTION INTRAVENOUS at 11:09

## 2018-09-06 RX ADMIN — REGADENOSON 0.4 MG: 0.08 INJECTION, SOLUTION INTRAVENOUS at 11:13

## 2018-09-06 NOTE — TELEPHONE ENCOUNTER
Preliminary report available.  Dr. Sanchez aware and would like to see her in clinic next week.  Spoke with patient and offered her an appointment with Dr. sanchez to review test results on 9/13 3:00 pm and patient accepted.    Hannah Conte, RN  Care Coordinator  [facility]  [phone]

## 2018-09-06 NOTE — PROGRESS NOTES
Pt here for Lexiscan. Test, medication and side effects reviewed with patient. Lung sounds clear. Pt denies caffeine use. Pt tolerated Lexiscan dose with complaints of chest and abdominal pressure that quickly resolved. Monitored post injection and then taken back to nuclear medicine for follow up imaging.

## 2018-09-06 NOTE — TELEPHONE ENCOUNTER
Kristin from [facility] called to report an abnormal finding on a ziopatch report for this patient.  Patient had Rapid A fib with a rate of 189.    This is a patient of Dr. Soto's in [place], Spoke with Shabana JENKINS over in [place], she is informed.      Rebecca Dunbar, ALICE    Routing to [facility] as an FYI of documentation.

## 2018-09-13 ENCOUNTER — OFFICE VISIT (OUTPATIENT)
Dept: CARDIOLOGY | Facility: CLINIC | Age: 38
End: 2018-09-13
Payer: COMMERCIAL

## 2018-09-13 VITALS
DIASTOLIC BLOOD PRESSURE: 70 MMHG | OXYGEN SATURATION: 97 % | WEIGHT: 293 LBS | HEART RATE: 67 BPM | BODY MASS INDEX: 47.4 KG/M2 | SYSTOLIC BLOOD PRESSURE: 106 MMHG

## 2018-09-13 DIAGNOSIS — I48.0 PAROXYSMAL ATRIAL FIBRILLATION (H): Primary | ICD-10-CM

## 2018-09-13 PROCEDURE — 99214 OFFICE O/P EST MOD 30 MIN: CPT | Performed by: INTERNAL MEDICINE

## 2018-09-13 NOTE — LETTER
9/13/2018      RE: Danitza Soto  [address]  [facility] [address]       Dear Colleague,    Thank you for the opportunity to participate in the care of your patient, Danitza Soto, at the [facility] HEART AT [facility] at [facility]. Please see a copy of my visit note below.       SUBJECTIVE:  Danitza Soto is a 38 year old female who presents for discussing Zio result.  Recently evaluated for chest pain,palpitation and dizziness.  Normal coronary CTA 2013. Normal stress MPI now. But zio showed significant afib with rate upto 227BPM,with diziiness,  No symptoms for 2 weeks now.    Patient Active Problem List    Diagnosis Date Noted     Lumbago 08/01/2018     Priority: Medium     Left anterior knee pain 08/01/2018     Priority: Medium     LEONARDO (obstructive sleep apnea) 04/13/2017     Priority: Medium     4/10/2017 - Home Sleep Apnea Testing - AHI 15.5/hr; Supine AHI 12.9/hr; SpO2 <= 88% for 33.8 minutes.  Titration Sleep Study 4/19/2017 - (288.0 lbs) CPAP was titrated to a pressure of 11 with an AHI of 3.7.  Time Spent in REM supine at this pressure was 33.5.  Recommending Auto-CPAP 7 - 15 cmH2O.       Obesity hypoventilation syndrome (H) 04/13/2017     Priority: Medium     Mixed hyperlipidemia 07/06/2015     Priority: Medium     Papanicolaou smear of cervix with low grade squamous intraepithelial lesion (LGSIL) 06/17/2014     Priority: Medium     6/17/14: LSIL, + HPV, unable to type.   8/20/14:[place]:BRISEYDA I. Plan cotest pap & HPV in 1 year.   1/20116 Pap Ascus Neg HPV. Plan: [place].   5/13/16 Colposcopy not completed. Cancelled by patient  3/21/17 NIL/Neg HPV. Plan: cotest in 1 year per provider.       Acne 02/24/2012     Priority: Medium     Hypertension goal BP (blood pressure) < 140/90 01/12/2011     Priority: Medium     CARDIOVASCULAR SCREENING; LDL GOAL LESS THAN 130 10/31/2010     Priority: Medium     Morbid obesity due to excess  calories (H) 05/17/2006     Priority: Medium     Problem list name updated by automated process. Provider to review       Tobacco use disorder 05/17/2006     Priority: Medium     Anxiety state      Priority: Medium     Esophageal reflux      Priority: Medium    .  Current Outpatient Prescriptions   Medication Sig     ADVIL 200 MG OR TABS TAKE THREE TABLETS BY MOUTH QD-BID PRN     Esomeprazole Magnesium (NEXIUM PO)      fish oil-omega-3 fatty acids 1000 MG capsule Take 2 g by mouth daily     hydrochlorothiazide (HYDRODIURIL) 25 MG tablet Take 1 tablet (25 mg) by mouth daily     lisinopril (PRINIVIL/ZESTRIL) 40 MG tablet Take 1 tablet (40 mg) by mouth daily     LORazepam (ATIVAN) 0.5 MG tablet Take 1 tablet (0.5 mg) by mouth every 8 hours as needed for anxiety     metroNIDAZOLE (FLAGYL) 500 MG tablet Take 1 tablet (500 mg) by mouth 2 times daily     Multiple Vitamins-Minerals (CENTRUM PO)      nicotine (NICODERM CQ) 14 MG/24HR 24 hr patch Place 1 patch onto the skin every 24 hours     Probiotic Product (PROBIOTIC ADVANCED PO)      topiramate (TOPAMAX) 25 MG tablet 25 mg at bedtime for 1 week, 50 mg at bedtime for 1 week and 75 mg daily at bedtime thereafter     varenicline (CHANTIX) 1 MG tablet Take 1 tablet (1 mg) by mouth 2 times daily     VENTOLIN  (90 Base) MCG/ACT Inhaler INHALE 1-2 PUFFS EVERY 4-6 HOURS AS NEEDED FOR WHEEZING     Vitamin D, Cholecalciferol, 1000 UNITS TABS Take 2 capsules by mouth daily     nicotine (NICODERM CQ) 21 MG/24HR 24 hr patch Place 1 patch onto the skin every 24 hours     nicotine (NICODERM CQ) 7 MG/24HR 24 hr patch Place 1 patch onto the skin every 24 hours     No current facility-administered medications for this visit.      Past Medical History:   Diagnosis Date     Depressive disorder 1990     Esophageal reflux      Hearing problem 2018     Hyperlipidemia LDL goal <130 7/6/2015     Hypertension      LSIL (low grade squamous intraepithelial lesion) on Pap smear 6/2014    +  HPV, unable to type colp - BRISEYDA I     Mixed hyperlipidemia 8/24/2016     Other anxiety states      Past Surgical History:   Procedure Laterality Date     HC TOOTH EXTRACTION W/FORCEP  1995    [place] Teeth Extraction     Allergies   Allergen Reactions     Wellbutrin [Bupropion]      Wellbutrin: Panic attacks, heart/chest pain     Social History     Social History     Marital status:      Spouse name: N/A     Number of children: 0     Years of education: Some Colle     Occupational History     Mental Health Counselor      Social History Main Topics     Smoking status: Former Smoker     Packs/day: 1.00     Years: 10.00     Types: Cigarettes     Start date: 1/1/2004     Quit date: 8/15/2018     Smokeless tobacco: Never Used      Comment:  pack or less a day     Alcohol use 0.0 oz/week     0 Standard drinks or equivalent per week      Comment: Occas.     Drug use: No     Sexual activity: Not Currently     Partners: Female     Birth control/ protection: None     Other Topics Concern     Parent/Sibling W/ Cabg, Mi Or Angioplasty Before 65f 55m? No     Social History Narrative    Social Documentation:        Balanced Diet: YES, sometimes    Calcium intake: 2 per day    Caffeine: 5-10 per day    Exercise:  type of activity walking, playtime;  5 times per week    Sunscreen: when remembered    Seatbelts:  Yes    Self Breast Exam:  No    Self Testicular Exam: n/a    Physical/Emotional/Sexual Abuse: No    Do you feel safe in your environment? Yes        Cholesterol screen up to date: No 2006    Eye Exam up to date: Yes    Dental Exam up to date: No    Pap smear up to date: No: 2006    Mammogram up to date: Does Not Apply    Dexa Scan up to date: Does Not Apply    Colonoscopy up to date: Does Not Apply    Immunizations up to date: Yes, 2003    Glucose screen if over 40:  N/a        Jamila Coyle MA                         Family History   Problem Relation Age of Onset     HEART DISEASE Mother      ,mother had emphesema and   at 62     Hypertension Mother      Allergies Mother      Lipids Mother      Obesity Mother      Respiratory Mother      Emphysema     Diabetes Maternal Grandmother      Type 2?     Hypertension Maternal Grandmother      Circulatory Maternal Grandmother      Due to diabetes     Eye Disorder Maternal Grandmother      Macular degeneration/Macular degeneration     Obesity Maternal Grandmother      Hypertension Father      Lipids Father      Cancer Father      Prostate Cancer Father      Other Cancer Father      Cerebrovascular Disease Paternal Grandmother      Alcohol/Drug Maternal Grandfather      Obesity Maternal Grandfather      Psychotic Disorder Paternal Grandfather      Committed Suicide     Depression Paternal Grandfather      Allergies Sister      Genitourinary Problems Sister           REVIEW OF SYSTEMS:  General: negative, fever, chills, night sweats  Skin: negative, acne, rash and scaling  Eyes: negative, double vision, eye pain and photophobia  Ears/Nose/Throat: negative, nasal congestion and purulent rhinorrhea  Respiratory: No dyspnea on exertion, No cough, No hemoptysis and negative  Cardiovascular: negative, exertional chest pain or pressure, paroxysmal nocturnal dyspnea, dyspnea on exertion and orthopnea         OBJECTIVE:  Blood pressure 106/70, pulse 67, weight 137 kg (302 lb), SpO2 97 %, not currently breastfeeding.  General Appearance: alert, active and no distress  Head: Normocephalic. No masses, lesions, tenderness or abnormalities  Eyes: conjuctiva clear, PERRL, EOM intact  Ears: External ears normal. Canals clear. TM's normal.  Nose: Nares normal  Mouth: normal  Neck: Supple, no cervical adenopathy, no thyromegaly  Lungs: clear to auscultation  Cardiac: regular rate and rhythm, normal S1 and S2, no murmur  Abdomen: Soft, nontender.  Normal bowel sounds.  No hepatosplenomegaly or abnormal masses  Extremities: no peripheral edema, peripheral pulses normal  Musculoskeletal:  negative  Neurological: Cranial nerves 2-12 intact, motor strength intact       ASSESSMENT/PLAN:  Patient with palpitation/dizziness.  Zio result discussed with patient. Afib lasting upto 8 hrs. One with dizziness. Rate upto 227 BPM.  CVA risk score2 and bleedig risk score 1.  Discussed options.  Will refer to EP for further management.  Will get an echo prior to appointment.  Per orders.   Return to Clinic PRN.        CINDA Prather MD

## 2018-09-13 NOTE — NURSING NOTE
Cardiac Testing: Per Dr Soto  Patient given instructions regarding  echocardiogram  scheduled on 09.21  In [place]. Discussed purpose, preparation, procedure and when to expect results reported back to the patient. Patient demonstrated understanding of this information and agreed to call with further questions or concerns.    Med Reconcile: Reviewed and verified all current medications with the patient. The updated medication list was printed and given to the patient.    Return Appointment: per Dr Soto follow-up with EP. Patient is scheduled with Dr Faye on 09.24 in [place]    Patient stated she understood all health information given and agreed to call with further questions or concerns.    Silva Urbina RN

## 2018-09-13 NOTE — PATIENT INSTRUCTIONS
Thank you for coming to the [facility] Heart @ [facility]; please note the following instructions:    1. No medication change   2. Follow-up with EP/Dr Faye on 09.24   3. ECHO prior to see EP 09.21        If you have any questions regarding your visit please contact your care team:     Cardiology  Telephone Number   Hannah YEPEZ, BOBBY BUNCH, BOBBY RAMIREZ, NAPOLEON HUGHES MA   [phone]    *After hours: [phone]   For scheduling appts:     [phone] or    [phone] (select option 1)    *After hours: [phone]     For the Device Clinic (Pacemakers and ICD's)  RN's :  Hanh Cochran   During business hours: [phone]    *After business hours:  [phone] (select option 4)      Normal test result notifications will be released via [email or web address] or mailed within 7 business days.  All other test results, will be communicated via telephone once reviewed by your cardiologist.    If you need a medication refill please contact your pharmacy.  Please allow 3 business days for your refill to be completed.    As always, thank you for trusting us with your health care needs!

## 2018-09-13 NOTE — MR AVS SNAPSHOT
After Visit Summary   9/13/2018    Danitza Soto    MRN: [id number]           Patient Information     Date Of Birth          1980        Visit Information        Provider Department      9/13/2018 3:00 PM CINDA Prather MD [facility] PHYSICIANS [facility]ART AT [facility]        Care Instructions    Thank you for coming to the [facility] Heart @ [facility]; please note the following instructions:    1. No medication change   2. Follow-up with EP/Dr Faye on 09.24   3. ECHO prior to see EP 09.21        If you have any questions regarding your visit please contact your care team:     Cardiology  Telephone Number   Hannah YEPEZ, RN  Charan BUNCH, RN   Shabana RAMIREZ, NAPOLEON HUGHES MA   [phone]    *After hours: [phone]   For scheduling appts:     [phone] or    [phone] (select option 1)    *After hours: [phone]     For the Device Clinic (Pacemakers and ICD's)  RN's :  Hanh Cochran   During business hours: [phone]    *After business hours:  [phone] (select option 4)      Normal test result notifications will be released via Icinetic or mailed within 7 business days.  All other test results, will be communicated via telephone once reviewed by your cardiologist.    If you need a medication refill please contact your pharmacy.  Please allow 3 business days for your refill to be completed.    As always, thank you for trusting us with your health care needs!            Follow-ups after your visit        Your next 10 appointments already scheduled     Sep 21, 2018  9:00 AM CDT   Ech Complete with [id number]   [facility] ([facility])    [address] [place] [address]   [phone]           1.  Please bring or wear a comfortable two-piece outfit. 2.  You may eat, drink and take your normal medicines. 3.  For any questions that cannot be answered, please contact the ordering  physician 4.  Please do not wear perfumes or scented lotions on the day of your exam.            Sep 24, 2018  1:00 PM CDT   New Visit with Neena Faye MD   [facility] PHYSICIANS HEART AT [facility] ([facility])    [address] [place] [address] [address]nd Floor  [place] [address]   [phone]            Sep 26, 2018  8:30 AM CDT   (Arrive by 8:15 AM)   NUTRITION VISIT with Kelsey Sousa RD   [facility] ([facility])    [address] [place] [address]   [phone]            Sep 28, 2018 11:00 AM CDT   (Arrive by 10:45 AM)   Bariatric Surgery Evaluation Interview with Anju Tinajero, PhD   [facility] ([facility])    [address] [place] [address]   [phone]            Sep 28, 2018 12:00 PM CDT   (Arrive by 11:45 AM)   Bariatric Surgery Evaluation Testing with Anju Tinajero, PhD   [facility] ([facility])    [address] [place] [address]   [phone]            Oct 04, 2018  8:45 AM CDT   (Arrive by 8:30 AM)   Pre-Surgical Evaluation with Sandy Cervantes PA-C   [facility] ([facility])    [address] [place] [address]   [phone]            Oct 04, 2018 10:20 AM CDT   (Arrive by 10:05 AM)   Bariatric Surgeon Visit with Artis Tse MD   [facility] ([facility])    [address] [place] [address]   [phone]              Who to contact     If you have questions or need follow up information about today's clinic visit or your schedule please contact [facility] PHYSICIANS HEART AT [facility] directly at [phone].  Normal or non-critical lab and imaging  results will be communicated to you by MyChart, letter or phone within 4 business days after the clinic has received the results. If you do not hear from us within 7 days, please contact the clinic through TBS or phone. If you have a critical or abnormal lab result, we will notify you by phone as soon as possible.  Submit refill requests through TBS or call your pharmacy and they will forward the refill request to us. Please allow 3 business days for your refill to be completed.          Additional Information About Your Visit        TBS Information     TBS gives you secure access to your electronic health record. If you see a primary care provider, you can also send messages to your care team and make appointments. If you have questions, please call your primary care clinic.  If you do not have a primary care provider, please call [phone] and they will assist you.        Care EveryWhere ID     This is your Care EveryWhere ID. This could be used by other organizations to access your [facility] medical records  [id number]        Your Vitals Were     Pulse Pulse Oximetry BMI (Body Mass Index)             67 97% 47.4 kg/m2          Blood Pressure from Last 3 Encounters:   09/13/18 106/70   08/17/18 115/76   08/16/18 131/85    Weight from Last 3 Encounters:   09/13/18 137 kg (302 lb)   08/28/18 135.9 kg (299 lb 11.2 oz)   08/17/18 135.6 kg (299 lb)              Today, you had the following     No orders found for display       Primary Care Provider Office Phone # Fax #    Polly Mcbride [phone] [address] [id number]        Equal Access to Services     [facility] campusSOFIA : Hadii aad ku hadasho Soomaali, waaxda luqadaha, qaybta kaalmada barrington, christiano shah. So [facility] [phone].    ATENCIÓN: Si habla español, tiene a bailey disposición servicios gratuitos de asistencia lingüística. Llame al [phone].    We comply with  applicable federal civil rights laws and [place] laws. We do not discriminate on the basis of race, color, national origin, age, disability, sex, sexual orientation, or gender identity.            Thank you!     Thank you for choosing [facility] PHYSICIANS HEART AT [facility]  for your care. Our goal is always to provide you with excellent care. Hearing back from our patients is one way we can continue to improve our services. Please take a few minutes to complete the written survey that you may receive in the mail after your visit with us. Thank you!             Your Updated Medication List - Protect others around you: Learn how to safely use, store and throw away your medicines at www.disposemymeds.org.          This list is accurate as of 9/13/18  3:15 PM.  Always use your most recent med list.                   Brand Name Dispense Instructions for use Diagnosis    ADVIL 200 MG tablet   Generic drug:  ibuprofen      TAKE THREE TABLETS BY MOUTH QD-BID PRN        CENTRUM PO           fish oil-omega-3 fatty acids 1000 MG capsule      Take 2 g by mouth daily        hydrochlorothiazide 25 MG tablet    HYDRODIURIL    90 tablet    Take 1 tablet (25 mg) by mouth daily    Essential hypertension with goal blood pressure less than 140/90       lisinopril 40 MG tablet    PRINIVIL/ZESTRIL    90 tablet    Take 1 tablet (40 mg) by mouth daily    Essential hypertension with goal blood pressure less than 140/90       LORazepam 0.5 MG tablet    ATIVAN    20 tablet    Take 1 tablet (0.5 mg) by mouth every 8 hours as needed for anxiety    Anxiety       metroNIDAZOLE 500 MG tablet    FLAGYL    14 tablet    Take 1 tablet (500 mg) by mouth 2 times daily    BV (bacterial vaginosis)       NEXIUM PO           * nicotine 21 MG/24HR 24 hr patch    NICODERM CQ    30 patch    Place 1 patch onto the skin every 24 hours    Encounter for smoking cessation counseling       * nicotine 14 MG/24HR 24 hr patch    NICODERM CQ    30  patch    Place 1 patch onto the skin every 24 hours    Encounter for smoking cessation counseling       * nicotine 7 MG/24HR 24 hr patch    NICODERM CQ    30 patch    Place 1 patch onto the skin every 24 hours    Encounter for smoking cessation counseling       PROBIOTIC ADVANCED PO           topiramate 25 MG tablet    TOPAMAX    90 tablet    25 mg at bedtime for 1 week, 50 mg at bedtime for 1 week and 75 mg daily at bedtime thereafter        varenicline 1 MG tablet    CHANTIX    60 tablet    Take 1 tablet (1 mg) by mouth 2 times daily    Tobacco use disorder       VENTOLIN  (90 Base) MCG/ACT inhaler   Generic drug:  albuterol      INHALE 1-2 PUFFS EVERY 4-6 HOURS AS NEEDED FOR WHEEZING        Vitamin D (Cholecalciferol) 1000 units Tabs      Take 2 capsules by mouth daily        * Notice:  This list has 3 medication(s) that are the same as other medications prescribed for you. Read the directions carefully, and ask your doctor or other care provider to review them with you.

## 2018-09-13 NOTE — NURSING NOTE
"Chief Complaint   Patient presents with     RECHECK     return: zio and nuc results. Pt denies any cardiac symptoms since heart monitor.       Initial /70 (BP Location: Right arm, Patient Position: Chair, Cuff Size: Adult Large)  Pulse 67  Wt 137 kg (302 lb)  SpO2 97%  BMI 47.4 kg/m2 Estimated body mass index is 47.4 kg/(m^2) as calculated from the following:    Height as of 8/17/18: 1.7 m (5' 6.93\").    Weight as of this encounter: 137 kg (302 lb)..  BP completed using cuff size: large    Elena Marcelo MA    "

## 2018-09-14 NOTE — PROGRESS NOTES
SUBJECTIVE:  Danitza Soto is a 38 year old female who presents for discussing Zio result.  Recently evaluated for chest pain,palpitation and dizziness.  Normal coronary CTA 2013. Normal stress MPI now. But zio showed significant afib with rate upto 227BPM,with diziiness,  No symptoms for 2 weeks now.    Patient Active Problem List    Diagnosis Date Noted     Lumbago 08/01/2018     Priority: Medium     Left anterior knee pain 08/01/2018     Priority: Medium     LEONARDO (obstructive sleep apnea) 04/13/2017     Priority: Medium     4/10/2017 - Home Sleep Apnea Testing - AHI 15.5/hr; Supine AHI 12.9/hr; SpO2 <= 88% for 33.8 minutes.  Titration Sleep Study 4/19/2017 - (288.0 lbs) CPAP was titrated to a pressure of 11 with an AHI of 3.7.  Time Spent in REM supine at this pressure was 33.5.  Recommending Auto-CPAP 7 - 15 cmH2O.       Obesity hypoventilation syndrome (H) 04/13/2017     Priority: Medium     Mixed hyperlipidemia 07/06/2015     Priority: Medium     Papanicolaou smear of cervix with low grade squamous intraepithelial lesion (LGSIL) 06/17/2014     Priority: Medium     6/17/14: LSIL, + HPV, unable to type.   8/20/14:[place]:BRISEYDA I. Plan cotest pap & HPV in 1 year.   1/20116 Pap Ascus Neg HPV. Plan: [place].   5/13/16 Colposcopy not completed. Cancelled by patient  3/21/17 NIL/Neg HPV. Plan: cotest in 1 year per provider.       Acne 02/24/2012     Priority: Medium     Hypertension goal BP (blood pressure) < 140/90 01/12/2011     Priority: Medium     CARDIOVASCULAR SCREENING; LDL GOAL LESS THAN 130 10/31/2010     Priority: Medium     Morbid obesity due to excess calories (H) 05/17/2006     Priority: Medium     Problem list name updated by automated process. Provider to review       Tobacco use disorder 05/17/2006     Priority: Medium     Anxiety state      Priority: Medium     Esophageal reflux      Priority: Medium    .  Current Outpatient Prescriptions   Medication Sig     ADVIL 200 MG OR TABS TAKE THREE TABLETS BY  MOUTH QD-BID PRN     Esomeprazole Magnesium (NEXIUM PO)      fish oil-omega-3 fatty acids 1000 MG capsule Take 2 g by mouth daily     hydrochlorothiazide (HYDRODIURIL) 25 MG tablet Take 1 tablet (25 mg) by mouth daily     lisinopril (PRINIVIL/ZESTRIL) 40 MG tablet Take 1 tablet (40 mg) by mouth daily     LORazepam (ATIVAN) 0.5 MG tablet Take 1 tablet (0.5 mg) by mouth every 8 hours as needed for anxiety     metroNIDAZOLE (FLAGYL) 500 MG tablet Take 1 tablet (500 mg) by mouth 2 times daily     Multiple Vitamins-Minerals (CENTRUM PO)      nicotine (NICODERM CQ) 14 MG/24HR 24 hr patch Place 1 patch onto the skin every 24 hours     Probiotic Product (PROBIOTIC ADVANCED PO)      topiramate (TOPAMAX) 25 MG tablet 25 mg at bedtime for 1 week, 50 mg at bedtime for 1 week and 75 mg daily at bedtime thereafter     varenicline (CHANTIX) 1 MG tablet Take 1 tablet (1 mg) by mouth 2 times daily     VENTOLIN  (90 Base) MCG/ACT Inhaler INHALE 1-2 PUFFS EVERY 4-6 HOURS AS NEEDED FOR WHEEZING     Vitamin D, Cholecalciferol, 1000 UNITS TABS Take 2 capsules by mouth daily     nicotine (NICODERM CQ) 21 MG/24HR 24 hr patch Place 1 patch onto the skin every 24 hours     nicotine (NICODERM CQ) 7 MG/24HR 24 hr patch Place 1 patch onto the skin every 24 hours     No current facility-administered medications for this visit.      Past Medical History:   Diagnosis Date     Depressive disorder 1990     Esophageal reflux      Hearing problem 2018     Hyperlipidemia LDL goal <130 7/6/2015     Hypertension      LSIL (low grade squamous intraepithelial lesion) on Pap smear 6/2014    + HPV, unable to type colp - BRISEYDA I     Mixed hyperlipidemia 8/24/2016     Other anxiety states      Past Surgical History:   Procedure Laterality Date     HC TOOTH EXTRACTION W/FORCEP  1995    [place] Teeth Extraction     Allergies   Allergen Reactions     Wellbutrin [Bupropion]      Wellbutrin: Panic attacks, heart/chest pain     Social History     Social History      Marital status:      Spouse name: N/A     Number of children: 0     Years of education: Some Colle     Occupational History     Mental Health Counselor      Social History Main Topics     Smoking status: Former Smoker     Packs/day: 1.00     Years: 10.00     Types: Cigarettes     Start date: 2004     Quit date: 8/15/2018     Smokeless tobacco: Never Used      Comment:  pack or less a day     Alcohol use 0.0 oz/week     0 Standard drinks or equivalent per week      Comment: Occas.     Drug use: No     Sexual activity: Not Currently     Partners: Female     Birth control/ protection: None     Other Topics Concern     Parent/Sibling W/ Cabg, Mi Or Angioplasty Before 65f 55m? No     Social History Narrative    Social Documentation:        Balanced Diet: YES, sometimes    Calcium intake: 2 per day    Caffeine: 5-10 per day    Exercise:  type of activity walking, playtime;  5 times per week    Sunscreen: when remembered    Seatbelts:  Yes    Self Breast Exam:  No    Self Testicular Exam: n/a    Physical/Emotional/Sexual Abuse: No    Do you feel safe in your environment? Yes        Cholesterol screen up to date: No     Eye Exam up to date: Yes    Dental Exam up to date: No    Pap smear up to date: No: 2006    Mammogram up to date: Does Not Apply    Dexa Scan up to date: Does Not Apply    Colonoscopy up to date: Does Not Apply    Immunizations up to date: Yes,     Glucose screen if over 40:  N/a        Jamila Coyle MA                         Family History   Problem Relation Age of Onset     HEART DISEASE Mother      ,mother had emphesema and  at 62     Hypertension Mother      Allergies Mother      Lipids Mother      Obesity Mother      Respiratory Mother      Emphysema     Diabetes Maternal Grandmother      Type 2?     Hypertension Maternal Grandmother      Circulatory Maternal Grandmother      Due to diabetes     Eye Disorder Maternal Grandmother      Macular degeneration/Macular degeneration      Obesity Maternal Grandmother      Hypertension Father      Lipids Father      Cancer Father      Prostate Cancer Father      Other Cancer Father      Cerebrovascular Disease Paternal Grandmother      Alcohol/Drug Maternal Grandfather      Obesity Maternal Grandfather      Psychotic Disorder Paternal Grandfather      Committed Suicide     Depression Paternal Grandfather      Allergies Sister      Genitourinary Problems Sister           REVIEW OF SYSTEMS:  General: negative, fever, chills, night sweats  Skin: negative, acne, rash and scaling  Eyes: negative, double vision, eye pain and photophobia  Ears/Nose/Throat: negative, nasal congestion and purulent rhinorrhea  Respiratory: No dyspnea on exertion, No cough, No hemoptysis and negative  Cardiovascular: negative, exertional chest pain or pressure, paroxysmal nocturnal dyspnea, dyspnea on exertion and orthopnea         OBJECTIVE:  Blood pressure 106/70, pulse 67, weight 137 kg (302 lb), SpO2 97 %, not currently breastfeeding.  General Appearance: alert, active and no distress  Head: Normocephalic. No masses, lesions, tenderness or abnormalities  Eyes: conjuctiva clear, PERRL, EOM intact  Ears: External ears normal. Canals clear. TM's normal.  Nose: Nares normal  Mouth: normal  Neck: Supple, no cervical adenopathy, no thyromegaly  Lungs: clear to auscultation  Cardiac: regular rate and rhythm, normal S1 and S2, no murmur  Abdomen: Soft, nontender.  Normal bowel sounds.  No hepatosplenomegaly or abnormal masses  Extremities: no peripheral edema, peripheral pulses normal  Musculoskeletal: negative  Neurological: Cranial nerves 2-12 intact, motor strength intact       ASSESSMENT/PLAN:  Patient with palpitation/dizziness.  Zio result discussed with patient. Afib lasting upto 8 hrs. One with dizziness. Rate upto 227 BPM.  CVA risk score2 and bleedig risk score 1.  Discussed options.  Will refer to EP for further management.  Will get an echo prior to appointment.  Per  orders.   Return to Clinic PRN.

## 2018-09-18 DIAGNOSIS — Z72.0 TOBACCO ABUSE: ICD-10-CM

## 2018-09-18 PROCEDURE — 80307 DRUG TEST PRSMV CHEM ANLYZR: CPT | Mod: 90 | Performed by: PHYSICIAN ASSISTANT

## 2018-09-18 PROCEDURE — 99000 SPECIMEN HANDLING OFFICE-LAB: CPT | Performed by: PHYSICIAN ASSISTANT

## 2018-09-19 LAB — COTININE UR QL: NEGATIVE NG/ML

## 2018-09-21 ENCOUNTER — RADIANT APPOINTMENT (OUTPATIENT)
Dept: CARDIOLOGY | Facility: CLINIC | Age: 38
End: 2018-09-21
Payer: COMMERCIAL

## 2018-09-21 DIAGNOSIS — I48.0 PAROXYSMAL ATRIAL FIBRILLATION (H): ICD-10-CM

## 2018-09-21 PROCEDURE — 93306 TTE W/DOPPLER COMPLETE: CPT | Mod: GC | Performed by: INTERNAL MEDICINE

## 2018-09-21 NOTE — NURSING NOTE
Patient presents today for resting echo ordered by MD.   Echo Tech provided patient education.    Echo technician completed resting echo. Data transferred to [facility] for final interpretation through [employer].   Patient education provided about cardiology interpretation and primary provider will be notified of results.    Melba Scott RDCS

## 2018-09-24 ENCOUNTER — OFFICE VISIT (OUTPATIENT)
Dept: CARDIOLOGY | Facility: CLINIC | Age: 38
End: 2018-09-24
Payer: COMMERCIAL

## 2018-09-24 VITALS
WEIGHT: 293 LBS | BODY MASS INDEX: 46.93 KG/M2 | SYSTOLIC BLOOD PRESSURE: 147 MMHG | OXYGEN SATURATION: 96 % | HEART RATE: 67 BPM | DIASTOLIC BLOOD PRESSURE: 73 MMHG

## 2018-09-24 DIAGNOSIS — I48.0 PAROXYSMAL ATRIAL FIBRILLATION (H): Primary | ICD-10-CM

## 2018-09-24 DIAGNOSIS — I48.0 PAROXYSMAL ATRIAL FIBRILLATION (H): ICD-10-CM

## 2018-09-24 LAB — TSH SERPL DL<=0.005 MIU/L-ACNC: 1.48 MU/L (ref 0.4–4)

## 2018-09-24 PROCEDURE — 99204 OFFICE O/P NEW MOD 45 MIN: CPT | Mod: GC | Performed by: INTERNAL MEDICINE

## 2018-09-24 PROCEDURE — 36415 COLL VENOUS BLD VENIPUNCTURE: CPT | Performed by: INTERNAL MEDICINE

## 2018-09-24 PROCEDURE — 84443 ASSAY THYROID STIM HORMONE: CPT | Performed by: INTERNAL MEDICINE

## 2018-09-24 RX ORDER — WARFARIN SODIUM 5 MG/1
TABLET ORAL
Qty: 30 TABLET | Refills: 0 | Status: SHIPPED | OUTPATIENT
Start: 2018-09-24 | End: 2018-10-23

## 2018-09-24 NOTE — PROGRESS NOTES
HPI:     I was asked by Dr. SHRUTI Prather to evaluate patient for newly diagnosed paroxysmal atrial fibrillation.    Patient is a 38 year old female with a PMH most remarkable for depression, GERD, essential hypertension who presents in clinic today for further evaluation of atrial fibrillation.    Patient states that for the past year she has experienced periods of palpitations, chest pain, and generally feeling unwell. She present in cardiology clinic for evaluation of palpitations and it was discovered on Ziopatch study that she intermittently goes into atrial fibrillation. For these periods she was symptomatic.    Patient denies fevers, chills, chest pain, SOB, abdominal pain, nasuea, vomiting, diarrhea. Generally sleeps well with a CPAP machine. No night sweats, no chills, no weight loss, patient is trying to lose weight.    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 - BRISEYDA I     Mixed hyperlipidemia 8/24/2016     Other anxiety states        CURRENT MEDICATIONS:  Current Outpatient Prescriptions   Medication Sig Dispense Refill     ADVIL 200 MG OR TABS TAKE THREE TABLETS BY MOUTH QD-BID PRN       Esomeprazole Magnesium (NEXIUM PO)        fish oil-omega-3 fatty acids 1000 MG capsule Take 2 g by mouth daily       hydrochlorothiazide (HYDRODIURIL) 25 MG tablet Take 1 tablet (25 mg) by mouth daily 90 tablet 3     lisinopril (PRINIVIL/ZESTRIL) 40 MG tablet Take 1 tablet (40 mg) by mouth daily 90 tablet 3     LORazepam (ATIVAN) 0.5 MG tablet Take 1 tablet (0.5 mg) by mouth every 8 hours as needed for anxiety 20 tablet 0     Multiple Vitamins-Minerals (CENTRUM PO)        Probiotic Product (PROBIOTIC ADVANCED PO)        topiramate (TOPAMAX) 25 MG tablet 25 mg at  bedtime for 1 week, 50 mg at bedtime for 1 week and 75 mg daily at bedtime thereafter 90 tablet 3     varenicline (CHANTIX) 1 MG tablet Take 1 tablet (1 mg) by mouth 2 times daily 60 tablet 1     VENTOLIN  (90 Base) MCG/ACT Inhaler INHALE 1-2 PUFFS EVERY 4-6 HOURS AS NEEDED FOR WHEEZING  0     Vitamin D, Cholecalciferol, 1000 UNITS TABS Take 2 capsules by mouth daily       metroNIDAZOLE (FLAGYL) 500 MG tablet Take 1 tablet (500 mg) by mouth 2 times daily (Patient not taking: Reported on 9/24/2018) 14 tablet 0     nicotine (NICODERM CQ) 14 MG/24HR 24 hr patch Place 1 patch onto the skin every 24 hours (Patient not taking: Reported on 9/24/2018) 30 patch 0     nicotine (NICODERM CQ) 21 MG/24HR 24 hr patch Place 1 patch onto the skin every 24 hours (Patient not taking: Reported on 9/24/2018) 30 patch 0     nicotine (NICODERM CQ) 7 MG/24HR 24 hr patch Place 1 patch onto the skin every 24 hours (Patient not taking: Reported on 9/24/2018) 30 patch 0       PAST SURGICAL HISTORY:  Past Surgical History:   Procedure Laterality Date     HC TOOTH EXTRACTION W/FORCEP  1995    [place] Teeth Extraction       ALLERGIES:     Allergies   Allergen Reactions     Wellbutrin [Bupropion]      Wellbutrin: Panic attacks, heart/chest pain       FAMILY HISTORY:  - Premature coronary artery disease  - Atrial fibrillation  - Sudden cardiac death     SOCIAL HISTORY:  Social History   Substance Use Topics     Smoking status: Former Smoker     Packs/day: 1.00     Years: 10.00     Types: Cigarettes     Start date: 1/1/2004     Quit date: 8/15/2018     Smokeless tobacco: Never Used      Comment:  pack or less a day     Alcohol use 0.0 oz/week     0 Standard drinks or equivalent per week      Comment: Occas.       ROS:   Constitutional: No fever, chills, or sweats. Weight stable.   ENT: No visual disturbance, ear ache, epistaxis, sore throat.   Cardiovascular: As per HPI.   Respiratory: No cough, hemoptysis.    GI: No nausea, vomiting,  hematemesis, melena, or hematochezia.   : No hematuria.   Integument: Negative.   Psychiatric: Negative.   Hematologic:  Easy bruising, no easy bleeding.  Neuro: Negative.   Endocrinology: No significant heat or cold intolerance   Musculoskeletal: No myalgia.    Exam:  /75 (BP Location: Left arm, Patient Position: Sitting, Cuff Size: Adult Regular)  Pulse 67  Wt 135.6 kg (299 lb)  SpO2 96%  BMI 46.93 kg/m2     GENERAL APPEARANCE: alert and no distress  HEENT: no icterus, no xanthelasmas, normal pupil size and reaction, normal palate, mucosa moist, no central cyanosis  NECK: no adenopathy, no asymmetry, masses, or scars, thyroid normal to palpation and no bruits, JVP not elevated  RESPIRATORY: lungs clear to auscultation - no rales, rhonchi or wheezes, no use of accessory muscles, no retractions, respirations are unlabored, normal respiratory rate  CARDIOVASCULAR: regular rhythm, normal S1 with physiologic split S2, no S3 or S4 and no click or rub, precordium quiet with normal PMI. 2/6 systolic murmur  ABDOMEN: soft, non tender, without hepatosplenomegaly, no masses palpable, bowel sounds normal, aorta not enlarged by palpation, no abdominal bruits  EXTREMITIES: peripheral pulses normal, no edema, no bruits  NEURO: alert and oriented to person/place/time, normal speech, gait and affect  VASC: Radial, femoral, dorsalis pedis and posterior tibialis pulses are normal in volumes and symmetric bilaterally. No bruits are heard.  SKIN: no ecchymoses, no rashes    Labs:  CBC RESULTS:   Lab Results   Component Value Date    WBC 10.5 07/24/2018    RBC 4.90 07/24/2018    HGB 14.0 07/24/2018    HCT 43.0 07/24/2018    MCV 88 07/24/2018    MCH 28.6 07/24/2018    MCHC 32.6 07/24/2018    RDW 14.5 07/24/2018     07/24/2018       BMP RESULTS:  Lab Results   Component Value Date     07/24/2018    POTASSIUM 3.7 07/24/2018    CHLORIDE 106 07/24/2018    CO2 26 07/24/2018    ANIONGAP 6 07/24/2018    GLC 95  07/24/2018    BUN 12 07/24/2018    CR 0.63 07/24/2018    GFRESTIMATED >90 07/24/2018    GFRESTBLACK >90 07/24/2018    CHIKI 8.6 07/24/2018        INR RESULTS:  No results found for: INR    Procedures:    Reivewed Ziopatch study, and echocardiogram. Ziopatch study was remarkable for paroxysmal atrial fibrillation with periods of RVR.    Assessment and Plan:     Patient is a 38 year old female with a PMH most remarkable for depression, GERD, essential hypertension who presents in clinic today for further evaluation of atrial fibrillation.    1. Paroxysmal Atrial Fibrillation. Patient has a CHADSVASC of 2 (female, history of essential hypertension). Patient is symptomatic with episodes of atrial fibrillation. Her largest risk factors that include development of atrial fibrillation are lifestyle--including her weight.   - Recommend weight loss, discussed with patient who is trying to lose weight, and is also pursuing weight loss surgery.  - With a CHADSVASC of 2 patient should have anticoagulation. IST[facility] does not recommend anticoagulation with DOAC in patient with a BMI > 40, so will initiate coumadin therapy today.  - Will hold off on rate/rhythm control today as patient's symptoms are relatively mild.  - Focus on lifestyle changes  - Follow up with Danitza Cullen in 6 months.    I have seen, interviewed, and examined patient. I have reviewed the laboratory tests, imaging, and other investigations. I have reviewed the management plan with the patient. I discussed with the team and agree with the findings and plan in this resident/fellow/nurse practitioner's note. In addition, changes in the physical examination, assessment and plan have been incorporated into the note by myself, as to make it a single cohesive document.       Neena Faye MD, MS  Cardiology/Cardiac EP Attending Staff        CC  Patient Care Team:  Polly Mcbride MD as PCP - General  Amy Wolf, RN as Nurse Coordinator (Bariatric)

## 2018-09-24 NOTE — NURSING NOTE
"Chief Complaint   Patient presents with     Atrial Fib     NEW paroxsymal atrial fibrillation consultation referred by Dr. Soto. Discuss options- medication management versus ablation. Per patient chest discomfort,heart palpitations,dizzy and fatigue occationally       Initial /75 (BP Location: Left arm, Patient Position: Sitting, Cuff Size: Adult Regular)  Pulse 67  Wt 135.6 kg (299 lb)  SpO2 96%  BMI 46.93 kg/m2 Estimated body mass index is 46.93 kg/(m^2) as calculated from the following:    Height as of 8/17/18: 1.7 m (5' 6.93\").    Weight as of this encounter: 135.6 kg (299 lb)..  BP completed using cuff size: regular low arm    Hamlet Little L.P.N.    "

## 2018-09-24 NOTE — MR AVS SNAPSHOT
After Visit Summary   9/24/2018    Danitza Soto    MRN: [id number]           Patient Information     Date Of Birth          1980        Visit Information        Provider Department      9/24/2018 1:00 PM Neena Faye MD [facility] PHYSICIANS HEART AT [facility]        Today's Diagnoses     Paroxysmal atrial fibrillation (H)    -  1      Care Instructions    Thank you for coming to the [facility] Heart @ [facility]; please note the following instructions:      1.  Follow up with cardiology in 6 months.            If you have any questions regarding your visit please contact your care team:     Cardiology  Telephone Number   Hannah YEPEZ, RN  Charan BUNCH, RN   Shabana RAMIREZ, [place]APOLEON HUGHES, MA  Hamlet BAUTISTA, LPN   [phone]    *After hours: [phone]   For scheduling appts:     [phone] or    [phone] (select option 1)    *After hours: [phone]     For the Device Clinic (Pacemakers and ICD's)  RN's :  Hanh Cochran   During business hours: [phone]    *After business hours:  [phone] (select option 4)      Normal test result notifications will be released via [employer] or mailed within 7 business days.  All other test results, will be communicated via telephone once reviewed by your cardiologist.    If you need a medication refill please contact your pharmacy.  Please allow 3 business days for your refill to be completed.    As always, thank you for trusting us with your health care needs!                  Follow-ups after your visit        Your next 10 appointments already scheduled     Sep 26, 2018  8:30 AM CDT   (Arrive by 8:15 AM)   NUTRITION VISIT with DAVIE Gonzáles [facility] ([facility])    [address] [place] [address] [phone]            Sep 28, 2018 11:00 AM CDT   (Arrive by 10:45 AM)   Bariatric Surgery Evaluation Interview with Anju GIL  Lior, PhD   [facility] ([facility])    [address] [address]   [phone]            Sep 28, 2018 12:00 PM CDT   (Arrive by 11:45 AM)   Bariatric Surgery Evaluation Testing with Anju Tinajero, PhD   [facility] ([facility])    [address] [place] [address]   [phone]            Oct 04, 2018  8:45 AM CDT   (Arrive by 8:30 AM)   Pre-Surgical Evaluation with Sandy Cervantes PA-C   [facility] ([facility])    [address] [place] [address]   [phone]            Oct 04, 2018 10:20 AM CDT   (Arrive by 10:05 AM)   Bariatric Surgeon Visit with Artis Tse MD   [facility] ([facility])    [address] [place] [address]   [phone]            Mar 26, 2019  1:30 PM CDT   Return Visit with Leena Heath MD   [facility] PHYS[facility]IANS HEART AT [facility] ([facility])    [address] [place] [id number] [phone]              Future tests that were ordered for you today     Open Future Orders        Priority Expected Expires Ordered    TSH Routine  9/24/2019 9/24/2018            Who to contact     If you have questions or need follow up information about today's clinic visit or your schedule please contact [facility] PHYSICIANS HEART AT [facility] directly at [phone].  Normal or non-critical lab and imaging results will be communicated to you by MyChart, letter or phone within 4 business days after the clinic has received the results. If you do not hear from us within 7 days, please contact the clinic through MyChart or phone. If you have a critical or abnormal lab result, we will notify you by phone as  soon as possible.  Submit refill requests through [employer] or call your pharmacy and they will forward the refill request to us. Please allow 3 business days for your refill to be completed.          Additional Information About Your Visit        [email or web address] Information     [employer] gives you secure access to your electronic health record. If you see a primary care provider, you can also send messages to your care team and make appointments. If you have questions, please call your primary care clinic.  If you do not have a primary care provider, please call [phone] and they will assist you.        Care EveryWhere ID     This is your Care EveryWhere ID. This could be used by other organizations to access your [place] medical records  [id number]        Your Vitals Were     Pulse Pulse Oximetry BMI (Body Mass Index)             67 96% 46.93 kg/m2          Blood Pressure from Last 3 Encounters:   09/24/18 147/73   09/13/18 106/70   08/17/18 115/76    Weight from Last 3 Encounters:   09/24/18 135.6 kg (299 lb)   09/13/18 137 kg (302 lb)   08/28/18 135.9 kg (299 lb 11.2 oz)                 Today's Medication Changes          These changes are accurate as of 9/24/18  2:45 PM.  If you have any questions, ask your nurse or doctor.               Start taking these medicines.        Dose/Directions    warfarin 5 MG tablet   Commonly known as:  COUMADIN   Used for:  Paroxysmal atrial fibrillation (H)   Started by:  Neena Faye MD        Take 10 mg (2 tablets) for two days then 5 mg (1 tablet) daily.  INR clinic will adjust warfarin dose.   Quantity:  30 tablet   Refills:  0         Stop taking these medicines if you haven't already. Please contact your care team if you have questions.     metroNIDAZOLE 500 MG tablet   Commonly known as:  FLAGYL   Stopped by:  Neena Faye MD                Where to get your medicines      These medications were sent to [facility] [address] [place] [address] AT  [address]  [address] [place] [address]     Phone:  [phone]     warfarin 5 MG tablet                Primary Care Provider Office Phone # Fax #    Polly Mcbride [phone] [address]   [facility] [address]        Equal Access to Services     ELDA NORMAN : Hadii aad ku hadasho Soomaali, waaxda luqadaha, qaybta kaalmada adeegyada, waxay idiin hayaan adeeg kharash la'aan ah. So [facility] [phone].    ATENCIÓN: Si habla español, tiene a bailey disposición servicios gratuitos de asistencia lingüística. Marie al [phone].    We comply with applicable federal civil rights laws and [place] laws. We do not discriminate on the basis of race, color, national origin, age, disability, sex, sexual orientation, or gender identity.            Thank you!     Thank you for choosing [facility] PHYSICIANS HEART AT [facility]  for your care. Our goal is always to provide you with excellent care. Hearing back from our patients is one way we can continue to improve our services. Please take a few minutes to complete the written survey that you may receive in the mail after your visit with us. Thank you!             Your Updated Medication List - Protect others around you: Learn how to safely use, store and throw away your medicines at www.disposemymeds.org.          This list is accurate as of 9/24/18  2:45 PM.  Always use your most recent med list.                   Brand Name Dispense Instructions for use Diagnosis    ADVIL 200 MG tablet   Generic drug:  ibuprofen      TAKE THREE TABLETS BY MOUTH QD-BID PRN        CENTRUM PO           fish oil-omega-3 fatty acids 1000 MG capsule      Take 2 g by mouth daily        hydrochlorothiazide 25 MG tablet    HYDRODIURIL    90 tablet    Take 1 tablet (25 mg) by mouth daily    Essential hypertension with goal blood pressure less than 140/90       lisinopril 40 MG tablet    PRINIVIL/ZESTRIL    90 tablet     Take 1 tablet (40 mg) by mouth daily    Essential hypertension with goal blood pressure less than 140/90       LORazepam 0.5 MG tablet    ATIVAN    20 tablet    Take 1 tablet (0.5 mg) by mouth every 8 hours as needed for anxiety    Anxiety       NEXIUM PO           * nicotine 21 MG/24HR 24 hr patch    NICODERM CQ    30 patch    Place 1 patch onto the skin every 24 hours    Encounter for smoking cessation counseling       * nicotine 14 MG/24HR 24 hr patch    NICODERM CQ    30 patch    Place 1 patch onto the skin every 24 hours    Encounter for smoking cessation counseling       * nicotine 7 MG/24HR 24 hr patch    NICODERM CQ    30 patch    Place 1 patch onto the skin every 24 hours    Encounter for smoking cessation counseling       PROBIOTIC ADVANCED PO           topiramate 25 MG tablet    TOPAMAX    90 tablet    25 mg at bedtime for 1 week, 50 mg at bedtime for 1 week and 75 mg daily at bedtime thereafter        varenicline 1 MG tablet    CHANTIX    60 tablet    Take 1 tablet (1 mg) by mouth 2 times daily    Tobacco use disorder       VENTOLIN  (90 Base) MCG/ACT inhaler   Generic drug:  albuterol      INHALE 1-2 PUFFS EVERY 4-6 HOURS AS NEEDED FOR WHEEZING        Vitamin D (Cholecalciferol) 1000 units Tabs      Take 2 capsules by mouth daily        warfarin 5 MG tablet    COUMADIN    30 tablet    Take 10 mg (2 tablets) for two days then 5 mg (1 tablet) daily.  INR clinic will adjust warfarin dose.    Paroxysmal atrial fibrillation (H)       * Notice:  This list has 3 medication(s) that are the same as other medications prescribed for you. Read the directions carefully, and ask your doctor or other care provider to review them with you.

## 2018-09-24 NOTE — PATIENT INSTRUCTIONS
Thank you for coming to the [facility] @ [place]; please note the following instructions:      1.  Follow up with cardiology in 6 months.            If you have any questions regarding your visit please contact your care team:     Cardiology  Telephone Number   Hannah YEPEZ, BOBBY BUNCH, RN   Shabana RAMIREZ, [place] HUGHES, MA  Hamlet BAUTISTA LPN   [phone]    *After hours: [phone]   For scheduling appts:     [phone] or    [phone] (select option 1)    *After hours: [phone]     For the Device Clinic (Pacemakers and ICD's)  RN's :  Hanh Cochran   During business hours: [phone]    *After business hours:  [phone] (select option 4)      Normal test result notifications will be released via [employer] or mailed within 7 business days.  All other test results, will be communicated via telephone once reviewed by your cardiologist.    If you need a medication refill please contact your pharmacy.  Please allow 3 business days for your refill to be completed.    As always, thank you for trusting us with your health care needs!

## 2018-09-24 NOTE — LETTER
9/24/2018      RE: Danitza Soto  [address]  [facility] [address]       Dear Colleague,    Thank you for the opportunity to participate in the care of your patient, Danitza Soto, at the [facility] HEART AT [facility]. Please see a copy of my visit note below.    HPI:     I was asked by Dr. SHRUTI Prather to evaluate patient for newly diagnosed paroxysmal atrial fibrillation.    Patient is a 38 year old female with a PMH most remarkable for depression, GERD, essential hypertension who presents in clinic today for further evaluation of atrial fibrillation.    Patient states that for the past year she has experienced periods of palpitations, chest pain, and generally feeling unwell. She present in cardiology clinic for evaluation of palpitations and it was discovered on Ziopatch study that she intermittently goes into atrial fibrillation. For these periods she was symptomatic.    Patient denies fevers, chills, chest pain, SOB, abdominal pain, nasuea, vomiting, diarrhea. Generally sleeps well with a CPAP machine. No night sweats, no chills, no weight loss, patient is trying to lose weight.    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 - BRISEYDA I     Mixed hyperlipidemia 8/24/2016     Other anxiety states        CURRENT MEDICATIONS:  Current Outpatient Prescriptions   Medication Sig Dispense Refill     ADVIL 200 MG OR TABS TAKE THREE TABLETS BY MOUTH QD-BID PRN       Esomeprazole Magnesium (NEXIUM PO)        fish oil-omega-3 fatty acids 1000 MG capsule Take 2 g by mouth daily       hydrochlorothiazide (HYDRODIURIL) 25 MG tablet Take 1 tablet (25 mg) by mouth daily 90  tablet 3     lisinopril (PRINIVIL/ZESTRIL) 40 MG tablet Take 1 tablet (40 mg) by mouth daily 90 tablet 3     LORazepam (ATIVAN) 0.5 MG tablet Take 1 tablet (0.5 mg) by mouth every 8 hours as needed for anxiety 20 tablet 0     Multiple Vitamins-Minerals (CENTRUM PO)        Probiotic Product (PROBIOTIC ADVANCED PO)        topiramate (TOPAMAX) 25 MG tablet 25 mg at bedtime for 1 week, 50 mg at bedtime for 1 week and 75 mg daily at bedtime thereafter 90 tablet 3     varenicline (CHANTIX) 1 MG tablet Take 1 tablet (1 mg) by mouth 2 times daily 60 tablet 1     VENTOLIN  (90 Base) MCG/ACT Inhaler INHALE 1-2 PUFFS EVERY 4-6 HOURS AS NEEDED FOR WHEEZING  0     Vitamin D, Cholecalciferol, 1000 UNITS TABS Take 2 capsules by mouth daily       metroNIDAZOLE (FLAGYL) 500 MG tablet Take 1 tablet (500 mg) by mouth 2 times daily (Patient not taking: Reported on 9/24/2018) 14 tablet 0     nicotine (NICODERM CQ) 14 MG/24HR 24 hr patch Place 1 patch onto the skin every 24 hours (Patient not taking: Reported on 9/24/2018) 30 patch 0     nicotine (NICODERM CQ) 21 MG/24HR 24 hr patch Place 1 patch onto the skin every 24 hours (Patient not taking: Reported on 9/24/2018) 30 patch 0     nicotine (NICODERM CQ) 7 MG/24HR 24 hr patch Place 1 patch onto the skin every 24 hours (Patient not taking: Reported on 9/24/2018) 30 patch 0       PAST SURGICAL HISTORY:  Past Surgical History:   Procedure Laterality Date     HC TOOTH EXTRACTION W/FORCEP  1995    [place] Teeth Extraction       ALLERGIES:     Allergies   Allergen Reactions     Wellbutrin [Bupropion]      Wellbutrin: Panic attacks, heart/chest pain       FAMILY HISTORY:  - Premature coronary artery disease  - Atrial fibrillation  - Sudden cardiac death     SOCIAL HISTORY:  Social History   Substance Use Topics     Smoking status: Former Smoker     Packs/day: 1.00     Years: 10.00     Types: Cigarettes     Start date: 1/1/2004     Quit date: 8/15/2018     Smokeless tobacco: Never Used       Comment:  pack or less a day     Alcohol use 0.0 oz/week     0 Standard drinks or equivalent per week      Comment: Occas.     Exam:  /75 (BP Location: Left arm, Patient Position: Sitting, Cuff Size: Adult Regular)  Pulse 67  Wt 135.6 kg (299 lb)  SpO2 96%  BMI 46.93 kg/m2     GENERAL APPEARANCE: alert and no distress  HEENT: no icterus, no xanthelasmas, normal pupil size and reaction, normal palate, mucosa moist, no central cyanosis  NECK: no adenopathy, no asymmetry, masses, or scars, thyroid normal to palpation and no bruits, JVP not elevated  RESPIRATORY: lungs clear to auscultation - no rales, rhonchi or wheezes, no use of accessory muscles, no retractions, respirations are unlabored, normal respiratory rate  CARDIOVASCULAR: regular rhythm, normal S1 with physiologic split S2, no S3 or S4 and no click or rub, precordium quiet with normal PMI. 2/6 systolic murmur  ABDOMEN: soft, non tender, without hepatosplenomegaly, no masses palpable, bowel sounds normal, aorta not enlarged by palpation, no abdominal bruits  EXTREMITIES: peripheral pulses normal, no edema, no bruits  NEURO: alert and oriented to person/place/time, normal speech, gait and affect  VASC: Radial, femoral, dorsalis pedis and posterior tibialis pulses are normal in volumes and symmetric bilaterally. No bruits are heard.  SKIN: no ecchymoses, no rashes    Labs:  CBC RESULTS:   Lab Results   Component Value Date    WBC 10.5 07/24/2018    RBC 4.90 07/24/2018    HGB 14.0 07/24/2018    HCT 43.0 07/24/2018    MCV 88 07/24/2018    MCH 28.6 07/24/2018    MCHC 32.6 07/24/2018    RDW 14.5 07/24/2018     07/24/2018       BMP RESULTS:  Lab Results   Component Value Date     07/24/2018    POTASSIUM 3.7 07/24/2018    CHLORIDE 106 07/24/2018    CO2 26 07/24/2018    ANIONGAP 6 07/24/2018    GLC 95 07/24/2018    BUN 12 07/24/2018    CR 0.63 07/24/2018    GFRESTIMATED >90 07/24/2018    GFRESTBLACK >90 07/24/2018    CHIKI 8.6 07/24/2018         INR RESULTS:  No results found for: INR    Procedures:    Reivewed Ziopatch study, and echocardiogram. Ziopatch study was remarkable for paroxysmal atrial fibrillation with periods of RVR.    Assessment and Plan:     Patient is a 38 year old female with a PMH most remarkable for depression, GERD, essential hypertension who presents in clinic today for further evaluation of atrial fibrillation.    1. Paroxysmal Atrial Fibrillation. Patient has a CHADSVASC of 2 (female, history of essential hypertension). Patient is symptomatic with episodes of atrial fibrillation. Her largest risk factors that include development of atrial fibrillation are lifestyle--including her weight.   - Recommend weight loss, discussed with patient who is trying to lose weight, and is also pursuing weight loss surgery.  - With a CHADSVASC of 2 patient should have anticoagulation. ISTH does not recommend anticoagulation with DOAC in patient with a BMI > 40, so will initiate coumadin therapy today.  - Will hold off on rate/rhythm control today as patient's symptoms are relatively mild.  - Focus on lifestyle changes  - Follow up with Danitza Cullen in 6 months.    I have seen, interviewed, and examined patient. I have reviewed the laboratory tests, imaging, and other investigations. I have reviewed the management plan with the patient. I discussed with the team and agree with the findings and plan in this resident/fellow/nurse practitioner's note. In addition, changes in the physical examination, assessment and plan have been incorporated into the note by myself, as to make it a single cohesive document.       Neena Faye MD, MS  Cardiology/Cardiac EP Attending Staff        CC  Patient Care Team:  Polly Mcbride MD as PCP - Amy Bryan RN as Nurse Coordinator (Bariatric)        Please do not hesitate to contact me if you have any questions/concerns.     Sincerely,     Neena Faye MD

## 2018-09-24 NOTE — NURSING NOTE
Med Reconcile: Reviewed and verified all current medications with the patient. The updated medication list was printed and given to the patient.  Return Appointment: Patient given instructions regarding scheduling next clinic visit. Patient demonstrated understanding of this information and agreed to call with further questions or concerns.  6 mo follow up  Patient stated she understood all health information given and agreed to call with further questions or concerns.  Hannah Conte RN

## 2018-09-26 ENCOUNTER — ALLIED HEALTH/NURSE VISIT (OUTPATIENT)
Dept: SURGERY | Facility: CLINIC | Age: 38
End: 2018-09-26
Payer: COMMERCIAL

## 2018-09-26 VITALS — WEIGHT: 293 LBS | BODY MASS INDEX: 46.38 KG/M2

## 2018-09-26 NOTE — MR AVS SNAPSHOT
"                  MRN:[id number]                      After Visit Summary   9/26/2018    Danitza Soto    MRN: [id number]           Visit Information        Provider Department      9/26/2018 8:30 AM Kelsey Sousa RD [facility]        Your next 10 appointments already scheduled     Sep 28, 2018 11:00 AM CDT   (Arrive by 10:45 AM)   Bariatric Surgery Evaluation Interview with Anju Tinajero, PhD   [facility] ([facility])    [address] [place] [address]   [phone]            Sep 28, 2018 12:00 PM CDT   (Arrive by 11:45 AM)   Bariatric Surgery Evaluation Testing with Anju Tinajero, PhD   [facility] ([facility])    [address] [place] [address]   [phone]            Oct 04, 2018  8:45 AM CDT   (Arrive by 8:30 AM)   Pre-Surgical Evaluation with Sandy Cervantes PA-C   [facility] ([facility])    [address] [place] [address]   [phone]            Oct 04, 2018 10:20 AM CDT   (Arrive by 10:05 AM)   Bariatric Surgeon Visit with Artis Tse MD   [facility] ([facility])    [address] [place] [address]   [phone]            Mar 26, 2019  1:30 PM CDT   Return Visit with Leena Heath MD   [facility] PHYSICIANS [facility] AT [facility] ([facility])    [address] [place] [address]   [phone]              Care Instructions    Goals:  Relating To Eating:  Eat slowly (20-30 minutes per meal), chewing foods well (25 chews per bite/applesauce consistency)  9\" Plate method (1/2 plate non-starchy vegetables/fruit, 1/4 plate lean protein, 1/4 plate whole grain starch - no more than 1/2 cup " carb/meal)  Avoid snacking on candy/carbohydrates - choose protein  Follow the Modified Liquid Diet for weight loss:  Breakfast: Protein Shake  Lunch: Protein Shake  Supper: 3 oz lean protein + non-starchy vegetables  Snack: non-starchy vegetables (no calorie-containing dips/condiments)  Beverages: at least 48-64 oz water between meals daily    *Protein Shake Criteria: no more than 210 Calories, at least 20 grams of protein, and less than 10 grams of sugar     Relating to beverages:  Separate fluids from meals by 30 minutes before, during, and after eating - separate beverages at least twice per week  Drink 48-64 oz of water per day    Relating to activity:  Increase activity as able try to get 3,000 - 5,000 steps per day     Goals after surgery:   1. Follow the bariatric post-op diet advancement schedule:  - Bariatric clear liquid diet through post-op day 1 (while in the hospital).  - Bariatric low-fat full liquid diet on post-op days 2 through 13 (2 weeks).  2. Sip on 48-64 oz (or greater) fluids daily, recording intake to help stay on-track.  - Drink at least 2 oz of fluid every 30 min.    Kelsey Sousa RD, LD  If you need to schedule or reschedule with a dietitian please call [phone].          [employer] Information     [employer] gives you secure access to your electronic health record. If you see a primary care provider, you can also send messages to your care team and make appointments. If you have questions, please call your primary care clinic.  If you do not have a primary care provider, please call [phone] and they will assist you.      [employer] is an electronic gateway that provides easy, online access to your medical records. With [employer], you can request a clinic appointment, read your test results, renew a prescription or communicate with your care team.     To access your existing account, please contact your [facility] or call [phone] for assistance.         Care EveryWhere ID     This is your Care EveryWhere ID. This could be used by other organizations to access your [place] medical records  [id number]        Equal Access to Services     ELDA NORMAN : Xavier Storey, addison yeager, ryan ferris, christiano shah. So [facility] [phone].    ATENCIÓN: Si habla español, tiene a bailey disposición servicios gratuitos de asistencia lingüística. Llame al [phone].    We comply with applicable federal civil rights laws and [place] laws. We do not discriminate on the basis of race, color, national origin, age, disability, sex, sexual orientation, or gender identity.

## 2018-09-26 NOTE — PROGRESS NOTES
"Bariatric Nutrition Consultation Note    Reason For Visit: Nutrition Assessment    Danitza Soto is a 38 year old presenting today for bariatric nutrition consult and nutrition education regarding clear and low-fat full liquid diet for post-bariatric surgery (SG).   Pt is interested in laparoscopic sleeve gastrectomy with Dr. Tse.  This is pt's 3rd of 3 required nutrition visits prior to surgery. Pt referred by Sandy LANG(7/24/18).     ANTHROPOMETRICS:  Initial Consult Weight: 308.8 lbs with BMI of 48.47  Current Weight: 295.5 lbs (-4.2 lbs in the past month, -13.3 lbs since initial visit)    Required weight loss goal pre-op: 20 lbs from initial consult weight (goal weight 288 lbs or less before surgery)    Diet History:  Pt reports no history of receiving clear and low-fat full liquid diet education after bariatric surgery in the past.    SUPPLEMENT INFORMATION:  Multivitamin, Vit D3 5,000 international units, fish oil    PROGRESS WITH PREVIOUS GOALS:  Relating To Eating:  Eat slowly (20-30 minutes per meal), chewing foods well (25 chews per bite/applesauce consistency) -met/continues  9\" Plate method (1/2 plate non-starchy vegetables/fruit, 1/4 plate lean protein, 1/4 plate whole grain starch - no more than 1/2 cup carb/meal) - maybe one shake per day  Avoid snacking on candy/carbohydrates - choose protein - not snacking as often or more controlled with snack    Relating to beverages:  Separate fluids from meals by 30 minutes before, during, and after eating - separate beverages at least twice per week  - not drinking during meal but will drink soon after meals  Drink 48-64 oz of water per day - 4 cans of pop per day    Relating to activity:  Increase activity as able try to get at least 5,000 steps per day - knee pain over the past month has decreased activity 3500- 4500 steps per day    NUTRITION DIAGNOSIS:  Obesity r/t long history of self-monitoring deficit and excessive energy intake aeb BMI >30 " "kg/m2.  And  Food- and Nutrition-related knowledge deficit r/t lack of prior exposure to information AEB pt unable to verbalize main points of bariatric clear and low-fat full liquid diet.    INTERVENTION:  Intervention Provided/Education Provided/Reviewed previous goals and encouraged patient to continue goals prior to surgery.     Provided instruction on bariatric clear and low-fat full liquid diets.  Provided the following handouts: Diet Guidelines for Bariatric Surgery, Sources of Protein, Keeping Track of Your Fluids, list of recommended vitamin/mineral supplementation after SG surgery and RD contact information.       Patient Understanding: good  Expected Compliance: good    Goals:  Relating To Eating:  Eat slowly (20-30 minutes per meal), chewing foods well (25 chews per bite/applesauce consistency)  9\" Plate method (1/2 plate non-starchy vegetables/fruit, 1/4 plate lean protein, 1/4 plate whole grain starch - no more than 1/2 cup carb/meal)  Avoid snacking on candy/carbohydrates - choose protein  Follow the Modified Liquid Diet for weight loss:  Breakfast: Protein Shake  Lunch: Protein Shake  Supper: 3 oz lean protein + non-starchy vegetables  Snack: non-starchy vegetables (no calorie-containing dips/condiments)  Beverages: at least 48-64 oz water between meals daily    *Protein Shake Criteria: no more than 210 Calories, at least 20 grams of protein, and less than 10 grams of sugar     Relating to beverages:  Separate fluids from meals by 30 minutes before, during, and after eating - separate beverages at least twice per week  Drink 48-64 oz of water per day    Relating to activity:  Increase activity as able try to get at least 5,000 steps per day     Goals after surgery:   1. Follow the bariatric post-op diet advancement schedule:  - Bariatric clear liquid diet through post-op day 1 (while in the hospital).  - Bariatric low-fat full liquid diet on post-op days 2 through 13 (2 weeks).  2. Sip on 48-64 oz " (or greater) fluids daily, recording intake to help stay on-track.  - Drink at least 2 oz of fluid every 30 min.    Time spent with patient: 45 minutes.  Kelsey Sousa RD, LD

## 2018-09-26 NOTE — PATIENT INSTRUCTIONS
"Goals:  Relating To Eating:  Eat slowly (20-30 minutes per meal), chewing foods well (25 chews per bite/applesauce consistency)  9\" Plate method (1/2 plate non-starchy vegetables/fruit, 1/4 plate lean protein, 1/4 plate whole grain starch - no more than 1/2 cup carb/meal)  Avoid snacking on candy/carbohydrates - choose protein  Follow the Modified Liquid Diet for weight loss:  Breakfast: Protein Shake  Lunch: Protein Shake  Supper: 3 oz lean protein + non-starchy vegetables  Snack: non-starchy vegetables (no calorie-containing dips/condiments)  Beverages: at least 48-64 oz water between meals daily    *Protein Shake Criteria: no more than 210 Calories, at least 20 grams of protein, and less than 10 grams of sugar     Relating to beverages:  Separate fluids from meals by 30 minutes before, during, and after eating - separate beverages at least twice per week  Drink 48-64 oz of water per day    Relating to activity:  Increase activity as able try to get 3,000 - 5,000 steps per day     Goals after surgery:   1. Follow the bariatric post-op diet advancement schedule:  - Bariatric clear liquid diet through post-op day 1 (while in the hospital).  - Bariatric low-fat full liquid diet on post-op days 2 through 13 (2 weeks).  2. Sip on 48-64 oz (or greater) fluids daily, recording intake to help stay on-track.  - Drink at least 2 oz of fluid every 30 min.    Kelsey Sousa, DAVIE, LD  If you need to schedule or reschedule with a dietitian please call [phone].   "

## 2018-09-28 ENCOUNTER — OFFICE VISIT (OUTPATIENT)
Dept: GASTROENTEROLOGY | Facility: CLINIC | Age: 38
End: 2018-09-28
Payer: COMMERCIAL

## 2018-09-28 ENCOUNTER — TELEPHONE (OUTPATIENT)
Dept: FAMILY MEDICINE | Facility: CLINIC | Age: 38
End: 2018-09-28

## 2018-09-28 DIAGNOSIS — E66.01 MORBID OBESITY DUE TO EXCESS CALORIES (H): ICD-10-CM

## 2018-09-28 DIAGNOSIS — F54 PSYCHOLOGICAL FACTORS AFFECTING MEDICAL CONDITION: Primary | ICD-10-CM

## 2018-09-28 DIAGNOSIS — F33.42 RECURRENT MAJOR DEPRESSIVE DISORDER, IN FULL REMISSION (H): ICD-10-CM

## 2018-09-28 ASSESSMENT — ANXIETY QUESTIONNAIRES
2. NOT BEING ABLE TO STOP OR CONTROL WORRYING: NOT AT ALL
7. FEELING AFRAID AS IF SOMETHING AWFUL MIGHT HAPPEN: NOT AT ALL
GAD7 TOTAL SCORE: 1
5. BEING SO RESTLESS THAT IT IS HARD TO SIT STILL: NOT AT ALL
4. TROUBLE RELAXING: NOT AT ALL
1. FEELING NERVOUS, ANXIOUS, OR ON EDGE: NOT AT ALL
3. WORRYING TOO MUCH ABOUT DIFFERENT THINGS: NOT AT ALL
6. BECOMING EASILY ANNOYED OR IRRITABLE: SEVERAL DAYS
7. FEELING AFRAID AS IF SOMETHING AWFUL MIGHT HAPPEN: NOT AT ALL

## 2018-09-28 ASSESSMENT — PATIENT HEALTH QUESTIONNAIRE - PHQ9
SUM OF ALL RESPONSES TO PHQ QUESTIONS 1-9: 3
10. IF YOU CHECKED OFF ANY PROBLEMS, HOW DIFFICULT HAVE THESE PROBLEMS MADE IT FOR YOU TO DO YOUR WORK, TAKE CARE OF THINGS AT HOME, OR GET ALONG WITH OTHER PEOPLE: NOT DIFFICULT AT ALL
SUM OF ALL RESPONSES TO PHQ QUESTIONS 1-9: 3

## 2018-09-28 NOTE — MR AVS SNAPSHOT
After Visit Summary   9/28/2018    Danitza Soto    MRN: [id number]           Patient Information     Date Of Birth          1980        Visit Information        Provider Department      9/28/2018 11:00 AM Anju Tinajero, PhD [facility]        Today's Diagnoses     Psychological factors affecting medical condition    -  1    Morbid obesity due to excess calories (H)        Recurrent major depressive disorder, in full remission (H)           Follow-ups after your visit        Your next 10 appointments already scheduled     Oct 04, 2018  8:45 AM CDT   (Arrive by 8:30 AM)   Pre-Surgical Evaluation with Sandy Cervantes PA-C   [facility] ([facility] [place])    [address] [place] [address] [id number] [phone]            Oct 04, 2018 10:20 AM CDT   (Arrive by 10:05 AM)   Bariatric Surgeon Visit with Artis Tse MD   [facility] ([facility] [place])    [address] [place] [address] [id number] [phone]            Mar 26, 2019  1:30 PM CDT   Return Visit with Leena Heath MD   [facility] ([facility])    [address] [place] [id number] [phone]              Who to contact     Please call your clinic at [phone] to:    Ask questions about your health    Make or cancel appointments    Discuss your medicines    Learn about your test results    Speak to your doctor            Additional Information About Your Visit        MyChart Information     [employer] gives you secure access to your electronic health record. If you see a primary care provider, you can also send messages to your care team and make appointments. If you have questions, please call your primary care clinic.  If you do not have a primary care provider, please call [phone] and  they will assist you.      [employer] is an electronic gateway that provides easy, online access to your medical records. With [employer], you can request a clinic appointment, read your test results, renew a prescription or communicate with your care team.     To access your existing account, please contact your [facility] or call [phone] for assistance.        Care EveryWhere ID     This is your Care EveryWhere ID. This could be used by other organizations to access your [facility] medical records  [id number]         Blood Pressure from Last 3 Encounters:   09/24/18 147/73   09/13/18 106/70   08/17/18 115/76    Weight from Last 3 Encounters:   09/26/18 134 kg (295 lb 8 oz)   09/24/18 135.6 kg (299 lb)   09/13/18 137 kg (302 lb)              Today, you had the following     No orders found for display       Primary Care Provider Office Phone # Fax #    Polly Mcbride [phone]       [address] [address]        Equal Access to Services     [facility]: Hadii aad ku hadasho Soomaali, waaxda luqadaha, qaybta kaalmada adeegyada, waxay renea hayjamal garcia . So [facility] [phone].    ATENCIÓN: Si habla español, tiene a bailey disposición servicios gratuitos de asistencia lingüística. Jenniferame al [phone].    We comply with applicable federal civil rights laws and [place] laws. We do not discriminate on the basis of race, color, national origin, age, disability, sex, sexual orientation, or gender identity.            Thank you!     Thank you for choosing [facility]  for your care. Our goal is always to provide you with excellent care. Hearing back from our patients is one way we can continue to improve our services. Please take a few minutes to complete the written survey that you may receive in the mail after your visit with us. Thank you!             Your Updated Medication List - Protect others around  you: Learn how to safely use, store and throw away your medicines at www.disposemymeds.org.          This list is accurate as of 9/28/18  2:06 PM.  Always use your most recent med list.                   Brand Name Dispense Instructions for use Diagnosis    ADVIL 200 MG tablet   Generic drug:  ibuprofen      TAKE THREE TABLETS BY MOUTH QD-BID PRN        CENTRUM PO           fish oil-omega-3 fatty acids 1000 MG capsule      Take 2 g by mouth daily        hydrochlorothiazide 25 MG tablet    HYDRODIURIL    90 tablet    Take 1 tablet (25 mg) by mouth daily    Essential hypertension with goal blood pressure less than 140/90       lisinopril 40 MG tablet    PRINIVIL/ZESTRIL    90 tablet    Take 1 tablet (40 mg) by mouth daily    Essential hypertension with goal blood pressure less than 140/90       LORazepam 0.5 MG tablet    ATIVAN    20 tablet    Take 1 tablet (0.5 mg) by mouth every 8 hours as needed for anxiety    Anxiety       NEXIUM PO           * nicotine 21 MG/24HR 24 hr patch    NICODERM CQ    30 patch    Place 1 patch onto the skin every 24 hours    Encounter for smoking cessation counseling       * nicotine 14 MG/24HR 24 hr patch    NICODERM CQ    30 patch    Place 1 patch onto the skin every 24 hours    Encounter for smoking cessation counseling       * nicotine 7 MG/24HR 24 hr patch    NICODERM CQ    30 patch    Place 1 patch onto the skin every 24 hours    Encounter for smoking cessation counseling       PROBIOTIC ADVANCED PO           topiramate 25 MG tablet    TOPAMAX    90 tablet    25 mg at bedtime for 1 week, 50 mg at bedtime for 1 week and 75 mg daily at bedtime thereafter        varenicline 1 MG tablet    CHANTIX    60 tablet    Take 1 tablet (1 mg) by mouth 2 times daily    Tobacco use disorder       VENTOLIN  (90 Base) MCG/ACT inhaler   Generic drug:  albuterol      INHALE 1-2 PUFFS EVERY 4-6 HOURS AS NEEDED FOR WHEEZING        Vitamin D (Cholecalciferol) 1000 units Tabs      Take 2 capsules  by mouth daily        warfarin 5 MG tablet    COUMADIN    30 tablet    Take 10 mg (2 tablets) for two days then 5 mg (1 tablet) daily.  INR clinic will adjust warfarin dose.    Paroxysmal atrial fibrillation (H)       * Notice:  This list has 3 medication(s) that are the same as other medications prescribed for you. Read the directions carefully, and ask your doctor or other care provider to review them with you.

## 2018-09-28 NOTE — PROGRESS NOTES
The patient completed the following battery of assessments during this pre-operative bariatric surgery psychological evaluation: World Health Organization Disability Assessment Schedule 2.0 12-item (WHODAS), Patient Health Questionnaire-9 (PHQ-9), Generalized Anxiety Disorder-7 screener (GABRIELA-7), CAGE Questionnaire Adapted to Include Drugs (CAGE-AID), and the [place] Multiphasic Personality Inventory-2 (MMPI-2). Results will be included in the full report to follow.     Anju Tinajero, PhD,   Clinical Health Psychologist

## 2018-09-28 NOTE — TELEPHONE ENCOUNTER
She would need a PAP and cotest early next year- Jan or Feb of 2019   Can you inform the pt ?  Thanks

## 2018-09-28 NOTE — TELEPHONE ENCOUNTER
"Per pap result from 3/21/17, Dr. Mcbride had recommended patient repeat pap/hpv in 1 yr, due 3/21/18.  [facility] states pap in 2018 \"not needed\". Will send encounter to Dr. Mcbride to verify plan. If pap/hpv no longer needed in 2018, when would you recommend patient repeat?    Patient hx:   6/17/14: LSIL, + HPV, unable to type.   8/20/14:[place]:BRISEYDA I. Plan cotest pap & HPV in 1 year.   1/20116 Pap Ascus Neg HPV. Plan: [place].   5/13/16 Colposcopy not completed. Cancelled by patient  3/21/17 NIL/Neg HPV. Plan: cotest in 1 year per Dr. Mcbride    Thank you,  Solange Cesar, BSN, RN, Pap Tracking Nurse     "

## 2018-09-28 NOTE — LETTER
9/28/2018       RE: Danitza Soto  [address]  [facility] [address]     Dear Colleague,    Thank you for referring your patient, Danitza Soto, to the [facility] at [facility]. Please see a copy of my visit note below.    The patient completed the following battery of assessments during this pre-operative bariatric surgery psychological evaluation: World Health Organization Disability Assessment Schedule 2.0 12-item (WHODAS), Patient Health Questionnaire-9 (PHQ-9), Generalized Anxiety Disorder-7 screener (GABRIELA-7), CAGE Questionnaire Adapted to Include Drugs (CAGE-AID), and the [place] Multiphasic Personality Inventory-2 (MMPI-2). Results will be included in the full report to follow.     Again, thank you for allowing me to participate in the care of your patient.      Sincerely,    Anju Tinajero, PhD

## 2018-09-28 NOTE — TELEPHONE ENCOUNTER
Tracking updated. Pap team will send reminder to patient two weeks before next pap/hpv is due.   CAYLA OronaN, RN, Pap Tracking Nurse
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
## 2018-09-28 NOTE — MR AVS SNAPSHOT
After Visit Summary   9/28/2018    Danitza Soto    MRN: [id number]           Patient Information     Date Of Birth          1980        Visit Information        Provider Department      9/28/2018 12:00 PM Anju Tinajero, PhD [facility]        Today's Diagnoses     Psychological factors affecting medical condition    -  1    Morbid obesity due to excess calories (H)           Follow-ups after your visit        Your next 10 appointments already scheduled     Oct 04, 2018  8:45 AM CDT   (Arrive by 8:30 AM)   Pre-Surgical Evaluation with Sandy Cervantes PA-C   [facility] Surgical [facility] Management ([facility])    [address] [place] [id number] [phone]            Oct 04, 2018 10:20 AM CDT   (Arrive by 10:05 AM)   Bariatric Surgeon Visit with Artis Tse MD   [facility] ([facility])    [address] [place] [id number] [phone]            Mar 26, 2019  1:30 PM CDT   Return Visit with Leena Heath MD   [facility] ([facility])    [address] [place] [id number] [phone]              Who to contact     Please call your clinic at [phone] to:    Ask questions about your health    Make or cancel appointments    Discuss your medicines    Learn about your test results    Speak to your doctor            Additional Information About Your Visit        [employer] Information     [employer] gives you secure access to your electronic health record. If you see a primary care provider, you can also send messages to your care team and make appointments. If you have questions, please call your primary care clinic.  If you do not have a primary care provider, please call [phone] and they will assist you.      [employer] is an electronic gateway that  provides easy, online access to your medical records. With [employer], you can request a clinic appointment, read your test results, renew a prescription or communicate with your care team.     To access your existing account, please contact your [facility] or call [phone] for assistance.        Care EveryWhere ID     This is your Care EveryWhere ID. This could be used by other organizations to access your [facility] medical records  [id number]         Blood Pressure from Last 3 Encounters:   09/24/18 147/73   09/13/18 106/70   08/17/18 115/76    Weight from Last 3 Encounters:   09/26/18 134 kg (295 lb 8 oz)   09/24/18 135.6 kg (299 lb)   09/13/18 137 kg (302 lb)              Today, you had the following     No orders found for display       Primary Care Provider Office Phone # Fax #    Polly Mcbride [phone]       [address] [address]        Equal Access to Services     ELDA NORMAN : Hadii aad ku hadasho Soomaali, waaxda luqadaha, qaybta kaalmada adeegyada, waxay idiin hayaan leonard garcia . So [facility] [phone].    ATENCIÓN: Si habla español, tiene a bailey disposición servicios gratuitos de asistencia lingüística. Ll[facility] [phone].    We comply with applicable federal civil rights laws and [place] laws. We do not discriminate on the basis of race, color, national origin, age, disability, sex, sexual orientation, or gender identity.            Thank you!     Thank you for choosing [facility]  for your care. Our goal is always to provide you with excellent care. Hearing back from our patients is one way we can continue to improve our services. Please take a few minutes to complete the written survey that you may receive in the mail after your visit with us. Thank you!             Your Updated Medication List - Protect others around you: Learn how to safely use, store and throw away your  medicines at www.disposemymeds.org.          This list is accurate as of 9/28/18 12:52 PM.  Always use your most recent med list.                   Brand Name Dispense Instructions for use Diagnosis    ADVIL 200 MG tablet   Generic drug:  ibuprofen      TAKE THREE TABLETS BY MOUTH QD-BID PRN        CENTRUM PO           fish oil-omega-3 fatty acids 1000 MG capsule      Take 2 g by mouth daily        hydrochlorothiazide 25 MG tablet    HYDRODIURIL    90 tablet    Take 1 tablet (25 mg) by mouth daily    Essential hypertension with goal blood pressure less than 140/90       lisinopril 40 MG tablet    PRINIVIL/ZESTRIL    90 tablet    Take 1 tablet (40 mg) by mouth daily    Essential hypertension with goal blood pressure less than 140/90       LORazepam 0.5 MG tablet    ATIVAN    20 tablet    Take 1 tablet (0.5 mg) by mouth every 8 hours as needed for anxiety    Anxiety       NEXIUM PO           * nicotine 21 MG/24HR 24 hr patch    NICODERM CQ    30 patch    Place 1 patch onto the skin every 24 hours    Encounter for smoking cessation counseling       * nicotine 14 MG/24HR 24 hr patch    NICODERM CQ    30 patch    Place 1 patch onto the skin every 24 hours    Encounter for smoking cessation counseling       * nicotine 7 MG/24HR 24 hr patch    NICODERM CQ    30 patch    Place 1 patch onto the skin every 24 hours    Encounter for smoking cessation counseling       PROBIOTIC ADVANCED PO           topiramate 25 MG tablet    TOPAMAX    90 tablet    25 mg at bedtime for 1 week, 50 mg at bedtime for 1 week and 75 mg daily at bedtime thereafter        varenicline 1 MG tablet    CHANTIX    60 tablet    Take 1 tablet (1 mg) by mouth 2 times daily    Tobacco use disorder       VENTOLIN  (90 Base) MCG/ACT inhaler   Generic drug:  albuterol      INHALE 1-2 PUFFS EVERY 4-6 HOURS AS NEEDED FOR WHEEZING        Vitamin D (Cholecalciferol) 1000 units Tabs      Take 2 capsules by mouth daily        warfarin 5 MG tablet    COUMADIN     30 tablet    Take 10 mg (2 tablets) for two days then 5 mg (1 tablet) daily.  INR clinic will adjust warfarin dose.    Paroxysmal atrial fibrillation (H)       * Notice:  This list has 3 medication(s) that are the same as other medications prescribed for you. Read the directions carefully, and ask your doctor or other care provider to review them with you.
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
## 2018-09-29 ASSESSMENT — ANXIETY QUESTIONNAIRES: GAD7 TOTAL SCORE: 1

## 2018-09-29 ASSESSMENT — PATIENT HEALTH QUESTIONNAIRE - PHQ9: SUM OF ALL RESPONSES TO PHQ QUESTIONS 1-9: 3

## 2018-10-04 ENCOUNTER — OFFICE VISIT (OUTPATIENT)
Dept: SURGERY | Facility: CLINIC | Age: 38
End: 2018-10-04
Payer: COMMERCIAL

## 2018-10-04 VITALS
DIASTOLIC BLOOD PRESSURE: 64 MMHG | TEMPERATURE: 98.4 F | HEART RATE: 69 BPM | HEIGHT: 67 IN | BODY MASS INDEX: 45.99 KG/M2 | OXYGEN SATURATION: 98 % | SYSTOLIC BLOOD PRESSURE: 120 MMHG | WEIGHT: 293 LBS

## 2018-10-04 VITALS
HEART RATE: 69 BPM | DIASTOLIC BLOOD PRESSURE: 64 MMHG | WEIGHT: 293 LBS | SYSTOLIC BLOOD PRESSURE: 120 MMHG | BODY MASS INDEX: 45.99 KG/M2 | TEMPERATURE: 98.4 F | HEIGHT: 67 IN | OXYGEN SATURATION: 98 %

## 2018-10-04 DIAGNOSIS — E66.01 MORBID OBESITY DUE TO EXCESS CALORIES (H): Primary | ICD-10-CM

## 2018-10-04 DIAGNOSIS — E66.01 MORBID OBESITY (H): Primary | ICD-10-CM

## 2018-10-04 RX ORDER — TOPIRAMATE 25 MG/1
50 TABLET, FILM COATED ORAL 2 TIMES DAILY
Qty: 120 TABLET | Refills: 3 | Status: SHIPPED | OUTPATIENT
Start: 2018-10-04 | End: 2019-01-03

## 2018-10-04 NOTE — LETTER
10/4/2018       RE: Danitza Soto  [address]  [facility] [address]     Dear Colleague,    Thank you for referring your patient, Danitza Soto, to the [facility] SURGICAL WEIGHT MANAGEMENT at [facility]. Please see a copy of my visit note below.    Pre-Bariatric Surgery Note    Polly Mcbride    Date: 10/4/2018     RE: Danitza Soot    MR#: [id number]   : 1980   Date of Visit: Oct 4, 2018    REASON FOR VISIT: Pre-operative evaluation for possible weight loss surgery    Dear Polly Addison,    I had the pleasure of seeing your patient, Danitza Soto, in my preoperative bariatric clinic.    As you know, she is morbidly obese and considering weight loss surgery to treat obesity in association with her medical conditions of obesity.  Her consult weight was 308.  She has lost 13 pounds since her consult weight. She has not met her required pre-surgery weight.    Most recent weights:  Wt Readings from Last 4 Encounters:   10/04/18 295 lb 4.8 oz   18 295 lb 8 oz   18 299 lb   18 302 lb       Please refer to initial consult note from date 18 for patient's weight history and co-morbidities.    ROS    Past Medical History:   Diagnosis Date     Depressive disorder      Esophageal reflux      Hearing problem      Hyperlipidemia LDL goal <130 2015     Hypertension      LSIL (low grade squamous intraepithelial lesion) on Pap smear 2014    + HPV, unable to type colp - BRISEYDA I     Mixed hyperlipidemia 2016     Other anxiety states        Past Surgical History:   Procedure Laterality Date     HC TOOTH EXTRACTION W/FORCEP      [place] Teeth Extraction       Current Outpatient Prescriptions   Medication     ADVIL 200 MG OR TABS     Esomeprazole Magnesium (NEXIUM PO)     fish oil-omega-3 fatty acids 1000 MG capsule     hydrochlorothiazide (HYDRODIURIL) 25 MG tablet     lisinopril (PRINIVIL/ZESTRIL) 40 MG tablet     LORazepam  "(ATIVAN) 0.5 MG tablet     Multiple Vitamins-Minerals (CENTRUM PO)     Probiotic Product (PROBIOTIC ADVANCED PO)     topiramate (TOPAMAX) 25 MG tablet     varenicline (CHANTIX) 1 MG tablet     VENTOLIN  (90 Base) MCG/ACT Inhaler     Vitamin D, Cholecalciferol, 1000 UNITS TABS     warfarin (COUMADIN) 5 MG tablet     nicotine (NICODERM CQ) 14 MG/24HR 24 hr patch     nicotine (NICODERM CQ) 21 MG/24HR 24 hr patch     nicotine (NICODERM CQ) 7 MG/24HR 24 hr patch     No current facility-administered medications for this visit.        Allergies   Allergen Reactions     Wellbutrin [Bupropion]      Wellbutrin: Panic attacks, heart/chest pain       PHYSICAL EXAMINATION:  /64  Pulse 69  Temp 98.4  F (36.9  C) (Oral)  Ht 5' 6.93\"  Wt 295 lb 4.8 oz  SpO2 98%  BMI 46.35 kg/m2   Body mass index is 46.35 kg/(m^2).   NAD NCAT  Respiratory: breathing unlabored  Abdomen: obese, soft/nt/nd        I emphasized exercise and activity behavior along with appropriate food choice as the main foundation for weight loss with surgery providing surgical reinforcement of the appropriate behavior set.    PLAN:    Call Mina to schedule surgery date and PAC visit  [phone]    Increase topiramate to 50mg twice daily    Plan that she will be at weight loss goal (7 more lbs) by PAC clinic visit. If not at goal will need full liquid diet and weight checks            Review of general surgery weight loss process    1. Complete preoperative requirements, including weight loss.  Final weight check to confirm MANDATORY weight loss requirement must be documented on a clinic scale.    2. Discuss prior authorization with .    3. History and physical evaluation by PCP of PAC clinic within 30 days of surgery date, preoperative class, and weight check (weigh-in visit) to be scheduled by patient.  Pre-anesthesia clinic for risk evaluation to be scheduled by anesthesia clinic.    4. We cannot guarantee that patient will " qualify for surgery unless all preoperative requirements are met, prior authorization from primary insurance company is granted, and insurance changes do not occur.    5. It is possible for patients to regain all weight after weight loss surgery unless they follow guidelines prescribed by our bariatric center.    6. All patients with gastrointestinal complaints after weight loss surgery must have complaints conveyed to the bariatric team for appropriate treatment.    7. Vitamin deficiencies may develop post-bariatric surgery and annual laboratory testing should be performed.    8. Persistent nausea/vomiting after bariatric surgery entails risk of thiamine deficiency and should be treated early.  Vitamin B12 deficiency may develop, especially after gastric bypass surgery and must be recognized.        If you have any questions about our plans please don't hesitate to contact me.    Sincerely,    Sandy Cervantes PA-C    I spent a total of 15 minutes face to face with Danitza Soto during today's office visit.  Over 50% of this time was spent counseling the patient and/or coordinating care.

## 2018-10-04 NOTE — PROGRESS NOTES
Pre-Bariatric Surgery Note    Polly Mcbride    Date: 10/4/2018     RE: Danitza Soto    MR#: [id number]   : 1980   Date of Visit: Oct 4, 2018    REASON FOR VISIT: Pre-operative evaluation for possible weight loss surgery    Dear Polly Addison,    I had the pleasure of seeing your patient, Danitza Soto, in my preoperative bariatric clinic.    As you know, she is morbidly obese and considering weight loss surgery to treat obesity in association with her medical conditions of obesity.  Her consult weight was 308.  She has lost 13 pounds since her consult weight. She has not met her required pre-surgery weight.    Most recent weights:  Wt Readings from Last 4 Encounters:   10/04/18 295 lb 4.8 oz   18 295 lb 8 oz   18 299 lb   18 302 lb       Please refer to initial consult note from date 18 for patient's weight history and co-morbidities.    ROS    Past Medical History:   Diagnosis Date     Depressive disorder      Esophageal reflux      Hearing problem      Hyperlipidemia LDL goal <130 2015     Hypertension      LSIL (low grade squamous intraepithelial lesion) on Pap smear 2014    + HPV, unable to type colp - BRISEYDA I     Mixed hyperlipidemia 2016     Other anxiety states        Past Surgical History:   Procedure Laterality Date     HC TOOTH EXTRACTION W/FORCEP      [place] Teeth Extraction       Current Outpatient Prescriptions   Medication     ADVIL 200 MG OR TABS     Esomeprazole Magnesium (NEXIUM PO)     fish oil-omega-3 fatty acids 1000 MG capsule     hydrochlorothiazide (HYDRODIURIL) 25 MG tablet     lisinopril (PRINIVIL/ZESTRIL) 40 MG tablet     LORazepam (ATIVAN) 0.5 MG tablet     Multiple Vitamins-Minerals (CENTRUM PO)     Probiotic Product (PROBIOTIC ADVANCED PO)     topiramate (TOPAMAX) 25 MG tablet     varenicline (CHANTIX) 1 MG tablet     VENTOLIN  (90 Base) MCG/ACT Inhaler     Vitamin D, Cholecalciferol, 1000 UNITS TABS     warfarin  "(COUMADIN) 5 MG tablet     nicotine (NICODERM CQ) 14 MG/24HR 24 hr patch     nicotine (NICODERM CQ) 21 MG/24HR 24 hr patch     nicotine (NICODERM CQ) 7 MG/24HR 24 hr patch     No current facility-administered medications for this visit.        Allergies   Allergen Reactions     Wellbutrin [Bupropion]      Wellbutrin: Panic attacks, heart/chest pain       PHYSICAL EXAMINATION:  /64  Pulse 69  Temp 98.4  F (36.9  C) (Oral)  Ht 5' 6.93\"  Wt 295 lb 4.8 oz  SpO2 98%  BMI 46.35 kg/m2   Body mass index is 46.35 kg/(m^2).   NAD NCAT  Respiratory: breathing unlabored  Abdomen: obese, soft/nt/nd        I emphasized exercise and activity behavior along with appropriate food choice as the main foundation for weight loss with surgery providing surgical reinforcement of the appropriate behavior set.    PLAN:    Call Mina to schedule surgery date and PAC visit  [phone]    Increase topiramate to 50mg twice daily    Plan that she will be at weight loss goal (7 more lbs) by PAC clinic visit. If not at goal will need full liquid diet and weight checks            Review of general surgery weight loss process    1. Complete preoperative requirements, including weight loss.  Final weight check to confirm MANDATORY weight loss requirement must be documented on a clinic scale.    2. Discuss prior authorization with .    3. History and physical evaluation by PCP of PAC clinic within 30 days of surgery date, preoperative class, and weight check (weigh-in visit) to be scheduled by patient.  Pre-anesthesia clinic for risk evaluation to be scheduled by anesthesia clinic.    4. We cannot guarantee that patient will qualify for surgery unless all preoperative requirements are met, prior authorization from primary insurance company is granted, and insurance changes do not occur.    5. It is possible for patients to regain all weight after weight loss surgery unless they follow guidelines prescribed by our " bariatric center.    6. All patients with gastrointestinal complaints after weight loss surgery must have complaints conveyed to the bariatric team for appropriate treatment.    7. Vitamin deficiencies may develop post-bariatric surgery and annual laboratory testing should be performed.    8. Persistent nausea/vomiting after bariatric surgery entails risk of thiamine deficiency and should be treated early.  Vitamin B12 deficiency may develop, especially after gastric bypass surgery and must be recognized.        If you have any questions about our plans please don't hesitate to contact me.    Sincerely,    Sandy Cervantes PA-C    I spent a total of 15 minutes face to face with Danitza Soto during today's office visit.  Over 50% of this time was spent counseling the patient and/or coordinating care.

## 2018-10-04 NOTE — LETTER
"10/4/2018       RE: Danitza Soto  [address]  [facility] [address]     Dear Colleague,    Thank you for referring your patient, Danitza Soto, to the [facility] SURG[facility]NAGEMENT at [facility]. Please see a copy of my visit note below.    Dear Dr. Mcbride      Referring provider:       7/23/2018   Who referred you? Dr. Polly Mcbride     I was asked to see the patient regarding obesity by the referring provider above.    I had the pleasure of meeting with your patient Danitza Soto in our weight loss surgery office.  This patient is a 38 year old female who has been undergoing our thorough preoperative screening process in anticipation of potential bariatric surgery.    At initial evaluation we recorded Danitza Soto's height of 5' 6.929\", a weight of 308 lbs 12.8 oz, and calculated Body mass index is 48.47 kg/(m^2).  The patient has been unsuccessful with other methods of permanent weight loss and suffers from multiple weight related medical conditions.  Due to lack of success in achieving weight loss through other methods, she is interested in undergoing bariatric surgery.    She has been working with our [facility] for weight management.    PREVIOUS WEIGHT LOSS ATTEMPTS:     7/23/2018   I have tried the following methods to lose weight Watching portions or calories, Exercise       CO-MORBIDITIES OF OBESITY INCLUDE:     7/23/2018   I have the following co-morbidities associated with obesity: High Blood Pressure, High Cholesterol, Sleep Apnea, GERD (Reflux), Weight Bearing Joint Pain   Do you use a CPAP? Yes   Are you taking daily medication for heartburn, acid reflux, or GERD (acid reflux disease)? Yes       VITALS:  /64  Pulse 69  Temp 98.4  F (36.9  C) (Oral)  Ht 5' 6.93\"  Wt 295 lb 4.8 oz  SpO2 98%  BMI 46.35 kg/m2    PE:  GENERAL: Alert and oriented x3. NAD  HEENT exam: Sclerae not icteric. Hearing good. Head normocephalic and atraumatic. "   CARDIOVASCULAR: No JVD  RESPIRATORY: Breathing unlabored  GASTROINTESTINAL: Obese  LOWER EXTREMITIES: no deformities  MUSCULOSKELETAL: Normal gait, Moves all 4 extremities equal and strong  NEUROLOGIC: no gross defect  SKIN: warm and dry to touch     In summary, she has undergone an appropriate medical evaluation, dietitian evaluation, as well as psychologic screening. The patient appears to be an appropriate candidate for bariatric surgery.    In the office today, I discussed the laparoscopic gastric sleeve surgery and that is her preferred operative approach.  Risks, benefits and anticipated outcomes were outlined including the risk of death, staple line leak (1-2%), PE, DVT, ulcer, worsening GERD, N/V, stricture, hernia, wound infection, weight regain, and vitamin deficiencies. This patient has a good chance of sustaining permanent weight loss due to this procedure.  This should also allow improvement if not resolution of his/her weight related medical conditions.    At present we are going to present your patient's file for prior authorization to insurance.  Pending prior authorization, I anticipate a surgical date in the near future.  We will keep you updated on any progress.  If you have questions regarding care please feel free to contact me.  Total time spent in the clinic was 10 minutes with greater than 50% in face-to-face consultation.    Sincerely,    Artis Tse

## 2018-10-04 NOTE — NURSING NOTE
"(   Chief Complaint   Patient presents with     RECHECK     PBS    )    ( Weight: 295 lb 4.8 oz )  ( Height: 5' 6.93\" )  ( BMI (Calculated): 46.44 )  ( Initial Weight: 308 lb 12.8 oz )  ( Cumulative weight loss (lbs): 13.5 )  ( Last Visits Weight: 308 lb 12.8 oz )  ( Wt change since last visit (lbs): -13.5 )  (   )  (   )    ( BP: 120/64 )  (   )  ( Temp: 98.4  F (36.9  C) )  ( Temp src: Oral )  ( Pulse: 69 )  (   )  ( SpO2: 98 % )    (   Patient Active Problem List   Diagnosis     Anxiety state     Esophageal reflux     Morbid obesity due to excess calories (H)     Tobacco use disorder     CARDIOVASCULAR SCREENING; LDL GOAL LESS THAN 130     Hypertension goal BP (blood pressure) < 140/90     Acne     Papanicolaou smear of cervix with low grade squamous intraepithelial lesion (LGSIL)     Mixed hyperlipidemia     LEONARDO (obstructive sleep apnea)     Obesity hypoventilation syndrome (H)     Lumbago     Left anterior knee pain    )  (   Current Outpatient Prescriptions   Medication Sig Dispense Refill     ADVIL 200 MG OR TABS TAKE THREE TABLETS BY MOUTH QD-BID PRN       Esomeprazole Magnesium (NEXIUM PO)        fish oil-omega-3 fatty acids 1000 MG capsule Take 2 g by mouth daily       hydrochlorothiazide (HYDRODIURIL) 25 MG tablet Take 1 tablet (25 mg) by mouth daily 90 tablet 3     lisinopril (PRINIVIL/ZESTRIL) 40 MG tablet Take 1 tablet (40 mg) by mouth daily 90 tablet 3     LORazepam (ATIVAN) 0.5 MG tablet Take 1 tablet (0.5 mg) by mouth every 8 hours as needed for anxiety 20 tablet 0     Multiple Vitamins-Minerals (CENTRUM PO)        nicotine (NICODERM CQ) 14 MG/24HR 24 hr patch Place 1 patch onto the skin every 24 hours 30 patch 0     nicotine (NICODERM CQ) 21 MG/24HR 24 hr patch Place 1 patch onto the skin every 24 hours 30 patch 0     nicotine (NICODERM CQ) 7 MG/24HR 24 hr patch Place 1 patch onto the skin every 24 hours 30 patch 0     Probiotic Product (PROBIOTIC ADVANCED PO)        topiramate (TOPAMAX) 25 MG " tablet 25 mg at bedtime for 1 week, 50 mg at bedtime for 1 week and 75 mg daily at bedtime thereafter 90 tablet 3     varenicline (CHANTIX) 1 MG tablet Take 1 tablet (1 mg) by mouth 2 times daily 60 tablet 1     VENTOLIN  (90 Base) MCG/ACT Inhaler INHALE 1-2 PUFFS EVERY 4-6 HOURS AS NEEDED FOR WHEEZING  0     Vitamin D, Cholecalciferol, 1000 UNITS TABS Take 2 capsules by mouth daily       warfarin (COUMADIN) 5 MG tablet Take 10 mg (2 tablets) for two days then 5 mg (1 tablet) daily.  INR clinic will adjust warfarin dose. 30 tablet 0    )  ( Diabetes Eval:    )    ( Pain Eval:  Data Unavailable )    ( Wound Eval:       )    (   History   Smoking Status     Former Smoker     Packs/day: 1.00     Years: 10.00     Types: Cigarettes     Start date: 1/1/2004     Quit date: 8/15/2018   Smokeless Tobacco     Never Used     Comment:  pack or less a day    )    ( Signed By:  Emmanuelle Kingsley; October 4, 2018; 8:27 AM )

## 2018-10-04 NOTE — PATIENT INSTRUCTIONS
Call Mina to schedule surgery date and PAC visit  [phone]    Increase topiramate to 50mg twice daily    Plan that she will be at weight loss goal (7 more lbs) by PAC clinic visit. If not at goal will need full liquid diet and weight checks

## 2018-10-04 NOTE — NURSING NOTE
"(   Chief Complaint   Patient presents with     RECHECK     PBS    )    ( Weight: 295 lb 4.8 oz )  ( Height: 5' 6.93\" )  ( BMI (Calculated): 46.44 )  ( Initial Weight: 308 lb 12.8 oz )  ( Cumulative weight loss (lbs): 13.5 )  ( Last Visits Weight: 308 lb 12.8 oz )  ( Wt change since last visit (lbs): -13.5 )  (   )  (   )    ( BP: 120/64 )  (   )  ( Temp: 98.4  F (36.9  C) )  ( Temp src: Oral )  ( Pulse: 69 )  (   )  ( SpO2: 98 % )    (   Patient Active Problem List   Diagnosis     Anxiety state     Esophageal reflux     Morbid obesity due to excess calories (H)     Tobacco use disorder     CARDIOVASCULAR SCREENING; LDL GOAL LESS THAN 130     Hypertension goal BP (blood pressure) < 140/90     Acne     Papanicolaou smear of cervix with low grade squamous intraepithelial lesion (LGSIL)     Mixed hyperlipidemia     LEONARDO (obstructive sleep apnea)     Obesity hypoventilation syndrome (H)     Lumbago     Left anterior knee pain    )  (   Current Outpatient Prescriptions   Medication Sig Dispense Refill     ADVIL 200 MG OR TABS TAKE THREE TABLETS BY MOUTH QD-BID PRN       Esomeprazole Magnesium (NEXIUM PO)        fish oil-omega-3 fatty acids 1000 MG capsule Take 2 g by mouth daily       hydrochlorothiazide (HYDRODIURIL) 25 MG tablet Take 1 tablet (25 mg) by mouth daily 90 tablet 3     lisinopril (PRINIVIL/ZESTRIL) 40 MG tablet Take 1 tablet (40 mg) by mouth daily 90 tablet 3     LORazepam (ATIVAN) 0.5 MG tablet Take 1 tablet (0.5 mg) by mouth every 8 hours as needed for anxiety 20 tablet 0     Multiple Vitamins-Minerals (CENTRUM PO)        Probiotic Product (PROBIOTIC ADVANCED PO)        topiramate (TOPAMAX) 25 MG tablet 25 mg at bedtime for 1 week, 50 mg at bedtime for 1 week and 75 mg daily at bedtime thereafter 90 tablet 3     varenicline (CHANTIX) 1 MG tablet Take 1 tablet (1 mg) by mouth 2 times daily 60 tablet 1     VENTOLIN  (90 Base) MCG/ACT Inhaler INHALE 1-2 PUFFS EVERY 4-6 HOURS AS NEEDED FOR WHEEZING  0 "     Vitamin D, Cholecalciferol, 1000 UNITS TABS Take 2 capsules by mouth daily       warfarin (COUMADIN) 5 MG tablet Take 10 mg (2 tablets) for two days then 5 mg (1 tablet) daily.  INR clinic will adjust warfarin dose. 30 tablet 0     nicotine (NICODERM CQ) 14 MG/24HR 24 hr patch Place 1 patch onto the skin every 24 hours (Patient not taking: Reported on 9/24/2018) 30 patch 0     nicotine (NICODERM CQ) 21 MG/24HR 24 hr patch Place 1 patch onto the skin every 24 hours (Patient not taking: Reported on 9/24/2018) 30 patch 0     nicotine (NICODERM CQ) 7 MG/24HR 24 hr patch Place 1 patch onto the skin every 24 hours (Patient not taking: Reported on 9/24/2018) 30 patch 0    )  ( Diabetes Eval:    )    ( Pain Eval:  Data Unavailable )    ( Wound Eval:       )    (   History   Smoking Status     Former Smoker     Packs/day: 1.00     Years: 10.00     Types: Cigarettes     Start date: 1/1/2004     Quit date: 8/15/2018   Smokeless Tobacco     Never Used     Comment:  pack or less a day    )    ( Signed By:  Emmanuelle Kingsley; October 4, 2018; 8:25 AM )

## 2018-10-08 NOTE — PROGRESS NOTES
"Dear Dr. Mcbride      Referring provider:       7/23/2018   Who referred you? Dr. Polly Mcbride     I was asked to see the patient regarding obesity by the referring provider above.    I had the pleasure of meeting with your patient Danitza Soto in our weight loss surgery office.  This patient is a 38 year old female who has been undergoing our thorough preoperative screening process in anticipation of potential bariatric surgery.    At initial evaluation we recorded Danitza Soto's height of 5' 6.929\", a weight of 308 lbs 12.8 oz, and calculated Body mass index is 48.47 kg/(m^2).  The patient has been unsuccessful with other methods of permanent weight loss and suffers from multiple weight related medical conditions.  Due to lack of success in achieving weight loss through other methods, she is interested in undergoing bariatric surgery.    She has been working with our [facility] for weight management.    PREVIOUS WEIGHT LOSS ATTEMPTS:     7/23/2018   I have tried the following methods to lose weight Watching portions or calories, Exercise       CO-MORBIDITIES OF OBESITY INCLUDE:     7/23/2018   I have the following co-morbidities associated with obesity: High Blood Pressure, High Cholesterol, Sleep Apnea, GERD (Reflux), Weight Bearing Joint Pain   Do you use a CPAP? Yes   Are you taking daily medication for heartburn, acid reflux, or GERD (acid reflux disease)? Yes       VITALS:  /64  Pulse 69  Temp 98.4  F (36.9  C) (Oral)  Ht 5' 6.93\"  Wt 295 lb 4.8 oz  SpO2 98%  BMI 46.35 kg/m2    PE:  GENERAL: Alert and oriented x3. NAD  HEENT exam: Sclerae not icteric. Hearing good. Head normocephalic and atraumatic.   CARDIOVASCULAR: No JVD  RESPIRATORY: Breathing unlabored  GASTROINTESTINAL: Obese  LOWER EXTREMITIES: no deformities  MUSCULOSKELETAL: Normal gait, Moves all 4 extremities equal and strong  NEUROLOGIC: no gross defect  SKIN: warm and dry to touch     In summary, she has undergone an appropriate " medical evaluation, dietitian evaluation, as well as psychologic screening. The patient appears to be an appropriate candidate for bariatric surgery.    In the office today, I discussed the laparoscopic gastric sleeve surgery and that is her preferred operative approach.  Risks, benefits and anticipated outcomes were outlined including the risk of death, staple line leak (1-2%), PE, DVT, ulcer, worsening GERD, N/V, stricture, hernia, wound infection, weight regain, and vitamin deficiencies. This patient has a good chance of sustaining permanent weight loss due to this procedure.  This should also allow improvement if not resolution of his/her weight related medical conditions.    At present we are going to present your patient's file for prior authorization to insurance.  Pending prior authorization, I anticipate a surgical date in the near future.  We will keep you updated on any progress.  If you have questions regarding care please feel free to contact me.  Total time spent in the clinic was 10 minutes with greater than 50% in face-to-face consultation.        Sincerely,    Artis Tse    Please route or send letter to:  Primary Care Provider (PCP) and Referring Provider

## 2018-10-22 ENCOUNTER — OFFICE VISIT (OUTPATIENT)
Dept: FAMILY MEDICINE | Facility: CLINIC | Age: 38
End: 2018-10-22
Payer: COMMERCIAL

## 2018-10-22 VITALS
SYSTOLIC BLOOD PRESSURE: 118 MMHG | TEMPERATURE: 98.2 F | OXYGEN SATURATION: 98 % | HEART RATE: 67 BPM | WEIGHT: 289.4 LBS | BODY MASS INDEX: 45.42 KG/M2 | HEIGHT: 67 IN | DIASTOLIC BLOOD PRESSURE: 85 MMHG

## 2018-10-22 DIAGNOSIS — I10 ESSENTIAL HYPERTENSION WITH GOAL BLOOD PRESSURE LESS THAN 140/90: Primary | ICD-10-CM

## 2018-10-22 DIAGNOSIS — I48.0 PAROXYSMAL ATRIAL FIBRILLATION (H): ICD-10-CM

## 2018-10-22 DIAGNOSIS — Z23 ENCOUNTER FOR IMMUNIZATION: ICD-10-CM

## 2018-10-22 DIAGNOSIS — E66.01 MORBID OBESITY (H): ICD-10-CM

## 2018-10-22 PROCEDURE — 99214 OFFICE O/P EST MOD 30 MIN: CPT | Mod: 25 | Performed by: FAMILY MEDICINE

## 2018-10-22 PROCEDURE — 90471 IMMUNIZATION ADMIN: CPT | Performed by: FAMILY MEDICINE

## 2018-10-22 PROCEDURE — 90686 IIV4 VACC NO PRSV 0.5 ML IM: CPT | Performed by: FAMILY MEDICINE

## 2018-10-22 RX ORDER — HYDROCHLOROTHIAZIDE 25 MG/1
12.5 TABLET ORAL EVERY MORNING
Qty: 90 TABLET | Refills: 3 | COMMUNITY
Start: 2018-10-22 | End: 2018-12-18

## 2018-10-22 NOTE — PROGRESS NOTES
"  SUBJECTIVE:   Danitza Soto is a 38 year old female who presents to clinic today for the following health issues:    Hypertension Follow-up    Outpatient blood pressures are being checked at home.  Results are ranging 110s/60s.    Low Salt Diet: not monitoring salt      Amount of exercise or physical activity: 4-5 days/week for an average of 45-60 minutes    Problems taking medications regularly: No    Medication side effects: none    Diet: regular (no restrictions)  Patient presents to clinic for blood pressure concerns.  She has been documenting her blood pressures over the past 3 weeks and it was noted to be persistently low.  Systolic blood pressures are in the 90's-120 diastolic blood pressures goes as low as 50s-70s.  She reports feeling intermittent episodes of dizziness.  She is currently preparing for weight loss surgery and is concerned about her blood pressure.     Medical history is also significant for paroxysmal atrial fib diagnosed in August 2018.  Patient was seen and evaluated by cardiology.  She was informed that it is related to her weight.  It was recommended that she starts warfarin.  But the patient has never started the medication and does not desire to start.     Problem list and histories reviewed & adjusted, as indicated.  Additional history: as documented    Reviewed and updated as needed this visit by clinical staff      ROS:  Constitutional, HEENT, cardiovascular, pulmonary, gi and gu systems are negative, except as otherwise noted.    OBJECTIVE:     /85  Pulse 67  Temp 98.2  F (36.8  C) (Oral)  Ht 5' 7\" (1.702 m)  Wt 289 lb 6.4 oz (131.3 kg)  SpO2 98%  BMI 45.33 kg/m2  Body mass index is 45.33 kg/(m^2).  GENERAL: healthy, alert and no distress  RESP: lungs clear to auscultation - no rales, rhonchi or wheezes  CV: regular rate and rhythm, normal S1 S2, no S3 or S4, no murmur, click or rub, no peripheral edema and peripheral pulses strong  PSYCH: mentation appears normal, " affect normal/bright    Diagnostic Test Results:  none     ASSESSMENT/PLAN:       ICD-10-CM    1. Essential hypertension with goal blood pressure less than 140/90 I10 hydrochlorothiazide (HYDRODIURIL) 25 MG tablet   2. Paroxysmal atrial fibrillation (H) I48.0    3. Encounter for immunization Z23 FLU VACCINE, 3 YRS +, IM (FLUZONE)     VACCINE ADMINISTRATION, INITIAL   4. Morbid obesity (H) E66.01      Medical history of paroxysmal atrial fib declined anticoagulation with warfarin. She has a CHADSVASC score of 2.   Patient is currently not taking any medications for rate/rhythm control. Advised to schedule an appointment with cardiology.     For persistently low blood pressures, I would recommend decreasing hydrochlorothiazide to 12.5 mg once daily. Re-evaluate blood pressure after weight loss surgery.     For morbid obesity. Patient is scheduled for bariatric surgery on 11/27/18.    Ken Jurado MD  [facility]

## 2018-10-22 NOTE — MR AVS SNAPSHOT
After Visit Summary   10/22/2018    Danitza Soto    MRN: [id number]           Patient Information     Date Of Birth          1980        Visit Information        Provider Department      10/22/2018 12:00 PM Ken Jurado MD [facility]        Today's Diagnoses     Essential hypertension with goal blood pressure less than 140/90    -  1    Paroxysmal atrial fibrillation (H)        Encounter for immunization        Morbid obesity (H)           Follow-ups after your visit        Your next 10 appointments already scheduled     Nov 06, 2018  9:00 AM CST   Nurse Visit with  Surgery Nurse   General Surgery ([facility] [place])    [address] [place] [phone]   [phone]            Nov 06, 2018  9:30 AM CST   (Arrive by 9:15 AM)   Return Bariatric Nutrition Visit with Kelsey Sousa RD   [facility] Surgical Weight Management ([facility])    [address] [place] [address]   [phone]            Nov 06, 2018 10:00 AM CST   (Arrive by 9:45 AM)   Office Visit with Kathy Calhoun RPH    [facility] Specialties [place] ([facility])    [address] [place] [phone]           Bring a current list of meds and any records pertaining to this visit. For Physicals, please bring immunization records and any forms needing to be filled out. Please arrive 10 minutes early to complete paperwork.            Nov 06, 2018 11:00 AM CST   (Arrive by 10:45 AM)   PAC EVALUATION with JOSE LUIS Hill [facility] ([facility] [place])    [address] [place] [id number] [phone]            Nov 27, 2018   Procedure with Artis Tse MD   [facility], [place], Same Day Surgery (--)    [address] [facility] [address] [phone]            Dec 07,  2018 10:00 AM CST   Nurse Visit with  Surgery Nurse   General Surgery ([facility] [place])    [address] [address]   [phone]            Dec 07, 2018 10:30 AM CST   (Arrive by 10:15 AM)   Return Bariatric Nutrition Visit with Kelsey Sousa RD   [facility] Surgical Weight Management ([facility])    [address] [address]   [phone]            Dec 28, 2018  2:00 PM CST   Nurse Visit with  Surgery Nurse   General Surgery ([facility] [place])    [address] [address]   [phone]            Dec 28, 2018  2:30 PM CST   (Arrive by 2:15 PM)   Return Bariatric Nutrition Visit with DAVIE Gupta [facility] Surgical Weight Management ([facility])    [address] [place] [address]   [phone]            Mar 26, 2019  1:30 PM CDT   Return Visit with Leena Heath MD   [facility] PHYSICIANS [facility] AT [facility] ([facility])    [address] [place] [address] [phone]              Who to contact     If you have questions or need follow up information about today's clinic visit or your schedule please contact [facility] directly at [phone].  Normal or non-critical lab and imaging results will be communicated to you by MyChart, letter or phone within 4 business days after the clinic has received the results. If you do not hear from us within 7 days, please contact the clinic through MyChart or phone. If you have a critical or abnormal lab result, we will notify you by phone as soon as possible.  Submit refill requests through [email or web address] or call your pharmacy and they will forward the refill request to us. Please allow 3 business days for your refill to be completed.          Additional Information About Your Visit       "  [employer] Information     [employer] gives you secure access to your electronic health record. If you see a primary care provider, you can also send messages to your care team and make appointments. If you have questions, please call your primary care clinic.  If you do not have a primary care provider, please call [phone] and they will assist you.        Care EveryWhere ID     This is your Care EveryWhere ID. This could be used by other organizations to access your [facility] medical records  [id number]        Your Vitals Were     Pulse Temperature Height Pulse Oximetry BMI (Body Mass Index)       67 98.2  F (36.8  C) (Oral) 5' 7\" (1.702 m) 98% 45.33 kg/m2        Blood Pressure from Last 3 Encounters:   10/22/18 118/85   10/04/18 120/64   10/04/18 120/64    Weight from Last 3 Encounters:   10/22/18 289 lb 6.4 oz (131.3 kg)   10/04/18 295 lb 4.8 oz (133.9 kg)   10/04/18 295 lb 4.8 oz (133.9 kg)              We Performed the Following     FLU VACCINE, 3 YRS +, IM (FLUZONE)     VACCINE ADMINISTRATION, INITIAL          Today's Medication Changes          These changes are accurate as of 10/22/18 11:59 PM.  If you have any questions, ask your nurse or doctor.               These medicines have changed or have updated prescriptions.        Dose/Directions    hydrochlorothiazide 25 MG tablet   Commonly known as:  HYDRODIURIL   This may have changed:  how much to take   Used for:  Essential hypertension with goal blood pressure less than 140/90   Changed by:  Ken Jurado MD        Dose:  12.5 mg   Take 0.5 tablets (12.5 mg) by mouth daily   Quantity:  90 tablet   Refills:  3                Primary Care Provider Office Phone # Fax #    Polly Mcbride [phone] [address] [address]        Equal Access to Services     ELDA NORMAN AH: Xavier Storey, waaxda luqadaha, qaybta kaalchristiano hermosillo. So wa " [phone].    ATENCIÓN: Si ellen moreira, tiene a bailey disposición servicios gratuitos de asistencia lingüística. Marie mann [phone].    We comply with applicable federal civil rights laws and [place] laws. We do not discriminate on the basis of race, color, national origin, age, disability, sex, sexual orientation, or gender identity.            Thank you!     Thank you for choosing [facility]  for your care. Our goal is always to provide you with excellent care. Hearing back from our patients is one way we can continue to improve our services. Please take a few minutes to complete the written survey that you may receive in the mail after your visit with us. Thank you!             Your Updated Medication List - Protect others around you: Learn how to safely use, store and throw away your medicines at www.disposemymeds.org.          This list is accurate as of 10/22/18 11:59 PM.  Always use your most recent med list.                   Brand Name Dispense Instructions for use Diagnosis    ADVIL 200 MG tablet   Generic drug:  ibuprofen      TAKE THREE TABLETS BY MOUTH QD-BID PRN        CENTRUM PO           fish oil-omega-3 fatty acids 1000 MG capsule      Take 2 g by mouth daily        hydrochlorothiazide 25 MG tablet    HYDRODIURIL    90 tablet    Take 0.5 tablets (12.5 mg) by mouth daily    Essential hypertension with goal blood pressure less than 140/90       lisinopril 40 MG tablet    PRINIVIL/ZESTRIL    90 tablet    Take 1 tablet (40 mg) by mouth daily    Essential hypertension with goal blood pressure less than 140/90       LORazepam 0.5 MG tablet    ATIVAN    20 tablet    Take 1 tablet (0.5 mg) by mouth every 8 hours as needed for anxiety    Anxiety       NEXIUM PO           * nicotine 21 MG/24HR 24 hr patch    NICODERM CQ    30 patch    Place 1 patch onto the skin every 24 hours    Encounter for smoking cessation counseling       * nicotine 14 MG/24HR 24 hr patch    NICODERM CQ    30 patch     Place 1 patch onto the skin every 24 hours    Encounter for smoking cessation counseling       * nicotine 7 MG/24HR 24 hr patch    NICODERM CQ    30 patch    Place 1 patch onto the skin every 24 hours    Encounter for smoking cessation counseling       PROBIOTIC ADVANCED PO           topiramate 25 MG tablet    TOPAMAX    120 tablet    Take 2 tablets (50 mg) by mouth 2 times daily 25 mg at bedtime for 1 week, 50 mg at bedtime for 1 week and 75 mg daily at bedtime thereafter    Morbid obesity (H)       varenicline 1 MG tablet    CHANTIX    60 tablet    Take 1 tablet (1 mg) by mouth 2 times daily    Tobacco use disorder       VENTOLIN  (90 Base) MCG/ACT inhaler   Generic drug:  albuterol      INHALE 1-2 PUFFS EVERY 4-6 HOURS AS NEEDED FOR WHEEZING        Vitamin D (Cholecalciferol) 1000 units Tabs      Take 2 capsules by mouth daily        * Notice:  This list has 3 medication(s) that are the same as other medications prescribed for you. Read the directions carefully, and ask your doctor or other care provider to review them with you.

## 2018-10-22 NOTE — NURSING NOTE
"Chief Complaint   Patient presents with     Hypertension     /85  Pulse 67  Temp 98.2  F (36.8  C) (Oral)  Ht 5' 7\" (1.702 m)  Wt 289 lb 6.4 oz (131.3 kg)  SpO2 98%  BMI 45.33 kg/m2 Estimated body mass index is 45.33 kg/(m^2) as calculated from the following:    Height as of this encounter: 5' 7\" (1.702 m).    Weight as of this encounter: 289 lb 6.4 oz (131.3 kg).  Medication Reconciliation: complete      Health Maintenance that is potentially due pending provider review:  NONE    n/a    NAPOLEON Osullivan  "

## 2018-11-06 ENCOUNTER — OFFICE VISIT (OUTPATIENT)
Dept: PHARMACY | Facility: CLINIC | Age: 38
End: 2018-11-06
Payer: COMMERCIAL

## 2018-11-06 ENCOUNTER — OFFICE VISIT (OUTPATIENT)
Dept: SURGERY | Facility: CLINIC | Age: 38
End: 2018-11-06
Payer: COMMERCIAL

## 2018-11-06 ENCOUNTER — ANESTHESIA EVENT (OUTPATIENT)
Dept: SURGERY | Facility: CLINIC | Age: 38
End: 2018-11-06

## 2018-11-06 ENCOUNTER — ALLIED HEALTH/NURSE VISIT (OUTPATIENT)
Dept: SURGERY | Facility: CLINIC | Age: 38
End: 2018-11-06
Payer: COMMERCIAL

## 2018-11-06 VITALS
WEIGHT: 288.4 LBS | BODY MASS INDEX: 45.17 KG/M2 | HEART RATE: 53 BPM | DIASTOLIC BLOOD PRESSURE: 70 MMHG | SYSTOLIC BLOOD PRESSURE: 132 MMHG

## 2018-11-06 VITALS
SYSTOLIC BLOOD PRESSURE: 132 MMHG | OXYGEN SATURATION: 98 % | HEART RATE: 53 BPM | WEIGHT: 288.4 LBS | DIASTOLIC BLOOD PRESSURE: 70 MMHG | BODY MASS INDEX: 45.27 KG/M2 | TEMPERATURE: 98.3 F | HEIGHT: 67 IN

## 2018-11-06 VITALS — WEIGHT: 288.4 LBS | BODY MASS INDEX: 45.17 KG/M2

## 2018-11-06 DIAGNOSIS — I10 HYPERTENSION GOAL BP (BLOOD PRESSURE) < 140/90: ICD-10-CM

## 2018-11-06 DIAGNOSIS — G89.29 CHRONIC PAIN OF BOTH KNEES: ICD-10-CM

## 2018-11-06 DIAGNOSIS — E66.01 MORBID OBESITY (H): Primary | ICD-10-CM

## 2018-11-06 DIAGNOSIS — Z87.09 HX OF BRONCHITIS: ICD-10-CM

## 2018-11-06 DIAGNOSIS — E66.813 CLASS 3 SEVERE OBESITY WITH BODY MASS INDEX (BMI) OF 45.0 TO 49.9 IN ADULT, UNSPECIFIED OBESITY TYPE, UNSPECIFIED WHETHER SERIOUS COMORBIDITY PRESENT (H): Primary | ICD-10-CM

## 2018-11-06 DIAGNOSIS — K21.9 GASTROESOPHAGEAL REFLUX DISEASE WITHOUT ESOPHAGITIS: ICD-10-CM

## 2018-11-06 DIAGNOSIS — Z98.84 S/P LAPAROSCOPIC SLEEVE GASTRECTOMY: ICD-10-CM

## 2018-11-06 DIAGNOSIS — M25.562 CHRONIC PAIN OF BOTH KNEES: ICD-10-CM

## 2018-11-06 DIAGNOSIS — M25.561 CHRONIC PAIN OF BOTH KNEES: ICD-10-CM

## 2018-11-06 DIAGNOSIS — F41.1 ANXIETY STATE: ICD-10-CM

## 2018-11-06 DIAGNOSIS — Z86.39 HISTORY OF VITAMIN D DEFICIENCY: ICD-10-CM

## 2018-11-06 DIAGNOSIS — E66.01 CLASS 3 SEVERE OBESITY WITH BODY MASS INDEX (BMI) OF 45.0 TO 49.9 IN ADULT, UNSPECIFIED OBESITY TYPE, UNSPECIFIED WHETHER SERIOUS COMORBIDITY PRESENT (H): Primary | ICD-10-CM

## 2018-11-06 DIAGNOSIS — Z01.818 PREOP GENERAL PHYSICAL EXAM: ICD-10-CM

## 2018-11-06 DIAGNOSIS — Z01.818 PREOP GENERAL PHYSICAL EXAM: Primary | ICD-10-CM

## 2018-11-06 DIAGNOSIS — E63.9 NUTRITIONAL DEFICIENCY: ICD-10-CM

## 2018-11-06 LAB
ALBUMIN UR-MCNC: NEGATIVE MG/DL
AMORPH CRY #/AREA URNS HPF: ABNORMAL /HPF
ANION GAP SERPL CALCULATED.3IONS-SCNC: 10 MMOL/L (ref 3–14)
APPEARANCE UR: ABNORMAL
BACTERIA #/AREA URNS HPF: ABNORMAL /HPF
BILIRUB UR QL STRIP: NEGATIVE
BUN SERPL-MCNC: 17 MG/DL (ref 7–30)
CALCIUM SERPL-MCNC: 9.3 MG/DL (ref 8.5–10.1)
CHLORIDE SERPL-SCNC: 109 MMOL/L (ref 94–109)
CO2 SERPL-SCNC: 22 MMOL/L (ref 20–32)
COLOR UR AUTO: YELLOW
CREAT SERPL-MCNC: 0.75 MG/DL (ref 0.52–1.04)
ERYTHROCYTE [DISTWIDTH] IN BLOOD BY AUTOMATED COUNT: 13.7 % (ref 10–15)
GFR SERPL CREATININE-BSD FRML MDRD: 85 ML/MIN/1.7M2
GLUCOSE SERPL-MCNC: 83 MG/DL (ref 70–99)
GLUCOSE UR STRIP-MCNC: NEGATIVE MG/DL
HCT VFR BLD AUTO: 41.2 % (ref 35–47)
HGB BLD-MCNC: 13.2 G/DL (ref 11.7–15.7)
HGB UR QL STRIP: NEGATIVE
INR PPP: 0.98 (ref 0.86–1.14)
KETONES UR STRIP-MCNC: NEGATIVE MG/DL
LEUKOCYTE ESTERASE UR QL STRIP: ABNORMAL
MCH RBC QN AUTO: 28.1 PG (ref 26.5–33)
MCHC RBC AUTO-ENTMCNC: 32 G/DL (ref 31.5–36.5)
MCV RBC AUTO: 88 FL (ref 78–100)
MUCOUS THREADS #/AREA URNS LPF: PRESENT /LPF
NITRATE UR QL: NEGATIVE
PH UR STRIP: 6 PH (ref 5–7)
PLATELET # BLD AUTO: 315 10E9/L (ref 150–450)
POTASSIUM SERPL-SCNC: 3.8 MMOL/L (ref 3.4–5.3)
RBC # BLD AUTO: 4.69 10E12/L (ref 3.8–5.2)
RBC #/AREA URNS AUTO: 1 /HPF (ref 0–2)
SODIUM SERPL-SCNC: 141 MMOL/L (ref 133–144)
SOURCE: ABNORMAL
SP GR UR STRIP: 1.02 (ref 1–1.03)
SQUAMOUS #/AREA URNS AUTO: 2 /HPF (ref 0–1)
UROBILINOGEN UR STRIP-MCNC: 0 MG/DL (ref 0–2)
WBC # BLD AUTO: 8.8 10E9/L (ref 4–11)
WBC #/AREA URNS AUTO: 11 /HPF (ref 0–5)

## 2018-11-06 PROCEDURE — 99605 MTMS BY PHARM NP 15 MIN: CPT | Performed by: PHARMACIST

## 2018-11-06 PROCEDURE — 99607 MTMS BY PHARM ADDL 15 MIN: CPT | Performed by: PHARMACIST

## 2018-11-06 ASSESSMENT — ENCOUNTER SYMPTOMS: DYSRHYTHMIAS: 1

## 2018-11-06 ASSESSMENT — LIFESTYLE VARIABLES: TOBACCO_USE: 1

## 2018-11-06 NOTE — LETTER
11/6/2018       RE: Danitza Soto  [address]  [facility] [address]     Dear Colleague,    Thank you for referring your patient, Danitza Soto, to the [facility] SURGICAL WEIGHT MANAGEMENT at [facility]. Please see a copy of my visit note below.    Nutrition Education Consult:  Nutrition education regarding clear and low-fat full liquid diet for post-bariatric surgery (SG).  Pt referred by Sandy LANG(7/24/18).  Diet History:  Pt previously received education on clear and low-fat full liquid diet education after bariatric surgery and wanted to review education.  Nutrition Diagnosis:  Food- and Nutrition-related knowledge deficit r/t needing to review information AEB pt wanting to review main points of bariatric clear and low-fat full liquid diet.  Interventions:  Provided instruction on bariatric clear and low-fat full liquid diets.  Provided the following handouts: Diet Guidelines for Bariatric Surgery, Sources of Protein, Keeping Track of Your Fluids, list of recommended vitamin/mineral supplementation after SG surgery and RD contact information.  Patient Understanding: good  Expected Compliance: good    Goals:   1. Follow the bariatric post-op diet advancement schedule:  - Bariatric clear liquid diet through post-op day 1 (while in the hospital).  - Bariatric low-fat full liquid diet on post-op days 2 through 13 (2 weeks).  2. Sip on 48-64 oz (or greater) fluids daily, recording intake to help stay on-track.  - Drink at least 2 oz of fluid every 30 min.  Time: 15 minutes        Again, thank you for allowing me to participate in the care of your patient.      Sincerely,    Kelsey Sousa RD

## 2018-11-06 NOTE — MR AVS SNAPSHOT
After Visit Summary   11/6/2018    Danitza Soto    MRN: [id number]           Patient Information     Date Of Birth          1980        Visit Information        Provider Department      11/6/2018 9:00 AM Nurse,  Surgery General Surgery        Today's Diagnoses     Morbid obesity (H)    -  1       Follow-ups after your visit        Your next 10 appointments already scheduled     Nov 06, 2018  9:30 AM CST   (Arrive by 9:15 AM)   Return Bariatric Nutrition Visit with DAVIE Gonzáles [facility] Surgical Weight Management ([facility] [place])    [address] [place] [phone]   [phone]            Nov 06, 2018 10:00 AM CST   (Arrive by 9:45 AM)   [facility] [place])    [address] [place] [phone]           Bring a current list of meds and any records pertaining to this visit. For Physicals, please bring immunization records and any forms needing to be filled out. Please arrive 10 minutes early to complete paperwork.            Nov 06, 2018 11:00 AM CST   (Arrive by 10:45 AM)   PAC EVALUATION with JOSE LUIS Hill   [facility] ([facility] [place])    [address] [place] [phone]            Nov 27, 2018   Procedure with Artis Tse MD   [facility], [place], Same Day Surgery (--)    [address] [facility] [address]   [phone]            Dec 07, 2018 10:00 AM CST   Nurse Visit with  Surgery Nurse   General Surgery ([facility] [place])    [address] [place] [phone]            Dec 07, 2018 10:30 AM CST   (Arrive by 10:15 AM)   Return Bariatric Nutrition Visit with DAVIE Gonzáles [facility] Surgical Weight Management ([facility])    [address] [address]   [phone]            Dec 28, 2018  2:00 PM CST   Nurse Visit with  Surgery Nurse   General Surgery ([facility] [place])    [address] [place] [address]   [phone]            Dec 28, 2018  2:00 PM CST   (Arrive by 1:45 PM)   Return Bariatric Nutrition Visit with Demi Alvarado RD   [facility] Surgical Weight Management ([facility] [place])    [address] [place] [address]   [phone]            Mar 26, 2019  1:30 PM CDT   Return Visit with Leena Heath MD   [facility] PHYSICIANS [facility] AT [facility] ([facility])    [address] [place] [phone]              Who to contact     Please call your clinic at [phone] to:    Ask questions about your health    Make or cancel appointments    Discuss your medicines    Learn about your test results    Speak to your doctor            Additional Information About Your Visit        [employer] Information     [employer] gives you secure access to your electronic health record. If you see a primary care provider, you can also send messages to your care team and make appointments. If you have questions, please call your primary care clinic.  If you do not have a primary care provider, please call [phone] and they will assist you.      [employer] is an electronic gateway that provides easy, online access to your medical records. With [employer], you can request a clinic appointment, read your test results, renew a prescription or communicate with your care team.     To access your existing account, please contact your [facility] or call [phone] for assistance.        Care EveryWhere ID     This is your Care EveryWhere ID. This could be used by other organizations to access your [facility]  records  [id number]        Your Vitals Were     BMI (Body Mass Index)                   45.17 kg/m2            Blood Pressure from Last 3 Encounters:   10/22/18 118/85   10/04/18 120/64   10/04/18 120/64    Weight from Last 3 Encounters:   11/06/18 288 lb 6.4 oz   10/22/18 289 lb 6.4 oz   10/04/18 295 lb 4.8 oz              Today, you had the following     No orders found for display       Primary Care Provider Office Phone # Fax #    Polly Mcbride [phone]       [address] [facility]   [facility] [facility] [address]        Equal Access to Services     [facility] CenterSOFIA : Hadii deepika Storey, addison yeager, ryan meekmatom ferris, christiano garcia . So [facility] [phone].    ATENCIÓN: Si habla español, tiene a bailey disposición servicios gratuitos de asistencia lingüística. Llame al [phone].    We comply with applicable federal civil rights laws and [place] laws. We do not discriminate on the basis of race, color, national origin, age, disability, sex, sexual orientation, or gender identity.            Thank you!     Thank you for choosing GENERAL SURGERY  for your care. Our goal is always to provide you with excellent care. Hearing back from our patients is one way we can continue to improve our services. Please take a few minutes to complete the written survey that you may receive in the mail after your visit with us. Thank you!             Your Updated Medication List - Protect others around you: Learn how to safely use, store and throw away your medicines at www.disposemymeds.org.          This list is accurate as of 11/6/18  9:03 AM.  Always use your most recent med list.                   Brand Name Dispense Instructions for use Diagnosis    ADVIL 200 MG tablet   Generic drug:  ibuprofen      TAKE THREE TABLETS BY MOUTH QD-BID PRN        CENTRUM PO           fish oil-omega-3 fatty acids 1000 MG capsule      Take 2 g by mouth daily        hydrochlorothiazide  25 MG tablet    HYDRODIURIL    90 tablet    Take 0.5 tablets (12.5 mg) by mouth daily    Essential hypertension with goal blood pressure less than 140/90       lisinopril 40 MG tablet    PRINIVIL/ZESTRIL    90 tablet    Take 1 tablet (40 mg) by mouth daily    Essential hypertension with goal blood pressure less than 140/90       LORazepam 0.5 MG tablet    ATIVAN    20 tablet    Take 1 tablet (0.5 mg) by mouth every 8 hours as needed for anxiety    Anxiety       NEXIUM PO           * nicotine 21 MG/24HR 24 hr patch    NICODERM CQ    30 patch    Place 1 patch onto the skin every 24 hours    Encounter for smoking cessation counseling       * nicotine 14 MG/24HR 24 hr patch    NICODERM CQ    30 patch    Place 1 patch onto the skin every 24 hours    Encounter for smoking cessation counseling       * nicotine 7 MG/24HR 24 hr patch    NICODERM CQ    30 patch    Place 1 patch onto the skin every 24 hours    Encounter for smoking cessation counseling       PROBIOTIC ADVANCED PO           topiramate 25 MG tablet    TOPAMAX    120 tablet    Take 2 tablets (50 mg) by mouth 2 times daily 25 mg at bedtime for 1 week, 50 mg at bedtime for 1 week and 75 mg daily at bedtime thereafter    Morbid obesity (H)       varenicline 1 MG tablet    CHANTIX    60 tablet    Take 1 tablet (1 mg) by mouth 2 times daily    Tobacco use disorder       VENTOLIN  (90 Base) MCG/ACT inhaler   Generic drug:  albuterol      INHALE 1-2 PUFFS EVERY 4-6 HOURS AS NEEDED FOR WHEEZING        Vitamin D (Cholecalciferol) 1000 units Tabs      Take 2 capsules by mouth daily        * Notice:  This list has 3 medication(s) that are the same as other medications prescribed for you. Read the directions carefully, and ask your doctor or other care provider to review them with you.

## 2018-11-06 NOTE — MR AVS SNAPSHOT
After Visit Summary   11/6/2018    Danitza Soto    MRN: [id number]           Patient Information     Date Of Birth          1980        Visit Information        Provider Department      11/6/2018 10:00 [facility]        Care Instructions    Recommendations from today's MTM visit:                                                    MTM (medication therapy management) is a service provided by a clinical pharmacist designed to help you get the most of out of your medicines.   Today we reviewed what your medicines are for, how to know if they are working, that your medicines are safe and how to make your medicine regimen as easy as possible.     1. Aspercreme (lidocaine) - for knee pain as needed.     Post-Operative Medication Regimen Plan:     Continue these medications after surgery:     Hydrochlorothiazide    Lisinopril     Lorazepam    Topiramate     Ventolin HFA    Hold these medications after surgery:     Nexium - will be changing to liquid omeprazole.    Advil - No NSAIDs after surgery (Advil, ibuprofen, Aleve, naproxen)    All vitamins until 1 week after your surgery - the dietician will talk to you about this at your 1 week post-op appointment     Medications prescribed immediately after surgery:  1.  Omeprazole 20 mg suspension by mouth once daily 30-60 minutes before breakfast.  This medication is to help prevent heartburn and acid reflux.  2.  Pain medication-use as needed for acute pain.    3.  Ondansetron as needed for nausea.  If having nausea when taking pain medication, may use ondansetron to alleviate nausea.  4.  Stool softener/laxative-to use as needed for opioid induced constipation  5.  Levsin (hyoscyamine)-to use as needed for abdominal cramping.    Next MTM visit: 1 month after surgery     To schedule another MTM appointment, please call the clinic directly or you may call the MTM scheduling line at [phone] or toll-free at  [phone].     My Clinical Pharmacist's contact information:                                                      It was a pleasure talking with you today!  Please feel free to contact me with any questions or concerns you have.      Kathy Calhoun, PharmD  Medication Therapy Management Pharmacist   [facility]   Phone: [phone]        You may receive a survey about the [facility] services you received.  I would appreciate your feedback to help me serve you better in the future. Please fill it out and return it when you can. Your comments will be anonymous.                      Follow-ups after your visit        Your next 10 appointments already scheduled     Nov 06, 2018  9:30 AM CST   (Arrive by 9:15 AM)   Return Bariatric Nutrition Visit with Kelsey Sousa RD   [facility] Surgical Weight Management ([facility])    [address] [place] [id number] [phone]            Nov 06, 2018 10:00 AM CST   (Arrive by 9:45 AM)   Office Visit with Kathy Calhoun RPH   [facility] Specialties [facility] ([facility])    [address] [place] [address] [id number] [phone]           Bring a current list of meds and any records pertaining to this visit. For Physicals, please bring immunization records and any forms needing to be filled out. Please arrive 10 minutes early to complete paperwork.            Nov 06, 2018 11:00 AM CST   (Arrive by 10:45 AM)   PAC EVALUATION with JOSE LUIS Hill [facility] ([facility])    [address] [place] [address] [id number] [phone]            Nov 27, 2018   Procedure with Artis Tse MD   [facility] [place], Same Day Surgery (--)    [address] [facility] [id number] [phone]            Dec 07, 2018 10:00 AM CST   Nurse Visit with  Surgery Nurse   General Surgery  ([facility])    [address] [address]   [phone]            Dec 07, 2018 10:30 AM CST   (Arrive by 10:15 AM)   Return Bariatric Nutrition Visit with Kelsey Sousa RD   [facility] Surgical Weight Management ([facility])    [address] [place] [id number]   [phone]            Dec 18, 2018 10:00 AM CST   (Arrive by 9:45 AM)   [facility]    [address] [place] [address]   [phone]            Dec 28, 2018  2:00 PM CST   Nurse Visit with  Surgery Nurse   General Surgery ([facility])    [address] [place] [address]   [phone]            Dec 28, 2018  2:00 PM CST   (Arrive by 1:45 PM)   Return Bariatric Nutrition Visit with Demi Alvarado RD   [facility] Surgical Weight Management ([facility])    [address] [place] [address]   [phone]            Mar 26, 2019  1:30 PM CDT   Return Visit with Leena Heath MD   [facility] ([facility])    [address] [place] [address]   [phone]              Who to contact     If you have questions or need follow up information about today's clinic visit or your schedule please contact [facility] directly at [phone].  Normal or non-critical lab and imaging results will be communicated to you by MyChart, letter or phone within 4 business days after the clinic has received the results. If you do not hear from us within 7 days, please contact the clinic through MyChart or phone. If you have a critical or abnormal lab result, we will notify you by phone as soon as possible.  Submit refill requests through [employer] or call your pharmacy and they  will forward the refill request to us. Please allow 3 business days for your refill to be completed.          Additional Information About Your Visit        [employer] Information     [employer] gives you secure access to your electronic health record. If you see a primary care provider, you can also send messages to your care team and make appointments. If you have questions, please call your primary care clinic.  If you do not have a primary care provider, please call [phone] and they will assist you.        Care EveryWhere ID     This is your Care EveryWhere ID. This could be used by other organizations to access your [place] medical records  [id number]        Your Vitals Were     Pulse                   53            Blood Pressure from Last 3 Encounters:   11/06/18 132/70   10/22/18 118/85   10/04/18 120/64    Weight from Last 3 Encounters:   11/06/18 288 lb 6.4 oz (130.8 kg)   10/22/18 289 lb 6.4 oz (131.3 kg)   10/04/18 295 lb 4.8 oz (133.9 kg)              Today, you had the following     No orders found for display         Today's Medication Changes          These changes are accurate as of 11/6/18  9:28 AM.  If you have any questions, ask your nurse or doctor.               These medicines have changed or have updated prescriptions.        Dose/Directions    topiramate 25 MG tablet   Commonly known as:  TOPAMAX   This may have changed:  additional instructions   Used for:  Morbid obesity (H)        Dose:  50 mg   Take 2 tablets (50 mg) by mouth 2 times daily 25 mg at bedtime for 1 week, 50 mg at bedtime for 1 week and 75 mg daily at bedtime thereafter   Quantity:  120 tablet   Refills:  3                Primary Care Provider Office Phone # Fax #    Polly Mcbride [phone] [address] [phone]        Equal Access to Services     ELDA NORMAN AH: Xavier Storey, addison yeager, christiano gonzalez  la'elainen pablo. So [facility] [phone].    ATENCIÓN: Si habla lillie, tiene a bailey disposición servicios gratuitos de asistencia lingüística. Marie al [phone].    We comply with applicable federal civil rights laws and [place] laws. We do not discriminate on the basis of race, color, national origin, age, disability, sex, sexual orientation, or gender identity.            Thank you!     Thank you for choosing [facility]  for your care. Our goal is always to provide you with excellent care. Hearing back from our patients is one way we can continue to improve our services. Please take a few minutes to complete the written survey that you may receive in the mail after your visit with us. Thank you!             Your Updated Medication List - Protect others around you: Learn how to safely use, store and throw away your medicines at www.disposemymeds.org.          This list is accurate as of 11/6/18  9:28 AM.  Always use your most recent med list.                   Brand Name Dispense Instructions for use Diagnosis    ADVIL 200 MG tablet   Generic drug:  ibuprofen      TAKE THREE TABLETS BY MOUTH QD-BID PRN        CENTRUM PO           fish oil-omega-3 fatty acids 1000 MG capsule      Take 2 g by mouth daily        hydrochlorothiazide 25 MG tablet    HYDRODIURIL    90 tablet    Take 0.5 tablets (12.5 mg) by mouth daily    Essential hypertension with goal blood pressure less than 140/90       lisinopril 40 MG tablet    PRINIVIL/ZESTRIL    90 tablet    Take 1 tablet (40 mg) by mouth daily    Essential hypertension with goal blood pressure less than 140/90       LORazepam 0.5 MG tablet    ATIVAN    20 tablet    Take 1 tablet (0.5 mg) by mouth every 8 hours as needed for anxiety    Anxiety       NEXIUM PO      Take 20 mg by mouth every morning (before breakfast)        PROBIOTIC ADVANCED PO           topiramate 25 MG tablet    TOPAMAX    120 tablet    Take 2 tablets (50 mg) by mouth 2 times daily 25 mg at bedtime  for 1 week, 50 mg at bedtime for 1 week and 75 mg daily at bedtime thereafter    Morbid obesity (H)       VENTOLIN  (90 Base) MCG/ACT inhaler   Generic drug:  albuterol      INHALE 1-2 PUFFS EVERY 4-6 HOURS AS NEEDED FOR WHEEZING        Vitamin D (Cholecalciferol) 1000 units Tabs      Take 2 capsules by mouth daily

## 2018-11-06 NOTE — PROGRESS NOTES
"SUBJECTIVE/OBJECTIVE:                           Danitza Soto is a 38 year old female coming in for an initial visit for Medication Therapy Management.  She was referred to me from Sandy Cervantes PA-C.    Chief Complaint: patient wanting to know what to take for knee pain after surgery,    Allergies/ADRs: Reviewed in Epic  Tobacco: No tobacco use. Quit several weeks ago.   Alcohol: none  Caffeine: no caffeine  PMH: Reviewed in Epic    Medication Adherence/Access:  no issues reported    Obesity/SG: Current medication(s) include: Topiramate 50 mg BID. Medication was started on 7/24/18 by Sandy Cervantes PA-C . Patient is experiencing the follow side effects: \"slight\" tingling, not bothersome and not frequent. Patient is having sleeve gastrectomy on 11/27/18 by Dr. Tse. Patient would like to continue weight loss medication after surgery to have continued assistance with cravings/grazing.   Diet/Eating Habits: Patient reports the medication has been effective for decreasing grazing throughout the day.    Current weight today: 288 lbs 6.4 oz   Initial consult weight: 308 lb   Goal weight prior to surgery: 288 lb, required weight loss: 20 lb     GERD: Currently using Nexium 20 mg once daily. Switches between Prilosec or Nexium OTC, whichever is cheaper at that time. Finds the medication effective. Finding that symptoms are triggered by food, especially tomato sauces. Currently no symptoms present.    Knee Pain: Currently using ibuprofen 600 mg once-TID PRN depending on knee pain. Patient finds that knee is swollen when she does not use on days when she is working. When she is inactive, she does not have issues with swelling/knee pain. Has used ibuprofen almost every day over the last decade. She knows that she cannot use NSAIDs after surgery, so she is wondering what she can use. She will not be working 1 month after surgery. Finds that acetaminophen is not effective. Asked about injections for knee pain. "     Creatinine   Date Value Ref Range Status   07/24/2018 0.63 0.52 - 1.04 mg/dL Final     GFR Estimate   Date Value Ref Range Status   07/24/2018 >90 >60 mL/min/1.7m2 Final     Comment:     Non  GFR Calc     Hypertension: Current medications include hydrochlorothiazide 12.5 mg once daily, losartan 40 mg once daily.  Patient does self-monitor BP. Home BP monitoring in range of 120's systolic over 80's diastolic.  Patient reports no current medication side effects. Previously was having issues of dizziness, so hydrochlorothiazide dose was decreased, now no issues.     Vitamin D Deficiency: Currently taking vitamin D 5,000 units once daily. No concerns today.    Ref. Range 7/24/2018 10:23   Vitamin D Deficiency screening Latest Ref Range: 20 - 75 ug/L 37     Anxiety: Currently has lorazepam 0.5 mg as needed for anxiety. Has not used recently. Is not feeling anxious. Last time she used was >1 month ago. No concerns today. No medication side effects when the medication is used.     General Health: Currently taking fish oil 2 grams daily, multivitamin 1 tablet daily. Patient states she will be switching to bariatric chewable multivitamin after surgery. Will take probiotic if having issues with bowel movement regularity, but not currently taking.     Hx Bronchitis: Patient states that she prefers to keep Ventolin HFA on file in case she has issues with bronchitis, usually during winter season. Not currently using. No issues of SOB. No fever.     Today's Vitals: /70  Pulse 53  Wt 288 lb 6.4 oz (130.8 kg)  BMI 45.17 kg/m2      ASSESSMENT:                             Current medications were reviewed today.     Medication Adherence: good, no issues identified    Obesity/SG: Needs improvement. Assessment of appropriate administration of medications post sleeve gastrectomy was completed. Alternative administration plan was formulated for medications s/p surgery, please see below for this information.  Discussed holding of NSAIDs post-op.    GERD: Stable. Discussed with patient that after surgery will be switching to liquid omeprazole.    Knee Pain: Needs improvement. As patient's knee pain is largely during work, aka greater physical activity, patient can try aspercreme (lidocaine) QID PRN knee pain. Will reassess at follow up alternative options. Could consider Celebrex due to less risk of GI effects and concurrently will be taking PPI.     Hypertension: Stable. BP at goal < 140/90 mmHg.     Vitamin D Deficiency: Stable.     Anxiety: Stable.     General Health: Stable.     Hx Bronchitis: Stable. Will keep Ventolin on profile per patient.     PLAN:                            1. Patient can try aspercreme (lidocaine) topical cream OTC QID as needed for knee pain over next month while off of work s/p surgery. Future: can discuss alternative options if requiring when going back to work.    2. Post-Operative Medication Regimen Plan:     Continue these medications after surgery:     Hydrochlorothiazide    Lisinopril     Lorazepam as needed    Topiramate, can take morning or surgery and can continue after surgery.    Ventolin HFA    Hold these medications after surgery:     Nexium - will be changing to liquid omeprazole.    Advil - No NSAIDs after surgery (Advil, ibuprofen, Aleve, naproxen)    All vitamins until 1 week after your surgery - the dietician will talk to you about this at your 1 week post-op appointment     Medications prescribed immediately after surgery:  1.  Omeprazole 20 mg suspension by mouth once daily 30-60 minutes before breakfast.  This medication is to help prevent heartburn and acid reflux.  2.  Pain medication-use as needed for acute pain.    3.  Ondansetron as needed for nausea.  If having nausea when taking pain medication, may use ondansetron to alleviate nausea.  4.  Stool softener/laxative-to use as needed for opioid induced constipation  5.  Levsin (hyoscyamine)-to use as needed for  abdominal cramping.    I spent 30 minutes with this patient today. All changes were made via verbal approval with Sandy Cervantes PA-C. A copy of the visit note was provided to the patient's referring provider.    Will follow up in 1 month after surgery.    The patient was given a summary of these recommendations as an after visit summary.     Kathy Calhoun, PharmD  Medication Therapy Management Pharmacist   [facility]   Phone: [phone]

## 2018-11-06 NOTE — NURSING NOTE
This patient is having Sleeve by Dr. Tse.    The following handouts were reviewed with the patient :  Before Your Surgery, Patient Checklist, Weight Loss Surgery Pre-operative Class, Preop Recommendations Quick Reference Guide, History and Physical, Medications to Avoid, Shower or Bathing Before Surgery, Bowel Preparation, Powerful Choices and [place] [employer] Care Directive.  Questions were addressed and understanding of content was verbalized.  Contact information was provided.    Patient goal weight: 288  Weight today: 288      Advised to call [facility]ina for surgery date and time.

## 2018-11-06 NOTE — Clinical Note
FYI - I do not believe you need to sign off on this plan.   Kathy Calhoun, PharmD Medication Therapy Management Pharmacist  [facility]  Phone: [phone]

## 2018-11-06 NOTE — PATIENT INSTRUCTIONS
Recommendations from today's MTM visit:                                                    MTM (medication therapy management) is a service provided by a clinical pharmacist designed to help you get the most of out of your medicines.   Today we reviewed what your medicines are for, how to know if they are working, that your medicines are safe and how to make your medicine regimen as easy as possible.     1. Aspercreme (lidocaine) - for knee pain as needed.     Post-Operative Medication Regimen Plan:     Continue these medications after surgery:     Hydrochlorothiazide    Lisinopril     Lorazepam as needed    Topiramate     Ventolin HFA    Hold these medications after surgery:     Nexium - will be changing to liquid omeprazole.    Advil - No NSAIDs after surgery (Advil, ibuprofen, Aleve, naproxen)    All vitamins until 1 week after your surgery - the dietician will talk to you about this at your 1 week post-op appointment     Medications prescribed immediately after surgery:  1.  Omeprazole 20 mg suspension by mouth once daily 30-60 minutes before breakfast.  This medication is to help prevent heartburn and acid reflux.  2.  Pain medication-use as needed for acute pain.    3.  Ondansetron as needed for nausea.  If having nausea when taking pain medication, may use ondansetron to alleviate nausea.  4.  Stool softener/laxative-to use as needed for opioid induced constipation  5.  Levsin (hyoscyamine)-to use as needed for abdominal cramping.    Next MTM visit: 1 month after surgery     To schedule another MTM appointment, please call the clinic directly or you may call the MTM scheduling line at [phone] or toll-free at [phone].     My Clinical Pharmacist's contact information:                                                      It was a pleasure talking with you today!  Please feel free to contact me with any questions or concerns you have.      Kathy Calhoun, PharmD  Medication Therapy Management  Pharmacist   [facility]   Phone: [phone]        You may receive a survey about the MTM services you received.  I would appreciate your feedback to help me serve you better in the future. Please fill it out and return it when you can. Your comments will be anonymous.

## 2018-11-06 NOTE — PROGRESS NOTES
Nutrition Education Consult:  Nutrition education regarding clear and low-fat full liquid diet for post-bariatric surgery (SG).  Pt referred by Sandy LANG(7/24/18).  Diet History:  Pt previously received education on clear and low-fat full liquid diet education after bariatric surgery and wanted to review education.  Nutrition Diagnosis:  Food- and Nutrition-related knowledge deficit r/t needing to review information AEB pt wanting to review main points of bariatric clear and low-fat full liquid diet.  Interventions:  Provided instruction on bariatric clear and low-fat full liquid diets.  Provided the following handouts: Diet Guidelines for Bariatric Surgery, Sources of Protein, Keeping Track of Your Fluids, list of recommended vitamin/mineral supplementation after SG surgery and RD contact information.  Patient Understanding: good  Expected Compliance: good    Goals:   1. Follow the bariatric post-op diet advancement schedule:  - Bariatric clear liquid diet through post-op day 1 (while in the hospital).  - Bariatric low-fat full liquid diet on post-op days 2 through 13 (2 weeks).  2. Sip on 48-64 oz (or greater) fluids daily, recording intake to help stay on-track.  - Drink at least 2 oz of fluid every 30 min.  Time: 15 minutes  Kelsey Sousa, DAVIE, LD

## 2018-11-06 NOTE — PATIENT INSTRUCTIONS
Preparing for Your Surgery      Name:  Danitza Soto   MRN:  [id number]   :  1980   Today's Date:  2018     Arriving for surgery: Laparoscopic Gastric Sleeve   Surgery date:  2018  Arrival time:  6:00 AM  Please come to:       [facility] [address]  [address] [place]  [address]    -   parking is available in front of the hospital from 5:15 am to 8:00 pm    -  Stop at the Information Desk in the lobby    -   Inform the information person that you are here for surgery. An escort to 3c will be provided. If you would not like an escort, please proceed to 3C on the 3rd floor. [phone]     What can I eat or drink?  -  You may have solid food or milk products until 8 hours prior to your surgery. 2 days prior per Dr Tse's instructions  -  You may have water, apple juice or 7up/Sprite until 3 hours prior to your surgery. 5 AM   -  Nothing after 5 AM  Which medicines can I take?  Stop Aspirin, vitamins and supplements one week prior to surgery.  Hold Ibuprofen and Naproxen for 24 hours prior to surgery.   -  Do NOT take these medications in the morning, the day of surgery:   Hydrochlorathiazide   Topamax  -  Please take these medications the day of surgery:   Nexium   Ventolin  How do I prepare myself?  -  Take two showers: one the night before surgery; and one the morning of surgery.         Use Scrubcare or Hibiclens to wash from neck down.  You may use your own     shampoo and conditioner. No other hair products.   -  Do NOT use lotion, powder, deodorant, or antiperspirant the day of your surgery.  -  Do NOT wear any makeup, fingernail polish or jewelry.  - Do not bring your own medications to the hospital, except for inhalers and eye   drops.  -  Bring your ID and insurance card.    Questions or Concerns:  -If you have questions or concerns regarding the day of surgery, please call [phone].     -For questions after surgery  please call your surgeons office.           AFTER YOUR SURGERY  Breathing exercises   Breathing exercises help you recover faster. Take deep breaths and let the air out slowly. This will:     Help you wake up after surgery.    Help prevent complications like pneumonia.  Preventing complications will help you go home sooner.  Nausea and vomiting   You may feel sick to your stomach after surgery; if so, let your nurse know.    Pain control:  After surgery, you may have pain. Our goal is to help you manage your pain. Pain medicine will help you feel comfortable enough to do activities that will help you heal.  These activities may include breathing exercises, walking and physical therapy.   To help your health care team treat your pain we will ask: 1) If you have pain  2) where it is located 3) describe your pain in your words  Methods of pain control include medications given by mouth, vein or by nerve block for some surgeries.  We may give you a pain control pump that will:  1) Deliver the medicine through a tube placed in your vein  2) Control the amount of medicine you receive  3) Allow you to push a button to deliver a dose of pain medicine  Sequential Compression Device (SCD) or Pneumo Boots:  You may need to wear SCD S on your legs or feet. These are wraps connected to a machine that pumps in air and releases it. The repeated pumping helps prevent blood clots from forming.

## 2018-11-06 NOTE — H&P
Pre-Operative H & P     CC:  Preoperative exam to assess for increased cardiopulmonary risk while undergoing surgery and anesthesia.    Date of Encounter: 11/6/2018  Primary Care Physician:  Polly Mcbride    Reason for visit:  Preop general physical exam  Morbid obesity      HPI  Danitza Soto is a 38 year old female who presents for pre-operative H & P in preparation for Laparoscopic Sleeve Gastrectomy on 11/27/2018 by Dr. Tse [facility].     History is obtained from the patient.   Pt has been over weight her entire adult life. She has tried diet and exercise and they have not been adequate in meeting weight loss goals. She became interested in surgical interventions.      Past Medical History  Past Medical History:   Diagnosis Date     Depressive disorder 1990     Esophageal reflux      Hearing problem 2018     Hyperlipidemia LDL goal <130 7/6/2015     Hypertension      LSIL (low grade squamous intraepithelial lesion) on Pap smear 6/2014    + HPV, unable to type colp - BRISEYDA I     Other anxiety states        Past Surgical History  Past Surgical History:   Procedure Laterality Date     HC TOOTH EXTRACTION W/FORCEP  1995    [place] Teeth Extraction       Hx of Blood transfusions/reactions: none    Hx of abnormal bleeding or anti-platelet use: none    Menstrual history: Patient's last menstrual period was 10/25/2018.:     Steroid use in the last year: none    Personal or FH with difficulty with Anesthesia:  None        Prior to Admission Medications  Current Outpatient Prescriptions   Medication Sig Dispense Refill     ADVIL 200 MG OR TABS TAKE THREE TABLETS BY MOUTH QD-BID PRN       Esomeprazole Magnesium (NEXIUM PO) Take 20 mg by mouth every morning (before breakfast)        fish oil-omega-3 fatty acids 1000 MG capsule Take 2 g by mouth every morning        hydrochlorothiazide (HYDRODIURIL) 25 MG tablet Take 12.5 mg by mouth every  morning  90 tablet 3     lisinopril (PRINIVIL/ZESTRIL) 40 MG tablet Take 1 tablet (40 mg) by mouth daily (Patient taking differently: Take 40 mg by mouth every evening ) 90 tablet 3     LORazepam (ATIVAN) 0.5 MG tablet Take 1 tablet (0.5 mg) by mouth every 8 hours as needed for anxiety 20 tablet 0     Multiple Vitamins-Minerals (CENTRUM PO)        Probiotic Product (PROBIOTIC ADVANCED PO)        topiramate (TOPAMAX) 25 MG tablet Take 2 tablets (50 mg) by mouth 2 times daily 25 mg at bedtime for 1 week, 50 mg at bedtime for 1 week and 75 mg daily at bedtime thereafter (Patient taking differently: Take 50 mg by mouth 2 times daily ) 120 tablet 3     VENTOLIN  (90 Base) MCG/ACT Inhaler INHALE 1-2 PUFFS EVERY 4-6 HOURS AS NEEDED FOR WHEEZING  0     Cholecalciferol (VITAMIN D-3) 5000 units TABS Take 1 tablet by mouth daily         Allergies  Allergies   Allergen Reactions     Wellbutrin [Bupropion]      Wellbutrin: Panic attacks, heart/chest pain       Social History  Social History     Social History     Marital status:      Spouse name: N/A     Number of children: 0     Years of education: Some Colle     Occupational History     Mental Health Counselor      Social History Main Topics     Smoking status: Former Smoker     Packs/day: 1.00     Years: 10.00     Types: Cigarettes     Start date: 1/1/2004     Quit date: 8/15/2018     Smokeless tobacco: Never Used      Comment:  pack or less a day     Alcohol use 0.0 oz/week     0 Standard drinks or equivalent per week      Comment: Occas.     Drug use: No     Sexual activity: Not Currently     Partners: Female     Birth control/ protection: None     Other Topics Concern     Parent/Sibling W/ Cabg, Mi Or Angioplasty Before 65f 55m? No     Social History Narrative    Social Documentation:        Balanced Diet: YES, sometimes    Calcium intake: 2 per day    Caffeine: 5-10 per day    Exercise:  type of activity walking, playtime;  5 times per week    Sunscreen:  when remembered    Seatbelts:  Yes    Self Breast Exam:  No    Self Testicular Exam: n/a    Physical/Emotional/Sexual Abuse: No    Do you feel safe in your environment? Yes        Cholesterol screen up to date: No     Eye Exam up to date: Yes    Dental Exam up to date: No    Pap smear up to date: No:     Mammogram up to date: Does Not Apply    Dexa Scan up to date: Does Not Apply    Colonoscopy up to date: Does Not Apply    Immunizations up to date: Yes,     Glucose screen if over 40:  N/a        Jamila Coyle MA                           Family History  Family History   Problem Relation Age of Onset     HEART DISEASE Mother      ,mother had emphesema and  at 62     Hypertension Mother      Allergies Mother      Lipids Mother      Obesity Mother      Respiratory Mother      Emphysema     Diabetes Maternal Grandmother      Type 2?     Hypertension Maternal Grandmother      Circulatory Maternal Grandmother      Due to diabetes     Eye Disorder Maternal Grandmother      Macular degeneration/Macular degeneration     Obesity Maternal Grandmother      Hypertension Father      Lipids Father      Cancer Father      Prostate Cancer Father      Other Cancer Father      Cerebrovascular Disease Paternal Grandmother      Alcohol/Drug Maternal Grandfather      Obesity Maternal Grandfather      Psychotic Disorder Paternal Grandfather      Committed Suicide     Depression Paternal Grandfather      Allergies Sister      Genitourinary Problems Sister            Anesthesia Evaluation     . Pt has not had prior anesthetic            ROS/MED HX    ENT/Pulmonary:     (+)sleep apnea, tobacco use, Past use 1 PPD for 10 years, quit 2018 packs/day  uses CPAP , . .    Neurologic:  - neg neurologic ROS     Cardiovascular:     (+) ----. : . . . :. dysrhythmias (paraxysmal atrial fibrillation ) a-fib, . Previous cardiac testing Echodate:results:Stress Testdate: results:ECG reviewed date: results: date: results:         "  METS/Exercise Tolerance:  >4 METS   Hematologic:  - neg hematologic  ROS       Musculoskeletal:  - neg musculoskeletal ROS       GI/Hepatic:     (+) GERD Asymptomatic on medication,       Renal/Genitourinary:  - ROS Renal section negative       Endo:     (+) Obesity, .      Psychiatric:  - neg psychiatric ROS       Infectious Disease:  - neg infectious disease ROS       Malignancy:      - no malignancy   Other:    (+) No chance of pregnancy C-spine cleared: N/A, no H/O Chronic Pain,no other significant disability              Physical Exam  Normal systems: cardiovascular, pulmonary and dental    Airway   Mallampati: II  TM distance: >3 FB  Neck ROM: full    Dental   Normal    Cardiovascular   Rhythm and rate: normal      Pulmonary   Clear to auscultation           The complete review of systems is negative other than noted in the HPI or here.   Temp: 98.3  F (36.8  C) Temp src: Oral BP: 132/70 Pulse: 53     SpO2: 98 %         288 lbs 6.4 oz  5' 7\"   Body mass index is 45.17 kg/(m^2).       Physical Exam  Constitutional: Awake, alert, cooperative, no apparent distress, and appears stated age.  Eyes: Pupils equal, round and reactive to light, extra ocular muscles intact, sclera clear, conjunctiva normal.  HENT: Normocephalic, oral pharynx with moist mucus membranes, good dentition. No goiter appreciated.   Respiratory: Clear to auscultation bilaterally, no crackles or wheezing.  Cardiovascular: Regular rate and rhythm, normal S1 and S2, and no murmur noted.  Carotids +2, no bruits. No edema. Palpable pulses to radial  DP and PT arteries.   GI: Normal bowel sounds, soft, non-distended, non-tender, no masses palpated, no hepatosplenomegaly.    Lymph/Hematologic: No cervical lymphadenopathy and no supraclavicular lymphadenopathy.  Genitourinary:  Deferred   Skin: Warm and dry.  No rashes at anticipated surgical site.   Musculoskeletal: Full ROM of neck. There is no redness, warmth, or swelling of the joints. Gross " motor strength is normal.    Neurologic: Awake, alert, oriented to name, place and time. Cranial nerves II-XII are grossly intact. Gait is normal.   Neuropsychiatric: Calm, cooperative. Normal affect.     Labs: (personally reviewed)  Results for RONAN ANNA (MRN [id number]) as of 11/7/2018 11:33   Ref. Range 11/6/2018 11:43 11/6/2018 11:45   Sodium Latest Ref Range: 133 - 144 mmol/L 141    Potassium Latest Ref Range: 3.4 - 5.3 mmol/L 3.8    Chloride Latest Ref Range: 94 - 109 mmol/L 109    Carbon Dioxide Latest Ref Range: 20 - 32 mmol/L 22    Urea Nitrogen Latest Ref Range: 7 - 30 mg/dL 17    Creatinine Latest Ref Range: 0.52 - 1.04 mg/dL 0.75    GFR Estimate Latest Ref Range: >60 mL/min/1.7m2 85    GFR Estimate If Black Latest Ref Range: >60 mL/min/1.7m2 >90    Calcium Latest Ref Range: 8.5 - 10.1 mg/dL 9.3    Anion Gap Latest Ref Range: 3 - 14 mmol/L 10    Glucose Latest Ref Range: 70 - 99 mg/dL 83    WBC Latest Ref Range: 4.0 - 11.0 10e9/L 8.8    Hemoglobin Latest Ref Range: 11.7 - 15.7 g/dL 13.2    Hematocrit Latest Ref Range: 35.0 - 47.0 % 41.2    Platelet Count Latest Ref Range: 150 - 450 10e9/L 315    RBC Count Latest Ref Range: 3.8 - 5.2 10e12/L 4.69    MCV Latest Ref Range: 78 - 100 fl 88    MCH Latest Ref Range: 26.5 - 33.0 pg 28.1    MCHC Latest Ref Range: 31.5 - 36.5 g/dL 32.0    RDW Latest Ref Range: 10.0 - 15.0 % 13.7    INR Latest Ref Range: 0.86 - 1.14  0.98    Color Urine Unknown  Yellow   Appearance Urine Unknown  Cloudy   Glucose Urine Latest Ref Range: NEG^Negative mg/dL  Negative   Bilirubin Urine Latest Ref Range: NEG^Negative   Negative   Ketones Urine Latest Ref Range: NEG^Negative mg/dL  Negative   Specific Gravity Urine Latest Ref Range: 1.003 - 1.035   1.017   pH Urine Latest Ref Range: 5.0 - 7.0 pH  6.0   Protein Albumin Urine Latest Ref Range: NEG^Negative mg/dL  Negative   Urobilinogen mg/dL Latest Ref Range: 0.0 - 2.0 mg/dL  0.0   Nitrite Urine Latest Ref Range: NEG^Negative    Negative   Blood Urine Latest Ref Range: NEG^Negative   Negative   Leukocyte Esterase Urine Latest Ref Range: NEG^Negative   Moderate (A)   Source Unknown  Midstream Urine   WBC Urine Latest Ref Range: 0 - 5 /HPF  11 (H)   RBC Urine Latest Ref Range: 0 - 2 /HPF  1   Bacteria Urine Latest Ref Range: NEG^Negative /HPF  Few (A)   Squamous Epithelial /HPF Urine Latest Ref Range: 0 - 1 /HPF  2 (H)   Mucous Urine Latest Ref Range: NEG^Negative /LPF  Present (A)   Amorphous Crystals Latest Ref Range: NEG^Negative /HPF  Few (A)   Specimen Description Unknown  Midstream Urine   Culture Micro Unknown  <10,000 colonies/...   URINE CULTURE AEROBIC BACTERIAL Unknown  Rpt     Noted UA with culture less than 10,000 -> no interventions    EKG: Personally reviewed but formal cardiology read pendin2018 SR, occasional PAC      Echocardiogram 2018  Interpretation Summary     Global and regional left ventricular function is normal with an EF of 60-65%.  Moderate concentric wall thickening consistent with left ventricular  hypertrophy is present.  Right ventricular function, chamber size, wall motion, and thickness are  normal.  Mild biatrial enlargement is present.  No significant valve abnormalities present.  The inferior vena cava was normal in size  _____________________________________________________________________________  __        Left Ventricle  Global and regional left ventricular function is normal with an EF of 60-65%.  Left ventricular size is normal. Moderate concentric wall thickening  consistent with left ventricular hypertrophy is present. Left ventricular  diastolic function is indeterminate. No regional wall motion abnormalities are  seen.     Right Ventricle  Right ventricular function, chamber size, wall motion, and thickness are  normal. TAPSE 2.0 cm.     Atria  Mild biatrial enlargement is present. The atrial septum is intact as assessed  by color Doppler .     Mitral Valve  Trace mitral  insufficiency is present.        Aortic Valve  Aortic valve is normal in structure and function. The aortic valve is  tricuspid.     Tricuspid Valve  Trace tricuspid insufficiency is present. Right ventricular systolic pressure  is 24mmHg above the right atrial pressure.     Pulmonic Valve  The pulmonic valve is normal. Estimated diastolic PA pressure is 27 mmHg.     Vessels  The aorta root is normal. The thoracic aorta is normal. Unable to accurately  index to BSA given weight. The pulmonary artery is normal. The inferior vena  cava was normal in size with preserved respiratory variability. Sinuses of  Valsalva 3.2 cm. Ascending aorta 3.1 cm. Estimated mean right atrial pressure  is 3 mmHg (normal).     Pericardium  No pericardial effusion is present.       Stress test 9/6/2018  Findings:  1. Overall quality of the study: Excellent.   2. Left ventricular cavity is normal on the rest and stress studies.  3. SPECT images demonstrate  normal perfusion. There is normal variant  apical thinning.  The rest images reveal normal perfusion. These  results suggest a low post-scan likelihood of angiographically  significant coronary artery disease. The summed stress score is 0.   4. Left ventricular ejection fraction is 76%. Left ventricular  end-diastolic volume is 127 mL. End-systolic volume is 31 mL.  5. Baseline EKG  findings reported separately in EPIC.         Impression:  Normal myocardial SPECT study with a summed stress score of 0.        Outside records reviewed from: care everywhere      ASSESSMENT and PLAN  Danitza Soto is a 38 year old female scheduled to undergo  Laparoscopic Sleeve Gastrectomy on 11/27/2018 by Dr. Tse in treatment of morbid obesit. She has the following specific operative considerations:   - RCRI : Intermediate serious cardiac risks.  0.9% risk of major adverse cardiac event.   - Anesthesia considerations:  Refer to PAC assessment in anesthesia records  - VTE risk: 0.5%  - LEONARDO # of risks   = known LEONARDO with CPAP use  - Post-op delirium risk: low risk  - If afib, [id number] score 2.    - Risk of PONV score = 2.  If > 2, anti-emetic intervention recommended.       Previous anesthesia, only conscious sedation  1) Cardiac: paroxysmal atrial fibrillation, Zio patch shows 4% burden and CHADs score of 2, no current anticoagulants. Followed by [facility] cardiology. Negative stress test 9/6/2018. Echocardiogram 9/2018 shows EF of 60-65%. Well managed HTN  2) Pulmonary: Smoked 1 PPD for 10 years, quit 8/2018.LEONARDO with CPAP use  3) GI: GERD, well managed with nexium  4) Endo: BMI 45.26    METS well over 4, feasible airway     Pt optimized for surgery. AVS with information on surgery time/arrival time, meds and NPO status given by nursing staff        I spent 20 minutes with patient, greater than 50% educating on preop meds, counseling on anesthesia and coordinating care for gastric sleeve    Patient was discussed with Dr Rome.    JOSE LUIS Hill CNS  Preoperative Assessment Center  [facility]  Cl[facility] and [facility]  Phone: [phone]  Fax: [phone]

## 2018-11-06 NOTE — MR AVS SNAPSHOT
After Visit Summary   2018    Danitza Soto    MRN: [id number]           Patient Information     Date Of Birth          1980        Visit Information        Provider Department      2018 11:00 AM Jaimee Cornelius APRN [facility]        Care Instructions    Preparing for Your Surgery      Name:  Danitza Soto   MRN:  [id number]   :  1980   Today's Date:  2018     Arriving for surgery: Laparoscopic Gastric Sleeve   Surgery date:  2018  Arrival time:  6:00 AM  Please come to:       [facility] [address]  [address] [place]  [address]    -   parking is available in front of the hospital from 5:15 am to 8:00 pm    -  Stop at the Information Desk in the lobby    -   Inform the information person that you are here for surgery. An escort to 3c will be provided. If you would not like an escort, please proceed to 3C on the 3rd floor. [phone]     What can I eat or drink?  -  You may have solid food or milk products until 8 hours prior to your surgery. 2 days prior per Dr Tse's instructions  -  You may have water, apple juice or 7up/Sprite until 3 hours prior to your surgery. 5 AM   -  Nothing after 5 AM  Which medicines can I take?  Stop Aspirin, vitamins and supplements one week prior to surgery.  Hold Ibuprofen and Naproxen for 24 hours prior to surgery.   -  Do NOT take these medications in the morning, the day of surgery:   Hydrochlorathiazide   Topamax  -  Please take these medications the day of surgery:   Nexium   Ventolin  How do I prepare myself?  -  Take two showers: one the night before surgery; and one the morning of surgery.         Use Scrubcare or Hibiclens to wash from neck down.  You may use your own     shampoo and conditioner. No other hair products.   -  Do NOT use lotion, powder, deodorant, or antiperspirant the day of your surgery.  -  Do  NOT wear any makeup, fingernail polish or jewelry.  - Do not bring your own medications to the hospital, except for inhalers and eye   drops.  -  Bring your ID and insurance card.    Questions or Concerns:  -If you have questions or concerns regarding the day of surgery, please call [phone].     -For questions after surgery please call your surgeons office.           AFTER YOUR SURGERY  Breathing exercises   Breathing exercises help you recover faster. Take deep breaths and let the air out slowly. This will:     Help you wake up after surgery.    Help prevent complications like pneumonia.  Preventing complications will help you go home sooner.  Nausea and vomiting   You may feel sick to your stomach after surgery; if so, let your nurse know.    Pain control:  After surgery, you may have pain. Our goal is to help you manage your pain. Pain medicine will help you feel comfortable enough to do activities that will help you heal.  These activities may include breathing exercises, walking and physical therapy.   To help your health care team treat your pain we will ask: 1) If you have pain  2) where it is located 3) describe your pain in your words  Methods of pain control include medications given by mouth, vein or by nerve block for some surgeries.  We may give you a pain control pump that will:  1) Deliver the medicine through a tube placed in your vein  2) Control the amount of medicine you receive  3) Allow you to push a button to deliver a dose of pain medicine  Sequential Compression Device (SCD) or Pneumo Boots:  You may need to wear SCD S on your legs or feet. These are wraps connected to a machine that pumps in air and releases it. The repeated pumping helps prevent blood clots from forming.           Follow-ups after your visit        Your next 10 appointments already scheduled     Nov 27, 2018   Procedure with Artis Tse MD   [facility], [place], Same Day Surgery (--)    [address] [facility]  [address]   [phone]            Dec 07, 2018 10:00 AM CST   Nurse Visit with  Surgery Nurse   General Surgery ([facility] [place])    [address] [place] [address]   [phone]            Dec 07, 2018 10:30 AM CST   (Arrive by 10:15 AM)   Return Bariatric Nutrition Visit with DAVIE Gonzáles [facility] ([facility] [place])    [address] [place] [id number]   [phone]            Dec 18, 2018 10:00 AM CST   (Arrive by 9:45 AM)   [facility] ([facility] [place])    [address] [place] [id number]   [phone]            Dec 28, 2018  2:00 PM CST   Nurse Visit with  Surgery Nurse   General Surgery ([facility] [place])    [address] [place] [address]   [phone]            Dec 28, 2018  2:00 PM CST   (Arrive by 1:45 PM)   Return Bariatric Nutrition Visit with DAVIE Gupta [facility] ([facility] [place])    [address] [place] [address]   [phone]            Mar 26, 2019  1:30 PM CDT   Return Visit with Leena Heath MD   [facility] ([facility])    [address] [place] [id number] [phone]              Who to contact     Please call your clinic at [phone] to:    Ask questions about your health    Make or cancel appointments    Discuss your medicines    Learn about your test results    Speak to your doctor            Additional Information About Your Visit        MyChart Information     [employer] gives you secure access to your electronic health record. If you see a primary care provider, you can also send messages to your care team and make appointments. If you have questions, please  "call your primary care clinic.  If you do not have a primary care provider, please call [phone] and they will assist you.      [employer] is an electronic gateway that provides easy, online access to your medical records. With [employer], you can request a clinic appointment, read your test results, renew a prescription or communicate with your care team.     To access your existing account, please contact your [facility] or call [phone] for assistance.        Care EveryWhere ID     This is your Care EveryWhere ID. This could be used by other organizations to access your [facility] medical records  [id number]        Your Vitals Were     Pulse Temperature Height Last Period Pulse Oximetry BMI (Body Mass Index)    53 98.3  F (36.8  C) (Oral) 1.702 m (5' 7\") 10/25/2018 98% 45.17 kg/m2       Blood Pressure from Last 3 Encounters:   11/06/18 132/70   11/06/18 132/70   10/22/18 118/85    Weight from Last 3 Encounters:   11/06/18 130.8 kg (288 lb 6.4 oz)   11/06/18 130.8 kg (288 lb 6.4 oz)   11/06/18 130.8 kg (288 lb 6.4 oz)              Today, you had the following     No orders found for display         Today's Medication Changes          These changes are accurate as of 11/6/18 11:04 AM.  If you have any questions, ask your nurse or doctor.               These medicines have changed or have updated prescriptions.        Dose/Directions    lisinopril 40 MG tablet   Commonly known as:  PRINIVIL/ZESTRIL   This may have changed:  when to take this   Used for:  Essential hypertension with goal blood pressure less than 140/90        Dose:  40 mg   Take 1 tablet (40 mg) by mouth daily   Quantity:  90 tablet   Refills:  3       topiramate 25 MG tablet   Commonly known as:  TOPAMAX   This may have changed:  additional instructions   Used for:  Morbid obesity (H)        Dose:  50 mg   Take 2 tablets (50 mg) by mouth 2 times daily 25 mg at bedtime for 1 week, 50 mg at bedtime for 1 week and 75 mg " daily at bedtime thereafter   Quantity:  120 tablet   Refills:  3                Primary Care Provider Office Phone # Fax #    Polly Mcbride [phone] [address] [address]        Equal Access to Services     HENRYDEMETRIUS ESTER : Hadii aad ku hadmarciao Soomaali, waaxda luqadaha, qaybta kaalmada adeegyada, waxnba hopein hayelainen adecristiano tafoya lasummernilton shah. So [facility] [phone].    ATENCIÓN: Si habla español, tiene a bailey disposición servicios gratuitos de asistencia lingüística. Llame al [phone].    We comply with applicable federal civil rights laws and [place] laws. We do not discriminate on the basis of race, color, national origin, age, disability, sex, sexual orientation, or gender identity.            Thank you!     Thank you for choosing [facility]  for your care. Our goal is always to provide you with excellent care. Hearing back from our patients is one way we can continue to improve our services. Please take a few minutes to complete the written survey that you may receive in the mail after your visit with us. Thank you!             Your Updated Medication List - Protect others around you: Learn how to safely use, store and throw away your medicines at www.disposemymeds.org.          This list is accurate as of 11/6/18 11:04 AM.  Always use your most recent med list.                   Brand Name Dispense Instructions for use Diagnosis    ADVIL 200 MG tablet   Generic drug:  ibuprofen      TAKE THREE TABLETS BY MOUTH QD-BID PRN        CENTRUM PO           fish oil-omega-3 fatty acids 1000 MG capsule      Take 2 g by mouth every morning        hydrochlorothiazide 25 MG tablet    HYDRODIURIL    90 tablet    Take 12.5 mg by mouth every morning    Essential hypertension with goal blood pressure less than 140/90       lisinopril 40 MG tablet    PRINIVIL/ZESTRIL    90 tablet    Take 1 tablet (40 mg) by mouth daily    Essential hypertension with  goal blood pressure less than 140/90       LORazepam 0.5 MG tablet    ATIVAN    20 tablet    Take 1 tablet (0.5 mg) by mouth every 8 hours as needed for anxiety    Anxiety       NEXIUM PO      Take 20 mg by mouth every morning (before breakfast)        PROBIOTIC ADVANCED PO           topiramate 25 MG tablet    TOPAMAX    120 tablet    Take 2 tablets (50 mg) by mouth 2 times daily 25 mg at bedtime for 1 week, 50 mg at bedtime for 1 week and 75 mg daily at bedtime thereafter    Morbid obesity (H)       VENTOLIN  (90 Base) MCG/ACT inhaler   Generic drug:  albuterol      INHALE 1-2 PUFFS EVERY 4-6 HOURS AS NEEDED FOR WHEEZING        Vitamin D-3 5000 units Tabs      Take 1 tablet by mouth daily

## 2018-11-06 NOTE — ANESTHESIA PREPROCEDURE EVALUATION
Anesthesia Evaluation     . Pt has not had prior anesthetic            ROS/MED HX    ENT/Pulmonary:     (+)sleep apnea, tobacco use, Past use 1 PPD for 10 years, quit 8/2018 packs/day  uses CPAP , . .    Neurologic:  - neg neurologic ROS     Cardiovascular:     (+) ----. : . . . :. dysrhythmias (paraxysmal atrial fibrillation ) a-fib, . Previous cardiac testing Echodate:9/2/2018results:Stress Testdate:9/6/2018 results:ECG reviewed date:6/19/2018 results: date: results:          METS/Exercise Tolerance:  >4 METS   Hematologic:  - neg hematologic  ROS       Musculoskeletal:  - neg musculoskeletal ROS       GI/Hepatic:     (+) GERD Asymptomatic on medication,       Renal/Genitourinary:  - ROS Renal section negative       Endo:     (+) Obesity, .      Psychiatric:  - neg psychiatric ROS       Infectious Disease:  - neg infectious disease ROS       Malignancy:      - no malignancy   Other:    (+) No chance of pregnancy C-spine cleared: N/A, no H/O Chronic Pain,no other significant disability                    Physical Exam  Normal systems: cardiovascular, pulmonary and dental    Airway   Mallampati: II  TM distance: >3 FB  Neck ROM: full    Dental     Cardiovascular   Rhythm and rate: normal      Pulmonary    breath sounds clear to auscultation    Other findings:     Echocardiogram 9/21/2018    Interpretation Summary     Global and regional left ventricular function is normal with an EF of 60-65%.  Moderate concentric wall thickening consistent with left ventricular  hypertrophy is present.  Right ventricular function, chamber size, wall motion, and thickness are  normal.  Mild biatrial enlargement is present.  No significant valve abnormalities present.  The inferior vena cava was normal in size  _____________________________________________________________________________  __        Left Ventricle  Global and regional left ventricular function is normal with an EF of 60-65%.  Left ventricular size is normal.  Moderate concentric wall thickening  consistent with left ventricular hypertrophy is present. Left ventricular  diastolic function is indeterminate. No regional wall motion abnormalities are  seen.     Right Ventricle  Right ventricular function, chamber size, wall motion, and thickness are  normal. TAPSE 2.0 cm.     Atria  Mild biatrial enlargement is present. The atrial septum is intact as assessed  by color Doppler .     Mitral Valve  Trace mitral insufficiency is present.        Aortic Valve  Aortic valve is normal in structure and function. The aortic valve is  tricuspid.     Tricuspid Valve  Trace tricuspid insufficiency is present. Right ventricular systolic pressure  is 24mmHg above the right atrial pressure.     Pulmonic Valve  The pulmonic valve is normal. Estimated diastolic PA pressure is 27 mmHg.     Vessels  The aorta root is normal. The thoracic aorta is normal. Unable to accurately  index to BSA given weight. The pulmonary artery is normal. The inferior vena  cava was normal in size with preserved respiratory variability. Sinuses of  Valsalva 3.2 cm. Ascending aorta 3.1 cm. Estimated mean right atrial pressure  is 3 mmHg (normal).     Pericardium  No pericardial effusion is present.       Stress test 9/6/2018  Findings:  1. Overall quality of the study: Excellent.   2. Left ventricular cavity is normal on the rest and stress studies.  3. SPECT images demonstrate  normal perfusion. There is normal variant  apical thinning.  The rest images reveal normal perfusion. These  results suggest a low post-scan likelihood of angiographically  significant coronary artery disease. The summed stress score is 0.   4. Left ventricular ejection fraction is 76%. Left ventricular  end-diastolic volume is 127 mL. End-systolic volume is 31 mL.  5. Baseline EKG  findings reported separately in EPIC.         Impression:  Normal myocardial SPECT study with a summed stress score of 0.          Results for RONAN ANNA  (MRN [id number]) as of 11/7/2018 11:33    11/6/2018 11:43  Sodium: 141  Potassium: 3.8  Chloride: 109  Carbon Dioxide: 22  Urea Nitrogen: 17  Creatinine: 0.75  GFR Estimate: 85  GFR Estimate If Black: >90  Calcium: 9.3  Anion Gap: 10  Glucose: 83  WBC: 8.8  Hemoglobin: 13.2  Hematocrit: 41.2  Platelet Count: 315  RBC Count: 4.69  MCV: 88  MCH: 28.1  MCHC: 32.0  RDW: 13.7  INR: 0.98    11/6/2018 11:45  Color Urine: Yellow  Appearance Urine: Cloudy  Glucose Urine: Negative  Bilirubin Urine: Negative  Ketones Urine: Negative  Specific Gravity Urine: 1.017  pH Urine: 6.0  Protein Albumin Urine: Negative  Urobilinogen mg/dL: 0.0  Nitrite Urine: Negative  Blood Urine: Negative  Leukocyte Esterase Urine: Moderate (A)  Source: Midstream Urine  WBC Urine: 11 (H)  RBC Urine: 1  Bacteria Urine: Few (A)  Squamous Epithelial /HPF Urine: 2 (H)  Mucous Urine: Present (A)  Amorphous Crystals: Few (A)  Specimen Description: Midstream Urine  Culture Micro: <10,000 colonies/...  URINE CULTURE AEROBIC BACTERIAL: Rpt      Noted UA on asymptomatic pt, less than 10,000 -> no interventions           PAC Discussion and Assessment    ASA Classification: 2  Case is suitable for: [place]  Anesthetic techniques and relevant risks discussed: GA  Invasive monitoring and risk discussed: Yes  Types:   Possibility and Risk of blood transfusion discussed: Yes  NPO instructions given:   Additional anesthetic preparation and risks discussed:   Needs early admission to pre-op area:   Other:     PAC Resident/NP Anesthesia Assessment:  Danitza Soto is a 37 yo female scheduled for Laparoscopic Sleeve Gastrectomy on 11/27/2018 by Dr. Tse in treatment of morbid obesity     Previous anesthesia, only conscious sedation     1) Cardiac: paroxysmal atrial fibrillation, Zio patch shows 4% burden and CHADs score of 2, no current anticoagulants. Followed by [facility] cardiology. Negative stress test 9/6/2018. Echocardiogram 9/2018 shows EF of 60-65%. Well managed  HTN  2) Pulmonary: Smoked 1 PPD for 10 years, quit 8/2018. LEONARDO with CPAP use  3) GI: GERD, well managed with nexium  4) Endo: BMI 45.26    METS well over 4, feasible airway                 Reviewed and Signed by PAC Mid-Level Provider/Resident  Mid-Level Provider/Resident: JOSE LUIS Nava  Date: 11/6/2018  Time: 1200    Attending Anesthesiologist Anesthesia Assessment:  28 year old for LGS. Paroxysmal atrial fibrillation, low CHADS, not anticoagulated. Rest of cardiac workup normal. No pulmonary disease.    Patient/case discussed with RASHAD/resident; agree with above assessment. No need to see patient. Patient is appropriate for the planned procedure without further work-up or medical management.      Reviewed and Signed by PAC Anesthesiologist  Anesthesiologist: vj  Date: 11/6/2018  Time:   Pass/Fail: Pass  Disposition:     PAC Pharmacist Assessment:        Pharmacist:   Date:   Time:                           .

## 2018-11-07 LAB
BACTERIA SPEC CULT: NORMAL
Lab: NORMAL
SPECIMEN SOURCE: NORMAL

## 2018-11-12 ENCOUNTER — CARE COORDINATION (OUTPATIENT)
Dept: SURGERY | Facility: CLINIC | Age: 38
End: 2018-11-12

## 2018-11-12 NOTE — PROGRESS NOTES
Pt notified through my chart that FMLA has been completed and faxed to employer and a copy sent to patient.

## 2018-11-26 ENCOUNTER — ANESTHESIA EVENT (OUTPATIENT)
Dept: SURGERY | Facility: CLINIC | Age: 38
DRG: 621 | End: 2018-11-26
Payer: COMMERCIAL

## 2018-11-26 ASSESSMENT — LIFESTYLE VARIABLES: TOBACCO_USE: 1

## 2018-11-26 ASSESSMENT — ENCOUNTER SYMPTOMS: DYSRHYTHMIAS: 1

## 2018-11-27 ENCOUNTER — ANESTHESIA (OUTPATIENT)
Dept: SURGERY | Facility: CLINIC | Age: 38
DRG: 621 | End: 2018-11-27
Payer: COMMERCIAL

## 2018-11-27 ENCOUNTER — HOSPITAL ENCOUNTER (INPATIENT)
Facility: CLINIC | Age: 38
LOS: 1 days | Discharge: HOME | DRG: 621 | End: 2018-11-28
Attending: SURGERY | Admitting: SURGERY
Payer: COMMERCIAL

## 2018-11-27 DIAGNOSIS — Z98.84 S/P LAPAROSCOPIC SLEEVE GASTRECTOMY: Primary | ICD-10-CM

## 2018-11-27 LAB
ANION GAP SERPL CALCULATED.3IONS-SCNC: 9 MMOL/L (ref 3–14)
BUN SERPL-MCNC: 13 MG/DL (ref 7–30)
CALCIUM SERPL-MCNC: 8.5 MG/DL (ref 8.5–10.1)
CHLORIDE SERPL-SCNC: 110 MMOL/L (ref 94–109)
CO2 SERPL-SCNC: 21 MMOL/L (ref 20–32)
CREAT SERPL-MCNC: 0.62 MG/DL (ref 0.52–1.04)
CREAT SERPL-MCNC: 0.69 MG/DL (ref 0.52–1.04)
GFR SERPL CREATININE-BSD FRML MDRD: >90 ML/MIN/1.7M2
GFR SERPL CREATININE-BSD FRML MDRD: >90 ML/MIN/1.7M2
GLUCOSE BLDC GLUCOMTR-MCNC: 95 MG/DL (ref 70–99)
GLUCOSE SERPL-MCNC: 149 MG/DL (ref 70–99)
HCG UR QL: NEGATIVE
HGB BLD-MCNC: 8.9 G/DL (ref 11.7–15.7)
MAGNESIUM SERPL-MCNC: 1.9 MG/DL (ref 1.6–2.3)
PHOSPHATE SERPL-MCNC: 3.5 MG/DL (ref 2.5–4.5)
PLATELET # BLD AUTO: 339 10E9/L (ref 150–450)
POTASSIUM SERPL-SCNC: 3.8 MMOL/L (ref 3.4–5.3)
SODIUM SERPL-SCNC: 140 MMOL/L (ref 133–144)

## 2018-11-27 PROCEDURE — 82565 ASSAY OF CREATININE: CPT | Performed by: SURGERY

## 2018-11-27 PROCEDURE — 25000132 ZZH RX MED GY IP 250 OP 250 PS 637

## 2018-11-27 PROCEDURE — 25000128 H RX IP 250 OP 636: Performed by: PHYSICIAN ASSISTANT

## 2018-11-27 PROCEDURE — 25000132 ZZH RX MED GY IP 250 OP 250 PS 637: Performed by: SURGERY

## 2018-11-27 PROCEDURE — 25000128 H RX IP 250 OP 636

## 2018-11-27 PROCEDURE — 80048 BASIC METABOLIC PNL TOTAL CA: CPT | Performed by: ANESTHESIOLOGY

## 2018-11-27 PROCEDURE — 25000128 H RX IP 250 OP 636: Performed by: STUDENT IN AN ORGANIZED HEALTH CARE EDUCATION/TRAINING PROGRAM

## 2018-11-27 PROCEDURE — 25000125 ZZHC RX 250

## 2018-11-27 PROCEDURE — 40000170 ZZH STATISTIC PRE-PROCEDURE ASSESSMENT II: Performed by: SURGERY

## 2018-11-27 PROCEDURE — 25000128 H RX IP 250 OP 636: Performed by: SURGERY

## 2018-11-27 PROCEDURE — 0BQT4ZZ REPAIR DIAPHRAGM, PERCUTANEOUS ENDOSCOPIC APPROACH: ICD-10-PCS | Performed by: SURGERY

## 2018-11-27 PROCEDURE — 81025 URINE PREGNANCY TEST: CPT | Performed by: ANESTHESIOLOGY

## 2018-11-27 PROCEDURE — 71000013 ZZH RECOVERY PHASE 1 LEVEL 1 EA ADDTL HR: Performed by: SURGERY

## 2018-11-27 PROCEDURE — 36000062 ZZH SURGERY LEVEL 4 1ST 30 MIN - UMMC: Performed by: SURGERY

## 2018-11-27 PROCEDURE — 25000132 ZZH RX MED GY IP 250 OP 250 PS 637: Performed by: STUDENT IN AN ORGANIZED HEALTH CARE EDUCATION/TRAINING PROGRAM

## 2018-11-27 PROCEDURE — 25000125 ZZHC RX 250: Performed by: ANESTHESIOLOGY

## 2018-11-27 PROCEDURE — 25000566 ZZH SEVOFLURANE, EA 15 MIN: Performed by: SURGERY

## 2018-11-27 PROCEDURE — 88305 TISSUE EXAM BY PATHOLOGIST: CPT | Performed by: SURGERY

## 2018-11-27 PROCEDURE — 21400006 ZZH R&B CCU INTERMEDIATE UMMC

## 2018-11-27 PROCEDURE — 27210794 ZZH OR GENERAL SUPPLY STERILE: Performed by: SURGERY

## 2018-11-27 PROCEDURE — 36416 COLLJ CAPILLARY BLOOD SPEC: CPT | Performed by: ANESTHESIOLOGY

## 2018-11-27 PROCEDURE — 93010 ELECTROCARDIOGRAM REPORT: CPT | Performed by: INTERNAL MEDICINE

## 2018-11-27 PROCEDURE — 25000125 ZZHC RX 250: Performed by: SURGERY

## 2018-11-27 PROCEDURE — 84100 ASSAY OF PHOSPHORUS: CPT | Performed by: ANESTHESIOLOGY

## 2018-11-27 PROCEDURE — 93005 ELECTROCARDIOGRAM TRACING: CPT

## 2018-11-27 PROCEDURE — 00000146 ZZHCL STATISTIC GLUCOSE BY METER IP

## 2018-11-27 PROCEDURE — 25000128 H RX IP 250 OP 636: Performed by: ANESTHESIOLOGY

## 2018-11-27 PROCEDURE — 0DB64Z3 EXCISION OF STOMACH, PERCUTANEOUS ENDOSCOPIC APPROACH, VERTICAL: ICD-10-PCS | Performed by: SURGERY

## 2018-11-27 PROCEDURE — 71000012 ZZH RECOVERY PHASE 1 LEVEL 1 FIRST HR: Performed by: SURGERY

## 2018-11-27 PROCEDURE — 36415 COLL VENOUS BLD VENIPUNCTURE: CPT | Performed by: SURGERY

## 2018-11-27 PROCEDURE — 40000275 ZZH STATISTIC RCP TIME EA 10 MIN

## 2018-11-27 PROCEDURE — 37000008 ZZH ANESTHESIA TECHNICAL FEE, 1ST 30 MIN: Performed by: SURGERY

## 2018-11-27 PROCEDURE — 37000009 ZZH ANESTHESIA TECHNICAL FEE, EACH ADDTL 15 MIN: Performed by: SURGERY

## 2018-11-27 PROCEDURE — 25000131 ZZH RX MED GY IP 250 OP 636 PS 637: Performed by: SURGERY

## 2018-11-27 PROCEDURE — 85018 HEMOGLOBIN: CPT | Performed by: SURGERY

## 2018-11-27 PROCEDURE — 36000064 ZZH SURGERY LEVEL 4 EA 15 ADDTL MIN - UMMC: Performed by: SURGERY

## 2018-11-27 PROCEDURE — 85049 AUTOMATED PLATELET COUNT: CPT | Performed by: SURGERY

## 2018-11-27 PROCEDURE — 83735 ASSAY OF MAGNESIUM: CPT | Performed by: ANESTHESIOLOGY

## 2018-11-27 RX ORDER — LABETALOL HYDROCHLORIDE 5 MG/ML
10 INJECTION, SOLUTION INTRAVENOUS
Status: DISCONTINUED | OUTPATIENT
Start: 2018-11-27 | End: 2018-11-27 | Stop reason: HOSPADM

## 2018-11-27 RX ORDER — HYDROMORPHONE HYDROCHLORIDE 1 MG/ML
.3-.5 INJECTION, SOLUTION INTRAMUSCULAR; INTRAVENOUS; SUBCUTANEOUS EVERY 5 MIN PRN
Status: DISCONTINUED | OUTPATIENT
Start: 2018-11-27 | End: 2018-11-27 | Stop reason: HOSPADM

## 2018-11-27 RX ORDER — SODIUM CHLORIDE, SODIUM LACTATE, POTASSIUM CHLORIDE, CALCIUM CHLORIDE 600; 310; 30; 20 MG/100ML; MG/100ML; MG/100ML; MG/100ML
INJECTION, SOLUTION INTRAVENOUS CONTINUOUS
Status: DISCONTINUED | OUTPATIENT
Start: 2018-11-27 | End: 2018-11-28 | Stop reason: HOSPADM

## 2018-11-27 RX ORDER — ESMOLOL HYDROCHLORIDE 10 MG/ML
30 INJECTION INTRAVENOUS ONCE
Status: COMPLETED | OUTPATIENT
Start: 2018-11-27 | End: 2018-11-27

## 2018-11-27 RX ORDER — ONDANSETRON 2 MG/ML
4 INJECTION INTRAMUSCULAR; INTRAVENOUS EVERY 30 MIN PRN
Status: DISCONTINUED | OUTPATIENT
Start: 2018-11-27 | End: 2018-11-27 | Stop reason: HOSPADM

## 2018-11-27 RX ORDER — FENTANYL CITRATE 50 UG/ML
INJECTION, SOLUTION INTRAMUSCULAR; INTRAVENOUS PRN
Status: DISCONTINUED | OUTPATIENT
Start: 2018-11-27 | End: 2018-11-27

## 2018-11-27 RX ORDER — SODIUM CHLORIDE, SODIUM LACTATE, POTASSIUM CHLORIDE, CALCIUM CHLORIDE 600; 310; 30; 20 MG/100ML; MG/100ML; MG/100ML; MG/100ML
INJECTION, SOLUTION INTRAVENOUS CONTINUOUS
Status: DISCONTINUED | OUTPATIENT
Start: 2018-11-27 | End: 2018-11-27 | Stop reason: HOSPADM

## 2018-11-27 RX ORDER — LIDOCAINE 40 MG/G
CREAM TOPICAL
Status: DISCONTINUED | OUTPATIENT
Start: 2018-11-27 | End: 2018-11-27 | Stop reason: HOSPADM

## 2018-11-27 RX ORDER — KETOROLAC TROMETHAMINE 30 MG/ML
30 INJECTION, SOLUTION INTRAMUSCULAR; INTRAVENOUS EVERY 6 HOURS PRN
Status: DISCONTINUED | OUTPATIENT
Start: 2018-11-27 | End: 2018-11-27 | Stop reason: HOSPADM

## 2018-11-27 RX ORDER — BUPIVACAINE HYDROCHLORIDE 2.5 MG/ML
INJECTION, SOLUTION EPIDURAL; INFILTRATION; INTRACAUDAL PRN
Status: DISCONTINUED | OUTPATIENT
Start: 2018-11-27 | End: 2018-11-27 | Stop reason: HOSPADM

## 2018-11-27 RX ORDER — POTASSIUM CHLORIDE 7.45 MG/ML
10 INJECTION INTRAVENOUS ONCE
Status: DISCONTINUED | OUTPATIENT
Start: 2018-11-27 | End: 2018-11-27

## 2018-11-27 RX ORDER — KETAMINE HYDROCHLORIDE 10 MG/ML
10 INJECTION, SOLUTION INTRAMUSCULAR; INTRAVENOUS
Status: DISCONTINUED | OUTPATIENT
Start: 2018-11-27 | End: 2018-11-27

## 2018-11-27 RX ORDER — NALOXONE HYDROCHLORIDE 0.4 MG/ML
.1-.4 INJECTION, SOLUTION INTRAMUSCULAR; INTRAVENOUS; SUBCUTANEOUS
Status: DISCONTINUED | OUTPATIENT
Start: 2018-11-27 | End: 2018-11-28 | Stop reason: HOSPADM

## 2018-11-27 RX ORDER — ACETAMINOPHEN 325 MG/1
650 TABLET ORAL EVERY 4 HOURS PRN
Status: DISCONTINUED | OUTPATIENT
Start: 2018-11-27 | End: 2018-11-28 | Stop reason: HOSPADM

## 2018-11-27 RX ORDER — HYDROCHLOROTHIAZIDE 12.5 MG/1
12.5 TABLET ORAL EVERY MORNING
Status: DISCONTINUED | OUTPATIENT
Start: 2018-11-28 | End: 2018-11-28 | Stop reason: HOSPADM

## 2018-11-27 RX ORDER — ONDANSETRON 4 MG/1
4 TABLET, ORALLY DISINTEGRATING ORAL EVERY 6 HOURS PRN
Status: DISCONTINUED | OUTPATIENT
Start: 2018-11-27 | End: 2018-11-28 | Stop reason: HOSPADM

## 2018-11-27 RX ORDER — PROPOFOL 10 MG/ML
INJECTION, EMULSION INTRAVENOUS PRN
Status: DISCONTINUED | OUTPATIENT
Start: 2018-11-27 | End: 2018-11-27

## 2018-11-27 RX ORDER — OXYCODONE HYDROCHLORIDE 5 MG/1
5-10 TABLET ORAL
Status: DISCONTINUED | OUTPATIENT
Start: 2018-11-27 | End: 2018-11-28 | Stop reason: HOSPADM

## 2018-11-27 RX ORDER — GABAPENTIN 300 MG/1
300 CAPSULE ORAL ONCE
Status: COMPLETED | OUTPATIENT
Start: 2018-11-27 | End: 2018-11-27

## 2018-11-27 RX ORDER — ONDANSETRON 2 MG/ML
INJECTION INTRAMUSCULAR; INTRAVENOUS PRN
Status: DISCONTINUED | OUTPATIENT
Start: 2018-11-27 | End: 2018-11-27

## 2018-11-27 RX ORDER — LIDOCAINE HYDROCHLORIDE 20 MG/ML
INJECTION, SOLUTION INFILTRATION; PERINEURAL PRN
Status: DISCONTINUED | OUTPATIENT
Start: 2018-11-27 | End: 2018-11-27

## 2018-11-27 RX ORDER — ONDANSETRON 2 MG/ML
4 INJECTION INTRAMUSCULAR; INTRAVENOUS EVERY 6 HOURS PRN
Status: DISCONTINUED | OUTPATIENT
Start: 2018-11-27 | End: 2018-11-28 | Stop reason: HOSPADM

## 2018-11-27 RX ORDER — AMOXICILLIN 250 MG
2 CAPSULE ORAL 2 TIMES DAILY
Status: DISCONTINUED | OUTPATIENT
Start: 2018-11-27 | End: 2018-11-28 | Stop reason: HOSPADM

## 2018-11-27 RX ORDER — METOCLOPRAMIDE HYDROCHLORIDE 5 MG/ML
10 INJECTION INTRAMUSCULAR; INTRAVENOUS ONCE
Status: COMPLETED | OUTPATIENT
Start: 2018-11-27 | End: 2018-11-27

## 2018-11-27 RX ORDER — POTASSIUM CHLORIDE 20MEQ/15ML
20 LIQUID (ML) ORAL ONCE
Status: DISCONTINUED | OUTPATIENT
Start: 2018-11-27 | End: 2018-11-28 | Stop reason: HOSPADM

## 2018-11-27 RX ORDER — ONDANSETRON 4 MG/1
4 TABLET, ORALLY DISINTEGRATING ORAL EVERY 30 MIN PRN
Status: DISCONTINUED | OUTPATIENT
Start: 2018-11-27 | End: 2018-11-27 | Stop reason: HOSPADM

## 2018-11-27 RX ORDER — PROCHLORPERAZINE MALEATE 5 MG
10 TABLET ORAL EVERY 6 HOURS PRN
Status: DISCONTINUED | OUTPATIENT
Start: 2018-11-27 | End: 2018-11-28 | Stop reason: HOSPADM

## 2018-11-27 RX ORDER — TOPIRAMATE 50 MG/1
50 TABLET, FILM COATED ORAL 2 TIMES DAILY
Status: DISCONTINUED | OUTPATIENT
Start: 2018-11-27 | End: 2018-11-28 | Stop reason: HOSPADM

## 2018-11-27 RX ORDER — EPHEDRINE SULFATE 50 MG/ML
INJECTION, SOLUTION INTRAMUSCULAR; INTRAVENOUS; SUBCUTANEOUS PRN
Status: DISCONTINUED | OUTPATIENT
Start: 2018-11-27 | End: 2018-11-27

## 2018-11-27 RX ORDER — LIDOCAINE 40 MG/G
CREAM TOPICAL
Status: DISCONTINUED | OUTPATIENT
Start: 2018-11-27 | End: 2018-11-28 | Stop reason: HOSPADM

## 2018-11-27 RX ORDER — FENTANYL CITRATE 50 UG/ML
25-50 INJECTION, SOLUTION INTRAMUSCULAR; INTRAVENOUS
Status: DISCONTINUED | OUTPATIENT
Start: 2018-11-27 | End: 2018-11-27 | Stop reason: HOSPADM

## 2018-11-27 RX ORDER — MAGNESIUM SULFATE HEPTAHYDRATE 40 MG/ML
2 INJECTION, SOLUTION INTRAVENOUS ONCE
Status: COMPLETED | OUTPATIENT
Start: 2018-11-27 | End: 2018-11-27

## 2018-11-27 RX ORDER — LORAZEPAM 0.5 MG/1
0.5 TABLET ORAL EVERY 8 HOURS PRN
Status: DISCONTINUED | OUTPATIENT
Start: 2018-11-27 | End: 2018-11-28 | Stop reason: HOSPADM

## 2018-11-27 RX ORDER — CEFAZOLIN SODIUM IN 0.9 % NACL 3 G/100 ML
3 INTRAVENOUS SOLUTION, PIGGYBACK (ML) INTRAVENOUS
Status: COMPLETED | OUTPATIENT
Start: 2018-11-27 | End: 2018-11-27

## 2018-11-27 RX ORDER — METOPROLOL TARTRATE 1 MG/ML
5 INJECTION, SOLUTION INTRAVENOUS ONCE
Status: COMPLETED | OUTPATIENT
Start: 2018-11-27 | End: 2018-11-27

## 2018-11-27 RX ORDER — ALBUTEROL SULFATE 90 UG/1
2 AEROSOL, METERED RESPIRATORY (INHALATION)
Status: DISCONTINUED | OUTPATIENT
Start: 2018-11-27 | End: 2018-11-28 | Stop reason: HOSPADM

## 2018-11-27 RX ORDER — DEXAMETHASONE SODIUM PHOSPHATE 4 MG/ML
INJECTION, SOLUTION INTRA-ARTICULAR; INTRALESIONAL; INTRAMUSCULAR; INTRAVENOUS; SOFT TISSUE PRN
Status: DISCONTINUED | OUTPATIENT
Start: 2018-11-27 | End: 2018-11-27

## 2018-11-27 RX ORDER — SCOLOPAMINE TRANSDERMAL SYSTEM 1 MG/1
1 PATCH, EXTENDED RELEASE TRANSDERMAL
Status: DISCONTINUED | OUTPATIENT
Start: 2018-11-27 | End: 2018-11-28 | Stop reason: HOSPADM

## 2018-11-27 RX ORDER — DIPHENHYDRAMINE HCL 25 MG
25 CAPSULE ORAL EVERY 6 HOURS PRN
Status: DISCONTINUED | OUTPATIENT
Start: 2018-11-27 | End: 2018-11-28 | Stop reason: HOSPADM

## 2018-11-27 RX ORDER — DIPHENHYDRAMINE HYDROCHLORIDE 50 MG/ML
25 INJECTION INTRAMUSCULAR; INTRAVENOUS EVERY 6 HOURS PRN
Status: DISCONTINUED | OUTPATIENT
Start: 2018-11-27 | End: 2018-11-28 | Stop reason: HOSPADM

## 2018-11-27 RX ORDER — HYOSCYAMINE SULFATE 0.125 MG
125 TABLET ORAL EVERY 4 HOURS PRN
Status: DISCONTINUED | OUTPATIENT
Start: 2018-11-27 | End: 2018-11-28 | Stop reason: HOSPADM

## 2018-11-27 RX ORDER — KETAMINE HYDROCHLORIDE 10 MG/ML
10 INJECTION, SOLUTION INTRAMUSCULAR; INTRAVENOUS
Status: DISCONTINUED | OUTPATIENT
Start: 2018-11-27 | End: 2018-11-27 | Stop reason: HOSPADM

## 2018-11-27 RX ORDER — HYDROMORPHONE HYDROCHLORIDE 1 MG/ML
.3-.5 INJECTION, SOLUTION INTRAMUSCULAR; INTRAVENOUS; SUBCUTANEOUS
Status: DISCONTINUED | OUTPATIENT
Start: 2018-11-27 | End: 2018-11-28 | Stop reason: HOSPADM

## 2018-11-27 RX ORDER — CEFAZOLIN SODIUM 1 G/3ML
1 INJECTION, POWDER, FOR SOLUTION INTRAMUSCULAR; INTRAVENOUS SEE ADMIN INSTRUCTIONS
Status: DISCONTINUED | OUTPATIENT
Start: 2018-11-27 | End: 2018-11-27 | Stop reason: HOSPADM

## 2018-11-27 RX ORDER — KETAMINE HYDROCHLORIDE 10 MG/ML
INJECTION, SOLUTION INTRAMUSCULAR; INTRAVENOUS PRN
Status: DISCONTINUED | OUTPATIENT
Start: 2018-11-27 | End: 2018-11-27

## 2018-11-27 RX ORDER — ACETAMINOPHEN 325 MG/1
975 TABLET ORAL ONCE
Status: COMPLETED | OUTPATIENT
Start: 2018-11-27 | End: 2018-11-27

## 2018-11-27 RX ORDER — LISINOPRIL 20 MG/1
40 TABLET ORAL EVERY EVENING
Status: DISCONTINUED | OUTPATIENT
Start: 2018-11-27 | End: 2018-11-28 | Stop reason: HOSPADM

## 2018-11-27 RX ADMIN — FENTANYL CITRATE 100 MCG: 50 INJECTION, SOLUTION INTRAMUSCULAR; INTRAVENOUS at 08:22

## 2018-11-27 RX ADMIN — Medication 10 MG: at 09:00

## 2018-11-27 RX ADMIN — PROPOFOL 200 MG: 10 INJECTION, EMULSION INTRAVENOUS at 08:22

## 2018-11-27 RX ADMIN — Medication 30 MG: at 08:22

## 2018-11-27 RX ADMIN — FENTANYL CITRATE 25 MCG: 50 INJECTION, SOLUTION INTRAMUSCULAR; INTRAVENOUS at 10:21

## 2018-11-27 RX ADMIN — Medication 0.3 MG: at 09:55

## 2018-11-27 RX ADMIN — SODIUM CHLORIDE, POTASSIUM CHLORIDE, SODIUM LACTATE AND CALCIUM CHLORIDE: 600; 310; 30; 20 INJECTION, SOLUTION INTRAVENOUS at 08:10

## 2018-11-27 RX ADMIN — TOPIRAMATE 50 MG: 50 TABLET ORAL at 22:19

## 2018-11-27 RX ADMIN — OXYCODONE HYDROCHLORIDE 5 MG: 5 TABLET ORAL at 16:40

## 2018-11-27 RX ADMIN — KETOROLAC TROMETHAMINE 30 MG: 30 INJECTION, SOLUTION INTRAMUSCULAR at 10:34

## 2018-11-27 RX ADMIN — Medication 10 MG: at 10:54

## 2018-11-27 RX ADMIN — ESMOLOL HYDROCHLORIDE 30 MG: 100 INJECTION, SOLUTION INTRAVENOUS at 10:46

## 2018-11-27 RX ADMIN — Medication 10 MG: at 10:20

## 2018-11-27 RX ADMIN — FENTANYL CITRATE 25 MCG: 50 INJECTION, SOLUTION INTRAMUSCULAR; INTRAVENOUS at 10:06

## 2018-11-27 RX ADMIN — Medication 0.5 MG: at 10:36

## 2018-11-27 RX ADMIN — Medication 10 MG: at 08:53

## 2018-11-27 RX ADMIN — Medication 0.3 MG: at 10:02

## 2018-11-27 RX ADMIN — METOCLOPRAMIDE 10 MG: 5 INJECTION, SOLUTION INTRAMUSCULAR; INTRAVENOUS at 07:17

## 2018-11-27 RX ADMIN — MIDAZOLAM 1 MG: 1 INJECTION INTRAMUSCULAR; INTRAVENOUS at 08:22

## 2018-11-27 RX ADMIN — FENTANYL CITRATE 25 MCG: 50 INJECTION, SOLUTION INTRAMUSCULAR; INTRAVENOUS at 10:37

## 2018-11-27 RX ADMIN — Medication 0.4 MG: at 10:08

## 2018-11-27 RX ADMIN — METOPROLOL TARTRATE 5 MG: 1 INJECTION, SOLUTION INTRAVENOUS at 11:22

## 2018-11-27 RX ADMIN — ROCURONIUM BROMIDE 20 MG: 10 INJECTION INTRAVENOUS at 08:49

## 2018-11-27 RX ADMIN — MIDAZOLAM HYDROCHLORIDE 1 MG: 2 INJECTION, SOLUTION INTRAMUSCULAR; INTRAVENOUS at 11:02

## 2018-11-27 RX ADMIN — LIDOCAINE HYDROCHLORIDE 100 MG: 20 INJECTION, SOLUTION INFILTRATION; PERINEURAL at 08:22

## 2018-11-27 RX ADMIN — ROCURONIUM BROMIDE 50 MG: 10 INJECTION INTRAVENOUS at 08:22

## 2018-11-27 RX ADMIN — Medication 0.5 MG: at 14:19

## 2018-11-27 RX ADMIN — MAGNESIUM SULFATE 2 G: 2 INJECTION INTRAVENOUS at 16:13

## 2018-11-27 RX ADMIN — Medication 0.5 MG: at 10:14

## 2018-11-27 RX ADMIN — ONDANSETRON 4 MG: 4 TABLET, ORALLY DISINTEGRATING ORAL at 20:44

## 2018-11-27 RX ADMIN — SUGAMMADEX 250 MG: 100 INJECTION, SOLUTION INTRAVENOUS at 09:32

## 2018-11-27 RX ADMIN — FENTANYL CITRATE 25 MCG: 50 INJECTION, SOLUTION INTRAMUSCULAR; INTRAVENOUS at 10:11

## 2018-11-27 RX ADMIN — LISINOPRIL 40 MG: 20 TABLET ORAL at 22:19

## 2018-11-27 RX ADMIN — DEXAMETHASONE SODIUM PHOSPHATE 6 MG: 4 INJECTION, SOLUTION INTRA-ARTICULAR; INTRALESIONAL; INTRAMUSCULAR; INTRAVENOUS; SOFT TISSUE at 09:02

## 2018-11-27 RX ADMIN — FENTANYL CITRATE 25 MCG: 50 INJECTION, SOLUTION INTRAMUSCULAR; INTRAVENOUS at 10:00

## 2018-11-27 RX ADMIN — MIDAZOLAM 1 MG: 1 INJECTION INTRAMUSCULAR; INTRAVENOUS at 11:27

## 2018-11-27 RX ADMIN — FENTANYL CITRATE 50 MCG: 50 INJECTION, SOLUTION INTRAMUSCULAR; INTRAVENOUS at 10:27

## 2018-11-27 RX ADMIN — MIDAZOLAM 1 MG: 1 INJECTION INTRAMUSCULAR; INTRAVENOUS at 11:26

## 2018-11-27 RX ADMIN — GABAPENTIN 300 MG: 300 CAPSULE ORAL at 06:33

## 2018-11-27 RX ADMIN — FENTANYL CITRATE 25 MCG: 50 INJECTION, SOLUTION INTRAMUSCULAR; INTRAVENOUS at 09:53

## 2018-11-27 RX ADMIN — ONDANSETRON 4 MG: 2 INJECTION INTRAMUSCULAR; INTRAVENOUS at 09:05

## 2018-11-27 RX ADMIN — ACETAMINOPHEN 975 MG: 325 TABLET, FILM COATED ORAL at 06:32

## 2018-11-27 RX ADMIN — Medication 3 G: at 08:30

## 2018-11-27 RX ADMIN — SCOPALAMINE 1 PATCH: 1 PATCH, EXTENDED RELEASE TRANSDERMAL at 14:22

## 2018-11-27 RX ADMIN — SODIUM CHLORIDE, POTASSIUM CHLORIDE, SODIUM LACTATE AND CALCIUM CHLORIDE: 600; 310; 30; 20 INJECTION, SOLUTION INTRAVENOUS at 10:08

## 2018-11-27 RX ADMIN — Medication 12.5 MG: at 21:00

## 2018-11-27 RX ADMIN — ENOXAPARIN SODIUM 40 MG: 40 INJECTION SUBCUTANEOUS at 06:34

## 2018-11-27 RX ADMIN — OXYCODONE HYDROCHLORIDE 5 MG: 5 TABLET ORAL at 20:51

## 2018-11-27 RX ADMIN — FAMOTIDINE 10 MG: 10 INJECTION, SOLUTION INTRAVENOUS at 07:17

## 2018-11-27 ASSESSMENT — PAIN DESCRIPTION - DESCRIPTORS
DESCRIPTORS: DISCOMFORT
DESCRIPTORS: DISCOMFORT
DESCRIPTORS: CRAMPING
DESCRIPTORS: DISCOMFORT
DESCRIPTORS: DISCOMFORT

## 2018-11-27 ASSESSMENT — ACTIVITIES OF DAILY LIVING (ADL)
ADLS_ACUITY_SCORE: 9
ADLS_ACUITY_SCORE: 9

## 2018-11-27 NOTE — LETTER
Transition Communication Hand-off for Care Transitions to Next Level of Care Provider    Name: Danitza Soto  : 1980  MRN #: [id number]  Primary Care Provider: Polly Mcbride     Primary Clinic: [address] [place] [id number]     Reason for Hospitalization:  Morbid Obesity   Morbid (severe) obesity due to excess calories (H)  Admit Date/Time: 2018  5:47 AM  Discharge Date: 2018  Payor Source: Payor: [employer] / Plan: [employer] AD[employer]ANTAGE / Product Type: HMO /     Readmission Assessment Measure (LULI) Risk Score/category: low    Reason for Communication Hand-off Referral: Continuity of care    Discharge Plan: Discharge to home with clinic follow up as recommended    Follow-up specialty is recommended: Yes    Follow-up plan:  Future Appointments  Date Time Provider Department Center   2018 10:00 AM Nurse,  Surgery [facility]   2018 10:30 AM Kelsey Sousa RD [facility]   2018 10:00 AM Kathy Calhoun, [facility]   2018 2:00 PM Demi Alvarado RD [facility]   2018 2:00 PM Nurse, [facility]   3/26/2019 1:30 PM Leena Heath MD [facility]     Key Recommendations:  Post hospitalization follow up  Rosie Henriquez RN BSN  6C Unit Care Coordinator  Phone number: [phone]  Pager: [phone]

## 2018-11-27 NOTE — ANESTHESIA POSTPROCEDURE EVALUATION
Anesthesia POST Procedure Evaluation    Patient: Danitza Soto   MRN:     [id number] Gender:   female   Age:    38 year old :      1980        Preoperative Diagnosis: Morbid Obesity    Procedure(s):  Laparoscopic Sleeve Gastrectomy Latex Free  LAPAROSCOPIC  HIATAL Hernia Repair   Postop Comments: No value filed.       Anesthesia Type:  General    Reportable Event: NO     PAIN: Uncomplicated   Sign Out status: Comfortable, Well controlled pain     PONV: No PONV   Sign Out status:  No Nausea or Vomiting     Neuro/Psych: Uneventful perioperative course   Sign Out Status: Preoperative baseline; Age appropriate mentation     Airway/Resp.: Uneventful perioperative course   Sign Out Status: Non labored breathing, age appropriate RR; Resp. Status within EXPECTED Parameters     CV: Uneventful perioperative course   Sign Out status: Appropriate BP and perfusion indices; Appropriate HR/Rhythm     Disposition:   Sign Out in:  PACU  Disposition:  Phase II; Home  Recovery Course: Uneventful  Follow-Up: Not required           Last Anesthesia Record Vitals:  CRNA VITALS  2018 0912 - 2018 0955      2018             Pulse: 77    SpO2: 100 %    Resp Rate (observed): (!)  50          Last PACU/Preop Vitals:  Vitals:    18 0600   BP: 131/86   Resp: 18   Temp: 36.7  C (98.1  F)   SpO2: 97%         Electronically Signed By: Maria De Jesus Vance MD, 2018, 9:55 AM

## 2018-11-27 NOTE — PLAN OF CARE
Problem: Patient Care Overview  Goal: Plan of Care/Patient Progress Review  PT: pt in OR at time of PT.

## 2018-11-27 NOTE — PHARMACY-CONSULT NOTE
Bariatric Consult    Medications evaluated as requested per Bariatric Consult. Per consult:  Dispense liquid for the following:   ~ IF tablet larger than   inch and cannot be broken, crushed, or split,   ~ IF capsule larger than   inch and cannot be opened.    No medication changes were needed based on the Bariatric Medication Management Policy.    The pharmacist will continue to follow as new medications are ordered.

## 2018-11-27 NOTE — ANESTHESIA CARE TRANSFER NOTE
Patient: Danitza Soto    Procedure(s):  Laparoscopic Sleeve Gastrectomy Latex Free  LAPAROSCOPIC  HIATAL Hernia Repair    Diagnosis: Morbid Obesity   Diagnosis Additional Information: No value filed.    Anesthesia Type:   General, ETT     Note:  Airway :Face Mask  Patient transferred to:PACU  Comments: VSS. Breathing spontaneously at a regular rate with adequate tidal volumes and maintaining O2 sats on 6L face mask. Denies nausea. Reports some pain. No apparent complications from anesthesia.     Jeff Martinez MD, MSc.  Anesthesia Resident, CA-1  Handoff Report: Identifed the Patient, Identified the Reponsible Provider, Reviewed the pertinent medical history, Discussed the surgical course, Reviewed Intra-OP anesthesia mangement and issues during anesthesia, Set expectations for post-procedure period and Allowed opportunity for questions and acknowledgement of understanding      Vitals: (Last set prior to Anesthesia Care Transfer)    CRNA VITALS  11/27/2018 0912 - 11/27/2018 0950      11/27/2018             Pulse: 77    SpO2: 100 %    Resp Rate (observed): 20                Electronically Signed By: Jeff Martinez MD  November 27, 2018  9:50 AM

## 2018-11-27 NOTE — PROGRESS NOTES
"General Surgery Post-Op Note    S: Danitza is s/p gastric sleeve this morning with Dr. Tse and Dr. Mei. She reports feeling tired, but otherwise okay. Notes some upper abdominal pain that she describes as gas pain. Denies N/V, shortness of breath, chest pain. She did have some a-fib immediately post-op and will be on telemetry overnight.    O:  /80 (BP Location: Left arm)  Temp (P) 99.3  F (37.4  C) (Oral)  Resp (P) 18  Ht 1.702 m (5' 7\")  Wt 124.6 kg (274 lb 11.1 oz)  LMP   SpO2 97%  BMI 43.02 kg/m2  General: Resting comfortably, in no acute distress  Abdomen: soft, incision dressings clean, dry    A/P:  39 yo F s/p gastric sleeve today, complicated by a-fib post op    - Telemetry overnight  - Cardiology consult placed, appreciate recs  - Rate control with diltiazem or BB if BP allows  - Pain control with tylenol, oxycodone, IV dilaudid  - Zofran for N/V, compazine if refractory  - Bariatric clears  - Lovenox for DVT PPx    Bebo Connelly MD  [facility]  PGY1  "

## 2018-11-27 NOTE — IP AVS SNAPSHOT
[address] [address]    Phone:  [phone]                                       After Visit Summary   11/27/2018    Danitza Soto    MRN: [id number]           After Visit Summary Signature Page     I have received my discharge instructions, and my questions have been answered. I have discussed any challenges I see with this plan with the nurse or doctor.    ..........................................................................................................................................  Patient/Patient Representative Signature      ..........................................................................................................................................  Patient Representative Print Name and Relationship to Patient    ..................................................               ................................................  Date                                   Time    ..........................................................................................................................................  Reviewed by Signature/Title    ...................................................              ..............................................  Date                                               Time          22EPIC Rev 08/18

## 2018-11-27 NOTE — OR NURSING
Dr Tse aware of pt's post op labs. No cards consult needed at this time per Dr Tse and VIRGINIA Vance.

## 2018-11-27 NOTE — OR NURSING
Pt's HR increased from 60's to 140's-Dr Vance aware. Order for 30 mg esmolol. Dr Mei aware & ok to give IV toradol in PACU. No labs ordered.

## 2018-11-27 NOTE — OR NURSING
10 mg Ketamine given. Pt started feeling anxious, tearful, and 'feeling weird' two minutes later. Dr Vance aware, ok to give 1 mg versed.

## 2018-11-27 NOTE — IP AVS SNAPSHOT
MRN:[id number]                      After Visit Summary   11/27/2018    Danitza Soto    MRN: [id number]           Thank you!     Thank you for choosing [facility] for your care. Our goal is always to provide you with excellent care. Hearing back from our patients is one way we can continue to improve our services. Please take a few minutes to complete the written survey that you may receive in the mail after you visit with us. Thank you!        Patient Information     Date Of Birth          1980        Designated Caregiver       Most Recent Value    Caregiver    Will someone help with your care after discharge? yes    Name of designated caregiver Avani wife    Phone number of caregiver [phone]    Caregiver address same as patient      About your hospital stay     You were admitted on:  November 27, 2018 You last received care in the:  [address] [facility]    You were discharged on:  November 28, 2018        Reason for your hospital stay       Laparoscopic sleeve gastrectomy                  Who to Call     For medical emergencies, please call 911.  For non-urgent questions about your medical care, please call your primary care provider or clinic, [phone]  For questions related to your surgery, please call your surgery clinic        Attending Provider     Provider Specialty    Artis Tse MD Surgery       Primary Care Provider Office Phone # Fax #    Polly Mcbride [phone]      After Care Instructions     Activity       Your activity upon discharge: activity as tolerated - no heavy lifting for 4-6 weeks            Diet       Follow this diet upon discharge: Bariatric full liquid diet                  Follow-up Appointments     Adult [facility] Follow-up and recommended labs and tests       Diet on discharge 11/28/2018.      Post-op diet: Bariatric full liquid diet until seen in clinic follow-up    No lifting >10 pounds for 4-6 weeks. Discuss with your  surgeon at follow up appointment  May shower starting postoperative day #1 but no scrubbing incisions. No bathing or soaking incisions for 2 weeks or until incisions completely healed.  Wound care: Keep clean and dry. Steri strips or glue will fall off on their own.   Take stool softener while taking narcotic pain medication.  No driving for at least 12 hours after taking narcotic pain medication.  After surgery it is ok to swallow medications smaller than 1/4 inch(size of pencil eraser) for all procedures.  If medication is larger than 1/4 inch then it will need to be crushed, cut or in liquid form for 1-2 months after surgery. This can be discussed with surgeon team at the 1 month follow up appointment and will depend on patient tolerance.  You will be sent home with omeprazole 20mg once daily for heartburn symptom prevention, zofran as needed for nausea and a medication called hyoscyamine (levsin) as needed for cramping.  Follow-up with your surgeon team in 1-2 weeks. If this appointment was not previously made for you please call:  BARIATRIC PATIENTS:  Call [phone] to schedule or speak with a nurse    You will receive a call from our clinic nurse after surgery.      Please follow up with primary care provider within 1 week for post-hospitalization/post-op visit.    AFTER HOURS QUESTIONS OR CONCERNS: Call [phone] and ask to speak with surgery resident if you are having troubles in the evenings, at night, or on weekends. Please call if you experience increasing abdominal pain, nausea, vomiting, increasing drainage from your wounds, chills, or fever >101.5  Appointments located at [facility] clinic:  Clinics and Surgery Center ([facility])    [address] [place] [id number]                  Your next 10 appointments already scheduled     Dec 07, 2018 10:00 AM CST   Nurse Visit with  Surgery Nurse   General Surgery ([facility])    [address] [place] [address]  [facility] [address]   [phone]            Dec 07, 2018 10:30 AM CST   (Arrive by 10:15 AM)   Return Bariatric Nutrition Visit with DAVIE Gonzáles [facility] ([facility])    [address] [place] [address]   [phone]            Dec 18, 2018 10:00 AM CST   (Arrive by 9:45 AM)   [facility]    [address] [place] [address]   [phone]            Dec 28, 2018  2:00 PM CST   Nurse Visit with  Surgery Nurse   General Surgery ([facility])    [address] [place] [address]   [phone]            Dec 28, 2018  2:00 PM CST   (Arrive by 1:45 PM)   Return Bariatric Nutrition Visit with DAVIE Gupta [facility] ([facility])    [address] [place] [address]   [phone]            Mar 26, 2019  1:30 PM CDT   Return Visit with Leena Heath MD   [facility] ([facility])    [address] [place] [address]   [phone]              Additional Information     If you use hormonal birth control (such as the pill, patch, ring or implants): You'll need a second form of birth control for 7 days (condoms, a diaphragm or contraceptive foam). While in the hospital, you received a medicine called Bridion. Your normal birth control will not work as well for a week after taking this medicine.          Pending Results     Date and Time Order Name Status Description    11/28/2018 0724 EKG 12-lead, tracing only Preliminary     11/27/2018 1029 EKG 12-lead, complete Preliminary     11/27/2018 0922 Surgical pathology exam In process             Statement of Approval     Ordered          11/28/18 0911  I have  "reviewed and agree with all the recommendations and orders detailed in this document.  EFFECTIVE NOW     Approved and electronically signed by:  Brody Carr MD             Admission Information     Date & Time Provider Department Dept. Phone    11/27/2018 Artis Tse MD [address] [facility] [phone]      Your Vitals Were     Blood Pressure Pulse Temperature Respirations Height Weight    144/78 63 99.7  F (37.6  C) (Oral) 16 1.702 m (5' 7\") 127.1 kg (280 lb 1.6 oz)    Pulse Oximetry BMI (Body Mass Index)                95% 43.87 kg/m2          MyChart Information     [employer] gives you secure access to your electronic health record. If you see a primary care provider, you can also send messages to your care team and make appointments. If you have questions, please call your primary care clinic.  If you do not have a primary care provider, please call [phone] and they will assist you.        Care EveryWhere ID     This is your Care EveryWhere ID. This could be used by other organizations to access your [facility] medical records  [id number]        Equal Access to Services     [facility] Hospital[facility] : Hadreed Storey, addison yeager, christiano gonzalez. So [facility] [phone].    ATENCIÓN: Si habla español, tiene a bailey disposición servicios gratuitos de asistencia lingüística. Marie al [phone].    We comply with applicable federal civil rights laws and [place] laws. We do not discriminate on the basis of race, color, national origin, age, disability, sex, sexual orientation, or gender identity.               Review of your medicines      START taking        Dose / Directions    acetaminophen 325 MG tablet   Commonly known as:  TYLENOL   Used for:  S/P laparoscopic sleeve gastrectomy        Dose:  650 mg   Take 2 tablets (650 mg) by mouth every 4 hours as needed for other (For optimal non-opioid multimodal pain management to improve " pain control and physical function.)   Quantity:  100 tablet   Refills:  0       hyoscyamine 0.125 MG tablet   Commonly known as:  ANASPAZ/LEVSIN   Used for:  S/P laparoscopic sleeve gastrectomy        Dose:  0.125-0.25 mg   Take 1-2 tablets (125-250 mcg) by mouth every 4 hours as needed for cramping (Take 1-2 tabs by mouth every 4 hours as needed for cramping)   Quantity:  30 tablet   Refills:  1       omeprazole 20 MG DR capsule   Commonly known as:  priLOSEC   Used for:  S/P laparoscopic sleeve gastrectomy        Dose:  20 mg   Take 1 capsule (20 mg) by mouth 2 times daily   Quantity:  60 capsule   Refills:  1       ondansetron 4 MG ODT tab   Commonly known as:  ZOFRAN-ODT   Used for:  S/P laparoscopic sleeve gastrectomy        Dose:  4 mg   Take 1 tablet (4 mg) by mouth every 4 hours as needed for nausea or vomiting   Quantity:  30 tablet   Refills:  1       oxyCODONE 5 MG tablet   Commonly known as:  ROXICODONE   Used for:  S/P laparoscopic sleeve gastrectomy        Dose:  5 mg   Take 1 tablet (5 mg) by mouth every 4 hours as needed for pain   Quantity:  30 tablet   Refills:  0         CONTINUE these medicines which may have CHANGED, or have new prescriptions. If we are uncertain of the size of tablets/capsules you have at home, strength may be listed as something that might have changed.        Dose / Directions    lisinopril 40 MG tablet   Commonly known as:  PRINIVIL/ZESTRIL   This may have changed:  when to take this   Used for:  Essential hypertension with goal blood pressure less than 140/90        Dose:  40 mg   Take 1 tablet (40 mg) by mouth daily   Quantity:  90 tablet   Refills:  3       topiramate 25 MG tablet   Commonly known as:  TOPAMAX   This may have changed:  additional instructions   Used for:  Morbid obesity (H)        Dose:  50 mg   Take 2 tablets (50 mg) by mouth 2 times daily 25 mg at bedtime for 1 week, 50 mg at bedtime for 1 week and 75 mg daily at bedtime thereafter   Quantity:  120  tablet   Refills:  3         CONTINUE these medicines which have NOT CHANGED        Dose / Directions    CENTRUM PO        Refills:  0       fish oil-omega-3 fatty acids 1000 MG capsule        Dose:  2 g   Take 2 g by mouth every morning   Refills:  0       hydrochlorothiazide 25 MG tablet   Commonly known as:  HYDRODIURIL   Used for:  Essential hypertension with goal blood pressure less than 140/90        Dose:  12.5 mg   Take 12.5 mg by mouth every morning   Quantity:  90 tablet   Refills:  3       LORazepam 0.5 MG tablet   Commonly known as:  ATIVAN   Used for:  Anxiety        Dose:  0.5 mg   Take 1 tablet (0.5 mg) by mouth every 8 hours as needed for anxiety   Quantity:  20 tablet   Refills:  0       PROBIOTIC ADVANCED PO        Refills:  0       VENTOLIN  (90 Base) MCG/ACT inhaler   Generic drug:  albuterol        INHALE 1-2 PUFFS EVERY 4-6 HOURS AS NEEDED FOR WHEEZING   Refills:  0       Vitamin D-3 5000 units Tabs        Dose:  1 tablet   Take 1 tablet by mouth daily   Refills:  0         STOP taking     ADVIL 200 MG tablet   Generic drug:  ibuprofen           NEXIUM PO                Where to get your medicines      These medications were sent to [facility] [place], [place] - [address] [place] [address]     Phone:  [phone]     hyoscyamine 0.125 MG tablet    omeprazole 20 MG DR capsule    ondansetron 4 MG ODT tab         Some of these will need a paper prescription and others can be bought over the counter. Ask your nurse if you have questions.     Bring a paper prescription for each of these medications     acetaminophen 325 MG tablet    oxyCODONE 5 MG tablet                Protect others around you: Learn how to safely use, store and throw away your medicines at www.disposemymeds.org.        Information about OPIOIDS     PRESCRIPTION OPIOIDS: WHAT YOU NEED TO KNOW   We gave you an opioid (narcotic) pain medicine. It is important to manage  your pain, but opioids are not always the best choice. You should first try all the other options your care team gave you. Take this medicine for as short a time (and as few doses) as possible.    Some activities can increase your pain, such as bandage changes or therapy sessions. It may help to take your pain medicine 30 to 60 minutes before these activities. Reduce your stress by getting enough sleep, working on hobbies you enjoy and practicing relaxation or meditation. Talk to your care team about ways to manage your pain beyond prescription opioids.    These medicines have risks:    DO NOT drive when on new or higher doses of pain medicine. These medicines can affect your alertness and reaction times, and you could be arrested for driving under the influence (DUI). If you need to use opioids long-term, talk to your care team about driving.    DO NOT operate heavy machinery    DO NOT do any other dangerous activities while taking these medicines.    DO NOT drink any alcohol while taking these medicines.     If the opioid prescribed includes acetaminophen, DO NOT take with any other medicines that contain acetaminophen. Read all labels carefully. Look for the word  acetaminophen  or  Tylenol.  Ask your pharmacist if you have questions or are unsure.    You can get addicted to pain medicines, especially if you have a history of addiction (chemical, alcohol or substance dependence). Talk to your care team about ways to reduce this risk.    All opioids tend to cause constipation. Drink plenty of water and eat foods that have a lot of fiber, such as fruits, vegetables, prune juice, apple juice and high-fiber cereal. Take a laxative (Miralax, milk of magnesia, Colace, Senna) if you don t move your bowels at least every other day. Other side effects include upset stomach, sleepiness, dizziness, throwing up, tolerance (needing more of the medicine to have the same effect), physical dependence and slowed  breathing.    Store your pills in a secure place, locked if possible. We will not replace any lost or stolen medicine. If you don t finish your medicine, please throw away (dispose) as directed by your pharmacist. The [place] Pollution Control Agency has more information about safe disposal: https://www.pca.[facility].[place].us/living-green/managing-unwanted-medications             Medication List: This is a list of all your medications and when to take them. Check marks below indicate your daily home schedule. Keep this list as a reference.      Medications           Morning Afternoon Evening Bedtime As Needed    acetaminophen 325 MG tablet   Commonly known as:  TYLENOL   Take 2 tablets (650 mg) by mouth every 4 hours as needed for other (For optimal non-opioid multimodal pain management to improve pain control and physical function.)   Last time this was given:  975 mg on 11/27/2018  6:32 AM                                   CENTRUM PO                                   fish oil-omega-3 fatty acids 1000 MG capsule   Take 2 g by mouth every morning                                   hydrochlorothiazide 25 MG tablet   Commonly known as:  HYDRODIURIL   Take 12.5 mg by mouth every morning   Last time this was given:  12.5 mg on 11/28/2018  7:56 AM                                   hyoscyamine 0.125 MG tablet   Commonly known as:  ANASPAZ/LEVSIN   Take 1-2 tablets (125-250 mcg) by mouth every 4 hours as needed for cramping (Take 1-2 tabs by mouth every 4 hours as needed for cramping)                                   lisinopril 40 MG tablet   Commonly known as:  PRINIVIL/ZESTRIL   Take 1 tablet (40 mg) by mouth daily   Last time this was given:  40 mg on 11/27/2018 10:19 PM                                   LORazepam 0.5 MG tablet   Commonly known as:  ATIVAN   Take 1 tablet (0.5 mg) by mouth every 8 hours as needed for anxiety                                   omeprazole 20 MG DR capsule   Commonly known as:  priLOSEC   Take 1  capsule (20 mg) by mouth 2 times daily                                      ondansetron 4 MG ODT tab   Commonly known as:  ZOFRAN-ODT   Take 1 tablet (4 mg) by mouth every 4 hours as needed for nausea or vomiting   Last time this was given:  4 mg on 11/27/2018  8:44 PM                                   oxyCODONE 5 MG tablet   Commonly known as:  ROXICODONE   Take 1 tablet (5 mg) by mouth every 4 hours as needed for pain   Last time this was given:  5 mg on 11/28/2018  7:55 AM                                   PROBIOTIC ADVANCED PO                                   topiramate 25 MG tablet   Commonly known as:  TOPAMAX   Take 2 tablets (50 mg) by mouth 2 times daily 25 mg at bedtime for 1 week, 50 mg at bedtime for 1 week and 75 mg daily at bedtime thereafter   Last time this was given:  50 mg on 11/28/2018  7:55 AM                                   VENTOLIN  (90 Base) MCG/ACT inhaler   INHALE 1-2 PUFFS EVERY 4-6 HOURS AS NEEDED FOR WHEEZING   Generic drug:  albuterol                                   Vitamin D-3 5000 units Tabs   Take 1 tablet by mouth daily

## 2018-11-27 NOTE — PLAN OF CARE
Transfer  Transferred from: PACU  Via: Bed  Reason for transfer: Pt inappropriate for unit  Family: Aware of transfer  Belongings: Sent with pt  Chart: Sent with pt  Medications: Meds from bin sent with pt  Report called from: PACU RN

## 2018-11-27 NOTE — PLAN OF CARE
D:  Pt post-op laparoscopic sleeve gastrectomy and hiatal hernia repair this AM, now in afib w/ RVR  I/A:  VSS on room air, except -160's, pt asymptomatic.  Abdominal/incisional pain managed w/ PRN IV dilaudid, pt tolerating.  Capnography as ordered.  Awaiting Mg and K from pharmacy.  Supportive wife at bedside  P:  Cards consult.  Continue w/ plan of care and notify General Surgery w/ changes/cocnerns.

## 2018-11-27 NOTE — ANESTHESIA PREPROCEDURE EVALUATION
Anesthesia Evaluation     . Pt has had prior anesthetic.            ROS/MED HX    ENT/Pulmonary:     (+)sleep apnea, tobacco use, Past use 1 PPD for 10 years, quit 8/2018 packs/day  uses CPAP , . .    Neurologic:  - neg neurologic ROS     Cardiovascular:     (+) hypertension----. : . . . :. dysrhythmias (paraxysmal atrial fibrillation ) a-fib, . Previous cardiac testing Echodate:9/2/2018results:Stress Testdate:9/6/2018 results:ECG reviewed date:6/19/2018 results: date: results:          METS/Exercise Tolerance:  >4 METS   Hematologic:  - neg hematologic  ROS       Musculoskeletal:  - neg musculoskeletal ROS (+) fracture upper extremity, , -       GI/Hepatic:     (+) GERD Asymptomatic on medication,       Renal/Genitourinary:  - ROS Renal section negative       Endo:     (+) Obesity, .      Psychiatric:  - neg psychiatric ROS       Infectious Disease:  - neg infectious disease ROS       Malignancy:      - no malignancy   Other:    (+) No chance of pregnancy C-spine cleared: N/A, no H/O Chronic Pain,no other significant disability                    Physical Exam  Normal systems: cardiovascular, pulmonary and dental    Airway   Mallampati: II  TM distance: >3 FB  Neck ROM: full    Dental     Cardiovascular   Rhythm and rate: normal      Pulmonary    breath sounds clear to auscultation    Other findings:     Echocardiogram 9/21/2018    Interpretation Summary     Global and regional left ventricular function is normal with an EF of 60-65%.  Moderate concentric wall thickening consistent with left ventricular  hypertrophy is present.  Right ventricular function, chamber size, wall motion, and thickness are  normal.  Mild biatrial enlargement is present.  No significant valve abnormalities present.  The inferior vena cava was normal in size  _____________________________________________________________________________  __        Left Ventricle  Global and regional left ventricular function is normal with an EF of  60-65%.  Left ventricular size is normal. Moderate concentric wall thickening  consistent with left ventricular hypertrophy is present. Left ventricular  diastolic function is indeterminate. No regional wall motion abnormalities are  seen.     Right Ventricle  Right ventricular function, chamber size, wall motion, and thickness are  normal. TAPSE 2.0 cm.     Atria  Mild biatrial enlargement is present. The atrial septum is intact as assessed  by color Doppler .     Mitral Valve  Trace mitral insufficiency is present.        Aortic Valve  Aortic valve is normal in structure and function. The aortic valve is  tricuspid.     Tricuspid Valve  Trace tricuspid insufficiency is present. Right ventricular systolic pressure  is 24mmHg above the right atrial pressure.     Pulmonic Valve  The pulmonic valve is normal. Estimated diastolic PA pressure is 27 mmHg.     Vessels  The aorta root is normal. The thoracic aorta is normal. Unable to accurately  index to BSA given weight. The pulmonary artery is normal. The inferior vena  cava was normal in size with preserved respiratory variability. Sinuses of  Valsalva 3.2 cm. Ascending aorta 3.1 cm. Estimated mean right atrial pressure  is 3 mmHg (normal).     Pericardium  No pericardial effusion is present.       Stress test 9/6/2018  Findings:  1. Overall quality of the study: Excellent.   2. Left ventricular cavity is normal on the rest and stress studies.  3. SPECT images demonstrate  normal perfusion. There is normal variant  apical thinning.  The rest images reveal normal perfusion. These  results suggest a low post-scan likelihood of angiographically  significant coronary artery disease. The summed stress score is 0.   4. Left ventricular ejection fraction is 76%. Left ventricular  end-diastolic volume is 127 mL. End-systolic volume is 31 mL.  5. Baseline EKG  findings reported separately in EPIC.         Impression:  Normal myocardial SPECT study with a summed stress score of  0.          Results for DANITZA SOTO (MRN [id number]) as of 11/7/2018 11:33    11/6/2018 11:43  Sodium: 141  Potassium: 3.8  Chloride: 109  Carbon Dioxide: 22  Urea Nitrogen: 17  Creatinine: 0.75  GFR Estimate: 85  GFR Estimate If Black: >90  Calcium: 9.3  Anion Gap: 10  Glucose: 83  WBC: 8.8  Hemoglobin: 13.2  Hematocrit: 41.2  Platelet Count: 315  RBC Count: 4.69  MCV: 88  MCH: 28.1  MCHC: 32.0  RDW: 13.7  INR: 0.98    11/6/2018 11:45  Color Urine: Yellow  Appearance Urine: Cloudy  Glucose Urine: Negative  Bilirubin Urine: Negative  Ketones Urine: Negative  Specific Gravity Urine: 1.017  pH Urine: 6.0  Protein Albumin Urine: Negative  Urobilinogen mg/dL: 0.0  Nitrite Urine: Negative  Blood Urine: Negative  Leukocyte Esterase Urine: Moderate (A)  Source: Midstream Urine  WBC Urine: 11 (H)  RBC Urine: 1  Bacteria Urine: Few (A)  Squamous Epithelial /HPF Urine: 2 (H)  Mucous Urine: Present (A)  Amorphous Crystals: Few (A)  Specimen Description: Midstream Urine  Culture Micro: <10,000 colonies/...  URINE CULTURE AEROBIC BACTERIAL: Rpt      Noted UA on asymptomatic pt, less than 10,000 -> no interventions           PAC Discussion and Assessment    ASA Classification: 2  Case is suitable for: [place]  Anesthetic techniques and relevant risks discussed: GA  Invasive monitoring and risk discussed: Yes  Types:   Possibility and Risk of blood transfusion discussed: Yes  NPO instructions given:   Additional anesthetic preparation and risks discussed:   Needs early admission to pre-op area:   Other:     PAC Resident/NP Anesthesia Assessment:  Danitza Soto is a 39 yo female scheduled for Laparoscopic Sleeve Gastrectomy on 11/27/2018 by Dr. Tse in treatment of morbid obesity     Previous anesthesia, only conscious sedation     1) Cardiac: paroxysmal atrial fibrillation, Zio patch shows 4% burden and CHADs score of 2, no current anticoagulants. Followed by [facility] cardiology. Negative stress test 9/6/2018.  Echocardiogram 9/2018 shows EF of 60-65%. Well managed HTN  2) Pulmonary: Smoked 1 PPD for 10 years, quit 8/2018. LEONARDO with CPAP use  3) GI: GERD, well managed with nexium  4) Endo: BMI 45.26    METS well over 4, feasible airway                 Reviewed and Signed by PAC Mid-Level Provider/Resident  Mid-Level Provider/Resident: JOSE LUIS Nava  Date: 11/6/2018  Time: 1200    Attending Anesthesiologist Anesthesia Assessment:  28 year old for LGS. Paroxysmal atrial fibrillation, low CHADS, not anticoagulated. Rest of cardiac workup normal. No pulmonary disease.    Patient/case discussed with RASHAD/resident; agree with above assessment. No need to see patient. Patient is appropriate for the planned procedure without further work-up or medical management.      Reviewed and Signed by PAC Anesthesiologist  Anesthesiologist: vj  Date: 11/6/2018  Time:   Pass/Fail: Pass  Disposition:     PAC Pharmacist Assessment:        Pharmacist:   Date:   Time:      Anesthesia Plan      History & Physical Review  History and physical reviewed and following examination; no interval change.    ASA Status:  2 .    NPO Status:  > 8 hours    Plan for General and ETT with Intravenous and Propofol induction. Maintenance will be Balanced.    PONV prophylaxis:  Ondansetron (or other 5HT-3) and Dexamethasone or Solumedrol  Additional equipment: Videolaryngoscope and 2nd IV      Postoperative Care  Postoperative pain management:  Multi-modal analgesia.      Consents  Anesthetic plan, risks, benefits and alternatives discussed with:  Patient.  Use of blood products discussed: Yes.   Use of blood products discussed with Patient.  Consented to blood products.  .                          .    JKATHIG FV AN PHYSICAL EXAM    Assessment:   ASA SCORE: 2       Documentation: H&P complete     Tobacco Use:  NO Active use of Tobacco/UNKNOWN Tobacco use status     Plan:   Anes. Type:  General   Pre-Induction: Midazolam IV; Acetaminophen PO; Gabapentin PO    Induction:  IV (Standard)   Airway: Oral ETT   Access/Monitoring: PIV   Maintenance: Balanced   Emergence: Procedure Site   Logistics: Same Day Surgery     Postop Pain/Sedation Strategy:  Standard-Options: Opioids PRN (Ketamine intraop)     PONV Management:  Adult Risk Factors: Female, Non-Smoker, Postop Opioids  Prevention: Ondansetron; Dexamethasone

## 2018-11-27 NOTE — OP NOTE
[facility], [place]    Operative Note    Pre-operative diagnosis: Morbid Obesity    Post-operative diagnosis * No post-op diagnosis entered *   Procedure: Procedure(s):  Laparoscopic Sleeve Gastrectomy Latex Free  LAPAROSCOPIC  HIATAL Hernia Repair   Surgeon: Surgeon(s) and Role:     * Artis Tse MD - Primary     * Alejandro Mei MD - Assisting   Assistant Surgeon      Anesthesia: Alejandro Mei MD; [please note that Dr. Mei was scrubbed and assisted during the operation due to the unavailability of a qualified surgical resident.]  General    Estimated blood loss: Minimal   Drains: None   Specimens:   ID Type Source Tests Collected by Time Destination   A : Partial Gastrectomy  Tissue Stomach, Greater Curvature SURGICAL PATHOLOGY EXAM Artis Tse MD 11/27/2018  9:22 AM       Findings: There was a small hiatal hernia.   Complications: None.   Implants: None.         BOUGIE SIZE: 40 FR  DISTANCE FROM PYLORUS: 10 CM  STAPLE LINE REINFORCEMENT: NO  STAPLE LINE OVERSEW: NO  COMORBIDITIES:   Past Medical History:   Diagnosis Date     Depressive disorder 1990     Esophageal reflux      Hearing problem 2018     Hyperlipidemia LDL goal <130 7/6/2015     Hypertension      LSIL (low grade squamous intraepithelial lesion) on Pap smear 6/2014    + HPV, unable to type colp - BRISEYDA I     LEONARDO on CPAP      Other anxiety states        INDICATIONS FOR PROCEDURE  Danitza Soto is a 38 year old female who is morbidly obese.  Numerous weight loss attempts without surgery have been without success.     After understanding the risks and benefits of proceeding with a laparoscopic vertical sleeve gastrectomy, she agreed to an operation as outlined by me.    I reviewed the risks of surgery with Danitza Soto.    These include, but are not limited to, death, myocardial infarction, pneumonia, urinary tract infection, deep venous thrombosis with or without pulmonary embolus, abdominal  "infection from bowel injury or abscess, bowel obstruction, wound infection, and bleeding.    More specific risks related to vertical sleeve gastrectomy were detailed at the bariatric informational seminar and include the followin.) leak at the vertical sleeve staple line, 2.) stricture in the sleeve, 3.) nausea, vomiting, and dehydration for several months, 4.) adhesions causing bowel obstruction, 5.) rapid weight loss causing a higher rate of gallstone formation during the first 6 months after surgery, 6.) decreased absorption of vitamins because of the reduced stomach size, 7.) weight regain if inappropriate food intake occurs.    The BMI that we are treating this patient for was measured at the initial consultation visit in our bariatric program and it was: 48.2kg/m2 (as calculated just below).      The initial consult height, weight, and BMI are as follows:    Height: 5' 7\"   BARIATRIC WEIGHT TRACKING 10/4/2018   Initial Weight 308 lbs 13 oz       Our weight loss surgery program requires weight loss prior to bariatric surgery and currently the height, weight, and BMI are as follows:    Height: 170.2 cm (5' 7\"), Weight: 124.6 kg (274 lb 11.1 oz), and currently the Body mass index is 43.02 kg/(m^2).    Due to the patient's comorbidity conditions of GERD, HTN, and depression, in association with elevated body mass index, bariatric surgery has been recommended and is being performed today.    Moreover, as the surgeon performing this procedure, I certify that the following are true in regards to this patient at or prior to the day of surgery:    1. The patient's body mass index (BMI) is or has been greater than or equal to 35 kg/m2.  2. The patient has at least one co-morbidity related to obesity (as outlined above).  3. The patient has been previously unsuccessful with medical treatment for obesity.  Please note that some of this information has been documented as part of a comprehensive pre-bariatric surgery " process in the outpatient clinic and is NOT immediately available in the inpatient encounter for this bariatric operation.      OPERATIVE PROCEDURE:     Danitza Soto was brought to the operating room and prepared in routine fashion. Under the benefits of general anesthesia, a left upper quadrant Veress needle was inserted and pneumoperitoneum was established using carbon dioxide gas to a maximum pressure of 15 mmHg. A total of five ports were placed into the abdomen.     A liver retractor was placed through the rightmost port and this provided a view of the upper stomach. The operation was started by dividing the short gastric vessels off the greater curvature of the stomach. This dissection was carried up to the angle of His, and ligasure dissector was used for hemostasis.     A hiatal hernia repair was performed by crural approximation and a figure of eight of 2-0 surgidac was used to close the hiatus.    A bougie (size noted above) was passed into the stomach and I used 5 blue loads of the [employer] [place] flex linear cutter stapler device to create a vertical sleeve gastrectomy with the bougie as a template. The bougie was removed.    The sleeve gastrectomy specimen (partial gastrectomy) was now removed from the abdomen through the 15 mm port.     Hemostasis was secured, and the liver retractor and all ports were removed from the abdomen under direct visualization.     Skin incisions were closed using skin staples, and sterile dressings were placed.     I was present for all critical components of the operation and all needle and sponge counts were correct x2 at the end of the procedure.    Artis Tse MD  Surgery  [phone] (hospital )

## 2018-11-27 NOTE — CONSULTS
Cardiology Inpatient Consultation  November 27, 2018    Reason for Consult:  A cardiology consult was requested by Dr. Connelly from the Bariatric Surgery service to provide clinical guidance regarding atrial fibrillation with RVR.    Assessment and Plan:  Danitza Soto is a 38 year old female past medical history significant for paroxysmal atrial fibrillation (not on rate/rhythm control, not taking anticoagulation secondary to patient choice), hypertension, GERD, and morbid obesity. She is now POD 0 for gastric sleeve surgery and is now in atrial fibrillation with RVR into 130s. Blood pressure remains low normal, with MAPS of ~60-80s. Electrolytes appear adequately managed. Hemoglobin noted to be acutely low post-operatively at 8.9.     Patient developed atrial fibrillation in post-operative setting likely driven by high catecholamine surge and stress of surgery.   For management of atrial fibrillation, we have the options of rate vs rhythm control. Neither approach is better than the other in regards to the risk of stroke or the risk of dying.We discussed with patient/family the optimal approach and decided to continue with rate control. We recommend starting metoprolol tartrate 25 mg Po BID and up titrate to achieve a heart rate <110 bpm on average.     The decision on whether a patient should be on warfarin is not influenced by the choice of a rhythm control versus rate control strategy. This decision is made based on the patient's risk for embolic complications. We used the CHADS-Vasc score to help with this decision. Based on patient's CHADS-Vasc score of 2 (female, HTN), we recommend anticoagulation. She was previously recommended warfarin as the drug of choice for AC. Goal INR is 2-3 for anticoagulation with atrial fibrillation. Stroke risk is calculated to be 2.2% per year in >90,000 patients (the [place] Atrial Fibrillation Cohort Study) and 2.9% risk of stroke/TIA/systemic embolism. Agree with  previous recommendation for anticoagulation, however she is adamant that she does not want to be on warfarin.    - Start low dose metoprolol tartrate 12.5mg bid. Hold if HR <60, MAPS <65. Goal HR <110  - Discussed importance of anticoagulation with patient. Recommend warfarin with goal INR of 2-3 when safe from surgical standpoint.  Patient refusing anticoagulation at this time, and understands she has an increased risk of stroke as a result.  - Continue PTA hydrochlorothiazide 12.5mg, Lisinopril 40mg. May hold if patient remains hypotensive.     Thank you for this consultation. Please do not hesitate to call if you have further questions or concerns.       Artis Crabtree PA-C  [facility] Cardiology Team      Patient seen and discussed with Dr. Chao.    HPI:   Danitza Soto is a 38 year old female past medical history significant for paroxysmal atrial fibrillation (not on rate/rhythm control or AC), hypertension, GERD, and morbid obesity. Patient presented today for planned gastric sleeve surgery, and was found to be in a-fib with RVR post-operatively. She recently saw Dr. Neena Faye in clinic to address her symptomatic atrial fibrillation. At that time, anticoagulation with warfarin was recommended, and rate/rhythm control was deferred due to patient only being mildly symptomatic during runs of a-fib. Patient not on anticoagulation secondary to personal choice.    At the time of interview, the patient denies chest pain, dyspnea at rest, orthopnea, PND, palpitations, lightheadedness, or syncope. She has not been ambulating post-operatively.    Review of Systems:    Complete review of systems was performed and negative except per HPI.    PMH:  Past Medical History:   Diagnosis Date     Depressive disorder 1990     Esophageal reflux      Hearing problem 2018     Hyperlipidemia LDL goal <130 7/6/2015     Hypertension      LSIL (low grade squamous intraepithelial lesion) on Pap smear 6/2014    + HPV, unable to type colp  - [place] I     LEONARDO on CPAP      Other anxiety states      Active Problems:  Patient Active Problem List    Diagnosis Date Noted     Morbid (severe) obesity due to excess calories (H) 11/27/2018     Priority: Medium     Paroxysmal atrial fibrillation (H) 10/23/2018     Priority: Medium     Lumbago 08/01/2018     Priority: Medium     Left anterior knee pain 08/01/2018     Priority: Medium     LEONARDO (obstructive sleep apnea) 04/13/2017     Priority: Medium     4/10/2017 - Home Sleep Apnea Testing - AHI 15.5/hr; Supine AHI 12.9/hr; SpO2 <= 88% for 33.8 minutes.  Titration Sleep Study 4/19/2017 - (288.0 lbs) CPAP was titrated to a pressure of 11 with an AHI of 3.7.  Time Spent in REM supine at this pressure was 33.5.  Recommending Auto-CPAP 7 - 15 cmH2O.       Obesity hypoventilation syndrome (H) 04/13/2017     Priority: Medium     Mixed hyperlipidemia 07/06/2015     Priority: Medium     Papanicolaou smear of cervix with low grade squamous intraepithelial lesion (LGSIL) 06/17/2014     Priority: Medium     6/17/14: LSIL, + HPV, unable to type.   8/20/14:[place]:BRISEYDA I. Plan cotest pap & HPV in 1 year.   1/20116 Pap Ascus Neg HPV. Plan: [place].   5/13/16 Colposcopy not completed. Cancelled by patient  3/21/17 NIL/Neg HPV. Plan: cotest in 1 year per Dr. Mcbride  9/28/18 Tele enc: Dr. Mcbride, recommending new plan. Cotest due Jan or Feb 2019. Tracking updated. ([facility] [place])       Acne 02/24/2012     Priority: Medium     Hypertension goal BP (blood pressure) < 140/90 01/12/2011     Priority: Medium     CARDIOVASCULAR SCREENING; LDL GOAL LESS THAN 130 10/31/2010     Priority: Medium     Morbid obesity due to excess calories (H) 05/17/2006     Priority: Medium     Problem list name updated by automated process. Provider to review       Tobacco use disorder 05/17/2006     Priority: Medium     Anxiety state      Priority: Medium     Esophageal reflux      Priority: Medium     Social History:  Social History   Substance Use Topics     Smoking  status: Former Smoker     Packs/day: 1.00     Years: 10.00     Types: Cigarettes     Start date: 2004     Quit date: 8/15/2018     Smokeless tobacco: Never Used      Comment:  pack or less a day     Alcohol use 0.0 oz/week     0 Standard drinks or equivalent per week      Comment: Occas.     Family History:  Family History   Problem Relation Age of Onset     HEART DISEASE Mother      ,mother had emphesema and  at 62     Hypertension Mother      Allergies Mother      Lipids Mother      Obesity Mother      Respiratory Mother      Emphysema     Diabetes Maternal Grandmother      Type 2?     Hypertension Maternal Grandmother      Circulatory Maternal Grandmother      Due to diabetes     Eye Disorder Maternal Grandmother      Macular degeneration/Macular degeneration     Obesity Maternal Grandmother      Hypertension Father      Lipids Father      Cancer Father      Prostate Cancer Father      Other Cancer Father      Cerebrovascular Disease Paternal Grandmother      Alcohol/Drug Maternal Grandfather      Obesity Maternal Grandfather      Psychotic Disorder Paternal Grandfather      Committed Suicide     Depression Paternal Grandfather      Allergies Sister      Genitourinary Problems Sister        Medications:    [START ON 2018] enoxaparin  40 mg Subcutaneous Q24H     [START ON 2018] hydrochlorothiazide  12.5 mg Oral QAM     lisinopril  40 mg Oral QPM     menthol   Transdermal Q8H     [START ON 2018] omeprazole  20 mg Oral QAM     scopolamine  1 patch Transdermal Q72H    And     scopolamine   Transdermal Q8H    And     [START ON 2018] scopolamine   Transdermal Q72H     senna-docusate  2 tablet Oral BID     sodium chloride (PF)  3 mL Intracatheter Q8H     topiramate  50 mg Oral BID         lactated ringers 75 mL/hr at 18 1008       Physical Exam:  Temp:  [97.2  F (36.2  C)-99  F (37.2  C)] 97.7  F (36.5  C)  Heart Rate:  [] 133  Resp:  [12-18] 16  BP: ()/(50-99)  104/58  SpO2:  [94 %-100 %] 99 %    Intake/Output Summary (Last 24 hours) at 11/27/18 1303  Last data filed at 11/27/18 0918   Gross per 24 hour   Intake              400 ml   Output                0 ml   Net              400 ml     GEN: pleasant, no acute distress, alert  HEENT: no icterus  CV: Irregular, rapid, normal s1/s2, flow murmur loudest over tricuspid area auscultated   CHEST: clear to ausculation bilaterally, no rales or wheezing  ABD: soft, mildly tender over epigastrum  EXTR: radial and pedal pulses palpable. No clubbing, cyanosis or edema.   NEURO: alert oriented, speech fluent/appropriate, motor grossly nonfocal    Diagnostics:  Echocardiogram 9/21/2018:  Interpretation Summary     Global and regional left ventricular function is normal with an EF of 60-65%.  Moderate concentric wall thickening consistent with left ventricular  hypertrophy is present.  Right ventricular function, chamber size, wall motion, and thickness are  normal.  Mild biatrial enlargement is present.  No significant valve abnormalities present.  The inferior vena cava was normal in size  _____________________________________________________________________________  __        Left Ventricle  Global and regional left ventricular function is normal with an EF of 60-65%.  Left ventricular size is normal. Moderate concentric wall thickening  consistent with left ventricular hypertrophy is present. Left ventricular  diastolic function is indeterminate. No regional wall motion abnormalities are  seen.     Right Ventricle  Right ventricular function, chamber size, wall motion, and thickness are  normal. TAPSE 2.0 cm.     Atria  Mild biatrial enlargement is present. The atrial septum is intact as assessed  by color Doppler .     Mitral Valve  Trace mitral insufficiency is present.        Aortic Valve  Aortic valve is normal in structure and function. The aortic valve is  tricuspid.     Tricuspid Valve  Trace tricuspid insufficiency is  present. Right ventricular systolic pressure  is 24mmHg above the right atrial pressure.     Pulmonic Valve  The pulmonic valve is normal. Estimated diastolic PA pressure is 27 mmHg.     Vessels  The aorta root is normal. The thoracic aorta is normal. Unable to accurately  index to BSA given weight. The pulmonary artery is normal. The inferior vena  cava was normal in size with preserved respiratory variability. Sinuses of  Valsalva 3.2 cm. Ascending aorta 3.1 cm. Estimated mean right atrial pressure  is 3 mmHg (normal).    No results found for: TROPI, TROPONIN, TROPR, TROPN    EKG:  Atrial fibrillation with rapid ventricular response.

## 2018-11-28 VITALS
WEIGHT: 280.1 LBS | HEIGHT: 67 IN | BODY MASS INDEX: 43.96 KG/M2 | TEMPERATURE: 99.7 F | RESPIRATION RATE: 16 BRPM | HEART RATE: 63 BPM | OXYGEN SATURATION: 95 % | SYSTOLIC BLOOD PRESSURE: 144 MMHG | DIASTOLIC BLOOD PRESSURE: 78 MMHG

## 2018-11-28 LAB
ANION GAP SERPL CALCULATED.3IONS-SCNC: 7 MMOL/L (ref 3–14)
BUN SERPL-MCNC: 11 MG/DL (ref 7–30)
CALCIUM SERPL-MCNC: 8.7 MG/DL (ref 8.5–10.1)
CHLORIDE SERPL-SCNC: 110 MMOL/L (ref 94–109)
CO2 SERPL-SCNC: 24 MMOL/L (ref 20–32)
COPATH REPORT: NORMAL
CREAT SERPL-MCNC: 0.74 MG/DL (ref 0.52–1.04)
ERYTHROCYTE [DISTWIDTH] IN BLOOD BY AUTOMATED COUNT: 13.8 % (ref 10–15)
GFR SERPL CREATININE-BSD FRML MDRD: 87 ML/MIN/1.7M2
GLUCOSE SERPL-MCNC: 99 MG/DL (ref 70–99)
HCT VFR BLD AUTO: 39.6 % (ref 35–47)
HGB BLD-MCNC: 12.7 G/DL (ref 11.7–15.7)
INTERPRETATION ECG - MUSE: NORMAL
MAGNESIUM SERPL-MCNC: 2.2 MG/DL (ref 1.6–2.3)
MCH RBC QN AUTO: 28.5 PG (ref 26.5–33)
MCHC RBC AUTO-ENTMCNC: 32.1 G/DL (ref 31.5–36.5)
MCV RBC AUTO: 89 FL (ref 78–100)
PHOSPHATE SERPL-MCNC: 3.5 MG/DL (ref 2.5–4.5)
PLATELET # BLD AUTO: 359 10E9/L (ref 150–450)
POTASSIUM SERPL-SCNC: 3.8 MMOL/L (ref 3.4–5.3)
RBC # BLD AUTO: 4.45 10E12/L (ref 3.8–5.2)
SODIUM SERPL-SCNC: 140 MMOL/L (ref 133–144)
WBC # BLD AUTO: 11.7 10E9/L (ref 4–11)

## 2018-11-28 PROCEDURE — 25000132 ZZH RX MED GY IP 250 OP 250 PS 637: Performed by: STUDENT IN AN ORGANIZED HEALTH CARE EDUCATION/TRAINING PROGRAM

## 2018-11-28 PROCEDURE — 84100 ASSAY OF PHOSPHORUS: CPT | Performed by: STUDENT IN AN ORGANIZED HEALTH CARE EDUCATION/TRAINING PROGRAM

## 2018-11-28 PROCEDURE — 25000132 ZZH RX MED GY IP 250 OP 250 PS 637: Performed by: SURGERY

## 2018-11-28 PROCEDURE — 80048 BASIC METABOLIC PNL TOTAL CA: CPT | Performed by: STUDENT IN AN ORGANIZED HEALTH CARE EDUCATION/TRAINING PROGRAM

## 2018-11-28 PROCEDURE — 93005 ELECTROCARDIOGRAM TRACING: CPT

## 2018-11-28 PROCEDURE — 85027 COMPLETE CBC AUTOMATED: CPT | Performed by: STUDENT IN AN ORGANIZED HEALTH CARE EDUCATION/TRAINING PROGRAM

## 2018-11-28 PROCEDURE — 36415 COLL VENOUS BLD VENIPUNCTURE: CPT | Performed by: STUDENT IN AN ORGANIZED HEALTH CARE EDUCATION/TRAINING PROGRAM

## 2018-11-28 PROCEDURE — 25000128 H RX IP 250 OP 636: Performed by: SURGERY

## 2018-11-28 PROCEDURE — 83735 ASSAY OF MAGNESIUM: CPT | Performed by: STUDENT IN AN ORGANIZED HEALTH CARE EDUCATION/TRAINING PROGRAM

## 2018-11-28 PROCEDURE — 93010 ELECTROCARDIOGRAM REPORT: CPT | Performed by: INTERNAL MEDICINE

## 2018-11-28 RX ORDER — OXYCODONE HYDROCHLORIDE 5 MG/1
5 TABLET ORAL EVERY 4 HOURS PRN
Qty: 30 TABLET | Refills: 0 | Status: SHIPPED | OUTPATIENT
Start: 2018-11-28 | End: 2018-12-18

## 2018-11-28 RX ORDER — ONDANSETRON 4 MG/1
4 TABLET, ORALLY DISINTEGRATING ORAL EVERY 4 HOURS PRN
Qty: 30 TABLET | Refills: 1 | Status: SHIPPED | OUTPATIENT
Start: 2018-11-28 | End: 2018-12-03

## 2018-11-28 RX ORDER — ACETAMINOPHEN 325 MG/1
650 TABLET ORAL EVERY 4 HOURS PRN
Qty: 100 TABLET | Refills: 0 | Status: SHIPPED | OUTPATIENT
Start: 2018-11-28 | End: 2024-04-16

## 2018-11-28 RX ORDER — HYOSCYAMINE SULFATE 0.125 MG
.125-.25 TABLET ORAL EVERY 4 HOURS PRN
Qty: 30 TABLET | Refills: 1 | Status: SHIPPED | OUTPATIENT
Start: 2018-11-28 | End: 2019-01-03

## 2018-11-28 RX ADMIN — SODIUM CHLORIDE, POTASSIUM CHLORIDE, SODIUM LACTATE AND CALCIUM CHLORIDE: 600; 310; 30; 20 INJECTION, SOLUTION INTRAVENOUS at 00:40

## 2018-11-28 RX ADMIN — SENNOSIDES AND DOCUSATE SODIUM 2 TABLET: 8.6; 5 TABLET ORAL at 07:56

## 2018-11-28 RX ADMIN — ENOXAPARIN SODIUM 40 MG: 40 INJECTION SUBCUTANEOUS at 00:44

## 2018-11-28 RX ADMIN — Medication 12.5 MG: at 07:55

## 2018-11-28 RX ADMIN — Medication 20 MG: at 07:55

## 2018-11-28 RX ADMIN — OXYCODONE HYDROCHLORIDE 5 MG: 5 TABLET ORAL at 00:48

## 2018-11-28 RX ADMIN — OXYCODONE HYDROCHLORIDE 5 MG: 5 TABLET ORAL at 07:55

## 2018-11-28 RX ADMIN — TOPIRAMATE 50 MG: 50 TABLET ORAL at 07:55

## 2018-11-28 RX ADMIN — HYDROCHLOROTHIAZIDE 12.5 MG: 12.5 TABLET ORAL at 07:56

## 2018-11-28 ASSESSMENT — PAIN DESCRIPTION - DESCRIPTORS: DESCRIPTORS: DISCOMFORT

## 2018-11-28 ASSESSMENT — ACTIVITIES OF DAILY LIVING (ADL)
ADLS_ACUITY_SCORE: 9

## 2018-11-28 NOTE — PLAN OF CARE
DISCHARGE    Discharged to: Home  Via: Automobile  Accompanied by: Wife  Discharge Instructions: Diet, activity, medications, follow up appointments, when to call the MD, and what to watchout for (i.e. s/s of infection, increasing SOB, palpitations)  Prescriptions: To be filled by discharge pharmacy per pt's request; medication list reviewed & sent with pt  Follow Up Appointments: arranged; information given  Belongings: All sent with pt  IV: Out  Telemetry: Off  Pt exhibits understanding of above discharge instructions; all questions answered  Discharge Paperwork: Signed

## 2018-11-28 NOTE — PROVIDER NOTIFICATION
Time: 2015  Cards consult team  Reason: pt is back to SB/SR (HR=57- low 60's).   Clarified with MD if we need to give 1st dose of Metoprolol 12.5 mg. Ok per md to give it.    Time: 2050  Notified: Surgery on call  Temp: 100.8 (O).  Other VSS. Pt c/o N and abd- pain  MD ordered to continue to monitor. If more fever call back

## 2018-11-28 NOTE — DISCHARGE SUMMARY
GENERAL SURGERY DISCHARGE SUMMARY  Patient Name: Danitza Soto  MR#: [id number]  Date of Admission: 11/27/2018  5:47 AM  Date of Discharge: 11/28/2018  Operating Physician: Artis Tse MD  Discharging Physician: Zaki Ríos MD    Discharge Diagnoses:  1. S/P laparoscopic sleeve gastrectomy        Procedures Performed this admission:  Procedure(s):  Laparoscopic Sleeve Gastrectomy Latex Free  LAPAROSCOPIC  HIATAL Hernia Repair    Consultations:  CARDIOLOGY GENERAL ADULT IP CONSULT  PHARMACY BARIATRIC IP CONSULT  RESPIRATORY CARE IP CONSULT    Brief HPI:  Danitza Soto is a 38 year old woman with PMHx significant for class III obesity, hypertension, LEONARDO on CPAP, GERD, obesity hyperventilation syndrome, and mixed hyperlipidemia who was admitted after undergoing a planned laparoscopic sleeve gastrectomy.  She had tried and failed numerous weight-loss interventions before surgery, and suffers from numerous comorbidities 2/2 obesity.  Before surgery the patient demonstrated significant weight-loss, with BMI decreaseing from 48.2 to 43.02.    Hospital Course:   Danitza Soto was admitted s/p the aforementioned procedures on 11/27/2018 which the patient tolerated well without complications. Immediately postop the patient was observed to be in afib with RVR, and a cardiology consult was placed.  Patient was placed on metoprolol 12.5mg BID, and referred for outpatient appointment.  Patient cardiopulmonary status was otherwise stable, and patient was in NSR at the time of discharge.  Renal status remained stable throughout the admission, and diet was advanced immediately post-op to bariatric clears, followed by bariatric fulls on POD1.  Patient achieved full return of bowel and bladder function, and was discharged on POD1 (11/28/2018).     On day of discharge, she was tolerating a regular diet, ambulating, voiding spontaneously without difficulty, and pain was controlled with oral pain medications. The patient  "was discharged home in stable and improved condition. She will follow up in clinic in 1-2 weeks with Dr. Tse, at which point her diet will be restored to regular status.  She will additionally follow-up with cardiology outpatient, at which point the subject of long-term anticoagulation will be discussed.      Pathology:    ID Type Source Tests Collected by Time Destination   A : Partial Gastrectomy  Tissue Stomach, Greater Curvature SURGICAL PATHOLOGY EXAM Artis Tse MD 11/27/2018  9:22 AM      Discharge Exam:  /78  Pulse 63  Temp 99.7  F (37.6  C) (Oral)  Resp 16  Ht 1.702 m (5' 7\")  Wt 127.1 kg (280 lb 1.6 oz)  LMP   SpO2 95%  BMI 43.87 kg/m2  General: Alert, in no acute distress.  Neuro: Orientedx3; CN II-XII grossly intact; moving all four extremities  HEENT: Normocephalic, atraumatic.   Respiratory: Breathing comfortably.  Cardiovascular: Non cyanotic; no JVD  Gastrointestinal: Obese, soft, nondistended, appropriately tender.   Skin: no rashes, erythema; no signs of obvious bleeding  Incisions: c/d/i, steri-strips in place  Extremities: no edema, wwp    Medications on Discharge:      Review of your medicines      START taking       Dose / Directions    acetaminophen 325 MG tablet   Commonly known as:  TYLENOL   Used for:  S/P laparoscopic sleeve gastrectomy        Dose:  650 mg   Take 2 tablets (650 mg) by mouth every 4 hours as needed for other (For optimal non-opioid multimodal pain management to improve pain control and physical function.)   Quantity:  100 tablet   Refills:  0       hyoscyamine 0.125 MG tablet   Commonly known as:  ANASPAZ/LEVSIN   Used for:  S/P laparoscopic sleeve gastrectomy        Dose:  0.125-0.25 mg   Take 1-2 tablets (125-250 mcg) by mouth every 4 hours as needed for cramping (Take 1-2 tabs by mouth every 4 hours as needed for cramping)   Quantity:  30 tablet   Refills:  1       omeprazole 20 MG DR capsule   Commonly known as:  priLOSEC   Used for:  S/P " laparoscopic sleeve gastrectomy        Dose:  20 mg   Take 1 capsule (20 mg) by mouth 2 times daily   Quantity:  60 capsule   Refills:  1       ondansetron 4 MG ODT tab   Commonly known as:  ZOFRAN-ODT   Used for:  S/P laparoscopic sleeve gastrectomy        Dose:  4 mg   Take 1 tablet (4 mg) by mouth every 4 hours as needed for nausea or vomiting   Quantity:  30 tablet   Refills:  1       oxyCODONE 5 MG tablet   Commonly known as:  ROXICODONE   Used for:  S/P laparoscopic sleeve gastrectomy        Dose:  5 mg   Take 1 tablet (5 mg) by mouth every 4 hours as needed for pain   Quantity:  30 tablet   Refills:  0         CONTINUE these medicines which may have CHANGED, or have new prescriptions. If we are uncertain of the size of tablets/capsules you have at home, strength may be listed as something that might have changed.       Dose / Directions    lisinopril 40 MG tablet   Commonly known as:  PRINIVIL/ZESTRIL   This may have changed:  when to take this   Used for:  Essential hypertension with goal blood pressure less than 140/90        Dose:  40 mg   Take 1 tablet (40 mg) by mouth daily   Quantity:  90 tablet   Refills:  3       topiramate 25 MG tablet   Commonly known as:  TOPAMAX   This may have changed:  additional instructions   Used for:  Morbid obesity (H)        Dose:  50 mg   Take 2 tablets (50 mg) by mouth 2 times daily 25 mg at bedtime for 1 week, 50 mg at bedtime for 1 week and 75 mg daily at bedtime thereafter   Quantity:  120 tablet   Refills:  3         CONTINUE these medicines which have NOT CHANGED       Dose / Directions    CENTRUM PO        Refills:  0       fish oil-omega-3 fatty acids 1000 MG capsule        Dose:  2 g   Take 2 g by mouth every morning   Refills:  0       hydrochlorothiazide 25 MG tablet   Commonly known as:  HYDRODIURIL   Used for:  Essential hypertension with goal blood pressure less than 140/90        Dose:  12.5 mg   Take 12.5 mg by mouth every morning   Quantity:  90 tablet    Refills:  3       LORazepam 0.5 MG tablet   Commonly known as:  ATIVAN   Used for:  Anxiety        Dose:  0.5 mg   Take 1 tablet (0.5 mg) by mouth every 8 hours as needed for anxiety   Quantity:  20 tablet   Refills:  0       PROBIOTIC ADVANCED PO        Refills:  0       VENTOLIN  (90 Base) MCG/ACT inhaler   Generic drug:  albuterol        INHALE 1-2 PUFFS EVERY 4-6 HOURS AS NEEDED FOR WHEEZING   Refills:  0       Vitamin D-3 5000 units Tabs        Dose:  1 tablet   Take 1 tablet by mouth daily   Refills:  0         STOP taking          ADVIL 200 MG tablet   Generic drug:  ibuprofen           NEXIUM PO                Where to get your medicines      These medications were sent to [facility] - [place], [place] - [address]  [address] [address]     Phone:  [phone]      hyoscyamine 0.125 MG tablet     omeprazole 20 MG DR capsule     ondansetron 4 MG ODT tab         Some of these will need a paper prescription and others can be bought over the counter. Ask your nurse if you have questions.     Bring a paper prescription for each of these medications      acetaminophen 325 MG tablet     oxyCODONE 5 MG tablet             Discharge Procedure Orders  Reason for your hospital stay   Order Comments: Laparoscopic sleeve gastrectomy     Activity   Order Comments: Your activity upon discharge: activity as tolerated - no heavy lifting for 4-6 weeks   Order Specific Question Answer Comments   Is discharge order? Yes      Adult [facility] Follow-up and recommended labs and tests   Order Comments: Diet on discharge 11/28/2018.      Post-op diet: Bariatric full liquid diet until seen in clinic follow-up    No lifting >10 pounds for 4-6 weeks. Discuss with your surgeon at follow up appointment  May shower starting postoperative day #1 but no scrubbing incisions. No bathing or soaking incisions for 2 weeks or until incisions completely healed.  Wound care: Keep clean and dry.  Steri strips or glue will fall off on their own.   Take stool softener while taking narcotic pain medication.  No driving for at least 12 hours after taking narcotic pain medication.  After surgery it is ok to swallow medications smaller than 1/4 inch(size of pencil eraser) for all procedures.  If medication is larger than 1/4 inch then it will need to be crushed, cut or in liquid form for 1-2 months after surgery. This can be discussed with surgeon team at the 1 month follow up appointment and will depend on patient tolerance.  You will be sent home with omeprazole 20mg once daily for heartburn symptom prevention, zofran as needed for nausea and a medication called hyoscyamine (levsin) as needed for cramping.  Follow-up with your surgeon team in 1-2 weeks. If this appointment was not previously made for you please call:  BARIATRIC PATIENTS:  Call [phone] to schedule or speak with a nurse    You will receive a call from our clinic nurse after surgery.      Please follow up with primary care provider within 1 week for post-hospitalization/post-op visit.    Also please follow up with cardiology within 1-2 weeks to address post-operative atrial fibrillation you experienced after surgery.    Return to the emergency department urgently if experiencing chest pain, palpitations, lightheadedness or fainting spells.    AFTER HOURS QUESTIONS OR CONCERNS: Call [phone] and ask to speak with surgery resident if you are having troubles in the evenings, at night, or on weekends. Please call if you experience increasing abdominal pain, nausea, vomiting, increasing drainage from your wounds, chills, or fever >101.5  Appointments located at [facility] clinic:  [facility] ([facility] [place]    [address]  [place] [address]     Full Code   Order Specific Question Answer Comments   Code status determined by: Unable to discuss and no AD/POLST on file; continue PREVIOUSLY ORDERED code status       Diet   Order Comments: Follow this diet upon discharge: Bariatric full liquid diet   Order Specific Question Answer Comments   Is discharge order? Yes      I have discussed this case with the G1, senior-resident, and attending physician.    Abhinav Vazquez, MS3    ----  I was present with the medical student who participated in the service and in the documentation of the note.  I have verified the history and personally performed the physical exam and medical decision making. Addenda have been made to the note as appropriate.  I agree with the assessment and plan of care as documented in the note.    Brody Carr MD  Surgery PGY-1  p: [phone]

## 2018-11-28 NOTE — PLAN OF CARE
Care provide from 23:30 to 7:30   Problem: Patient Care Overview  Goal: Plan of Care/Patient Progress Review  D: Pt had gastric sleeve  (the bariatric surgery service),post operatively  Afib with RVR converted to SR in 11/27.HTN, GERD, morbid obesity.    I: Monitored vitals and assessed pt status.   Running: LR gtt at 75 ml/hr  PRN: Oxycodone for incisional pain     A: A0x4. VSS, on CPAP (home) overnight (off of capnography due to wearing CPAP,  O2 sat monitored continuously .SR.  T 100, 100.1 overnight,  will monitor ,will notify MD if 101.5 per active order. Offered  Tylenol prn ,pt refused.  T 99 at 6 am Lovenox SQ given (explained why she needed it  at this time) . Food compression pump on.  Insisional pain  controlled with Oxycodone prn with  effect.  Abdominal incisions covered with primopore, CDI.   MD cross cover notified at 2345 in 11/27 ,if they want to recheck  potassium and replace if it is needed, MD ok with K+ level,no need to recheck at this time per MD verbal.  Neuro check without any changes.     Temp:  [97.2  F (36.2  C)-100.8  F (38.2  C)] 99.3  F (37.4  C)  Pulse:  [] 63  Heart Rate:  [] 138  Resp:  [12-18] 16  BP: ()/(50-99) 126/59  SpO2:  [94 %-100 %] 95 %      P: Continue to monitor Pt status and report changes to treatment team.

## 2018-11-28 NOTE — PLAN OF CARE
Problem: Patient Care Overview  Goal: Individualization & Mutuality  Outcome: No Change    D/I. S/p gastric sleeve this morning. ALOx4. Sleeping/tired.  Pt stated that she did not sleep well last night.  Using IS independenlty. On RA. On home Bipap most of the shift while sleeping.  Unable to using capnography continuously while on bipap.  On Cont- O2 monitor. Afib RVR (130-160's) most of the shift and converted to SB/SR ~ @1830. Pt was started on PO Metoprolol.  On bariatric clear liquid diet. Pt c/o nausea and resolved with PO Zofran. Belching. Hypoactive BS. Adequate UO. LR at 75cc/hr will discontinue once tolerates adequate PO intake. Up to the bathroom with SBA/tolerated activity.   A. Pt is stable.   P. Continue to monitor.       Attempted to replace K=3.8 with KCL 20mg Liq mixed with apple juice.    Pt took a few sips of it and started to  c/o nausea and heartburn and unable to finish it.    Notified Surgery on call ~2100 MD said she would f/u. Will remind MD again.

## 2018-11-29 ENCOUNTER — PATIENT OUTREACH (OUTPATIENT)
Dept: CARE COORDINATION | Facility: CLINIC | Age: 38
End: 2018-11-29

## 2018-11-29 ENCOUNTER — TELEPHONE (OUTPATIENT)
Dept: FAMILY MEDICINE | Facility: CLINIC | Age: 38
End: 2018-11-29

## 2018-11-29 DIAGNOSIS — Z98.84 S/P LAPAROSCOPIC SLEEVE GASTRECTOMY: Primary | ICD-10-CM

## 2018-11-29 LAB — INTERPRETATION ECG - MUSE: NORMAL

## 2018-11-29 ASSESSMENT — ACTIVITIES OF DAILY LIVING (ADL): DEPENDENT_IADLS:: INDEPENDENT

## 2018-11-29 NOTE — PROGRESS NOTES
Clinic Care Coordination Contact    Clinic Care Coordination Contact  OUTREACH    Referral Information:  Referral Source: IP Handoff         Chief Complaint   Patient presents with     Clinic Care Coordination - Post Hospital        [place] Utilization:   Clinic Utilization  Difficulty keeping appointments:: No  Compliance Concerns: No  No-Show Concerns: No  No PCP office visit in Past Year: No  Utilization    Last refreshed: 11/29/2018  1:40 PM:  No Show Count (past year) 0       Last refreshed: 11/29/2018  1:40 PM:  ED visits 0       Last refreshed: 11/29/2018  1:40 PM:  Hospital admissions 1          Current as of: 11/29/2018  1:40 PM             Clinical Concerns:  Current Medical Concerns:  11/27/2018 - 11/28/2018 (29 hours)  [facility], [place]    Artis Tse MD   Attending   Treatment team    Discharge Summaries   Brody Carr MD (Resident)     Surgery   Cosign Needed      GENERAL SURGERY DISCHARGE SUMMARY  Patient Name: Danitza Soto  MR#: [id number]  Date of Admission: 11/27/2018  5:47 AM  Date of Discharge: 11/28/2018  Operating Physician: Artis Tse MD  Discharging Physician: Zaki Ríos MD     Discharge Diagnoses:  1. S/P laparoscopic sleeve gastrectomy          Procedures Performed this admission:  Procedure(s):  Laparoscopic Sleeve Gastrectomy Latex Free  LAPAROSCOPIC  HIATAL Hernia Repair     Consultations:  CARDIOLOGY GENERAL ADULT IP CONSULT  PHARMACY BARIATRIC IP CONSULT  RESPIRATORY CARE IP CONSULT     Brief HPI:  Danitza Soto is a 38 year old woman with PMHx significant for class III obesity, hypertension, LEONARDO on CPAP, GERD, obesity hyperventilation syndrome, and mixed hyperlipidemia who was admitted after undergoing a planned laparoscopic sleeve gastrectomy.  She had tried and failed numerous weight-loss interventions before surgery, and suffers from numerous comorbidities 2/2 obesity.  Before surgery the patient demonstrated  "significant weight-loss, with BMI decreaseing from 48.2 to 43.02.     Hospital Course:   Danitza Soto was admitted s/p the aforementioned procedures on 11/27/2018 which the patient tolerated well without complications. Immediately postop the patient was observed to be in afib with RVR, and a cardiology consult was placed.  Patient was placed on metoprolol 12.5mg BID, and referred for outpatient appointment.  Patient cardiopulmonary status was otherwise stable, and patient was in NSR at the time of discharge.  Renal status remained stable throughout the admission, and diet was advanced immediately post-op to bariatric clears, followed by bariatric fulls on POD1.  Patient achieved full return of bowel and bladder function, and was discharged on POD1 (11/28/2018).      On day of discharge, she was tolerating a regular diet, ambulating, voiding spontaneously without difficulty, and pain was controlled with oral pain medications. The patient was discharged home in stable and improved condition. She will follow up in clinic in 1-2 weeks with Dr. Tse, at which point her diet will be restored to regular status.  She will additionally follow-up with cardiology outpatient, at which point the subject of long-term anticoagulation will be discussed.       Pathology:     ID Type Source Tests Collected by Time Destination   A : Partial Gastrectomy  Tissue Stomach, Greater Curvature SURGICAL PATHOLOGY EXAM Artis Tse MD 11/27/2018  9:22 AM        Discharge Exam:  /78  Pulse 63  Temp 99.7  F (37.6  C) (Oral)  Resp 16  Ht 1.702 m (5' 7\")  Wt 127.1 kg (280 lb 1.6 oz)  LMP   SpO2 95%  BMI 43.87 kg/m2  General: Alert, in no acute distress.  Neuro: Orientedx3; CN II-XII grossly intact; moving all four extremities  HEENT: Normocephalic, atraumatic.   Respiratory: Breathing comfortably.  Cardiovascular: Non cyanotic; no JVD  Gastrointestinal: Obese, soft, nondistended, appropriately tender.   Skin: no " rashes, erythema; no signs of obvious bleeding  Incisions: c/d/i, steri-strips in place  Extremities: no edema, wwp     Medications on Discharge:       Review of your medicines       START taking        Dose / Directions     acetaminophen 325 MG tablet   Commonly known as:  TYLENOL   Used for:  S/P laparoscopic sleeve gastrectomy          Dose:  650 mg   Take 2 tablets (650 mg) by mouth every 4 hours as needed for other (For optimal non-opioid multimodal pain management to improve pain control and physical function.)   Quantity:  100 tablet   Refills:  0         hyoscyamine 0.125 MG tablet   Commonly known as:  ANASPAZ/LEVSIN   Used for:  S/P laparoscopic sleeve gastrectomy          Dose:  0.125-0.25 mg   Take 1-2 tablets (125-250 mcg) by mouth every 4 hours as needed for cramping (Take 1-2 tabs by mouth every 4 hours as needed for cramping)   Quantity:  30 tablet   Refills:  1         omeprazole 20 MG DR capsule   Commonly known as:  priLOSEC   Used for:  S/P laparoscopic sleeve gastrectomy          Dose:  20 mg   Take 1 capsule (20 mg) by mouth 2 times daily   Quantity:  60 capsule   Refills:  1         ondansetron 4 MG ODT tab   Commonly known as:  ZOFRAN-ODT   Used for:  S/P laparoscopic sleeve gastrectomy          Dose:  4 mg   Take 1 tablet (4 mg) by mouth every 4 hours as needed for nausea or vomiting   Quantity:  30 tablet   Refills:  1         oxyCODONE 5 MG tablet   Commonly known as:  ROXICODONE   Used for:  S/P laparoscopic sleeve gastrectomy          Dose:  5 mg   Take 1 tablet (5 mg) by mouth every 4 hours as needed for pain   Quantity:  30 tablet   Refills:  0          CONTINUE these medicines which may have CHANGED, or have new prescriptions. If we are uncertain of the size of tablets/capsules you have at home, strength may be listed as something that might have changed.        Dose / Directions     lisinopril 40 MG tablet   Commonly known as:  PRINIVIL/ZESTRIL   This may have changed:  when to take  this   Used for:  Essential hypertension with goal blood pressure less than 140/90          Dose:  40 mg   Take 1 tablet (40 mg) by mouth daily   Quantity:  90 tablet   Refills:  3         topiramate 25 MG tablet   Commonly known as:  TOPAMAX   This may have changed:  additional instructions   Used for:  Morbid obesity (H)          Dose:  50 mg   Take 2 tablets (50 mg) by mouth 2 times daily 25 mg at bedtime for 1 week, 50 mg at bedtime for 1 week and 75 mg daily at bedtime thereafter   Quantity:  120 tablet   Refills:  3          CONTINUE these medicines which have NOT CHANGED        Dose / Directions     CENTRUM PO          Refills:  0         fish oil-omega-3 fatty acids 1000 MG capsule          Dose:  2 g   Take 2 g by mouth every morning   Refills:  0         hydrochlorothiazide 25 MG tablet   Commonly known as:  HYDRODIURIL   Used for:  Essential hypertension with goal blood pressure less than 140/90          Dose:  12.5 mg   Take 12.5 mg by mouth every morning   Quantity:  90 tablet   Refills:  3         LORazepam 0.5 MG tablet   Commonly known as:  ATIVAN   Used for:  Anxiety          Dose:  0.5 mg   Take 1 tablet (0.5 mg) by mouth every 8 hours as needed for anxiety   Quantity:  20 tablet   Refills:  0         PROBIOTIC ADVANCED PO          Refills:  0         VENTOLIN  (90 Base) MCG/ACT inhaler   Generic drug:  albuterol          INHALE 1-2 PUFFS EVERY 4-6 HOURS AS NEEDED FOR WHEEZING   Refills:  0         Vitamin D-3 5000 units Tabs          Dose:  1 tablet   Take 1 tablet by mouth daily   Refills:  0          STOP taking            ADVIL 200 MG tablet   Generic drug:  ibuprofen              NEXIUM PO                                 Where to get your medicines              These medications were sent to [facility] [place], [place] - [address]  [address] [facility] [address]             Phone:  [phone]        hyoscyamine 0.125 MG tablet     omeprazole 20  MG DR capsule     ondansetron 4 MG ODT tab                      Some of these will need a paper prescription and others can be bought over the counter. Ask your nurse if you have questions.           Bring a paper prescription for each of these medications        acetaminophen 325 MG tablet     oxyCODONE 5 MG tablet                  Discharge Procedure Orders      Reason for your hospital stay   Order Comments: Laparoscopic sleeve gastrectomy           Activity        Order Comments: Your activity upon discharge: activity as tolerated - no heavy lifting for 4-6 weeks   Order Specific Question Answer Comments   Is discharge order? Yes            Adult [facility] Follow-up and recommended labs and tests   Order Comments: Diet on discharge 11/28/2018.      Post-op diet: Bariatric full liquid diet until seen in clinic follow-up    No lifting >10 pounds for 4-6 weeks. Discuss with your surgeon at follow up appointment  May shower starting postoperative day #1 but no scrubbing incisions. No bathing or soaking incisions for 2 weeks or until incisions completely healed.  Wound care: Keep clean and dry. Steri strips or glue will fall off on their own.   Take stool softener while taking narcotic pain medication.  No driving for at least 12 hours after taking narcotic pain medication.  After surgery it is ok to swallow medications smaller than 1/4 inch(size of pencil eraser) for all procedures.  If medication is larger than 1/4 inch then it will need to be crushed, cut or in liquid form for 1-2 months after surgery. This can be discussed with surgeon team at the 1 month follow up appointment and will depend on patient tolerance.  You will be sent home with omeprazole 20mg once daily for heartburn symptom prevention, zofran as needed for nausea and a medication called hyoscyamine (levsin) as needed for cramping.  Follow-up with your surgeon team in 1-2 weeks. If this appointment was not previously made for you please  call:  BARIATRIC PATIENTS:  Call [phone] to schedule or speak with a nurse    You will receive a call from our clinic nurse after surgery.      Please follow up with primary care provider within 1 week for post-hospitalization/post-op visit.    Also please follow up with cardiology within 1-2 weeks to address post-operative atrial fibrillation you experienced after surgery.    Return to the emergency department urgently if experiencing chest pain, palpitations, lightheadedness or fainting spells.    AFTER HOURS QUESTIONS OR CONCERNS: Call [phone] and ask to speak with surgery resident if you are having troubles in the evenings, at night, or on weekends. Please call if you experience increasing abdominal pain, nausea, vomiting, increasing drainage from your wounds, chills, or fever >101.5  Appointments located at [facility] clinic:  [facility] ([facility])    [address]  [place] [address]           Full Code   Order Specific Question Answer Comments   Code status determined by: Unable to discuss and no AD/POLST on file; continue PREVIOUSLY ORDERED code status             Diet        Order Comments: Follow this diet upon discharge: Bariatric full liquid diet   Order Specific Question Answer Comments   Is discharge order? Yes       I have discussed this case with the G1, senior-resident, and attending physician.            Functional Status:  Dependent ADLs:: Independent  Dependent IADLs:: Independent  Bed or wheelchair confined:: No  Mobility Status: Independent  Fallen 2 or more times in the past year?: No  Any fall with injury in the past year?: No    Living Situation:       Diet/Exercise/Sleep:  Diet:: Other    Transportation:  Transportation concerns (GOAL):: No  Transportation means:: Accessible car     Psychosocial:        Financial/Insurance:   Financial/Insurance concerns (GOAL):: No       Resources and Interventions:  Current Resources:    ;   Community  Resources: None  Supplies used at home::  (IS)  Equipment Currently Used at Home: none    Advance Care Plan/Directive  Advanced Care Plans/Directives on file:: No    Referrals Placed: None     Goals:   Goals        General    Healthy Eating (pt-stated)     Notes - Note created  11/29/2018  1:31 PM by Kassy Dudley, RN    Goal 2  Statement: I will follow post surgery diet as directed   Measure of Success: completed  Supportive Steps to Achieve: understanding of diet  Barriers: na  Strengths: understanding of diet  Date to Achieve By: 1/1/19  Patient expressed understanding of goal: yes          Medical (pt-stated)     Notes - Note created  11/29/2018  1:29 PM by Kassy Dudley RN    Goal 1  Statement: I will take pain medication as directed.   Measure of Success: completed  Supportive Steps to Achieve: understanding of medications  Barriers: na   Strengths: desire for pain control  Date to Achieve By: 1/1/19  Patient expressed understanding of goal: yes                  Patient/Caregiver understanding: yes       Future Appointments              In 6 days Polly Mcbride MD [facility]    In 1 week Nurse,  Surgery General Surgery, [facility]    In 1 week Kelsey Sousa RD [facility] Surgical Weight Management, [facility]    In 2 weeks Kathy Calhoun Count includes the [facility] [place], [facility]    In 4 weeks Demi Alvarado RD [facility] Surgical Weight Management, [facility]    In 4 weeks Nurse,  Surgery General Surgery, [facility]    In 3 months Leena Heath MD [facility] AT [facility]

## 2018-11-29 NOTE — TELEPHONE ENCOUNTER
Hospital F/U 11/28/2018 DX: Morbid (Severe) Obesity Due To Excess Calories (H), Morbid Obesity ED/IP 0/1    [phone] (home)

## 2018-11-29 NOTE — TELEPHONE ENCOUNTER
ED for acute condition Discharge Protocol    Pt following up with Bariatric surgeon and has f/u with PCP on 12/5/18  Kaykay Blackmon RN

## 2018-11-29 NOTE — LETTER
Critical access hospital  Complex Care Plan  About Me  Patient Name:  Danitza Soto    YOB: 1980  Age:     38 year old   [place] MRN:   [id number] Telephone Information:    Home Phone [phone]   Mobile [phone]       Address[address]    [address] [facility] [address] Email address:  [email or web address]      Emergency Contact(s)  Name Relationship Lgl Grd Work Phone Home Phone Mobile Phone   1. MARC, ABRAN* Spouse No  none [phone]   2. ASIF SOTO Sister No none none [phone]           Primary language:  English     needed? No   [employer] [employer]:  [phone] op. 1  Other communication barriers: None  Preferred Method of Communication:  Maria Luz  Current living arrangement:    Mobility Status/ Medical Equipment: Independent    Health Maintenance  Health Maintenance Reviewed: Not assessed    My Access Plan  Medical Emergency 911   Primary Clinic Line [facility] [phone]   24 Hour Appointment Line [phone] or  [phone]) (toll-free)   24 Hour Nurse Line [phone] (toll-free)   Preferred Urgent Care  (na)   Preferred Hospital [facility]  [phone]   Preferred Pharmacy [facility] [address] [place] [address] AVE [address]     Behavioral Health Crisis Line The National Suicide Prevention Lifeline at 1-349.878.5366 or 911     My Care Team Members    Patient Care Team       Relationship Specialty Notifications Start End    Polly Mcbride MD PCP - General   2/22/06     Phone: [phone] Fax: [phone] [address]  [facility] [address]    Amy Wolf, RN Nurse Coordinator Bariatric  7/25/18     Phone: [phone] Fax: [phone]         [address] [facility] [address]    Kassy Dudley, RN Lead Care Coordinator Primary Care - [facility] Admissions 11/29/18     Phone: [phone]  Fax: [phone]                My Care Plans  Self Management and Treatment Plan  Goals and (Comments)  Goals        General    Healthy Eating (pt-stated)     Notes - Note created  11/29/2018  1:31 PM by Kassy Dudley RN    Goal 2  Statement: I will follow post surgery diet as directed   Measure of Success: completed  Supportive Steps to Achieve: understanding of diet  Barriers: na  Strengths: understanding of diet  Date to Achieve By: 1/1/19  Patient expressed understanding of goal: yes          Medical (pt-stated)     Notes - Note created  11/29/2018  1:29 PM by Kassy Dudley RN    Goal 1  Statement: I will take pain medication as directed.   Measure of Success: completed  Supportive Steps to Achieve: understanding of medications  Barriers: na   Strengths: desire for pain control  Date to Achieve By: 1/1/19  Patient expressed understanding of goal: yes                 Action Plans on File:                       Advance Care Plans/Directives Type:        My Medical and Care Information  Problem List   Patient Active Problem List   Diagnosis     Anxiety state     Esophageal reflux     Morbid obesity due to excess calories (H)     Tobacco use disorder     CARDIOVASCULAR SCREENING; LDL GOAL LESS THAN 130     Hypertension goal BP (blood pressure) < 140/90     Acne     Papanicolaou smear of cervix with low grade squamous intraepithelial lesion (LGSIL)     Mixed hyperlipidemia     LEONARDO (obstructive sleep apnea)     Obesity hypoventilation syndrome (H)     Lumbago     Left anterior knee pain     Paroxysmal atrial fibrillation (H)     Morbid (severe) obesity due to excess calories (H)      Current Medications and Allergies:  See printed Medication Report.    Care Coordination Start Date: No linked episodes   Frequency of Care Coordination:     Form Last Updated: 11/29/2018

## 2018-11-30 ENCOUNTER — CARE COORDINATION (OUTPATIENT)
Dept: SURGERY | Facility: CLINIC | Age: 38
End: 2018-11-30

## 2018-11-30 NOTE — PROGRESS NOTES
Bariatric Surgery Post op Call      72 Hour Post-Op Follow Up:     Ms. Danitza Soto is a 38 year old female who underwent 11/27/18 on Sleeve with Dr. Tse for a history of obesity.  Spoke with Patient.    Support  Patient able to care for self independently    Coughing/Deep Breathing:yes  Pain rating: no real    (If patient needs additional pain medication and Tylenol is not contraindicated, then ok  advise patient to take ES Tylenol 2 tablets every 6 hours while symptoms last, not to exceed 6 caplets in 24 hours).     Leg swelling: no  Nausea/Vomiting: no  Passing flatus: yes  Having BM s: no - instructed to take miralax  Are you taking your chewable multivitamin: no  Activity Level: walking  Fluid Intake: 64 oz  Confirm drinking full liquid diet  Concerns: no concerns.     Staples removed prior to discharge: removed    Taking any weight loss medications prior to surgery? Yes, continue    Do you have any questions about medications that you were discharged on from the hospital?  no        Activity/Restrictions  Patient following lifting restrictions.    Equipment    Activity/Restrictions  Patient following lifting restrictions. and Following restrictions outlined by surgeon.     Whom and When to Call  Patient acknowledges understanding of how to manage any medication changes and when to seek medical care.      Patient advised that if after hour medical concerns arise to please call [phone] and choose option 4 to speak to the physician on call.      How was your hospital stay/concerns/positive/negative? Patient had a horrible experience she was tearful the whole conversation and the discharge information she was given was different then [facility] pharmacist instructions. She was very upset and continued to express her bad experience on discharge.    Confirm with patient their follow-up appointment date and time? yes     Time spent with patient: 20 min

## 2018-12-05 ENCOUNTER — OFFICE VISIT (OUTPATIENT)
Dept: FAMILY MEDICINE | Facility: CLINIC | Age: 38
End: 2018-12-05
Payer: COMMERCIAL

## 2018-12-05 VITALS
SYSTOLIC BLOOD PRESSURE: 110 MMHG | RESPIRATION RATE: 16 BRPM | WEIGHT: 265 LBS | HEIGHT: 67 IN | BODY MASS INDEX: 41.59 KG/M2 | DIASTOLIC BLOOD PRESSURE: 71 MMHG | HEART RATE: 68 BPM | OXYGEN SATURATION: 98 % | TEMPERATURE: 97.8 F

## 2018-12-05 DIAGNOSIS — I48.0 PAROXYSMAL ATRIAL FIBRILLATION (H): ICD-10-CM

## 2018-12-05 DIAGNOSIS — Z98.84 STATUS POST GASTRIC BYPASS FOR OBESITY: ICD-10-CM

## 2018-12-05 DIAGNOSIS — I10 HYPERTENSION GOAL BP (BLOOD PRESSURE) < 140/90: Primary | ICD-10-CM

## 2018-12-05 PROCEDURE — 99495 TRANSJ CARE MGMT MOD F2F 14D: CPT | Performed by: FAMILY MEDICINE

## 2018-12-05 RX ORDER — LISINOPRIL 20 MG/1
20 TABLET ORAL DAILY
Qty: 30 TABLET | Refills: 1 | Status: CANCELLED | OUTPATIENT
Start: 2018-12-05

## 2018-12-05 NOTE — PROGRESS NOTES
SUBJECTIVE:   Danitza Soto is a 38 year old female who presents to clinic today for the following health issues:          Hospital Follow-up Visit:    Hospital/Nursing Home/IP Rehab Facility: [facility]  Date of Admission: 11/27/2018  Date of Discharge: 11/28/2018  Reason(s) for Admission:  Procedure             Problems taking medications regularly:  None       Medication changes since discharge: None       Problems adhering to non-medication therapy:  None    Summary of hospitalization:  [facility] discharge summary reviewed  Diagnostic Tests/Treatments reviewed.  Follow up needed: with the bariatric surgeon and with cardiology  Other Healthcare Providers Involved in Patient s Care:         Specialist appointment - cardiology and Surgical follow-up appointment - with Bariatrics  Update since discharge: improved.     Post Discharge Medication Reconciliation: discharge medications reconciled, continue medications without change.  Plan of care communicated with patient     Coding guidelines for this visit:  Type of Medical   Decision Making Face-to-Face Visit       within 7 Days of discharge Face-to-Face Visit        within 14 days of discharge   Moderate Complexity [id number]   High Complexity 87443 [id number]                  Problem list and histories reviewed & adjusted, as indicated.  Additional history: as documented    Patient Active Problem List   Diagnosis     Anxiety state     Esophageal reflux     Morbid obesity due to excess calories (H)     Tobacco use disorder     CARDIOVASCULAR SCREENING; LDL GOAL LESS THAN 130     Hypertension goal BP (blood pressure) < 140/90     Acne     Papanicolaou smear of cervix with low grade squamous intraepithelial lesion (LGSIL)     Mixed hyperlipidemia     LEONARDO (obstructive sleep apnea)     Obesity hypoventilation syndrome (H)     Lumbago     Left anterior knee pain     Paroxysmal atrial fibrillation (H)     Morbid (severe) obesity due to  excess calories (H)     Past Surgical History:   Procedure Laterality Date     HC TOOTH EXTRACTION W/FORCEP      [place] Teeth Extraction     LAPAROSCOPIC GASTRIC SLEEVE N/A 2018    Procedure: Laparoscopic Sleeve Gastrectomy Latex Free;  Surgeon: Artis Tse MD;  Location: [facility] OR     LAPAROSCOPIC HERNIORRHAPHY HIATAL  2018    Procedure: LAPAROSCOPIC  HIATAL Hernia Repair;  Surgeon: Artis Tse MD;  Location:  OR       Social History   Substance Use Topics     Smoking status: Former Smoker     Packs/day: 1.00     Years: 10.00     Types: Cigarettes     Start date: 2004     Quit date: 8/15/2018     Smokeless tobacco: Never Used      Comment:  pack or less a day     Alcohol use 0.0 oz/week     0 Standard drinks or equivalent per week      Comment: Occas.     Family History   Problem Relation Age of Onset     Heart Disease Mother      ,mother had emphesema and  at 62     Hypertension Mother      Allergies Mother      Lipids Mother      Obesity Mother      Respiratory Mother      Emphysema     Diabetes Maternal Grandmother      Type 2?     Hypertension Maternal Grandmother      Circulatory Maternal Grandmother      Due to diabetes     Eye Disorder Maternal Grandmother      Macular degeneration/Macular degeneration     Obesity Maternal Grandmother      Hypertension Father      Lipids Father      Cancer Father      Prostate Cancer Father      Other Cancer Father      Cerebrovascular Disease Paternal Grandmother      Alcohol/Drug Maternal Grandfather      Obesity Maternal Grandfather      Psychotic Disorder Paternal Grandfather      Committed Suicide     Depression Paternal Grandfather      Allergies Sister      Genitourinary Problems Sister          Current Outpatient Prescriptions   Medication Sig Dispense Refill     acetaminophen (TYLENOL) 325 MG tablet Take 2 tablets (650 mg) by mouth every 4 hours as needed for other (For optimal non-opioid multimodal pain management to  improve pain control and physical function.) 100 tablet 0     Cholecalciferol (VITAMIN D-3) 5000 units TABS Take 1 tablet by mouth daily       hyoscyamine (ANASPAZ/LEVSIN) 0.125 MG tablet Take 1-2 tablets (125-250 mcg) by mouth every 4 hours as needed for cramping (Take 1-2 tabs by mouth every 4 hours as needed for cramping) 30 tablet 1     lisinopril (PRINIVIL/ZESTRIL) 40 MG tablet Take 1 tablet (40 mg) by mouth daily (Patient taking differently: Take 40 mg by mouth every evening ) 90 tablet 3     LORazepam (ATIVAN) 0.5 MG tablet Take 1 tablet (0.5 mg) by mouth every 8 hours as needed for anxiety 20 tablet 0     omeprazole (PRILOSEC) 20 MG DR capsule Take 1 capsule (20 mg) by mouth 2 times daily 60 capsule 1     Probiotic Product (PROBIOTIC ADVANCED PO)        topiramate (TOPAMAX) 25 MG tablet Take 2 tablets (50 mg) by mouth 2 times daily 25 mg at bedtime for 1 week, 50 mg at bedtime for 1 week and 75 mg daily at bedtime thereafter (Patient taking differently: Take 50 mg by mouth 2 times daily ) 120 tablet 3     VENTOLIN  (90 Base) MCG/ACT Inhaler INHALE 1-2 PUFFS EVERY 4-6 HOURS AS NEEDED FOR WHEEZING  0     fish oil-omega-3 fatty acids 1000 MG capsule Take 2 g by mouth every morning        hydrochlorothiazide (HYDRODIURIL) 25 MG tablet Take 12.5 mg by mouth every morning  90 tablet 3     Multiple Vitamins-Minerals (CENTRUM PO)        ondansetron (ZOFRAN-ODT) 4 MG ODT tab Take 1 tablet (4 mg) by mouth every 4 hours as needed for nausea or vomiting 30 tablet 1     oxyCODONE (ROXICODONE) 5 MG tablet Take 1 tablet (5 mg) by mouth every 4 hours as needed for pain 30 tablet 0     Allergies   Allergen Reactions     Wellbutrin [Bupropion]      Wellbutrin: Panic attacks, heart/chest pain     Recent Labs   Lab Test  11/28/18   0554  11/27/18   1347  11/27/18   1120   09/24/18   1452  07/24/18   1023  06/19/18   0952  10/13/17   1445  03/21/17   0926  08/24/16   0915  01/13/16   1138   10/08/13   0808   A1C   --     "--    --    --    --   6.1*   --    --    --    --    --    --   5.4   LDL   --    --    --    --    --    --    --    --   155*  174*  162*   --   149*   HDL   --    --    --    --    --    --    --    --   30*  29*  33*   --   33*   TRIG   --    --    --    --    --    --    --    --   203*  231*  207*   --   179*   ALT   --    --    --    --    --   22  33  24  24   --   26   --   26   CR  0.74  0.69  0.62   < >   --   0.63  0.65  0.72  0.62  0.61  0.59   < >  0.60   GFRESTIMATED  87  >90  >90   < >   --   >90  >90  >90  >90  Non African American GFR Calc    >90  Non  GFR Calc    >90  Non  GFR Calc     < >  >90   GFRESTBLACK  >90  >90  >90   < >   --   >90  >90  >90  >90  African American GFR Calc    >90   GFR Calc    >90   GFR Calc     < >  >90   POTASSIUM  3.8   --   3.8   < >   --   3.7  3.9  3.8  4.0  3.9  4.0   < >  3.8   TSH   --    --    --    --   1.48   --    --    --    --    --   1.79   < >   --     < > = values in this interval not displayed.      BP Readings from Last 3 Encounters:   12/05/18 110/71   11/28/18 144/78   11/06/18 132/70    Wt Readings from Last 3 Encounters:   12/05/18 265 lb (120.2 kg)   11/28/18 280 lb 1.6 oz (127.1 kg)   11/06/18 288 lb 6.4 oz (130.8 kg)                  Labs reviewed in EPIC    Reviewed and updated as needed this visit by clinical staff       Reviewed and updated as needed this visit by Provider         ROS:  Constitutional, HEENT, cardiovascular, pulmonary, GI, , musculoskeletal, neuro, skin, endocrine and psych systems are negative, except as otherwise noted.    OBJECTIVE:     /71  Pulse 68  Temp 97.8  F (36.6  C) (Oral)  Resp 16  Ht 5' 7\" (1.702 m)  Wt 265 lb (120.2 kg)  LMP 11/22/2018  SpO2 98%  BMI 41.5 kg/m2  Body mass index is 41.5 kg/(m^2).  GENERAL: healthy, alert and no distress  EYES: Eyes grossly normal to inspection, PERRL and conjunctivae and sclerae normal  NECK: no " adenopathy, no asymmetry, masses, or scars and thyroid normal to palpation  RESP: lungs clear to auscultation - no rales, rhonchi or wheezes  CV: regular rate and rhythm, normal S1 S2, no S3 or S4, no murmur, click or rub, no peripheral edema and peripheral pulses strong  ABDOMEN: soft, nontender, no hepatosplenomegaly, no masses and bowel sounds normal  MS: no gross musculoskeletal defects noted, no edema  NEURO: Normal strength and tone, mentation intact and speech normal    Diagnostic Test Results:  none     ASSESSMENT/PLAN:         (I10) Hypertension goal BP (blood pressure) < 140/90  (primary encounter diagnosis)  Comment: she has had some low ones at home in the 90s over 60s , so we discussed to cut down on the lisinopril to 20mg instead of the 40mg and laso get off the hydrochlorothiazide as she was already down to 12.5 mg   Plan: f/u on the BP at home and here in 2 to 3 weeks , sooner if any concerns.    (Z98.84) Status post gastric bypass for obesity  Comment: she has lost 30 lbs before the surgery , surgery on the 11-27 and she has lost about 15 lbs since then , continuing to lose weight . She is feeling better , BP is better as well as the GERD , she has cut down on the omeprazole as well,   Plan: she has a f/u appointment with the bariatric clinic in a week .    (I48.0) Paroxysmal atrial fibrillation (H)  Comment: she has an upcoming appointment with the cardiologist as far as the afib , she gets it occasionally and she knows because she gets symptomatic with it , discussed being on metoprolol but she does not want to be on this as the afib happens so rarely  Plan: discussed is lasting more than 3 to 4 hrs would need to be seen in the ER - or is worsening symtpoms,         Pt is aware  and comfortable with the current plan.      Polly Mcbride MD  [facility]

## 2018-12-05 NOTE — MR AVS SNAPSHOT
After Visit Summary   12/5/2018    Danitza Soto    MRN: [id number]           Patient Information     Date Of Birth          1980        Visit Information        Provider Department      12/5/2018 11:20 AM Polly Mcbride MD [facility]        Today's Diagnoses     Hypertension goal BP (blood pressure) < 140/90    -  1    Status post gastric bypass for obesity        Paroxysmal atrial fibrillation (H)           Follow-ups after your visit        Follow-up notes from your care team     Return in about 4 weeks (around 1/2/2019) for follow up visit.      Your next 10 appointments already scheduled     Dec 07, 2018 10:00 AM CST   Nurse Visit with  Surgery Nurse   General Surgery ([facility])    [address] [place] [address]   [phone]            Dec 07, 2018 10:30 AM CST   (Arrive by 10:15 AM)   Return Bariatric Nutrition Visit with Kelsey Sousa RD   [facility] Surgical [facility] Management ([facility])    [address] [place] [address]   [phone]            Dec 18, 2018 10:00 AM CST   (Arrive by 9:45 AM)   SHORT with Lauren Turner Bloch, RPH    Health Specialties MTM ([facility])    [address] [place] [address]   [phone]            Jan 03, 2019  8:30 AM CST   (Arrive by 8:15 AM)   Return Bariatric Visit with Sandy Cervantes PA-C   [facility] Surgical Weight Management ([facility])    [address] [place] [address]   [phone]            Jan 03, 2019  9:00 AM CST   (Arrive by 8:45 AM)   Return Bariatric Nutrition Visit with DAVIE Gonzáles [facility] Surgical Weight Management ([facility])    [address] [place] [address]   [phone]            Mar 26, 2019  1:30 PM CDT   Return Visit with Leena Heath  "MD   [facility] AT [facility] ([facility])    [address] [place] [address] 2nd Floor  [facility] [address] [phone]              Who to contact     If you have questions or need follow up information about today's clinic visit or your schedule please contact [facility] directly at [phone].  Normal or non-critical lab and imaging results will be communicated to you by MyChart, letter or phone within 4 business days after the clinic has received the results. If you do not hear from us within 7 days, please contact the clinic through [employer] or phone. If you have a critical or abnormal lab result, we will notify you by phone as soon as possible.  Submit refill requests through [employer] or call your pharmacy and they will forward the refill request to us. Please allow 3 business days for your refill to be completed.          Additional Information About Your Visit        [employer] Information     [employer] gives you secure access to your electronic health record. If you see a primary care provider, you can also send messages to your care team and make appointments. If you have questions, please call your primary care clinic.  If you do not have a primary care provider, please call [phone] and they will assist you.        Care EveryWhere ID     This is your Care EveryWhere ID. This could be used by other organizations to access your [facility] medical records  [id number]        Your Vitals Were     Pulse Temperature Respirations Height Last Period Pulse Oximetry    68 97.8  F (36.6  C) (Oral) 16 5' 7\" (1.702 m) 11/22/2018 98%    BMI (Body Mass Index)                   41.5 kg/m2            Blood Pressure from Last 3 Encounters:   12/05/18 110/71   11/28/18 144/78   11/06/18 132/70    Weight from Last 3 Encounters:   12/05/18 265 lb (120.2 kg)   11/28/18 280 lb 1.6 oz (127.1 kg)   11/06/18 288 lb 6.4 oz (130.8 kg)              Today, you had the following     No " orders found for display         Today's Medication Changes          These changes are accurate as of 12/5/18 12:21 PM.  If you have any questions, ask your nurse or doctor.               These medicines have changed or have updated prescriptions.        Dose/Directions    lisinopril 40 MG tablet   Commonly known as:  PRINIVIL/ZESTRIL   This may have changed:  when to take this   Used for:  Essential hypertension with goal blood pressure less than 140/90        Dose:  40 mg   Take 1 tablet (40 mg) by mouth daily   Quantity:  90 tablet   Refills:  3       topiramate 25 MG tablet   Commonly known as:  TOPAMAX   This may have changed:  additional instructions   Used for:  Morbid obesity (H)        Dose:  50 mg   Take 2 tablets (50 mg) by mouth 2 times daily 25 mg at bedtime for 1 week, 50 mg at bedtime for 1 week and 75 mg daily at bedtime thereafter   Quantity:  120 tablet   Refills:  3                Primary Care Provider Office Phone # Fax #    Polly Mcbride [phone]       [address] [address]        Goals        General    Healthy Eating (pt-stated)     Notes - Note created  11/29/2018  1:31 PM by Kassy Dudley RN    Goal 2  Statement: I will follow post surgery diet as directed   Measure of Success: completed  Supportive Steps to Achieve: understanding of diet  Barriers: na  Strengths: understanding of diet  Date to Achieve By: 1/1/19  Patient expressed understanding of goal: yes          Medical (pt-stated)     Notes - Note created  11/29/2018  1:29 PM by Kassy Dudley RN    Goal 1  Statement: I will take pain medication as directed.   Measure of Success: completed  Supportive Steps to Achieve: understanding of medications  Barriers: na   Strengths: desire for pain control  Date to Achieve By: 1/1/19  Patient expressed understanding of goal: yes            Equal Access to Services     ELDA NORMAN : addison Feliz qaybta  christiano nelsoncristiano garcia ah. So [facility] [phone].    ATENCIÓN: Si ellen moreira, tiene a bailey disposición servicios gratuitos de asistencia lingüística. Marie al [phone].    We comply with applicable federal civil rights laws and [place] laws. We do not discriminate on the basis of race, color, national origin, age, disability, sex, sexual orientation, or gender identity.            Thank you!     Thank you for choosing [facility]  for your care. Our goal is always to provide you with excellent care. Hearing back from our patients is one way we can continue to improve our services. Please take a few minutes to complete the written survey that you may receive in the mail after your visit with us. Thank you!             Your Updated Medication List - Protect others around you: Learn how to safely use, store and throw away your medicines at www.disposemymeds.org.          This list is accurate as of 12/5/18 12:21 PM.  Always use your most recent med list.                   Brand Name Dispense Instructions for use Diagnosis    acetaminophen 325 MG tablet    TYLENOL    100 tablet    Take 2 tablets (650 mg) by mouth every 4 hours as needed for other (For optimal non-opioid multimodal pain management to improve pain control and physical function.)    S/P laparoscopic sleeve gastrectomy       CENTRUM PO           fish oil-omega-3 fatty acids 1000 MG capsule      Take 2 g by mouth every morning        hydrochlorothiazide 25 MG tablet    HYDRODIURIL    90 tablet    Take 12.5 mg by mouth every morning    Essential hypertension with goal blood pressure less than 140/90       hyoscyamine 0.125 MG tablet    ANASPAZ/LEVSIN    30 tablet    Take 1-2 tablets (125-250 mcg) by mouth every 4 hours as needed for cramping (Take 1-2 tabs by mouth every 4 hours as needed for cramping)    S/P laparoscopic sleeve gastrectomy       lisinopril 40 MG tablet    PRINIVIL/ZESTRIL    90 tablet     Take 1 tablet (40 mg) by mouth daily    Essential hypertension with goal blood pressure less than 140/90       LORazepam 0.5 MG tablet    ATIVAN    20 tablet    Take 1 tablet (0.5 mg) by mouth every 8 hours as needed for anxiety    Anxiety       omeprazole 20 MG DR capsule    priLOSEC    60 capsule    Take 1 capsule (20 mg) by mouth 2 times daily    S/P laparoscopic sleeve gastrectomy       ondansetron 4 MG ODT tab    ZOFRAN-ODT    30 tablet    Take 1 tablet (4 mg) by mouth every 4 hours as needed for nausea or vomiting    S/P laparoscopic sleeve gastrectomy       oxyCODONE 5 MG tablet    ROXICODONE    30 tablet    Take 1 tablet (5 mg) by mouth every 4 hours as needed for pain    S/P laparoscopic sleeve gastrectomy       PROBIOTIC ADVANCED PO           topiramate 25 MG tablet    TOPAMAX    120 tablet    Take 2 tablets (50 mg) by mouth 2 times daily 25 mg at bedtime for 1 week, 50 mg at bedtime for 1 week and 75 mg daily at bedtime thereafter    Morbid obesity (H)       VENTOLIN  (90 Base) MCG/ACT inhaler   Generic drug:  albuterol      INHALE 1-2 PUFFS EVERY 4-6 HOURS AS NEEDED FOR WHEEZING        Vitamin D-3 5000 units Tabs      Take 1 tablet by mouth daily

## 2018-12-07 ENCOUNTER — OFFICE VISIT (OUTPATIENT)
Dept: SURGERY | Facility: CLINIC | Age: 38
End: 2018-12-07
Payer: COMMERCIAL

## 2018-12-07 ENCOUNTER — ALLIED HEALTH/NURSE VISIT (OUTPATIENT)
Dept: SURGERY | Facility: CLINIC | Age: 38
End: 2018-12-07
Payer: COMMERCIAL

## 2018-12-07 VITALS
WEIGHT: 264.9 LBS | BODY MASS INDEX: 41.49 KG/M2 | DIASTOLIC BLOOD PRESSURE: 61 MMHG | RESPIRATION RATE: 18 BRPM | HEART RATE: 88 BPM | TEMPERATURE: 98.4 F | OXYGEN SATURATION: 97 % | SYSTOLIC BLOOD PRESSURE: 111 MMHG

## 2018-12-07 DIAGNOSIS — Z98.84 BARIATRIC SURGERY STATUS: ICD-10-CM

## 2018-12-07 DIAGNOSIS — E66.01 MORBID OBESITY (H): Primary | ICD-10-CM

## 2018-12-07 RX ORDER — URSODIOL 300 MG/1
300 CAPSULE ORAL 2 TIMES DAILY
Qty: 60 CAPSULE | Refills: 5 | Status: SHIPPED | OUTPATIENT
Start: 2018-12-07 | End: 2019-06-03

## 2018-12-07 NOTE — LETTER
12/7/2018       RE: Danitza Soto  [address]  [place] [facility] [place] [address]     Dear Colleague,    Thank you for referring your patient, Danitza Soto, to the [facility] SURGICAL WEIGHT MANAGEMENT at [facility]. Please see a copy of my visit note below.    Nutrition Assessment  Reason For Visit:  Danitza Soto is a 38 year old female presenting today for nutrition follow-up, 1 week s/p SG with Dr Tse(11/27/18).  Patient referred by Dr Tse.    Anthropometrics  Initial Consult Weight: 308.8 lbs  Day of Surgery Weight: 274.6 lbs  Current Weight: 264.9 lbs  Weight loss: -43.9 lbs from initial consult; -9.7 lbs from day of surgery    Current Vitamins/Minerals: multivitamin    Nutrition History  Pt reports consuming and tolerating bariatric clear and low-fat full liquid diets. Fluid intake appears adequate, consuming 48-64 oz/day.    Nutrition Prescription:  Grams Protein: 60 (minimum)  Amount of Fluid: 48-64 oz    Nutrition Diagnosis  Food and nutrition-related knowledge deficit r/t lack of prior exposure to diet advancements beyond bariatric low-fat full liquid diet aeb pt unable to verbalize understanding of bariatric pureed and soft diets.    Intervention  Intervention At Appointment:  Materials/education provided on bariatric pureed and soft diets, protein intake, fluid intake, eating pace, portion control, avoiding excess sugar and fat, recommended vitamin/mineral supplements.    Patient Understanding: good  Expected Compliance: good    Goals:  1) Follow bariatric low-fat full liquid diet through day 13 post-op, then to progress to pureed diet x 2 weeks(12/11).  If tolerating, may advance on day 29 post-op to bariatric soft diet(12/25).   2) Work towards 60 gm protein/day.  3) Consume 48-64 oz fluids daily- between meals.  4) Eat slowly (>20 min/meal), chewing well to smooth consistency once on the bariatric soft diet.  5) Limit portions to 1/2 cup/meal.  6)  Start chewable/liquid multivitamin/minerals twice daily.    Follow-Up: 3 weeks or prn    Time spent with patient: 30 minutes.    Again, thank you for allowing me to participate in the care of your patient.      Sincerely,    Kelsey Sousa RD

## 2018-12-07 NOTE — MR AVS SNAPSHOT
After Visit Summary   12/7/2018    Danitza Soto    MRN: [id number]           Patient Information     Date Of Birth          1980        Visit Information        Provider Department      12/7/2018 10:00 AM Nurse,  Surgery General Surgery        Today's Diagnoses     Morbid obesity (H)    -  1    Bariatric surgery status          Care Instructions    It was a pleasure meeting with you today.     Thank you for allowing us the privilege of caring for you. We hope we provided you with the excellent service you deserve.     Please let us know if there is anything else we can do for you so that we can be sure you are leaving completely satisfied with your care experience.      You saw Clotilde Franklin today.     Instructions per today's visit:   Plan:  1. RD visit today.  2. Start vitamin supplements per RD directions.  3. Advance diet per RD directions.  4. Okay to take omeprazole once daily  5. Follow-up: 3 weeks at 1 month post op  6. Actigall prescription sent to pharmacy    To schedule appointments with our team, please call [phone] option #1    Please call during clinic hours Monday through Friday 8:00a - 4:00p if you have questions or you can contact us via [employer] at anytime.      Nurses: [phone]  Fax: [phone]  Surgery Scheduler: [phone]    Please call the hospital at [phone] to speak with our on call MDs if you have urgent needs after hours, during weekends, or holidays.    **Please note if you need to go to the Emergency Room for any reason in the first 90 days after surgery it is important to go to the [facility] on the [place] on [facility] off of the [address] exit off [address]    Main follow-up parameters for all bariatric patients     Patients who have undergone Bariatric surgery:    1. Follow-up interval during the first year: ~1 week, 1 month, 3 month, 6 month,12 months.  Every 6 months until 5 years; and then annually thereafter.  The goal of  follow-up sessions is to ensure that food intake behavior (choice of food and eating speed) and exercise/activity level are set appropriately.    2. Yearly lab tests ordered by our clinic.      3. The bariatric team should be aware and potentially evaluate all adverse gastrointestinal symptoms (dysphagia, abdominal pain, nausea, vomiting, diarrhea, heartburn, reflux, etc), which can be a sign of complication.  The bariatric surgeons perform general surgery procedures in addition to bariatric surgery (laparoscopic cholecystectomy, etc.)    4. Inability to tolerate textured food (chicken, steak, fish, eggs, beans) is NOT normal and may need to be evaluated by endoscopy performed by the bariatric surgeon.                         Follow-ups after your visit        Your next 10 appointments already scheduled     Dec 07, 2018 10:30 AM CST   (Arrive by 10:15 AM)   Return Bariatric Nutrition Visit with Kelsey Sousa RD   [facility] ([facility] [place])    [address] [place] [address]   [phone]            Dec 18, 2018 10:00 AM CST   (Arrive by 9:45 AM)   SHORT with Lauren Turner Bloch, RPH    [facility] [place] ([facility] [place])    [address] [place] [address]   [phone]            Jan 03, 2019  8:30 AM CST   (Arrive by 8:15 AM)   Return Bariatric Visit with Sandy Cervantes PA-C   [facility] ([facility] [place])    [address] [place] [address]   [phone]            Jan 03, 2019  9:00 AM CST   (Arrive by 8:45 AM)   Return Bariatric Nutrition Visit with Kelsey Sousa RD   [facility] ([facility] [place])    [address] [place] [address]   [phone]            Mar 26, 2019  1:30 PM CDT   Return Visit with Leena Heath MD   [facility]  [facility] AT [facility] ([facility])    [address] [place] [address] 2nd Floor  [facility] [address] [phone]              Who to contact     Please call your clinic at [phone] to:    Ask questions about your health    Make or cancel appointments    Discuss your medicines    Learn about your test results    Speak to your doctor            Additional Information About Your Visit        MyChart Information     [employer] gives you secure access to your electronic health record. If you see a primary care provider, you can also send messages to your care team and make appointments. If you have questions, please call your primary care clinic.  If you do not have a primary care provider, please call [phone] and they will assist you.      [employer] is an electronic gateway that provides easy, online access to your medical records. With [employer], you can request a clinic appointment, read your test results, renew a prescription or communicate with your care team.     To access your existing account, please contact your [facility] or call [phone] for assistance.        Care EveryWhere ID     This is your Care EveryWhere ID. This could be used by other organizations to access your [place] medical records  [id number]        Your Vitals Were     Pulse Temperature Respirations Last Period Pulse Oximetry BMI (Body Mass Index)    88 98.4  F (36.9  C) (Oral) 18 11/22/2018 97% 41.49 kg/m2       Blood Pressure from Last 3 Encounters:   12/07/18 111/61   12/05/18 110/71   11/28/18 144/78    Weight from Last 3 Encounters:   12/07/18 120.2 kg (264 lb 14.4 oz)   12/05/18 120.2 kg (265 lb)   11/28/18 127.1 kg (280 lb 1.6 oz)              Today, you had the following     No orders found for display         Today's Medication Changes          These changes are accurate as of 12/7/18 10:08 AM.  If you have any questions, ask your nurse or doctor.               Start  taking these medicines.        Dose/Directions    ursodiol 300 MG capsule   Commonly known as:  ACTIGALL   Used for:  Bariatric surgery status   Started by:  Nurse, [facility]        Dose:  300 mg   Take 1 capsule (300 mg) by mouth 2 times daily   Quantity:  60 capsule   Refills:  5         These medicines have changed or have updated prescriptions.        Dose/Directions    lisinopril 40 MG tablet   Commonly known as:  PRINIVIL/ZESTRIL   This may have changed:  when to take this   Used for:  Essential hypertension with goal blood pressure less than 140/90        Dose:  40 mg   Take 1 tablet (40 mg) by mouth daily   Quantity:  90 tablet   Refills:  3       topiramate 25 MG tablet   Commonly known as:  TOPAMAX   This may have changed:  additional instructions   Used for:  Morbid obesity (H)        Dose:  50 mg   Take 2 tablets (50 mg) by mouth 2 times daily 25 mg at bedtime for 1 week, 50 mg at bedtime for 1 week and 75 mg daily at bedtime thereafter   Quantity:  120 tablet   Refills:  3            Where to get your medicines      [facility] [address] [place] [address] [address] AT [facility] [address]  [address] [facility] [facility] [address]     Phone:  [phone]     ursodiol 300 MG capsule                Primary Care Provider Office Phone # Fax #    Polly Mcbride [phone] [address] [address]        Goals        General    Healthy Eating (pt-stated)     Notes - Note created  11/29/2018  1:31 PM by Kassy Dudley RN    Goal 2  Statement: I will follow post surgery diet as directed   Measure of Success: completed  Supportive Steps to Achieve: understanding of diet  Barriers: na  Strengths: understanding of diet  Date to Achieve By: 1/1/19  Patient expressed understanding of goal: yes          Medical (pt-stated)     Notes - Note created  11/29/2018  1:29 PM by Kassy Dudley RN    Goal  1  Statement: I will take pain medication as directed.   Measure of Success: completed  Supportive Steps to Achieve: understanding of medications  Barriers: na   Strengths: desire for pain control  Date to Achieve By: 1/1/19  Patient expressed understanding of goal: yes            Equal Access to Services     ELDA NORMAN : Hadii aad ku hadmarciaruchi [place], wasergioda luqadaha, qaybta kaalmada barrington, christiano hopein hayaanilton valle michelet radha shah. So [facility] [phone].    ATENCIÓN: Si habla español, tiene a bailey disposición servicios gratuitos de asistencia lingüística. Llame al [phone].    We comply with applicable federal civil rights laws and [place] laws. We do not discriminate on the basis of race, color, national origin, age, disability, sex, sexual orientation, or gender identity.            Thank you!     Thank you for choosing GENERAL SURGERY  for your care. Our goal is always to provide you with excellent care. Hearing back from our patients is one way we can continue to improve our services. Please take a few minutes to complete the written survey that you may receive in the mail after your visit with us. Thank you!             Your Updated Medication List - Protect others around you: Learn how to safely use, store and throw away your medicines at www.disposemymeds.org.          This list is accurate as of 12/7/18 10:08 AM.  Always use your most recent med list.                   Brand Name Dispense Instructions for use Diagnosis    acetaminophen 325 MG tablet    TYLENOL    100 tablet    Take 2 tablets (650 mg) by mouth every 4 hours as needed for other (For optimal non-opioid multimodal pain management to improve pain control and physical function.)    S/P laparoscopic sleeve gastrectomy       CENTRUM PO           fish oil-omega-3 fatty acids 1000 MG capsule      Take 2 g by mouth every morning        hydrochlorothiazide 25 MG tablet    HYDRODIURIL    90 tablet    Take 12.5 mg by mouth every morning     Essential hypertension with goal blood pressure less than 140/90       hyoscyamine 0.125 MG tablet    ANASPAZ/LEVSIN    30 tablet    Take 1-2 tablets (125-250 mcg) by mouth every 4 hours as needed for cramping (Take 1-2 tabs by mouth every 4 hours as needed for cramping)    S/P laparoscopic sleeve gastrectomy       lisinopril 40 MG tablet    PRINIVIL/ZESTRIL    90 tablet    Take 1 tablet (40 mg) by mouth daily    Essential hypertension with goal blood pressure less than 140/90       LORazepam 0.5 MG tablet    ATIVAN    20 tablet    Take 1 tablet (0.5 mg) by mouth every 8 hours as needed for anxiety    Anxiety       omeprazole 20 MG DR capsule    priLOSEC    60 capsule    Take 1 capsule (20 mg) by mouth 2 times daily    S/P laparoscopic sleeve gastrectomy       ondansetron 4 MG ODT tab    ZOFRAN-ODT    30 tablet    Take 1 tablet (4 mg) by mouth every 4 hours as needed for nausea or vomiting    S/P laparoscopic sleeve gastrectomy       oxyCODONE 5 MG tablet    ROXICODONE    30 tablet    Take 1 tablet (5 mg) by mouth every 4 hours as needed for pain    S/P laparoscopic sleeve gastrectomy       PROBIOTIC ADVANCED PO           topiramate 25 MG tablet    TOPAMAX    120 tablet    Take 2 tablets (50 mg) by mouth 2 times daily 25 mg at bedtime for 1 week, 50 mg at bedtime for 1 week and 75 mg daily at bedtime thereafter    Morbid obesity (H)       ursodiol 300 MG capsule    ACTIGALL    60 capsule    Take 1 capsule (300 mg) by mouth 2 times daily    Bariatric surgery status       VENTOLIN  (90 Base) MCG/ACT inhaler   Generic drug:  albuterol      INHALE 1-2 PUFFS EVERY 4-6 HOURS AS NEEDED FOR WHEEZING        Vitamin D-3 5000 units Tabs      Take 1 tablet by mouth daily

## 2018-12-07 NOTE — PATIENT INSTRUCTIONS
Goals:  1) Follow bariatric low-fat full liquid diet through day 13 post-op, then to progress to pureed diet x 2 weeks(12/11).  If tolerating, may advance on day 29 post-op to bariatric soft diet(12/25).   2) Work towards 60 gm protein/day.  3) Consume 48-64 oz fluids daily- between meals.  4) Eat slowly (>20 min/meal), chewing well to smooth consistency once on the bariatric soft diet.  5) Limit portions to 1/2 cup/meal.  6) Start chewable/liquid multivitamin/minerals twice daily.    Follow up with RD in one month    Kelsey Sousa RD, LD  If you need to schedule or reschedule with a dietitian please call [phone].

## 2018-12-07 NOTE — MR AVS SNAPSHOT
MRN:[id number]                      After Visit Summary   12/7/2018    Danitza Soto    MRN: [id number]           Visit Information        Provider Department      12/7/2018 10:30 AM Kelsey Sousa RD M [facility]        Your next 10 appointments already scheduled     Dec 18, 2018 10:00 AM CST   (Arrive by 9:45 AM)   SHORT with Lauren Turner Bloch, RPH    [facility] ([facility] [place])    [address] [place] [address]   [phone]            Jan 03, 2019  8:30 AM CST   (Arrive by 8:15 AM)   Return Bariatric Visit with DESTINI Harrell [facility] ([facility] [place])    [address] [place] [address]   [phone]            Jan 03, 2019  9:00 AM CST   (Arrive by 8:45 AM)   Return Bariatric Nutrition Visit with DAVIE Gonzáles [facility] ([facility] [place])    [address] [place] [address]   [phone]            Mar 26, 2019  1:30 PM CDT   Return Visit with Leena Heath MD   [facility] ([facility])    [address] [place] [address]   [phone]              Care Instructions    Goals:  1) Follow bariatric low-fat full liquid diet through day 13 post-op, then to progress to pureed diet x 2 weeks(12/11).  If tolerating, may advance on day 29 post-op to bariatric soft diet(12/25).   2) Work towards 60 gm protein/day.  3) Consume 48-64 oz fluids daily- between meals.  4) Eat slowly (>20 min/meal), chewing well to smooth consistency once on the bariatric soft diet.  5) Limit portions to 1/2 cup/meal.  6) Start chewable/liquid multivitamin/minerals twice daily.    Follow up with DAVIE in one month    Kelsey Sousa RD, LD  If you need to schedule or reschedule with a dietitian please  call [phone].          [employer] Information     [employer] gives you secure access to your electronic health record. If you see a primary care provider, you can also send messages to your care team and make appointments. If you have questions, please call your primary care clinic.  If you do not have a primary care provider, please call [phone] and they will assist you.      [employer] is an electronic gateway that provides easy, online access to your medical records. With [employer], you can request a clinic appointment, read your test results, renew a prescription or communicate with your care team.     To access your existing account, please contact your [facility] or call [phone] for assistance.        Care EveryWhere ID     This is your Care EveryWhere ID. This could be used by other organizations to access your [place] medical records  [id number]        Equal Access to Services     ELDA NORMAN : Xavier Storey, addison yeager, christiano gonzalez. So [facility] [phone].    ATENCIÓN: Si habla español, tiene a bailey disposición servicios gratuitos de asistencia lingüística. Llame al [phone].    We comply with applicable federal civil rights laws and [place] laws. We do not discriminate on the basis of race, color, national origin, age, disability, sex, sexual orientation, or gender identity.

## 2018-12-07 NOTE — PATIENT INSTRUCTIONS
It was a pleasure meeting with you today.     Thank you for allowing us the privilege of caring for you. We hope we provided you with the excellent service you deserve.     Please let us know if there is anything else we can do for you so that we can be sure you are leaving completely satisfied with your care experience.      You saw Clotilde Franklin today.     Instructions per today's visit:   Plan:  1. RD visit today.  2. Start vitamin supplements per RD directions.  3. Advance diet per RD directions.  4. Okay to take omeprazole once daily  5. Follow-up: 3 weeks at 1 month post op  6. Actigall prescription sent to pharmacy    To schedule appointments with our team, please call [phone] option #1    Please call during clinic hours Monday through Friday 8:00a - 4:00p if you have questions or you can contact us via [employer] at anytime.      Nurses: [phone]  Fax: [phone]  Surgery Scheduler: [phone]    Please call the hospital at [phone] to speak with our on call MDs if you have urgent needs after hours, during weekends, or holidays.    **Please note if you need to go to the Emergency Room for any reason in the first 90 days after surgery it is important to go to the [facility] on the [place] on [facility] off of the [place] exit off of [address].    Main follow-up parameters for all bariatric patients     Patients who have undergone Bariatric surgery:    1. Follow-up interval during the first year: ~1 week, 1 month, 3 month, 6 month,12 months.  Every 6 months until 5 years; and then annually thereafter.  The goal of follow-up sessions is to ensure that food intake behavior (choice of food and eating speed) and exercise/activity level are set appropriately.    2. Yearly lab tests ordered by our clinic.      3. The bariatric team should be aware and potentially evaluate all adverse gastrointestinal symptoms (dysphagia, abdominal pain, nausea, vomiting, diarrhea, heartburn, reflux, etc), which  can be a sign of complication.  The bariatric surgeons perform general surgery procedures in addition to bariatric surgery (laparoscopic cholecystectomy, etc.)    4. Inability to tolerate textured food (chicken, steak, fish, eggs, beans) is NOT normal and may need to be evaluated by endoscopy performed by the bariatric surgeon.

## 2018-12-07 NOTE — PROGRESS NOTES
Nutrition Assessment  Reason For Visit:  Danitza Soto is a 38 year old female presenting today for nutrition follow-up, 1 week s/p SG with Dr Tse(11/27/18).  Patient referred by Dr Tse.    Anthropometrics  Initial Consult Weight: 308.8 lbs  Day of Surgery Weight: 274.6 lbs  Current Weight: 264.9 lbs  Weight loss: -43.9 lbs from initial consult; -9.7 lbs from day of surgery    Current Vitamins/Minerals: multivitamin    Nutrition History  Pt reports consuming and tolerating bariatric clear and low-fat full liquid diets. Fluid intake appears adequate, consuming 48-64 oz/day.    Nutrition Prescription:  Grams Protein: 60 (minimum)  Amount of Fluid: 48-64 oz    Nutrition Diagnosis  Food and nutrition-related knowledge deficit r/t lack of prior exposure to diet advancements beyond bariatric low-fat full liquid diet aeb pt unable to verbalize understanding of bariatric pureed and soft diets.    Intervention  Intervention At Appointment:  Materials/education provided on bariatric pureed and soft diets, protein intake, fluid intake, eating pace, portion control, avoiding excess sugar and fat, recommended vitamin/mineral supplements.    Patient Understanding: good  Expected Compliance: good    Goals:  1) Follow bariatric low-fat full liquid diet through day 13 post-op, then to progress to pureed diet x 2 weeks(12/11).  If tolerating, may advance on day 29 post-op to bariatric soft diet(12/25).   2) Work towards 60 gm protein/day.  3) Consume 48-64 oz fluids daily- between meals.  4) Eat slowly (>20 min/meal), chewing well to smooth consistency once on the bariatric soft diet.  5) Limit portions to 1/2 cup/meal.  6) Start chewable/liquid multivitamin/minerals twice daily.    Follow-Up: 3 weeks or prn    Time spent with patient: 30 minutes.  Kelsey Sousa, RD, LD

## 2018-12-07 NOTE — PROGRESS NOTES
[facility]: Nurse (RN) Visit Note  SITUATION/BACKGROUND                                                      Danitza Soto is a 38 year old female, who presents to clinic for post bariatric surgery clinic.    Patient underwent sleeve gastrectomy 1 weeks ago with Dr. Tse.      Tolerating liquids: yes  Nausea: none  Lightheadedness: none since adjusting BP med, at baseline has Afib with lightheadedness which happened last night but not more frequently than pre surgery  Abdominal pain: none, not needing pain medication  Bowel movements: normal, not constipated  Fevers/shakes/chills: none  GERD: taking once daily, no heart burn    Low BP over the weekend, PCP stopped hydrochlorothiazide and halfed the lisinopril  Sometimes dry in the AM with CPAP     Restart medications per instructions from Lauren Bloch, [facility] pharmacy    Initial weight: 308  Day of surgery: 274lb  Today: 264lb    ASSESSMENT      Vital Signs:  /61 (BP Location: Left arm, Patient Position: Sitting, Cuff Size: Adult Large)  Pulse 88  Temp 98.4  F (36.9  C) (Oral)  Resp 18  Wt 120.2 kg (264 lb 14.4 oz)  LMP 11/22/2018  SpO2 97%  BMI 41.49 kg/m2     Exam:  NAD  Overall looks great  Incisions c/d/i; soft, non-tender      RECOMMENDATION/PLAN                                                      Medical Plan:  Plan:  1. RD visit today.  2. Start vitamin supplements per RD directions.  3. Advance diet per RD directions.  4. Okay to take omeprazole once daily  5. Follow-up: 3 weeks at 1 month post op  6. Actigall prescription sent to pharmacy    Today's visit was:  Ordered or requested by: Sandy Cervantes PA-C. Supervising provider: Sandy Cervantes PA-C who was in clinic and available if needed.   JOSE LUIS Lizama CNP

## 2018-12-18 ENCOUNTER — ALLIED HEALTH/NURSE VISIT (OUTPATIENT)
Dept: PHARMACY | Facility: CLINIC | Age: 38
End: 2018-12-18
Payer: COMMERCIAL

## 2018-12-18 DIAGNOSIS — Z98.84 S/P LAPAROSCOPIC SLEEVE GASTRECTOMY: Primary | ICD-10-CM

## 2018-12-18 DIAGNOSIS — K21.9 GASTROESOPHAGEAL REFLUX DISEASE WITHOUT ESOPHAGITIS: ICD-10-CM

## 2018-12-18 DIAGNOSIS — I10 HYPERTENSION GOAL BP (BLOOD PRESSURE) < 140/90: ICD-10-CM

## 2018-12-18 PROCEDURE — 99607 MTMS BY PHARM ADDL 15 MIN: CPT | Performed by: PHARMACIST

## 2018-12-18 PROCEDURE — 99606 MTMS BY PHARM EST 15 MIN: CPT | Performed by: PHARMACIST

## 2018-12-18 NOTE — PROGRESS NOTES
SUBJECTIVE/OBJECTIVE:                Danitza Soto is a 38 year old female called for a follow-up visit for Medication Therapy Management.  She was referred to me from Sandy Cervantes PA-C.     Chief Complaint: Follow up from our visit on 11/06/18.      Tobacco: No tobacco use  Alcohol: not currently using    Medication Adherence/Access:  no issues reported    S/P Sleeve Gastrectomy: Patient had sleeve gastrectomy and hernia repair on 11/27 by Dr. Artis Tse. Still taking topiramate 50 mg BID. She finds that the topiramate is helping to manage appetite and snacking after surgery. No medication side effects. Taking ursodiol 300 mg BID for gallstone prevention s/p surgery. Noticing sporadic hives since starting ursodiol, no SOB. Finds when hives are present, then will go away, then reappear. Patient sates she would prefer to take a medication that causes a slight rash than have worry for gallstones.  Has not needed hyoscamine for cramping.   Supplements: Patient is currently taking Multivitamin w/ Iron  twice daily. Will start other supplements at next follow up with dietician.   Pain: No post-surgical pain. No using oxycodone. Not using APAP, but has for if needed.   Bowel movements: Taking senna once daily. Will use Benefiber if needed for constipation. Bowel movement every other day. Previously to surgery was having bowel movement every 3 days. No concerns.   Heartburn: Currently taking omeprazole 20 mg once daily. See below.     Initial consult weight: 30.8.8 lb    Weight day of surgery: 374.6 lb    Weight on 12/07, last clinic visit: 264.9 lb    Cumulative weight loss: -43.9 lb from initial consult.     GERD: Current medications include: Prilosec (omeprazole) 20mg once daily. Pt c/o no current symptoms.  Patient feels that current regimen is effective. Finds that GERD symptoms were food related and have improved since hernia repair.     Hypertension: Current medications include lisinopril 20 mg once daily.   Patient does self-monitor BP. Home BP monitoring in range of 110's systolic over 60's diastolic.  Patient reports no current medication side effects (no dizziness/lightheadedness since changing BP medications). Stopped hydrochlorothiazide after surgery and decreased lisinopril from 40 to 20 mg once daily.   Creatinine   Date Value Ref Range Status   11/28/2018 0.74 0.52 - 1.04 mg/dL Final     GFR Estimate   Date Value Ref Range Status   11/28/2018 87 >60 mL/min/1.7m2 Final     Comment:     Non  GFR Calc     GFR Estimate If Black   Date Value Ref Range Status   11/28/2018 >90 >60 mL/min/1.7m2 Final     Comment:      GFR Calc     Today's Vitals: LMP 11/22/2018  Telemedicine visit. No vitals.     ASSESSMENT:              Current medications were reviewed today as discussed above.      Medication Adherence: good, no issues identified    S/P Sleeve Gastrectomy: Stable. Reasonable to continue ursodiol for now and patient to monitor symptoms and contact should symptoms worsen.     GERD: Stable. Future consideration, if at 3 months GERD symptoms have resolved, could consider tapering off omeprazole at 20 mg every other day for 2 weeks, then every 3 days for 2 weeks, then stop.     Hypertension: Stable. BP at goal <140/90 mmHg. Patient to continue to monitor BP and watch for s/e of dizziness or lightheadedness.      PLAN:                  1. Continue current regimen for now, agreed upon with Sandy Cervantes PA-C.  Patient to contact should symptoms worsen.     I spent 20 minutes with this patient today. A copy of the visit note was provided to the patient's referring provider.     Will follow up in 2-3 months.    The patient declined a summary of these recommendations as an after visit summary.    Kathy Calhoun, PharmD  Medication Therapy Management Pharmacist   [facility]   Phone: [phone]

## 2018-12-18 NOTE — Clinical Note
FYI, could consider taper off omeprazole in future in 2-3 months if continuing to have no GERD symptoms.

## 2018-12-21 ENCOUNTER — DOCUMENTATION ONLY (OUTPATIENT)
Dept: SURGERY | Facility: CLINIC | Age: 38
End: 2018-12-21

## 2019-01-03 ENCOUNTER — OFFICE VISIT (OUTPATIENT)
Dept: SURGERY | Facility: CLINIC | Age: 39
End: 2019-01-03
Payer: COMMERCIAL

## 2019-01-03 VITALS
TEMPERATURE: 98.5 F | HEIGHT: 67 IN | OXYGEN SATURATION: 97 % | WEIGHT: 253.6 LBS | DIASTOLIC BLOOD PRESSURE: 76 MMHG | BODY MASS INDEX: 39.8 KG/M2 | HEART RATE: 63 BPM | SYSTOLIC BLOOD PRESSURE: 128 MMHG

## 2019-01-03 DIAGNOSIS — E66.01 MORBID OBESITY (H): ICD-10-CM

## 2019-01-03 DIAGNOSIS — Z98.84 S/P LAPAROSCOPIC SLEEVE GASTRECTOMY: Primary | ICD-10-CM

## 2019-01-03 RX ORDER — TOPIRAMATE 25 MG/1
50 TABLET, FILM COATED ORAL 2 TIMES DAILY
Qty: 120 TABLET | Refills: 3 | COMMUNITY
Start: 2019-01-03 | End: 2019-03-07

## 2019-01-03 ASSESSMENT — MIFFLIN-ST. JEOR: SCORE: 1856.82

## 2019-01-03 NOTE — NURSING NOTE
"(   Chief Complaint   Patient presents with     RECHECK     S/P LSG 11/27/2018    )    ( Weight: 115 kg (253 lb 9.6 oz) )  ( Height: 170 cm (5' 6.93\") )  ( BMI (Calculated): 39.89 )  ( Initial Weight: 140.1 kg (308 lb 12.8 oz) )  ( Cumulative weight loss (lbs): 55.2 )  ( Last Visits Weight: 133.9 kg (295 lb 4.8 oz) )  ( Wt change since last visit (lbs): -41.7 )  (   )  (   )    ( BP: 128/76 )  (   )  ( Temp: 98.5  F (36.9  C) )  ( Temp src: Oral )  ( Pulse: 63 )  (   )  ( SpO2: 97 % )    (   Patient Active Problem List   Diagnosis     Anxiety state     Esophageal reflux     Morbid obesity due to excess calories (H)     Tobacco use disorder     CARDIOVASCULAR SCREENING; LDL GOAL LESS THAN 130     Hypertension goal BP (blood pressure) < 140/90     Acne     Papanicolaou smear of cervix with low grade squamous intraepithelial lesion (LGSIL)     Mixed hyperlipidemia     LEONARDO (obstructive sleep apnea)     Obesity hypoventilation syndrome (H)     Lumbago     Left anterior knee pain     Paroxysmal atrial fibrillation (H)     Morbid (severe) obesity due to excess calories (H)    )  (   Current Outpatient Medications   Medication Sig Dispense Refill     acetaminophen (TYLENOL) 325 MG tablet Take 2 tablets (650 mg) by mouth every 4 hours as needed for other (For optimal non-opioid multimodal pain management to improve pain control and physical function.) 100 tablet 0     Cholecalciferol (VITAMIN D-3) 5000 units TABS Take 1 tablet by mouth daily       lisinopril (PRINIVIL/ZESTRIL) 40 MG tablet Take 1 tablet (40 mg) by mouth daily (Patient taking differently: Take 20 mg by mouth every evening ) 90 tablet 3     LORazepam (ATIVAN) 0.5 MG tablet Take 1 tablet (0.5 mg) by mouth every 8 hours as needed for anxiety 20 tablet 0     Multiple Vitamins-Minerals (CENTRUM PO)        ondansetron (ZOFRAN-ODT) 4 MG ODT tab Take 1 tablet (4 mg) by mouth every 4 hours as needed for nausea or vomiting 30 tablet 1     topiramate (TOPAMAX) 25 MG " tablet Take 2 tablets (50 mg) by mouth 2 times daily 25 mg at bedtime for 1 week, 50 mg at bedtime for 1 week and 75 mg daily at bedtime thereafter (Patient taking differently: Take 50 mg by mouth 2 times daily ) 120 tablet 3     ursodiol (ACTIGALL) 300 MG capsule Take 1 capsule (300 mg) by mouth 2 times daily 60 capsule 5     VENTOLIN  (90 Base) MCG/ACT Inhaler INHALE 1-2 PUFFS EVERY 4-6 HOURS AS NEEDED FOR WHEEZING  0     fish oil-omega-3 fatty acids 1000 MG capsule Take 2 g by mouth every morning        Probiotic Product (PROBIOTIC ADVANCED PO)       )  ( Diabetes Eval:    )    ( Pain Eval:  Data Unavailable )    ( Wound Eval:       )    (   History   Smoking Status     Former Smoker     Packs/day: 1.00     Years: 10.00     Types: Cigarettes     Start date: 1/1/2004     Quit date: 8/15/2018   Smokeless Tobacco     Never Used     Comment:  pack or less a day    )    ( Signed By:  Emmanuelle Kingsley; January 3, 2019; 8:30 AM )

## 2019-01-03 NOTE — LETTER
1/3/2019       RE: Danitza Soto  [address]  [facility] [address]     Dear Colleague,    Thank you for referring your patient, Danitza Soto, to the [facility] SURGICAL WEIGHT MANAGEMENT at [facility]. Please see a copy of my visit note below.  Return Bariatric Surgery Note    RE: Danitza Soto  MR#: [id number]  : 1980  VISIT DATE: Timothy 3, 2019    Dear Polly Mcbride,    I had the pleasure of seeing your patient, Danitza Soto, in my post-bariatric surgery assessment clinic.    CHIEF COMPLAINT: Post-bariatric surgery follow-up. 1 month follow up.    HISTORY OF PRESENT ILLNESS:  Questions Regarding Prior Weight Loss Surgery Reviewed With Patient 1/3/2019   I had the following weight loss procedure: Sleeve Gastrectomy   What year was your surgery? 2018   How has your weight changed since your last visit? I have lost weight   Are you currently taking any weight loss medications? Yes   Do you currently have any of the following: Hypertension (high blood pressure)?, Hyperlipidemia (high cholesterol, triglycerides)?   Have you been to the Emergency room since your last visit with us? No   Were you in the hospital since your last visit with us? No   Do you have any concerns today? queasiness/stomach upset, itchiness - side effects from Ursodiol   Tolerating topiramate 50 mg twice daily  Tolerating eating and drinking    Weight History:     1/3/2019   What is your highest lifetime weight? 310   What is your lowest weight since surgery? (In pounds) 253     Initial Weight: 140.1 kg (308 lb 12.8 oz)  Current Weight: Weight: 115 kg (253 lb 9.6 oz)  Cumulative weight loss (lbs): 55.2  Last Visits Weight: 133.9 kg (295 lb 4.8 oz)    Questions Regarding Co-Morbidities and Health Concerns Reviewed With Patient 1/3/2019   Pre-diabetes: Improved   Diabetes II: Never   High Blood Pressure: Improved   High cholesterol: Stayed the same   Heartburn/Reflux: Improved   Are you  taking daily medication for heartburn, acid reflux, or GERD (acid reflux disease)? -   Sleep apnea: Improved   Do you use a CPAP? -   PCOS: Never   Back pain: Improved   Joint pain: Improved   Lower leg swelling: Never       Eating Habits 1/3/2019   How many meals do you eat per day? 2   Do you snack between meals? Sometimes   How much food are you eating at each meal? 1/2 cup to 1 cup   Are you able to separate your meals and liquids by at least 30 minutes? Yes   Are you able to avoid liquid calories? Sometimes       Exercise Questions Reviewed With Patient 1/3/2019   How often do you exercise? 1 to 2 times per week   What is the duration of your exercise (in minutes)? 30 Minutes   What types of exercise do you do? walking, gym membership, other   What keeps you from being more active?  I have recently been sick, Too tired       Social History:      1/3/2019   Are you smoking? No   Are you drinking alcohol? No     Medications:  Current Outpatient Medications   Medication     acetaminophen (TYLENOL) 325 MG tablet     Cholecalciferol (VITAMIN D-3) 5000 units TABS     lisinopril (PRINIVIL/ZESTRIL) 40 MG tablet     LORazepam (ATIVAN) 0.5 MG tablet     Multiple Vitamins-Minerals (CENTRUM PO)     topiramate (TOPAMAX) 25 MG tablet     ursodiol (ACTIGALL) 300 MG capsule     VENTOLIN  (90 Base) MCG/ACT Inhaler     fish oil-omega-3 fatty acids 1000 MG capsule     Probiotic Product (PROBIOTIC ADVANCED PO)     No current facility-administered medications for this visit.          1/3/2019   Do you avoid NSAIDs such as (Ibuprofen, Aleve, Naproxen, Advil)?   Yes       ROS:  GI:      1/3/2019   Vomiting: No   Diarrhea: No   Constipation: Yes   Swallowing trouble: No   Abdominal pain: No   Heartburn: No   Rash in skin folds: No   Depression: No   Stress urinary incontinence No     Skin:   BAR RBS ROS - SKIN 1/3/2019   Rash in skin folds: No     Psych:      1/3/2019   Depression: No   Anxiety: Yes     Female Only:   VARUN HERNANDEZ  "ROS - FEMALE ONLY 1/3/2019   Female only: Regular menstrual cycles       LABS/IMAGING/MEDICAL RECORDS REVIEW:     PHYSICAL EXAMINATION:  /76   Pulse 63   Temp 98.5  F (36.9  C) (Oral)   Ht 1.7 m (5' 6.93\")   Wt 115 kg (253 lb 9.6 oz)   SpO2 97%   BMI 39.80 kg/m      General: No apparent distress  Neuro: A & O x 3  Head: Atraumatic, normocephalic  Eyes: PERRL, EOMI  Skin: warm and dry, no rashes on exposed skin  Respiratory: respirations unlabored  Abdomen: soft NT ND lap sites well healed  Extremities: No LE swelling      ASSESSMENT AND PLAN:      1. 1 months status laparoscopic gastric sleeve. She is doing well  2. Morbid Obesity current BMI: Body mass index is 39.8 kg/m .  3. Post surgical malabsorption:   Labs ordered per protocol.   Follow food plan per dietitian recommendations.   Continue taking recommended post-op vitamins.  4. Return to clinic in 2 months.  5. Continue topiramate      Sincerely,    Sandy Cervantes PA-C    I spent a total of 15 minutes face to face with Danitza during today's office visit. Over 50% of this time was spent counseling the patient and/or coordinating care.    Again, thank you for allowing me to participate in the care of your patient.      Sincerely,    Sandy Cervantes PA-C      "

## 2019-01-03 NOTE — LETTER
1/3/2019       RE: Danitza Soto  [address]  [facility] [address]     Dear Colleague,    Thank you for referring your patient, Danizta Soto, to the [facility] SURGICAL WEIGHT MANAGEMENT at [facility]. Please see a copy of my visit note below.    Nutrition Reassessment  Reason For Visit:  Danitza Soto is a 38 year old female presenting today for nutrition follow-up, 1 month s/p SG with Dr Tse(11/27/18).  Patient referred by Dr Tse and Sandy Cervantes.    Anthropometrics  Initial Consult Weight: 308.8 lbs  Day of Surgery Weight: 274.6 lbs  Current Weight: 253.6 lbs   Weight loss: -34.2 lbs from initial consult; -21 lbs from day of surgery    Current Vitamins/Minerals: MVI/minerals BID, calcium, B12, Vitamin D, B complex    Nutrition History:    Breakfast: protein shake or eggs  Lunch: shake when working or string cheese  Dinner: chicken, canned meat, brussels sprouts, eggs  Beverages: water, light juice or crystal light    Progress with Previous Goals:  1) Follow bariatric low-fat full liquid diet through day 13 post-op, then to progress to pureed diet x 2 weeks(12/11).  If tolerating, may advance on day 29 post-op to bariatric soft diet(12/25). Met/contains   2) Work towards 60 gm protein/day. Improving continues  3) Consume 48-64 oz fluids daily- between meals. Improving, 48 oz per day  4) Eat slowly (>20 min/meal), chewing well to smooth consistency once on the bariatric soft diet. met  5) Limit portions to 1/2 cup/meal. met  6) Start chewable/liquid multivitamin/minerals twice daily. met    Joined the gym not as often recently due to nausea     Nutrition Prescription:  Grams Protein: 60 (minimum)  Amount of Fluid: 48-64 oz    Nutrition Diagnosis  Previous: Food and nutrition-related knowledge deficit r/t lack of prior exposure to diet advancements beyond bariatric low-fat full liquid diet aeb pt unable to verbalize understanding of bariatric pureed and  soft diets.    Current: Food and nutrition-related knowledge deficit r/t lack of prior exposure to diet advancements beyond bariatric pureed diet aeb pt unable to verbalize full understanding of bariatric soft and regular consistency diets.    Intervention  Materials/Education provided on bariatric soft and regular consistency diets, protein intake, fluid intake, eating pace, chewing foods well, portion control, sugar/fat intake, recommended vitamin/mineral supplements.     Patient Understanding: good  Expected Compliance: good    Goals:  1) Follow soft diet for 4 weeks, then to progress to bariatric regular diet(1/22/19).   2) Consume 60 grams of protein/day.  3) Sip on 48-64 oz of fluids/day- between meals only.  4) Eat slowly (>20 min/meal), chewing foods well (to applesauce-like consistency).  5) Limit portions to 1/2 cup/meal.  6) Take the following supplements:    Multivitamin/minerals: adult dose 2 times daily    Iron: 45-60 mg elemental (18-36 mg if low risk) - may partly or fully be covered in multivitamin     Calcium Citrate containing vitamin D: 500 mg 3 times daily or 600 mg 2 times daily    Vitamin B12: sublingual form of at least 500 mcg daily or injection of 1000 mcg monthly     B-50 Complex once daily   7) Increase activity as able       Follow-Up: prn    Time spent with patient: 30 minutes.  Kelsey Sousa RD, LD

## 2019-01-03 NOTE — PATIENT INSTRUCTIONS
Goals:  1) Follow soft diet for 4 weeks, then to progress to bariatric regular diet(1/22/19).   2) Consume 60 grams of protein/day.  3) Sip on 48-64 oz of fluids/day- between meals only.  4) Eat slowly (>20 min/meal), chewing foods well (to applesauce-like consistency).  5) Limit portions to 1/2 cup/meal.  6) Take the following supplements:    Multivitamin/minerals: adult dose 2 times daily    Iron: 45-60 mg elemental (18-36 mg if low risk) - may partly or fully be covered in multivitamin     Calcium Citrate containing vitamin D: 500 mg 3 times daily or 600 mg 2 times daily    Vitamin B12: sublingual form of at least 500 mcg daily or injection of 1000 mcg monthly     B-50 Complex once daily   7) Increase activity as able    Follow up with RD in two months    Kelsey Sousa RD, LD  If you need to schedule or reschedule with a dietitian please call [phone].

## 2019-01-03 NOTE — PROGRESS NOTES
Nutrition Reassessment  Reason For Visit:  Danitza Soto is a 38 year old female presenting today for nutrition follow-up, 1 month s/p SG with Dr Tse(11/27/18).  Patient referred by Dr Tse and Sandy Cervantes.    Anthropometrics  Initial Consult Weight: 308.8 lbs  Day of Surgery Weight: 274.6 lbs  Current Weight: 253.6 lbs   Weight loss: -34.2 lbs from initial consult; -21 lbs from day of surgery    Current Vitamins/Minerals: MVI/minerals BID, calcium, B12, Vitamin D, B complex    Nutrition History:    Breakfast: protein shake or eggs  Lunch: shake when working or string cheese  Dinner: chicken, canned meat, brussels sprouts, eggs  Beverages: water, light juice or crystal light    Progress with Previous Goals:  1) Follow bariatric low-fat full liquid diet through day 13 post-op, then to progress to pureed diet x 2 weeks(12/11).  If tolerating, may advance on day 29 post-op to bariatric soft diet(12/25). Met/contains   2) Work towards 60 gm protein/day. Improving continues  3) Consume 48-64 oz fluids daily- between meals. Improving, 48 oz per day  4) Eat slowly (>20 min/meal), chewing well to smooth consistency once on the bariatric soft diet. met  5) Limit portions to 1/2 cup/meal. met  6) Start chewable/liquid multivitamin/minerals twice daily. met    Joined the gym not as often recently due to nausea     Nutrition Prescription:  Grams Protein: 60 (minimum)  Amount of Fluid: 48-64 oz    Nutrition Diagnosis  Previous: Food and nutrition-related knowledge deficit r/t lack of prior exposure to diet advancements beyond bariatric low-fat full liquid diet aeb pt unable to verbalize understanding of bariatric pureed and soft diets.    Current: Food and nutrition-related knowledge deficit r/t lack of prior exposure to diet advancements beyond bariatric pureed diet aeb pt unable to verbalize full understanding of bariatric soft and regular consistency diets.    Intervention  Materials/Education provided on bariatric  soft and regular consistency diets, protein intake, fluid intake, eating pace, chewing foods well, portion control, sugar/fat intake, recommended vitamin/mineral supplements.     Patient Understanding: good  Expected Compliance: good    Goals:  1) Follow soft diet for 4 weeks, then to progress to bariatric regular diet(1/22/19).   2) Consume 60 grams of protein/day.  3) Sip on 48-64 oz of fluids/day- between meals only.  4) Eat slowly (>20 min/meal), chewing foods well (to applesauce-like consistency).  5) Limit portions to 1/2 cup/meal.  6) Take the following supplements:    Multivitamin/minerals: adult dose 2 times daily    Iron: 45-60 mg elemental (18-36 mg if low risk) - may partly or fully be covered in multivitamin     Calcium Citrate containing vitamin D: 500 mg 3 times daily or 600 mg 2 times daily    Vitamin B12: sublingual form of at least 500 mcg daily or injection of 1000 mcg monthly     B-50 Complex once daily   7) Increase activity as able       Follow-Up: prn    Time spent with patient: 30 minutes.  Kelsey Sousa, RD, LD

## 2019-01-03 NOTE — PROGRESS NOTES
Return Bariatric Surgery Note    RE: Danitza Soto  MR#: [id number]  : 1980  VISIT DATE: Timothy 3, 2019    Dear Polly Mcbride,    I had the pleasure of seeing your patient, Danitza Soto, in my post-bariatric surgery assessment clinic.    CHIEF COMPLAINT: Post-bariatric surgery follow-up. 1 month follow up.    HISTORY OF PRESENT ILLNESS:  Questions Regarding Prior Weight Loss Surgery Reviewed With Patient 1/3/2019   I had the following weight loss procedure: Sleeve Gastrectomy   What year was your surgery? 2018   How has your weight changed since your last visit? I have lost weight   Are you currently taking any weight loss medications? Yes   Do you currently have any of the following: Hypertension (high blood pressure)?, Hyperlipidemia (high cholesterol, triglycerides)?   Have you been to the Emergency room since your last visit with us? No   Were you in the hospital since your last visit with us? No   Do you have any concerns today? queasiness/stomach upset, itchiness - side effects from Ursodiol   Tolerating topiramate 50 mg twice daily  Tolerating eating and drinking    Weight History:     1/3/2019   What is your highest lifetime weight? 310   What is your lowest weight since surgery? (In pounds) 253     Initial Weight: 140.1 kg (308 lb 12.8 oz)  Current Weight: Weight: 115 kg (253 lb 9.6 oz)  Cumulative weight loss (lbs): 55.2  Last Visits Weight: 133.9 kg (295 lb 4.8 oz)    Questions Regarding Co-Morbidities and Health Concerns Reviewed With Patient 1/3/2019   Pre-diabetes: Improved   Diabetes II: Never   High Blood Pressure: Improved   High cholesterol: Stayed the same   Heartburn/Reflux: Improved   Are you taking daily medication for heartburn, acid reflux, or GERD (acid reflux disease)? -   Sleep apnea: Improved   Do you use a CPAP? -   PCOS: Never   Back pain: Improved   Joint pain: Improved   Lower leg swelling: Never       Eating Habits 1/3/2019   How many meals do you eat per day? 2   Do  "you snack between meals? Sometimes   How much food are you eating at each meal? 1/2 cup to 1 cup   Are you able to separate your meals and liquids by at least 30 minutes? Yes   Are you able to avoid liquid calories? Sometimes       Exercise Questions Reviewed With Patient 1/3/2019   How often do you exercise? 1 to 2 times per week   What is the duration of your exercise (in minutes)? 30 Minutes   What types of exercise do you do? walking, gym membership, other   What keeps you from being more active?  I have recently been sick, Too tired       Social History:      1/3/2019   Are you smoking? No   Are you drinking alcohol? No     Medications:  Current Outpatient Medications   Medication     acetaminophen (TYLENOL) 325 MG tablet     Cholecalciferol (VITAMIN D-3) 5000 units TABS     lisinopril (PRINIVIL/ZESTRIL) 40 MG tablet     LORazepam (ATIVAN) 0.5 MG tablet     Multiple Vitamins-Minerals (CENTRUM PO)     topiramate (TOPAMAX) 25 MG tablet     ursodiol (ACTIGALL) 300 MG capsule     VENTOLIN  (90 Base) MCG/ACT Inhaler     fish oil-omega-3 fatty acids 1000 MG capsule     Probiotic Product (PROBIOTIC ADVANCED PO)     No current facility-administered medications for this visit.          1/3/2019   Do you avoid NSAIDs such as (Ibuprofen, Aleve, Naproxen, Advil)?   Yes       ROS:  GI:      1/3/2019   Vomiting: No   Diarrhea: No   Constipation: Yes   Swallowing trouble: No   Abdominal pain: No   Heartburn: No   Rash in skin folds: No   Depression: No   Stress urinary incontinence No     Skin:   BAR RBS ROS - SKIN 1/3/2019   Rash in skin folds: No     Psych:      1/3/2019   Depression: No   Anxiety: Yes     Female Only:   BAR RBS ROS - FEMALE ONLY 1/3/2019   Female only: Regular menstrual cycles       LABS/IMAGING/MEDICAL RECORDS REVIEW:     PHYSICAL EXAMINATION:  /76   Pulse 63   Temp 98.5  F (36.9  C) (Oral)   Ht 1.7 m (5' 6.93\")   Wt 115 kg (253 lb 9.6 oz)   SpO2 97%   BMI 39.80 kg/m     General: No " apparent distress  Neuro: A & O x 3  Head: Atraumatic, normocephalic  Eyes: PERRL, EOMI  Skin: warm and dry, no rashes on exposed skin  Respiratory: respirations unlabored  Abdomen: soft NT ND lap sites well healed  Extremities: No LE swelling      ASSESSMENT AND PLAN:      1. 1 months status laparoscopic gastric sleeve. She is doing well  2. Morbid Obesity current BMI: Body mass index is 39.8 kg/m .  3. Post surgical malabsorption:   Labs ordered per protocol.   Follow food plan per dietitian recommendations.   Continue taking recommended post-op vitamins.  4. Return to clinic in 2 months.  5. Continue topiramate      Sincerely,    Sandy Cervantes PA-C    I spent a total of 15 minutes face to face with Danitza during today's office visit. Over 50% of this time was spent counseling the patient and/or coordinating care.

## 2019-01-03 NOTE — PATIENT INSTRUCTIONS
Follow up in 2 months return bariatric visit with Sandy and RD  Labs before next visit any [place] lab  Continue topiramate 50mg twice daily  Can take actigall once daily to see if nausea improves    To reach Jolene, LPN for any questions/concerns call [phone]

## 2019-01-09 ENCOUNTER — PATIENT OUTREACH (OUTPATIENT)
Dept: CARE COORDINATION | Facility: CLINIC | Age: 39
End: 2019-01-09

## 2019-01-09 NOTE — PROGRESS NOTES
Clinic Care Coordination Contact  Care Team Conversations    Chart review   Clinic Care Coordinator RN will follow up with patient regarding blood pressure.   Patient has not made a follow up appointment .   Patient saw PCP 12/05/18  Patient has followed up with surgery 1/3/18

## 2019-02-06 ENCOUNTER — PATIENT OUTREACH (OUTPATIENT)
Dept: CARE COORDINATION | Facility: CLINIC | Age: 39
End: 2019-02-06

## 2019-02-06 NOTE — PROGRESS NOTES
Clinic Care Coordination Contact  Care Team Conversations    Patient has PCP appointment 2/8/19.   Clinic Care Coordinator RN will follow up after appointment.

## 2019-02-07 DIAGNOSIS — I10 ESSENTIAL HYPERTENSION WITH GOAL BLOOD PRESSURE LESS THAN 140/90: ICD-10-CM

## 2019-02-08 ENCOUNTER — OFFICE VISIT (OUTPATIENT)
Dept: FAMILY MEDICINE | Facility: CLINIC | Age: 39
End: 2019-02-08
Payer: COMMERCIAL

## 2019-02-08 VITALS
SYSTOLIC BLOOD PRESSURE: 118 MMHG | OXYGEN SATURATION: 99 % | WEIGHT: 240.8 LBS | HEART RATE: 64 BPM | HEIGHT: 67 IN | TEMPERATURE: 98.1 F | BODY MASS INDEX: 37.79 KG/M2 | DIASTOLIC BLOOD PRESSURE: 80 MMHG

## 2019-02-08 DIAGNOSIS — N89.8 VAGINAL DISCHARGE: ICD-10-CM

## 2019-02-08 DIAGNOSIS — B96.89 BV (BACTERIAL VAGINOSIS): Primary | ICD-10-CM

## 2019-02-08 DIAGNOSIS — N76.0 BV (BACTERIAL VAGINOSIS): Primary | ICD-10-CM

## 2019-02-08 LAB
SPECIMEN SOURCE: ABNORMAL
WET PREP SPEC: ABNORMAL

## 2019-02-08 PROCEDURE — 87210 SMEAR WET MOUNT SALINE/INK: CPT | Performed by: FAMILY MEDICINE

## 2019-02-08 PROCEDURE — 99213 OFFICE O/P EST LOW 20 MIN: CPT | Performed by: FAMILY MEDICINE

## 2019-02-08 RX ORDER — CLINDAMYCIN PHOSPHATE 20 MG/G
1 CREAM VAGINAL AT BEDTIME
Qty: 80 G | Refills: 0 | Status: SHIPPED | OUTPATIENT
Start: 2019-02-08 | End: 2019-02-15

## 2019-02-08 RX ORDER — METRONIDAZOLE 500 MG/1
500 TABLET ORAL 2 TIMES DAILY
Qty: 14 TABLET | Refills: 0 | Status: SHIPPED | OUTPATIENT
Start: 2019-02-08 | End: 2019-02-15

## 2019-02-08 ASSESSMENT — MIFFLIN-ST. JEOR: SCORE: 1799.89

## 2019-02-08 NOTE — NURSING NOTE
"Chief Complaint   Patient presents with     Vaginal Problem     /80   Pulse 64   Temp 98.1  F (36.7  C) (Oral)   Ht 1.702 m (5' 7\")   Wt 109.2 kg (240 lb 12.8 oz)   LMP 01/20/2019   SpO2 99%   BMI 37.71 kg/m   Estimated body mass index is 37.71 kg/m  as calculated from the following:    Height as of this encounter: 1.702 m (5' 7\").    Weight as of this encounter: 109.2 kg (240 lb 12.8 oz).  Medication Reconciliation: complete      Health Maintenance that is potentially due pending provider review:  NONE    n/a    NAPOLEON Osullivan  "

## 2019-02-08 NOTE — PROGRESS NOTES
SUBJECTIVE:   Daintza Soto is a 38 year old female who presents to clinic today for the following health issues:      Vaginal Symptoms      Duration: x2-3 weeks     Description  vaginal discharge - clear/yellow and odor- she has had BV in the past and thinks she is having BV again , same symptoms     Intensity:  Mild to moderate     Accompanying signs and symptoms (fever/dysuria/abdominal or back pain): None    History  Sexually active: not at present  Possibility of pregnancy: No  Recent antibiotic use: no     Precipitating or alleviating factors: None    Therapies tried and outcome: none            Problem list and histories reviewed & adjusted, as indicated.  Additional history: as documented    Patient Active Problem List   Diagnosis     Anxiety state     Esophageal reflux     Morbid obesity due to excess calories (H)     Tobacco use disorder     CARDIOVASCULAR SCREENING; LDL GOAL LESS THAN 130     Hypertension goal BP (blood pressure) < 140/90     Acne     Papanicolaou smear of cervix with low grade squamous intraepithelial lesion (LGSIL)     Mixed hyperlipidemia     LEONARDO (obstructive sleep apnea)     Obesity hypoventilation syndrome (H)     Lumbago     Left anterior knee pain     Paroxysmal atrial fibrillation (H)     Morbid (severe) obesity due to excess calories (H)     Past Surgical History:   Procedure Laterality Date     HC TOOTH EXTRACTION W/FORCEP  1995    [place] Teeth Extraction     LAPAROSCOPIC GASTRIC SLEEVE N/A 11/27/2018    Procedure: Laparoscopic Sleeve Gastrectomy Latex Free;  Surgeon: Artis Tse MD;  Location:  OR     LAPAROSCOPIC HERNIORRHAPHY HIATAL  11/27/2018    Procedure: LAPAROSCOPIC  HIATAL Hernia Repair;  Surgeon: Artis Tse MD;  Location:  OR       Social History     Tobacco Use     Smoking status: Former Smoker     Packs/day: 1.00     Years: 10.00     Pack years: 10.00     Types: Cigarettes     Start date: 1/1/2004     Last attempt to quit: 8/15/2018      Years since quittin.4     Smokeless tobacco: Never Used     Tobacco comment:  pack or less a day   Substance Use Topics     Alcohol use: Yes     Alcohol/week: 0.0 oz     Comment: Occas.     Family History   Problem Relation Age of Onset     Heart Disease Mother         ,mother had emphesema and  at 62     Hypertension Mother      Allergies Mother      Lipids Mother      Obesity Mother      Respiratory Mother         Emphysema     Diabetes Maternal Grandmother         Type 2?     Hypertension Maternal Grandmother      Circulatory Maternal Grandmother         Due to diabetes     Eye Disorder Maternal Grandmother         Macular degeneration/Macular degeneration     Obesity Maternal Grandmother      Hypertension Father      Lipids Father      Cancer Father      Prostate Cancer Father      Other Cancer Father      Cerebrovascular Disease Paternal Grandmother      Alcohol/Drug Maternal Grandfather      Obesity Maternal Grandfather      Psychotic Disorder Paternal Grandfather         Committed Suicide     Depression Paternal Grandfather      Allergies Sister      Genitourinary Problems Sister          Current Outpatient Medications   Medication Sig Dispense Refill     acetaminophen (TYLENOL) 325 MG tablet Take 2 tablets (650 mg) by mouth every 4 hours as needed for other (For optimal non-opioid multimodal pain management to improve pain control and physical function.) 100 tablet 0     Cholecalciferol (VITAMIN D-3) 5000 units TABS Take 1 tablet by mouth daily       clindamycin (CLEOCIN) 2 % vaginal cream Place 1 applicator vaginally At Bedtime for 7 days 80 g 0     fish oil-omega-3 fatty acids 1000 MG capsule Take 2 g by mouth every morning        lisinopril (PRINIVIL/ZESTRIL) 40 MG tablet Take 1 tablet (40 mg) by mouth daily (Patient taking differently: Take 20 mg by mouth every evening ) 90 tablet 3     LORazepam (ATIVAN) 0.5 MG tablet Take 1 tablet (0.5 mg) by mouth every 8 hours as needed for anxiety 20 tablet  0     metroNIDAZOLE (FLAGYL) 500 MG tablet Take 1 tablet (500 mg) by mouth 2 times daily for 7 days 14 tablet 0     Multiple Vitamins-Minerals (CENTRUM PO)        topiramate (TOPAMAX) 25 MG tablet Take 2 tablets (50 mg) by mouth 2 times daily 120 tablet 3     ursodiol (ACTIGALL) 300 MG capsule Take 1 capsule (300 mg) by mouth 2 times daily 60 capsule 5     VENTOLIN  (90 Base) MCG/ACT Inhaler INHALE 1-2 PUFFS EVERY 4-6 HOURS AS NEEDED FOR WHEEZING  0     Probiotic Product (PROBIOTIC ADVANCED PO)        Allergies   Allergen Reactions     Wellbutrin [Bupropion]      Wellbutrin: Panic attacks, heart/chest pain     Recent Labs   Lab Test 11/28/18  0554 11/27/18  1347 11/27/18  1120  09/24/18  1452 07/24/18  1023 06/19/18  0952 10/13/17  1445 03/21/17  0926 08/24/16  0915 01/13/16  1138  10/08/13  0808   A1C  --   --   --   --   --  6.1*  --   --   --   --   --   --  5.4   LDL  --   --   --   --   --   --   --   --  155* 174* 162*  --  149*   HDL  --   --   --   --   --   --   --   --  30* 29* 33*  --  33*   TRIG  --   --   --   --   --   --   --   --  203* 231* 207*  --  179*   ALT  --   --   --   --   --  22 33 24 24  --  26  --  26   CR 0.74 0.69 0.62   < >  --  0.63 0.65 0.72 0.62 0.61 0.59   < > 0.60   GFRESTIMATED 87 >90 >90   < >  --  >90 >90 >90 >90  Non  GFR Calc   >90  Non  GFR Calc   >90  Non  GFR Calc     < > >90   GFRESTBLACK >90 >90 >90   < >  --  >90 >90 >90 >90   GFR Calc   >90   GFR Calc   >90   GFR Calc     < > >90   POTASSIUM 3.8  --  3.8   < >  --  3.7 3.9 3.8 4.0 3.9 4.0   < > 3.8   TSH  --   --   --   --  1.48  --   --   --   --   --  1.79   < >  --     < > = values in this interval not displayed.      BP Readings from Last 3 Encounters:   02/08/19 118/80   01/03/19 128/76   12/07/18 111/61    Wt Readings from Last 3 Encounters:   02/08/19 109.2 kg (240 lb 12.8 oz)   01/03/19 115 kg (253 lb 9.6 oz)  "  12/07/18 120.2 kg (264 lb 14.4 oz)                  Labs reviewed in EPIC    Reviewed and updated as needed this visit by clinical staff       Reviewed and updated as needed this visit by Provider         ROS:  Constitutional, HEENT, cardiovascular, pulmonary, gi and gu systems are negative, except as otherwise noted.    OBJECTIVE:     /80   Pulse 64   Temp 98.1  F (36.7  C) (Oral)   Ht 1.702 m (5' 7\")   Wt 109.2 kg (240 lb 12.8 oz)   LMP 01/20/2019   SpO2 99%   BMI 37.71 kg/m    Body mass index is 37.71 kg/m .  GENERAL: healthy, alert and no distress  EYES: Eyes grossly normal to inspection, PERRL and conjunctivae and sclerae normal    Diagnostic Test Results:  Results for orders placed or performed in visit on 02/08/19 (from the past 24 hour(s))   Wet prep   Result Value Ref Range    Specimen Description Vagina     Wet Prep No Trichomonas seen     Wet Prep No yeast seen     Wet Prep Clue cells seen (A)        ASSESSMENT/PLAN:         1. Vaginal discharge  See below   - Wet prep    2. BV (bacterial vaginosis)  Will treat with oral Metronidazole but since last time this did not work , she will also use topical Clindamycin ( vaginally )   - clindamycin (CLEOCIN) 2 % vaginal cream; Place 1 applicator vaginally At Bedtime for 7 days  Dispense: 80 g; Refill: 0  - metroNIDAZOLE (FLAGYL) 500 MG tablet; Take 1 tablet (500 mg) by mouth 2 times daily for 7 days  Dispense: 14 tablet; Refill: 0    RTC if no improving or worsening.  Pt is aware  and comfortable with the current plan.      Polly Mcbride MD  [facility]  "

## 2019-02-11 RX ORDER — LISINOPRIL 40 MG/1
TABLET ORAL
Refills: 0
Start: 2019-02-11

## 2019-02-11 NOTE — TELEPHONE ENCOUNTER
"Requested Prescriptions   Pending Prescriptions Disp Refills     lisinopril (PRINIVIL/ZESTRIL) 40 MG tablet [Pharmacy Med Name: LISINOPRIL 40MG TABLETS] 90 tablet 0     Sig: TAKE 1 TABLET(40 MG) BY MOUTH DAILY    ACE Inhibitors (Including Combos) Protocol Passed - 2/7/2019  5:56 AM       Passed - Blood pressure under 140/90 in past 12 months    BP Readings from Last 3 Encounters:   01/03/19 128/76   12/07/18 111/61   12/05/18 110/71                Passed - Recent (12 mo) or future (30 days) visit within the authorizing provider's specialty    Patient had office visit in the last 12 months or has a visit in the next 30 days with authorizing provider or within the authorizing provider's specialty.  See \"Patient Info\" tab in inbasket, or \"Choose Columns\" in Meds & Orders section of the refill encounter.             Passed - Medication is active on med list       Passed - Patient is age 18 or older       Passed - No active pregnancy on record       Passed - Normal serum creatinine on file in past 12 months    Recent Labs   Lab Test 11/28/18  0554   CR 0.74            Passed - Normal serum potassium on file in past 12 months    Recent Labs   Lab Test 11/28/18  0554   POTASSIUM 3.8            Passed - No positive pregnancy test within past 12 months        "
Should have refills.  Was ordered 6/19/18 #90 R3.  Delia Carter RN    
Never smoker

## 2019-02-12 ENCOUNTER — OFFICE VISIT (OUTPATIENT)
Dept: CARDIOLOGY | Facility: CLINIC | Age: 39
End: 2019-02-12
Payer: COMMERCIAL

## 2019-02-12 VITALS
DIASTOLIC BLOOD PRESSURE: 79 MMHG | WEIGHT: 240 LBS | BODY MASS INDEX: 37.59 KG/M2 | SYSTOLIC BLOOD PRESSURE: 154 MMHG | HEART RATE: 66 BPM | OXYGEN SATURATION: 97 %

## 2019-02-12 DIAGNOSIS — I10 ESSENTIAL HYPERTENSION: ICD-10-CM

## 2019-02-12 DIAGNOSIS — I48.0 PAROXYSMAL ATRIAL FIBRILLATION (H): Primary | ICD-10-CM

## 2019-02-12 DIAGNOSIS — Z98.84 GASTRIC BYPASS STATUS FOR OBESITY: ICD-10-CM

## 2019-02-12 DIAGNOSIS — G47.33 OSA (OBSTRUCTIVE SLEEP APNEA): ICD-10-CM

## 2019-02-12 PROCEDURE — 99214 OFFICE O/P EST MOD 30 MIN: CPT | Performed by: INTERNAL MEDICINE

## 2019-02-12 RX ORDER — FLECAINIDE ACETATE 50 MG/1
50 TABLET ORAL 2 TIMES DAILY
Qty: 180 TABLET | Refills: 3 | Status: SHIPPED | OUTPATIENT
Start: 2019-02-12 | End: 2019-05-14

## 2019-02-12 RX ORDER — METOPROLOL SUCCINATE 50 MG/1
50 TABLET, EXTENDED RELEASE ORAL DAILY
Qty: 90 TABLET | Refills: 3 | Status: SHIPPED | OUTPATIENT
Start: 2019-02-12 | End: 2020-02-17

## 2019-02-12 NOTE — PROGRESS NOTES
Chief complaint: Palpitations    HPI: Ms. Danitza Soto is a 38 year old  female with PMH significant for depression, GERD, LEONARDO, essential hypertension, morbid obesity S/P gastric bypass 11/2018 and paroxysmal atrial fibrillation.    The patient was last seen in cardiology for paroxysmal atrial fibrillation diagnosis in 9/2018.  Her symptoms were mild at that time therefore rate or rhythm control strategy was not pursued.  The patient underwent gastric sleeve surgery in 11/2018 and has lost 70 pounds.  The patient noticed more frequent atrial fibrillation episodes since the surgery almost weekly now lasting up to 8-10 hours.  She finds works stress as a prominent trigger for episodes.  Associated symptoms include chest pressure, chest pain and dizziness.  She denies chest discomfort with exertion at other times.  The patient denies a history of dyspnea, PND, orthopnea, pedal edema, and syncope  Current medications include lisinopril 20 mg, fish oil.  She has cut down on lisinopril from 40 mg since she has lost significant weight.  She quit smoking in 2018 (10 pack years).  No history of alcohol abuse. No history of diabetes.  Family history is negative for CAD.  Prior cardiac tests include CT coronary angiogram in 2013 which did not show evidence of CAD.  Echocardiogram dated 9/2018 showed normal LV and RV function with no significant valve disease.  Moderate LVH was reported. I personnally reviewed the images and I donot agree with the LVH diagnosis. I donot think she has LVH based on echo. Zio patch in 8/2018 showed 4% atrial fibrillation burden heart rate ranged  beats per minute.  Longest episode lasting for 8 hours.    I have reviewed her ECG 11/28/2018 which shows normal sinus rhythm with single PAC, normal ID/QRS/QTC intervals. No evidence of LVH on ECG.    Medications, personal, family, and social history reviewed with patient and revised.    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 colp - BRISEYDA I     LEONARDO on CPAP      Other anxiety states        CURRENT MEDICATIONS:  Current Outpatient Medications   Medication Sig Dispense Refill     acetaminophen (TYLENOL) 325 MG tablet Take 2 tablets (650 mg) by mouth every 4 hours as needed for other (For optimal non-opioid multimodal pain management to improve pain control and physical function.) 100 tablet 0     Cholecalciferol (VITAMIN D-3) 5000 units TABS Take 1 tablet by mouth daily       clindamycin (CLEOCIN) 2 % vaginal cream Place 1 applicator vaginally At Bedtime for 7 days 80 g 0     fish oil-omega-3 fatty acids 1000 MG capsule Take 2 g by mouth every morning        lisinopril (PRINIVIL/ZESTRIL) 40 MG tablet Take 1 tablet (40 mg) by mouth daily (Patient taking differently: Take 20 mg by mouth every evening ) 90 tablet 3     LORazepam (ATIVAN) 0.5 MG tablet Take 1 tablet (0.5 mg) by mouth every 8 hours as needed for anxiety 20 tablet 0     metroNIDAZOLE (FLAGYL) 500 MG tablet Take 1 tablet (500 mg) by mouth 2 times daily for 7 days 14 tablet 0     Multiple Vitamins-Minerals (CENTRUM PO)        Probiotic Product (PROBIOTIC ADVANCED PO)        topiramate (TOPAMAX) 25 MG tablet Take 2 tablets (50 mg) by mouth 2 times daily 120 tablet 3     ursodiol (ACTIGALL) 300 MG capsule Take 1 capsule (300 mg) by mouth 2 times daily 60 capsule 5     VENTOLIN  (90 Base) MCG/ACT Inhaler INHALE 1-2 PUFFS EVERY 4-6 HOURS AS NEEDED FOR WHEEZING  0       PAST SURGICAL HISTORY:  Past Surgical History:   Procedure Laterality Date     HC TOOTH EXTRACTION W/FORCEP  1995    [place] Teeth Extraction     LAPAROSCOPIC GASTRIC SLEEVE N/A 11/27/2018    Procedure: Laparoscopic Sleeve Gastrectomy Latex Free;  Surgeon: Artis Tse MD;   Location: [facility] OR     LAPAROSCOPIC HERNIORRHAPHY HIATAL  2018    Procedure: LAPAROSCOPIC  HIATAL Hernia Repair;  Surgeon: Artis Tse MD;  Location: [facility] OR       ALLERGIES:     Allergies   Allergen Reactions     Wellbutrin [Bupropion]      Wellbutrin: Panic attacks, heart/chest pain       FAMILY HISTORY:  Family History   Problem Relation Age of Onset     Heart Disease Mother         ,mother had emphesema and  at 62     Hypertension Mother      Allergies Mother      Lipids Mother      Obesity Mother      Respiratory Mother         Emphysema     Diabetes Maternal Grandmother         Type 2?     Hypertension Maternal Grandmother      Circulatory Maternal Grandmother         Due to diabetes     Eye Disorder Maternal Grandmother         Macular degeneration/Macular degeneration     Obesity Maternal Grandmother      Hypertension Father      Lipids Father      Cancer Father      Prostate Cancer Father      Other Cancer Father      Cerebrovascular Disease Paternal Grandmother      Alcohol/Drug Maternal Grandfather      Obesity Maternal Grandfather      Psychotic Disorder Paternal Grandfather         Committed Suicide     Depression Paternal Grandfather      Allergies Sister      Genitourinary Problems Sister          SOCIAL HISTORY:  Social History     Tobacco Use     Smoking status: Former Smoker     Packs/day: 1.00     Years: 10.00     Pack years: 10.00     Types: Cigarettes     Start date: 2004     Last attempt to quit: 8/15/2018     Years since quittin.4     Smokeless tobacco: Never Used     Tobacco comment:  pack or less a day   Substance Use Topics     Alcohol use: Yes     Alcohol/week: 0.0 oz     Comment: Occas.     Drug use: No       ROS:   Constitutional: No fever, chills, or sweats. Weight stable.   ENT: No visual disturbance, ear ache, epistaxis, sore throat.   Cardiovascular: As per HPI.   Respiratory: No cough, hemoptysis.    GI: No nausea, vomiting, hematemesis, melena, or  hematochezia. Constipation+  : No hematuria.   Integument: Negative.   Psychiatric: Negative.   Hematologic:  No easy bruising, no easy bleeding.  Neuro: Negative.   Endocrinology: No significant heat or cold intolerance   Musculoskeletal: No myalgia.    Exam:  /79 (BP Location: Left arm, Patient Position: Sitting, Cuff Size: Adult Large)   Pulse 66   Wt 108.9 kg (240 lb)   LMP 01/20/2019   SpO2 97%   BMI 37.59 kg/m    GENERAL APPEARANCE: alert and no distress  HEENT: no icterus, no central cyanosis  LYMPH/NECK: no adenopathy, no asymmetry, JVP not elevated, no carotid bruits.  RESPIRATORY: lungs clear to auscultation - no rales, rhonchi or wheezes, no use of accessory muscles, no retractions, respirations are unlabored, normal respiratory rate  CARDIOVASCULAR: regular rhythm, normal S1, S2, no S3 or S4 and no murmur, click or rub, precordium quiet with normal PMI.  GI: soft, non tender  EXTREMITIES: peripheral pulses normal, no edema  NEURO: alert, normal speech,and affect  VASC: Radial, dorsalis pedis and posterior tibialis pulses are normal in volumes and symmetric bilaterally.   SKIN: no ecchymoses, no rashes     I have reviewed the labs and personally reviewed the imaging below and made my comment in the assessment and plan.    Labs:  CBC RESULTS:   Lab Results   Component Value Date    WBC 11.7 (H) 11/28/2018    RBC 4.45 11/28/2018    HGB 12.7 11/28/2018    HCT 39.6 11/28/2018    MCV 89 11/28/2018    MCH 28.5 11/28/2018    MCHC 32.1 11/28/2018    RDW 13.8 11/28/2018     11/28/2018       BMP RESULTS:  Lab Results   Component Value Date     11/28/2018    POTASSIUM 3.8 11/28/2018    CHLORIDE 110 (H) 11/28/2018    CO2 24 11/28/2018    ANIONGAP 7 11/28/2018    GLC 99 11/28/2018    BUN 11 11/28/2018    CR 0.74 11/28/2018    GFRESTIMATED 87 11/28/2018    GFRESTBLACK >90 11/28/2018    CHIKI 8.7 11/28/2018        INR RESULTS:  Lab Results   Component Value Date    INR 0.98 11/06/2018        Echocardiogram 9/26/2018  Global and regional left ventricular function is normal with an EF of 60-65%.  Moderate concentric wall thickening consistent with left ventricular  hypertrophy is present.  Right ventricular function, chamber size, wall motion, and thickness are  normal.  Mild biatrial enlargement is present.  No significant valve abnormalities present.  The inferior vena cava was normal in size    Nuclear stress test with Lexiscan 9/6/2018  Normal myocardial SPECT study with a summed stress score of 0.     CT coronary artery angiogram 6/27/2013  No evidence of coronary artery disease    EKG 11/28/2018 normal.    Assessment and Plan:   Ms. Danitza Soto is a 38 year old  female with PMH significant for depression, GERD, LEONARDO, essential hypertension, morbid obesity S/P gastric bypass 11/2018 and paroxysmal atrial fibrillation.    1.  Paroxysmal atrial fibrillation: First detected 6 months ago.  The patient is more symptomatic over the last 2 months almost with weekly episodes lasting up to 8-10 hours. I recommended to start metoprolol 50 mg and flecainide 50 mg twice daily for rate and rhythm control.  Since her CHADSVASC score is 2 (HTN and female gender)  I also recommended to start apixaban 5 mg twice daily.    2.  Hypertension: The patient`s blood pressure in clinic is high however she reports blood pressure at home <120/80mmHg. The patient has decreased the lisinopril dose from 40 mg to 20 mg after the gastric sleeve surgery.    Plan:  Start metoprolol 50 XL mg daily  Start flecainide 50 mg twice daily; threadmill stress test in 2 weeks.  Start apixaban 5 mg twice daily  Continue lisinopril 20 mg    Return to clinic in 3 months for follow-up.    A total of 30 minutes spent face-toface with greater than 50% of the time spent in counseling and coordinating cares of the issues above.     Please donot hesitate to contact me if you have any questions or concerns. Again, thank you for allowing me to  participate in the care of your patient.    Leena RONDON MD  [facility] Division of Cardiology  Pager [phone]

## 2019-02-12 NOTE — NURSING NOTE
"Chief Complaint   Patient presents with     Atrial Fib     Paroxysmal atrial fibrillation 6 mo. Rtc.-Per patientheart palpitations and lightheaded occationally worse since Nov. 2018       Initial /79 (BP Location: Left arm, Patient Position: Sitting, Cuff Size: Adult Large)   Pulse 66   Wt 108.9 kg (240 lb)   LMP 01/20/2019   SpO2 97%   BMI 37.59 kg/m   Estimated body mass index is 37.59 kg/m  as calculated from the following:    Height as of 2/8/19: 1.702 m (5' 7\").    Weight as of this encounter: 108.9 kg (240 lb)..  BP completed using cuff size: large    Hamlet Little L.P.N.    "

## 2019-02-12 NOTE — PATIENT INSTRUCTIONS
Thank you for coming to the [facility] @ [facility]; please note the following instructions:    1. NEW MEDICATIONS TO START TODAY:     Eliquis 5 mg twice daily (anticoagulant)    Metoprolol 50 mg daily  (beta blocker)    Flecainide 50 mg twice daily  (antiarrythmic)         2. Dr. Leena Heath has ordered a stress test to be performed on you.  This test has been scheduled at the [facility] Department ([address] [place] [address]); please see following pages for appointment detail information. Please arrive 15 minutes early to allow enough time for registration.     3.Dr. Leena Heath has requested you to follow up in 3 months; please see following pages for appointment detail information.                        If you have any questions regarding your visit please contact your care team:     Cardiology  Telephone Number   Hannah YEPEZ, BOBBY HUGHES,RN  Shabana RAMIREZ, RMA  Elena HUGHES, MA  Hamlet BAUTISTA, LPN   [phone]    *After hours: [phone]   For scheduling appts:     [phone] or    [phone] (select option 1)    *After hours: [phone]     For the Device Clinic (Pacemakers and ICD's)  RN's :  Hanh Cochran   During business hours: [phone]    *After business hours:  [phone] (select option 4)      Normal test result notifications will be released via [employer] or mailed within 7 business days.  All other test results, will be communicated via telephone once reviewed by your cardiologist.    If you need a medication refill please contact your pharmacy.  Please allow 3 business days for your refill to be completed.    As always, thank you for trusting us with your health care needs!        Patient Education     Patient Education    Metoprolol Succinate Oral tablet, extended-release    Metoprolol Tartrate Oral tablet    Metoprolol Tartrate Solution for injection  Metoprolol Succinate Oral tablet, extended-release  What is this medicine?  METOPROLOL (me  TOE proe lole) is a beta-blocker. Beta-blockers reduce the workload on the heart and help it to beat more regularly. This medicine is used to treat high blood pressure and to prevent chest pain. It is also used to after a heart attack and to prevent an additional heart attack from occurring.  This medicine may be used for other purposes; ask your health care provider or pharmacist if you have questions.  What should I tell my health care provider before I take this medicine?  They need to know if you have any of these conditions:    diabetes    heart or vessel disease like slow heart rate, worsening heart failure, heart block, sick sinus syndrome or Raynaud's disease    kidney disease    liver disease    lung or breathing disease, like asthma or emphysema    pheochromocytoma    thyroid disease    an unusual or allergic reaction to metoprolol, other beta-blockers, medicines, foods, dyes, or preservatives    pregnant or trying to get pregnant    breast-feeding  How should I use this medicine?  Take this medicine by mouth with a glass of water. Follow the directions on the prescription label. Do not crush or chew. Take this medicine with or immediately after meals. Take your doses at regular intervals. Do not take more medicine than directed. Do not stop taking this medicine suddenly. This could lead to serious heart-related effects.  Talk to your pediatrician regarding the use of this medicine in children. While this drug may be prescribed for children as young as 6 years for selected conditions, precautions do apply.  Overdosage: If you think you have taken too much of this medicine contact a poison control center or emergency room at once.  NOTE: This medicine is only for you. Do not share this medicine with others.  What if I miss a dose?  If you miss a dose, take it as soon as you can. If it is almost time for your next dose, take only that dose. Do not take double or extra doses.  What may interact with this  medicine?  This medicine may interact with the following medications:    certain medicines for blood pressure, heart disease, irregular heart beat    certain medicines for depression, like monoamine oxidase (MAO) inhibitors, fluoxetine, or paroxetine    clonidine    dobutamine    epinephrine    isoproterenol    reserpine  This list may not describe all possible interactions. Give your health care provider a list of all the medicines, herbs, non-prescription drugs, or dietary supplements you use. Also tell them if you smoke, drink alcohol, or use illegal drugs. Some items may interact with your medicine.  What should I watch for while using this medicine?  Visit your doctor or health care professional for regular check ups. Contact your doctor right away if your symptoms worsen. Check your blood pressure and pulse rate regularly. Ask your health care professional what your blood pressure and pulse rate should be, and when you should contact them.  You may get drowsy or dizzy. Do not drive, use machinery, or do anything that needs mental alertness until you know how this medicine affects you. Do not sit or stand up quickly, especially if you are an older patient. This reduces the risk of dizzy or fainting spells. Contact your doctor if these symptoms continue. Alcohol may interfere with the effect of this medicine. Avoid alcoholic drinks.  What side effects may I notice from receiving this medicine?  Side effects that you should report to your doctor or health care professional as soon as possible:    allergic reactions like skin rash, itching or hives    cold or numb hands or feet    depression    difficulty breathing    faint    fever with sore throat    irregular heartbeat, chest pain    rapid weight gain    swollen legs or ankles  Side effects that usually do not require medical attention (report to your doctor or health care professional if they continue or are bothersome):    anxiety or nervousness    change in  sex drive or performance    dry skin    headache    nightmares or trouble sleeping    short term memory loss    stomach upset or diarrhea    unusually tired  This list may not describe all possible side effects. Call your doctor for medical advice about side effects. You may report side effects to FDA at 6-742-IWP-1212.  Where should I keep my medicine?  Keep out of the reach of children.  Store at room temperature between 15 and 30 degrees C (59 and 86 degrees F). Throw away any unused medicine after the expiration date.  NOTE:This sheet is a summary. It may not cover all possible information. If you have questions about this medicine, talk to your doctor, pharmacist, or health care provider. Copyright  2016 Gold Standard           Patient Education     Flecainide Acetate Oral tablet  What is this medicine?  FLECAINIDE (FLEK a nide) is an antiarrhythmic drug. This medicine is used to prevent irregular heart rhythm. It can also slow down fast heartbeats called tachycardia.  This medicine may be used for other purposes; ask your health care provider or pharmacist if you have questions.  What should I tell my health care provider before I take this medicine?  They need to know if you have any of these conditions:    abnormal levels of potassium in the blood    heart disease including heart rhythm and heart rate problems    kidney or liver disease    recent heart attack    an unusual or allergic reaction to flecainide, local anesthetics, other medicines, foods, dyes, or preservatives    pregnant or trying to get pregnant    breast-feeding  How should I use this medicine?  Take this medicine by mouth with a glass of water. Follow the directions on the prescription label. You can take this medicine with or without food. Take your doses at regular intervals. Do not take your medicine more often than directed. Do not stop taking this medicine suddenly. This may cause serious, heart-related side effects. If your doctor  wants you to stop the medicine, the dose may be slowly lowered over time to avoid any side effects.  Talk to your pediatrician regarding the use of this medicine in children. While this drug may be prescribed for children as young as 1 year of age for selected conditions, precautions do apply.  Overdosage: If you think you have taken too much of this medicine contact a poison control center or emergency room at once.  NOTE: This medicine is only for you. Do not share this medicine with others.  What if I miss a dose?  If you miss a dose, take it as soon as you can. If it is almost time for your next dose, take only that dose. Do not take double or extra doses.  What may interact with this medicine?  Do not take this medicine with any of the following medications:    amoxapine    arsenic trioxide    certain antibiotics like clarithromycin, erythromycin, gatifloxacin, gemifloxacin, levofloxacin, moxifloxacin, sparfloxacin, or troleandomycin    certain antidepressants called tricyclic antidepressants like amitriptyline, imipramine, or nortriptyline    certain medicines to control heart rhythm like disopyramide, dofetilide, encainide, moricizine, procainamide, propafenone, and quinidine    cisapride    cyclobenzaprine    delavirdine    droperidol    haloperidol    hawthorn    imatinib    levomethadyl    maprotiline    medicines for malaria like chloroquine and halofantrine    pentamidine    phenothiazines like chlorpromazine, mesoridazine, prochlorperazine, thioridazine    pimozide    quinine    ranolazine    ritonavir    sertindole    ziprasidone  This medicine may also interact with the following medications:    cimetidine    medicines for angina or high blood pressure    medicines to control heart rhythm like amiodarone and digoxin  This list may not describe all possible interactions. Give your health care provider a list of all the medicines, herbs, non-prescription drugs, or dietary supplements you use. Also tell  them if you smoke, drink alcohol, or use illegal drugs. Some items may interact with your medicine.  What should I watch for while using this medicine?  Visit your doctor or health care professional for regular checks on your progress. Because your condition and the use of this medicine carries some risk, it is a good idea to carry an identification card, necklace or bracelet with details of your condition, medications and doctor or health care professional.  Check your blood pressure and pulse rate regularly. Ask your health care professional what your blood pressure and pulse rate should be, and when you should contact him or her. Your doctor or health care professional also may schedule regular blood tests and electrocardiograms to check your progress.  You may get drowsy or dizzy. Do not drive, use machinery, or do anything that needs mental alertness until you know how this medicine affects you. Do not stand or sit up quickly, especially if you are an older patient. This reduces the risk of dizzy or fainting spells. Alcohol can make you more dizzy, increase flushing and rapid heartbeats. Avoid alcoholic drinks.  What side effects may I notice from receiving this medicine?  Side effects that you should report to your doctor or health care professional as soon as possible:    chest pain, continued irregular heartbeats    difficulty breathing    swelling of the legs or feet    trembling, shaking    unusually weak or tired  Side effects that usually do not require medical attention (report to your doctor or health care professional if they continue or are bothersome):    blurred vision    constipation    headache    nausea, vomiting    stomach pain  This list may not describe all possible side effects. Call your doctor for medical advice about side effects. You may report side effects to FDA at 5-153-ZSY-2353.  Where should I keep my medicine?  Keep out of the reach of children.  Store at room temperature between  15 and 30 degrees C (59 and 86 degrees F). Protect from light. Keep container tightly closed. Throw away any unused medicine after the expiration date.  NOTE:This sheet is a summary. It may not cover all possible information. If you have questions about this medicine, talk to your doctor, pharmacist, or health care provider. Copyright  2016 Gold Standard

## 2019-02-12 NOTE — LETTER
2/12/2019      RE: Danitza Soto  [address]  [facility] [address]       Dear Colleague,    Thank you for the opportunity to participate in the care of your patient, Danitza Soto, at the [facility] AT [facility] at [facility]. Please see a copy of my visit note below.    Chief complaint: Palpitations    HPI: Ms. Danitza Soto is a 38 year old  female with PMH significant for depression, GERD, LEONADRO, essential hypertension, morbid obesity S/P gastric bypass 11/2018 and paroxysmal atrial fibrillation.    The patient was last seen in cardiology for paroxysmal atrial fibrillation diagnosis in 9/2018.  Her symptoms were mild at that time therefore rate or rhythm control strategy was not pursued.  The patient underwent gastric sleeve surgery in 11/2018 and has lost 70 pounds.  The patient noticed more frequent atrial fibrillation episodes since the surgery almost weekly now lasting up to 8-10 hours.  She finds works stress as a prominent trigger for episodes.  Associated symptoms include chest pressure, chest pain and dizziness.  She denies chest discomfort with exertion at other times.  The patient denies a history of dyspnea, PND, orthopnea, pedal edema, and syncope  Current medications include lisinopril 20 mg, fish oil.  She has cut down on lisinopril from 40 mg since she has lost significant weight.  She quit smoking in 2018 (10 pack years).  No history of alcohol abuse. No history of diabetes.  Family history is negative for CAD.  Prior cardiac tests include CT coronary angiogram in 2013 which did not show evidence of CAD.  Echocardiogram dated 9/2018 showed normal LV and RV function with no significant valve disease.  Moderate LVH was reported. I personnally reviewed the images and I donot agree with the LVH diagnosis. I donot think she has LVH based on echo. Zio patch in 8/2018 showed 4% atrial fibrillation burden heart rate  ranged  beats per minute.  Longest episode lasting for 8 hours.    I have reviewed her ECG 11/28/2018 which shows normal sinus rhythm with single PAC, normal NH/QRS/QTC intervals. No evidence of LVH on ECG.    Medications, personal, family, and social history reviewed with patient and revised.    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 - BRISEYDA I     LEONARDO on CPAP      Other anxiety states        CURRENT MEDICATIONS:  Current Outpatient Medications   Medication Sig Dispense Refill     acetaminophen (TYLENOL) 325 MG tablet Take 2 tablets (650 mg) by mouth every 4 hours as needed for other (For optimal non-opioid multimodal pain management to improve pain control and physical function.) 100 tablet 0     Cholecalciferol (VITAMIN D-3) 5000 units TABS Take 1 tablet by mouth daily       clindamycin (CLEOCIN) 2 % vaginal cream Place 1 applicator vaginally At Bedtime for 7 days 80 g 0     fish oil-omega-3 fatty acids 1000 MG capsule Take 2 g by mouth every morning        lisinopril (PRINIVIL/ZESTRIL) 40 MG tablet Take 1 tablet (40 mg) by mouth daily (Patient taking differently: Take 20 mg by mouth every evening ) 90 tablet 3     LORazepam (ATIVAN) 0.5 MG tablet Take 1 tablet (0.5 mg) by mouth every 8 hours as needed for anxiety 20 tablet 0     metroNIDAZOLE (FLAGYL) 500 MG tablet Take 1 tablet (500 mg) by mouth 2 times daily for 7 days 14 tablet 0     Multiple Vitamins-Minerals (CENTRUM PO)        Probiotic Product (PROBIOTIC ADVANCED PO)        topiramate (TOPAMAX) 25 MG tablet Take 2 tablets (50 mg) by mouth 2 times daily 120 tablet 3     ursodiol (ACTIGALL) 300 MG capsule Take 1 capsule (300 mg) by mouth 2 times daily 60 capsule 5     VENTOLIN  (90 Base)  MCG/ACT Inhaler INHALE 1-2 PUFFS EVERY 4-6 HOURS AS NEEDED FOR WHEEZING  0       PAST SURGICAL HISTORY:  Past Surgical History:   Procedure Laterality Date     HC TOOTH EXTRACTION W/FORCEP      [place] Teeth Extraction     LAPAROSCOPIC GASTRIC SLEEVE N/A 2018    Procedure: Laparoscopic Sleeve Gastrectomy Latex Free;  Surgeon: Artis Tse MD;  Location: [facility] OR     LAPAROSCOPIC HERNIORRHAPHY HIATAL  2018    Procedure: LAPAROSCOPIC  HIATAL Hernia Repair;  Surgeon: Artis Tse MD;  Location: [facility] OR       ALLERGIES:     Allergies   Allergen Reactions     Wellbutrin [Bupropion]      Wellbutrin: Panic attacks, heart/chest pain       FAMILY HISTORY:  Family History   Problem Relation Age of Onset     Heart Disease Mother         ,mother had emphesema and  at 62     Hypertension Mother      Allergies Mother      Lipids Mother      Obesity Mother      Respiratory Mother         Emphysema     Diabetes Maternal Grandmother         Type 2?     Hypertension Maternal Grandmother      Circulatory Maternal Grandmother         Due to diabetes     Eye Disorder Maternal Grandmother         Macular degeneration/Macular degeneration     Obesity Maternal Grandmother      Hypertension Father      Lipids Father      Cancer Father      Prostate Cancer Father      Other Cancer Father      Cerebrovascular Disease Paternal Grandmother      Alcohol/Drug Maternal Grandfather      Obesity Maternal Grandfather      Psychotic Disorder Paternal Grandfather         Committed Suicide     Depression Paternal Grandfather      Allergies Sister      Genitourinary Problems Sister          SOCIAL HISTORY:  Social History     Tobacco Use     Smoking status: Former Smoker     Packs/day: 1.00     Years: 10.00     Pack years: 10.00     Types: Cigarettes     Start date: 2004     Last attempt to quit: 8/15/2018     Years since quittin.4     Smokeless tobacco: Never Used     Tobacco comment:  pack or less a day    Substance Use Topics     Alcohol use: Yes     Alcohol/week: 0.0 oz     Comment: Occas.     Drug use: No       ROS:   Constitutional: No fever, chills, or sweats. Weight stable.   ENT: No visual disturbance, ear ache, epistaxis, sore throat.   Cardiovascular: As per HPI.   Respiratory: No cough, hemoptysis.    GI: No nausea, vomiting, hematemesis, melena, or hematochezia. Constipation+  : No hematuria.   Integument: Negative.   Psychiatric: Negative.   Hematologic:  No easy bruising, no easy bleeding.  Neuro: Negative.   Endocrinology: No significant heat or cold intolerance   Musculoskeletal: No myalgia.    Exam:  /79 (BP Location: Left arm, Patient Position: Sitting, Cuff Size: Adult Large)   Pulse 66   Wt 108.9 kg (240 lb)   LMP 01/20/2019   SpO2 97%   BMI 37.59 kg/m     GENERAL APPEARANCE: alert and no distress  HEENT: no icterus, no central cyanosis  LYMPH/NECK: no adenopathy, no asymmetry, JVP not elevated, no carotid bruits.  RESPIRATORY: lungs clear to auscultation - no rales, rhonchi or wheezes, no use of accessory muscles, no retractions, respirations are unlabored, normal respiratory rate  CARDIOVASCULAR: regular rhythm, normal S1, S2, no S3 or S4 and no murmur, click or rub, precordium quiet with normal PMI.  GI: soft, non tender  EXTREMITIES: peripheral pulses normal, no edema  NEURO: alert, normal speech,and affect  VASC: Radial, dorsalis pedis and posterior tibialis pulses are normal in volumes and symmetric bilaterally.   SKIN: no ecchymoses, no rashes     I have reviewed the labs and personally reviewed the imaging below and made my comment in the assessment and plan.    Labs:  CBC RESULTS:   Lab Results   Component Value Date    WBC 11.7 (H) 11/28/2018    RBC 4.45 11/28/2018    HGB 12.7 11/28/2018    HCT 39.6 11/28/2018    MCV 89 11/28/2018    MCH 28.5 11/28/2018    MCHC 32.1 11/28/2018    RDW 13.8 11/28/2018     11/28/2018       BMP RESULTS:  Lab Results   Component Value Date      11/28/2018    POTASSIUM 3.8 11/28/2018    CHLORIDE 110 (H) 11/28/2018    CO2 24 11/28/2018    ANIONGAP 7 11/28/2018    GLC 99 11/28/2018    BUN 11 11/28/2018    CR 0.74 11/28/2018    GFRESTIMATED 87 11/28/2018    GFRESTBLACK >90 11/28/2018    CHIKI 8.7 11/28/2018        INR RESULTS:  Lab Results   Component Value Date    INR 0.98 11/06/2018       Echocardiogram 9/26/2018  Global and regional left ventricular function is normal with an EF of 60-65%.  Moderate concentric wall thickening consistent with left ventricular  hypertrophy is present.  Right ventricular function, chamber size, wall motion, and thickness are  normal.  Mild biatrial enlargement is present.  No significant valve abnormalities present.  The inferior vena cava was normal in size    Nuclear stress test with Lexiscan 9/6/2018  Normal myocardial SPECT study with a summed stress score of 0.     CT coronary artery angiogram 6/27/2013  No evidence of coronary artery disease    EKG 11/28/2018 normal.    Assessment and Plan:   Ms. Danitza Soto is a 38 year old  female with PMH significant for depression, GERD, LEONARDO, essential hypertension, morbid obesity S/P gastric bypass 11/2018 and paroxysmal atrial fibrillation.    1.  Paroxysmal atrial fibrillation: First detected 6 months ago.  The patient is more symptomatic over the last 2 months almost with weekly episodes lasting up to 8-10 hours. I recommended to start metoprolol 50 mg and flecainide 50 mg twice daily for rate and rhythm control.  Since her CHADSVASC score is 2 (HTN and female gender)  I also recommended to start apixaban 5 mg twice daily.    2.  Hypertension: The patient`s blood pressure in clinic is high however she reports blood pressure at home <120/80mmHg. The patient has decreased the lisinopril dose from 40 mg to 20 mg after the gastric sleeve surgery.    Plan:  Start metoprolol 50 XL mg daily  Start flecainide 50 mg twice daily; threadmill stress test in 2 weeks.  Start  apixaban 5 mg twice daily  Continue lisinopril 20 mg    Return to clinic in 3 months for follow-up.    A total of 30 minutes spent face-toface with greater than 50% of the time spent in counseling and coordinating cares of the issues above.     Please do not hesitate to contact me if you have any questions or concerns. Again, thank you for allowing me to participate in the care of your patient.    Leena RONDON MD  [facility] Division of Cardiology  Pager [phone]

## 2019-02-25 ENCOUNTER — TRANSFERRED RECORDS (OUTPATIENT)
Dept: HEALTH INFORMATION MANAGEMENT | Facility: CLINIC | Age: 39
End: 2019-02-25

## 2019-02-26 ENCOUNTER — OFFICE VISIT (OUTPATIENT)
Dept: FAMILY MEDICINE | Facility: CLINIC | Age: 39
End: 2019-02-26
Payer: OTHER MISCELLANEOUS

## 2019-02-26 ENCOUNTER — PATIENT OUTREACH (OUTPATIENT)
Dept: CARE COORDINATION | Facility: CLINIC | Age: 39
End: 2019-02-26

## 2019-02-26 VITALS
WEIGHT: 244 LBS | OXYGEN SATURATION: 98 % | BODY MASS INDEX: 38.22 KG/M2 | SYSTOLIC BLOOD PRESSURE: 135 MMHG | TEMPERATURE: 98.3 F | HEART RATE: 51 BPM | DIASTOLIC BLOOD PRESSURE: 87 MMHG

## 2019-02-26 DIAGNOSIS — M77.12 LEFT LATERAL EPICONDYLITIS: Primary | ICD-10-CM

## 2019-02-26 PROCEDURE — 99213 OFFICE O/P EST LOW 20 MIN: CPT | Performed by: PHYSICIAN ASSISTANT

## 2019-02-26 NOTE — PROGRESS NOTES
SUBJECTIVE:   Danitza Soto is a 38 year old female who presents to clinic today for the following health issues:      Musculoskeletal problem/pain      Duration: 2 days    Description  Location: left forearm and elbow    Intensity:  moderate    Accompanying signs and symptoms: none    History  Previous similar problem: no previos tendonitis  Previous evaluation:  none    Precipitating or alleviating factors:  Trauma or overuse: YES  Aggravating factors include: lifting and overuse    Therapies tried and outcome: rest/inactivity, heat, ice and immobilization          Problem list and histories reviewed & adjusted, as indicated.  Additional history: was shoveling at work over the weekend and started to have pain and swelling over forearm and elbow since then. She does work at group home, and she has continued to work.  Did feel like she flared it up while working on Monday and Tuesday.  She has been using ice, rest, some heat.      She is concerned about tendonitis as she has had this in the past.      BP Readings from Last 3 Encounters:   02/26/19 135/87   02/12/19 154/79   02/08/19 118/80    Wt Readings from Last 3 Encounters:   02/26/19 110.7 kg (244 lb)   02/12/19 108.9 kg (240 lb)   02/08/19 109.2 kg (240 lb 12.8 oz)                    Reviewed and updated as needed this visit by clinical staff  Tobacco  Allergies  Meds       Reviewed and updated as needed this visit by Provider         ROS:  Constitutional, HEENT, cardiovascular, pulmonary, gi and gu systems are negative, except as otherwise noted.    OBJECTIVE:     /87 (BP Location: Right arm, Cuff Size: Adult Large)   Pulse 51   Temp 98.3  F (36.8  C) (Oral)   Wt 110.7 kg (244 lb)   SpO2 98%   BMI 38.22 kg/m    Body mass index is 38.22 kg/m .  GENERAL: alert and no distress  EYES: Eyes grossly normal to inspection  MS: full active ROM in left elbow.  Tender lateral epicondyl distally into forearm extensors.   strength is good.  Pain  with supination and pronation against resistance.    SKIN: no suspicious lesions or rashes  PSYCH: mentation appears normal, affect normal/bright    Diagnostic Test Results:  none     ASSESSMENT/PLAN:             1. Left lateral epicondylitis  Unable to use oral nsaids, gastric bypass/eliquis, will trial topical along with ice.  Offered referral to PHYSICAL THERAPY, but she has exercises at home from last time.  At home care instructions given.  Work restrictions for 2 weeks.    - diclofenac (VOLTAREN) 1 % topical gel; Place onto the skin 4 times daily  Dispense: 100 g; Refill: 0        Zaki Roa PA-C  [facility]

## 2019-02-26 NOTE — NURSING NOTE
"Chief Complaint   Patient presents with     Arm Injury     left forearm and elbow injury shoveling on sunday at work     initial BP (!) 155/91 (BP Location: Right arm, Cuff Size: Adult Large)   Pulse 51   Temp 98.3  F (36.8  C) (Oral)   Wt 110.7 kg (244 lb)   SpO2 98%   BMI 38.22 kg/m   Estimated body mass index is 38.22 kg/m  as calculated from the following:    Height as of 2/8/19: 1.702 m (5' 7\").    Weight as of this encounter: 110.7 kg (244 lb).  BP completed using cuff size: large.   R arm      Health Maintenance that is potentially due pending provider review:  NONE    n/a    Zane Torrez ma  "

## 2019-02-26 NOTE — PROGRESS NOTES
Clinic Care Coordination Contact  Care Team Conversations    Patient has appointment today with PCP.   Clinic Care Coordinator RN will attempt to follow up after visit to access clinic care coordination need.

## 2019-02-26 NOTE — PATIENT INSTRUCTIONS
Patient Education     Treating Tennis Elbow    Your treatment will depend on how inflamed your tendon is. The goal is to relieve your symptoms and help you regain full use of your elbow.  Rest and medicine  Wearing a tennis elbow splint allows the inflamed tendon to rest. It must be worn properly. It should be placed down the arm past the painful area of the elbow. If it is directly over the inflamed tendon, it can worsen the symptoms. This brace can help the tendon heal. Using your other hand or changing your  also takes stress off the tendon. Oral nonsteroidal anti-inflammatory medicines (NSAIDs) and ice can relieve pain and reduce swelling.  Exercises and therapy  Your healthcare provider may give you an exercise program. He or she may refer you to a physical therapist. The physical therapist will teach you how to gently stretch and strengthen the muscles around your elbow.  Anti-inflammatory injections  Your healthcare provider may give you injections of an anti-inflammatory medicine, such as cortisone. This helps reduce swelling. You may have more pain at first. But in a few days, your elbow should feel better.  If surgery is needed  If your symptoms persist for a long time, or other treatments don t work, your healthcare provider may recommend surgery. Surgery repairs the inflamed tendon.   Date Last Reviewed: 1/1/2018 2000-2018 [employer]. [address] [place] [address], [place] [address]. All rights reserved. This information is not intended as a substitute for professional medical care. Always follow your healthcare professional's instructions.

## 2019-02-26 NOTE — LETTER
[facility]  [address]  [facility] [address]  Phone: [phone]    February 26, 2019        Danitza Soto  [address]  [facility] [address]          To whom it may concern:    RE: Danitza Soto    Patient was seen and treated today at our clinic.  Trish will need to avoid lifting secondary to medical condition until 3/12.      Please contact me for questions or concerns.      Sincerely,        Zaki Roa PA-C

## 2019-02-27 ENCOUNTER — TELEPHONE (OUTPATIENT)
Dept: FAMILY MEDICINE | Facility: CLINIC | Age: 39
End: 2019-02-27

## 2019-02-27 NOTE — TELEPHONE ENCOUNTER
Reason for Call:  Other Medication question    Detailed comments: They need to know how many Grams of Voltaren gel to use each time  diclofenac (VOLTAREN) 1 % topical gel  Says to apply 4 times daily    Phone Number Patient can be reached at: [phone]    Best Time: ASAP    Can we leave a detailed message on this number? Not Applicable    Call taken on 2/27/2019 at 12:14 PM by Sanjuanita Sharma

## 2019-02-27 NOTE — TELEPHONE ENCOUNTER
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  Forms need to be filled out and faxed to [phone]    Call pt to verify form was sent: yes  Copy needs to be sent for scanning after completion: yes

## 2019-03-01 ENCOUNTER — TELEPHONE (OUTPATIENT)
Dept: OBGYN | Facility: CLINIC | Age: 39
End: 2019-03-01

## 2019-03-01 DIAGNOSIS — R87.612 PAPANICOLAOU SMEAR OF CERVIX WITH LOW GRADE SQUAMOUS INTRAEPITHELIAL LESION (LGSIL): ICD-10-CM

## 2019-03-01 NOTE — TELEPHONE ENCOUNTER
Form(s) have been faxed.  Faxed or mailed to: number listed on form.  Was a copy sent to scanning?   yes sent to scanning on [place] side    FRANCHESCA Brozoltan SORENSEN Lead  [facility]

## 2019-03-01 NOTE — TELEPHONE ENCOUNTER
Pt is past due for f/u pap smear.  Spoke with pt, states she will call to schedule.  May Casas,    Pap Tracking

## 2019-03-05 ENCOUNTER — ANCILLARY PROCEDURE (OUTPATIENT)
Dept: CARDIOLOGY | Facility: CLINIC | Age: 39
End: 2019-03-05
Attending: INTERNAL MEDICINE
Payer: COMMERCIAL

## 2019-03-05 DIAGNOSIS — I48.0 PAROXYSMAL ATRIAL FIBRILLATION (H): ICD-10-CM

## 2019-03-05 PROCEDURE — 93018 CV STRESS TEST I&R ONLY: CPT | Performed by: INTERNAL MEDICINE

## 2019-03-05 PROCEDURE — 93016 CV STRESS TEST SUPVJ ONLY: CPT | Performed by: INTERNAL MEDICINE

## 2019-03-05 PROCEDURE — 93017 CV STRESS TEST TRACING ONLY: CPT | Performed by: INTERNAL MEDICINE

## 2019-03-07 ENCOUNTER — OFFICE VISIT (OUTPATIENT)
Dept: ENDOCRINOLOGY | Facility: CLINIC | Age: 39
End: 2019-03-07
Payer: COMMERCIAL

## 2019-03-07 ENCOUNTER — OFFICE VISIT (OUTPATIENT)
Dept: SURGERY | Facility: CLINIC | Age: 39
End: 2019-03-07
Payer: COMMERCIAL

## 2019-03-07 VITALS
OXYGEN SATURATION: 98 % | SYSTOLIC BLOOD PRESSURE: 132 MMHG | HEART RATE: 52 BPM | TEMPERATURE: 98.5 F | WEIGHT: 245.3 LBS | HEIGHT: 67 IN | BODY MASS INDEX: 38.5 KG/M2 | DIASTOLIC BLOOD PRESSURE: 72 MMHG

## 2019-03-07 DIAGNOSIS — E66.01 MORBID OBESITY DUE TO EXCESS CALORIES (H): Primary | ICD-10-CM

## 2019-03-07 DIAGNOSIS — Z98.84 S/P LAPAROSCOPIC SLEEVE GASTRECTOMY: ICD-10-CM

## 2019-03-07 LAB
ALBUMIN SERPL-MCNC: 3.4 G/DL (ref 3.4–5)
ALP SERPL-CCNC: 85 U/L (ref 40–150)
ALT SERPL W P-5'-P-CCNC: 18 U/L (ref 0–50)
ANION GAP SERPL CALCULATED.3IONS-SCNC: 6 MMOL/L (ref 3–14)
AST SERPL W P-5'-P-CCNC: 6 U/L (ref 0–45)
BILIRUB SERPL-MCNC: 0.3 MG/DL (ref 0.2–1.3)
BUN SERPL-MCNC: 10 MG/DL (ref 7–30)
CALCIUM SERPL-MCNC: 8.2 MG/DL (ref 8.5–10.1)
CHLORIDE SERPL-SCNC: 109 MMOL/L (ref 94–109)
CO2 SERPL-SCNC: 24 MMOL/L (ref 20–32)
CREAT SERPL-MCNC: 0.66 MG/DL (ref 0.52–1.04)
ERYTHROCYTE [DISTWIDTH] IN BLOOD BY AUTOMATED COUNT: 15.5 % (ref 10–15)
GFR SERPL CREATININE-BSD FRML MDRD: >90 ML/MIN/{1.73_M2}
GLUCOSE SERPL-MCNC: 72 MG/DL (ref 70–99)
HCT VFR BLD AUTO: 44.1 % (ref 35–47)
HGB BLD-MCNC: 13.6 G/DL (ref 11.7–15.7)
MCH RBC QN AUTO: 28.1 PG (ref 26.5–33)
MCHC RBC AUTO-ENTMCNC: 30.8 G/DL (ref 31.5–36.5)
MCV RBC AUTO: 91 FL (ref 78–100)
PLATELET # BLD AUTO: 299 10E9/L (ref 150–450)
POTASSIUM SERPL-SCNC: 3.9 MMOL/L (ref 3.4–5.3)
PROT SERPL-MCNC: 6.8 G/DL (ref 6.8–8.8)
PTH-INTACT SERPL-MCNC: 78 PG/ML (ref 18–80)
RBC # BLD AUTO: 4.84 10E12/L (ref 3.8–5.2)
SODIUM SERPL-SCNC: 139 MMOL/L (ref 133–144)
VIT B12 SERPL-MCNC: 386 PG/ML (ref 193–986)
WBC # BLD AUTO: 8.3 10E9/L (ref 4–11)

## 2019-03-07 RX ORDER — NALTREXONE HYDROCHLORIDE 50 MG/1
TABLET, FILM COATED ORAL
Qty: 30 TABLET | Refills: 3 | Status: SHIPPED | OUTPATIENT
Start: 2019-03-07 | End: 2019-06-03

## 2019-03-07 ASSESSMENT — MIFFLIN-ST. JEOR: SCORE: 1820.42

## 2019-03-07 NOTE — PROGRESS NOTES
Nutrition Reassessment  Reason For Visit:  Danitza Soto is a 38 year old female presenting today for nutrition follow-up, 3 months s/p SG with Dr Tse(11/27/18).  Patient referred by Dr Tse and Sandy Cervantes.    Anthropometrics  Initial Consult Weight: 308.8 lbs  Day of Surgery Weight(11/27/18): 274.6 lbs  Current Weight: 245.3 lbs   Weight loss: -63.5 lbs from initial consult; -29.3 lbs from day of surgery    Current Vitamins/Minerals: MVI/minerals BID, calcium, B complex    Nutrition History:  Recent food recall:  Breakfast: egg muffin or protein shakes, eggs  Lunch: protein   Dinner: [employer] - taco and part of burrito, usually protein and vegetables, soup  Snacks: M&Ms, protein bars, nuts, crackers and cheese, chex mix  Beverages: water and crystal light  Dining out: once per month     Progress with Previous Goals:  1) Follow soft diet for 4 weeks, then to progress to bariatric regular diet(1/22/19). met   2) Consume 60 grams of protein/day. continues   3) Sip on 48-64 oz of fluids/day- between meals only. met   4) Eat slowly (>20 min/meal), chewing foods well (to applesauce-like consistency). met   5) Limit portions to 1/2 cup/meal. 1 cup   6) Take the following supplements: not taking vitamin B12, getting enough Vit D through other vitamins     Multivitamin/minerals: adult dose 2 times daily    Iron: 45-60 mg elemental (18-36 mg if low risk) - may partly or fully be covered in multivitamin     Calcium Citrate containing vitamin D: 500 mg 3 times daily or 600 mg 2 times daily    Vitamin B12: sublingual form of at least 500 mcg daily or injection of 1000 mcg monthly     B-50 Complex once daily   7) Increase activity as able walking at work up and down stairs has gym membership but not going     Nutrition Prescription:  Grams Protein: 60 (minimum)  Amount of Fluid: 48-64 oz    Nutrition Diagnosis  Previous: Food and nutrition-related knowledge deficit r/t lack of prior exposure to diet advancements  beyond bariatric pureed diet aeb pt unable to verbalize full understanding of bariatric soft and regular consistency diets. -resolved    Current: Food and nutrition-related knowledge deficit r/t lack of prior exposure to diet instruction beyond 3 months s/p SG as evidenced by Pt seeking further guidance from RD on diet instruction beyond 3 months s/p SG.     Intervention  Materials/Education provided on bariatric soft and regular consistency diets, protein intake, fluid intake, eating pace, chewing foods well, portion control, sugar/fat intake, recommended vitamin/mineral supplements.  Discussed limiting portion sizes and avoiding high sugar high fats foods with grazing between meals.    Patient Understanding: good  Expected Compliance: good    Goals:  1) Follow bariatric regular diet.   2) Consume 60 grams of protein/day.  3) Sip on 48-64 oz of fluids/day- between meals only.  4) Eat slowly (>20 min/meal), chewing foods well (to applesauce-like consistency).  5) Limit portions to 1/2 cup/meal.  6) Take the following supplements:    Multivitamin/minerals: adult dose 2 times daily    Iron: 45-60 mg elemental (18-36 mg if low risk) - may partly or fully be covered in multivitamin     Calcium Citrate containing vitamin D: 500 mg 3 times daily or 600 mg 2 times daily    Vitamin B12: sublingual form of at least 500 mcg daily or injection of 1000 mcg monthly     B-50 Complex once daily   7) Increase activity as able       Follow-Up: prn    Time spent with patient: 15 minutes.  Kelsey Sousa, DAVIE, LD

## 2019-03-07 NOTE — PATIENT INSTRUCTIONS
"See Ang in 3 months return bariatric    Start naltrexone    Labs today first floor  MEDICATION STARTED AT THIS APPOINTMENT  We are starting Naltrexone. Start with 1/2 tab 1-2 hours prior to the time you have the most trouble with cravings or extra hunger. If you are doing well you may switch to a whole tablet taken at the same time period.     WARNING: This medication blocks the action of opioid type pain medications. If you routinely take any medication like Codeine, Oxycontin,Percocet,Morphine,Dilaudid or Methodone, do not take this until you have talked with weight management staff. If you are planning surgery you should stop Naltrexone 4 days prior to the surgery. If you have an injury that requires pain medication, make sure the health care staff knows you take Naltrexone.     Call the nurse at [phone] if you have any questions or concerns. (Do not stop taking it if you don't think it's working. For some people it works without them knowing it.)    Naltrexone is a medication that is used most often to help people who are troubled by dependence on prescription pain killers or alcohol. It has also been found to help with weight loss. Although it's not currently FDA approved for weight loss, it has been used safely for a number of years to help people who are carrying extra weight.     Just how Naltrexone helps with weight loss has not been exactly determined.  It seems to work by quieting down brain signals related to strong food cravings. Many of our patients use the word \"addiction\" to describe their feelings and constant thoughts about food. It makes sense then to treat the feeling of dependence on food, outside of real hunger, with a medication designed to help with other sorts of dependence.     Our patients on Naltrexone find that they:    >feel less interest in food   >think less about food and eating and have more time to think of other things   >find it easier to push the plate " away   >have an easier time eating less    For some of our patients, these feelings are very immediate. Other patients, don't feel much of a change but find they've lost weight. Like all weight loss medications, Naltrexone works best when you help it work. This means:  1. Having less tempting high calorie (fattening) food around the house or office. (For people with strong cravings this is very important.)   2. Staying away from situations or people that may trigger your cravings .   3. Eating out only one time or less each week.  4. Eating your meals at a table with the TV or computer off.    Side-effects. Naltrexone is generally well tolerated. The main side-effect we see is  nausea or a woozy feeling. A small number of people feel quite ill. Most people have a mild reaction and some people have no reaction at all.  The good news is that this feeling does go away.     In order to avoid nausea, please start the medication with half a pill for the first few days. Go on to a full pill if you are feeling well.      If you  are nauseated on 1/2 a pill it is okay to cut back to 1/4 pill ( a very small amount). Take this for a couple of days and work your way back up to a 1/2 pill and then a whole pill. Taking the medication at night or with food  to start also may help prevent the feeling of nausea.         Please refer to the pharmacy insert for more information on side-effects. Since many pharmacists are not familiar with the use of naltrexone in weight loss, calling the nurse at [phone] will get you the most accurate information.  In order to get refills of this or any medication we prescribe you must be seen in the medical weight mgmt clinic every 2-3 months. Please have your pharmacy fax a refill request to [phone].

## 2019-03-07 NOTE — LETTER
3/7/2019       RE: Danitza Soto  [address]  [facility] [address]     Dear Colleague,    Thank you for referring your patient, Danitza Soto, to the [facility] SURGICAL WEIGHT MANAGEMENT at [facility]. Please see a copy of my visit note below.    Nutrition Reassessment  Reason For Visit:  Danitza Soto is a 38 year old female presenting today for nutrition follow-up, 3 months s/p SG with Dr Tse(11/27/18).  Patient referred by Dr Tse and Sandy Cervantes.    Anthropometrics  Initial Consult Weight: 308.8 lbs  Day of Surgery Weight(11/27/18): 274.6 lbs  Current Weight: 245.3 lbs   Weight loss: -63.5 lbs from initial consult; -29.3 lbs from day of surgery    Current Vitamins/Minerals: MVI/minerals BID, calcium, B complex    Nutrition History:  Recent food recall:  Breakfast: egg muffin or protein shakes, eggs  Lunch: protein   Dinner: [employer]aco [employer] - taco and part of burrito, usually protein and vegetables, soup  Snacks: M&Ms, protein bars, nuts, crackers and cheese, chex mix  Beverages: water and crystal light  Dining out: once per month     Progress with Previous Goals:  1) Follow soft diet for 4 weeks, then to progress to bariatric regular diet(1/22/19). met   2) Consume 60 grams of protein/day. continues   3) Sip on 48-64 oz of fluids/day- between meals only. met   4) Eat slowly (>20 min/meal), chewing foods well (to applesauce-like consistency). met   5) Limit portions to 1/2 cup/meal. 1 cup   6) Take the following supplements: not taking vitamin B12, getting enough Vit D through other vitamins     Multivitamin/minerals: adult dose 2 times daily    Iron: 45-60 mg elemental (18-36 mg if low risk) - may partly or fully be covered in multivitamin     Calcium Citrate containing vitamin D: 500 mg 3 times daily or 600 mg 2 times daily    Vitamin B12: sublingual form of at least 500 mcg daily or injection of 1000 mcg monthly     B-50 Complex once daily   7) Increase  activity as able walking at work up and down stairs has gym membership but not going     Nutrition Prescription:  Grams Protein: 60 (minimum)  Amount of Fluid: 48-64 oz    Nutrition Diagnosis  Previous: Food and nutrition-related knowledge deficit r/t lack of prior exposure to diet advancements beyond bariatric pureed diet aeb pt unable to verbalize full understanding of bariatric soft and regular consistency diets. -resolved    Current: Food and nutrition-related knowledge deficit r/t lack of prior exposure to diet instruction beyond 3 months s/p SG as evidenced by Pt seeking further guidance from RD on diet instruction beyond 3 months s/p SG.     Intervention  Materials/Education provided on bariatric soft and regular consistency diets, protein intake, fluid intake, eating pace, chewing foods well, portion control, sugar/fat intake, recommended vitamin/mineral supplements.  Discussed limiting portion sizes and avoiding high sugar high fats foods with grazing between meals.    Patient Understanding: good  Expected Compliance: good    Goals:  1) Follow bariatric regular diet.   2) Consume 60 grams of protein/day.  3) Sip on 48-64 oz of fluids/day- between meals only.  4) Eat slowly (>20 min/meal), chewing foods well (to applesauce-like consistency).  5) Limit portions to 1/2 cup/meal.  6) Take the following supplements:    Multivitamin/minerals: adult dose 2 times daily    Iron: 45-60 mg elemental (18-36 mg if low risk) - may partly or fully be covered in multivitamin     Calcium Citrate containing vitamin D: 500 mg 3 times daily or 600 mg 2 times daily    Vitamin B12: sublingual form of at least 500 mcg daily or injection of 1000 mcg monthly     B-50 Complex once daily   7) Increase activity as able       Follow-Up: prn    Time spent with patient: 15 minutes.    Kelsey Sousa, RD, LD

## 2019-03-07 NOTE — NURSING NOTE
"(   Chief Complaint   Patient presents with     RECHECK     Follow up from 1/3/19    )    ( Weight: 111.3 kg (245 lb 4.8 oz) )  ( Height: 170.2 cm (5' 7.01\") )  ( BMI (Calculated): 38.49 )  ( Initial Weight: 140.1 kg (308 lb 13.8 oz) )  ( Cumulative weight loss (lbs): 63.56 )  ( Last Visits Weight: 115.3 kg (254 lb 3.1 oz) )  ( Wt change since last visit (lbs): -8.9 )  (   )  (   )    ( BP: 132/72 )  (   )  ( Temp: 98.5  F (36.9  C) )  ( Temp src: Oral )  ( Pulse: 52 )  (   )  ( SpO2: 98 % )    (   Patient Active Problem List   Diagnosis     Anxiety state     Esophageal reflux     Morbid obesity due to excess calories (H)     Tobacco use disorder     CARDIOVASCULAR SCREENING; LDL GOAL LESS THAN 130     Hypertension goal BP (blood pressure) < 140/90     Acne     Papanicolaou smear of cervix with low grade squamous intraepithelial lesion (LGSIL)     Mixed hyperlipidemia     LEONARDO (obstructive sleep apnea)     Obesity hypoventilation syndrome (H)     Lumbago     Left anterior knee pain     Paroxysmal atrial fibrillation (H)     Morbid (severe) obesity due to excess calories (H)    )  (   Current Outpatient Medications   Medication Sig Dispense Refill     acetaminophen (TYLENOL) 325 MG tablet Take 2 tablets (650 mg) by mouth every 4 hours as needed for other (For optimal non-opioid multimodal pain management to improve pain control and physical function.) 100 tablet 0     apixaban ANTICOAGULANT (ELIQUIS) 5 MG tablet Take 1 tablet (5 mg) by mouth 2 times daily 180 tablet 3     Cholecalciferol (VITAMIN D-3) 5000 units TABS Take 1 tablet by mouth daily       diclofenac (VOLTAREN) 1 % topical gel Place 2 g onto the skin 4 times daily 100 g 0     fish oil-omega-3 fatty acids 1000 MG capsule Take 2 g by mouth every morning        flecainide (TAMBOCOR) 50 MG tablet Take 1 tablet (50 mg) by mouth 2 times daily 180 tablet 3     lisinopril (PRINIVIL/ZESTRIL) 40 MG tablet Take 1 tablet (40 mg) by mouth daily (Patient taking " differently: Take 20 mg by mouth every evening ) 90 tablet 3     LORazepam (ATIVAN) 0.5 MG tablet Take 1 tablet (0.5 mg) by mouth every 8 hours as needed for anxiety 20 tablet 0     metoprolol succinate ER (TOPROL-XL) 50 MG 24 hr tablet Take 1 tablet (50 mg) by mouth daily 90 tablet 3     Multiple Vitamins-Minerals (CENTRUM PO)        naltrexone (DEPADE/REVIA) 50 MG tablet Take 1/2 Tablet then increase to maximum of 1 Full Tablet daily as tolerated.  Time it one to two hours prior to worst cravings 30 tablet 3     Probiotic Product (PROBIOTIC ADVANCED PO)        ursodiol (ACTIGALL) 300 MG capsule Take 1 capsule (300 mg) by mouth 2 times daily 60 capsule 5     VENTOLIN  (90 Base) MCG/ACT Inhaler INHALE 1-2 PUFFS EVERY 4-6 HOURS AS NEEDED FOR WHEEZING  0    )  ( Diabetes Eval:    )    ( Pain Eval:  Data Unavailable )    ( Wound Eval:       )    (   History   Smoking Status     Former Smoker     Packs/day: 1.00     Years: 10.00     Types: Cigarettes     Start date: 1/1/2004     Quit date: 8/15/2018   Smokeless Tobacco     Never Used     Comment:  pack or less a day    )    ( Signed By:  Emmanuelle Kingsley; March 7, 2019; 12:02 PM )

## 2019-03-07 NOTE — PROGRESS NOTES
Return Bariatric Surgery Note    RE: Danitza Soto  MR#: [id number]  : 1980  VISIT DATE: Mar 7, 2019    Dear Polly Mcbride,    I had the pleasure of seeing your patient, Danitza Soto, in my post-bariatric surgery assessment clinic.    CHIEF COMPLAINT: Post-bariatric surgery follow-up. 3 mo follow up    HISTORY OF PRESENT ILLNESS:  Questions Regarding Prior Weight Loss Surgery Reviewed With Patient 3/7/2019   I had the following weight loss procedure: Sleeve Gastrectomy   What year was your surgery? 2018   How has your weight changed since your last visit? I have lost weight   Are you currently taking any weight loss medications? No   Do you currently have any of the following: Hyperlipidemia (high cholesterol, triglycerides)?   Have you been to the Emergency room since your last visit with us? No   Were you in the hospital since your last visit with us? No   Do you have any concerns today? weight     Topiramate prior to surgery and after.  She tapered off of it and completely stopped 2 weeks ago. She had brain fog and word finding issues with it.  Now she is grazing more and has gained some weight since stopping.    No GERD, takes omeprazole once daily  Drinking enough fluids >64 oz      Weight History:     3/7/2019   What is your highest lifetime weight? 315   What is your lowest weight since surgery? (In pounds) 239     Initial Weight: 140.1 kg (308 lb 13.8 oz)  Current Weight: Weight: 115.3 kg (254 lb 4.8 oz)  Cumulative weight loss (lbs): 54.56  Last Visits Weight: 115.3 kg (254 lb 3.1 oz)    Questions Regarding Co-Morbidities and Health Concerns Reviewed With Patient 3/7/2019   Pre-diabetes: Improved   Diabetes II: Never   High Blood Pressure: Improved   High cholesterol: Improved   Heartburn/Reflux: Improved   Are you taking daily medication for heartburn, acid reflux, or GERD (acid reflux disease)? -   Sleep apnea: Gone away   Do you use a CPAP? -   PCOS: Never   Back pain: Improved   Joint  pain: Improved   Lower leg swelling: Never       Eating Habits 3/7/2019   How many meals do you eat per day? 3   Do you snack between meals? Sometimes   How much food are you eating at each meal? 1/2 cup to 1 cup   Are you able to separate your meals and liquids by at least 30 minutes? Sometimes   Are you able to avoid liquid calories? Yes       Exercise Questions Reviewed With Patient 3/7/2019   How often do you exercise? 1 to 2 times per week   What is the duration of your exercise (in minutes)? 30 Minutes   What types of exercise do you do? walking, gym membership, climbing stairs at work   What keeps you from being more active?  I should be more active but I just have not gotten around to it       Social History:      3/7/2019   Are you smoking? No   Are you drinking alcohol? No       Medications:  Current Outpatient Medications   Medication     acetaminophen (TYLENOL) 325 MG tablet     apixaban ANTICOAGULANT (ELIQUIS) 5 MG tablet     Cholecalciferol (VITAMIN D-3) 5000 units TABS     diclofenac (VOLTAREN) 1 % topical gel     fish oil-omega-3 fatty acids 1000 MG capsule     flecainide (TAMBOCOR) 50 MG tablet     lisinopril (PRINIVIL/ZESTRIL) 40 MG tablet     LORazepam (ATIVAN) 0.5 MG tablet     metoprolol succinate ER (TOPROL-XL) 50 MG 24 hr tablet     Multiple Vitamins-Minerals (CENTRUM PO)     Probiotic Product (PROBIOTIC ADVANCED PO)     ursodiol (ACTIGALL) 300 MG capsule     VENTOLIN  (90 Base) MCG/ACT Inhaler     No current facility-administered medications for this visit.          3/7/2019   Do you avoid NSAIDs such as (Ibuprofen, Aleve, Naproxen, Advil)?   Yes       ROS:  GI:      3/7/2019   Vomiting: No   Diarrhea: No   Constipation: No   Swallowing trouble: No   Abdominal pain: No   Heartburn: No   Rash in skin folds: No   Depression: Yes   Stress urinary incontinence No     Skin:   BAR RBS ROS - SKIN 3/7/2019   Rash in skin folds: No     Psych:      3/7/2019   Depression: Yes   Anxiety: Yes  "    Female Only:   BAR RBS ROS - FEMALE ONLY 3/7/2019   Female only: None of the above       LABS/IMAGING/MEDICAL RECORDS REVIEW:     PHYSICAL EXAMINATION:  /72   Pulse 52   Temp 98.5  F (36.9  C) (Oral)   Ht 1.702 m (5' 7.01\")   Wt 115.3 kg (254 lb 4.8 oz)   SpO2 98%   BMI 39.82 kg/m     General: No apparent distress  Neuro: A & O x 3  Head: Atraumatic, normocephalic  Eyes: PERRL, EOMI  Skin: warm and dry, no rashes on exposed skin  Respiratory: respirations unlabored  Abdomen: soft NT ND  Extremities: No LE swelling    ASSESSMENT AND PLAN:      1. 3 months status laparoscopic gastric sleeve. She is doing well overall but concerned about increase in snacking since stopping topiramate.  2. Morbid Obesity current BMI: Body mass index is 39.82 kg/m .  3. Post surgical malabsorption:   Labs ordered per protocol.   Follow food plan per dietitian recommendations.   Continue taking recommended post-op vitamins.  4. Return to clinic in 3 months.  5. Labs today first floor today  6. Start naltrexone  7. Consider restarting low dose topiramate 25 mg and continue low dose for longer to see if side effects improve and then increase            Sincerely,    Sandy Cervantes PA-C    I spent a total of 15 minutes face to face with Danitza during today's office visit. Over 50% of this time was spent counseling the patient and/or coordinating care.  "

## 2019-03-07 NOTE — LETTER
3/7/2019       RE: Danitza Soto  [address]  [facility] [address]     Dear Colleague,    Thank you for referring your patient, Danitza Soto, to the [facility] MEDICAL WEIGHT MANAGEMENT at [facility]. Please see a copy of my visit note below.        Return Bariatric Surgery Note    RE: Danitza Soto  MR#: [id number]  : 1980  VISIT DATE: Mar 7, 2019    Dear Polly Mcbride,    I had the pleasure of seeing your patient, Danitza Soto, in my post-bariatric surgery assessment clinic.    CHIEF COMPLAINT: Post-bariatric surgery follow-up. 3 mo follow up    HISTORY OF PRESENT ILLNESS:  Questions Regarding Prior Weight Loss Surgery Reviewed With Patient 3/7/2019   I had the following weight loss procedure: Sleeve Gastrectomy   What year was your surgery? 2018   How has your weight changed since your last visit? I have lost weight   Are you currently taking any weight loss medications? No   Do you currently have any of the following: Hyperlipidemia (high cholesterol, triglycerides)?   Have you been to the Emergency room since your last visit with us? No   Were you in the hospital since your last visit with us? No   Do you have any concerns today? weight     Topiramate prior to surgery and after.  She tapered off of it and completely stopped 2 weeks ago. She had brain fog and word finding issues with it.  Now she is grazing more and has gained some weight since stopping.    No GERD, takes omeprazole once daily  Drinking enough fluids >64 oz      Weight History:     3/7/2019   What is your highest lifetime weight? 315   What is your lowest weight since surgery? (In pounds) 239     Initial Weight: 140.1 kg (308 lb 13.8 oz)  Current Weight: Weight: 115.3 kg (254 lb 4.8 oz)  Cumulative weight loss (lbs): 54.56  Last Visits Weight: 115.3 kg (254 lb 3.1 oz)    Questions Regarding Co-Morbidities and Health Concerns Reviewed With Patient 3/7/2019   Pre-diabetes: Improved    Diabetes II: Never   High Blood Pressure: Improved   High cholesterol: Improved   Heartburn/Reflux: Improved   Are you taking daily medication for heartburn, acid reflux, or GERD (acid reflux disease)? -   Sleep apnea: Gone away   Do you use a CPAP? -   PCOS: Never   Back pain: Improved   Joint pain: Improved   Lower leg swelling: Never       Eating Habits 3/7/2019   How many meals do you eat per day? 3   Do you snack between meals? Sometimes   How much food are you eating at each meal? 1/2 cup to 1 cup   Are you able to separate your meals and liquids by at least 30 minutes? Sometimes   Are you able to avoid liquid calories? Yes       Exercise Questions Reviewed With Patient 3/7/2019   How often do you exercise? 1 to 2 times per week   What is the duration of your exercise (in minutes)? 30 Minutes   What types of exercise do you do? walking, gym membership, climbing stairs at work   What keeps you from being more active?  I should be more active but I just have not gotten around to it       Social History:      3/7/2019   Are you smoking? No   Are you drinking alcohol? No       Medications:  Current Outpatient Medications   Medication     acetaminophen (TYLENOL) 325 MG tablet     apixaban ANTICOAGULANT (ELIQUIS) 5 MG tablet     Cholecalciferol (VITAMIN D-3) 5000 units TABS     diclofenac (VOLTAREN) 1 % topical gel     fish oil-omega-3 fatty acids 1000 MG capsule     flecainide (TAMBOCOR) 50 MG tablet     lisinopril (PRINIVIL/ZESTRIL) 40 MG tablet     LORazepam (ATIVAN) 0.5 MG tablet     metoprolol succinate ER (TOPROL-XL) 50 MG 24 hr tablet     Multiple Vitamins-Minerals (CENTRUM PO)     Probiotic Product (PROBIOTIC ADVANCED PO)     ursodiol (ACTIGALL) 300 MG capsule     VENTOLIN  (90 Base) MCG/ACT Inhaler     No current facility-administered medications for this visit.          3/7/2019   Do you avoid NSAIDs such as (Ibuprofen, Aleve, Naproxen, Advil)?   Yes       ROS:  GI:      3/7/2019   Vomiting: No  "  Diarrhea: No   Constipation: No   Swallowing trouble: No   Abdominal pain: No   Heartburn: No   Rash in skin folds: No   Depression: Yes   Stress urinary incontinence No     Skin:   BAR RBS ROS - SKIN 3/7/2019   Rash in skin folds: No     Psych:      3/7/2019   Depression: Yes   Anxiety: Yes     Female Only:   VARUN RBS ROS - FEMALE ONLY 3/7/2019   Female only: None of the above       LABS/IMAGING/MEDICAL RECORDS REVIEW:     PHYSICAL EXAMINATION:  /72   Pulse 52   Temp 98.5  F (36.9  C) (Oral)   Ht 1.702 m (5' 7.01\")   Wt 115.3 kg (254 lb 4.8 oz)   SpO2 98%   BMI 39.82 kg/m      General: No apparent distress  Neuro: A & O x 3  Head: Atraumatic, normocephalic  Eyes: PERRL, EOMI  Skin: warm and dry, no rashes on exposed skin  Respiratory: respirations unlabored  Abdomen: soft NT ND  Extremities: No LE swelling    ASSESSMENT AND PLAN:      1. 3 months status laparoscopic gastric sleeve. She is doing well overall but concerned about increase in snacking since stopping topiramate.  2. Morbid Obesity current BMI: Body mass index is 39.82 kg/m .  3. Post surgical malabsorption:   Labs ordered per protocol.   Follow food plan per dietitian recommendations.   Continue taking recommended post-op vitamins.  4. Return to clinic in 3 months.  5. Labs today first floor today  6. Start naltrexone  7. Consider restarting low dose topiramate 25 mg and continue low dose for longer to see if side effects improve and then increase            Sincerely,    Sandy Cervantes PA-C    I spent a total of 15 minutes face to face with Danitza during today's office visit. Over 50% of this time was spent counseling the patient and/or coordinating care.    "

## 2019-03-08 LAB — DEPRECATED CALCIDIOL+CALCIFEROL SERPL-MC: 31 UG/L (ref 20–75)

## 2019-03-10 LAB
ANNOTATION COMMENT IMP: NORMAL
RETINYL PALMITATE SERPL-MCNC: 0.05 MG/L (ref 0–0.1)
VIT A SERPL-MCNC: 0.56 MG/L (ref 0.3–1.2)

## 2019-03-12 ENCOUNTER — OFFICE VISIT (OUTPATIENT)
Dept: FAMILY MEDICINE | Facility: CLINIC | Age: 39
End: 2019-03-12
Payer: OTHER MISCELLANEOUS

## 2019-03-12 ENCOUNTER — MYC MEDICAL ADVICE (OUTPATIENT)
Dept: FAMILY MEDICINE | Facility: CLINIC | Age: 39
End: 2019-03-12

## 2019-03-12 ENCOUNTER — PATIENT OUTREACH (OUTPATIENT)
Dept: CARE COORDINATION | Facility: CLINIC | Age: 39
End: 2019-03-12

## 2019-03-12 VITALS
TEMPERATURE: 97.3 F | OXYGEN SATURATION: 97 % | HEART RATE: 55 BPM | SYSTOLIC BLOOD PRESSURE: 122 MMHG | WEIGHT: 244 LBS | DIASTOLIC BLOOD PRESSURE: 82 MMHG | BODY MASS INDEX: 38.21 KG/M2 | RESPIRATION RATE: 12 BRPM

## 2019-03-12 DIAGNOSIS — M77.12 LEFT LATERAL EPICONDYLITIS: Primary | ICD-10-CM

## 2019-03-12 DIAGNOSIS — E66.01 MORBID OBESITY (H): ICD-10-CM

## 2019-03-12 PROCEDURE — 99213 OFFICE O/P EST LOW 20 MIN: CPT | Performed by: FAMILY MEDICINE

## 2019-03-12 NOTE — PROGRESS NOTES
SUBJECTIVE:   Danitza Soto is a 38 year old female who presents to clinic today for the following health issues:      Follow up on Worker's Comp--last office visit on 2/26/19. Patient state that she is still having some pain in left forearm and elbow. Pain rating is 4/10; patient state that pain is dull, achy and constant.   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  Additional history: as documented    Patient Active Problem List   Diagnosis     Anxiety state     Esophageal reflux     Morbid obesity due to excess calories (H)     Tobacco use disorder     CARDIOVASCULAR SCREENING; LDL GOAL LESS THAN 130     Hypertension goal BP (blood pressure) < 140/90     Acne     Papanicolaou smear of cervix with low grade squamous intraepithelial lesion (LGSIL)     Mixed hyperlipidemia     LEONARDO (obstructive sleep apnea)     Obesity hypoventilation syndrome (H)     Lumbago     Left anterior knee pain     Paroxysmal atrial fibrillation (H)     Morbid (severe) obesity due to excess calories (H)     Past Surgical History:   Procedure Laterality Date     HC TOOTH EXTRACTION W/FORCEP  1995    [place] Teeth Extraction     LAPAROSCOPIC GASTRIC SLEEVE N/A 11/27/2018    Procedure: Laparoscopic Sleeve Gastrectomy Latex Free;  Surgeon: Artis Tse MD;  Location:  OR     LAPAROSCOPIC HERNIORRHAPHY HIATAL  11/27/2018    Procedure: LAPAROSCOPIC  HIATAL Hernia Repair;  Surgeon: Artis Tse MD;  Location:  OR       Social History     Tobacco Use     Smoking status: Former Smoker     Packs/day: 1.00     Years: 10.00     Pack  years: 10.00     Types: Cigarettes     Start date: 2004     Last attempt to quit: 8/15/2018     Years since quittin.5     Smokeless tobacco: Never Used     Tobacco comment:  pack or less a day   Substance Use Topics     Alcohol use: Yes     Alcohol/week: 0.0 oz     Comment: Occas.     Family History   Problem Relation Age of Onset     Heart Disease Mother         ,mother had emphesema and  at 62     Hypertension Mother      Allergies Mother      Lipids Mother      Obesity Mother      Respiratory Mother         Emphysema     Diabetes Maternal Grandmother         Type 2?     Hypertension Maternal Grandmother      Circulatory Maternal Grandmother         Due to diabetes     Eye Disorder Maternal Grandmother         Macular degeneration/Macular degeneration     Obesity Maternal Grandmother      Hypertension Father      Lipids Father      Cancer Father      Prostate Cancer Father      Other Cancer Father      Cerebrovascular Disease Paternal Grandmother      Alcohol/Drug Maternal Grandfather      Obesity Maternal Grandfather      Psychotic Disorder Paternal Grandfather         Committed Suicide     Depression Paternal Grandfather      Allergies Sister      Genitourinary Problems Sister          Current Outpatient Medications   Medication Sig Dispense Refill     acetaminophen (TYLENOL) 325 MG tablet Take 2 tablets (650 mg) by mouth every 4 hours as needed for other (For optimal non-opioid multimodal pain management to improve pain control and physical function.) 100 tablet 0     apixaban ANTICOAGULANT (ELIQUIS) 5 MG tablet Take 1 tablet (5 mg) by mouth 2 times daily 180 tablet 3     Cholecalciferol (VITAMIN D-3) 5000 units TABS Take 1 tablet by mouth daily       diclofenac (VOLTAREN) 1 % topical gel Place 2 g onto the skin 4 times daily 100 g 0     fish oil-omega-3 fatty acids 1000 MG capsule Take 2 g by mouth every morning        flecainide (TAMBOCOR) 50 MG tablet Take 1 tablet (50 mg) by mouth 2 times daily  180 tablet 3     lisinopril (PRINIVIL/ZESTRIL) 40 MG tablet Take 1 tablet (40 mg) by mouth daily (Patient taking differently: Take 20 mg by mouth every evening ) 90 tablet 3     LORazepam (ATIVAN) 0.5 MG tablet Take 1 tablet (0.5 mg) by mouth every 8 hours as needed for anxiety 20 tablet 0     metoprolol succinate ER (TOPROL-XL) 50 MG 24 hr tablet Take 1 tablet (50 mg) by mouth daily 90 tablet 3     Multiple Vitamins-Minerals (CENTRUM PO)        naltrexone (DEPADE/REVIA) 50 MG tablet Take 1/2 Tablet then increase to maximum of 1 Full Tablet daily as tolerated.  Time it one to two hours prior to worst cravings 30 tablet 3     Probiotic Product (PROBIOTIC ADVANCED PO)        ursodiol (ACTIGALL) 300 MG capsule Take 1 capsule (300 mg) by mouth 2 times daily 60 capsule 5     VENTOLIN  (90 Base) MCG/ACT Inhaler INHALE 1-2 PUFFS EVERY 4-6 HOURS AS NEEDED FOR WHEEZING  0     Allergies   Allergen Reactions     Wellbutrin [Bupropion]      Wellbutrin: Panic attacks, heart/chest pain     Recent Labs   Lab Test 03/07/19  1251 11/28/18  0554  09/24/18  1452 07/24/18  1023 06/19/18  0952  03/21/17  0926 08/24/16  0915 01/13/16  1138  10/08/13  0808   A1C  --   --   --   --  6.1*  --   --   --   --   --   --  5.4   LDL  --   --   --   --   --   --   --  155* 174* 162*  --  149*   HDL  --   --   --   --   --   --   --  30* 29* 33*  --  33*   TRIG  --   --   --   --   --   --   --  203* 231* 207*  --  179*   ALT 18  --   --   --  22 33   < > 24  --  26  --  26   CR 0.66 0.74   < >  --  0.63 0.65   < > 0.62 0.61 0.59   < > 0.60   GFRESTIMATED >90 87   < >  --  >90 >90   < > >90  Non  GFR Calc   >90  Non  GFR Calc   >90  Non  GFR Calc     < > >90   GFRESTBLACK >90 >90   < >  --  >90 >90   < > >90   GFR Calc   >90   GFR Calc   >90   GFR Calc     < > >90   POTASSIUM 3.9 3.8   < >  --  3.7 3.9   < > 4.0 3.9 4.0   < > 3.8   TSH  --   --    --  1.48  --   --   --   --   --  1.79   < >  --     < > = values in this interval not displayed.      BP Readings from Last 3 Encounters:   03/12/19 122/82   03/07/19 132/72   02/26/19 135/87    Wt Readings from Last 3 Encounters:   03/12/19 110.7 kg (244 lb)   03/07/19 111.3 kg (245 lb 4.8 oz)   02/26/19 110.7 kg (244 lb)                  Labs reviewed in EPIC    Reviewed and updated as needed this visit by clinical staff       Reviewed and updated as needed this visit by Provider         ROS:  Constitutional, HEENT, cardiovascular, pulmonary, GI, , musculoskeletal, neuro, skin, endocrine and psych systems are negative, except as otherwise noted.    OBJECTIVE:     /82 (BP Location: Left arm, Patient Position: Sitting, Cuff Size: Adult Large)   Pulse 55   Temp 97.3  F (36.3  C) (Oral)   Resp 12   Wt 110.7 kg (244 lb)   SpO2 97%   Breastfeeding? No   BMI 38.21 kg/m    Body mass index is 38.21 kg/m .  GENERAL: healthy, alert and no distress  NECK: no adenopathy, no asymmetry, masses, or scars and thyroid normal to palpation  MS: no gross musculoskeletal defects noted, no edema EXCEPT that there is some tenderness with palpation and also with extension in the elbow , no edema     Diagnostic Test Results:  none     ASSESSMENT/PLAN:         1. Left lateral epicondylitis  She is doing ice, rest , topical Voltaren, she has been using ACe wrap or elbow brace, - we discussed doing PT at this point and [facility] , also I have written her a note for work , to go back to work tomorrow and not to do repetitive heavy lifting like shoveling .  - [facility] REFERR[facility]    RTC if no improving or worsening.  Pt is aware  and comfortable with the current plan.    Polly Mcbride MD  [facility]

## 2019-03-12 NOTE — LETTER
[facility]  [address]  [address]  [place], [place] [address]  [phone]    March 12, 2019    RE:  Danitza Soto                                                                                                                                                       [address]  [facility] [address]            To whom it may concern:    Danitza Soto is under my professional care. She can return to work March 13th, 2019 , but the restrictions at work are - no repetitive heavy lifting because of left lateral epicondylitis.         Sincerely,        Polly Mcbride MD      Clinic hours:  Monday 7:30 AM - 5:00 PM    Tuesday  7:00 AM - 7:00 PM    Wednesday  7:00 AM - 5:00 PM    Thursday  7:30 AM -  7:00 PM    Friday   7:30 AM -  5:00 PM

## 2019-03-12 NOTE — NURSING NOTE
"Chief Complaint   Patient presents with     Work Comp     /82 (BP Location: Left arm, Patient Position: Sitting, Cuff Size: Adult Large)   Pulse 55   Temp 97.3  F (36.3  C) (Oral)   Resp 12   Wt 110.7 kg (244 lb)   SpO2 97%   Breastfeeding? No   BMI 38.21 kg/m   Estimated body mass index is 38.21 kg/m  as calculated from the following:    Height as of 3/7/19: 1.702 m (5' 7.01\").    Weight as of this encounter: 110.7 kg (244 lb).  bp completed using cuff size: large       Health Maintenance addressed:  Pap Smear    Pt will schedule px appt.    Tomas Forrest MA     "

## 2019-03-12 NOTE — LETTER
[facility]  [address]  [facility] [address]  Phone: [phone]    03/13/19    Danitza D Bladen  [address]  [facility] [address]    Fax: [phone]    Email: [email or web address]    To whom it may concern:     Danitza Soto is under my professional care. She can return to work March 13th, 2019, but the restrictions at work are no repetitive heavy lifting because of left lateral epicondylitis. This restriction will be in effect through May 31st, 2019.  Please call the clinic with questions/concerns at [phone].     Sincerely,        Polly Mcbride MD    Clinic Hours:    Monday  7:00am-5:00pm  Tuesday  7:00am-7:00pm  Wednesday  7:00am-5:00pm  Thursday  7:00am-5:00pm  Friday   7:00am-5:00pm

## 2019-03-13 NOTE — TELEPHONE ENCOUNTER
Can you ask her if she needs a new letter and what exactly does she want me to say in it ?  Thanks

## 2019-03-14 ENCOUNTER — MYC MEDICAL ADVICE (OUTPATIENT)
Dept: FAMILY MEDICINE | Facility: CLINIC | Age: 39
End: 2019-03-14

## 2019-03-14 NOTE — TELEPHONE ENCOUNTER
[facility]  Patient is currently open to home care services with [facility]. The patient is currently receiving PT services. [facility]  and team have been notified of patient admission. [facility] liaison will continue to follow patient during stay. If appropriate provide orders to resume home care at time of discharge.       See [employer] message below.  Ami Garcia RN

## 2019-03-15 RX ORDER — TOPIRAMATE 25 MG/1
25 TABLET, FILM COATED ORAL DAILY
Qty: 30 TABLET | Refills: 3 | Status: SHIPPED | OUTPATIENT
Start: 2019-03-15 | End: 2019-06-03

## 2019-03-15 NOTE — TELEPHONE ENCOUNTER
topiramate (TOPAMAX) 25 MG tablet      Last Written Prescription Date:  Not on med list    Last Office Visit : 3-7-19  Future Office visit:  6-3-19    Routing refill request to provider for review/approval because:  Drug not active on patient's medication list    Last Clinic note  6. Start naltrexone  7. Consider restarting low dose topiramate 25 mg and continue low dose for longer to see if side effects improve and then increase

## 2019-03-25 NOTE — TELEPHONE ENCOUNTER
Jennifer from [facility] is calling because they need referral information sent to them.  Please fax over to [phone]

## 2019-04-08 ENCOUNTER — MYC MEDICAL ADVICE (OUTPATIENT)
Dept: FAMILY MEDICINE | Facility: CLINIC | Age: 39
End: 2019-04-08

## 2019-04-08 ENCOUNTER — MYC MEDICAL ADVICE (OUTPATIENT)
Dept: CARDIOLOGY | Facility: CLINIC | Age: 39
End: 2019-04-08

## 2019-04-08 ENCOUNTER — OFFICE VISIT (OUTPATIENT)
Dept: FAMILY MEDICINE | Facility: CLINIC | Age: 39
End: 2019-04-08
Payer: COMMERCIAL

## 2019-04-08 VITALS
TEMPERATURE: 97.6 F | RESPIRATION RATE: 16 BRPM | BODY MASS INDEX: 36.88 KG/M2 | HEART RATE: 58 BPM | OXYGEN SATURATION: 98 % | WEIGHT: 235 LBS | HEIGHT: 67 IN

## 2019-04-08 DIAGNOSIS — Z12.4 SCREENING FOR MALIGNANT NEOPLASM OF CERVIX: ICD-10-CM

## 2019-04-08 DIAGNOSIS — I48.0 PAROXYSMAL ATRIAL FIBRILLATION (H): ICD-10-CM

## 2019-04-08 DIAGNOSIS — F33.42 RECURRENT MAJOR DEPRESSIVE DISORDER, IN FULL REMISSION (H): ICD-10-CM

## 2019-04-08 DIAGNOSIS — Z00.00 PREVENTATIVE HEALTH CARE: Primary | ICD-10-CM

## 2019-04-08 LAB — GLUCOSE SERPL-MCNC: 95 MG/DL (ref 70–99)

## 2019-04-08 PROCEDURE — 36415 COLL VENOUS BLD VENIPUNCTURE: CPT | Performed by: FAMILY MEDICINE

## 2019-04-08 PROCEDURE — 87624 HPV HI-RISK TYP POOLED RSLT: CPT | Performed by: FAMILY MEDICINE

## 2019-04-08 PROCEDURE — 82947 ASSAY GLUCOSE BLOOD QUANT: CPT | Performed by: FAMILY MEDICINE

## 2019-04-08 PROCEDURE — 99395 PREV VISIT EST AGE 18-39: CPT | Performed by: FAMILY MEDICINE

## 2019-04-08 PROCEDURE — G0145 SCR C/V CYTO,THINLAYER,RESCR: HCPCS | Performed by: FAMILY MEDICINE

## 2019-04-08 PROCEDURE — 99213 OFFICE O/P EST LOW 20 MIN: CPT | Mod: 25 | Performed by: FAMILY MEDICINE

## 2019-04-08 RX ORDER — FLUOXETINE 10 MG/1
10 TABLET, FILM COATED ORAL DAILY
Qty: 30 TABLET | Refills: 1 | Status: SHIPPED | OUTPATIENT
Start: 2019-04-08 | End: 2019-04-08

## 2019-04-08 RX ORDER — VENLAFAXINE HYDROCHLORIDE 75 MG/1
75 CAPSULE, EXTENDED RELEASE ORAL DAILY
Qty: 30 CAPSULE | Refills: 1 | Status: SHIPPED | OUTPATIENT
Start: 2019-04-08 | End: 2019-06-05

## 2019-04-08 ASSESSMENT — MIFFLIN-ST. JEOR: SCORE: 1765.64

## 2019-04-08 ASSESSMENT — PATIENT HEALTH QUESTIONNAIRE - PHQ9: SUM OF ALL RESPONSES TO PHQ QUESTIONS 1-9: 14

## 2019-04-08 NOTE — LETTER
My Depression Action Plan  Name: Danitza Soto   Date of Birth 1980  Date: 4/8/2019    My doctor: Polly Mcbride   My clinic: [facility]  [address] [facility] [address] [phone]          GREEN    ZONE   Good Control    What it looks like:     Things are going generally well. You have normal up s and down s. You may even feel depressed from time to time, but bad moods usually last less than a day.   What you need to do:  1. Continue to care for yourself (see self care plan)  2. Check your depression survival kit and update it as needed  3. Follow your physician s recommendations including any medication.  4. Do not stop taking medication unless you consult with your physician first.           YELLOW         ZONE Getting Worse    What it looks like:     Depression is starting to interfere with your life.     It may be hard to get out of bed; you may be starting to isolate yourself from others.    Symptoms of depression are starting to last most all day and this has happened for several days.     You may have suicidal thoughts but they are not constant.   What you need to do:     1. Call your care team, your response to treatment will improve if you keep your care team informed of your progress. Yellow periods are signs an adjustment may need to be made.     2. Continue your self-care, even if you have to fake it!    3. Talk to someone in your support network    4. Open up your depression survival kit           RED    ZONE Medical Alert - Get Help    What it looks like:     Depression is seriously interfering with your life.     You may experience these or other symptoms: You can t get out of bed most days, can t work or engage in other necessary activities, you have trouble taking care of basic hygiene, or basic responsibilities, thoughts of suicide or death that will not go away, self-injurious behavior.     What you need to do:  1. Call your care team and  request a same-day appointment. If they are not available (weekends or after hours) call your local crisis line, emergency room or 911.            Depression Self Care Plan / Survival Kit    Self-Care for Depression  Here s the deal. Your body and mind are really not as separate as most people think.  What you do and think affects how you feel and how you feel influences what you do and think. This means if you do things that people who feel good do, it will help you feel better.  Sometimes this is all it takes.  There is also a place for medication and therapy depending on how severe your depression is, so be sure to consult with your medical provider and/ or Behavioral Health Consultant if your symptoms are worsening or not improving.     In order to better manage my stress, I will:    Exercise  Get some form of exercise, every day. This will help reduce pain and release endorphins, the  feel good  chemicals in your brain. This is almost as good as taking antidepressants!  This is not the same as joining a gym and then never going! (they count on that by the way ) It can be as simple as just going for a walk or doing some gardening, anything that will get you moving.      Hygiene   Maintain good hygiene (Get out of bed in the morning, Make your bed, Brush your teeth, Take a shower, and Get dressed like you were going to work, even if you are unemployed).  If your clothes don't fit try to get ones that do.    Diet  I will strive to eat foods that are good for me, drink plenty of water, and avoid excessive sugar, caffeine, alcohol, and other mood-altering substances.  Some foods that are helpful in depression are: complex carbohydrates, B vitamins, flaxseed, fish or fish oil, fresh fruits and vegetables.    Psychotherapy  I agree to participate in Individual Therapy (if recommended).    Medication  If prescribed medications, I agree to take them.  Missing doses can result in serious side effects.  I understand that  drinking alcohol, or other illicit drug use, may cause potential side effects.  I will not stop my medication abruptly without first discussing it with my provider.    Staying Connected With Others  I will stay in touch with my friends, family members, and my primary care provider/team.    Use your imagination  Be creative.  We all have a creative side; it doesn t matter if it s oil painting, sand castles, or mud pies! This will also kick up the endorphins.    Witness Beauty  (AKA stop and smell the roses) Take a look outside, even in mid-winter. Notice colors, textures. Watch the squirrels and birds.     Service to others  Be of service to others.  There is always someone else in need.  By helping others we can  get out of ourselves  and remember the really important things.  This also provides opportunities for practicing all the other parts of the program.    Humor  Laugh and be silly!  Adjust your TV habits for less news and crime-drama and more comedy.    Control your stress  Try breathing deep, massage therapy, biofeedback, and meditation. Find time to relax each day.     My support system    Clinic Contact:  Phone number:    Contact 1:  Phone number:    Contact 2:  Phone number:    [facility]/:  Phone number:    Therapist:  Phone number:    Local crisis center:    Phone number:    Other community support:  Phone number:

## 2019-04-08 NOTE — PROGRESS NOTES
SUBJECTIVE:   CC: Danitza Soto is an 38 year old woman who presents for preventive health visit.     Healthy Habits:     Getting at least 3 servings of Calcium per day:  Yes    Bi-annual eye exam:  Yes    Dental care twice a year:  NO    Sleep apnea or symptoms of sleep apnea:  None    Diet:  Regular (no restrictions)    Frequency of exercise:  4-5 days/week    Duration of exercise:  15-30 minutes    Taking medications regularly:  Yes    Medication side effects:  None    PHQ-2 Total Score: 2    Additional concerns today:  Yes        PHQ-9 SCORE 2018   PHQ-9 Total Score MyChart 3 (Minimal depression) -   PHQ-9 Total Score 3 14     Depression patient would like to get script for depression would like to get celexa states that it works for her sister and niece.  Patient also has a history of atrial fibrillation currently taking fluocinonide and Eliquis.  Episodes of palpitations are approximately once a week.      Today's PHQ-2 Score:   PHQ-2 (  [employer]) 2019   Q1: Little interest or pleasure in doing things 1   Q2: Feeling down, depressed or hopeless 1   PHQ-2 Score 2   Q1: Little interest or pleasure in doing things Several days   Q2: Feeling down, depressed or hopeless Several days   PHQ-2 Score 2       Abuse: Current or Past(Physical, Sexual or Emotional)- No  Do you feel safe in your environment? Yes    Social History     Tobacco Use     Smoking status: Former Smoker     Packs/day: 1.00     Years: 10.00     Pack years: 10.00     Types: Cigarettes     Start date: 2004     Last attempt to quit: 8/15/2018     Years since quittin.6     Smokeless tobacco: Never Used     Tobacco comment:  pack or less a day   Substance Use Topics     Alcohol use: Yes     Alcohol/week: 0.0 oz     Comment: Occas.     If you drink alcohol do you typically have >3 drinks per day or >7 drinks per week? No    Alcohol Use 2019   Prescreen: >3 drinks/day or >7 drinks/week? Not Applicable   Prescreen: >3  drinks/day or >7 drinks/week? -   AUDIT SCORE  -     AUDIT - Alcohol Use Disorders Identification Test - Reproduced from the World Health Organization Audit 2001 (Second Edition) 9/21/2018   1.  How often do you have a drink containing alcohol? Never   2.  How many drinks containing alcohol do you have on a typical day when you are drinking? 1 or 2   3.  How often do you have five or more drinks on one occasion? Never   4.  How often during the last year have you found that you were not able to stop drinking once you had started? Never   5.  How often during the last year have you failed to do what was normally expected of you because of drinking? Never   6.  How often during the last year have you needed a first drink in the morning to get yourself going after a heavy drinking session? Never   7.  How often during the last year have you had a feeling of guilt or remorse after drinking? Never   8.  How often during the last year have you been unable to remember what happened the night before because of your drinking? Never   9.  Have you or someone else been injured because of your drinking? No   10. Has a relative, friend, doctor or other health care worker been concerned about your drinking or suggested you cut down? No   TOTAL SCORE 0       Reviewed orders with patient.  Reviewed health maintenance and updated orders accordingly - Yes  Labs reviewed in EPIC    Mammogram not appropriate for this patient based on age.    Pertinent mammograms are reviewed under the imaging tab.  History of abnormal Pap smear: YES - updated in Problem List and Health Maintenance accordingly  PAP / HPV Latest Ref Rng & Units 3/21/2017 1/13/2016 6/17/2014   PAP - NIL ASC-US(A) LSIL(A)   HPV 16 DNA NEG Negative Negative -   HPV 18 DNA NEG Negative Negative -   OTHER HR HPV NEG Negative Negative -     Reviewed and updated as needed this visit by clinical staff         Reviewed and updated as needed this visit by Provider       "      Review of Systems  CONSTITUTIONAL: NEGATIVE for fever, chills, change in weight  INTEGUMENTARU/SKIN: NEGATIVE for worrisome rashes, moles or lesions  EYES: NEGATIVE for vision changes or irritation  ENT: NEGATIVE for ear, mouth and throat problems  RESP: NEGATIVE for significant cough or SOB  BREAST: NEGATIVE for masses, tenderness or discharge  CV: NEGATIVE for chest pain, palpitations or peripheral edema  GI: NEGATIVE for nausea, abdominal pain, heartburn, or change in bowel habits  : NEGATIVE for unusual urinary or vaginal symptoms. Periods are regular.  MUSCULOSKELETAL: NEGATIVE for significant arthralgias or myalgia  NEURO: NEGATIVE for weakness, dizziness or paresthesias  PSYCHIATRIC: NEGATIVE for changes in mood or affect     OBJECTIVE:   Pulse 58   Temp 97.6  F (36.4  C) (Oral)   Resp 16   Ht 1.689 m (5' 6.5\")   Wt 106.6 kg (235 lb)   SpO2 98%   BMI 37.36 kg/m    Physical Exam  GENERAL: healthy, alert and no distress  EYES: Eyes grossly normal to inspection, PERRL and conjunctivae and sclerae normal  HENT: ear canals and TM's normal, nose and mouth without ulcers or lesions  NECK: no adenopathy, no asymmetry, masses, or scars and thyroid normal to palpation  RESP: lungs clear to auscultation - no rales, rhonchi or wheezes  BREAST: normal without masses, tenderness or nipple discharge and no palpable axillary masses or adenopathy  CV: regular rate and rhythm, normal S1 S2, no S3 or S4, no murmur, click or rub, no peripheral edema and peripheral pulses strong  ABDOMEN: soft, nontender, no hepatosplenomegaly, no masses and bowel sounds normal   (female): normal female external genitalia, normal urethral meatus, vaginal mucosa pink, moist, well rugated, and normal cervix/adnexa/uterus without masses or discharge  MS: no gross musculoskeletal defects noted, no edema  SKIN: no suspicious lesions or rashes  NEURO: Normal strength and tone, mentation intact and speech normal  PSYCH: mentation appears " "normal, affect normal/bright    Diagnostic Test Results:  No results found for this or any previous visit (from the past 24 hour(s)).    ASSESSMENT/PLAN:       ICD-10-CM    1. Preventative health care Z00.00 Lipid panel reflex to direct LDL Fasting     Glucose   2. Recurrent major depressive disorder, in full remission (H) F33.42 FLUoxetine (PROZAC) 10 MG tablet   3. Screening for malignant neoplasm of cervix Z12.4 Pap imaged thin layer screen with HPV - recommended age 30 - 65 years (select HPV order below)     HPV High Risk Types DNA Cervical   4. Paroxysmal atrial fibrillation (H) I48.0 Lipid panel reflex to direct LDL Fasting   Medication options to manage depression were discussed with the patient.  Celexa will be a feasible option because of its risk of QT prolongation.  Patient was started on Prozac today.  Advised to schedule a follow-up visit in 4-6 weeks.      COUNSELING:  Reviewed preventive health counseling, as reflected in patient instructions       Regular exercise       Healthy diet/nutrition       Vision screening       Hearing screening    BP Readings from Last 1 Encounters:   03/12/19 122/82     Estimated body mass index is 37.36 kg/m  as calculated from the following:    Height as of this encounter: 1.689 m (5' 6.5\").    Weight as of this encounter: 106.6 kg (235 lb).    Counseling Resources:  ATP IV Guidelines  Pooled Cohorts Equation Calculator  Breast Cancer Risk Calculator  FRAX Risk Assessment  ICSI Preventive Guidelines  Dietary Guidelines for Americans, 2010  USDA's MyPlate  ASA Prophylaxis  Lung CA Screening    Ken Jurado MD  [facility]  "

## 2019-04-08 NOTE — TELEPHONE ENCOUNTER
Dear Danitza.   I would recommend starting Effexor. There is no interaction between Effexor and Flecainide. I checked with both [facility] and micromedex.    Best Regards  Ken Jurado MD.

## 2019-04-09 ENCOUNTER — TRANSFERRED RECORDS (OUTPATIENT)
Dept: HEALTH INFORMATION MANAGEMENT | Facility: CLINIC | Age: 39
End: 2019-04-09

## 2019-04-09 NOTE — TELEPHONE ENCOUNTER
Mirtazapine is an antidepressant with low interaction with flecainide. Can you ask your doctor if they recommend that as an antidepressant ?

## 2019-04-10 LAB
COPATH REPORT: NORMAL
PAP: NORMAL

## 2019-04-12 LAB
FINAL DIAGNOSIS: NORMAL
HPV HR 12 DNA CVX QL NAA+PROBE: NEGATIVE
HPV16 DNA SPEC QL NAA+PROBE: NEGATIVE
HPV18 DNA SPEC QL NAA+PROBE: NEGATIVE
SPECIMEN DESCRIPTION: NORMAL
SPECIMEN SOURCE CVX/VAG CYTO: NORMAL

## 2019-04-30 ENCOUNTER — PATIENT OUTREACH (OUTPATIENT)
Dept: CARE COORDINATION | Facility: CLINIC | Age: 39
End: 2019-04-30

## 2019-04-30 NOTE — PROGRESS NOTES
Clinic Care Coordination Contact  [facility]/Voicemail       Clinical Data: Care Coordinator Outreach  Chart review:  Patient had a yearly phsical with PCP on 4/8/19.  Patient wanted to start on an anti depressant but had concerns of a drug interaction with Flecainide.  Patient was asured by PCP and cardiology there is not a drug interaction between flecainide and Effexor.     Outreach attempted.  Left message on voicemail with call back information and requested return call.  Plan:  Care Coordinator will follow up with patient in the next month if no return call from patient.

## 2019-05-03 ENCOUNTER — OFFICE VISIT (OUTPATIENT)
Dept: FAMILY MEDICINE | Facility: CLINIC | Age: 39
End: 2019-05-03
Payer: OTHER MISCELLANEOUS

## 2019-05-03 VITALS
BODY MASS INDEX: 35.82 KG/M2 | HEIGHT: 67 IN | HEART RATE: 59 BPM | DIASTOLIC BLOOD PRESSURE: 78 MMHG | OXYGEN SATURATION: 98 % | SYSTOLIC BLOOD PRESSURE: 111 MMHG | WEIGHT: 228.2 LBS | RESPIRATION RATE: 14 BRPM

## 2019-05-03 DIAGNOSIS — M77.12 LEFT LATERAL EPICONDYLITIS: Primary | ICD-10-CM

## 2019-05-03 DIAGNOSIS — I10 ESSENTIAL HYPERTENSION WITH GOAL BLOOD PRESSURE LESS THAN 140/90: ICD-10-CM

## 2019-05-03 PROCEDURE — 99214 OFFICE O/P EST MOD 30 MIN: CPT | Performed by: FAMILY MEDICINE

## 2019-05-03 ASSESSMENT — MIFFLIN-ST. JEOR: SCORE: 1742.74

## 2019-05-03 ASSESSMENT — PAIN SCALES - GENERAL: PAINLEVEL: NO PAIN (0)

## 2019-05-03 NOTE — PROGRESS NOTES
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 Now she needs to have the resrtictions removed . 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        Additional history: as documented    Reviewed  and updated as needed this visit by clinical staff  Allergies  Meds         Reviewed and updated as needed this visit by Provider         Patient Active Problem List   Diagnosis     Anxiety state     Esophageal reflux     Morbid obesity due to excess calories (H)     Tobacco use disorder     CARDIOVASCULAR SCREENING; LDL GOAL LESS THAN 130     Hypertension goal BP (blood pressure) < 140/90     Acne     Papanicolaou smear of cervix with low grade squamous intraepithelial lesion (LGSIL)     Mixed hyperlipidemia     LEONARDO (obstructive sleep apnea)     Obesity hypoventilation syndrome (H)     Lumbago     Left anterior knee pain     Paroxysmal atrial fibrillation (H)     Morbid (severe) obesity due to excess calories (H)     Recurrent major depressive disorder, in full remission (H)     Past Surgical History:   Procedure Laterality Date     HC TOOTH EXTRACTION W/FORCEP  1995    [place] Teeth Extraction     LAPAROSCOPIC GASTRIC SLEEVE N/A 11/27/2018    Procedure: Laparoscopic Sleeve Gastrectomy Latex Free;  Surgeon: Artis Tse MD;  Location:  OR     LAPAROSCOPIC HERNIORRHAPHY HIATAL  11/27/2018    Procedure: LAPAROSCOPIC  HIATAL Hernia Repair;  Surgeon: Artis Tse MD;  Location:  OR       Social  History     Tobacco Use     Smoking status: Former Smoker     Packs/day: 1.00     Years: 10.00     Pack years: 10.00     Types: Cigarettes     Start date: 2004     Last attempt to quit: 8/15/2018     Years since quittin.7     Smokeless tobacco: Never Used     Tobacco comment:  pack or less a day   Substance Use Topics     Alcohol use: Yes     Alcohol/week: 0.0 oz     Comment: Occas.     Family History   Problem Relation Age of Onset     Heart Disease Mother         ,mother had emphesema and  at 62     Hypertension Mother      Allergies Mother      Lipids Mother      Obesity Mother      Respiratory Mother         Emphysema     Diabetes Maternal Grandmother         Type 2?     Hypertension Maternal Grandmother      Circulatory Maternal Grandmother         Due to diabetes     Eye Disorder Maternal Grandmother         Macular degeneration/Macular degeneration     Obesity Maternal Grandmother      Hypertension Father      Lipids Father      Cancer Father      Prostate Cancer Father      Other Cancer Father      Cerebrovascular Disease Paternal Grandmother      Alcohol/Drug Maternal Grandfather      Obesity Maternal Grandfather      Psychotic Disorder Paternal Grandfather         Committed Suicide     Depression Paternal Grandfather      Allergies Sister      Genitourinary Problems Sister          Current Outpatient Medications   Medication Sig Dispense Refill     acetaminophen (TYLENOL) 325 MG tablet Take 2 tablets (650 mg) by mouth every 4 hours as needed for other (For optimal non-opioid multimodal pain management to improve pain control and physical function.) 100 tablet 0     apixaban ANTICOAGULANT (ELIQUIS) 5 MG tablet Take 1 tablet (5 mg) by mouth 2 times daily 180 tablet 3     Cholecalciferol (VITAMIN D-3) 5000 units TABS Take 1 tablet by mouth daily       diclofenac (VOLTAREN) 1 % topical gel Place 2 g onto the skin 4 times daily 100 g 0     fish oil-omega-3 fatty acids 1000 MG capsule Take 2 g by  mouth every morning        flecainide (TAMBOCOR) 50 MG tablet Take 1 tablet (50 mg) by mouth 2 times daily 180 tablet 3     lisinopril (PRINIVIL/ZESTRIL) 40 MG tablet Take 1 tablet (40 mg) by mouth daily (Patient taking differently: Take 20 mg by mouth every evening ) 90 tablet 3     LORazepam (ATIVAN) 0.5 MG tablet Take 1 tablet (0.5 mg) by mouth every 8 hours as needed for anxiety 20 tablet 0     metoprolol succinate ER (TOPROL-XL) 50 MG 24 hr tablet Take 1 tablet (50 mg) by mouth daily 90 tablet 3     Multiple Vitamins-Minerals (CENTRUM PO)        naltrexone (DEPADE/REVIA) 50 MG tablet Take 1/2 Tablet then increase to maximum of 1 Full Tablet daily as tolerated.  Time it one to two hours prior to worst cravings 30 tablet 3     Probiotic Product (PROBIOTIC ADVANCED PO)        topiramate (TOPAMAX) 25 MG tablet Take 1 tablet (25 mg) by mouth daily 30 tablet 3     ursodiol (ACTIGALL) 300 MG capsule Take 1 capsule (300 mg) by mouth 2 times daily 60 capsule 5     venlafaxine (EFFEXOR-XR) 75 MG 24 hr capsule Take 1 capsule (75 mg) by mouth daily 30 capsule 1     VENTOLIN  (90 Base) MCG/ACT Inhaler INHALE 1-2 PUFFS EVERY 4-6 HOURS AS NEEDED FOR WHEEZING  0     Allergies   Allergen Reactions     Wellbutrin [Bupropion]      Wellbutrin: Panic attacks, heart/chest pain     Recent Labs   Lab Test 03/07/19  1251 11/28/18  0554  09/24/18  1452 07/24/18  1023 06/19/18  0952  03/21/17  0926 08/24/16  0915 01/13/16  1138  10/08/13  0808   A1C  --   --   --   --  6.1*  --   --   --   --   --   --  5.4   LDL  --   --   --   --   --   --   --  155* 174* 162*  --  149*   HDL  --   --   --   --   --   --   --  30* 29* 33*  --  33*   TRIG  --   --   --   --   --   --   --  203* 231* 207*  --  179*   ALT 18  --   --   --  22 33   < > 24  --  26  --  26   CR 0.66 0.74   < >  --  0.63 0.65   < > 0.62 0.61 0.59   < > 0.60   GFRESTIMATED >90 87   < >  --  >90 >90   < > >90  Non  GFR Calc   >90  Non  GFR  "Calc   >90  Non  GFR Calc     < > >90   GFRESTBLACK >90 >90   < >  --  >90 >90   < > >90   GFR Calc   >90   GFR Calc   >90   GFR Calc     < > >90   POTASSIUM 3.9 3.8   < >  --  3.7 3.9   < > 4.0 3.9 4.0   < > 3.8   TSH  --   --   --  1.48  --   --   --   --   --  1.79   < >  --     < > = values in this interval not displayed.      BP Readings from Last 3 Encounters:   05/03/19 111/78   03/12/19 122/82   03/07/19 132/72    Wt Readings from Last 3 Encounters:   05/03/19 103.5 kg (228 lb 3.2 oz)   04/08/19 106.6 kg (235 lb)   03/12/19 110.7 kg (244 lb)                  Labs reviewed in EPIC    ROS:  Constitutional, HEENT, cardiovascular, pulmonary, GI, , musculoskeletal, neuro, skin, endocrine and psych systems are negative, except as otherwise noted.    OBJECTIVE:     /78 (BP Location: Left arm, Patient Position: Sitting, Cuff Size: Adult Large)   Pulse 59   Resp 14   Ht 1.702 m (5' 7\")   Wt 103.5 kg (228 lb 3.2 oz)   LMP 04/10/2019 (Exact Date)   SpO2 98%   BMI 35.74 kg/m    Body mass index is 35.74 kg/m .  GENERAL: healthy, alert and no distress  EYES: Eyes grossly normal to inspection, PERRL and conjunctivae and sclerae normal  NECK: no adenopathy, no asymmetry, masses, or scars and thyroid normal to palpation  RESP: lungs clear to auscultation - no rales, rhonchi or wheezes  CV: regular rate and rhythm, normal S1 S2, no S3 or S4, no murmur, click or rub, no peripheral edema and peripheral pulses strong  MS: no gross musculoskeletal defects noted, no edema    Diagnostic Test Results:  Results for orders placed or performed in visit on 04/08/19   Glucose   Result Value Ref Range    Glucose 95 70 - 99 mg/dL   Pap imaged thin layer screen with HPV - recommended age 30 - 65 years (select HPV order below)   Result Value Ref Range    PAP NIL     Copath Report         Patient Name: RONAN ANNA  MR#: [id number]  Specimen #: " [id number]  Collected: 4/8/2019  Received: 4/8/2019  Reported: 4/10/2019 11:17  Ordering Phy(s): KEMAR BROWN    For improved result formatting, select 'View Enhanced Report Format' under   Linked Documents section.    SPECIMEN/STAIN PROCESS:  Pap imaged thin layer prep screening (Surepath, FocalPoint with guided   screening)       Pap-Cyto x 1, HPV ordered x 1    SOURCE: Cervical, endocervical  ----------------------------------------------------------------   Pap imaged thin layer prep screening (Surepath, FocalPoint with guided   screening)  SPECIMEN ADEQUACY:  Satisfactory for evaluation.  -Transformation zone component present.    CYTOLOGIC INTERPRETATION:    Negative for intraepithelial lesion or malignancy    Electronically signed out by:  DAYNE Davis (ASCP)    Processed and screened at [facility]    CLINICAL HISTORY:    A previous normal pap  Date  of Last Pap: 03/21/2017,    Papanicolaou Test Limitations:  Cervical cytology is a screening test with   limited sensitivity; regular  screening is critical for cancer prevention; Pap tests are primarily   effective for the diagnosis/prevention of  squamous cell carcinoma, not adenocarcinomas or other cancers.    TESTING LAB LOCATION:  [address]  [phone]    COLLECTION SITE:  Client:  [facility]  Location: [facility] ([facility])     HPV High Risk Types DNA Cervical   Result Value Ref Range    HPV Source SurePath     HPV 16 DNA Negative NEG^Negative    HPV 18 DNA Negative NEG^Negative    Other HR HPV Negative NEG^Negative    Final Diagnosis This patient's sample is negative for HPV DNA.     Specimen Description Cervical Cells        ASSESSMENT/PLAN:       (M77.12) Left lateral epicondylitis  (primary encounter diagnosis)  Comment: we discussed that her epicondylitis gets exacerbated at times with  activities, seems that her PT , ice, rest topical Voltaren gel helped and now she is feeling better , not hurting as much and I have given her the letter to go back to work without any restrictions   Plan: RTC if no improving or worsening.      (I10) Essential hypertension with goal blood pressure less than 140/90  Comment: seems that her BP is well under control now that she is losing weight after the gastrin bypass sleeve procedure , she is still taking lisinopril 40mg and we have discussed if the BP is low she can cut them in half and take 20mg daily dose   Plan: f/u in three months , sooner if any concerns.  RTC if no improving or worsening.  .Pt is aware  and comfortable with the current plan.          Polly Mcbride MD  [facility]

## 2019-05-03 NOTE — LETTER
[facility]  [address]  [address]  [place], [place] [address]  [phone]    May 3, 2019    RE:  Danitza Soto                                                                                                                                                       [address] [facility] [address]            To whom it may concern:    Danitza Soto is under my professional care . She can resume work with no restrictions.       Sincerely,        Polly Mcbride MD      Clinic hours:  Monday 7:30 AM - 5:00 PM    Tuesday  7:00 AM - 7:00 PM    Wednesday  7:00 AM - 5:00 PM    Thursday  7:30 AM -  7:00 PM    Friday   7:30 AM -  5:00 PM

## 2019-05-14 ENCOUNTER — OFFICE VISIT (OUTPATIENT)
Dept: CARDIOLOGY | Facility: CLINIC | Age: 39
End: 2019-05-14
Payer: COMMERCIAL

## 2019-05-14 VITALS
DIASTOLIC BLOOD PRESSURE: 78 MMHG | WEIGHT: 230 LBS | BODY MASS INDEX: 36.02 KG/M2 | HEART RATE: 51 BPM | OXYGEN SATURATION: 98 % | SYSTOLIC BLOOD PRESSURE: 120 MMHG

## 2019-05-14 DIAGNOSIS — I10 ESSENTIAL HYPERTENSION: ICD-10-CM

## 2019-05-14 DIAGNOSIS — Z51.81 ENCOUNTER FOR MONITORING FLECAINIDE THERAPY: Primary | ICD-10-CM

## 2019-05-14 DIAGNOSIS — Z79.899 ENCOUNTER FOR MONITORING FLECAINIDE THERAPY: Primary | ICD-10-CM

## 2019-05-14 DIAGNOSIS — I48.0 PAROXYSMAL ATRIAL FIBRILLATION (H): ICD-10-CM

## 2019-05-14 PROCEDURE — 99214 OFFICE O/P EST MOD 30 MIN: CPT | Performed by: INTERNAL MEDICINE

## 2019-05-14 RX ORDER — FLECAINIDE ACETATE 100 MG/1
100 TABLET ORAL 2 TIMES DAILY
Qty: 180 TABLET | Refills: 3 | Status: SHIPPED | OUTPATIENT
Start: 2019-05-14 | End: 2020-02-18

## 2019-05-14 NOTE — NURSING NOTE
"Chief Complaint   Patient presents with     Atrial Fib     Paroxysmal atrial fibrillation follow up for 3 month. - dr       Initial /78 (BP Location: Left arm, Patient Position: Sitting, Cuff Size: Adult Large)   Pulse 51   Wt 104.3 kg (230 lb)   SpO2 98%   BMI 36.02 kg/m   Estimated body mass index is 36.02 kg/m  as calculated from the following:    Height as of 5/3/19: 1.702 m (5' 7\").    Weight as of this encounter: 104.3 kg (230 lb)..  BP completed using cuff size: large    [facility]    "

## 2019-05-14 NOTE — LETTER
5/14/2019      RE: Danitza Soto  [address]  [facility] [address]       Dear Colleague,    Thank you for the opportunity to participate in the care of your patient, Danitza Soto, at the [facility]. Please see a copy of my visit note below.    Chief complaint: Palpitations    HPI: Ms. Danitza Soto is a 38 year old  female with PMH significant for depression, GERD, LEONARDO, essential hypertension, morbid obesity S/P gastric bypass 11/2018 and paroxysmal atrial fibrillation.    The patient was last seen in cardiology for paroxysmal atrial fibrillation diagnosis in 9/2018.  Her symptoms were mild at that time therefore rate or rhythm control strategy was not pursued.  The patient underwent gastric sleeve surgery in 11/2018 and has lost 70 pounds.  The patient noticed more frequent atrial fibrillation episodes since the surgery almost weekly now lasting up to 8-10 hours.  She finds works stress as a prominent trigger for episodes.  Associated symptoms include chest pressure, chest pain and dizziness.  She denies chest discomfort with exertion at other times.  The patient denies a history of dyspnea, PND, orthopnea, pedal edema, and syncope  Current medications include lisinopril 20 mg, fish oil.  She has cut down on lisinopril from 40 mg since she has lost significant weight.  She quit smoking in 2018 (10 pack years).  No history of alcohol abuse. No history of diabetes.  Family history is negative for CAD.  Prior cardiac tests include CT coronary angiogram in 2013 which did not show evidence of CAD.  Echocardiogram dated 9/2018 showed normal LV and RV function with no significant valve disease.  Moderate LVH was reported. I personnally reviewed the images and I donot agree with the LVH diagnosis. I donot think she has LVH based on echo. Zio patch in 8/2018 showed 4% atrial fibrillation burden heart rate  ranged  beats per minute.  Longest episode lasting for 8 hours.    I have reviewed her ECG 11/28/2018 which shows normal sinus rhythm with single PAC, normal TX/QRS/QTC intervals. No evidence of LVH on ECG.    Medications, personal, family, and social history reviewed with patient and revised.    Interval history 5/14/2019:  Patient returns for follow-up to recheck on paroxysmal atrial fibrillation.  Since I saw 3 months ago, patient developed depression and started on Effexor which improved her mood.  I have started her on flecainide and metoprolol 3 months ago for frequent paroxysmal atrial fibrillation episodes.  She noticed significant decline in the frequency of the episodes however she still reports at least 2 episodes per month sometimes lasting for several hours.  She reports associated dizziness however denies chest pain, shortness of breath, lower extremity edema or syncope.  Since gastric bypass patient lost significant weight with improvement in blood pressure and sleep apnea.  She no longer uses CPAP machine.  She is overall feeling well.    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 - BRISEYDA I     LEONARDO on CPAP      Other anxiety states        CURRENT MEDICATIONS:  Current Outpatient Medications   Medication Sig Dispense Refill     acetaminophen (TYLENOL) 325 MG tablet Take 2 tablets (650 mg) by mouth every 4 hours as needed for other (For optimal non-opioid multimodal pain management to improve pain control and physical function.) 100 tablet 0     apixaban ANTICOAGULANT (ELIQUIS) 5 MG tablet Take 1 tablet (5 mg) by mouth 2 times daily 180 tablet 3     Cholecalciferol (VITAMIN D-3) 5000 units TABS Take 1 tablet by mouth daily       diclofenac  (VOLTAREN) 1 % topical gel Place 2 g onto the skin 4 times daily 100 g 0     fish oil-omega-3 fatty acids 1000 MG capsule Take 2 g by mouth every morning        flecainide (TAMBOCOR) 50 MG tablet Take 1 tablet (50 mg) by mouth 2 times daily 180 tablet 3     lisinopril (PRINIVIL/ZESTRIL) 40 MG tablet Take 1 tablet (40 mg) by mouth daily (Patient taking differently: Take 20 mg by mouth every evening ) 90 tablet 3     LORazepam (ATIVAN) 0.5 MG tablet Take 1 tablet (0.5 mg) by mouth every 8 hours as needed for anxiety 20 tablet 0     metoprolol succinate ER (TOPROL-XL) 50 MG 24 hr tablet Take 1 tablet (50 mg) by mouth daily 90 tablet 3     Multiple Vitamins-Minerals (CENTRUM PO)        naltrexone (DEPADE/REVIA) 50 MG tablet Take 1/2 Tablet then increase to maximum of 1 Full Tablet daily as tolerated.  Time it one to two hours prior to worst cravings 30 tablet 3     Probiotic Product (PROBIOTIC ADVANCED PO)        topiramate (TOPAMAX) 25 MG tablet Take 1 tablet (25 mg) by mouth daily 30 tablet 3     ursodiol (ACTIGALL) 300 MG capsule Take 1 capsule (300 mg) by mouth 2 times daily 60 capsule 5     venlafaxine (EFFEXOR-XR) 75 MG 24 hr capsule Take 1 capsule (75 mg) by mouth daily 30 capsule 1     VENTOLIN  (90 Base) MCG/ACT Inhaler INHALE 1-2 PUFFS EVERY 4-6 HOURS AS NEEDED FOR WHEEZING  0       PAST SURGICAL HISTORY:  Past Surgical History:   Procedure Laterality Date     HC TOOTH EXTRACTION W/FORCEP  1995    [place] Teeth Extraction     LAPAROSCOPIC GASTRIC SLEEVE N/A 11/27/2018    Procedure: Laparoscopic Sleeve Gastrectomy Latex Free;  Surgeon: Artis Tse MD;  Location: [facility] OR     LAPAROSCOPIC HERNIORRHAPHY HIATAL  11/27/2018    Procedure: LAPAROSCOPIC  HIATAL Hernia Repair;  Surgeon: Artis Tse MD;  Location: [facility] OR       ALLERGIES:     Allergies   Allergen Reactions     Wellbutrin [Bupropion]      Wellbutrin: Panic attacks, heart/chest pain       FAMILY HISTORY:  Family History   Problem  Relation Age of Onset     Heart Disease Mother         ,mother had emphesema and  at 62     Hypertension Mother      Allergies Mother      Lipids Mother      Obesity Mother      Respiratory Mother         Emphysema     Diabetes Maternal Grandmother         Type 2?     Hypertension Maternal Grandmother      Circulatory Maternal Grandmother         Due to diabetes     Eye Disorder Maternal Grandmother         Macular degeneration/Macular degeneration     Obesity Maternal Grandmother      Hypertension Father      Lipids Father      Cancer Father      Prostate Cancer Father      Other Cancer Father      Cerebrovascular Disease Paternal Grandmother      Alcohol/Drug Maternal Grandfather      Obesity Maternal Grandfather      Psychotic Disorder Paternal Grandfather         Committed Suicide     Depression Paternal Grandfather      Allergies Sister      Genitourinary Problems Sister          SOCIAL HISTORY:  Social History     Tobacco Use     Smoking status: Former Smoker     Packs/day: 1.00     Years: 10.00     Pack years: 10.00     Types: Cigarettes     Start date: 2004     Last attempt to quit: 8/15/2018     Years since quittin.7     Smokeless tobacco: Never Used     Tobacco comment:  pack or less a day   Substance Use Topics     Alcohol use: Yes     Alcohol/week: 0.0 oz     Comment: Occas.     Drug use: No       ROS:   Constitutional: No fever, chills, or sweats. Weight loss (intentional)  ENT: No visual disturbance, ear ache, epistaxis, sore throat.   Cardiovascular: As per HPI.   Respiratory: No cough, hemoptysis.    : No hematuria.   Integument: Negative.   Psychiatric: Depression+  Hematologic:  No easy bruising, no easy bleeding.  Neuro: Negative.   Musculoskeletal: No myalgia.    Exam:  /78 (BP Location: Left arm, Patient Position: Sitting, Cuff Size: Adult Large)   Pulse 51   Wt 104.3 kg (230 lb)   SpO2 98%   BMI 36.02 kg/m     GENERAL APPEARANCE: alert and no distress  HEENT: no icterus,  no central cyanosis.  RESPIRATORY: lungs clear to auscultation - no rales, rhonchi or wheezes, no use of accessory muscles, no retractions, respirations are unlabored, normal respiratory rate  CARDIOVASCULAR: regular rhythm, normal S1, S2, no S3 or S4 and no murmur, click or rub, precordium quiet with normal PMI.  EXTREMITIES: no edema  NEURO: alert, normal speech,and affect  SKIN: no ecchymoses, no rashes     I have reviewed the labs and personally reviewed the imaging below and made my comment in the assessment and plan.    Labs:  CBC RESULTS:   Lab Results   Component Value Date    WBC 8.3 03/07/2019    RBC 4.84 03/07/2019    HGB 13.6 03/07/2019    HCT 44.1 03/07/2019    MCV 91 03/07/2019    MCH 28.1 03/07/2019    MCHC 30.8 (L) 03/07/2019    RDW 15.5 (H) 03/07/2019     03/07/2019       BMP RESULTS:  Lab Results   Component Value Date     03/07/2019    POTASSIUM 3.9 03/07/2019    CHLORIDE 109 03/07/2019    CO2 24 03/07/2019    ANIONGAP 6 03/07/2019    GLC 95 04/08/2019    BUN 10 03/07/2019    CR 0.66 03/07/2019    GFRESTIMATED >90 03/07/2019    GFRESTBLACK >90 03/07/2019    CHIKI 8.2 (L) 03/07/2019        INR RESULTS:  Lab Results   Component Value Date    INR 0.98 11/06/2018       Echocardiogram 9/26/2018  Global and regional left ventricular function is normal with an EF of 60-65%.  Moderate concentric wall thickening consistent with left ventricular  hypertrophy is present.  Right ventricular function, chamber size, wall motion, and thickness are  normal.  Mild biatrial enlargement is present.  No significant valve abnormalities present.  The inferior vena cava was normal in size    Nuclear stress test with Lexiscan 9/6/2018  Normal myocardial SPECT study with a summed stress score of 0.     CT coronary artery angiogram 6/27/2013  No evidence of coronary artery disease    EKG 11/28/2018 normal.    Assessment and Plan:   Ms. Danitza Soto is a 38 year old  female with PMH significant for  depression, GERD, LEONARDO, essential hypertension, morbid obesity S/P gastric bypass 11/2018 and paroxysmal atrial fibrillation.    1.  Paroxysmal atrial fibrillation: First detected 9 months ago.  The patient was symptomatic with weekly episodes lasting up to 8-10 hours before I have started her on flecainide and metoprolol.  She still reports episodes however significantly decreased to 2 episodes per month sometimes lasting for several hours associated with dizziness.  I discussed with her increasing the dose of flecainide versus catheter ablation for atrial fibrillation.  She prefers to stay on medical treatment for now.  I recommended to increase dose of flecainide 100 mg twice daily and continue all other current medications.  Treadmill stress test in 2 weeks.  She is currently on apixaban 5 mg twice daily with no bleeding issues.    2.  Hypertension: Well-controlled with current medical treatment.      3.  Obstructive sleep apnea: Resolved after gastric sleeve surgery.      Plan:  Start metoprolol 50 XL mg daily  Increase flecainide dose 100 mg twice daily; threadmill stress test in 2 weeks.  Continue apixaban 5 mg twice daily  Continue lisinopril 20 mg    Return to clinic in 3 months for follow-up.    A total of 30 minutes spent face-toface with greater than 50% of the time spent in counseling and coordinating cares of the issues above.     Please donot hesitate to contact me if you have any questions or concerns. Again, thank you for allowing me to participate in the care of your patient.    Leena RONDON MD  [facility] Division of Cardiology  Pager [phone]

## 2019-05-14 NOTE — PATIENT INSTRUCTIONS
Thank you for coming to the [facility] @ [facility]; please note the following instructions:    1. MEDICATION CHANGES TODAY:    * Your new prescription change  will be at the Pharmacy you prefer.    ** INCREASE flecainide 100 mg twice daily.  A prescription has been sent to your preferred pharmacy.      2. Treadmill stress test in 2 weeks , see next page for appointment.  Hold your metoprolol the day before and day of the test    3.Dr. Leena Heath has requested you to follow up in 3 Month; please see following pages for appointment detail information.                If you have any questions regarding your visit please contact your care team:     Cardiology  Telephone Number   Hannah YEPEZ, RN  Josette RUSS,RN  Shabana RAMIREZ, NAPOLEON HUGHES, MA  [place] BAUTISTA, VANDANAN   [phone]    *After hours: [phone]   For scheduling appts:     [phone] or    [phone] (select option 1)    *After hours: [phone]     For the Device Clinic (Pacemakers and ICD's)  RN's :  Hanh Cochran   During business hours: [phone]    *After business hours:  [phone] (select option 4)      Normal test result notifications will be released via [employer] or mailed within 7 business days.  All other test results, will be communicated via telephone once reviewed by your cardiologist.    If you need a medication refill please contact your pharmacy.  Please allow 3 business days for your refill to be completed.    As always, thank you for trusting us with your health care needs!

## 2019-05-14 NOTE — PROGRESS NOTES
Chief complaint: Palpitations    HPI: Ms. Danitza Soto is a 38 year old  female with PMH significant for depression, GERD, LEONARDO, essential hypertension, morbid obesity S/P gastric bypass 11/2018 and paroxysmal atrial fibrillation.    The patient was last seen in cardiology for paroxysmal atrial fibrillation diagnosis in 9/2018.  Her symptoms were mild at that time therefore rate or rhythm control strategy was not pursued.  The patient underwent gastric sleeve surgery in 11/2018 and has lost 70 pounds.  The patient noticed more frequent atrial fibrillation episodes since the surgery almost weekly now lasting up to 8-10 hours.  She finds works stress as a prominent trigger for episodes.  Associated symptoms include chest pressure, chest pain and dizziness.  She denies chest discomfort with exertion at other times.  The patient denies a history of dyspnea, PND, orthopnea, pedal edema, and syncope  Current medications include lisinopril 20 mg, fish oil.  She has cut down on lisinopril from 40 mg since she has lost significant weight.  She quit smoking in 2018 (10 pack years).  No history of alcohol abuse. No history of diabetes.  Family history is negative for CAD.  Prior cardiac tests include CT coronary angiogram in 2013 which did not show evidence of CAD.  Echocardiogram dated 9/2018 showed normal LV and RV function with no significant valve disease.  Moderate LVH was reported. I personnally reviewed the images and I donot agree with the LVH diagnosis. I donot think she has LVH based on echo. Zio patch in 8/2018 showed 4% atrial fibrillation burden heart rate ranged  beats per minute.  Longest episode lasting for 8 hours.    I have reviewed her ECG 11/28/2018 which shows normal sinus rhythm with single PAC, normal HI/QRS/QTC intervals. No evidence of LVH on ECG.    Medications, personal, family, and social history reviewed with patient and revised.    Interval history 5/14/2019:  Patient returns for  follow-up to recheck on paroxysmal atrial fibrillation.  Since I saw 3 months ago, patient developed depression and started on Effexor which improved her mood.  I have started her on flecainide and metoprolol 3 months ago for frequent paroxysmal atrial fibrillation episodes.  She noticed significant decline in the frequency of the episodes however she still reports at least 2 episodes per month sometimes lasting for several hours.  She reports associated dizziness however denies chest pain, shortness of breath, lower extremity edema or syncope.  Since gastric bypass patient lost significant weight with improvement in blood pressure and sleep apnea.  She no longer uses CPAP machine.  She is overall feeling well.    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 - BRISEYDA I     LEONARDO on CPAP      Other anxiety states        CURRENT MEDICATIONS:  Current Outpatient Medications   Medication Sig Dispense Refill     acetaminophen (TYLENOL) 325 MG tablet Take 2 tablets (650 mg) by mouth every 4 hours as needed for other (For optimal non-opioid multimodal pain management to improve pain control and physical function.) 100 tablet 0     apixaban ANTICOAGULANT (ELIQUIS) 5 MG tablet Take 1 tablet (5 mg) by mouth 2 times daily 180 tablet 3     Cholecalciferol (VITAMIN D-3) 5000 units TABS Take 1 tablet by mouth daily       diclofenac (VOLTAREN) 1 % topical gel Place 2 g onto the skin 4 times daily 100 g 0     fish oil-omega-3 fatty acids 1000 MG capsule Take 2 g by mouth every morning        flecainide (TAMBOCOR) 50 MG tablet Take 1 tablet (50 mg) by mouth 2 times daily 180 tablet 3     lisinopril (PRINIVIL/ZESTRIL) 40 MG tablet Take 1 tablet (40 mg) by mouth daily (Patient taking  differently: Take 20 mg by mouth every evening ) 90 tablet 3     LORazepam (ATIVAN) 0.5 MG tablet Take 1 tablet (0.5 mg) by mouth every 8 hours as needed for anxiety 20 tablet 0     metoprolol succinate ER (TOPROL-XL) 50 MG 24 hr tablet Take 1 tablet (50 mg) by mouth daily 90 tablet 3     Multiple Vitamins-Minerals (CENTRUM PO)        naltrexone (DEPADE/REVIA) 50 MG tablet Take 1/2 Tablet then increase to maximum of 1 Full Tablet daily as tolerated.  Time it one to two hours prior to worst cravings 30 tablet 3     Probiotic Product (PROBIOTIC ADVANCED PO)        topiramate (TOPAMAX) 25 MG tablet Take 1 tablet (25 mg) by mouth daily 30 tablet 3     ursodiol (ACTIGALL) 300 MG capsule Take 1 capsule (300 mg) by mouth 2 times daily 60 capsule 5     venlafaxine (EFFEXOR-XR) 75 MG 24 hr capsule Take 1 capsule (75 mg) by mouth daily 30 capsule 1     VENTOLIN  (90 Base) MCG/ACT Inhaler INHALE 1-2 PUFFS EVERY 4-6 HOURS AS NEEDED FOR WHEEZING  0       PAST SURGICAL HISTORY:  Past Surgical History:   Procedure Laterality Date     HC TOOTH EXTRACTION W/FORCEP      Bertrand Teeth Extraction     LAPAROSCOPIC GASTRIC SLEEVE N/A 2018    Procedure: Laparoscopic Sleeve Gastrectomy Latex Free;  Surgeon: Artis Tse MD;  Location: [facility] OR     LAPAROSCOPIC HERNIORRHAPHY HIATAL  2018    Procedure: LAPAROSCOPIC  HIATAL Hernia Repair;  Surgeon: Artis Tse MD;  Location: [facility] OR       ALLERGIES:     Allergies   Allergen Reactions     Wellbutrin [Bupropion]      Wellbutrin: Panic attacks, heart/chest pain       FAMILY HISTORY:  Family History   Problem Relation Age of Onset     Heart Disease Mother         ,mother had emphesema and  at 62     Hypertension Mother      Allergies Mother      Lipids Mother      Obesity Mother      Respiratory Mother         Emphysema     Diabetes Maternal Grandmother         Type 2?     Hypertension Maternal Grandmother      Circulatory Maternal Grandmother         Due  to diabetes     Eye Disorder Maternal Grandmother         Macular degeneration/Macular degeneration     Obesity Maternal Grandmother      Hypertension Father      Lipids Father      Cancer Father      Prostate Cancer Father      Other Cancer Father      Cerebrovascular Disease Paternal Grandmother      Alcohol/Drug Maternal Grandfather      Obesity Maternal Grandfather      Psychotic Disorder Paternal Grandfather         Committed Suicide     Depression Paternal Grandfather      Allergies Sister      Genitourinary Problems Sister          SOCIAL HISTORY:  Social History     Tobacco Use     Smoking status: Former Smoker     Packs/day: 1.00     Years: 10.00     Pack years: 10.00     Types: Cigarettes     Start date: 2004     Last attempt to quit: 8/15/2018     Years since quittin.7     Smokeless tobacco: Never Used     Tobacco comment:  pack or less a day   Substance Use Topics     Alcohol use: Yes     Alcohol/week: 0.0 oz     Comment: Occas.     Drug use: No       ROS:   Constitutional: No fever, chills, or sweats. Weight loss (intentional)  ENT: No visual disturbance, ear ache, epistaxis, sore throat.   Cardiovascular: As per HPI.   Respiratory: No cough, hemoptysis.    : No hematuria.   Integument: Negative.   Psychiatric: Depression+  Hematologic:  No easy bruising, no easy bleeding.  Neuro: Negative.   Musculoskeletal: No myalgia.    Exam:  /78 (BP Location: Left arm, Patient Position: Sitting, Cuff Size: Adult Large)   Pulse 51   Wt 104.3 kg (230 lb)   SpO2 98%   BMI 36.02 kg/m    GENERAL APPEARANCE: alert and no distress  HEENT: no icterus, no central cyanosis.  RESPIRATORY: lungs clear to auscultation - no rales, rhonchi or wheezes, no use of accessory muscles, no retractions, respirations are unlabored, normal respiratory rate  CARDIOVASCULAR: regular rhythm, normal S1, S2, no S3 or S4 and no murmur, click or rub, precordium quiet with normal PMI.  EXTREMITIES: no edema  NEURO: alert,  normal speech,and affect  SKIN: no ecchymoses, no rashes     I have reviewed the labs and personally reviewed the imaging below and made my comment in the assessment and plan.    Labs:  CBC RESULTS:   Lab Results   Component Value Date    WBC 8.3 03/07/2019    RBC 4.84 03/07/2019    HGB 13.6 03/07/2019    HCT 44.1 03/07/2019    MCV 91 03/07/2019    MCH 28.1 03/07/2019    MCHC 30.8 (L) 03/07/2019    RDW 15.5 (H) 03/07/2019     03/07/2019       BMP RESULTS:  Lab Results   Component Value Date     03/07/2019    POTASSIUM 3.9 03/07/2019    CHLORIDE 109 03/07/2019    CO2 24 03/07/2019    ANIONGAP 6 03/07/2019    GLC 95 04/08/2019    BUN 10 03/07/2019    CR 0.66 03/07/2019    GFRESTIMATED >90 03/07/2019    GFRESTBLACK >90 03/07/2019    CHIKI 8.2 (L) 03/07/2019        INR RESULTS:  Lab Results   Component Value Date    INR 0.98 11/06/2018       Echocardiogram 9/26/2018  Global and regional left ventricular function is normal with an EF of 60-65%.  Moderate concentric wall thickening consistent with left ventricular  hypertrophy is present.  Right ventricular function, chamber size, wall motion, and thickness are  normal.  Mild biatrial enlargement is present.  No significant valve abnormalities present.  The inferior vena cava was normal in size    Nuclear stress test with Lexiscan 9/6/2018  Normal myocardial SPECT study with a summed stress score of 0.     CT coronary artery angiogram 6/27/2013  No evidence of coronary artery disease    EKG 11/28/2018 normal.    Assessment and Plan:   Ms. Danitza Soto is a 38 year old  female with PMH significant for depression, GERD, LEONARDO, essential hypertension, morbid obesity S/P gastric bypass 11/2018 and paroxysmal atrial fibrillation.    1.  Paroxysmal atrial fibrillation: First detected 9 months ago.  The patient was symptomatic with weekly episodes lasting up to 8-10 hours before I have started her on flecainide and metoprolol.  She still reports episodes however  significantly decreased to 2 episodes per month sometimes lasting for several hours associated with dizziness.  I discussed with her increasing the dose of flecainide versus catheter ablation for atrial fibrillation.  She prefers to stay on medical treatment for now.  I recommended to increase dose of flecainide 100 mg twice daily and continue all other current medications.  Treadmill stress test in 2 weeks.  She is currently on apixaban 5 mg twice daily with no bleeding issues.    2.  Hypertension: Well-controlled with current medical treatment.      3.  Obstructive sleep apnea: Resolved after gastric sleeve surgery.      Plan:  Start metoprolol 50 XL mg daily  Increase flecainide dose 100 mg twice daily; threadmill stress test in 2 weeks.  Continue apixaban 5 mg twice daily  Continue lisinopril 20 mg    Return to clinic in 3 months for follow-up.    A total of 30 minutes spent face-toface with greater than 50% of the time spent in counseling and coordinating cares of the issues above.     Please donot hesitate to contact me if you have any questions or concerns. Again, thank you for allowing me to participate in the care of your patient.    Leena RONDON MD  [facility] Division of Cardiology  Pager [phone]

## 2019-05-15 ENCOUNTER — TELEPHONE (OUTPATIENT)
Dept: FAMILY MEDICINE | Facility: CLINIC | Age: 39
End: 2019-05-15

## 2019-05-15 NOTE — TELEPHONE ENCOUNTER
I have filled out the form, can you attach the most recent letter NOT the progress notes from May 3rd to go with this ?  Thanks

## 2019-05-15 NOTE — TELEPHONE ENCOUNTER
Received form(s) from risk management for health care provider report.  Placed form(s) in/on LS's desk.  Forms need to be filled out and signed and faxed to number listed on form..    Call pt to verify form was sent: No  Copy needs to be sent for scanning after completion: Yes

## 2019-05-16 ENCOUNTER — PATIENT OUTREACH (OUTPATIENT)
Dept: CARE COORDINATION | Facility: CLINIC | Age: 39
End: 2019-05-16

## 2019-05-16 NOTE — LETTER
[facility]  Complex Care Plan  About Me:    Patient Name:  Danitza Soto    YOB: 1980  Age:         38 year old   [place] MRN:    [id number] Telephone Information:  Home Phone [phone]   Mobile [phone]       Address:  [address] [facility] [address] Email address:  [email or web address]      Emergency Contact(s)    Name Relationship Lgl Grd Work Phone Home Phone Mobile Phone   1. MARC, ABRAN* Spouse No  none [phone]   2. ASIF SOTO Sister No none none [phone]           Primary language:  English     needed? No   [place] [employer]:  [phone] op. 1  Other communication barriers:    Preferred Method of Communication:  Maria Luz  Current living arrangement:    Mobility Status/ Medical Equipment:      Health Maintenance  Health Maintenance Reviewed:      My Access Plan  Medical Emergency 911   Primary Clinic Line [facility] [phone]   24 Hour Appointment Line [phone] or  [phone]) (toll-free)   24 Hour Nurse Line [phone] (toll-free)   Preferred Urgent Care     Preferred Hospital     Preferred Pharmacy [facility] [place] [address] AT [facility] [address]     Behavioral Health Crisis Line The National Suicide Prevention Lifeline at 1-180.757.5578 or 911             My Care Team Members  Patient Care Team       Relationship Specialty Notifications Start End    Polly Mcbride MD PCP - General   2/22/06     Phone: [phone] Fax: [phone] [address] [address]    Polly Mcbride MD Assigned PCP   3/26/17     Phone: [phone] Fax: [phone] [address] [address]    Amy Wolf, RN Nurse Coordinator Bariatric  7/25/18     Phone: [phone] Fax: [phone]         [address] [place] [address] [address] [facility] [address]    Kassy Dudley, RN Lead Care Coordinator  Primary Care -  Admissions 11/29/18     Phone: [phone] Fax: [phone]                My Care Plans  Self Management and Treatment Plan  Goals and (Comments)  Goals        General    Healthy Eating (pt-stated)     Notes - Note created  11/29/2018  1:31 PM by Kassy Dudley, RN    Goal 2  Statement: I will follow post surgery diet as directed   Measure of Success: completed  Supportive Steps to Achieve: understanding of diet  Barriers: na  Strengths: understanding of diet  Date to Achieve By: 1/1/19  Patient expressed understanding of goal: yes          Medical (pt-stated)     Notes - Note created  11/29/2018  1:29 PM by Kassy Dudley RN    Goal 1  Statement: I will take pain medication as directed.   Measure of Success: completed  Supportive Steps to Achieve: understanding of medications  Barriers: na   Strengths: desire for pain control  Date to Achieve By: 1/1/19  Patient expressed understanding of goal: yes                 Action Plans on File:            Depression          Advance Care Plans/Directives Type:        My Medical and Care Information  Problem List   Patient Active Problem List   Diagnosis     Anxiety state     Esophageal reflux     Morbid obesity due to excess calories (H)     Tobacco use disorder     CARDIOVASCULAR SCREENING; LDL GOAL LESS THAN 130     Hypertension goal BP (blood pressure) < 140/90     Acne     Papanicolaou smear of cervix with low grade squamous intraepithelial lesion (LGSIL)     Mixed hyperlipidemia     LEONARDO (obstructive sleep apnea)     Obesity hypoventilation syndrome (H)     Lumbago     Left anterior knee pain     Paroxysmal atrial fibrillation (H)     Morbid (severe) obesity due to excess calories (H)     Recurrent major depressive disorder, in full remission (H)      Current Medications and Allergies:  See printed Medication Report.    Care Coordination Start Date: 11/29/2018   Frequency of Care Coordination: monthly   Form Last Updated: 05/16/2019

## 2019-05-16 NOTE — PROGRESS NOTES
Clinic Care Coordination Contact    Situation: Patient chart reviewed by care coordinator.    Background/:Assessment Patient was seen by Cardiology earlier this week 5/14/19. The cardioloist plan is Start metoprolol 50 XL mg daily, Increase flecainide dose 100 mg twice daily; threadmill stress test in 2 weeks. Continue apixaban 5 mg twice daily, Continue lisinopril 20 mg.   Patient also started Effexor in April.       Plan/Recommendations: Clinic Care Coordinator RN will attempt to contact next week (after patient has adjusted medications per cardiology) Patient is scheduled for stress test 5/29/19.

## 2019-05-29 ENCOUNTER — ANCILLARY PROCEDURE (OUTPATIENT)
Dept: CARDIOLOGY | Facility: CLINIC | Age: 39
End: 2019-05-29
Attending: INTERNAL MEDICINE
Payer: COMMERCIAL

## 2019-05-29 DIAGNOSIS — Z79.899 ENCOUNTER FOR MONITORING FLECAINIDE THERAPY: ICD-10-CM

## 2019-05-29 DIAGNOSIS — Z51.81 ENCOUNTER FOR MONITORING FLECAINIDE THERAPY: ICD-10-CM

## 2019-05-29 DIAGNOSIS — I10 ESSENTIAL HYPERTENSION: ICD-10-CM

## 2019-05-29 DIAGNOSIS — I48.0 PAROXYSMAL ATRIAL FIBRILLATION (H): ICD-10-CM

## 2019-05-29 PROCEDURE — 93018 CV STRESS TEST I&R ONLY: CPT | Performed by: INTERNAL MEDICINE

## 2019-05-29 PROCEDURE — 93016 CV STRESS TEST SUPVJ ONLY: CPT | Performed by: INTERNAL MEDICINE

## 2019-05-29 PROCEDURE — 93017 CV STRESS TEST TRACING ONLY: CPT | Performed by: INTERNAL MEDICINE

## 2019-05-30 ENCOUNTER — PATIENT OUTREACH (OUTPATIENT)
Dept: CARE COORDINATION | Facility: CLINIC | Age: 39
End: 2019-05-30

## 2019-05-30 NOTE — PROGRESS NOTES
Clinic Care Coordination Contact  [facility]/Voicemail       Clinical Data: Care Coordinator Outreach  Outreach attempted.  Left message on voicemail with call back information and requested return call.  Plan:  Care Coordinator will try to reach patient again in 5-7 business days.

## 2019-06-03 ENCOUNTER — OFFICE VISIT (OUTPATIENT)
Dept: SURGERY | Facility: CLINIC | Age: 39
End: 2019-06-03
Payer: COMMERCIAL

## 2019-06-03 ENCOUNTER — OFFICE VISIT (OUTPATIENT)
Dept: ENDOCRINOLOGY | Facility: CLINIC | Age: 39
End: 2019-06-03
Payer: COMMERCIAL

## 2019-06-03 VITALS
BODY MASS INDEX: 36.41 KG/M2 | WEIGHT: 232 LBS | HEIGHT: 67 IN | OXYGEN SATURATION: 97 % | DIASTOLIC BLOOD PRESSURE: 75 MMHG | HEART RATE: 68 BPM | SYSTOLIC BLOOD PRESSURE: 123 MMHG

## 2019-06-03 VITALS — BODY MASS INDEX: 36.34 KG/M2 | WEIGHT: 232 LBS

## 2019-06-03 DIAGNOSIS — E66.01 MORBID OBESITY DUE TO EXCESS CALORIES (H): ICD-10-CM

## 2019-06-03 DIAGNOSIS — Z98.84 S/P LAPAROSCOPIC SLEEVE GASTRECTOMY: Primary | ICD-10-CM

## 2019-06-03 DIAGNOSIS — E66.01 MORBID OBESITY (H): ICD-10-CM

## 2019-06-03 RX ORDER — TOPIRAMATE 25 MG/1
25 TABLET, FILM COATED ORAL DAILY
Qty: 30 TABLET | Refills: 5 | Status: SHIPPED | OUTPATIENT
Start: 2019-06-03 | End: 2019-12-05

## 2019-06-03 RX ORDER — NALTREXONE HYDROCHLORIDE 50 MG/1
50 TABLET, FILM COATED ORAL DAILY
Qty: 30 TABLET | Refills: 5 | Status: SHIPPED | OUTPATIENT
Start: 2019-06-03 | End: 2019-12-05

## 2019-06-03 ASSESSMENT — MIFFLIN-ST. JEOR: SCORE: 1760.1

## 2019-06-03 ASSESSMENT — PAIN SCALES - GENERAL: PAINLEVEL: NO PAIN (0)

## 2019-06-03 NOTE — LETTER
"6/3/2019       RE: Danitza Soto  [address]  [facility] [address]     Dear Colleague,    Thank you for referring your patient, Danitza Soto, to the [facility]NAGEMENT at [facility]. Please see a copy of my visit note below.  Return Bariatric Surgery Note    RE: Danitza Soto  MR#: [id number]  : 1980  VISIT DATE: Rivas 3, 2019    Dear Polly Mcbride,    I had the pleasure of seeing your patient, Danitza Soto, in my post-bariatric surgery assessment clinic.    CHIEF COMPLAINT: Post-bariatric surgery follow-up. 6 mo follow up    \"Topiramate prior to surgery and after.  She tapered off of it and completely stopped 2 weeks ago. She had brain fog and word finding issues with it.  Now she is grazing more and has gained some weight since stopping.     No GERD, takes omeprazole once daily  Drinking enough fluids >64 oz\"     HISTORY OF PRESENT ILLNESS:  Questions Regarding Prior Weight Loss Surgery Reviewed With Patient 6/3/2019   I had the following weight loss procedure: Sleeve Gastrectomy   What year was your surgery? 2018   How has your weight changed since your last visit? I have lost weight   Are you currently taking any weight loss medications? Yes   Do you currently have any of the following: None of the above   Have you been to the Emergency room since your last visit with us? No   Were you in the hospital since your last visit with us? No   Do you have any concerns today? no     Last visit was 3/7/19 and she has lost 22 lbs since then and lost 76 lbs in total since consult weight.   Last visit we started naltrexone and topiramate 25mg.  She takes both in the am and they help her with hunger/cravings    Weight History:     6/3/2019   What is your highest lifetime weight? 310   What is your lowest weight since surgery? (In pounds) 228     Initial Weight: 140.1 kg (308 lb 13.8 oz)  Current Weight: Weight: 105.2 kg (232 lb)  Cumulative " weight loss (lbs): 76.86  Last Visits Weight: 104.3 kg (230 lb)    Questions Regarding Co-Morbidities and Health Concerns Reviewed With Patient 6/3/2019   Pre-diabetes: Improved   Diabetes II: Never   High Blood Pressure: Improved   High cholesterol: Improved   Heartburn/Reflux: Improved   Are you taking daily medication for heartburn, acid reflux, or GERD (acid reflux disease)? -   Sleep apnea: Gone away   Do you use a CPAP? -   PCOS: Never   Back pain: Improved   Joint pain: Improved   Lower leg swelling: Never       Eating Habits 6/3/2019   How many meals do you eat per day? 3   Do you snack between meals? Sometimes   How much food are you eating at each meal? 1/2 cup to 1 cup   Are you able to separate your meals and liquids by at least 30 minutes? Sometimes   Are you able to avoid liquid calories? Yes       Exercise Questions Reviewed With Patient 6/3/2019   How often do you exercise? 3 to 4 times per week   What is the duration of your exercise (in minutes)? 30 Minutes   What types of exercise do you do? walking, climbing stairs at work, other   What keeps you from being more active?  I am as active as I can possbily be       Social History:      6/3/2019   Are you smoking? No   Are you drinking alcohol? No       Medications:  Current Outpatient Medications   Medication     acetaminophen (TYLENOL) 325 MG tablet     apixaban ANTICOAGULANT (ELIQUIS) 5 MG tablet     fish oil-omega-3 fatty acids 1000 MG capsule     flecainide (TAMBOCOR) 100 MG tablet     lisinopril (PRINIVIL/ZESTRIL) 40 MG tablet     LORazepam (ATIVAN) 0.5 MG tablet     metoprolol succinate ER (TOPROL-XL) 50 MG 24 hr tablet     Multiple Vitamins-Minerals (CENTRUM PO)     naltrexone (DEPADE/REVIA) 50 MG tablet     topiramate (TOPAMAX) 25 MG tablet     venlafaxine (EFFEXOR-XR) 75 MG 24 hr capsule     vitamin B complex with vitamin C (VITAMIN  B COMPLEX) tablet     Cholecalciferol (VITAMIN D-3) 5000 units TABS     diclofenac (VOLTAREN) 1 % topical  "gel     Probiotic Product (PROBIOTIC ADVANCED PO)     ursodiol (ACTIGALL) 300 MG capsule     VENTOLIN  (90 Base) MCG/ACT Inhaler     No current facility-administered medications for this visit.          6/3/2019   Do you avoid NSAIDs such as (Ibuprofen, Aleve, Naproxen, Advil)?   Yes       ROS:  GI:      6/3/2019   Vomiting: No   Diarrhea: No   Constipation: Yes   Swallowing trouble: No   Abdominal pain: No   Heartburn: No   Rash in skin folds: No   Depression: No   Stress urinary incontinence No     Skin:   BAR RBS ROS - SKIN 6/3/2019   Rash in skin folds: No     Psych:      6/3/2019   Depression: No   Anxiety: No     Female Only:   BAR RBS ROS - FEMALE ONLY 6/3/2019   Female only: None of the above       LABS/IMAGING/MEDICAL RECORDS REVIEW:     PHYSICAL EXAMINATION:  /75 (BP Location: Left arm, Patient Position: Sitting, Cuff Size: Adult Large)   Pulse 68   Ht 1.702 m (5' 7.01\")   Wt 105.2 kg (232 lb)   SpO2 97%   Breastfeeding? No   BMI 36.33 kg/m      General: No apparent distress  Neuro: A & O x 3  Head: Atraumatic, normocephalic  Eyes: PERRL, EOMI  Skin: warm and dry, no rashes on exposed skin  Respiratory: respirations unlabored  Abdomen: soft NT ND  Extremities: No LE swelling      ASSESSMENT AND PLAN:      1. 6 months status laparoscopic gastric sleeve  2. Morbid Obesity current BMI: Body mass index is 36.33 kg/m .  3. Post surgical malabsorption:   Labs ordered per protocol.   Follow food plan per dietitian recommendations.   Continue taking recommended post-op vitamins.  4. Return to clinic in 6 months with labs. See Sandy and dietitian  5. Refill naltrexone 50mg  6.Refill topiramate 25mg        Sincerely,    Sandy Cervantes PA-C    I spent a total of 15 minutes face to face with Danitza during today's office visit. Over 50% of this time was spent counseling the patient and/or coordinating care.    "

## 2019-06-03 NOTE — PATIENT INSTRUCTIONS
Refill naltrexone 50mg    Refill topiramate 25mg    Return  To clinic in 6 months with labs with Sandy Cervantes and dietitian

## 2019-06-03 NOTE — PROGRESS NOTES
"    Return Bariatric Surgery Note    RE: Danitza Soto  MR#: [id number]  : 1980  VISIT DATE: Rivas 3, 2019    Dear Polly Mcbride,    I had the pleasure of seeing your patient, Danitza Soto, in my post-bariatric surgery assessment clinic.    CHIEF COMPLAINT: Post-bariatric surgery follow-up. 6 mo follow up    \"Topiramate prior to surgery and after.  She tapered off of it and completely stopped 2 weeks ago. She had brain fog and word finding issues with it.  Now she is grazing more and has gained some weight since stopping.     No GERD, takes omeprazole once daily  Drinking enough fluids >64 oz\"     HISTORY OF PRESENT ILLNESS:  Questions Regarding Prior Weight Loss Surgery Reviewed With Patient 6/3/2019   I had the following weight loss procedure: Sleeve Gastrectomy   What year was your surgery? 2018   How has your weight changed since your last visit? I have lost weight   Are you currently taking any weight loss medications? Yes   Do you currently have any of the following: None of the above   Have you been to the Emergency room since your last visit with us? No   Were you in the hospital since your last visit with us? No   Do you have any concerns today? no     Last visit was 3/7/19 and she has lost 22 lbs since then and lost 76 lbs in total since consult weight.   Last visit we started naltrexone and topiramate 25mg.  She takes both in the am and they help her with hunger/cravings    Weight History:     6/3/2019   What is your highest lifetime weight? 310   What is your lowest weight since surgery? (In pounds) 228     Initial Weight: 140.1 kg (308 lb 13.8 oz)  Current Weight: Weight: 105.2 kg (232 lb)  Cumulative weight loss (lbs): 76.86  Last Visits Weight: 104.3 kg (230 lb)    Questions Regarding Co-Morbidities and Health Concerns Reviewed With Patient 6/3/2019   Pre-diabetes: Improved   Diabetes II: Never   High Blood Pressure: Improved   High cholesterol: Improved   Heartburn/Reflux: Improved   Are " you taking daily medication for heartburn, acid reflux, or GERD (acid reflux disease)? -   Sleep apnea: Gone away   Do you use a CPAP? -   PCOS: Never   Back pain: Improved   Joint pain: Improved   Lower leg swelling: Never       Eating Habits 6/3/2019   How many meals do you eat per day? 3   Do you snack between meals? Sometimes   How much food are you eating at each meal? 1/2 cup to 1 cup   Are you able to separate your meals and liquids by at least 30 minutes? Sometimes   Are you able to avoid liquid calories? Yes       Exercise Questions Reviewed With Patient 6/3/2019   How often do you exercise? 3 to 4 times per week   What is the duration of your exercise (in minutes)? 30 Minutes   What types of exercise do you do? walking, climbing stairs at work, other   What keeps you from being more active?  I am as active as I can possbily be       Social History:      6/3/2019   Are you smoking? No   Are you drinking alcohol? No       Medications:  Current Outpatient Medications   Medication     acetaminophen (TYLENOL) 325 MG tablet     apixaban ANTICOAGULANT (ELIQUIS) 5 MG tablet     fish oil-omega-3 fatty acids 1000 MG capsule     flecainide (TAMBOCOR) 100 MG tablet     lisinopril (PRINIVIL/ZESTRIL) 40 MG tablet     LORazepam (ATIVAN) 0.5 MG tablet     metoprolol succinate ER (TOPROL-XL) 50 MG 24 hr tablet     Multiple Vitamins-Minerals (CENTRUM PO)     naltrexone (DEPADE/REVIA) 50 MG tablet     topiramate (TOPAMAX) 25 MG tablet     venlafaxine (EFFEXOR-XR) 75 MG 24 hr capsule     vitamin B complex with vitamin C (VITAMIN  B COMPLEX) tablet     Cholecalciferol (VITAMIN D-3) 5000 units TABS     diclofenac (VOLTAREN) 1 % topical gel     Probiotic Product (PROBIOTIC ADVANCED PO)     ursodiol (ACTIGALL) 300 MG capsule     VENTOLIN  (90 Base) MCG/ACT Inhaler     No current facility-administered medications for this visit.          6/3/2019   Do you avoid NSAIDs such as (Ibuprofen, Aleve, Naproxen, Advil)?   Yes  "      ROS:  GI:      6/3/2019   Vomiting: No   Diarrhea: No   Constipation: Yes   Swallowing trouble: No   Abdominal pain: No   Heartburn: No   Rash in skin folds: No   Depression: No   Stress urinary incontinence No     Skin:   BAR RBS ROS - SKIN 6/3/2019   Rash in skin folds: No     Psych:      6/3/2019   Depression: No   Anxiety: No     Female Only:   BAR RBS ROS - FEMALE ONLY 6/3/2019   Female only: None of the above       LABS/IMAGING/MEDICAL RECORDS REVIEW:     PHYSICAL EXAMINATION:  /75 (BP Location: Left arm, Patient Position: Sitting, Cuff Size: Adult Large)   Pulse 68   Ht 1.702 m (5' 7.01\")   Wt 105.2 kg (232 lb)   SpO2 97%   Breastfeeding? No   BMI 36.33 kg/m     General: No apparent distress  Neuro: A & O x 3  Head: Atraumatic, normocephalic  Eyes: PERRL, EOMI  Skin: warm and dry, no rashes on exposed skin  Respiratory: respirations unlabored  Abdomen: soft NT ND  Extremities: No LE swelling      ASSESSMENT AND PLAN:      1. 6 months status laparoscopic gastric sleeve  2. Morbid Obesity current BMI: Body mass index is 36.33 kg/m .  3. Post surgical malabsorption:   Labs ordered per protocol.   Follow food plan per dietitian recommendations.   Continue taking recommended post-op vitamins.  4. Return to clinic in 6 months with labs. See Sandy and dietitian  5. Refill naltrexone 50mg  6.Refill topiramate 25mg        Sincerely,    Sandy Cervantes PA-C    I spent a total of 15 minutes face to face with Danitza during today's office visit. Over 50% of this time was spent counseling the patient and/or coordinating care.  "

## 2019-06-03 NOTE — NURSING NOTE
"(   Chief Complaint   Patient presents with     RECHECK     S/P LSG 11/27/18 with Dr. Tse.    )    ( Weight: 105.2 kg (232 lb) )  ( Height: 170.2 cm (5' 7.01\") )  ( BMI (Calculated): 36.33 )  ( Initial Weight: 140.1 kg (308 lb 13.8 oz) )  ( Cumulative weight loss (lbs): 76.86 )  ( Last Visits Weight: 104.3 kg (230 lb) )  ( Wt change since last visit (lbs): 2 )  (   )  (   )    ( BP: 123/75 )  (   )  (   )  (   )  ( Pulse: 68 )  (   )  ( SpO2: 97 % )    (   Patient Active Problem List   Diagnosis     Anxiety state     Esophageal reflux     Morbid obesity due to excess calories (H)     Tobacco use disorder     CARDIOVASCULAR SCREENING; LDL GOAL LESS THAN 130     Hypertension goal BP (blood pressure) < 140/90     Acne     Papanicolaou smear of cervix with low grade squamous intraepithelial lesion (LGSIL)     Mixed hyperlipidemia     LEONARDO (obstructive sleep apnea)     Obesity hypoventilation syndrome (H)     Lumbago     Left anterior knee pain     Paroxysmal atrial fibrillation (H)     Morbid (severe) obesity due to excess calories (H)     Recurrent major depressive disorder, in full remission (H)    )  (   Current Outpatient Medications   Medication Sig Dispense Refill     acetaminophen (TYLENOL) 325 MG tablet Take 2 tablets (650 mg) by mouth every 4 hours as needed for other (For optimal non-opioid multimodal pain management to improve pain control and physical function.) 100 tablet 0     apixaban ANTICOAGULANT (ELIQUIS) 5 MG tablet Take 1 tablet (5 mg) by mouth 2 times daily 180 tablet 3     fish oil-omega-3 fatty acids 1000 MG capsule Take 2 g by mouth every morning        flecainide (TAMBOCOR) 100 MG tablet Take 1 tablet (100 mg) by mouth 2 times daily 180 tablet 3     lisinopril (PRINIVIL/ZESTRIL) 40 MG tablet Take 1 tablet (40 mg) by mouth daily (Patient taking differently: Take 20 mg by mouth every evening ) 90 tablet 3     LORazepam (ATIVAN) 0.5 MG tablet Take 1 tablet (0.5 mg) by mouth every 8 hours as " needed for anxiety 20 tablet 0     metoprolol succinate ER (TOPROL-XL) 50 MG 24 hr tablet Take 1 tablet (50 mg) by mouth daily 90 tablet 3     Multiple Vitamins-Minerals (CENTRUM PO)        naltrexone (DEPADE/REVIA) 50 MG tablet Take 1/2 Tablet then increase to maximum of 1 Full Tablet daily as tolerated.  Time it one to two hours prior to worst cravings 30 tablet 3     topiramate (TOPAMAX) 25 MG tablet Take 1 tablet (25 mg) by mouth daily 30 tablet 3     venlafaxine (EFFEXOR-XR) 75 MG 24 hr capsule Take 1 capsule (75 mg) by mouth daily 30 capsule 1     vitamin B complex with vitamin C (VITAMIN  B COMPLEX) tablet Take 1 tablet by mouth daily       Cholecalciferol (VITAMIN D-3) 5000 units TABS Take 1 tablet by mouth daily       diclofenac (VOLTAREN) 1 % topical gel Place 2 g onto the skin 4 times daily (Patient not taking: Reported on 6/3/2019) 100 g 0     Probiotic Product (PROBIOTIC ADVANCED PO)        ursodiol (ACTIGALL) 300 MG capsule Take 1 capsule (300 mg) by mouth 2 times daily (Patient not taking: Reported on 6/3/2019) 60 capsule 5     VENTOLIN  (90 Base) MCG/ACT Inhaler INHALE 1-2 PUFFS EVERY 4-6 HOURS AS NEEDED FOR WHEEZING  0    )  ( Diabetes Eval:    )    ( Pain Eval:  No Pain (0) )    ( Wound Eval:       )    (   History   Smoking Status     Former Smoker     Packs/day: 1.00     Years: 10.00     Types: Cigarettes     Start date: 1/1/2004     Quit date: 8/15/2018   Smokeless Tobacco     Never Used     Comment:  pack or less a day    )    ( Signed By:  Kristina Molina; Etelvina 3, 2019; 10:26 AM )

## 2019-06-03 NOTE — PROGRESS NOTES
Nutrition Reassessment  Reason For Visit:  Danitza Soto is a 38 year old female presenting today for nutrition follow-up, 6 months s/p SG with Dr Tse(11/27/18).  Patient referred by Dr Tse and Sandy Cervantes.    Anthropometrics  Initial Consult Weight: 308.8 lbs  Day of Surgery Weight(11/27/18): 274.6 lbs  Current Weight: 232 lbs   Weight loss: -76.8 lbs from initial consult; -42.6 lbs from day of surgery    Current Vitamins/Minerals: MVI/minerals BID, calcium, B complex    Nutrition History:  recent food recall:  Breakfast: yogurt or eggs  Lunch: leftovers or meat or beans, crackers, vegetables, rice  Dinner: leftovers or meat or beans, crackers, vegetables, rice  Snacks: crackers, occ protein bar  Beverages: water, crystal light      1) Follow bariatric regular diet. met   2) Consume 60 grams of protein/day. Met/continues   3) Sip on 48-64 oz of fluids/day- between meals only. Met/continues   4) Eat slowly (>20 min/meal), chewing foods well (to applesauce-like consistency). met   5) Limit portions to 1/2 cup/meal. 1/2- 1 cup per meal   6) Take the following supplements: MVI(with iron), calcium(2 pills per day), no vitamin D int he summer, B complex     Multivitamin/minerals: adult dose 2 times daily    Iron: 45-60 mg elemental (18-36 mg if low risk) - may partly or fully be covered in multivitamin     Calcium Citrate containing vitamin D: 500 mg 3 times daily or 600 mg 2 times daily    Vitamin B12: sublingual form of at least 500 mcg daily or injection of 1000 mcg monthly     B-50 Complex once daily   7) Increase activity as able walking 5 days per week     Nutrition Prescription:  Grams Protein: 60 (minimum)  Amount of Fluid: 48-64 oz    Nutrition Diagnosis  Previous: Food and nutrition-related knowledge deficit r/t lack of prior exposure to diet instruction beyond 3 months s/p SG as evidenced by Pt seeking further guidance from RD on diet instruction beyond 3 months s/p SG.  -resolved    Current: Food  and nutrition-related knowledge deficit r/t lack of prior exposure to diet instruction beyond 6 months s/p SG as evidenced by Pt seeking further guidance from RD on diet instruction beyond 6 months s/p SG.     Intervention  Materials/Education provided, reviewed previous goals and bariatric diet, protein intake, fluid intake, eating pace, chewing foods well, portion control, sugar/fat intake, recommended vitamin/mineral supplements.  Patient notes improvements in reduced snacking and food choices(lower fat foods- not eating out).  She is walking 5 days per week and at work, discussed increasing exercise for continued weight loss if desired.    Patient Understanding: good  Expected Compliance: good    Goals:  1) Follow bariatric regular diet.   2) Consume 60 grams of protein/day.  3) Sip on 48-64 oz of fluids/day- between meals only.  4) Eat slowly (>20 min/meal), chewing foods well (to applesauce-like consistency).  5) Limit portions to 1/2-1 cup/meal.  6) Take the following supplements:    Multivitamin/minerals: adult dose 2 times daily    Iron: 45-60 mg elemental (18-36 mg if low risk) - may partly or fully be covered in multivitamin     Calcium Citrate containing vitamin D: 500 mg 3 times daily or 600 mg 2 times daily    Vitamin B12: sublingual form of at least 500 mcg daily or injection of 1000 mcg monthly     B-50 Complex once daily   7) Increase activity as able       Follow-Up: prn    Time spent with patient: 15 minutes.  Kelsey Sousa, RD, LD

## 2019-06-03 NOTE — LETTER
6/3/2019       RE: Danitza Soto  [address]  [facility] [id number]     Dear Colleague,    Thank you for referring your patient, Danitza Soto, to the [facility] at [facility]. Please see a copy of my visit note below.    Nutrition Reassessment  Reason For Visit:  Danitza Soto is a 38 year old female presenting today for nutrition follow-up, 6 months s/p SG with Dr Tse(11/27/18).  Patient referred by Dr Tse and Sandy Cervantes.    Anthropometrics  Initial Consult Weight: 308.8 lbs  Day of Surgery Weight(11/27/18): 274.6 lbs  Current Weight: 232 lbs   Weight loss: -76.8 lbs from initial consult; -42.6 lbs from day of surgery    Current Vitamins/Minerals: MVI/minerals BID, calcium, B complex    Nutrition History:  recent food recall:  Breakfast: yogurt or eggs  Lunch: leftovers or meat or beans, crackers, vegetables, rice  Dinner: leftovers or meat or beans, crackers, vegetables, rice  Snacks: crackers, occ protein bar  Beverages: water, crystal light      1) Follow bariatric regular diet. met   2) Consume 60 grams of protein/day. Met/continues   3) Sip on 48-64 oz of fluids/day- between meals only. Met/continues   4) Eat slowly (>20 min/meal), chewing foods well (to applesauce-like consistency). met   5) Limit portions to 1/2 cup/meal. 1/2- 1 cup per meal   6) Take the following supplements: MVI(with iron), calcium(2 pills per day), no vitamin D int he summer, B complex     Multivitamin/minerals: adult dose 2 times daily    Iron: 45-60 mg elemental (18-36 mg if low risk) - may partly or fully be covered in multivitamin     Calcium Citrate containing vitamin D: 500 mg 3 times daily or 600 mg 2 times daily    Vitamin B12: sublingual form of at least 500 mcg daily or injection of 1000 mcg monthly     B-50 Complex once daily   7) Increase activity as able walking 5 days per week     Nutrition Prescription:  Grams Protein: 60  (minimum)  Amount of Fluid: 48-64 oz    Nutrition Diagnosis  Previous: Food and nutrition-related knowledge deficit r/t lack of prior exposure to diet instruction beyond 3 months s/p SG as evidenced by Pt seeking further guidance from RD on diet instruction beyond 3 months s/p SG.  -resolved    Current: Food and nutrition-related knowledge deficit r/t lack of prior exposure to diet instruction beyond 6 months s/p SG as evidenced by Pt seeking further guidance from RD on diet instruction beyond 6 months s/p SG.     Intervention  Materials/Education provided, reviewed previous goals and bariatric diet, protein intake, fluid intake, eating pace, chewing foods well, portion control, sugar/fat intake, recommended vitamin/mineral supplements.  Patient notes improvements in reduced snacking and food choices(lower fat foods- not eating out).  She is walking 5 days per week and at work, discussed increasing exercise for continued weight loss if desired.    Patient Understanding: good  Expected Compliance: good    Goals:  1) Follow bariatric regular diet.   2) Consume 60 grams of protein/day.  3) Sip on 48-64 oz of fluids/day- between meals only.  4) Eat slowly (>20 min/meal), chewing foods well (to applesauce-like consistency).  5) Limit portions to 1/2-1 cup/meal.  6) Take the following supplements:    Multivitamin/minerals: adult dose 2 times daily    Iron: 45-60 mg elemental (18-36 mg if low risk) - may partly or fully be covered in multivitamin     Calcium Citrate containing vitamin D: 500 mg 3 times daily or 600 mg 2 times daily    Vitamin B12: sublingual form of at least 500 mcg daily or injection of 1000 mcg monthly     B-50 Complex once daily   7) Increase activity as able       Follow-Up: prn    Time spent with patient: 15 minutes.  Kelsey Sousa, RD, LD

## 2019-06-03 NOTE — PATIENT INSTRUCTIONS
1) Follow bariatric regular diet.   2) Consume 60 grams of protein/day.  3) Sip on 48-64 oz of fluids/day- between meals only.  4) Eat slowly (>20 min/meal), chewing foods well (to applesauce-like consistency).  5) Limit portions to 1/2-1 cup/meal.  6) Take the following supplements:    Multivitamin/minerals: adult dose 2 times daily    Iron: 45-60 mg elemental (18-36 mg if low risk) - may partly or fully be covered in multivitamin     Calcium Citrate containing vitamin D: 500 mg 3 times daily or 600 mg 2 times daily    Vitamin B12: sublingual form of at least 500 mcg daily or injection of 1000 mcg monthly     B-50 Complex once daily   7) Increase activity as able    Follow up with RD in six months    Kelsey Sousa RD, LD  If you need to schedule or reschedule with a dietitian please call [phone].

## 2019-06-04 DIAGNOSIS — F33.42 RECURRENT MAJOR DEPRESSIVE DISORDER, IN FULL REMISSION (H): ICD-10-CM

## 2019-06-05 RX ORDER — VENLAFAXINE HYDROCHLORIDE 75 MG/1
75 CAPSULE, EXTENDED RELEASE ORAL DAILY
Qty: 30 CAPSULE | Refills: 1 | Status: SHIPPED | OUTPATIENT
Start: 2019-06-05 | End: 2019-08-08

## 2019-06-05 NOTE — TELEPHONE ENCOUNTER
"Effexor started 4/8/2019   Sent MyChart to pt to update PHQ9  Pt seen 5/3/2019 by PCP but depression not discussed  Mady LOO RN    Last Written Prescription Date:  4/8/2019  Last Fill Quantity: 30,  # refills: 1   Last office visit: 5/3/2019 with prescribing provider:     Future Office Visit:   Next 5 appointments (look out 90 days)    Aug 14, 2019  9:30 AM CDT  Return Visit with Leena Heath MD  [facility] AT [facility] ([facility]) [address] [place] [id number] [phone]         Requested Prescriptions   Pending Prescriptions Disp Refills     venlafaxine (EFFEXOR-XR) 75 MG 24 hr capsule 30 capsule 1     Sig: Take 1 capsule (75 mg) by mouth daily       Serotonin-Norepinephrine Reuptake Inhibitors  Failed - 6/4/2019  2:09 PM        Failed - PHQ-9 score of less than 5 in past 6 months     Please review last PHQ-9 score.           Passed - Blood pressure under 140/90 in past 12 months     BP Readings from Last 3 Encounters:   06/03/19 123/75   05/14/19 120/78   05/03/19 111/78                 Passed - Medication is active on med list        Passed - Patient is age 18 or older        Passed - No active pregnancy on record        Passed - Normal serum creatinine on file in past 12 months     Recent Labs   Lab Test 03/07/19  1251   CR 0.66             Passed - No positive pregnancy test in past 12 months        Passed - Recent (6 mo) or future (30 days) visit within the authorizing provider's specialty     Patient had office visit in the last 6 months or has a visit in the next 30 days with authorizing provider or within the authorizing provider's specialty.  See \"Patient Info\" tab in inbasket, or \"Choose Columns\" in Meds & Orders section of the refill encounter.              "

## 2019-06-05 NOTE — TELEPHONE ENCOUNTER
LS,   Please advise on refill   PHQ9 improved since started on Effexor    PHQ-9 SCORE 9/28/2018 4/8/2019 6/5/2019   PHQ-9 Total Score MyChart 3 (Minimal depression) - 1 (Minimal depression)   PHQ-9 Total Score 3 14 1     Mady LOO RN

## 2019-06-12 ENCOUNTER — PATIENT OUTREACH (OUTPATIENT)
Dept: CARE COORDINATION | Facility: CLINIC | Age: 39
End: 2019-06-12

## 2019-06-12 NOTE — PROGRESS NOTES
Clinic Care Coordination Contact  [facility]/Voicemail       Clinical Data: Care Coordinator Outreach  Patient saw cardiology on 5/14/19  From visit: Plan:  Start metoprolol 50 XL mg daily  Increase flecainide dose 100 mg twice daily; threadmill stress test in 2 weeks.  Continue apixaban 5 mg twice daily  Continue lisinopril 20 mg     Return to clinic in 3 months for follow-up.    Patient saw Bariatric surg 6/3/19  From visit: ASSESSMENT AND PLAN:       1. 6 months status laparoscopic gastric sleeve  2. Morbid Obesity current BMI: Body mass index is 36.33 kg/m .  3. Post surgical malabsorption:              Labs ordered per protocol.              Follow food plan per dietitian recommendations.              Continue taking recommended post-op vitamins.  4. Return to clinic in 6 months with labs. See Sandy and dietitian  5. Refill naltrexone 50mg  6.Refill topiramate 25mg      Outreach attempted.  Left message on voicemail with call back information and requested return call.  Plan:  Care Coordinator will try to reach patient again next month if no return call from patient.

## 2019-06-29 ENCOUNTER — MYC MEDICAL ADVICE (OUTPATIENT)
Dept: FAMILY MEDICINE | Facility: CLINIC | Age: 39
End: 2019-06-29

## 2019-07-01 NOTE — TELEPHONE ENCOUNTER
She would have to make an appointment to discuss this , could do a telephone visit - can you let pt know ?  Thanks

## 2019-07-23 ENCOUNTER — PATIENT OUTREACH (OUTPATIENT)
Dept: CARE COORDINATION | Facility: CLINIC | Age: 39
End: 2019-07-23

## 2019-07-23 NOTE — PROGRESS NOTES
Clinic Care Coordination Contact  [facility]/Voicemail       Clinical Data: Care Coordinator Outreach  Outreach attempted x 1.  Left message on voicemail with call back information and requested return call.  Plan:  Care Coordinator will try to reach patient again next month if no response from patient.

## 2019-08-08 ENCOUNTER — MYC REFILL (OUTPATIENT)
Dept: FAMILY MEDICINE | Facility: CLINIC | Age: 39
End: 2019-08-08

## 2019-08-08 DIAGNOSIS — I10 ESSENTIAL HYPERTENSION WITH GOAL BLOOD PRESSURE LESS THAN 140/90: ICD-10-CM

## 2019-08-08 DIAGNOSIS — F33.42 RECURRENT MAJOR DEPRESSIVE DISORDER, IN FULL REMISSION (H): ICD-10-CM

## 2019-08-09 RX ORDER — VENLAFAXINE HYDROCHLORIDE 75 MG/1
CAPSULE, EXTENDED RELEASE ORAL
Qty: 90 CAPSULE | Refills: 0 | Status: SHIPPED | OUTPATIENT
Start: 2019-08-09 | End: 2019-11-09

## 2019-08-09 RX ORDER — LISINOPRIL 40 MG/1
40 TABLET ORAL DAILY
Qty: 30 TABLET | Refills: 0 | Status: SHIPPED | OUTPATIENT
Start: 2019-08-09 | End: 2019-09-10

## 2019-08-09 NOTE — TELEPHONE ENCOUNTER
"Prescription approved per [facility].  Mady LOO RN    Last Written Prescription Date:  6/5/2019  Last Fill Quantity: 30,  # refills: 1   Last office visit: 5/3/2019 with prescribing provider:     Future Office Visit:   Next 5 appointments (look out 90 days)    Aug 14, 2019  9:30 AM CDT  Return Visit with Leena Heath MD  [facility] ([facility]) [address] [place] [id number] [phone]         Requested Prescriptions   Pending Prescriptions Disp Refills     venlafaxine (EFFEXOR-XR) 75 MG 24 hr capsule [Pharmacy Med Name: VENLAFAXINE ER 75MG CAPSULES] 30 capsule 0     Sig: TAKE 1 CAPSULE(75 MG) BY MOUTH DAILY       Serotonin-Norepinephrine Reuptake Inhibitors  Passed - 8/8/2019  8:10 PM        Passed - Blood pressure under 140/90 in past 12 months     BP Readings from Last 3 Encounters:   06/03/19 123/75   05/14/19 120/78   05/03/19 111/78                 Passed - PHQ-9 score of less than 5 in past 6 months     Please review last PHQ-9 score.           Passed - Medication is active on med list        Passed - Patient is age 18 or older        Passed - No active pregnancy on record        Passed - Normal serum creatinine on file in past 12 months     Recent Labs   Lab Test 03/07/19  1251   CR 0.66             Passed - No positive pregnancy test in past 12 months        Passed - Recent (6 mo) or future (30 days) visit within the authorizing provider's specialty     Patient had office visit in the last 6 months or has a visit in the next 30 days with authorizing provider or within the authorizing provider's specialty.  See \"Patient Info\" tab in inbasket, or \"Choose Columns\" in Meds & Orders section of the refill encounter.              "

## 2019-08-09 NOTE — TELEPHONE ENCOUNTER
"Medication is being filled for 1 time refill only due to:  Patient needs to be seen because due for 3 month f/u.   Mady LOO RN    Last Written Prescription Date:  6/19/2018  Last Fill Quantity: 90,  # refills: 3   Last office visit: 5/3/2019 with prescribing provider:     Future Office Visit:   Next 5 appointments (look out 90 days)    Aug 14, 2019  9:30 AM CDT  Return Visit with Leena Heath MD  [facility]YS[facility] AT [facility] ([facility]) [address] [place] [address] [phone] [phone]         Requested Prescriptions   Pending Prescriptions Disp Refills     lisinopril (PRINIVIL/ZESTRIL) 40 MG tablet 90 tablet 3     Sig: Take 1 tablet (40 mg) by mouth daily       ACE Inhibitors (Including Combos) Protocol Passed - 8/8/2019  6:28 PM        Passed - Blood pressure under 140/90 in past 12 months     BP Readings from Last 3 Encounters:   06/03/19 123/75   05/14/19 120/78   05/03/19 111/78                 Passed - Recent (12 mo) or future (30 days) visit within the authorizing provider's specialty     Patient had office visit in the last 12 months or has a visit in the next 30 days with authorizing provider or within the authorizing provider's specialty.  See \"Patient Info\" tab in inbasket, or \"Choose Columns\" in Meds & Orders section of the refill encounter.              Passed - Medication is active on med list        Passed - Patient is age 18 or older        Passed - No active pregnancy on record        Passed - Normal serum creatinine on file in past 12 months     Recent Labs   Lab Test 03/07/19  1251   CR 0.66             Passed - Normal serum potassium on file in past 12 months     Recent Labs   Lab Test 03/07/19  1251   POTASSIUM 3.9             Passed - No positive pregnancy test within past 12 months          "

## 2019-08-14 ENCOUNTER — OFFICE VISIT (OUTPATIENT)
Dept: CARDIOLOGY | Facility: CLINIC | Age: 39
End: 2019-08-14
Payer: COMMERCIAL

## 2019-08-14 VITALS
BODY MASS INDEX: 36.95 KG/M2 | OXYGEN SATURATION: 98 % | HEART RATE: 58 BPM | WEIGHT: 236 LBS | DIASTOLIC BLOOD PRESSURE: 73 MMHG | SYSTOLIC BLOOD PRESSURE: 111 MMHG

## 2019-08-14 DIAGNOSIS — Z98.84 STATUS POST BARIATRIC SURGERY: ICD-10-CM

## 2019-08-14 DIAGNOSIS — I48.0 PAROXYSMAL ATRIAL FIBRILLATION (H): Primary | ICD-10-CM

## 2019-08-14 DIAGNOSIS — I10 BENIGN ESSENTIAL HYPERTENSION: ICD-10-CM

## 2019-08-14 PROCEDURE — 99214 OFFICE O/P EST MOD 30 MIN: CPT | Performed by: INTERNAL MEDICINE

## 2019-08-14 RX ORDER — CALCIUM CARBONATE 500(1250)
1 TABLET ORAL EVERY EVENING
COMMUNITY

## 2019-08-14 NOTE — PATIENT INSTRUCTIONS
Thank you for coming to the [facility] @ [facility]; please note the following instructions:    1. Schedule clinic visit with  DR Arevalo , cardiology  @ telephone # [phone] regarding Afibrillation Ablation Consultation     2.Dr. Leena Heath has requested you to follow up in 6 month; please see following pages for appointment detail information.          If you have any questions regarding your visit please contact your care team:     Cardiology  Telephone Number   Hannah ALLEN., RN  Josette RUSS, RN   Shabana RAMIREZ, RMA  Elena HUGHES, RMA  [place], [facility]   [phone] (option 1)   For scheduling appts:     [phone] (select option 1)       For the Device Clinic (Pacemakers and ICD's)  RN's :  Hanh Cochran   During business hours: [phone]    *After business hours:  [phone] (select option 4)      Normal test result notifications will be released via [employer] or mailed within 7 business days.  All other test results, will be communicated via telephone once reviewed by your cardiologist.    If you need a medication refill please contact your pharmacy.  Please allow 3 business days for your refill to be completed.    As always, thank you for trusting us with your health care needs!

## 2019-08-14 NOTE — TELEPHONE ENCOUNTER
RECORDS RECEIVED FROM: Internal   DATE RECEIVED: 9-4   NOTES STATUS DETAILS   OFFICE NOTE from referring provider    Internal 8-14-19 Can   OFFICE NOTE from other cardiologists   and neurologists Internal Previously saw JEFFERY Heath, and Charles   DISCHARGE SUMMARY from hospital    N/A    DISCHARGE REPORT from the ER   N/A    OPERATIVE REPORT    N/A    MEDICATION LIST   Internal    LABS     BMP   Internal 11-28-18   CBC   Internal 3-7-19   CMP   Internal 3-7-19   Lipids   Internal 3-21-17   TSH   Internal 9-24-18   DIAGNOSTIC PROCEDURES     EKG  Strips *important Internal    Monitor Reports  Strips *important N/A    Cardioversions   N/A    ICD/pacemaker implant       Tilt table studies   N/A    IMAGING (DISC & REPORT)      ECHO's   Internal    Stress Tests   Internal    Cath   N/A    CT/CTA   N/A    MRI/MRA   N/A

## 2019-08-14 NOTE — NURSING NOTE
"Chief Complaint   Patient presents with     RECHECK     OV with Dr. Leena Heath for 3 month follow up for PAF. t reports some episodes of Afib.       Initial /73 (BP Location: Right arm, Patient Position: Chair, Cuff Size: Adult Large)   Pulse 58   Wt 107 kg (236 lb)   SpO2 98%   BMI 36.95 kg/m   Estimated body mass index is 36.95 kg/m  as calculated from the following:    Height as of 6/3/19: 1.702 m (5' 7.01\").    Weight as of this encounter: 107 kg (236 lb)..  BP completed using cuff size: large    Elena Marcelo MA    "

## 2019-08-14 NOTE — PROGRESS NOTES
Chief complaint: Palpitations    HPI: Ms. Danitza Soto is a 38 year old  female with PMH significant for depression, GERD, LEONARDO, essential hypertension, morbid obesity S/P gastric bypass 11/2018 and paroxysmal atrial fibrillation.    The patient was last seen in cardiology for paroxysmal atrial fibrillation diagnosis in 9/2018.  Her symptoms were mild at that time therefore rate or rhythm control strategy was not pursued.  The patient underwent gastric sleeve surgery in 11/2018 and has lost 70 pounds.  The patient noticed more frequent atrial fibrillation episodes since the surgery almost weekly now lasting up to 8-10 hours.  She finds works stress as a prominent trigger for episodes.  Associated symptoms include chest pressure, chest pain and dizziness.  She denies chest discomfort with exertion at other times.  The patient denies a history of dyspnea, PND, orthopnea, pedal edema, and syncope  Current medications include lisinopril 20 mg, fish oil.  She has cut down on lisinopril from 40 mg since she has lost significant weight.  She quit smoking in 2018 (10 pack years).  No history of alcohol abuse. No history of diabetes.  Family history is negative for CAD.  Prior cardiac tests include CT coronary angiogram in 2013 which did not show evidence of CAD.  Echocardiogram dated 9/2018 showed normal LV and RV function with no significant valve disease.  Moderate LVH was reported. I personnally reviewed the images and I donot agree with the LVH diagnosis. I donot think she has LVH based on echo. Zio patch in 8/2018 showed 4% atrial fibrillation burden heart rate ranged  beats per minute.  Longest episode lasting for 8 hours.    I have reviewed her ECG 11/28/2018 which shows normal sinus rhythm with single PAC, normal NJ/QRS/QTC intervals. No evidence of LVH on ECG.    Medications, personal, family, and social history reviewed with patient and revised.    Interval history 5/14/2019:  Patient returns for  follow-up to recheck on paroxysmal atrial fibrillation.  Since I saw 3 months ago, patient developed depression and started on Effexor which improved her mood.  I have started her on flecainide and metoprolol 3 months ago for frequent paroxysmal atrial fibrillation episodes.  She noticed significant decline in the frequency of the episodes however she still reports at least 2 episodes per month sometimes lasting for several hours.  She reports associated dizziness however denies chest pain, shortness of breath, lower extremity edema or syncope.  Since gastric bypass patient lost significant weight with improvement in blood pressure and sleep apnea.  She no longer uses CPAP machine.  She is overall feeling well.    Interval history 8/14/2019:  The patient returns for follow-up to recheck on paroxysmal atrial fibrillation.  When I saw patient last time 3 months ago I increased dose of flecainide to 100 mg twice daily due to symptomatic palpitations.  The patient reports doing well during the first 2 months however had 3 episodes within the last month longest episode lasting for 3 hours with associated tiredness.  Denies chest pain, dyspnea on exertion, dizziness, syncope.  The patient has did a lot of research on atrial fibrillation ablation and she expressed her interest in undergoing procedure.    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 - BRISEYDA I     LEONARDO on CPAP      Other anxiety states        CURRENT MEDICATIONS:  Current Outpatient Medications   Medication Sig Dispense Refill     acetaminophen (TYLENOL) 325 MG tablet Take 2 tablets (650 mg) by mouth every 4 hours as needed for other (For optimal non-opioid multimodal pain management to improve pain control  and physical function.) 100 tablet 0     apixaban ANTICOAGULANT (ELIQUIS) 5 MG tablet Take 1 tablet (5 mg) by mouth 2 times daily 180 tablet 3     calcium carbonate (OS-CHIKI) 500 MG tablet Take 1 tablet by mouth 2 times daily       Cholecalciferol (VITAMIN D-3) 5000 units TABS Take 1 tablet by mouth daily       fish oil-omega-3 fatty acids 1000 MG capsule Take 2 g by mouth every morning        flecainide (TAMBOCOR) 100 MG tablet Take 1 tablet (100 mg) by mouth 2 times daily 180 tablet 3     lisinopril (PRINIVIL/ZESTRIL) 40 MG tablet Take 1 tablet (40 mg) by mouth daily 30 tablet 0     LORazepam (ATIVAN) 0.5 MG tablet Take 1 tablet (0.5 mg) by mouth every 8 hours as needed for anxiety 20 tablet 0     metoprolol succinate ER (TOPROL-XL) 50 MG 24 hr tablet Take 1 tablet (50 mg) by mouth daily 90 tablet 3     Multiple Vitamins-Minerals (CENTRUM PO)        naltrexone (DEPADE/REVIA) 50 MG tablet Take 1 tablet (50 mg) by mouth daily 30 tablet 5     Probiotic Product (PROBIOTIC ADVANCED PO)        topiramate (TOPAMAX) 25 MG tablet Take 1 tablet (25 mg) by mouth daily 30 tablet 5     venlafaxine (EFFEXOR-XR) 75 MG 24 hr capsule TAKE 1 CAPSULE(75 MG) BY MOUTH DAILY 90 capsule 0     VENTOLIN  (90 Base) MCG/ACT Inhaler INHALE 1-2 PUFFS EVERY 4-6 HOURS AS NEEDED FOR WHEEZING  0     vitamin B complex with vitamin C (VITAMIN  B COMPLEX) tablet Take 1 tablet by mouth daily       diclofenac (VOLTAREN) 1 % topical gel Place 2 g onto the skin 4 times daily (Patient not taking: Reported on 6/3/2019) 100 g 0       PAST SURGICAL HISTORY:  Past Surgical History:   Procedure Laterality Date     HC TOOTH EXTRACTION W/FORCEP  1995    [place] Teeth Extraction     LAPAROSCOPIC GASTRIC SLEEVE N/A 11/27/2018    Procedure: Laparoscopic Sleeve Gastrectomy Latex Free;  Surgeon: Artis Tse MD;  Location: [facility] OR     LAPAROSCOPIC HERNIORRHAPHY HIATAL  11/27/2018    Procedure: LAPAROSCOPIC  HIATAL Hernia Repair;  Surgeon: Artis Tse  MD [place];  Location: [facility] OR       ALLERGIES:     Allergies   Allergen Reactions     Wellbutrin [Bupropion]      Wellbutrin: Panic attacks, heart/chest pain       FAMILY HISTORY:  Family History   Problem Relation Age of Onset     Heart Disease Mother         ,mother had emphesema and  at 62     Hypertension Mother      Allergies Mother      Lipids Mother      Obesity Mother      Respiratory Mother         Emphysema     Diabetes Maternal Grandmother         Type 2?     Hypertension Maternal Grandmother      Circulatory Maternal Grandmother         Due to diabetes     Eye Disorder Maternal Grandmother         Macular degeneration/Macular degeneration     Obesity Maternal Grandmother      Hypertension Father      Lipids Father      Cancer Father      Prostate Cancer Father      Other Cancer Father      Cerebrovascular Disease Paternal Grandmother      Alcohol/Drug Maternal Grandfather      Obesity Maternal Grandfather      Psychotic Disorder Paternal Grandfather         Committed Suicide     Depression Paternal Grandfather      Allergies Sister      Genitourinary Problems Sister          SOCIAL HISTORY:  Social History     Tobacco Use     Smoking status: Former Smoker     Packs/day: 1.00     Years: 10.00     Pack years: 10.00     Types: Cigarettes     Start date: 2004     Last attempt to quit: 8/15/2018     Years since quittin.9     Smokeless tobacco: Never Used     Tobacco comment:  pack or less a day   Substance Use Topics     Alcohol use: Yes     Alcohol/week: 0.0 oz     Comment: Occas.     Drug use: No       ROS:   Constitutional: No fever, chills, or sweats. Weight loss (intentional)  ENT: No visual disturbance, ear ache, epistaxis, sore throat.   Cardiovascular: As per HPI.   Respiratory: No cough, hemoptysis.    : No hematuria.   Integument: Negative.   Psychiatric: Depression+  Hematologic:  No easy bruising, no easy bleeding.  Neuro: Negative.   Musculoskeletal: No myalgia.    Exam:  /73  (BP Location: Right arm, Patient Position: Chair, Cuff Size: Adult Large)   Pulse 58   Wt 107 kg (236 lb)   SpO2 98%   BMI 36.95 kg/m    GENERAL APPEARANCE: alert and no distress  HEENT: no icterus, no central cyanosis.  RESPIRATORY: lungs clear to auscultation - no rales, rhonchi or wheezes, no use of accessory muscles, no retractions, respirations are unlabored, normal respiratory rate  CARDIOVASCULAR: regular rhythm, normal S1, S2, no S3 or S4 and no murmur, click or rub, precordium quiet with normal PMI.  EXTREMITIES: no edema  NEURO: alert, normal speech,and affect  SKIN: no ecchymoses, no rashes     I have reviewed the labs and personally reviewed the imaging below and made my comment in the assessment and plan.    Labs:  CBC RESULTS:   Lab Results   Component Value Date    WBC 8.3 03/07/2019    RBC 4.84 03/07/2019    HGB 13.6 03/07/2019    HCT 44.1 03/07/2019    MCV 91 03/07/2019    MCH 28.1 03/07/2019    MCHC 30.8 (L) 03/07/2019    RDW 15.5 (H) 03/07/2019     03/07/2019       BMP RESULTS:  Lab Results   Component Value Date     03/07/2019    POTASSIUM 3.9 03/07/2019    CHLORIDE 109 03/07/2019    CO2 24 03/07/2019    ANIONGAP 6 03/07/2019    GLC 95 04/08/2019    BUN 10 03/07/2019    CR 0.66 03/07/2019    GFRESTIMATED >90 03/07/2019    GFRESTBLACK >90 03/07/2019    CHIKI 8.2 (L) 03/07/2019        INR RESULTS:  Lab Results   Component Value Date    INR 0.98 11/06/2018       Echocardiogram 9/26/2018  Global and regional left ventricular function is normal with an EF of 60-65%.  Moderate concentric wall thickening consistent with left ventricular  hypertrophy is present.  Right ventricular function, chamber size, wall motion, and thickness are  normal.  Mild biatrial enlargement is present.  No significant valve abnormalities present.  The inferior vena cava was normal in size    Nuclear stress test with Lexiscan 9/6/2018  Normal myocardial SPECT study with a summed stress score of 0.     CT  coronary artery angiogram 6/27/2013  No evidence of coronary artery disease    EKG 11/28/2018 normal.    Assessment and Plan:   Ms. Danitza Soto is a 38 year old  female with PMH significant for depression, GERD, LEONARDO, essential hypertension, morbid obesity S/P gastric bypass 11/2018 and paroxysmal atrial fibrillation.    1.  Paroxysmal atrial fibrillation: The onset of atrial fibrillation is over 1 year now.  The patient was symptomatic with weekly episodes lasting up to 8-10 hours before I have started her on flecainide and metoprolol.  I have increased dose of flecainide from 50 to 100 mg 3 months ago.  Over the last 1 month the patient had 3 episodes longest lasting for 3 hours causing tiredness.  The patient prefers to undergo catheter ablation.      2.  Hypertension: Well-controlled with current medical treatment.      3.  Obstructive sleep apnea: Resolved after gastric sleeve surgery.      Plan:  Continue metoprolol 50 mg daily  Continue flecainide 100 mg twice daily  Continue apixaban 5 mg twice daily  Continue lisinopril 20 mg   Patient referred to EP for catheter ablation of atrial fibrillation    Return to clinic 6 months.    A total of 30 minutes spent face-toface with greater than 50% of the time spent in counseling and coordinating cares of the issues above.     Please donot hesitate to contact me if you have any questions or concerns. Again, thank you for allowing me to participate in the care of your patient.    Leena RONDON MD  [facility] Division of Cardiology  Pager [phone]

## 2019-08-14 NOTE — LETTER
8/14/2019      RE: Danitza Soto  [address]  [facility] [address]       Dear Colleague,    Thank you for the opportunity to participate in the care of your patient, Danitza Soto, at the [facility] AT [employer] at [facility]. Please see a copy of my visit note below.    Chief complaint: Palpitations    HPI: Ms. Danitza Soto is a 38 year old  female with PMH significant for depression, GERD, LEONARDO, essential hypertension, morbid obesity S/P gastric bypass 11/2018 and paroxysmal atrial fibrillation.    The patient was last seen in cardiology for paroxysmal atrial fibrillation diagnosis in 9/2018.  Her symptoms were mild at that time therefore rate or rhythm control strategy was not pursued.  The patient underwent gastric sleeve surgery in 11/2018 and has lost 70 pounds.  The patient noticed more frequent atrial fibrillation episodes since the surgery almost weekly now lasting up to 8-10 hours.  She finds works stress as a prominent trigger for episodes.  Associated symptoms include chest pressure, chest pain and dizziness.  She denies chest discomfort with exertion at other times.  The patient denies a history of dyspnea, PND, orthopnea, pedal edema, and syncope  Current medications include lisinopril 20 mg, fish oil.  She has cut down on lisinopril from 40 mg since she has lost significant weight.  She quit smoking in 2018 (10 pack years).  No history of alcohol abuse. No history of diabetes.  Family history is negative for CAD.  Prior cardiac tests include CT coronary angiogram in 2013 which did not show evidence of CAD.  Echocardiogram dated 9/2018 showed normal LV and RV function with no significant valve disease.  Moderate LVH was reported. I personnally reviewed the images and I donot agree with the LVH diagnosis. I donot think she has LVH based on echo. Zio patch in 8/2018 showed 4% atrial fibrillation burden heart rate  ranged  beats per minute.  Longest episode lasting for 8 hours.    I have reviewed her ECG 11/28/2018 which shows normal sinus rhythm with single PAC, normal WV/QRS/QTC intervals. No evidence of LVH on ECG.    Medications, personal, family, and social history reviewed with patient and revised.    Interval history 5/14/2019:  Patient returns for follow-up to recheck on paroxysmal atrial fibrillation.  Since I saw 3 months ago, patient developed depression and started on Effexor which improved her mood.  I have started her on flecainide and metoprolol 3 months ago for frequent paroxysmal atrial fibrillation episodes.  She noticed significant decline in the frequency of the episodes however she still reports at least 2 episodes per month sometimes lasting for several hours.  She reports associated dizziness however denies chest pain, shortness of breath, lower extremity edema or syncope.  Since gastric bypass patient lost significant weight with improvement in blood pressure and sleep apnea.  She no longer uses CPAP machine.  She is overall feeling well.    Interval history 8/14/2019:  The patient returns for follow-up to recheck on paroxysmal atrial fibrillation.  When I saw patient last time 3 months ago I increased dose of flecainide to 100 mg twice daily due to symptomatic palpitations.  The patient reports doing well during the first 2 months however had 3 episodes within the last month longest episode lasting for 3 hours with associated tiredness.  Denies chest pain, dyspnea on exertion, dizziness, syncope.  The patient has did a lot of research on atrial fibrillation ablation and she expressed her interest in undergoing procedure.    PAST MEDICAL HISTORY:  Past Medical History:   Diagnosis Date     Depressive disorder 1990     Esophageal reflux      Hearing problem 2018     Hyperlipidemia LDL goal <130 7/6/2015     Hypertension      LSIL (low grade squamous intraepithelial lesion) on Pap smear 6/2014    +  HPV, unable to type colp - BRISEYDA I     LEONARDO on CPAP      Other anxiety states        CURRENT MEDICATIONS:  Current Outpatient Medications   Medication Sig Dispense Refill     acetaminophen (TYLENOL) 325 MG tablet Take 2 tablets (650 mg) by mouth every 4 hours as needed for other (For optimal non-opioid multimodal pain management to improve pain control and physical function.) 100 tablet 0     apixaban ANTICOAGULANT (ELIQUIS) 5 MG tablet Take 1 tablet (5 mg) by mouth 2 times daily 180 tablet 3     calcium carbonate (OS-CHIKI) 500 MG tablet Take 1 tablet by mouth 2 times daily       Cholecalciferol (VITAMIN D-3) 5000 units TABS Take 1 tablet by mouth daily       fish oil-omega-3 fatty acids 1000 MG capsule Take 2 g by mouth every morning        flecainide (TAMBOCOR) 100 MG tablet Take 1 tablet (100 mg) by mouth 2 times daily 180 tablet 3     lisinopril (PRINIVIL/ZESTRIL) 40 MG tablet Take 1 tablet (40 mg) by mouth daily 30 tablet 0     LORazepam (ATIVAN) 0.5 MG tablet Take 1 tablet (0.5 mg) by mouth every 8 hours as needed for anxiety 20 tablet 0     metoprolol succinate ER (TOPROL-XL) 50 MG 24 hr tablet Take 1 tablet (50 mg) by mouth daily 90 tablet 3     Multiple Vitamins-Minerals (CENTRUM PO)        naltrexone (DEPADE/REVIA) 50 MG tablet Take 1 tablet (50 mg) by mouth daily 30 tablet 5     Probiotic Product (PROBIOTIC ADVANCED PO)        topiramate (TOPAMAX) 25 MG tablet Take 1 tablet (25 mg) by mouth daily 30 tablet 5     venlafaxine (EFFEXOR-XR) 75 MG 24 hr capsule TAKE 1 CAPSULE(75 MG) BY MOUTH DAILY 90 capsule 0     VENTOLIN  (90 Base) MCG/ACT Inhaler INHALE 1-2 PUFFS EVERY 4-6 HOURS AS NEEDED FOR WHEEZING  0     vitamin B complex with vitamin C (VITAMIN  B COMPLEX) tablet Take 1 tablet by mouth daily       diclofenac (VOLTAREN) 1 % topical gel Place 2 g onto the skin 4 times daily (Patient not taking: Reported on 6/3/2019) 100 g 0       PAST SURGICAL HISTORY:  Past Surgical History:   Procedure Laterality  Date     HC TOOTH EXTRACTION W/FORCEP      [facility] Teeth Extraction     LAPAROSCOPIC GASTRIC SLEEVE N/A 2018    Procedure: Laparoscopic Sleeve Gastrectomy Latex Free;  Surgeon: Artis Tes MD;  Location: [facility] OR     LAPAROSCOPIC HERNIORRHAPHY HIATAL  2018    Procedure: LAPAROSCOPIC  HIATAL Hernia Repair;  Surgeon: Artis Tse MD;  Location: [facility] OR       ALLERGIES:     Allergies   Allergen Reactions     Wellbutrin [Bupropion]      Wellbutrin: Panic attacks, heart/chest pain       FAMILY HISTORY:  Family History   Problem Relation Age of Onset     Heart Disease Mother         ,mother had emphesema and  at 62     Hypertension Mother      Allergies Mother      Lipids Mother      Obesity Mother      Respiratory Mother         Emphysema     Diabetes Maternal Grandmother         Type 2?     Hypertension Maternal Grandmother      Circulatory Maternal Grandmother         Due to diabetes     Eye Disorder Maternal Grandmother         Macular degeneration/Macular degeneration     Obesity Maternal Grandmother      Hypertension Father      Lipids Father      Cancer Father      Prostate Cancer Father      Other Cancer Father      Cerebrovascular Disease Paternal Grandmother      Alcohol/Drug Maternal Grandfather      Obesity Maternal Grandfather      Psychotic Disorder Paternal Grandfather         Committed Suicide     Depression Paternal Grandfather      Allergies Sister      Genitourinary Problems Sister          SOCIAL HISTORY:  Social History     Tobacco Use     Smoking status: Former Smoker     Packs/day: 1.00     Years: 10.00     Pack years: 10.00     Types: Cigarettes     Start date: 2004     Last attempt to quit: 8/15/2018     Years since quittin.9     Smokeless tobacco: Never Used     Tobacco comment:  pack or less a day   Substance Use Topics     Alcohol use: Yes     Alcohol/week: 0.0 oz     Comment: Occas.     Drug use: No       ROS:   Constitutional: No fever, chills, or  sweats. Weight loss (intentional)  ENT: No visual disturbance, ear ache, epistaxis, sore throat.   Cardiovascular: As per HPI.   Respiratory: No cough, hemoptysis.    : No hematuria.   Integument: Negative.   Psychiatric: Depression+  Hematologic:  No easy bruising, no easy bleeding.  Neuro: Negative.   Musculoskeletal: No myalgia.    Exam:  /73 (BP Location: Right arm, Patient Position: Chair, Cuff Size: Adult Large)   Pulse 58   Wt 107 kg (236 lb)   SpO2 98%   BMI 36.95 kg/m     GENERAL APPEARANCE: alert and no distress  HEENT: no icterus, no central cyanosis.  RESPIRATORY: lungs clear to auscultation - no rales, rhonchi or wheezes, no use of accessory muscles, no retractions, respirations are unlabored, normal respiratory rate  CARDIOVASCULAR: regular rhythm, normal S1, S2, no S3 or S4 and no murmur, click or rub, precordium quiet with normal PMI.  EXTREMITIES: no edema  NEURO: alert, normal speech,and affect  SKIN: no ecchymoses, no rashes     I have reviewed the labs and personally reviewed the imaging below and made my comment in the assessment and plan.    Labs:  CBC RESULTS:   Lab Results   Component Value Date    WBC 8.3 03/07/2019    RBC 4.84 03/07/2019    HGB 13.6 03/07/2019    HCT 44.1 03/07/2019    MCV 91 03/07/2019    MCH 28.1 03/07/2019    MCHC 30.8 (L) 03/07/2019    RDW 15.5 (H) 03/07/2019     03/07/2019       BMP RESULTS:  Lab Results   Component Value Date     03/07/2019    POTASSIUM 3.9 03/07/2019    CHLORIDE 109 03/07/2019    CO2 24 03/07/2019    ANIONGAP 6 03/07/2019    GLC 95 04/08/2019    BUN 10 03/07/2019    CR 0.66 03/07/2019    GFRESTIMATED >90 03/07/2019    GFRESTBLACK >90 03/07/2019    CHIKI 8.2 (L) 03/07/2019        INR RESULTS:  Lab Results   Component Value Date    INR 0.98 11/06/2018       Echocardiogram 9/26/2018  Global and regional left ventricular function is normal with an EF of 60-65%.  Moderate concentric wall thickening consistent with left  ventricular  hypertrophy is present.  Right ventricular function, chamber size, wall motion, and thickness are  normal.  Mild biatrial enlargement is present.  No significant valve abnormalities present.  The inferior vena cava was normal in size    Nuclear stress test with Lexiscan 9/6/2018  Normal myocardial SPECT study with a summed stress score of 0.     CT coronary artery angiogram 6/27/2013  No evidence of coronary artery disease    EKG 11/28/2018 normal.    Assessment and Plan:   Ms. Danitza Soto is a 38 year old  female with PMH significant for depression, GERD, LEONARDO, essential hypertension, morbid obesity S/P gastric bypass 11/2018 and paroxysmal atrial fibrillation.    1.  Paroxysmal atrial fibrillation: The onset of atrial fibrillation is over 1 year now.  The patient was symptomatic with weekly episodes lasting up to 8-10 hours before I have started her on flecainide and metoprolol.  I have increased dose of flecainide from 50 to 100 mg 3 months ago.  Over the last 1 month the patient had 3 episodes longest lasting for 3 hours causing tiredness.  The patient prefers to undergo catheter ablation.      2.  Hypertension: Well-controlled with current medical treatment.      3.  Obstructive sleep apnea: Resolved after gastric sleeve surgery.      Plan:  Continue metoprolol 50 mg daily  Continue flecainide 100 mg twice daily  Continue apixaban 5 mg twice daily  Continue lisinopril 20 mg   Patient referred to EP for catheter ablation of atrial fibrillation    Return to clinic 6 months.    A total of 30 minutes spent face-toface with greater than 50% of the time spent in counseling and coordinating cares of the issues above.     Please donot hesitate to contact me if you have any questions or concerns. Again, thank you for allowing me to participate in the care of your patient.    Leena RONDON MD  [facility] Division of Cardiology  Pager [phone]

## 2019-08-22 PROBLEM — M54.50 LUMBAGO: Status: RESOLVED | Noted: 2018-08-01 | Resolved: 2019-08-22

## 2019-08-22 PROBLEM — M25.562 LEFT ANTERIOR KNEE PAIN: Status: RESOLVED | Noted: 2018-08-01 | Resolved: 2019-08-22

## 2019-08-22 NOTE — PROGRESS NOTES
Subjective:  HPI                    Objective:  System    Physical Exam    General     ROS    Assessment/Plan:    DISCHARGE REPORT    Progress reporting period is from 7/31/18 to 8/23/18.     SUBJECTIVE  Subjective: Overall feels better. Exercises going well.   Current Pain level: 1/10   Initial Pain level: 6/10        ;   ,     Patient has failed to return to therapy so current objective findings are unknown.  The subjective and objective information are from the last SOAP note on this patient.    OBJECTIVE  Objective: Dynamic L knee valgus      ASSESSMENT/PLAN  Diagnosis 1:  LBP  Pain -  hot/cold therapy, mechanical traction, manual therapy, self management, education and home program  Decreased ROM/flexibility - manual therapy, therapeutic exercise and home program  Decreased strength - therapeutic exercise, therapeutic activities and home program  Diagnosis 2:  L knee   Pain -  hot/cold therapy, manual therapy, self management, education and home program  Decreased ROM/flexibility - manual therapy, therapeutic exercise and home program  Decreased strength - therapeutic exercise, therapeutic activities and home program  STG/LTGs have been met or progress has been made towards goals:  Yes (See Goal flow sheet completed today.)  Assessment of Progress: The patient has not returned to therapy. Current status is unknown.  Self Management Plans:  Patient has been instructed in a home treatment program.  Patient  has been instructed in self management of symptoms.  Danitza continues to require the following intervention to meet STG and LTG's: PT intervention is no longer required to meet STG/LTG.  The patient failed to complete scheduled/ordered appointments so current information is unknown.  We will discharge this patient from PT.    Recommendations:  This patient is ready to be discharged from therapy and continue their home treatment program.    Please refer to the daily flowsheet for treatment today, total  treatment time and time spent performing 1:1 timed codes.

## 2019-08-27 ENCOUNTER — PATIENT OUTREACH (OUTPATIENT)
Dept: CARE COORDINATION | Facility: CLINIC | Age: 39
End: 2019-08-27

## 2019-08-27 NOTE — LETTER
[facility]  Complex Care Plan  About Me:    Patient Name:  Danitza Soto    YOB: 1980  Age:         39 year old   [place] MRN:    [id number] Telephone Information:  Home Phone [phone]   Mobile [phone]       [address] [facility] [address] Email address:  [email or web address]      Emergency Contact(s)    Name Relationship Lgl Grd Work Phone Home Phone Mobile Phone   1. MARC, ABRAN* Spouse No  none [phone]   2. ASIF SOTO Sister No none none [phone]           Primary language:  English     needed? No   [place] [employer] [facility]:  [phone] op. 1  Other communication barriers:    Preferred Method of Communication:  Marai Luz  Current living arrangement:    Mobility Status/ Medical Equipment:      Health Maintenance  Health Maintenance Reviewed:      My Access Plan  Medical Emergency 911   Primary Clinic Line [facility] - [phone]   24 Hour Appointment Line [phone] or  [phone]) (toll-free)   24 Hour Nurse Line [phone] (toll-free)   Preferred Urgent Care     Preferred Hospital     Preferred Pharmacy [facility] [address] [place] [address] [address] AT [facility] [address]     Behavioral Health Crisis Line The National Suicide Prevention Lifeline at 1-804.399.8507 or 911             My Care Team Members  Patient Care Team       Relationship Specialty Notifications Start End    Polly Mcbride MD PCP - General   2/22/06     Phone: [phone] Fax: [phone] [address] [address]    Polly Mcbride MD Assigned PCP   3/26/17     Phone: [phone] Fax: [phone]         [facility] [address] [address]    Amy Wolf, RN Nurse Coordinator Bariatric  7/25/18     Phone: [phone] Fax: [phone]         [address] [place] [address]    Kassy Dudley, RN Lead Care Coordinator  Primary Care - CC Admissions 11/29/18     Phone: [phone] Fax: [phone]        Brandee Salazar, RN Specialty Care Coordinator Cardiology  8/19/19     Phone: [phone] Fax: [phone]         [address] [facility] [address]    Harpreet Arevalo MD MD Clinical Cardiac Electrophysiology  8/19/19     Phone: [phone] Fax: [phone]         [address] [facility] [address]    Leena Heath MD MD Cardiology  8/19/19     Phone: [phone] Fax: [phone]         [address] [address]            My Care Plans  Self Management and Treatment Plan  Goals and (Comments)  Goals        General    Healthy Eating (pt-stated)     Notes - Note edited  8/27/2019  3:42 PM by Kassy Dudley RN    Goal 2  Statement: I will follow post surgery diet as directed   Measure of Success: completed  Supportive Steps to Achieve: understanding of diet  Barriers: na  Strengths: understanding of diet  Date to Achieve By: 1/1/20  Patient expressed understanding of goal: yes          Medication 1 (pt-stated)     Notes - Note created  8/27/2019  3:41 PM by Kassy Dudley RN    Goal Statement: I will take medications as directed   Measure of Success: copleted  Supportive Steps to Achieve: patient verbalizes understanding of medications    Barriers: na  Strengths: desire to be healthy   Date to Achieve By: 1/1/20  Patient expressed understanding of goal: yes                 Action Plans on File:            Depression          Advance Care Plans/Directives Type:        My Medical and Care Information  Problem List   Patient Active Problem List   Diagnosis     Anxiety state     Esophageal reflux     Morbid obesity due to excess calories (H)     Tobacco use disorder     CARDIOVASCULAR SCREENING; LDL GOAL LESS THAN 130     Hypertension goal BP (blood pressure) < 140/90     Acne     Papanicolaou smear of cervix with low grade squamous intraepithelial lesion (LGSIL)     Mixed hyperlipidemia     LEONARDO  (obstructive sleep apnea)     Obesity hypoventilation syndrome (H)     Paroxysmal atrial fibrillation (H)     Morbid (severe) obesity due to excess calories (H)     Recurrent major depressive disorder, in full remission (H)      Current Medications and Allergies:  See printed Medication Report.    Care Coordination Start Date: 11/29/2018   Frequency of Care Coordination:     Form Last Updated: 08/27/2019

## 2019-08-27 NOTE — PROGRESS NOTES
"Clinic Care Coordination Contact    Follow Up Progress Note      Assessment: Clinic Care Coordinator RN spoke with patient today on the following topics:     1. A-fib. Patient followed up with Cardiology 8/14/19.    Patient has had atrial fibrillation for more than 1 year.  The patient was symptomatic with weekly episodes lasting up to 8-10 hours before starting flecainide and metoprolol.  Over the last 1 month the patient had 3 episodes longest lasting for 3 hours causing tiredness.  The patient prefers to undergo catheter ablation.  Patient is scheduled to see electrophysiologist in September.    Clinic Care Coordinator RN advised patient to ask if pre op is need for ablation.      2. Bariatric surgery: Patient reports \"everything is going good\"  Patient denies N/V/D.  Patient is followed closely by surgery center.     3. Anti depressant: Patient is taking Effexor.  Patient reports \"I feel great\"      4. Forgetfulness and attention span: Patient reports she has increased forgetfulness and a decreased attention span.  She states \"Im not overly concerned about it , it just something I noticed\"  Clinic Care Coordinator RN discussed possible causes cardiac medication, diet, anti depressants.   After Patient has cardiac ablation her betablocker's will be decreased/stopped.  Patient agreed to report back if forgetfulness and attention span improve after this.    Patient feels this started prior to starting Effexor.   Routed to PCP as an FYI       Goals addressed this encounter:   Goals Addressed                 This Visit's Progress      Healthy Eating (pt-stated)   On track     Goal 2  Statement: I will follow post surgery diet as directed   Measure of Success: completed  Supportive Steps to Achieve: understanding of diet  Barriers: na  Strengths: understanding of diet  Date to Achieve By: 1/1/20  Patient expressed understanding of goal: yes            COMPLETED: Medical (pt-stated)        Goal 1  Statement: I will " take pain medication as directed.   Measure of Success: completed  Supportive Steps to Achieve: understanding of medications  Barriers: na   Strengths: desire for pain control  Date to Achieve By: 1/1/19  Patient expressed understanding of goal: yes            Medication 1 (pt-stated)   On track     Goal Statement: I will take medications as directed   Measure of Success: copleted  Supportive Steps to Achieve: patient verbalizes understanding of medications    Barriers: na  Strengths: desire to be healthy   Date to Achieve By: 1/1/20  Patient expressed understanding of goal: yes                 Intervention/Education provided during outreach: yes          Plan: Patient will see electrophysiologist in September.   Patient will take medications as directed.   Clinic Care Coordinator RN will follow up in next month     Routed to PCP as an FYI

## 2019-09-01 ASSESSMENT — ENCOUNTER SYMPTOMS
PALPITATIONS: 1
SYNCOPE: 0
LIGHT-HEADEDNESS: 1
LEG PAIN: 0
ORTHOPNEA: 0
EXERCISE INTOLERANCE: 0
HYPOTENSION: 0
HYPERTENSION: 0
SLEEP DISTURBANCES DUE TO BREATHING: 0

## 2019-09-04 ENCOUNTER — PRE VISIT (OUTPATIENT)
Dept: CARDIOLOGY | Facility: CLINIC | Age: 39
End: 2019-09-04

## 2019-09-04 ENCOUNTER — OFFICE VISIT (OUTPATIENT)
Dept: CARDIOLOGY | Facility: CLINIC | Age: 39
End: 2019-09-04
Attending: INTERNAL MEDICINE
Payer: COMMERCIAL

## 2019-09-04 VITALS
OXYGEN SATURATION: 97 % | SYSTOLIC BLOOD PRESSURE: 137 MMHG | HEIGHT: 67 IN | WEIGHT: 238 LBS | HEART RATE: 60 BPM | BODY MASS INDEX: 37.35 KG/M2 | DIASTOLIC BLOOD PRESSURE: 85 MMHG

## 2019-09-04 DIAGNOSIS — I48.0 PAROXYSMAL ATRIAL FIBRILLATION (H): Primary | ICD-10-CM

## 2019-09-04 PROCEDURE — 93010 ELECTROCARDIOGRAM REPORT: CPT | Mod: ZP | Performed by: INTERNAL MEDICINE

## 2019-09-04 PROCEDURE — 93005 ELECTROCARDIOGRAM TRACING: CPT | Mod: ZF

## 2019-09-04 PROCEDURE — G0463 HOSPITAL OUTPT CLINIC VISIT: HCPCS | Mod: 25,ZF

## 2019-09-04 PROCEDURE — 99205 OFFICE O/P NEW HI 60 MIN: CPT | Mod: ZP | Performed by: INTERNAL MEDICINE

## 2019-09-04 RX ORDER — SODIUM CHLORIDE 9 MG/ML
INJECTION, SOLUTION INTRAVENOUS CONTINUOUS
Status: CANCELLED | OUTPATIENT
Start: 2019-09-04

## 2019-09-04 RX ORDER — LIDOCAINE 40 MG/G
CREAM TOPICAL
Status: CANCELLED | OUTPATIENT
Start: 2019-09-04

## 2019-09-04 ASSESSMENT — MIFFLIN-ST. JEOR: SCORE: 1782.19

## 2019-09-04 ASSESSMENT — PAIN SCALES - GENERAL: PAINLEVEL: NO PAIN (0)

## 2019-09-04 NOTE — NURSING NOTE
Chief Complaint   Patient presents with     New Patient     [facility]I consult, referral Leena (Kerry).     Vitals were taken and medications were reconciled. EKG was performed.    Ana Tate  3:23 PM

## 2019-09-04 NOTE — LETTER
9/4/2019      RE: Danitza Soto  [address]  [facility] [address]       Dear Colleague,    Thank you for the opportunity to participate in the care of your patient, Danitza Soto, at the [facility]. Please see a copy of my visit note below.    Electrophysiology Clinic Note  HPI:   Ms. Danitza Soto is a 38 year old  female  who has a past medical history significant for depression, HTN, GERD, LEONARDO (resolved with weight loss), essential hypertension, morbid obesity S/P gastric bypass 11/2018, PAF (CHADSVASC 2), and depression.    She was first diagnosed with PAF in 9/2018. She was reporting palpitations, racing heart with some associated chest pressure and dizziness. At first her symptoms were mild and so no interventions were initially pursued. She has gastric bypass in 11/2018 and has lost over 70 lbs since. Since her surgery, she has started noticing more increased symptoms occurring a couple times a week lasting up to 10 hours. Zio patch in 8/2018 showed 4% atrial fibrillation burden heart rate ranged  beats per minute.  Longest episode lasting for 8 hours. She has followed with Dr. Heath who started her on Flecainide and Metoprolol. She continued to have breakthrough AF episodes. Initially decreased on Flecainide, but now back to weekly prompting referral to EP. She had been on lisinopril for HTN and dose has been decreased since weight loss. Prior cardiac tests include CT coronary angiogram in 2013 which did not show evidence of CAD.  Echocardiogram dated 9/2018 showed normal LV and RV function with no significant valve disease.  Moderate LVH was reported; however this has been questioned. Stress echo in 3/2019 showed normal structure and function and no ischemia. Family history is negative for early CAD or SCD.     She reports feeling well today. She states she is continuing to have episodes of AF 1-2 times a week lasting 8-10  hours. She has palpitations with associated CP and dizziness during these episodes. She denies any worsening shortness of breath, leg/ankle swelling, PND, orthopnea, or syncopal symptoms. Presenting 12 lead ECG shows SB Vent Rate 59 bpm,  ms, QRS 92 ms, QTc 461 ms. Current cardiac medications include: Flecainide, Metoprolol, Lisinopril, and Eliquis.      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 - BRISEYDA I     LEONARDO on CPAP      Other anxiety states        CURRENT MEDICATIONS:  Current Outpatient Medications   Medication Sig Dispense Refill     acetaminophen (TYLENOL) 325 MG tablet Take 2 tablets (650 mg) by mouth every 4 hours as needed for other (For optimal non-opioid multimodal pain management to improve pain control and physical function.) 100 tablet 0     apixaban ANTICOAGULANT (ELIQUIS) 5 MG tablet Take 1 tablet (5 mg) by mouth 2 times daily 180 tablet 3     calcium carbonate (OS-CHIKI) 500 MG tablet Take 1 tablet by mouth 2 times daily       Cholecalciferol (VITAMIN D-3) 5000 units TABS Take 1 tablet by mouth daily       diclofenac (VOLTAREN) 1 % topical gel Place 2 g onto the skin 4 times daily (Patient not taking: Reported on 6/3/2019) 100 g 0     fish oil-omega-3 fatty acids 1000 MG capsule Take 2 g by mouth every morning        flecainide (TAMBOCOR) 100 MG tablet Take 1 tablet (100 mg) by mouth 2 times daily 180 tablet 3     lisinopril (PRINIVIL/ZESTRIL) 40 MG tablet Take 1 tablet (40 mg) by mouth daily 30 tablet 0     LORazepam (ATIVAN) 0.5 MG tablet Take 1 tablet (0.5 mg) by mouth every 8 hours as needed for anxiety 20 tablet 0     metoprolol succinate ER (TOPROL-XL) 50 MG 24 hr tablet Take 1 tablet (50 mg) by mouth daily 90 tablet 3     Multiple  Vitamins-Minerals (CENTRUM PO)        naltrexone (DEPADE/REVIA) 50 MG tablet Take 1 tablet (50 mg) by mouth daily 30 tablet 5     Probiotic Product (PROBIOTIC ADVANCED PO)        topiramate (TOPAMAX) 25 MG tablet Take 1 tablet (25 mg) by mouth daily 30 tablet 5     venlafaxine (EFFEXOR-XR) 75 MG 24 hr capsule TAKE 1 CAPSULE(75 MG) BY MOUTH DAILY 90 capsule 0     VENTOLIN  (90 Base) MCG/ACT Inhaler INHALE 1-2 PUFFS EVERY 4-6 HOURS AS NEEDED FOR WHEEZING  0     vitamin B complex with vitamin C (VITAMIN  B COMPLEX) tablet Take 1 tablet by mouth daily         PAST SURGICAL HISTORY:  Past Surgical History:   Procedure Laterality Date     HC TOOTH EXTRACTION W/FORCEP      [place] Teeth Extraction     LAPAROSCOPIC GASTRIC SLEEVE N/A 2018    Procedure: Laparoscopic Sleeve Gastrectomy Latex Free;  Surgeon: Artis Tse MD;  Location: [facility] OR     LAPAROSCOPIC HERNIORRHAPHY HIATAL  2018    Procedure: LAPAROSCOPIC  HIATAL Hernia Repair;  Surgeon: Artis Tse MD;  Location: [facility] OR       ALLERGIES:     Allergies   Allergen Reactions     Wellbutrin [Bupropion]      Wellbutrin: Panic attacks, heart/chest pain       FAMILY HISTORY:  Family History   Problem Relation Age of Onset     Heart Disease Mother         ,mother had emphesema and  at 62     Hypertension Mother      Allergies Mother      Lipids Mother      Obesity Mother      Respiratory Mother         Emphysema     Diabetes Maternal Grandmother         Type 2?     Hypertension Maternal Grandmother      Circulatory Maternal Grandmother         Due to diabetes     Eye Disorder Maternal Grandmother         Macular degeneration/Macular degeneration     Obesity Maternal Grandmother      Hypertension Father      Lipids Father      Cancer Father      Prostate Cancer Father      Other Cancer Father      Cerebrovascular Disease Paternal Grandmother      Alcohol/Drug Maternal Grandfather      Obesity Maternal Grandfather      Psychotic Disorder  "Paternal Grandfather         Committed Suicide     Depression Paternal Grandfather      Allergies Sister      Genitourinary Problems Sister        SOCIAL HISTORY:  Social History     Tobacco Use     Smoking status: Former Smoker     Packs/day: 1.00     Years: 10.00     Pack years: 10.00     Types: Cigarettes     Start date: 2004     Last attempt to quit: 8/15/2018     Years since quittin.0     Smokeless tobacco: Never Used     Tobacco comment:  pack or less a day   Substance Use Topics     Alcohol use: Yes     Alcohol/week: 0.0 oz     Comment: Occas.     Drug use: No       ROS:   A comprehensive 10 point review of systems negative other than as mentioned in HPI.  Exam:  /85 (BP Location: Left arm, Patient Position: Chair, Cuff Size: Adult Regular)   Pulse 60   Ht 1.702 m (5' 7\")   Wt 108 kg (238 lb)   SpO2 97%   BMI 37.28 kg/m     GENERAL APPEARANCE: alert and no distress  HEENT: no icterus, no xanthelasmas, normal pupil size and reaction, normal palate, mucosa moist, no central cyanosis  NECK: no adenopathy, no asymmetry, masses, or scars, thyroid normal to palpation and no bruits, JVP not elevated  RESPIRATORY: lungs clear to auscultation - no rales, rhonchi or wheezes, no use of accessory muscles, no retractions, respirations are unlabored, normal respiratory rate  CARDIOVASCULAR: regular rhythm, normal S1 with physiologic split S2, no S3 or S4 and no murmur, click or rub, precordium quiet with normal PMI.  ABDOMEN: soft, non tender, bowel sounds normal, non-distended  EXTREMITIES: peripheral pulses normal, no edema  NEURO: alert and oriented to person/place/time, normal speech, gait and affect  SKIN: no ecchymoses, no rashes  PSYCH: normal affect, cooperative    Labs:  Reviewed.     Testing/Procedures:  3/5/19 ECHOCARDIOGRAM:   Interpretation Summary  Submaximal ECG stress test     The patient exercised for 7 minutes and 27 seconds on the Andrés Protocol. The  patient did not report any " symptoms during testing. The test was terminated  due to fatigue. The patient achieved 9.2 METs or 32.2 ml/kg/min at peak  exercise.     73% of age predicted target heart rate achieved. Normal blood pressure  response to exercise.  No angina reported during stress test.  No ECG evidence of ischemia.  No supraventricular or ventricular arrhythmias.  Reduced functional capacity.      Assessment and Plan:   Ms. Danitza Soto is a 38 year old  female  who has a past medical history significant for depression, HTN, GERD, LEONARDO (resolved with weight loss), essential hypertension, morbid obesity S/P gastric bypass 2018, PAF (CHADSVASC 2), and depression.She was first diagnosed with PAF in 2018. She was reporting palpitations, racing heart with some associated chest pressure and dizziness. At first her symptoms were mild and so no interventions were initially pursued. She has gastric bypass in 2018 and has lost over 70 lbs since. Since her surgery, she has started noticing more increased symptoms occurring a couple times a week lasting up to 10 hours. Zio patch in 2018 showed 4% AF up to  8 hours. She has followed with Dr. Heath who started her on Flecainide, Metoprolol, and Eliquis. She continued to have breakthrough AF episodes. Initially decreased on Flecainide, but now back to weekly prompting referral to EP. Stress echo in 3/2019 showed normal structure and function and no ischemia. he states she is continuing to have episodes of AF 1-2 times a week lasting 8-10 hours. She has palpitations with associated CP and dizziness during these episodes. Presenting 12 lead ECG shows SB Vent Rate 59 bpm,  ms, QRS 92 ms, QTc 461 ms. Current cardiac medications include: Flecainide, Metoprolol, Lisinopril, and Eliquis.      Paroxysmal Atrial Fibrillation:   We discussed in detail with the patient management/treatment options for A.fib includin. Stroke Prophylaxis:  CHADSVASC=+HTN, +gender  2, corresponding to a  2.[facility] annual [facility]. She is on Eliquis, no bleeding issues.     2. Rate Control: Continue Metoprolol.   3. Rhythm Control: She has been on Flecainide with continued AF episodes. We discussed alternative AATs vs ablation. She would like to try to come off AATs and would like to pursue ablation. The procedural risk of EP study and ablation were discussed in detail. Risks discussed include: vascular complications, CVA, AVB, pericardial effusion and tamponade, esophageal fistula, PV stenosis. AF ablation has 70% success at 1 year, 50% success at 5 years, and 30% need another ablation.  Even if ablation is successful anticoagulation may be needed lifelong. She states understanding and is wishes to pursue ablation. We will have her switch to pradaxa 2 weeks prior. She will get a CT scan for LA/PV anatomy and ERMA thrombus rule out. We will have her stop Flecainide 2 days prior to ablation. She can hold metoprolol the morning of ablation.    4. Risk Factor Management: maintain good BP control, and LEONARDO evaluation if needed.      Follow up 3 months after ablation.     The patient states understanding and is agreeable with plan.   This patient was seen and evaluated with Dr. Arevalo. The above note reflects our joint assessment and plan.   JOSE LUIS Danielson CNP  Pager: [phone]    EP STAFF NOTE  I have seen and examined the patient as part of a shared visit with OSIRIS Danielson NP.  I agree with the note above. I reviewed today's vital signs and medications. I have reviewed and discussed with the advanced practice provider their physical examination, assessment, and plan   Briefly, PAF resistant to flecainide  My key history/exam findings are: RRR.   The key management decisions made by me: AFib ablation.    Harpreet Arevalo MD [facility]  Cardiology - Electrophysiology

## 2019-09-04 NOTE — PATIENT INSTRUCTIONS
**Medication Change: 1 week prior to ablation, stop Eliquis and start Pradaxa 150mg twice a day. You will be given a 30 day supply. Continue taking until you run out, and then switch back to Eliquis. A 30 day voucher has been provided.         You are scheduled for an Atrial Fibrillation Ablation, at [facility] [place], with Dr. Harpreet Arevalo. The hospital is located at [address] [place] [address] on [place] the [place].  If you need to cancel this procedure, please call [phone].     Please disregard any automated, reminder phone calls regarding arrival times. Follow the below arrival times.     Pre-Anesthesia Phone Call will occur 1-2 days prior to procedure date.  You do not need to come to the [facility].      CT Angiogram Instructions:    Date: __Sept 6, 2019____Time: __9:45 am____Arrive to [facility] Waiting Room at [facility]      1. Nothing to eat or drink except water 3 hours prior.  2. Avoid caffeine, smoking, or strenuous activity on the day of the test.  3. You will receive contrast, so plan to drink extra water the day before and after your test.  4. If you take any of the following medications, please HOLD the day of:   * NSAIDS (Examples: ibuprofen/Advil/Motrin, naproxen/Aleve) the day of.  5. Please allow 2 hours of time for preparation and testing    Atrial Fibrillation Ablation with Transesophageal Echocardiogram (EDY)    Date:   Oct 3, 2019  Time: ___5:30 am______Arrive to Unit 3[address] at the [facility]      1. Please review the attached instructions on showering before your procedure at the end of this letter.  2. Your history and physical will be completed by our nurse practitioner when you arrive.  3. Please do not eat anything for 8 hours prior to your procedure. You may have sips of water up until 2 hours prior to your arrival.  4. The morning of your procedure, you may take your scheduled medications with a sip of water - continue your blood thinner  (Pradaxa).  HOLD Flecainide 2 days before.   5. If the EDY shows a clot in your heart, the procedure will be canceled.   6. You will receive general anesthesia for this procedure.   7. You may stay in the hospital overnight or you may discharge the same day.      Post-Procedure Instructions  Care of groin site:    Remove the Band-Aid after 24 hours. If there is minor oozing, apply another Band-aid and remove it after 12 hours.     Do NOT take a bath, use a hot tub, pool, or submerse in water for at least 3 days. You may shower.     It is normal to have a small bruise or lump at the site.    Do not scrub the site.    Do not use lotion or powder near the puncture site for 3 days.      For the first 2 days: Do not stoop or squat. When you cough, sneeze or move your bowels, hold your hand over the puncture site and press gently.    Do not lift more than 10 pounds or exertional activity for 10 days.  - No driving for 24 hours after (with or without general anesthesia).     If you start bleeding from the site in your groin: Lie down flat and press firmly on the site. Call your physician immediately, or, come to the emergency room.    Call 911 right away if you have bleeding that is heavy or does not stop.  Call your doctor/provider if:     You have a large or growing hard lump around the site.     The site is red, swollen, hot or tender.     Blood or fluid is draining from the site.     You have chills or a fever greater than 101 F (38 C).     Your leg or arm turns bluish, feels numb or cool.     You have hives, a rash or unusual itching.        Date: ___Jan 8, 2020__8:30 am_____:   Follow up appointment with Coral Ling NP      *Please fax us your FMLA forms and we will fill out surrounding procedure and for appointments*    Please do not hesitate to utilize [employer] or call us if you have any questions or concerns.    Brandee Salazar RN  Electrophysiology Nurse Coordinator  [phone]  Fax: [phone]    Alexia  OSIRIS Ojeda Procedure   [phone]                Showering Before Surgery   Your surgeon has asked you to take 2 showers before surgery.  Why is this important?  It is normal for bacteria (germs) to be on your skin. The skin protects us from these germs. When you have surgery, we cut the skin. Sometimes germs get into the cuts and cause infection (illness caused by germs). By following the instructions below and using special soap, you will lower the number of germs on your skin. This decreases your chance of infection.  Special soap  Buy or get 8 ounces of antiseptic surgical soap called 4% CHG. Common name brands of this soap are Hibiclens and Exidine.   You can find it at your local pharmacy, clinic or retail store. If you have trouble, ask your pharmacist to help you find the right substitute.   A note about shaving:  Do not shave within 12 inches of your incision (surgical cut) area for at least 3 days before surgery. Shaving can make small cuts in the skin. This puts you at a higher risk of infection.  Items you will need for each shower:    1 newly washed towel    4 ounces of one of the above soaps  Follow these instructions:  The evening before surgery   1. Wash your hair and body with your regular shampoo and soap. Make sure you rinse the shampoo and soap from your hair and body.   2. Using clean hands, apply about 2 ounces of soap gently on your skin from the neck to your toes. Use on your groin area last. Do not use this soap on your face or head. If you get any soap in your eyes, ears or mouth, rinse right away.   3. Repeat step 2. It is very important to let the soap stay on your skin for at least 1 minute.   4. Rinse well and dry off using a clean towel.If you feel any tingling, itching or other irritation, rinse right away. It is normal to feel some coolness on the skin after using the antiseptic soap. Your skin may feel a bit dry after the shower, but do not use any lotions, creams or  moisturizers. Do not use hair spray or other products in your hair.  5. Dress in freshly washed clothes or pajamas. Use fresh pillowcases and sheets on your bed.      The morning of surgery  1. Wash your hair and body with your regular shampoo and soap. Make sure you rinse the shampoo and soap from your hair and body.   2. Using clean hands, apply about 2 ounces of soap gently on your skin from the neck to your toes. Use on your groin area last. Do not use this soap on your face or head. If you get any soap in your eyes, ears or mouth, rinse right away.   3. Repeat step 2. It is very important to let the soap stay on your skin for at least 1 minute.   4. Rinse well and dry off using a clean towel.If you feel any tingling, itching or other irritation, rinse right away. It is normal to feel some coolness on the skin after using the antiseptic soap. Your skin may feel a bit dry after the shower, but do not use any lotions, creams or moisturizers. Do not use hair spray or other products in your hair.  5. Dress in clean clothes.  If you have any questions about showering or an allergy to CHG soap, please call the Preadmissions Nursing Department at the hospital where you are having your surgery.  [facility] [place] ([place]): [phone]  This phone number will be answered between the hours of 8:00 a.m. and 6:30 p.m. Monday through Friday.

## 2019-09-04 NOTE — PROGRESS NOTES
Electrophysiology Clinic Note  HPI:   Ms. Danitza Soto is a 38 year old  female who has a past medical history significant for depression, HTN, GERD, LEONARDO (resolved with weight loss), essential hypertension, morbid obesity S/P gastric bypass 11/2018, PAF (CHADSVASC 2), and depression.    She was first diagnosed with PAF in 9/2018. She was reporting palpitations, racing heart with some associated chest pressure and dizziness. At first her symptoms were mild and so no interventions were initially pursued. She has gastric bypass in 11/2018 and has lost over 70 lbs since. Since her surgery, she has started noticing more increased symptoms occurring a couple times a week lasting up to 10 hours. Zio patch in 8/2018 showed 4% atrial fibrillation burden heart rate ranged  beats per minute.  Longest episode lasting for 8 hours. She has followed with Dr. Heath who started her on Flecainide and Metoprolol. She continued to have breakthrough AF episodes. Initially decreased on Flecainide, but now back to weekly prompting referral to EP. She had been on lisinopril for HTN and dose has been decreased since weight loss. Prior cardiac tests include CT coronary angiogram in 2013 which did not show evidence of CAD.  Echocardiogram dated 9/2018 showed normal LV and RV function with no significant valve disease.  Moderate LVH was reported; however this has been questioned. Stress echo in 3/2019 showed normal structure and function and no ischemia. Family history is negative for early CAD or SCD.     She reports feeling well today. She states she is continuing to have episodes of AF 1-2 times a week lasting 8-10 hours. She has palpitations with associated CP and dizziness during these episodes. She denies any worsening shortness of breath, leg/ankle swelling, PND, orthopnea, or syncopal symptoms. Presenting 12 lead ECG shows SB Vent Rate 59 bpm,  ms, QRS 92 ms, QTc 461 ms. Current cardiac medications include: Flecainide,  Metoprolol, Lisinopril, and Eliquis.      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 - BRISEYDA I     LEONARDO on CPAP      Other anxiety states        CURRENT MEDICATIONS:  Current Outpatient Medications   Medication Sig Dispense Refill     acetaminophen (TYLENOL) 325 MG tablet Take 2 tablets (650 mg) by mouth every 4 hours as needed for other (For optimal non-opioid multimodal pain management to improve pain control and physical function.) 100 tablet 0     apixaban ANTICOAGULANT (ELIQUIS) 5 MG tablet Take 1 tablet (5 mg) by mouth 2 times daily 180 tablet 3     calcium carbonate (OS-CHIKI) 500 MG tablet Take 1 tablet by mouth 2 times daily       Cholecalciferol (VITAMIN D-3) 5000 units TABS Take 1 tablet by mouth daily       diclofenac (VOLTAREN) 1 % topical gel Place 2 g onto the skin 4 times daily (Patient not taking: Reported on 6/3/2019) 100 g 0     fish oil-omega-3 fatty acids 1000 MG capsule Take 2 g by mouth every morning        flecainide (TAMBOCOR) 100 MG tablet Take 1 tablet (100 mg) by mouth 2 times daily 180 tablet 3     lisinopril (PRINIVIL/ZESTRIL) 40 MG tablet Take 1 tablet (40 mg) by mouth daily 30 tablet 0     LORazepam (ATIVAN) 0.5 MG tablet Take 1 tablet (0.5 mg) by mouth every 8 hours as needed for anxiety 20 tablet 0     metoprolol succinate ER (TOPROL-XL) 50 MG 24 hr tablet Take 1 tablet (50 mg) by mouth daily 90 tablet 3     Multiple Vitamins-Minerals (CENTRUM PO)        naltrexone (DEPADE/REVIA) 50 MG tablet Take 1 tablet (50 mg) by mouth daily 30 tablet 5     Probiotic Product (PROBIOTIC ADVANCED PO)        topiramate (TOPAMAX) 25 MG tablet Take 1 tablet (25 mg) by mouth daily 30 tablet 5     venlafaxine (EFFEXOR-XR) 75 MG 24 hr capsule TAKE 1  CAPSULE(75 MG) BY MOUTH DAILY 90 capsule 0     VENTOLIN  (90 Base) MCG/ACT Inhaler INHALE 1-2 PUFFS EVERY 4-6 HOURS AS NEEDED FOR WHEEZING  0     vitamin B complex with vitamin C (VITAMIN  B COMPLEX) tablet Take 1 tablet by mouth daily         PAST SURGICAL HISTORY:  Past Surgical History:   Procedure Laterality Date     HC TOOTH EXTRACTION W/FORCEP      [place] Teeth Extraction     LAPAROSCOPIC GASTRIC SLEEVE N/A 2018    Procedure: Laparoscopic Sleeve Gastrectomy Latex Free;  Surgeon: Artis Tse MD;  Location: [facility] OR     LAPAROSCOPIC HERNIORRHAPHY HIATAL  2018    Procedure: LAPAROSCOPIC  HIATAL Hernia Repair;  Surgeon: Artis Tse MD;  Location: [facility] OR       ALLERGIES:     Allergies   Allergen Reactions     Wellbutrin [Bupropion]      Wellbutrin: Panic attacks, heart/chest pain       FAMILY HISTORY:  Family History   Problem Relation Age of Onset     Heart Disease Mother         ,mother had emphesema and  at 62     Hypertension Mother      Allergies Mother      Lipids Mother      Obesity Mother      Respiratory Mother         Emphysema     Diabetes Maternal Grandmother         Type 2?     Hypertension Maternal Grandmother      Circulatory Maternal Grandmother         Due to diabetes     Eye Disorder Maternal Grandmother         Macular degeneration/Macular degeneration     Obesity Maternal Grandmother      Hypertension Father      Lipids Father      Cancer Father      Prostate Cancer Father      Other Cancer Father      Cerebrovascular Disease Paternal Grandmother      Alcohol/Drug Maternal Grandfather      Obesity Maternal Grandfather      Psychotic Disorder Paternal Grandfather         Committed Suicide     Depression Paternal Grandfather      Allergies Sister      Genitourinary Problems Sister        SOCIAL HISTORY:  Social History     Tobacco Use     Smoking status: Former Smoker     Packs/day: 1.00     Years: 10.00     Pack years: 10.00     Types: Cigarettes      "Start date: 2004     Last attempt to quit: 8/15/2018     Years since quittin.0     Smokeless tobacco: Never Used     Tobacco comment:  pack or less a day   Substance Use Topics     Alcohol use: Yes     Alcohol/week: 0.0 oz     Comment: Occas.     Drug use: No       ROS:   A comprehensive 10 point review of systems negative other than as mentioned in HPI.  Exam:  /85 (BP Location: Left arm, Patient Position: Chair, Cuff Size: Adult Regular)   Pulse 60   Ht 1.702 m (5' 7\")   Wt 108 kg (238 lb)   SpO2 97%   BMI 37.28 kg/m    GENERAL APPEARANCE: alert and no distress  HEENT: no icterus, no xanthelasmas, normal pupil size and reaction, normal palate, mucosa moist, no central cyanosis  NECK: no adenopathy, no asymmetry, masses, or scars, thyroid normal to palpation and no bruits, JVP not elevated  RESPIRATORY: lungs clear to auscultation - no rales, rhonchi or wheezes, no use of accessory muscles, no retractions, respirations are unlabored, normal respiratory rate  CARDIOVASCULAR: regular rhythm, normal S1 with physiologic split S2, no S3 or S4 and no murmur, click or rub, precordium quiet with normal PMI.  ABDOMEN: soft, non tender, bowel sounds normal, non-distended  EXTREMITIES: peripheral pulses normal, no edema  NEURO: alert and oriented to person/place/time, normal speech, gait and affect  SKIN: no ecchymoses, no rashes  PSYCH: normal affect, cooperative    Labs:  Reviewed.     Testing/Procedures:  3/5/19 ECHOCARDIOGRAM:   Interpretation Summary  Submaximal ECG stress test     The patient exercised for 7 minutes and 27 seconds on the Andrés Protocol. The  patient did not report any symptoms during testing. The test was terminated  due to fatigue. The patient achieved 9.2 METs or 32.2 ml/kg/min at peak  exercise.     73% of age predicted target heart rate achieved. Normal blood pressure  response to exercise.  No angina reported during stress test.  No ECG evidence of ischemia.  No supraventricular " or ventricular arrhythmias.  Reduced functional capacity.      Assessment and Plan:   Ms. Danitza Soto is a 38 year old  female who has a past medical history significant for depression, HTN, GERD, LEONARDO (resolved with weight loss), essential hypertension, morbid obesity S/P gastric bypass 2018, PAF (CHADSVASC 2), and depression.She was first diagnosed with PAF in 2018. She was reporting palpitations, racing heart with some associated chest pressure and dizziness. At first her symptoms were mild and so no interventions were initially pursued. She has gastric bypass in 2018 and has lost over 70 lbs since. Since her surgery, she has started noticing more increased symptoms occurring a couple times a week lasting up to 10 hours. Zio patch in 2018 showed 4% AF up to  8 hours. She has followed with Dr. Heath who started her on Flecainide, Metoprolol, and Eliquis. She continued to have breakthrough AF episodes. Initially decreased on Flecainide, but now back to weekly prompting referral to EP. Stress echo in 3/2019 showed normal structure and function and no ischemia. he states she is continuing to have episodes of AF 1-2 times a week lasting 8-10 hours. She has palpitations with associated CP and dizziness during these episodes. Presenting 12 lead ECG shows SB Vent Rate 59 bpm,  ms, QRS 92 ms, QTc 461 ms. Current cardiac medications include: Flecainide, Metoprolol, Lisinopril, and Eliquis.      Paroxysmal Atrial Fibrillation:   We discussed in detail with the patient management/treatment options for Shelley includin. Stroke Prophylaxis:  CHADSVASC=+HTN, +gender  2, corresponding to a 2.2% annual stroke / systemic emolism event rate. She is on Eliquis, no bleeding issues.     2. Rate Control: Continue Metoprolol.   3. Rhythm Control: She has been on Flecainide with continued AF episodes. We discussed alternative AATs vs ablation. She would like to try to come off AATs and would like to pursue ablation.  The procedural risk of EP study and ablation were discussed in detail. Risks discussed include: vascular complications, CVA, AVB, pericardial effusion and tamponade, esophageal fistula, PV stenosis. AF ablation has 70% success at 1 year, 50% success at 5 years, and 30% need another ablation.  Even if ablation is successful anticoagulation may be needed lifelong. She states understanding and is wishes to pursue ablation. We will have her switch to pradaxa 2 weeks prior. She will get a CT scan for LA/PV anatomy and ERMA thrombus rule out. We will have her stop Flecainide 2 days prior to ablation. She can hold metoprolol the morning of ablation.    4. Risk Factor Management: maintain good BP control, and LEONARDO evaluation if needed.      Follow up 3 months after ablation.     The patient states understanding and is agreeable with plan.   This patient was seen and evaluated with Dr. Arevalo. The above note reflects our joint assessment and plan.   JOSE LUIS Danielson CNP  Pager: [phone]    EP STAFF NOTE  I have seen and examined the patient as part of a shared visit with OSIRIS Danielson NP.  I agree with the note above. I reviewed today's vital signs and medications. I have reviewed and discussed with the advanced practice provider their physical examination, assessment, and plan   Briefly, PAF resistant to flecainide  My key history/exam findings are: RRR.   The key management decisions made by me: AFib ablation.    Harpreet Arevalo MD [facility]  Cardiology - Electrophysiology

## 2019-09-05 LAB — INTERPRETATION ECG - MUSE: NORMAL

## 2019-09-06 ENCOUNTER — HOSPITAL ENCOUNTER (OUTPATIENT)
Dept: CT IMAGING | Facility: CLINIC | Age: 39
Discharge: HOME | End: 2019-09-06
Attending: INTERNAL MEDICINE | Admitting: INTERNAL MEDICINE
Payer: COMMERCIAL

## 2019-09-06 DIAGNOSIS — I48.0 PAROXYSMAL ATRIAL FIBRILLATION (H): ICD-10-CM

## 2019-09-06 PROCEDURE — 75572 CT HRT W/3D IMAGE: CPT

## 2019-09-06 PROCEDURE — 25000128 H RX IP 250 OP 636: Performed by: INTERNAL MEDICINE

## 2019-09-06 PROCEDURE — 75572 CT HRT W/3D IMAGE: CPT | Mod: 26 | Performed by: INTERNAL MEDICINE

## 2019-09-06 RX ORDER — IOPAMIDOL 755 MG/ML
120 INJECTION, SOLUTION INTRAVASCULAR ONCE
Status: COMPLETED | OUTPATIENT
Start: 2019-09-06 | End: 2019-09-06

## 2019-09-06 RX ADMIN — IOPAMIDOL 120 ML: 755 INJECTION, SOLUTION INTRAVENOUS at 09:57

## 2019-09-10 DIAGNOSIS — I10 ESSENTIAL HYPERTENSION WITH GOAL BLOOD PRESSURE LESS THAN 140/90: ICD-10-CM

## 2019-09-10 RX ORDER — LISINOPRIL 40 MG/1
TABLET ORAL
Qty: 30 TABLET | Refills: 1 | Status: SHIPPED | OUTPATIENT
Start: 2019-09-10 | End: 2020-03-24

## 2019-09-10 NOTE — TELEPHONE ENCOUNTER
"Lisinopril 40MG  Last Written Prescription Date:  08/09/2019  Last Fill Quantity: 30,  # refills: 0   Last office visit: 5/3/2019 with prescribing provider:  Yes    Future Office Visit:      Requested Prescriptions   Pending Prescriptions Disp Refills     lisinopril (PRINIVIL/ZESTRIL) 40 MG tablet [Pharmacy Med Name: LISINOPRIL 40MG TABLETS] 30 tablet 0     Sig: TAKE 1 TABLET BY MOUTH DAILY       ACE Inhibitors (Including Combos) Protocol Passed - 9/10/2019  1:10 PM        Passed - Blood pressure under 140/90 in past 12 months     BP Readings from Last 3 Encounters:   09/04/19 137/85   08/14/19 111/73   06/03/19 123/75                 Passed - Recent (12 mo) or future (30 days) visit within the authorizing provider's specialty     Patient had office visit in the last 12 months or has a visit in the next 30 days with authorizing provider or within the authorizing provider's specialty.  See \"Patient Info\" tab in inbasket, or \"Choose Columns\" in Meds & Orders section of the refill encounter.              Passed - Medication is active on med list        Passed - Patient is age 18 or older        Passed - No active pregnancy on record        Passed - Normal serum creatinine on file in past 12 months     Recent Labs   Lab Test 03/07/19  1251   CR 0.66             Passed - Normal serum potassium on file in past 12 months     Recent Labs   Lab Test 03/07/19  1251   POTASSIUM 3.9             Passed - No positive pregnancy test within past 12 months          "

## 2019-09-26 ENCOUNTER — MYC MEDICAL ADVICE (OUTPATIENT)
Dept: CARDIOLOGY | Facility: CLINIC | Age: 39
End: 2019-09-26

## 2019-09-26 DIAGNOSIS — I48.0 PAROXYSMAL ATRIAL FIBRILLATION (H): Primary | ICD-10-CM

## 2019-09-27 ENCOUNTER — TELEPHONE (OUTPATIENT)
Dept: CARDIOLOGY | Facility: CLINIC | Age: 39
End: 2019-09-27

## 2019-09-27 RX ORDER — DABIGATRAN ETEXILATE 150 MG/1
150 CAPSULE ORAL 2 TIMES DAILY
Qty: 60 CAPSULE | Refills: 0 | Status: SHIPPED | OUTPATIENT
Start: 2019-09-27 | End: 2019-10-21

## 2019-09-27 NOTE — TELEPHONE ENCOUNTER
Pradaxa 150mg BID Rx sent for upcoming AF ablation on 10/3/19. Will stop Eliquis. Returning to Eliquis once Pradaxa supply runs out.     10/21/19  Eliquis Rx sent.

## 2019-09-27 NOTE — TELEPHONE ENCOUNTER
Prior Authorization Specialty Medication Request    Medication/Dose: Pradaxa 150 mg  ICD code (if different than what is on RX):  Paroxysmal atrial fibrillation (H) [I48.0]  - Primary       Rationale: Paroxysmal atrial fibrillation (H) [I48.0]  - Primary     Insurance Name:   Insurance ID: [id number]   Insurance Phone Number: [employer] [facility] Ph: [phone]     Pharmacy Information (if different than what is on RX)

## 2019-09-30 NOTE — TELEPHONE ENCOUNTER
PRIOR AUTHORIZATION DENIED    Medication: Pradaxa 150 MG capsule - DENIED    Denial Date: 9/30/2019    Denial Rational:     The requested drug will be covered with prior authorization when the following criteria are met:    The patient s treatment cannot be switched to a formulary drug (Available Formulary Alternatives: Eliquis, Xarelto, or warfarin)  AND    The patient has tried and had an intolerance to TWO of the following: Eliquis, Xarelto, or warfarin  OR    The patient has tried and had an inadequate treatment response (i.e., failure to adequately resolve thrombus) to ONE of the following: Eliquis or Xarelto  AND    The requested drug is being prescribed for any of the following reasons:    A) to reduce the risk of stroke and systemic embolism in a patient with non-valvular atrial fibrillation,   B) the treatment of deep venous thrombosis (DVT) or pulmonary embolism (PE) in a patient who has been treated with a parenteral anticoagulant for 5-10 days,    C) to reduce the risk of recurrence of deep venous thrombosis or pulmonary embolism in a patient who has been previously treated  OR    The requested drug is being prescribed for the prophylaxis of deep venous thrombosis (DVT) or pulmonary embolism (PE) following hip replacement surgery    RATIONALE  The intent of the criteria is to provide coverage consistent with product labeling, FDA guidance, standards of medical practice, evidence-based drug information, and/or published guidelines. The intent of this Formulary Medical Necessity program is to confirm the appropriate coverage of Pradaxa and to foster cost-effective, first-line use of available formulary/preferred drug list  medications.  Pradaxa in a direct thrombin inhibitor indicated for the reduction of risk of stroke and systemic embolism in non-valvular atrial fibrillation, treatment of deep venous thrombosis (DVT) and pulmonary embolism (PE) in patients who have been treated with a parenteral  "anticoagulant for 5-10 days, reduction in the risk of recurrence of DVT and PE in patients who have been previously treated, and the prophylaxis of DVT and PE following hip replacement surgery. If the prescriber provides evidence that the patient has tried and had an intolerance to 2 formulary  alternatives (Eliquis, Xarelto or warfarin), then the request will be approved. If the prescriber provides evidence that the patient has tried and had an inadequate treatment response (i.e., failure to adequately resolve thrombus) to Eliquis or Xarelto, then the request will be approved.    Appeal Information:     **Please advise if appeal is necessary and place a letter of medical necessity under the \"letters\" tab in patient's chart and route back to [facility] [[id number]]            "

## 2019-09-30 NOTE — TELEPHONE ENCOUNTER
PA Initiation    Medication: Pradaxa 150 MG capsule -   Insurance Company: [employer] - Phone [phone] Fax [phone]  Pharmacy Filling the Rx: [id number] - [place], [place] - [facility] AT [facility] OF  [address]  Filling Pharmacy Phone: [phone]  Filling Pharmacy Fax:    Start Date: 9/30/2019

## 2019-10-01 ENCOUNTER — PATIENT OUTREACH (OUTPATIENT)
Dept: CARE COORDINATION | Facility: CLINIC | Age: 39
End: 2019-10-01

## 2019-10-01 NOTE — PROGRESS NOTES
Clinic Care Coordination Contact  Care Team Conversations    Patient is scheduled for cardiac ablation on 10/3/19. Clinic Care Coordinator RN will attempted to follow up with patient afterwards.

## 2019-10-03 ENCOUNTER — ANESTHESIA (OUTPATIENT)
Dept: SURGERY | Facility: CLINIC | Age: 39
End: 2019-10-03
Payer: COMMERCIAL

## 2019-10-03 ENCOUNTER — SURGERY (OUTPATIENT)
Age: 39
End: 2019-10-03
Payer: COMMERCIAL

## 2019-10-03 ENCOUNTER — HOSPITAL ENCOUNTER (OUTPATIENT)
Facility: CLINIC | Age: 39
Discharge: HOME | End: 2019-10-03
Attending: INTERNAL MEDICINE | Admitting: INTERNAL MEDICINE
Payer: COMMERCIAL

## 2019-10-03 ENCOUNTER — ANESTHESIA EVENT (OUTPATIENT)
Dept: SURGERY | Facility: CLINIC | Age: 39
End: 2019-10-03
Payer: COMMERCIAL

## 2019-10-03 ENCOUNTER — HOSPITAL ENCOUNTER (OUTPATIENT)
Dept: CARDIOLOGY | Facility: CLINIC | Age: 39
End: 2019-10-03
Attending: INTERNAL MEDICINE | Admitting: INTERNAL MEDICINE
Payer: COMMERCIAL

## 2019-10-03 VITALS
HEIGHT: 67 IN | SYSTOLIC BLOOD PRESSURE: 124 MMHG | DIASTOLIC BLOOD PRESSURE: 77 MMHG | BODY MASS INDEX: 37.61 KG/M2 | WEIGHT: 239.64 LBS | RESPIRATION RATE: 12 BRPM | OXYGEN SATURATION: 97 % | TEMPERATURE: 98.3 F | HEART RATE: 74 BPM

## 2019-10-03 DIAGNOSIS — I48.0 PAROXYSMAL ATRIAL FIBRILLATION (H): ICD-10-CM

## 2019-10-03 PROBLEM — Z98.890 S/P ABLATION OF ATRIAL FIBRILLATION: Status: ACTIVE | Noted: 2019-10-03

## 2019-10-03 PROBLEM — Z86.79 S/P ABLATION OF ATRIAL FIBRILLATION: Status: ACTIVE | Noted: 2019-10-03

## 2019-10-03 LAB
ABO + RH BLD: NORMAL
ABO + RH BLD: NORMAL
ANION GAP SERPL CALCULATED.3IONS-SCNC: 7 MMOL/L (ref 3–14)
B-HCG SERPL-ACNC: <1 IU/L (ref 0–5)
BLD GP AB SCN SERPL QL: NORMAL
BLOOD BANK CMNT PATIENT-IMP: NORMAL
BUN SERPL-MCNC: 11 MG/DL (ref 7–30)
CALCIUM SERPL-MCNC: 8.6 MG/DL (ref 8.5–10.1)
CHLORIDE SERPL-SCNC: 108 MMOL/L (ref 94–109)
CO2 SERPL-SCNC: 23 MMOL/L (ref 20–32)
CREAT SERPL-MCNC: 0.65 MG/DL (ref 0.52–1.04)
ERYTHROCYTE [DISTWIDTH] IN BLOOD BY AUTOMATED COUNT: 13.1 % (ref 10–15)
GFR SERPL CREATININE-BSD FRML MDRD: >90 ML/MIN/{1.73_M2}
GLUCOSE BLDC GLUCOMTR-MCNC: 88 MG/DL (ref 70–99)
GLUCOSE SERPL-MCNC: 84 MG/DL (ref 70–99)
HCG UR QL: NEGATIVE
HCT VFR BLD AUTO: 41.4 % (ref 35–47)
HGB BLD-MCNC: 12.9 G/DL (ref 11.7–15.7)
INTERPRETATION ECG - MUSE: NORMAL
KCT BLD-ACNC: 126 SEC (ref 75–150)
KCT BLD-ACNC: 199 SEC (ref 75–150)
KCT BLD-ACNC: 304 SEC (ref 75–150)
KCT BLD-ACNC: 308 SEC (ref 75–150)
KCT BLD-ACNC: 324 SEC (ref 75–150)
KCT BLD-ACNC: 336 SEC (ref 75–150)
KCT BLD-ACNC: 360 SEC (ref 75–150)
MCH RBC QN AUTO: 27.7 PG (ref 26.5–33)
MCHC RBC AUTO-ENTMCNC: 31.2 G/DL (ref 31.5–36.5)
MCV RBC AUTO: 89 FL (ref 78–100)
PLATELET # BLD AUTO: 298 10E9/L (ref 150–450)
POTASSIUM SERPL-SCNC: 4 MMOL/L (ref 3.4–5.3)
RBC # BLD AUTO: 4.66 10E12/L (ref 3.8–5.2)
SODIUM SERPL-SCNC: 138 MMOL/L (ref 133–144)
SPECIMEN EXP DATE BLD: NORMAL
WBC # BLD AUTO: 7.6 10E9/L (ref 4–11)

## 2019-10-03 PROCEDURE — 85027 COMPLETE CBC AUTOMATED: CPT | Performed by: INTERNAL MEDICINE

## 2019-10-03 PROCEDURE — 40000065 ZZH STATISTIC EKG NON-CHARGEABLE

## 2019-10-03 PROCEDURE — C1759 CATH, INTRA ECHOCARDIOGRAPHY: HCPCS | Performed by: INTERNAL MEDICINE

## 2019-10-03 PROCEDURE — 93656 COMPRE EP EVAL ABLTJ ATR FIB: CPT | Performed by: INTERNAL MEDICINE

## 2019-10-03 PROCEDURE — 25000565 ZZH ISOFLURANE, EA 15 MIN: Performed by: INTERNAL MEDICINE

## 2019-10-03 PROCEDURE — 93005 ELECTROCARDIOGRAM TRACING: CPT

## 2019-10-03 PROCEDURE — 37000009 ZZH ANESTHESIA TECHNICAL FEE, EACH ADDTL 15 MIN: Performed by: INTERNAL MEDICINE

## 2019-10-03 PROCEDURE — 37000008 ZZH ANESTHESIA TECHNICAL FEE, 1ST 30 MIN: Performed by: INTERNAL MEDICINE

## 2019-10-03 PROCEDURE — 25000128 H RX IP 250 OP 636: Performed by: NURSE ANESTHETIST, CERTIFIED REGISTERED

## 2019-10-03 PROCEDURE — 93355 ECHO TRANSESOPHAGEAL (TEE): CPT

## 2019-10-03 PROCEDURE — C1887 CATHETER, GUIDING: HCPCS | Performed by: INTERNAL MEDICINE

## 2019-10-03 PROCEDURE — 86850 RBC ANTIBODY SCREEN: CPT | Performed by: INTERNAL MEDICINE

## 2019-10-03 PROCEDURE — 27210794 ZZH OR GENERAL SUPPLY STERILE: Performed by: INTERNAL MEDICINE

## 2019-10-03 PROCEDURE — 84702 CHORIONIC GONADOTROPIN TEST: CPT | Performed by: INTERNAL MEDICINE

## 2019-10-03 PROCEDURE — 25800030 ZZH RX IP 258 OP 636: Performed by: NURSE ANESTHETIST, CERTIFIED REGISTERED

## 2019-10-03 PROCEDURE — 93010 ELECTROCARDIOGRAM REPORT: CPT | Mod: 76 | Performed by: INTERNAL MEDICINE

## 2019-10-03 PROCEDURE — 25000125 ZZHC RX 250: Performed by: NURSE ANESTHETIST, CERTIFIED REGISTERED

## 2019-10-03 PROCEDURE — 85347 COAGULATION TIME ACTIVATED: CPT

## 2019-10-03 PROCEDURE — C1730 CATH, EP, 19 OR FEW ELECT: HCPCS | Performed by: INTERNAL MEDICINE

## 2019-10-03 PROCEDURE — 80048 BASIC METABOLIC PNL TOTAL CA: CPT | Performed by: INTERNAL MEDICINE

## 2019-10-03 PROCEDURE — 86901 BLOOD TYPING SEROLOGIC RH(D): CPT | Performed by: INTERNAL MEDICINE

## 2019-10-03 PROCEDURE — 93662 INTRACARDIAC ECG (ICE): CPT | Performed by: INTERNAL MEDICINE

## 2019-10-03 PROCEDURE — 85347 COAGULATION TIME ACTIVATED: CPT | Mod: 91

## 2019-10-03 PROCEDURE — 36415 COLL VENOUS BLD VENIPUNCTURE: CPT | Performed by: INTERNAL MEDICINE

## 2019-10-03 PROCEDURE — 81025 URINE PREGNANCY TEST: CPT | Performed by: INTERNAL MEDICINE

## 2019-10-03 PROCEDURE — C1733 CATH, EP, OTHR THAN COOL-TIP: HCPCS | Performed by: INTERNAL MEDICINE

## 2019-10-03 PROCEDURE — 93613 INTRACARDIAC EPHYS 3D MAPG: CPT | Performed by: INTERNAL MEDICINE

## 2019-10-03 PROCEDURE — 82962 GLUCOSE BLOOD TEST: CPT

## 2019-10-03 PROCEDURE — 86900 BLOOD TYPING SEROLOGIC ABO: CPT | Performed by: INTERNAL MEDICINE

## 2019-10-03 PROCEDURE — 93355 ECHO TRANSESOPHAGEAL (TEE): CPT | Performed by: INTERNAL MEDICINE

## 2019-10-03 PROCEDURE — C1894 INTRO/SHEATH, NON-LASER: HCPCS | Performed by: INTERNAL MEDICINE

## 2019-10-03 RX ORDER — VENLAFAXINE HYDROCHLORIDE 75 MG/1
75 TABLET, EXTENDED RELEASE ORAL
Status: DISCONTINUED | OUTPATIENT
Start: 2019-10-04 | End: 2019-10-03

## 2019-10-03 RX ORDER — ONDANSETRON 4 MG/1
4 TABLET, ORALLY DISINTEGRATING ORAL EVERY 30 MIN PRN
Status: DISCONTINUED | OUTPATIENT
Start: 2019-10-03 | End: 2019-10-03 | Stop reason: HOSPADM

## 2019-10-03 RX ORDER — LISINOPRIL 20 MG/1
20 TABLET ORAL DAILY
Status: DISCONTINUED | OUTPATIENT
Start: 2019-10-03 | End: 2019-10-03 | Stop reason: HOSPADM

## 2019-10-03 RX ORDER — FENTANYL CITRATE 50 UG/ML
INJECTION, SOLUTION INTRAMUSCULAR; INTRAVENOUS PRN
Status: DISCONTINUED | OUTPATIENT
Start: 2019-10-03 | End: 2019-10-03

## 2019-10-03 RX ORDER — FENTANYL CITRATE 50 UG/ML
25-50 INJECTION, SOLUTION INTRAMUSCULAR; INTRAVENOUS
Status: DISCONTINUED | OUTPATIENT
Start: 2019-10-03 | End: 2019-10-03 | Stop reason: HOSPADM

## 2019-10-03 RX ORDER — SODIUM CHLORIDE 9 MG/ML
INJECTION, SOLUTION INTRAVENOUS CONTINUOUS
Status: DISCONTINUED | OUTPATIENT
Start: 2019-10-03 | End: 2019-10-03 | Stop reason: HOSPADM

## 2019-10-03 RX ORDER — VENLAFAXINE HYDROCHLORIDE 75 MG/1
75 CAPSULE, EXTENDED RELEASE ORAL
Status: DISCONTINUED | OUTPATIENT
Start: 2019-10-04 | End: 2019-10-03 | Stop reason: HOSPADM

## 2019-10-03 RX ORDER — HEPARIN SODIUM 1000 [USP'U]/ML
INJECTION, SOLUTION INTRAVENOUS; SUBCUTANEOUS PRN
Status: DISCONTINUED | OUTPATIENT
Start: 2019-10-03 | End: 2019-10-03

## 2019-10-03 RX ORDER — ONDANSETRON 2 MG/ML
INJECTION INTRAMUSCULAR; INTRAVENOUS PRN
Status: DISCONTINUED | OUTPATIENT
Start: 2019-10-03 | End: 2019-10-03

## 2019-10-03 RX ORDER — METHYLPREDNISOLONE SODIUM SUCCINATE 125 MG/2ML
INJECTION, POWDER, LYOPHILIZED, FOR SOLUTION INTRAMUSCULAR; INTRAVENOUS PRN
Status: DISCONTINUED | OUTPATIENT
Start: 2019-10-03 | End: 2019-10-03

## 2019-10-03 RX ORDER — LIDOCAINE 40 MG/G
CREAM TOPICAL
Status: DISCONTINUED | OUTPATIENT
Start: 2019-10-03 | End: 2019-10-03 | Stop reason: HOSPADM

## 2019-10-03 RX ORDER — METOPROLOL SUCCINATE 50 MG/1
50 TABLET, EXTENDED RELEASE ORAL DAILY
Status: DISCONTINUED | OUTPATIENT
Start: 2019-10-03 | End: 2019-10-03 | Stop reason: HOSPADM

## 2019-10-03 RX ORDER — NALOXONE HYDROCHLORIDE 0.4 MG/ML
.1-.4 INJECTION, SOLUTION INTRAMUSCULAR; INTRAVENOUS; SUBCUTANEOUS
Status: DISCONTINUED | OUTPATIENT
Start: 2019-10-03 | End: 2019-10-03 | Stop reason: HOSPADM

## 2019-10-03 RX ORDER — SODIUM CHLORIDE 9 MG/ML
INJECTION, SOLUTION INTRAVENOUS CONTINUOUS PRN
Status: DISCONTINUED | OUTPATIENT
Start: 2019-10-03 | End: 2019-10-03

## 2019-10-03 RX ORDER — DABIGATRAN ETEXILATE 150 MG/1
150 CAPSULE ORAL 2 TIMES DAILY
Status: DISCONTINUED | OUTPATIENT
Start: 2019-10-03 | End: 2019-10-03 | Stop reason: HOSPADM

## 2019-10-03 RX ORDER — LORAZEPAM 0.5 MG/1
0.5 TABLET ORAL EVERY 8 HOURS PRN
Status: DISCONTINUED | OUTPATIENT
Start: 2019-10-03 | End: 2019-10-03 | Stop reason: HOSPADM

## 2019-10-03 RX ORDER — SODIUM CHLORIDE, SODIUM LACTATE, POTASSIUM CHLORIDE, CALCIUM CHLORIDE 600; 310; 30; 20 MG/100ML; MG/100ML; MG/100ML; MG/100ML
INJECTION, SOLUTION INTRAVENOUS CONTINUOUS PRN
Status: DISCONTINUED | OUTPATIENT
Start: 2019-10-03 | End: 2019-10-03

## 2019-10-03 RX ORDER — TOPIRAMATE 25 MG/1
25 TABLET, FILM COATED ORAL DAILY
Status: DISCONTINUED | OUTPATIENT
Start: 2019-10-03 | End: 2019-10-03 | Stop reason: HOSPADM

## 2019-10-03 RX ORDER — SODIUM CHLORIDE, SODIUM LACTATE, POTASSIUM CHLORIDE, CALCIUM CHLORIDE 600; 310; 30; 20 MG/100ML; MG/100ML; MG/100ML; MG/100ML
INJECTION, SOLUTION INTRAVENOUS CONTINUOUS
Status: DISCONTINUED | OUTPATIENT
Start: 2019-10-03 | End: 2019-10-03 | Stop reason: HOSPADM

## 2019-10-03 RX ORDER — OXYCODONE AND ACETAMINOPHEN 5; 325 MG/1; MG/1
1 TABLET ORAL EVERY 4 HOURS PRN
Status: DISCONTINUED | OUTPATIENT
Start: 2019-10-03 | End: 2019-10-03 | Stop reason: HOSPADM

## 2019-10-03 RX ORDER — ONDANSETRON 2 MG/ML
4 INJECTION INTRAMUSCULAR; INTRAVENOUS EVERY 30 MIN PRN
Status: DISCONTINUED | OUTPATIENT
Start: 2019-10-03 | End: 2019-10-03 | Stop reason: HOSPADM

## 2019-10-03 RX ORDER — DEXAMETHASONE SODIUM PHOSPHATE 4 MG/ML
INJECTION, SOLUTION INTRA-ARTICULAR; INTRALESIONAL; INTRAMUSCULAR; INTRAVENOUS; SOFT TISSUE PRN
Status: DISCONTINUED | OUTPATIENT
Start: 2019-10-03 | End: 2019-10-03

## 2019-10-03 RX ORDER — FUROSEMIDE 10 MG/ML
INJECTION INTRAMUSCULAR; INTRAVENOUS PRN
Status: DISCONTINUED | OUTPATIENT
Start: 2019-10-03 | End: 2019-10-03

## 2019-10-03 RX ORDER — FLECAINIDE ACETATE 100 MG/1
100 TABLET ORAL 2 TIMES DAILY
Status: DISCONTINUED | OUTPATIENT
Start: 2019-10-03 | End: 2019-10-03 | Stop reason: HOSPADM

## 2019-10-03 RX ORDER — PROTAMINE SULFATE 10 MG/ML
INJECTION, SOLUTION INTRAVENOUS PRN
Status: DISCONTINUED | OUTPATIENT
Start: 2019-10-03 | End: 2019-10-03

## 2019-10-03 RX ORDER — PROPOFOL 10 MG/ML
INJECTION, EMULSION INTRAVENOUS PRN
Status: DISCONTINUED | OUTPATIENT
Start: 2019-10-03 | End: 2019-10-03

## 2019-10-03 RX ORDER — NALOXONE HYDROCHLORIDE 0.4 MG/ML
.1-.4 INJECTION, SOLUTION INTRAMUSCULAR; INTRAVENOUS; SUBCUTANEOUS
Status: DISCONTINUED | OUTPATIENT
Start: 2019-10-03 | End: 2019-10-03

## 2019-10-03 RX ADMIN — MIDAZOLAM 2 MG: 1 INJECTION INTRAMUSCULAR; INTRAVENOUS at 07:24

## 2019-10-03 RX ADMIN — SODIUM CHLORIDE, POTASSIUM CHLORIDE, SODIUM LACTATE AND CALCIUM CHLORIDE: 600; 310; 30; 20 INJECTION, SOLUTION INTRAVENOUS at 07:26

## 2019-10-03 RX ADMIN — PROTAMINE SULFATE 45 MG: 10 INJECTION, SOLUTION INTRAVENOUS at 12:01

## 2019-10-03 RX ADMIN — PROTAMINE SULFATE 10 MG: 10 INJECTION, SOLUTION INTRAVENOUS at 12:24

## 2019-10-03 RX ADMIN — PROPOFOL 200 MG: 10 INJECTION, EMULSION INTRAVENOUS at 07:37

## 2019-10-03 RX ADMIN — PROTAMINE SULFATE 10 MG: 10 INJECTION, SOLUTION INTRAVENOUS at 12:25

## 2019-10-03 RX ADMIN — HEPARIN SODIUM 2000 UNITS: 1000 INJECTION, SOLUTION INTRAVENOUS; SUBCUTANEOUS at 10:43

## 2019-10-03 RX ADMIN — HEPARIN SODIUM 10000 UNITS: 1000 INJECTION, SOLUTION INTRAVENOUS; SUBCUTANEOUS at 09:02

## 2019-10-03 RX ADMIN — HEPARIN SODIUM 2000 UNITS: 1000 INJECTION, SOLUTION INTRAVENOUS; SUBCUTANEOUS at 11:20

## 2019-10-03 RX ADMIN — PROTAMINE SULFATE 5 MG: 10 INJECTION, SOLUTION INTRAVENOUS at 12:00

## 2019-10-03 RX ADMIN — HEPARIN SODIUM 3000 UNITS: 1000 INJECTION, SOLUTION INTRAVENOUS; SUBCUTANEOUS at 09:30

## 2019-10-03 RX ADMIN — METHYLPREDNISOLONE 125 MG: 125 INJECTION, POWDER, LYOPHILIZED, FOR SOLUTION INTRAMUSCULAR; INTRAVENOUS at 11:44

## 2019-10-03 RX ADMIN — SODIUM CHLORIDE: 900 INJECTION INTRAVENOUS at 11:46

## 2019-10-03 RX ADMIN — HEPARIN SODIUM 4000 UNITS: 1000 INJECTION, SOLUTION INTRAVENOUS; SUBCUTANEOUS at 09:52

## 2019-10-03 RX ADMIN — DEXAMETHASONE SODIUM PHOSPHATE 8 MG: 4 INJECTION, SOLUTION INTRA-ARTICULAR; INTRALESIONAL; INTRAMUSCULAR; INTRAVENOUS; SOFT TISSUE at 08:05

## 2019-10-03 RX ADMIN — FUROSEMIDE 10 MG: 10 INJECTION, SOLUTION INTRAVENOUS at 11:44

## 2019-10-03 RX ADMIN — FENTANYL CITRATE 100 MCG: 50 INJECTION, SOLUTION INTRAMUSCULAR; INTRAVENOUS at 07:37

## 2019-10-03 RX ADMIN — ROCURONIUM BROMIDE 60 MG: 10 INJECTION INTRAVENOUS at 07:37

## 2019-10-03 RX ADMIN — ONDANSETRON 4 MG: 2 INJECTION INTRAMUSCULAR; INTRAVENOUS at 11:46

## 2019-10-03 RX ADMIN — SUGAMMADEX 200 MG: 100 INJECTION, SOLUTION INTRAVENOUS at 12:36

## 2019-10-03 RX ADMIN — ROCURONIUM BROMIDE 20 MG: 10 INJECTION INTRAVENOUS at 09:36

## 2019-10-03 RX ADMIN — FENTANYL CITRATE 25 MCG: 50 INJECTION, SOLUTION INTRAMUSCULAR; INTRAVENOUS at 08:53

## 2019-10-03 RX ADMIN — HEPARIN SODIUM 3000 UNITS: 1000 INJECTION, SOLUTION INTRAVENOUS; SUBCUTANEOUS at 10:16

## 2019-10-03 ASSESSMENT — MIFFLIN-ST. JEOR: SCORE: 1789.63

## 2019-10-03 NOTE — Clinical Note
Potential access sites were evaluated for patency using ultrasound.   The right femoral vein was selected. Access was obtained under with Fluoroscopic guidance using a micropuncture 21 guage needle with direct visualization of needle entry.

## 2019-10-03 NOTE — OP NOTE
EP PROCEDURE NOTE    Procedures:  1. Left Atrial Wide Area Circumferential radiofrequency Ablation.  2. Trans-septal Puncture x2  3. Intracardiac Echocardiography.  4. Invasive Hemodynamic Monitoring.  5. 3D electro-anatomic Mapping using Carto3.  6. Esophageal temperature monitoring.    Attending: Dr Harpreet Arevalo  EP Fellow: Dr Brandee Barriga  Procedure Date: 10/3/2019    Pre-operative Diagnosis:  Paroxysmal Atrial Fibrillaltion resistant to AAT  Post-operative diagnosis:  Paroxysmal Atrial fibrillation   Complications:   None.  Fluoroscopy time/dose: 4.0 minutes, 2484 mGycm2.    Clinical Profile:  38yo f with paroxysmal atrial fibrillation that has failed medical management.    PROCEDURE  The risks and benefits of the procedure were explained to the patient in full.  The risks include, but are not limited to: pain, bleeding, blood transfusion reactions, arrhythmia, dissection of vessels, cardiac perforation, pericardial effusion, stroke, and death. Informed Consent was obtained. The patient was brought to the EP lab in a fasting and hemodynamically stable condition. The patient was prepped and draped in a sterile fashion. This procedure was done under general anesthesia.  Sheaths and Catheters:  After local anesthesia with 2% lidocaine, vascular access was obtained using the modified Seldinger technique for the following access points:    Right Femoral Vein:   - 7Fr Locking Sheath: A decapolar catheter was positioned into the coronary sinus.    - 8Fr Sheath: Exchanged for an SL1 trans-septal Sheath through which a Biosense Denise Thermacool Smartouch 3.5 mm tip DF curve mapping and ablation catheter was placed into the LA.  - 8Fr Sheath: exchanged for SL1 trans-septal sheath sheath through which a Pentarray catheter was placed into the LA.    Left Femoral Vein:  - 9 Fr Sheath - an ICE catheter was placed into the RA / RV.    Right Femoral Artery:   - 4Fr Sheath was placed for hemodynamic monitoring.    EP study  results (in milliseconds):  Pre-ablation: In Sinus rhythm, AA= 800, VV= 800, DE= 136, QRS= 93, QT= 500  Post-ablation: AA=VV= 900, DE= 140, QRS= 88, QT= 457    Procedure Description:  After the above sheaths were in place, the catheters were positioned under fluoroscopic guidance. The ICE catheter was placed into the RA.  Baseline survey of the heart with the ICE did not reveal the presence of any significant pericardial effusion.  A temperature probe was placed in the esophagus.  We then performed the trans-septal access:  The guide wire was advanced into the SVC.  The SL1 trans-septal sheath and dilator were advanced over the guide wire into the SVC and directed medially.  The guide wire was removed, the dilator was aspirated and the BRK-1 needle was advanced. Using both the SINGER and MAGDALENA projections, the trans-septal system was then dragged down and the Fossa Ovalis was engaged. Tenting of the interatrial septum was observed with ICE.  The needle was advanced into the LA and the location confirmed using ICE.  While fixing the needle, the dilator was slightly advanced across the septum. The dilator and then the sheath were advanced though the septum into the LA. The trans-septal needle was then withdrawn from the dilator. The dilator was removed and the sheath was then flushed. A Heparin bolus and drip were started, with serial monitoring of ACTs to keep ACT around 350. A second trans-septal puncture was then performed in a similar fashion as described above.  We used the Pentarray and ablation catheters to build the LA, pulmonary vein and LA appendage geometry with FAM on Carto3. This was matched with the cardiac CT 3D reconstruction of the LA and pulmonary veins.   With the left atrial map in place, we then performed a wide area circumferential ablation of the LA. High output pacing technique was used to identify phrenic location and avoid injury of the phrenic nerve. The Pentarray was used to confirm electrical  isolation of the pulmonary veins. Differential pacing was performed from the coronary sinus, the left atrial appendage, and pulmonary veins, and the veins were re-touched. Electrical isolation was achieved in all four pulmonary veins. Entry and Exit block were confirmed with either spontaneous PV extrasystole or pacing from inside the PV.  Additional Ablation:  - CFAE ablation: no  - Rotor ablation: no  - SVC isolation: no  - ERMA isolation: no  - LA roof line: no  - Mitral isthmus line: no  - Left/Right maranda ablation: no.  - CTI ablation: no.    A final survey with ICE was performed and no pericardial effusion was present.    The sheaths were pulled out of the left atrium into the RA.  Heparin was stopped and protamine was given, after a test dose had revealed no reaction.  Solumedrol 125mg was given. Lasix 10mg IV was given for net 1.2 L in during procedure.The catheters were withdrawn, and the sheaths were pulled.  Manual pressure was applied to both groins. The patient was then transported to PACU for extubation then to a monitored bed.     ASSESSMENT:  1. Symptomatic Paroxysmal  Atrial Fibrillation, refractory to AAT.    2. Successful pulmonary vein isolation with entry and exit block.    PLAN:  1. Re-start oral anticoagulation tonight.  2. Transfer to a monitored bed.    Dr Brandee Barriga  EP Fellow    EP STAFF NOTE  I was present throughout the procedure. I agree with the note above by the EP fellow.  Harpreet Arevalo MD [facility]  Cardiology - Electrophysiology

## 2019-10-03 NOTE — Clinical Note
Potential access sites were evaluated for patency using ultrasound.   The left femoral vein was selected. Access was obtained under with Fluoroscopic guidance using a micropuncture 21 guage needle with direct visualization of needle entry.

## 2019-10-03 NOTE — BRIEF OP NOTE
[facility], [place]    Brief Operative Note    Pre-operative diagnosis: paroxysmal atrial fibrillation  Post-operative diagnosis Paroxysmal atrial fibrillation  Procedure: Procedure(s):  EP ABLATION FOCAL AFIB  Surgeon: Surgeon(s) and Role:     * Harpreet Arevalo MD - Primary   Fellow: Dr Brandee Barriga  Anesthesia: General   Estimated blood loss: None  Drains: None  Specimens: * No specimens in log *  Findings:   None.  Complications: None.  Implants:  * No implants in log *

## 2019-10-03 NOTE — H&P
H&P from Dr. Arevalo's Office Visit on 9/4/19 reviewed. Following today's exam there are no interval changes.     JOSE LUIS Danielson CNP  Electrophysiology Consult Service  Pager: [phone]

## 2019-10-03 NOTE — Clinical Note
Potential access sites were evaluated for patency using ultrasound.   The right femoral artery was selected. Access was obtained under with Fluoroscopic guidance using a micropuncture 21 guage needle with direct visualization of needle entry.

## 2019-10-03 NOTE — ANESTHESIA POSTPROCEDURE EVALUATION
Anesthesia POST Procedure Evaluation    Patient: Danitza Soto   MRN:     [id number] Gender:   female   Age:    39 year old :      1980        Preoperative Diagnosis: paroxysmal atrial fibrillation   Procedure(s):  EP ABLATION FOCAL AFIB   Postop Comments: No value filed.       Anesthesia Type:  Not documented  General    Reportable Event: NO     PAIN: Uncomplicated   Sign Out status: Comfortable, Well controlled pain     PONV: No PONV   Sign Out status:  No Nausea or Vomiting     Neuro/Psych: Uneventful perioperative course   Sign Out Status: Preoperative baseline; Age appropriate mentation     Airway/Resp.: Uneventful perioperative course   Sign Out Status: Non labored breathing, age appropriate RR; Resp. Status within EXPECTED Parameters     CV: Uneventful perioperative course   Sign Out status: Appropriate BP and perfusion indices; Appropriate HR/Rhythm     Disposition:   Sign Out in:  PACU  Disposition:  Phase II; Home  Recovery Course: Uneventful  Follow-Up: Not required           Last Anesthesia Record Vitals:  CRNA VITALS  10/3/2019 1217 - 10/3/2019 1259      10/3/2019             Resp Rate (set):  10          Last PACU Vitals:  Vitals Value Taken Time   /68 10/3/2019 12:56 PM   Temp     Pulse 75 10/3/2019 12:56 PM   Resp     SpO2 98 % 10/3/2019 12:58 PM   Temp src     NIBP     Pulse     SpO2     Resp     Temp     Ht Rate     Temp 2     Vitals shown include unvalidated device data.      Electronically Signed By: Tyrell Crabtree MD, October 3, 2019, 12:59 PM

## 2019-10-03 NOTE — ANESTHESIA PREPROCEDURE EVALUATION
Anesthesia Pre-Procedure Evaluation    Patient: Danitza Soto   MRN:     [id number] Gender:   female   Age:    39 year old :      1980        Preoperative Diagnosis: paroxysmal atrial fibrillation   Procedure(s):  EP ABLATION FOCAL AFIB     Past Medical History:   Diagnosis Date     Antiplatelet or antithrombotic long-term use      Arrhythmia      Depressive disorder      Esophageal reflux      Hearing problem      Hyperlipidemia LDL goal <130 2015     Hypertension      LSIL (low grade squamous intraepithelial lesion) on Pap smear 2014    + HPV, unable to type colp - BRISEYDA I     LEONARDO on CPAP      Other anxiety states       Past Surgical History:   Procedure Laterality Date     HC TOOTH EXTRACTION W/FORCEP      [place] Teeth Extraction     LAPAROSCOPIC GASTRIC SLEEVE N/A 2018    Procedure: Laparoscopic Sleeve Gastrectomy Latex Free;  Surgeon: Artis Tse MD;  Location: [facility] OR     LAPAROSCOPIC HERNIORRHAPHY HIATAL  2018    Procedure: LAPAROSCOPIC  HIATAL Hernia Repair;  Surgeon: Artis Tse MD;  Location: [facility] OR          Anesthesia Evaluation     . Pt has had prior anesthetic. Type: General           ROS/MED HX    ENT/Pulmonary:     (+)sleep apnea, , . .    Neurologic:       Cardiovascular:     (+) hypertension----. Taking blood thinners : . . . :. .       METS/Exercise Tolerance:     Hematologic:         Musculoskeletal:         GI/Hepatic:     (+) GERD       Renal/Genitourinary:         Endo:     (+) Obesity, .      Psychiatric:         Infectious Disease:         Malignancy:         Other:                         PHYSICAL EXAM:   Mental Status/Neuro: A/A/O   Airway: Facies: Feasible  Mallampati: I  Mouth/Opening: Full  TM distance: > 6 cm  Neck ROM: Full   Respiratory: Auscultation: CTAB     Resp. Rate: Normal     Resp. Effort: Normal      CV: Rhythm: Regular  Rate: Age appropriate  Heart: Normal Sounds  Edema: None   Comments:      Dental: Normal Dentition  "               LABS:  CBC:   Lab Results   Component Value Date    WBC 7.6 10/03/2019    WBC 8.3 03/07/2019    HGB 12.9 10/03/2019    HGB 13.6 03/07/2019    HCT 41.4 10/03/2019    HCT 44.1 03/07/2019     10/03/2019     03/07/2019     BMP:   Lab Results   Component Value Date     10/03/2019     03/07/2019    POTASSIUM 4.0 10/03/2019    POTASSIUM 3.9 03/07/2019    CHLORIDE 108 10/03/2019    CHLORIDE 109 03/07/2019    CO2 23 10/03/2019    CO2 24 03/07/2019    BUN 11 10/03/2019    BUN 10 03/07/2019    CR 0.65 10/03/2019    CR 0.66 03/07/2019    GLC 84 10/03/2019    GLC 95 04/08/2019     COAGS:   Lab Results   Component Value Date    INR 0.98 11/06/2018     POC:   Lab Results   Component Value Date    BGM 88 10/03/2019    HCG Negative 10/03/2019     OTHER:   Lab Results   Component Value Date    A1C 6.1 (H) 07/24/2018    CHIKI 8.6 10/03/2019    PHOS 3.5 11/28/2018    MAG 2.2 11/28/2018    ALBUMIN 3.4 03/07/2019    PROTTOTAL 6.8 03/07/2019    ALT 18 03/07/2019    AST 6 03/07/2019    ALKPHOS 85 03/07/2019    BILITOTAL 0.3 03/07/2019    LIPASE 137 08/11/2010    AMYLASE 33 08/11/2010    TSH 1.48 09/24/2018        Preop Vitals    BP Readings from Last 3 Encounters:   10/03/19 130/80   09/04/19 137/85   08/14/19 111/73    Pulse Readings from Last 3 Encounters:   10/03/19 72   09/04/19 60   08/14/19 58      Resp Readings from Last 3 Encounters:   10/03/19 16   05/03/19 14   04/08/19 16    SpO2 Readings from Last 3 Encounters:   10/03/19 97%   09/04/19 97%   08/14/19 98%      Temp Readings from Last 1 Encounters:   10/03/19 36.5  C (97.7  F) (Oral)    Ht Readings from Last 1 Encounters:   10/03/19 1.702 m (5' 7\")      Wt Readings from Last 1 Encounters:   10/03/19 108.7 kg (239 lb 10.2 oz)    Estimated body mass index is 37.53 kg/m  as calculated from the following:    Height as of this encounter: 1.702 m (5' 7\").    Weight as of this encounter: 108.7 kg (239 lb 10.2 oz).     LDA:        Assessment:   ASA " SCORE: 3    H&P: History and physical reviewed and following examination; no interval change.   Smoking Status:  Non-Smoker/Unknown   NPO Status: NPO Appropriate     Plan:   Anes. Type:  General   Pre-Medication: None   Induction:  IV (Standard)   Airway: ETT; Oral   Access/Monitoring: PIV   Maintenance: Balanced     Postop Plan:     Postop Sedation/Airway: Not planned     PONV Management:   Adult Risk Factors: Female, Non-Smoker   Prevention: Ondansetron     CONSENT: Direct conversation   Plan and risks discussed with: Patient   Blood Products: Consented (ALL Blood Products)                   Tyrell Crabtree MD

## 2019-10-03 NOTE — DISCHARGE INSTRUCTIONS
Care of groin site:         Remove the Band-Aid after 24 hours. If there is minor oozing, apply another Band-aid and remove it after 12 hours.          Do NOT take a bath, use a hot tub, pool, or submerse in water for at least 3 days You may shower.          It is normal to have a small bruise or lump at the site.         Do not scrub the site.         Do not use lotion or powder near the puncture site for 3 days.         For the first 2 days: Do not stoop or squat. When you cough, sneeze or move your bowels, hold your hand over the puncture site and press gently.         Do not lift more than 10 pounds or exertional activity for 10 days.      If you start bleeding from the site in your groin:  Lie down flat and press firmly on the site.  Call your physician immediately, or, come to the emergency room.    Call 911 right away if you have bleeding that is heavy or does not stop.     Call your doctor/provider if:         You have a large or growing hard lump around the site.         The site is red, swollen, hot or tender.         Blood or fluid is draining from the site.         You have chills or a fever greater than 101 F (38 C).         Your leg or arm turns bluish, feels numb or cool.         You have hives, a rash or unusual itching.     Cardiovascular Clinic:   [address] [place] [address]. [place] [address]  Your Care Team:  [facility]   Telephone Number     Coral Arevalo [phone]   Brandee Michel RN  [phone]     For scheduling appts or procedures:    Alexia Ojeda   [phone]     As always, Thank you for trusting us with your health care needs

## 2019-10-05 LAB — INTERPRETATION ECG - MUSE: NORMAL

## 2019-10-08 ENCOUNTER — OFFICE VISIT (OUTPATIENT)
Dept: FAMILY MEDICINE | Facility: CLINIC | Age: 39
End: 2019-10-08
Payer: COMMERCIAL

## 2019-10-08 ENCOUNTER — PATIENT OUTREACH (OUTPATIENT)
Dept: CARDIOLOGY | Facility: CLINIC | Age: 39
End: 2019-10-08

## 2019-10-08 VITALS
HEIGHT: 67 IN | BODY MASS INDEX: 38.14 KG/M2 | OXYGEN SATURATION: 96 % | SYSTOLIC BLOOD PRESSURE: 127 MMHG | HEART RATE: 70 BPM | DIASTOLIC BLOOD PRESSURE: 80 MMHG | RESPIRATION RATE: 16 BRPM | WEIGHT: 243 LBS | TEMPERATURE: 98.4 F

## 2019-10-08 DIAGNOSIS — Z86.2 HISTORY OF ANEMIA: Primary | ICD-10-CM

## 2019-10-08 DIAGNOSIS — Z23 NEED FOR PROPHYLACTIC VACCINATION AND INOCULATION AGAINST INFLUENZA: ICD-10-CM

## 2019-10-08 DIAGNOSIS — Z86.79 S/P ABLATION OF ATRIAL FIBRILLATION: Primary | ICD-10-CM

## 2019-10-08 DIAGNOSIS — Z98.890 S/P ABLATION OF ATRIAL FIBRILLATION: Primary | ICD-10-CM

## 2019-10-08 DIAGNOSIS — I10 ESSENTIAL HYPERTENSION WITH GOAL BLOOD PRESSURE LESS THAN 140/90: ICD-10-CM

## 2019-10-08 DIAGNOSIS — Z23 ENCOUNTER FOR IMMUNIZATION: ICD-10-CM

## 2019-10-08 PROCEDURE — 99214 OFFICE O/P EST MOD 30 MIN: CPT | Mod: 25 | Performed by: FAMILY MEDICINE

## 2019-10-08 PROCEDURE — 90471 IMMUNIZATION ADMIN: CPT | Performed by: FAMILY MEDICINE

## 2019-10-08 PROCEDURE — 90686 IIV4 VACC NO PRSV 0.5 ML IM: CPT | Performed by: FAMILY MEDICINE

## 2019-10-08 RX ORDER — LISINOPRIL 20 MG/1
20 TABLET ORAL DAILY
Qty: 90 TABLET | Refills: 1 | Status: SHIPPED | OUTPATIENT
Start: 2019-10-08 | End: 2020-07-02 | Stop reason: DRUGHIGH

## 2019-10-08 RX ORDER — FERROUS GLUCONATE 324(38)MG
324 TABLET ORAL
Qty: 30 TABLET | Refills: 3 | Status: SHIPPED | OUTPATIENT
Start: 2019-10-08 | End: 2020-03-05

## 2019-10-08 ASSESSMENT — MIFFLIN-ST. JEOR: SCORE: 1804.87

## 2019-10-08 NOTE — PROGRESS NOTES
This note is a hospital discharge follow up phone call from the patient's atrial fibrillation ablation procedure on 10/3/19 with Dr. Arevalo.    EP s/p Ablation  Questions Answers    1. Groin Sites Oozed for 1 day. Been great since.    2. New discharge medications NA. Will switch back to Eliquis once runs out of Pradaxa supply.   3. Any other discharge instructions Reviewed post procedure restrictions and when to call cardiology clinic.   4. Follow up appointments 1/8/2020 Coral Ling NP    5. Other questions/Concerns NA. She will get a return to work note from her PCP.

## 2019-10-08 NOTE — NURSING NOTE
"Chief Complaint   Patient presents with     Surgical Followup     /80 (BP Location: Right arm, Patient Position: Sitting, Cuff Size: Adult Large)   Pulse 70   Temp 98.4  F (36.9  C) (Oral)   Resp 16   Ht 1.702 m (5' 7\")   Wt 110.2 kg (243 lb)   LMP 09/22/2019   SpO2 96%   Breastfeeding? No   BMI 38.06 kg/m   Estimated body mass index is 38.06 kg/m  as calculated from the following:    Height as of this encounter: 1.702 m (5' 7\").    Weight as of this encounter: 110.2 kg (243 lb).  bp completed using cuff size: large       Health Maintenance addressed:  flu    Possibly completing today per provider review.    Tomas Forrest CMA, MA     "

## 2019-10-08 NOTE — LETTER
[facility]  [address]  [address]  [place], [place] [address]  [phone]    October 8, 2019    RE:  Danitza Soto                                                                                                                                                       [address]  [facility] [address]            To whom it may concern:    Danitza Soto is under my professional care and she can go back to work tomorrow and she needs to be on weight restriction of 10  Lbs. Until October 15th, 2019 .      Sincerely,        Polly Mcbride MD      Clinic hours:  Monday 7:30 AM - 5:00 PM    Tuesday  7:00 AM - 7:00 PM    Wednesday  7:00 AM - 5:00 PM    Thursday  7:30 AM -  7:00 PM    Friday   7:30 AM -  5:00 PM

## 2019-10-09 ENCOUNTER — MYC MEDICAL ADVICE (OUTPATIENT)
Dept: FAMILY MEDICINE | Facility: CLINIC | Age: 39
End: 2019-10-09

## 2019-10-10 ENCOUNTER — PATIENT OUTREACH (OUTPATIENT)
Dept: CARE COORDINATION | Facility: CLINIC | Age: 39
End: 2019-10-10

## 2019-11-01 ENCOUNTER — ANCILLARY PROCEDURE (OUTPATIENT)
Dept: GENERAL RADIOLOGY | Facility: CLINIC | Age: 39
End: 2019-11-01
Attending: FAMILY MEDICINE
Payer: COMMERCIAL

## 2019-11-01 ENCOUNTER — OFFICE VISIT (OUTPATIENT)
Dept: FAMILY MEDICINE | Facility: CLINIC | Age: 39
End: 2019-11-01
Payer: COMMERCIAL

## 2019-11-01 VITALS
DIASTOLIC BLOOD PRESSURE: 88 MMHG | OXYGEN SATURATION: 98 % | HEART RATE: 59 BPM | WEIGHT: 249 LBS | RESPIRATION RATE: 14 BRPM | SYSTOLIC BLOOD PRESSURE: 138 MMHG | HEIGHT: 67 IN | BODY MASS INDEX: 39.08 KG/M2

## 2019-11-01 DIAGNOSIS — R07.9 CHEST PAIN, UNSPECIFIED TYPE: ICD-10-CM

## 2019-11-01 DIAGNOSIS — I10 ESSENTIAL HYPERTENSION WITH GOAL BLOOD PRESSURE LESS THAN 140/90: ICD-10-CM

## 2019-11-01 DIAGNOSIS — K21.9 GASTROESOPHAGEAL REFLUX DISEASE WITHOUT ESOPHAGITIS: ICD-10-CM

## 2019-11-01 DIAGNOSIS — R07.9 CHEST PAIN, UNSPECIFIED TYPE: Primary | ICD-10-CM

## 2019-11-01 PROCEDURE — 99214 OFFICE O/P EST MOD 30 MIN: CPT | Performed by: FAMILY MEDICINE

## 2019-11-01 PROCEDURE — 71046 X-RAY EXAM CHEST 2 VIEWS: CPT | Mod: FY

## 2019-11-01 PROCEDURE — 93000 ELECTROCARDIOGRAM COMPLETE: CPT | Performed by: FAMILY MEDICINE

## 2019-11-01 RX ORDER — OMEPRAZOLE 40 MG/1
40 CAPSULE, DELAYED RELEASE ORAL DAILY
Qty: 30 CAPSULE | Refills: 1 | Status: SHIPPED | OUTPATIENT
Start: 2019-11-01 | End: 2019-12-05

## 2019-11-01 ASSESSMENT — PAIN SCALES - GENERAL: PAINLEVEL: MILD PAIN (2)

## 2019-11-01 ASSESSMENT — MIFFLIN-ST. JEOR: SCORE: 1832.09

## 2019-11-01 NOTE — PROGRESS NOTES
Subjective     Danitza Soto is a 39 year old female who presents to clinic today for the following health issues:    HPI   Chest Pain - 2 ot three times happened at night when sleeping and laying down , last one woke her up at 3 am , lower part of the chest and upper abdomen, sharp pain, did not radiate anywhere . She has GERD and is on the omeprazole but she also had a gastric sleeve procedure a year ago, the GERD felt different before the procedure- more of acid reflux and not now. She also had ablation for afib done on October 3rd , states that she does not have any palpitations, no SOB, no wheezing no cough , just sharp pain lasts about 20 min at the most when she sits upright.      Onset: Has happened a few times     Description (location/character/radiation/duration): upper quadrant    Intensity:  Severe when happens    Accompanying signs and symptoms:        Shortness of breath: no        Sweating: no        Nausea/vomitting: no        Palpitations: no        Other (fevers/chills/cough/heartburn/lightheadedness): no     History (similar episodes/previous evaluation): None    Precipitating or alleviating factors:       Worse with exertion: no        Worse with breathing: no        Related to eating: no        Better with burping: no     Therapies tried and outcome: none        Patient Active Problem List   Diagnosis     Anxiety state     Gastroesophageal reflux disease without esophagitis     Morbid obesity due to excess calories (H)     Tobacco use disorder     CARDIOVASCULAR SCREENING; LDL GOAL LESS THAN 130     Hypertension goal BP (blood pressure) < 140/90     Acne     Papanicolaou smear of cervix with low grade squamous intraepithelial lesion (LGSIL)     Mixed hyperlipidemia     LEONARDO (obstructive sleep apnea)     Obesity hypoventilation syndrome (H)     Paroxysmal atrial fibrillation (H)     Morbid (severe) obesity due to excess calories (H)     Recurrent major depressive disorder, in full remission  (H)     S/P ablation of atrial fibrillation     Past Surgical History:   Procedure Laterality Date     EP ABLATION FOCAL AFIB N/A 10/3/2019    Procedure: EP ABLATION FOCAL AFIB;  Surgeon: Harpreet Arevalo MD;  Location:  OR      TOOTH EXTRACTION W/FORCEP      [place] Teeth Extraction     LAPAROSCOPIC GASTRIC SLEEVE N/A 2018    Procedure: Laparoscopic Sleeve Gastrectomy Latex Free;  Surgeon: Artis Tse MD;  Location:  OR     LAPAROSCOPIC HERNIORRHAPHY HIATAL  2018    Procedure: LAPAROSCOPIC  HIATAL Hernia Repair;  Surgeon: Artis Tse MD;  Location:  OR       Social History     Tobacco Use     Smoking status: Former Smoker     Packs/day: 1.00     Years: 10.00     Pack years: 10.00     Types: Cigarettes     Start date: 2004     Last attempt to quit: 8/15/2018     Years since quittin.2     Smokeless tobacco: Never Used     Tobacco comment:  pack or less a day   Substance Use Topics     Alcohol use: Yes     Alcohol/week: 0.0 standard drinks     Comment: Occas.     Family History   Problem Relation Age of Onset     Heart Disease Mother         ,mother had emphesema and  at 62     Hypertension Mother      Allergies Mother      Lipids Mother      Obesity Mother      Respiratory Mother         Emphysema     Diabetes Maternal Grandmother         Type 2?     Hypertension Maternal Grandmother      Circulatory Maternal Grandmother         Due to diabetes     Eye Disorder Maternal Grandmother         Macular degeneration/Macular degeneration     Obesity Maternal Grandmother      Hypertension Father      Lipids Father      Cancer Father      Prostate Cancer Father      Other Cancer Father      Cerebrovascular Disease Paternal Grandmother      Alcohol/Drug Maternal Grandfather      Obesity Maternal Grandfather      Psychotic Disorder Paternal Grandfather         Committed Suicide     Depression Paternal Grandfather      Allergies Sister      Genitourinary Problems Sister           Current Outpatient Medications   Medication Sig Dispense Refill     apixaban ANTICOAGULANT (ELIQUIS) 5 MG tablet Take 1 tablet (5 mg) by mouth 2 times daily 180 tablet 3     calcium carbonate (OS-CHIKI) 500 MG tablet Take 1 tablet by mouth daily        Cholecalciferol (VITAMIN D-3) 5000 units TABS Take 1 tablet by mouth daily       ferrous gluconate (FERGON) 324 (38 Fe) MG tablet Take 1 tablet (324 mg) by mouth daily (with breakfast) 30 tablet 3     fish oil-omega-3 fatty acids 1000 MG capsule Take 2 g by mouth every morning        flecainide (TAMBOCOR) 100 MG tablet Take 1 tablet (100 mg) by mouth 2 times daily 180 tablet 3     lisinopril (PRINIVIL/ZESTRIL) 20 MG tablet Take 1 tablet (20 mg) by mouth daily 90 tablet 1     LORazepam (ATIVAN) 0.5 MG tablet Take 1 tablet (0.5 mg) by mouth every 8 hours as needed for anxiety 20 tablet 0     metoprolol succinate ER (TOPROL-XL) 50 MG 24 hr tablet Take 1 tablet (50 mg) by mouth daily 90 tablet 3     Multiple Vitamins-Minerals (CENTRUM PO)        naltrexone (DEPADE/REVIA) 50 MG tablet Take 1 tablet (50 mg) by mouth daily 30 tablet 5     omeprazole (PRILOSEC) 40 MG DR capsule Take 1 capsule (40 mg) by mouth daily 30 capsule 1     Probiotic Product (PROBIOTIC ADVANCED PO)        topiramate (TOPAMAX) 25 MG tablet Take 1 tablet (25 mg) by mouth daily 30 tablet 5     venlafaxine (EFFEXOR-XR) 75 MG 24 hr capsule TAKE 1 CAPSULE(75 MG) BY MOUTH DAILY 90 capsule 0     VENTOLIN  (90 Base) MCG/ACT Inhaler INHALE 1-2 PUFFS EVERY 4-6 HOURS AS NEEDED FOR WHEEZING  0     vitamin B complex with vitamin C (VITAMIN  B COMPLEX) tablet Take 1 tablet by mouth daily       acetaminophen (TYLENOL) 325 MG tablet Take 2 tablets (650 mg) by mouth every 4 hours as needed for other (For optimal non-opioid multimodal pain management to improve pain control and physical function.) (Patient not taking: Reported on 10/8/2019) 100 tablet 0     lisinopril (PRINIVIL/ZESTRIL) 40 MG tablet TAKE 1  TABLET BY MOUTH DAILY (Patient taking differently: 20 mg daily ) 30 tablet 1     Allergies   Allergen Reactions     Wellbutrin [Bupropion]      Wellbutrin: Panic attacks, heart/chest pain     Recent Labs   Lab Test 10/03/19  0608 03/07/19  1251  09/24/18  1452 07/24/18  1023 06/19/18  0952  03/21/17  0926 08/24/16  0915 01/13/16  1138  10/08/13  0808   A1C  --   --   --   --  6.1*  --   --   --   --   --   --  5.4   LDL  --   --   --   --   --   --   --  155* 174* 162*  --  149*   HDL  --   --   --   --   --   --   --  30* 29* 33*  --  33*   TRIG  --   --   --   --   --   --   --  203* 231* 207*  --  179*   ALT  --  18  --   --  22 33   < > 24  --  26  --  26   CR 0.65 0.66   < >  --  0.63 0.65   < > 0.62 0.61 0.59   < > 0.60   GFRESTIMATED >90 >90   < >  --  >90 >90   < > >90  Non  GFR Calc   >90  Non  GFR Calc   >90  Non  GFR Calc     < > >90   GFRESTBLACK >90 >90   < >  --  >90 >90   < > >90   GFR Calc   >90   GFR Calc   >90   GFR Calc     < > >90   POTASSIUM 4.0 3.9   < >  --  3.7 3.9   < > 4.0 3.9 4.0   < > 3.8   TSH  --   --   --  1.48  --   --   --   --   --  1.79   < >  --     < > = values in this interval not displayed.      BP Readings from Last 3 Encounters:   11/01/19 138/88   10/08/19 127/80   10/03/19 124/77    Wt Readings from Last 3 Encounters:   11/01/19 112.9 kg (249 lb)   10/08/19 110.2 kg (243 lb)   10/03/19 108.7 kg (239 lb 10.2 oz)                      Reviewed and updated as needed this visit by Provider         Review of Systems   ROS COMP: Constitutional, HEENT, cardiovascular, pulmonary, GI, , musculoskeletal, neuro, skin, endocrine and psych systems are negative, except as otherwise noted.      Objective    There were no vitals taken for this visit.  There is no height or weight on file to calculate BMI.  Physical Exam   GENERAL: healthy, alert and no distress  EYES: Eyes grossly normal to  inspection, PERRL and conjunctivae and sclerae normal  HENT: ear canals and TM's normal, nose and mouth without ulcers or lesions  NECK: no adenopathy, no asymmetry, masses, or scars and thyroid normal to palpation  RESP: lungs clear to auscultation - no rales, rhonchi or wheezes  CV: regular rate and rhythm, normal S1 S2, no S3 or S4, no murmur, click or rub, no peripheral edema and peripheral pulses strong  ABDOMEN: soft, nontender, no hepatosplenomegaly, no masses and bowel sounds normal  MS: no gross musculoskeletal defects noted, no edema  NEURO: Normal strength and tone, mentation intact and speech normal    Diagnostic Test Results:  Labs reviewed in Epic  No results found for this or any previous visit (from the past 24 hour(s)).        Assessment & Plan     1. Chest pain, unspecified type  Her CXR and EKG are normal today - we discussed increasing the dose of the omeprazole to 40 mg but I would recommend she makes a f/u appointment with the gastric bypass surgeon , also she would need to make a f/u appointment with cardiology after the ablation procedure .  - EKG 12-lead complete w/read - Clinics  - XR Chest 2 Views; Future    2. Gastroesophageal reflux disease without esophagitis  Does seem more of a gastric origin but she would need to also f/u ith cardiology - I have discussed that if the pain happens again after we have increased the dose the of the PPI , she should go to the ER .  - omeprazole (PRILOSEC) 40 MG DR capsule; Take 1 capsule (40 mg) by mouth daily  Dispense: 30 capsule; Refill: 1    3. Essential hypertension with goal blood pressure less than 140/90  She is currently on 20 mg of lisinopril daily which seems to be controlling her BP , was initially elevated today at the beginning of the visit but went down at the end of the visit , would need to keep monitoring .     RTC if no improving or worsening.  Pt is aware  and comfortable with the current plan.    Polly Mcbride MD  [facility]  CLINIC

## 2019-11-07 ENCOUNTER — HEALTH MAINTENANCE LETTER (OUTPATIENT)
Age: 39
End: 2019-11-07

## 2019-11-09 DIAGNOSIS — F33.42 RECURRENT MAJOR DEPRESSIVE DISORDER, IN FULL REMISSION (H): ICD-10-CM

## 2019-11-11 ENCOUNTER — PATIENT OUTREACH (OUTPATIENT)
Dept: CARE COORDINATION | Facility: CLINIC | Age: 39
End: 2019-11-11

## 2019-11-11 RX ORDER — VENLAFAXINE HYDROCHLORIDE 75 MG/1
CAPSULE, EXTENDED RELEASE ORAL
Qty: 90 CAPSULE | Refills: 0 | Status: SHIPPED | OUTPATIENT
Start: 2019-11-11 | End: 2020-02-10

## 2019-11-11 NOTE — TELEPHONE ENCOUNTER
"Prescription approved per [facility] Refill Protocol.  Mady LOO RN    Last Written Prescription Date:  8/9/2019  Last Fill Quantity: 90,  # refills: 0   Last office visit: 11/1/2019 with prescribing provider:     Future Office Visit:    Requested Prescriptions   Pending Prescriptions Disp Refills     venlafaxine (EFFEXOR-XR) 75 MG 24 hr capsule [Pharmacy Med Name: VENLAFAXINE ER 75MG CAPSULES] 90 capsule 0     Sig: TAKE 1 CAPSULE(75 MG) BY MOUTH DAILY       Serotonin-Norepinephrine Reuptake Inhibitors  Passed - 11/9/2019 12:19 PM        Passed - Blood pressure under 140/90 in past 12 months     BP Readings from Last 3 Encounters:   11/01/19 138/88   10/08/19 127/80   10/03/19 124/77                 Passed - PHQ-9 score of less than 5 in past 6 months     Please review last PHQ-9 score.           Passed - Medication is active on med list        Passed - Patient is age 18 or older        Passed - No active pregnancy on record        Passed - Normal serum creatinine on file in past 12 months     Recent Labs   Lab Test 10/03/19  0608   CR 0.65             Passed - No positive pregnancy test in past 12 months        Passed - Recent (6 mo) or future (30 days) visit within the authorizing provider's specialty     Patient had office visit in the last 6 months or has a visit in the next 30 days with authorizing provider or within the authorizing provider's specialty.  See \"Patient Info\" tab in inbasket, or \"Choose Columns\" in Meds & Orders section of the refill encounter.            "

## 2019-11-17 ENCOUNTER — MYC MEDICAL ADVICE (OUTPATIENT)
Dept: FAMILY MEDICINE | Facility: CLINIC | Age: 39
End: 2019-11-17

## 2019-11-17 DIAGNOSIS — R10.12 ABDOMINAL PAIN, LEFT UPPER QUADRANT: Primary | ICD-10-CM

## 2019-11-19 NOTE — TELEPHONE ENCOUNTER
I have placed an order for the abdominal US to r/out gallstones, can you , please give her the radiology department number to call and schedule the appointment and let her know ?  Thanks

## 2019-11-22 ENCOUNTER — ANCILLARY PROCEDURE (OUTPATIENT)
Dept: ULTRASOUND IMAGING | Facility: CLINIC | Age: 39
End: 2019-11-22
Attending: FAMILY MEDICINE
Payer: COMMERCIAL

## 2019-11-22 DIAGNOSIS — R10.12 ABDOMINAL PAIN, LEFT UPPER QUADRANT: ICD-10-CM

## 2019-12-04 NOTE — PROGRESS NOTES
Nutrition Reassessment  Reason For Visit:  Danitza Soto is a 38 year old female presenting today for nutrition follow-up, 1 year s/p SG with Dr Tse(11/27/18).  Patient referred by Dr Tse and Sandy Cervantes.    Anthropometrics  Initial Consult Weight: 308.8 lbs  Day of Surgery Weight(11/27/18): 274.6 lbs  Current Weight: 254.4 lbs  Weight loss: -54.4 lbs from initial consult; -20.2 lbs from day of surgery  *pt weight +22.4 lbs since last visit (6/3/19)    Current Vitamins/Minerals: MVI/minerals BID, addtl iron (38 mg Fe) daily, calcium, B complex     Nutrition History:  - Was taking topiramate before and after surgery. Stopped topiramate briefly post-op and experineced weight gain r/t increased hunger and cravings. Started naltrexone and restarted topiramate with Sandy Cervantes PA-C on 3/17/19. Today pt reports taking topiramate, and ran out of naltrexone a while ago (and didn't contact to refill).     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      Progress Towards Previous Goals:  1) Follow bariatric regular diet.   2) Consume 60 grams of protein/day. - Not met,   3) Sip on 48-64 oz of fluids/day- between meals only. - Met, but fluid intake with meals  4) Eat slowly (>20 min/meal), chewing foods well (to applesauce-like consistency). - Met at times. Chewing thoroughly, taking 15-30 mins to eat meals.   5) Limit portions to 1/2-1 cup/meal. - Not met, 1+ cup/meal  6) Take the following supplements:     Multivitamin/minerals: adult dose 2 times daily - BID    Iron: 45-60 mg elemental (18-36 mg if low risk) - may partly or fully be covered in multivitamin - Daily (38 mg)    Calcium Citrate containing vitamin D: 500 mg 3 times daily or 600 mg 2 times daily - Not met, only taking once daily with MVI    Vitamin B12: sublingual form of at least 500 mcg daily or injection of 1000 mcg monthly - Not met,     B-50 Complex once daily - Met   7) Increase activity as able - Not met    Nutrition Prescription:  Grams Protein: 60 (minimum)  Amount of Fluid: 48-64 oz    Nutrition Diagnosis  Previous: Food and nutrition-related knowledge deficit r/t lack of prior exposure to diet instruction beyond 6 months s/p SG as evidenced by Pt seeking further guidance from RD on diet instruction beyond 6 months s/p SG.  -resolved    Current: Food and nutrition-related knowledge deficit r/t lack of prior exposure to diet instruction beyond 1 year s/p SG as evidenced by Pt seeking further guidance from RD on diet instruction beyond 1 year s/p SG.     Intervention  Materials/Education provided, reviewed previous goals and bariatric diet, protein intake, fluid intake, eating pace, chewing foods well, portion control, sugar/fat intake, recommended vitamin/mineral supplements. Discussed mindful eating practices. Encouraged pt to attend support group for pt's beyond 1 year post-op to aid with maintaining behavior changes. Encouraged pt to avoid snacking, and discussed healthy snack options if snack needed d/t hunger. Discussed adding addtl calcium/vitmain D supplement daily, encouraged taking dose separate from each other and separate from MVI. Discussed importance of regular exercise for maintaining weight loss. Provided pt with list of goals and RD contact info.     Patient Understanding: good  Expected Compliance: good    Goals:  1) Follow bariatric regular diet.   2) Consume 60 grams of protein/day.  3) Sip on 48-64 oz of fluids/day- between  meals only.  4) Eat slowly (>20 min/meal), chewing foods well (to applesauce-like consistency).  5) Limit portions to 1/2-1 cup/meal.  6) Take the following supplements:    Multivitamin/minerals: adult dose 2 times daily    Iron: 45-60 mg elemental (18-36 mg if low risk) - may partly or fully be covered in multivitamin     Calcium Citrate containing vitamin D: 500 mg 3 times daily or 600 mg 2 times daily    Vitamin B12: sublingual form of at least 500 mcg daily or injection of 1000 mcg monthly     B-50 Complex once daily   7) Increase activity as able  8) Reduce snacking. Try using high protein snack (hard boiled egg, non-fat yogurt) or non-starchy vegetable instead.        Follow-Up: prn    Time spent with patient: 30 minutes.  Mady Kaminski RD, LD

## 2019-12-05 ENCOUNTER — OFFICE VISIT (OUTPATIENT)
Dept: SURGERY | Facility: CLINIC | Age: 39
End: 2019-12-05
Payer: COMMERCIAL

## 2019-12-05 VITALS
DIASTOLIC BLOOD PRESSURE: 75 MMHG | OXYGEN SATURATION: 98 % | SYSTOLIC BLOOD PRESSURE: 128 MMHG | HEART RATE: 51 BPM | HEIGHT: 67 IN | TEMPERATURE: 97.7 F | BODY MASS INDEX: 39.87 KG/M2 | WEIGHT: 254 LBS

## 2019-12-05 VITALS — BODY MASS INDEX: 39.84 KG/M2 | WEIGHT: 254.4 LBS

## 2019-12-05 DIAGNOSIS — K21.9 GASTROESOPHAGEAL REFLUX DISEASE WITHOUT ESOPHAGITIS: ICD-10-CM

## 2019-12-05 DIAGNOSIS — E66.01 MORBID OBESITY DUE TO EXCESS CALORIES (H): ICD-10-CM

## 2019-12-05 DIAGNOSIS — E66.01 MORBID OBESITY (H): ICD-10-CM

## 2019-12-05 DIAGNOSIS — Z98.84 S/P LAPAROSCOPIC SLEEVE GASTRECTOMY: ICD-10-CM

## 2019-12-05 LAB
ALBUMIN SERPL-MCNC: 3.5 G/DL (ref 3.4–5)
ALP SERPL-CCNC: 77 U/L (ref 40–150)
ALT SERPL W P-5'-P-CCNC: 21 U/L (ref 0–50)
ANION GAP SERPL CALCULATED.3IONS-SCNC: 3 MMOL/L (ref 3–14)
AST SERPL W P-5'-P-CCNC: 11 U/L (ref 0–45)
BILIRUB SERPL-MCNC: 0.3 MG/DL (ref 0.2–1.3)
BUN SERPL-MCNC: 11 MG/DL (ref 7–30)
CALCIUM SERPL-MCNC: 8.8 MG/DL (ref 8.5–10.1)
CHLORIDE SERPL-SCNC: 108 MMOL/L (ref 94–109)
CO2 SERPL-SCNC: 28 MMOL/L (ref 20–32)
CREAT SERPL-MCNC: 0.69 MG/DL (ref 0.52–1.04)
DEPRECATED CALCIDIOL+CALCIFEROL SERPL-MC: 36 UG/L (ref 20–75)
ERYTHROCYTE [DISTWIDTH] IN BLOOD BY AUTOMATED COUNT: 14.4 % (ref 10–15)
FERRITIN SERPL-MCNC: 4 NG/ML (ref 12–150)
GFR SERPL CREATININE-BSD FRML MDRD: >90 ML/MIN/{1.73_M2}
GLUCOSE SERPL-MCNC: 80 MG/DL (ref 70–99)
HBA1C MFR BLD: 5.4 % (ref 0–5.6)
HCT VFR BLD AUTO: 37.4 % (ref 35–47)
HGB BLD-MCNC: 11.8 G/DL (ref 11.7–15.7)
MCH RBC QN AUTO: 27 PG (ref 26.5–33)
MCHC RBC AUTO-ENTMCNC: 31.6 G/DL (ref 31.5–36.5)
MCV RBC AUTO: 86 FL (ref 78–100)
PLATELET # BLD AUTO: 323 10E9/L (ref 150–450)
POTASSIUM SERPL-SCNC: 3.8 MMOL/L (ref 3.4–5.3)
PROT SERPL-MCNC: 6.9 G/DL (ref 6.8–8.8)
PTH-INTACT SERPL-MCNC: 41 PG/ML (ref 18–80)
RBC # BLD AUTO: 4.37 10E12/L (ref 3.8–5.2)
SODIUM SERPL-SCNC: 138 MMOL/L (ref 133–144)
VIT B12 SERPL-MCNC: 426 PG/ML (ref 193–986)
WBC # BLD AUTO: 7.3 10E9/L (ref 4–11)

## 2019-12-05 RX ORDER — NALTREXONE HYDROCHLORIDE 50 MG/1
50 TABLET, FILM COATED ORAL DAILY
Qty: 30 TABLET | Refills: 5 | Status: SHIPPED | OUTPATIENT
Start: 2019-12-05 | End: 2020-06-16

## 2019-12-05 RX ORDER — TOPIRAMATE 25 MG/1
25 TABLET, FILM COATED ORAL 2 TIMES DAILY
Qty: 60 TABLET | Refills: 5 | Status: SHIPPED | OUTPATIENT
Start: 2019-12-05 | End: 2020-06-16

## 2019-12-05 RX ORDER — OMEPRAZOLE 40 MG/1
40 CAPSULE, DELAYED RELEASE ORAL DAILY
Qty: 60 CAPSULE | Refills: 5 | Status: SHIPPED | OUTPATIENT
Start: 2019-12-05 | End: 2020-12-29

## 2019-12-05 ASSESSMENT — MIFFLIN-ST. JEOR: SCORE: 1854.77

## 2019-12-05 ASSESSMENT — PAIN SCALES - GENERAL: PAINLEVEL: MILD PAIN (2)

## 2019-12-05 NOTE — NURSING NOTE
"(   Chief Complaint   Patient presents with     RECHECK     Follow up bariatric.    )    ( Weight: 115.2 kg (254 lb) )  ( Height: 170.2 cm (5' 7\") )  ( BMI (Calculated): 39.78 )  ( Initial Weight: 140.1 kg (308 lb 13.8 oz) )  ( Cumulative weight loss (lbs): 54.86 )  ( Last Visits Weight: 105.2 kg (232 lb) )  ( Wt change since last visit (lbs): 22 )  ( Waist Circumference (cm): 123 cm )  (   )    ( BP: 128/75 )  (   )  ( Temp: 97.7  F (36.5  C) )  ( Temp src: Oral )  ( Pulse: 51 )  (   )  ( SpO2: 98 % )    (   Patient Active Problem List   Diagnosis     Anxiety state     Gastroesophageal reflux disease without esophagitis     Morbid obesity due to excess calories (H)     Tobacco use disorder     CARDIOVASCULAR SCREENING; LDL GOAL LESS THAN 130     Hypertension goal BP (blood pressure) < 140/90     Acne     Papanicolaou smear of cervix with low grade squamous intraepithelial lesion (LGSIL)     Mixed hyperlipidemia     LEONARDO (obstructive sleep apnea)     Obesity hypoventilation syndrome (H)     Paroxysmal atrial fibrillation (H)     Morbid (severe) obesity due to excess calories (H)     Recurrent major depressive disorder, in full remission (H)     S/P ablation of atrial fibrillation    )  (   Current Outpatient Medications   Medication Sig Dispense Refill     apixaban ANTICOAGULANT (ELIQUIS) 5 MG tablet Take 1 tablet (5 mg) by mouth 2 times daily 180 tablet 3     calcium carbonate (OS-CHIKI) 500 MG tablet Take 1 tablet by mouth daily        Cholecalciferol (VITAMIN D-3) 5000 units TABS Take 1 tablet by mouth daily       ferrous gluconate (FERGON) 324 (38 Fe) MG tablet Take 1 tablet (324 mg) by mouth daily (with breakfast) 30 tablet 3     fish oil-omega-3 fatty acids 1000 MG capsule Take 2 g by mouth every morning        flecainide (TAMBOCOR) 100 MG tablet Take 1 tablet (100 mg) by mouth 2 times daily 180 tablet 3     lisinopril (PRINIVIL/ZESTRIL) 20 MG tablet Take 1 tablet (20 mg) by mouth daily 90 tablet 1     lisinopril " (PRINIVIL/ZESTRIL) 40 MG tablet TAKE 1 TABLET BY MOUTH DAILY (Patient taking differently: 20 mg daily ) 30 tablet 1     LORazepam (ATIVAN) 0.5 MG tablet Take 1 tablet (0.5 mg) by mouth every 8 hours as needed for anxiety 20 tablet 0     metoprolol succinate ER (TOPROL-XL) 50 MG 24 hr tablet Take 1 tablet (50 mg) by mouth daily 90 tablet 3     Multiple Vitamins-Minerals (CENTRUM PO)        naltrexone (DEPADE/REVIA) 50 MG tablet Take 1 tablet (50 mg) by mouth daily 30 tablet 5     omeprazole (PRILOSEC) 40 MG DR capsule Take 1 capsule (40 mg) by mouth daily 30 capsule 1     Probiotic Product (PROBIOTIC ADVANCED PO)        topiramate (TOPAMAX) 25 MG tablet Take 1 tablet (25 mg) by mouth daily 30 tablet 5     venlafaxine (EFFEXOR-XR) 75 MG 24 hr capsule TAKE 1 CAPSULE(75 MG) BY MOUTH DAILY 90 capsule 0     VENTOLIN  (90 Base) MCG/ACT Inhaler INHALE 1-2 PUFFS EVERY 4-6 HOURS AS NEEDED FOR WHEEZING  0     vitamin B complex with vitamin C (VITAMIN  B COMPLEX) tablet Take 1 tablet by mouth daily       acetaminophen (TYLENOL) 325 MG tablet Take 2 tablets (650 mg) by mouth every 4 hours as needed for other (For optimal non-opioid multimodal pain management to improve pain control and physical function.) (Patient not taking: Reported on 10/8/2019) 100 tablet 0    )  ( Diabetes Eval:    )    ( Pain Eval:  Mild Pain (2) )    ( Wound Eval:       )    (   History   Smoking Status     Former Smoker     Packs/day: 1.00     Years: 10.00     Types: Cigarettes     Start date: 1/1/2004     Quit date: 8/15/2018   Smokeless Tobacco     Never Used     Comment:  pack or less a day    )    ( Signed By:  Brandee Stout, EMT; December 5, 2019; 9:29 AM )

## 2019-12-05 NOTE — LETTER
2019       RE: Danitza Soto  [address]  [facility] [address]     Dear Colleague,    Thank you for referring your patient, Danitza Soto, to the [facility] SURGICAL WEIGHT MANAGEMENT at [facility]. Please see a copy of my visit note below.    Return Bariatric Surgery Note    RE: Danitza Soto  MR#: [id number]  : 1980  VISIT DATE: Dec 5, 2019    Dear Polly Mcbride,    I had the pleasure of seeing your patient, Danitza Soto, in my post-bariatric surgery assessment clinic.    CHIEF COMPLAINT: Post-bariatric surgery follow-up. 1 year follow up    HISTORY OF PRESENT ILLNESS:  Questions Regarding Prior Weight Loss Surgery Reviewed With Patient 2019   I had the following weight loss procedure: Sleeve Gastrectomy   What year was your surgery? 2018   How has your weight changed since your last visit? I have gained weight   Are you currently taking any weight loss medications? Yes   Do you currently have any of the following: Sleep Apnea, Heartburn, acid reflux, or GERD (acid reflux disease)?   Have you been to the Emergency room since your last visit with us? No   Were you in the hospital since your last visit with us? Yes   Do you have any concerns today? weight gain     Weight History:     2019   What is your highest lifetime weight? 310   What is your lowest weight since surgery? (In pounds) 228     Initial Weight: 140.1 kg (308 lb 13.8 oz)  Current Weight: Weight: 115.2 kg (254 lb)  Cumulative weight loss (lbs): 54.86  Last Visits Weight: 105.2 kg (232 lb)    Questions Regarding Co-Morbidities and Health Concerns Reviewed With Patient 2019   Pre-diabetes: Improved   Diabetes II: Never   High Blood Pressure: Improved   High cholesterol: Improved   Heartburn/Reflux: Stayed the same   Are you taking daily medication for heartburn, acid reflux, or GERD (acid reflux disease)? Yes   Sleep apnea: Improved   Do you use a CPAP? Yes   PCOS:  Never   Back pain: Stayed the same   Joint pain: Improved   Lower leg swelling: Never       Eating Habits 12/5/2019   How many meals do you eat per day? 3   Do you snack between meals? Sometimes   How much food are you eating at each meal? 1/2 cup to 1 cup   Are you able to separate your meals and liquids by at least 30 minutes? No   Are you able to avoid liquid calories? Yes       Exercise Questions Reviewed With Patient 12/5/2019   How often do you exercise? 3 to 4 times per week   What is the duration of your exercise (in minutes)? 30 Minutes   What types of exercise do you do? walking, climbing stairs at work   What keeps you from being more active?  I should be more active but I just have not gotten around to it, Lack of Time       Social History:      12/5/2019   Are you smoking? No   Are you drinking alcohol? No       Medications:  Current Outpatient Medications   Medication     apixaban ANTICOAGULANT (ELIQUIS) 5 MG tablet     calcium carbonate (OS-CHIKI) 500 MG tablet     Cholecalciferol (VITAMIN D-3) 5000 units TABS     ferrous gluconate (FERGON) 324 (38 Fe) MG tablet     fish oil-omega-3 fatty acids 1000 MG capsule     flecainide (TAMBOCOR) 100 MG tablet     lisinopril (PRINIVIL/ZESTRIL) 20 MG tablet     lisinopril (PRINIVIL/ZESTRIL) 40 MG tablet     LORazepam (ATIVAN) 0.5 MG tablet     metoprolol succinate ER (TOPROL-XL) 50 MG 24 hr tablet     Multiple Vitamins-Minerals (CENTRUM PO)     naltrexone (DEPADE/REVIA) 50 MG tablet     omeprazole (PRILOSEC) 40 MG DR capsule     Probiotic Product (PROBIOTIC ADVANCED PO)     topiramate (TOPAMAX) 25 MG tablet     venlafaxine (EFFEXOR-XR) 75 MG 24 hr capsule     VENTOLIN  (90 Base) MCG/ACT Inhaler     vitamin B complex with vitamin C (VITAMIN  B COMPLEX) tablet     acetaminophen (TYLENOL) 325 MG tablet     No current facility-administered medications for this visit.          12/5/2019   Do you avoid NSAIDs such as (Ibuprofen, Aleve, Naproxen, Advil)?   Yes  "      ROS:  GI:      12/5/2019   Vomiting: No   Diarrhea: No   Constipation: No   Swallowing trouble: No   Abdominal pain: Yes   Heartburn: Yes   Rash in skin folds: No   Depression: No   Stress urinary incontinence No     Skin:   BAR RBS ROS - SKIN 12/5/2019   Rash in skin folds: No     Psych:      12/5/2019   Depression: No   Anxiety: No     Female Only:   BAR RBS ROS - FEMALE ONLY 12/5/2019   Female only: Regular menstrual cycles       LABS/IMAGING/MEDICAL RECORDS REVIEW:     PHYSICAL EXAMINATION:  /75 (BP Location: Left arm, Patient Position: Sitting, Cuff Size: Adult Large)   Pulse 51   Temp 97.7  F (36.5  C) (Oral)   Ht 1.702 m (5' 7\")   Wt 115.2 kg (254 lb)   SpO2 98%   BMI 39.78 kg/m      General: No apparent distress  Neuro: A & O x 3  Head: Atraumatic, normocephalic  Eyes: PERRL, EOMI  Skin: warm and dry, no rashes on exposed skin  Respiratory: respirations unlabored  Abdomen: soft NT ND  Extremities: No LE swelling      ASSESSMENT AND PLAN:      1. 1 years status laparoscopic gastric sleeve with 20 lb recent weight gain.   2. Morbid Obesity current BMI: Body mass index is 39.78 kg/m .  3. Post surgical malabsorption:   Labs ordered per protocol.   Follow food plan per dietitian recommendations.   Continue taking recommended post-op vitamins.  4. Return to clinic in 3-4 months to see Sandy and dietitian for return medical weight management (closer f/u needed due to recent weight gain)  5. Return to clinic in 12 months for return bariatric surgery visit with labs  6. Labs today first floor  7. Refill naltrexone, increase topiramate to 25mg twice daily and increase omeprazole 40mg twice daily.  8. Recommend psychology group visit at [facility], info given today    Sincerely,    Sandy Cervantes PA-C    I spent a total of 15 minutes face to face with Danitza during today's office visit. Over 50% of this time was spent counseling the patient and/or coordinating care.        "

## 2019-12-05 NOTE — PATIENT INSTRUCTIONS
Return to clinic in 3-4 months to see Sandy and dietitian for return medical weight management    Return to clinic in 12 months for return bariatric surgery visit with labs    Labs today first floor    Refill naltrexone, increase topiramate to 25mg twice daily and increase omeprazole 40mg twice daily    Recommend psychology group visit at [facility], info given today

## 2019-12-05 NOTE — LETTER
12/5/2019       RE: Danitza Soto  [address]  [facility] [address]     Dear Colleague,    Thank you for referring your patient, Danitza Soto, to the [facility] SURGICAL WEIGHT MANAGEMENT at [facility]. Please see a copy of my visit note below.    Nutrition Reassessment  Reason For Visit:  Danitza Soto is a 38 year old female presenting today for nutrition follow-up, 1 year s/p SG with Dr Tse(11/27/18).  Patient referred by Dr Tse and Sandy Cervantes.    Anthropometrics  Initial Consult Weight: 308.8 lbs  Day of Surgery Weight(11/27/18): 274.6 lbs  Current Weight: 254.4 lbs  Weight loss: -54.4 lbs from initial consult; -20.2 lbs from day of surgery  *pt weight +22.4 lbs since last visit (6/3/19)    Current Vitamins/Minerals: MVI/minerals BID, addtl iron (38 mg Fe) daily, calcium, B complex     Nutrition History:  - Was taking topiramate before and after surgery. Stopped topiramate briefly post-op and experineced weight gain r/t increased hunger and cravings. Started naltrexone and restarted topiramate with Sandy Cervantes PA-C on 3/17/19. Today pt reports taking topiramate, and ran out of naltrexone a while ago (and didn't contact to refill).     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      Progress Towards Previous Goals:  1) Follow bariatric regular diet.   2) Consume 60 grams of protein/day. - Not met,   3) Sip on 48-64 oz of fluids/day- between meals only. -  Met, but fluid intake with meals  4) Eat slowly (>20 min/meal), chewing foods well (to applesauce-like consistency). - Met at times. Chewing thoroughly, taking 15-30 mins to eat meals.   5) Limit portions to 1/2-1 cup/meal. - Not met, 1+ cup/meal  6) Take the following supplements:    Multivitamin/minerals: adult dose 2 times daily - BID    Iron: 45-60 mg elemental (18-36 mg if low risk) - may partly or fully be covered in multivitamin - Daily (38 mg)    Calcium Citrate containing vitamin D: 500 mg 3 times daily or 600 mg 2 times daily - Not met, only taking once daily with MVI    Vitamin B12: sublingual form of at least 500 mcg daily or injection of 1000 mcg monthly - Not met,     B-50 Complex once daily - Met   7) Increase activity as able - Not met    Nutrition Prescription:  Grams Protein: 60 (minimum)  Amount of Fluid: 48-64 oz    Nutrition Diagnosis  Previous: Food and nutrition-related knowledge deficit r/t lack of prior exposure to diet instruction beyond 6 months s/p SG as evidenced by Pt seeking further guidance from RD on diet instruction beyond 6 months s/p SG.  -resolved    Current: Food and nutrition-related knowledge deficit r/t lack of prior exposure to diet instruction beyond 1 year s/p SG as evidenced by Pt seeking further guidance from RD on diet instruction beyond 1 year s/p SG.     Intervention  Materials/Education provided, reviewed previous goals and bariatric diet, protein intake, fluid intake, eating pace, chewing foods well, portion control, sugar/fat intake, recommended vitamin/mineral supplements. Discussed mindful eating practices. Encouraged pt to attend support group for pt's beyond 1 year post-op to aid with maintaining behavior changes. Encouraged pt to avoid snacking, and discussed healthy snack options if snack needed d/t hunger. Discussed adding addtl calcium/vitmain D supplement daily, encouraged taking dose separate from each other and separate from MVI. Discussed importance of  regular exercise for maintaining weight loss. Provided pt with list of goals and RD contact info.     Patient Understanding: good  Expected Compliance: good    Goals:  1) Follow bariatric regular diet.   2) Consume 60 grams of protein/day.  3) Sip on 48-64 oz of fluids/day- between meals only.  4) Eat slowly (>20 min/meal), chewing foods well (to applesauce-like consistency).  5) Limit portions to 1/2-1 cup/meal.  6) Take the following supplements:    Multivitamin/minerals: adult dose 2 times daily    Iron: 45-60 mg elemental (18-36 mg if low risk) - may partly or fully be covered in multivitamin     Calcium Citrate containing vitamin D: 500 mg 3 times daily or 600 mg 2 times daily    Vitamin B12: sublingual form of at least 500 mcg daily or injection of 1000 mcg monthly     B-50 Complex once daily   7) Increase activity as able  8) Reduce snacking. Try using high protein snack (hard boiled egg, non-fat yogurt) or non-starchy vegetable instead.        Follow-Up: prn    Time spent with patient: 30 minutes.  Mady Kaminski RD, LD  Kelsey Sousa RD

## 2019-12-05 NOTE — PROGRESS NOTES
Return Bariatric Surgery Note    RE: Danitza Soto  MR#: [id number]  : 1980  VISIT DATE: Dec 5, 2019    Dear Polly Mcbride,    I had the pleasure of seeing your patient, Danitza Soto, in my post-bariatric surgery assessment clinic.    CHIEF COMPLAINT: Post-bariatric surgery follow-up. 1 year follow up    HISTORY OF PRESENT ILLNESS:  Questions Regarding Prior Weight Loss Surgery Reviewed With Patient 2019   I had the following weight loss procedure: Sleeve Gastrectomy   What year was your surgery? 2018   How has your weight changed since your last visit? I have gained weight   Are you currently taking any weight loss medications? Yes   Do you currently have any of the following: Sleep Apnea, Heartburn, acid reflux, or GERD (acid reflux disease)?   Have you been to the Emergency room since your last visit with us? No   Were you in the hospital since your last visit with us? Yes   Do you have any concerns today? weight gain     Weight History:     2019   What is your highest lifetime weight? 310   What is your lowest weight since surgery? (In pounds) 228     Initial Weight: 140.1 kg (308 lb 13.8 oz)  Current Weight: Weight: 115.2 kg (254 lb)  Cumulative weight loss (lbs): 54.86  Last Visits Weight: 105.2 kg (232 lb)    Questions Regarding Co-Morbidities and Health Concerns Reviewed With Patient 2019   Pre-diabetes: Improved   Diabetes II: Never   High Blood Pressure: Improved   High cholesterol: Improved   Heartburn/Reflux: Stayed the same   Are you taking daily medication for heartburn, acid reflux, or GERD (acid reflux disease)? Yes   Sleep apnea: Improved   Do you use a CPAP? Yes   PCOS: Never   Back pain: Stayed the same   Joint pain: Improved   Lower leg swelling: Never       Eating Habits 2019   How many meals do you eat per day? 3   Do you snack between meals? Sometimes   How much food are you eating at each meal? 1/2 cup to 1 cup   Are you able to separate your meals and liquids  by at least 30 minutes? No   Are you able to avoid liquid calories? Yes       Exercise Questions Reviewed With Patient 12/5/2019   How often do you exercise? 3 to 4 times per week   What is the duration of your exercise (in minutes)? 30 Minutes   What types of exercise do you do? walking, climbing stairs at work   What keeps you from being more active?  I should be more active but I just have not gotten around to it, Lack of Time       Social History:      12/5/2019   Are you smoking? No   Are you drinking alcohol? No       Medications:  Current Outpatient Medications   Medication     apixaban ANTICOAGULANT (ELIQUIS) 5 MG tablet     calcium carbonate (OS-CHIKI) 500 MG tablet     Cholecalciferol (VITAMIN D-3) 5000 units TABS     ferrous gluconate (FERGON) 324 (38 Fe) MG tablet     fish oil-omega-3 fatty acids 1000 MG capsule     flecainide (TAMBOCOR) 100 MG tablet     lisinopril (PRINIVIL/ZESTRIL) 20 MG tablet     lisinopril (PRINIVIL/ZESTRIL) 40 MG tablet     LORazepam (ATIVAN) 0.5 MG tablet     metoprolol succinate ER (TOPROL-XL) 50 MG 24 hr tablet     Multiple Vitamins-Minerals (CENTRUM PO)     naltrexone (DEPADE/REVIA) 50 MG tablet     omeprazole (PRILOSEC) 40 MG DR capsule     Probiotic Product (PROBIOTIC ADVANCED PO)     topiramate (TOPAMAX) 25 MG tablet     venlafaxine (EFFEXOR-XR) 75 MG 24 hr capsule     VENTOLIN  (90 Base) MCG/ACT Inhaler     vitamin B complex with vitamin C (VITAMIN  B COMPLEX) tablet     acetaminophen (TYLENOL) 325 MG tablet     No current facility-administered medications for this visit.          12/5/2019   Do you avoid NSAIDs such as (Ibuprofen, Aleve, Naproxen, Advil)?   Yes       ROS:  GI:      12/5/2019   Vomiting: No   Diarrhea: No   Constipation: No   Swallowing trouble: No   Abdominal pain: Yes   Heartburn: Yes   Rash in skin folds: No   Depression: No   Stress urinary incontinence No     Skin:   BAR RBS ROS - SKIN 12/5/2019   Rash in skin folds: No     Psych:      12/5/2019  "  Depression: No   Anxiety: No     Female Only:   BAR RBS ROS - FEMALE ONLY 12/5/2019   Female only: Regular menstrual cycles       LABS/IMAGING/MEDICAL RECORDS REVIEW:     PHYSICAL EXAMINATION:  /75 (BP Location: Left arm, Patient Position: Sitting, Cuff Size: Adult Large)   Pulse 51   Temp 97.7  F (36.5  C) (Oral)   Ht 1.702 m (5' 7\")   Wt 115.2 kg (254 lb)   SpO2 98%   BMI 39.78 kg/m     General: No apparent distress  Neuro: A & O x 3  Head: Atraumatic, normocephalic  Eyes: PERRL, EOMI  Skin: warm and dry, no rashes on exposed skin  Respiratory: respirations unlabored  Abdomen: soft NT ND  Extremities: No LE swelling      ASSESSMENT AND PLAN:      1. 1 years status laparoscopic gastric sleeve with 20 lb recent weight gain.   2. Morbid Obesity current BMI: Body mass index is 39.78 kg/m .  3. Post surgical malabsorption:   Labs ordered per protocol.   Follow food plan per dietitian recommendations.   Continue taking recommended post-op vitamins.  4. Return to clinic in 3-4 months to see Sandy and dietitian for return medical weight management (closer f/u needed due to recent weight gain)  5. Return to clinic in 12 months for return bariatric surgery visit with labs  6. Labs today first floor  7. Refill naltrexone, increase topiramate to 25mg twice daily and increase omeprazole 40mg twice daily.  8. Recommend psychology group visit at [facility], info given today        Sincerely,    Sandy Cervantes PA-C    I spent a total of 15 minutes face to face with Danitza during today's office visit. Over 50% of this time was spent counseling the patient and/or coordinating care.  "

## 2019-12-05 NOTE — PATIENT INSTRUCTIONS
Nutrition Goals  1) Follow bariatric regular diet.   2) Consume 60 grams of protein/day.  3) Sip on 48-64 oz of fluids/day- between meals only.  4) Eat slowly (>20 min/meal), chewing foods well (to applesauce-like consistency).  5) Limit portions to 1/2-1 cup/meal.  6) Take the following supplements:    Multivitamin/minerals: adult dose 2 times daily    Iron: 45-60 mg elemental (18-36 mg if low risk) - may partly or fully be covered in multivitamin     Calcium Citrate containing vitamin D: 500 mg 3 times daily or 600 mg 2 times daily    Vitamin B12: sublingual form of at least 500 mcg daily or injection of 1000 mcg monthly     B-50 Complex once daily   7) Increase activity as able  8) Reduce snacking. Try using high protein snack (hard boiled egg, non-fat yogurt) or non-starchy vegetable instead.    Follow-up with RD yearly, or as needed    Mady Kaminski, DAVIE, LD  If you would like to schedule or reschedule an appointment with the RD, please call [phone]

## 2019-12-08 LAB
ANNOTATION COMMENT IMP: NORMAL
RETINYL PALMITATE SERPL-MCNC: 0.04 MG/L (ref 0–0.1)
VIT A SERPL-MCNC: 0.57 MG/L (ref 0.3–1.2)

## 2020-01-07 ASSESSMENT — ENCOUNTER SYMPTOMS
DECREASED LIBIDO: 1
HOT FLASHES: 0

## 2020-01-08 ENCOUNTER — OFFICE VISIT (OUTPATIENT)
Dept: CARDIOLOGY | Facility: CLINIC | Age: 40
End: 2020-01-08
Attending: NURSE PRACTITIONER
Payer: COMMERCIAL

## 2020-01-08 VITALS
SYSTOLIC BLOOD PRESSURE: 118 MMHG | OXYGEN SATURATION: 97 % | HEART RATE: 72 BPM | BODY MASS INDEX: 38.92 KG/M2 | HEIGHT: 67 IN | DIASTOLIC BLOOD PRESSURE: 76 MMHG | WEIGHT: 248 LBS

## 2020-01-08 DIAGNOSIS — I48.0 PAF (PAROXYSMAL ATRIAL FIBRILLATION) (H): Primary | ICD-10-CM

## 2020-01-08 LAB — INTERPRETATION ECG - MUSE: NORMAL

## 2020-01-08 PROCEDURE — 99213 OFFICE O/P EST LOW 20 MIN: CPT | Mod: ZP | Performed by: NURSE PRACTITIONER

## 2020-01-08 PROCEDURE — 93005 ELECTROCARDIOGRAM TRACING: CPT | Mod: ZF

## 2020-01-08 PROCEDURE — G0463 HOSPITAL OUTPT CLINIC VISIT: HCPCS | Mod: 25,ZF

## 2020-01-08 PROCEDURE — 93010 ELECTROCARDIOGRAM REPORT: CPT | Mod: ZP | Performed by: INTERNAL MEDICINE

## 2020-01-08 ASSESSMENT — PAIN SCALES - GENERAL: PAINLEVEL: NO PAIN (0)

## 2020-01-08 ASSESSMENT — MIFFLIN-ST. JEOR: SCORE: 1827.55

## 2020-01-08 NOTE — PROGRESS NOTES
Electrophysiology Clinic Note  HPI:   Ms. Soto is a 39 year old  female who has a past medical history significant for depression, HTN, GERD, LEONARDO (resolved with weight loss), essential hypertension, morbid obesity S/P gastric bypass 11/2018, PAF (CHADSVASC 2) s/p PVI 10/3/19, and depression.    She was first diagnosed with PAF in 9/2018. She was reporting palpitations, racing heart with some associated chest pressure and dizziness. At first her symptoms were mild and so no interventions were initially pursued. She has gastric bypass in 11/2018 and has lost over 70 lbs since. Since her surgery, she has started noticing more increased symptoms occurring a couple times a week lasting up to 10 hours. Zio patch in 8/2018 showed 4% atrial fibrillation burden heart rate ranged  beats per minute.  Longest episode lasting for 8 hours. She has followed with Dr. Heath who started her on Flecainide and Metoprolol. She continued to have breakthrough AF episodes. Initially decreased on Flecainide, but now back to weekly prompting referral to EP. She had been on lisinopril for HTN and dose has been decreased since weight loss. Prior cardiac tests include CT coronary angiogram in 2013 which did not show evidence of CAD.  Echocardiogram dated 9/2018 showed normal LV and RV function with no significant valve disease.  Moderate LVH was reported; however this has been questioned. Stress echo in 3/2019 showed normal structure and function and no ischemia. Family history is negative for early CAD or SCD.     EP Visit 9/4/19: She reports feeling well today. She states she is continuing to have episodes of AF 1-2 times a week lasting 8-10 hours. She has palpitations with associated CP and dizziness during these episodes. She denies any worsening shortness of breath, leg/ankle swelling, PND, orthopnea, or syncopal symptoms. Presenting 12 lead ECG shows SB Vent Rate 59 bpm,  ms, QRS 92 ms, QTc 461 ms. Current cardiac medications  include: Flecainide, Metoprolol, Lisinopril, and Eliquis.      She presents today for follow up. She underwent PVI ablation on 10/3/19. She reports feeling well. No known AF episodes after ablation. Groin sites well healed. She denies any chest pain/pressures, dizziness, lightheadedness, worsening shortness of breath, leg/ankle swelling, PND, orthopnea, palpitations, or syncopal symptoms. Presenting 12 lead ECG shows NSR Vent Rate 70 bpm,  ms, QRS 86 ms, QTc 451ms. Current cardiac medications include: Flecainide, Metoprolol, Lisinopril, and Eliquis.        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 - BRISEYDA I     LEONARDO on CPAP      Other anxiety states        CURRENT MEDICATIONS:  Current Outpatient Medications   Medication Sig Dispense Refill     acetaminophen (TYLENOL) 325 MG tablet Take 2 tablets (650 mg) by mouth every 4 hours as needed for other (For optimal non-opioid multimodal pain management to improve pain control and physical function.) (Patient not taking: Reported on 10/8/2019) 100 tablet 0     apixaban ANTICOAGULANT (ELIQUIS) 5 MG tablet Take 1 tablet (5 mg) by mouth 2 times daily 180 tablet 3     calcium carbonate (OS-CHIKI) 500 MG tablet Take 1 tablet by mouth daily        Cholecalciferol (VITAMIN D-3) 5000 units TABS Take 1 tablet by mouth daily       ferrous gluconate (FERGON) 324 (38 Fe) MG tablet Take 1 tablet (324 mg) by mouth daily (with breakfast) 30 tablet 3     fish oil-omega-3 fatty acids 1000 MG capsule Take 2 g by mouth every morning        flecainide (TAMBOCOR) 100 MG tablet Take 1 tablet (100 mg) by mouth 2 times daily 180 tablet 3     lisinopril (PRINIVIL/ZESTRIL) 20  MG tablet Take 1 tablet (20 mg) by mouth daily 90 tablet 1     lisinopril (PRINIVIL/ZESTRIL) 40 MG tablet TAKE 1 TABLET BY MOUTH DAILY (Patient taking differently: 20 mg daily ) 30 tablet 1     LORazepam (ATIVAN) 0.5 MG tablet Take 1 tablet (0.5 mg) by mouth every 8 hours as needed for anxiety 20 tablet 0     metoprolol succinate ER (TOPROL-XL) 50 MG 24 hr tablet Take 1 tablet (50 mg) by mouth daily 90 tablet 3     Multiple Vitamins-Minerals (CENTRUM PO)        naltrexone (DEPADE/REVIA) 50 MG tablet Take 1 tablet (50 mg) by mouth daily 30 tablet 5     omeprazole (PRILOSEC) 40 MG DR capsule Take 1 capsule (40 mg) by mouth daily 60 capsule 5     Probiotic Product (PROBIOTIC ADVANCED PO)        topiramate (TOPAMAX) 25 MG tablet Take 1 tablet (25 mg) by mouth 2 times daily 60 tablet 5     venlafaxine (EFFEXOR-XR) 75 MG 24 hr capsule TAKE 1 CAPSULE(75 MG) BY MOUTH DAILY 90 capsule 0     VENTOLIN  (90 Base) MCG/ACT Inhaler INHALE 1-2 PUFFS EVERY 4-6 HOURS AS NEEDED FOR WHEEZING  0     vitamin B complex with vitamin C (VITAMIN  B COMPLEX) tablet Take 1 tablet by mouth daily         PAST SURGICAL HISTORY:  Past Surgical History:   Procedure Laterality Date     EP ABLATION FOCAL AFIB N/A 10/3/2019    Procedure: EP ABLATION FOCAL AFIB;  Surgeon: Harpreet Arevalo MD;  Location:  OR      TOOTH EXTRACTION W/FORCEP      [place] Teeth Extraction     LAPAROSCOPIC GASTRIC SLEEVE N/A 2018    Procedure: Laparoscopic Sleeve Gastrectomy Latex Free;  Surgeon: Artis Tse MD;  Location: [facility] OR     LAPAROSCOPIC HERNIORRHAPHY HIATAL  2018    Procedure: LAPAROSCOPIC  HIATAL Hernia Repair;  Surgeon: Artis Tse MD;  Location: [facility] OR       ALLERGIES:     Allergies   Allergen Reactions     Wellbutrin [Bupropion]      Wellbutrin: Panic attacks, heart/chest pain       FAMILY HISTORY:  Family History   Problem Relation Age of Onset     Heart Disease Mother         ,mother had emphesema and  at 62      "Hypertension Mother      Allergies Mother      Lipids Mother      Obesity Mother      Respiratory Mother         Emphysema     Diabetes Maternal Grandmother         Type 2?     Hypertension Maternal Grandmother      Circulatory Maternal Grandmother         Due to diabetes     Eye Disorder Maternal Grandmother         Macular degeneration/Macular degeneration     Obesity Maternal Grandmother      Hypertension Father      Lipids Father      Cancer Father      Prostate Cancer Father      Other Cancer Father      Cerebrovascular Disease Paternal Grandmother      Alcohol/Drug Maternal Grandfather      Obesity Maternal Grandfather      Psychotic Disorder Paternal Grandfather         Committed Suicide     Depression Paternal Grandfather      Allergies Sister      Genitourinary Problems Sister        SOCIAL HISTORY:  Social History     Tobacco Use     Smoking status: Former Smoker     Packs/day: 1.00     Years: 10.00     Pack years: 10.00     Types: Cigarettes     Start date: 2004     Last attempt to quit: 8/15/2018     Years since quittin.3     Smokeless tobacco: Never Used     Tobacco comment:  pack or less a day   Substance Use Topics     Alcohol use: Yes     Alcohol/week: 0.0 standard drinks     Comment: Occas.     Drug use: No       ROS:   A comprehensive 10 point review of systems negative other than as mentioned in HPI.  Exam:  /76 (BP Location: Right arm, Patient Position: Chair, Cuff Size: Adult Large)   Pulse 72   Ht 1.702 m (5' 7\")   Wt 112.5 kg (248 lb)   SpO2 97%   BMI 38.84 kg/m    GENERAL APPEARANCE: alert and no distress  HEENT: no icterus, no xanthelasmas, normal pupil size and reaction, normal palate, mucosa moist, no central cyanosis  NECK: no adenopathy, no asymmetry, masses, or scars, thyroid normal to palpation and no bruits, JVP not elevated  RESPIRATORY: lungs clear to auscultation - no rales, rhonchi or wheezes, no use of accessory muscles, no retractions, respirations are " unlabored, normal respiratory rate  CARDIOVASCULAR: regular rhythm, normal S1 with physiologic split S2, no S3 or S4 and no murmur, click or rub, precordium quiet with normal PMI.  ABDOMEN: soft, non tender, bowel sounds normal, non-distended  EXTREMITIES: peripheral pulses normal, no edema  NEURO: alert and oriented to person/place/time, normal speech, gait and affect  SKIN: no ecchymoses, no rashes  PSYCH: normal affect, cooperative    Labs:  Reviewed.     Testing/Procedures:  3/5/19 ECHOCARDIOGRAM:   Interpretation Summary  Submaximal ECG stress test     The patient exercised for 7 minutes and 27 seconds on the Andrés Protocol. The  patient did not report any symptoms during testing. The test was terminated  due to fatigue. The patient achieved 9.2 METs or 32.2 ml/kg/min at peak  exercise.     73% of age predicted target heart rate achieved. Normal blood pressure  response to exercise.  No angina reported during stress test.  No ECG evidence of ischemia.  No supraventricular or ventricular arrhythmias.  Reduced functional capacity.      Assessment and Plan:    is a 39 year old  female who has a past medical history significant for depression, HTN, GERD, LEONARDO (resolved with weight loss), essential hypertension, morbid obesity S/P gastric bypass 11/2018, PAF (CHADSVASC 2) s/p PVI 10/3/19, and depression. She was first diagnosed with PAF in 9/2018. She was reporting palpitations, racing heart with some associated chest pressure and dizziness. At first her symptoms were mild and so no interventions were initially pursued. She has gastric bypass in 11/2018 and has lost over 70 lbs since. Since her surgery, she started noticing more increased symptoms occurring a couple times a week lasting up to 10 hours. Zio patch in 8/2018 showed 4% AF up to  8 hours. She has followed with Dr. Heath who started her on Flecainide, Metoprolol, and Eliquis. She continued to have breakthrough AF episodes. Initially decreased on  Flecainide, but then back to weekly prompting referral to EP.  She states she is continuing to have episodes of AF 1-2 times a week lasting 8-10 hours.Stress echo in 3/2019 showed normal structure and function and no ischemia. She presents today for follow up. She underwent PVI ablation on 10/3/19. She reports feeling well. No known AF episodes after ablation. Groin sites well healed. She denies any chest pain/pressures, dizziness, lightheadedness, worsening shortness of breath, leg/ankle swelling, PND, orthopnea, palpitations, or syncopal symptoms. Presenting 12 lead ECG shows NSR Vent Rate 70 bpm,  ms, QRS 86 ms, QTc 451ms. Current cardiac medications include: Flecainide, Metoprolol, Lisinopril, and Eliquis.        Paroxysmal Atrial Fibrillation:   We discussed in detail with the patient management/treatment options for Shelley includin. Stroke Prophylaxis:  CHADSVASC=+HTN, +gender  2, corresponding to a 2.2% annual stroke / systemic emolism event rate. She is on Eliquis, no bleeding issues. Discussed change in guidelines which would not necessitate anticoagulation for females until CHADSVASC 3. Discussed that she is now 3 months out from ablation and we would be comfortable stopping anticoagulation. She will discuss with her primary Cardiologist Dr. Heath.   2. Rate Control: Continue Metoprolol.   3. Rhythm Control: She has been on Flecainide and continued to have AF episodes leading to her electing to pursue PVI. She had PVI 10/3/19. She has not had any recurrent AF since. Advised her to stop Flecainide and Metoprolol. She will discuss with her primary Cardiologist Dr. Heath.  4. Risk Factor Management: maintain good BP control, and LEONARDO evaluation if needed.    She will continue to follow with Dr. Heath. She can follow up with EP as needed.     The patient states understanding and is agreeable with plan.   JOSE LUIS Danielson CNP  Pager: [phone]

## 2020-01-08 NOTE — LETTER
1/8/2020      RE: Danitza Soto  [address]  [facility] [address]       Dear Colleague,    Thank you for the opportunity to participate in the care of your patient, Danitza Soto, at the [facility] HEART [facility] at [facility]. Please see a copy of my visit note below.    Electrophysiology Clinic Note  HPI:   Ms. Soto is a 39 year old  female who has a past medical history significant for depression, HTN, GERD, LEONARDO (resolved with weight loss), essential hypertension, morbid obesity S/P gastric bypass 11/2018, PAF (CHADSVASC 2) s/p PVI 10/3/19, and depression.    She was first diagnosed with PAF in 9/2018. She was reporting palpitations, racing heart with some associated chest pressure and dizziness. At first her symptoms were mild and so no interventions were initially pursued. She has gastric bypass in 11/2018 and has lost over 70 lbs since. Since her surgery, she has started noticing more increased symptoms occurring a couple times a week lasting up to 10 hours. Zio patch in 8/2018 showed 4% atrial fibrillation burden heart rate ranged  beats per minute.  Longest episode lasting for 8 hours. She has followed with Dr. Heath who started her on Flecainide and Metoprolol. She continued to have breakthrough AF episodes. Initially decreased on Flecainide, but now back to weekly prompting referral to EP. She had been on lisinopril for HTN and dose has been decreased since weight loss. Prior cardiac tests include CT coronary angiogram in 2013 which did not show evidence of CAD.  Echocardiogram dated 9/2018 showed normal LV and RV function with no significant valve disease.  Moderate LVH was reported; however this has been questioned. Stress echo in 3/2019 showed normal structure and function and no ischemia. Family history is negative for early CAD or SCD.     EP Visit 9/4/19: She reports feeling well today. She states she is continuing to have episodes of AF 1-2  times a week lasting 8-10 hours. She has palpitations with associated CP and dizziness during these episodes. She denies any worsening shortness of breath, leg/ankle swelling, PND, orthopnea, or syncopal symptoms. Presenting 12 lead ECG shows SB Vent Rate 59 bpm,  ms, QRS 92 ms, QTc 461 ms. Current cardiac medications include: Flecainide, Metoprolol, Lisinopril, and Eliquis.      She presents today for follow up. She underwent PVI ablation on 10/3/19. She reports feeling well. No known AF episodes after ablation. Groin sites well healed. She denies any chest pain/pressures, dizziness, lightheadedness, worsening shortness of breath, leg/ankle swelling, PND, orthopnea, palpitations, or syncopal symptoms. Presenting 12 lead ECG shows NSR Vent Rate 70 bpm,  ms, QRS 86 ms, QTc 451ms. Current cardiac medications include: Flecainide, Metoprolol, Lisinopril, and Eliquis.        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 - BRISEYDA I     LEOANRDO on CPAP      Other anxiety states        CURRENT MEDICATIONS:  Current Outpatient Medications   Medication Sig Dispense Refill     acetaminophen (TYLENOL) 325 MG tablet Take 2 tablets (650 mg) by mouth every 4 hours as needed for other (For optimal non-opioid multimodal pain management to improve pain control and physical function.) (Patient not taking: Reported on 10/8/2019) 100 tablet 0     apixaban ANTICOAGULANT (ELIQUIS) 5 MG tablet Take 1 tablet (5 mg) by mouth 2 times daily 180 tablet 3     calcium carbonate (OS-CHIKI) 500 MG tablet Take 1 tablet by mouth daily        Cholecalciferol (VITAMIN D-3) 5000 units TABS Take 1 tablet by mouth daily       ferrous  gluconate (FERGON) 324 (38 Fe) MG tablet Take 1 tablet (324 mg) by mouth daily (with breakfast) 30 tablet 3     fish oil-omega-3 fatty acids 1000 MG capsule Take 2 g by mouth every morning        flecainide (TAMBOCOR) 100 MG tablet Take 1 tablet (100 mg) by mouth 2 times daily 180 tablet 3     lisinopril (PRINIVIL/ZESTRIL) 20 MG tablet Take 1 tablet (20 mg) by mouth daily 90 tablet 1     lisinopril (PRINIVIL/ZESTRIL) 40 MG tablet TAKE 1 TABLET BY MOUTH DAILY (Patient taking differently: 20 mg daily ) 30 tablet 1     LORazepam (ATIVAN) 0.5 MG tablet Take 1 tablet (0.5 mg) by mouth every 8 hours as needed for anxiety 20 tablet 0     metoprolol succinate ER (TOPROL-XL) 50 MG 24 hr tablet Take 1 tablet (50 mg) by mouth daily 90 tablet 3     Multiple Vitamins-Minerals (CENTRUM PO)        naltrexone (DEPADE/REVIA) 50 MG tablet Take 1 tablet (50 mg) by mouth daily 30 tablet 5     omeprazole (PRILOSEC) 40 MG DR capsule Take 1 capsule (40 mg) by mouth daily 60 capsule 5     Probiotic Product (PROBIOTIC ADVANCED PO)        topiramate (TOPAMAX) 25 MG tablet Take 1 tablet (25 mg) by mouth 2 times daily 60 tablet 5     venlafaxine (EFFEXOR-XR) 75 MG 24 hr capsule TAKE 1 CAPSULE(75 MG) BY MOUTH DAILY 90 capsule 0     VENTOLIN  (90 Base) MCG/ACT Inhaler INHALE 1-2 PUFFS EVERY 4-6 HOURS AS NEEDED FOR WHEEZING  0     vitamin B complex with vitamin C (VITAMIN  B COMPLEX) tablet Take 1 tablet by mouth daily         PAST SURGICAL HISTORY:  Past Surgical History:   Procedure Laterality Date     EP ABLATION FOCAL AFIB N/A 10/3/2019    Procedure: EP ABLATION FOCAL AFIB;  Surgeon: Harpreet Arevalo MD;  Location: [facility] OR      TOOTH EXTRACTION W/FORCEP  1995    [place] Teeth Extraction     LAPAROSCOPIC GASTRIC SLEEVE N/A 11/27/2018    Procedure: Laparoscopic Sleeve Gastrectomy Latex Free;  Surgeon: Artis Tse MD;  Location: [facility] OR     LAPAROSCOPIC HERNIORRHAPHY HIATAL  11/27/2018    Procedure: LAPAROSCOPIC  HIATAL Hernia  "Repair;  Surgeon: Artis Tse MD;  Location: [facility] OR       ALLERGIES:     Allergies   Allergen Reactions     Wellbutrin [Bupropion]      Wellbutrin: Panic attacks, heart/chest pain       FAMILY HISTORY:  Family History   Problem Relation Age of Onset     Heart Disease Mother         ,mother had emphesema and  at 62     Hypertension Mother      Allergies Mother      Lipids Mother      Obesity Mother      Respiratory Mother         Emphysema     Diabetes Maternal Grandmother         Type 2?     Hypertension Maternal Grandmother      Circulatory Maternal Grandmother         Due to diabetes     Eye Disorder Maternal Grandmother         Macular degeneration/Macular degeneration     Obesity Maternal Grandmother      Hypertension Father      Lipids Father      Cancer Father      Prostate Cancer Father      Other Cancer Father      Cerebrovascular Disease Paternal Grandmother      Alcohol/Drug Maternal Grandfather      Obesity Maternal Grandfather      Psychotic Disorder Paternal Grandfather         Committed Suicide     Depression Paternal Grandfather      Allergies Sister      Genitourinary Problems Sister        SOCIAL HISTORY:  Social History     Tobacco Use     Smoking status: Former Smoker     Packs/day: 1.00     Years: 10.00     Pack years: 10.00     Types: Cigarettes     Start date: 2004     Last attempt to quit: 8/15/2018     Years since quittin.3     Smokeless tobacco: Never Used     Tobacco comment:  pack or less a day   Substance Use Topics     Alcohol use: Yes     Alcohol/week: 0.0 standard drinks     Comment: Occas.     Drug use: No       ROS:   A comprehensive 10 point review of systems negative other than as mentioned in HPI.  Exam:  /76 (BP Location: Right arm, Patient Position: Chair, Cuff Size: Adult Large)   Pulse 72   Ht 1.702 m (5' 7\")   Wt 112.5 kg (248 lb)   SpO2 97%   BMI 38.84 kg/m     GENERAL APPEARANCE: alert and no distress  HEENT: no icterus, no xanthelasmas, " normal pupil size and reaction, normal palate, mucosa moist, no central cyanosis  NECK: no adenopathy, no asymmetry, masses, or scars, thyroid normal to palpation and no bruits, JVP not elevated  RESPIRATORY: lungs clear to auscultation - no rales, rhonchi or wheezes, no use of accessory muscles, no retractions, respirations are unlabored, normal respiratory rate  CARDIOVASCULAR: regular rhythm, normal S1 with physiologic split S2, no S3 or S4 and no murmur, click or rub, precordium quiet with normal PMI.  ABDOMEN: soft, non tender, bowel sounds normal, non-distended  EXTREMITIES: peripheral pulses normal, no edema  NEURO: alert and oriented to person/place/time, normal speech, gait and affect  SKIN: no ecchymoses, no rashes  PSYCH: normal affect, cooperative    Labs:  Reviewed.     Testing/Procedures:  3/5/19 ECHOCARDIOGRAM:   Interpretation Summary  Submaximal ECG stress test     The patient exercised for 7 minutes and 27 seconds on the Andrés Protocol. The  patient did not report any symptoms during testing. The test was terminated  due to fatigue. The patient achieved 9.2 METs or 32.2 ml/kg/min at peak  exercise.     73% of age predicted target heart rate achieved. Normal blood pressure  response to exercise.  No angina reported during stress test.  No ECG evidence of ischemia.  No supraventricular or ventricular arrhythmias.  Reduced functional capacity.      Assessment and Plan:    is a 39 year old  female who has a past medical history significant for depression, HTN, GERD, LEONARDO (resolved with weight loss), essential hypertension, morbid obesity S/P gastric bypass 11/2018, PAF (CHADSVASC 2) s/p PVI 10/3/19, and depression. She was first diagnosed with PAF in 9/2018. She was reporting palpitations, racing heart with some associated chest pressure and dizziness. At first her symptoms were mild and so no interventions were initially pursued. She has gastric bypass in 11/2018 and has lost over 70 lbs  since. Since her surgery, she started noticing more increased symptoms occurring a couple times a week lasting up to 10 hours. Zio patch in 2018 showed 4% AF up to  8 hours. She has followed with Dr. Heath who started her on Flecainide, Metoprolol, and Eliquis. She continued to have breakthrough AF episodes. Initially decreased on Flecainide, but then back to weekly prompting referral to EP.  She states she is continuing to have episodes of AF 1-2 times a week lasting 8-10 hours.Stress echo in 3/2019 showed normal structure and function and no ischemia. She presents today for follow up. She underwent PVI ablation on 10/3/19. She reports feeling well. No known AF episodes after ablation. Groin sites well healed. She denies any chest pain/pressures, dizziness, lightheadedness, worsening shortness of breath, leg/ankle swelling, PND, orthopnea, palpitations, or syncopal symptoms. Presenting 12 lead ECG shows NSR Vent Rate 70 bpm,  ms, QRS 86 ms, QTc 451ms. Current cardiac medications include: Flecainide, Metoprolol, Lisinopril, and Eliquis.        Paroxysmal Atrial Fibrillation:   We discussed in detail with the patient management/treatment options for Shelley includin. Stroke Prophylaxis:  CHADSVASC=+HTN, +gender  2, corresponding to a 2.2% annual stroke / systemic emolism event rate. She is on Eliquis, no bleeding issues. Discussed change in guidelines which would not necessitate anticoagulation for females until CHADSVASC 3. Discussed that she is now 3 months out from ablation and we would be comfortable stopping anticoagulation. She will discuss with her primary Cardiologist Dr. Heath.   2. Rate Control: Continue Metoprolol.   3. Rhythm Control: She has been on Flecainide and continued to have AF episodes leading to her electing to pursue PVI. She had PVI 10/3/19. She has not had any recurrent AF since. Advised her to stop Flecainide and Metoprolol. She will discuss with her primary Cardiologist Dr. Heath.  4.  Risk Factor Management: maintain good BP control, and LEONARDO evaluation if needed.    She will continue to follow with Dr. Heath. She can follow up with EP as needed.     The patient states understanding and is agreeable with plan.     JOSE LUIS Danielson CNP  Pager: [phone]

## 2020-02-08 DIAGNOSIS — F33.42 RECURRENT MAJOR DEPRESSIVE DISORDER, IN FULL REMISSION (H): ICD-10-CM

## 2020-02-10 RX ORDER — VENLAFAXINE HYDROCHLORIDE 75 MG/1
CAPSULE, EXTENDED RELEASE ORAL
Qty: 90 CAPSULE | Refills: 0 | Status: SHIPPED | OUTPATIENT
Start: 2020-02-10 | End: 2020-05-11

## 2020-02-10 NOTE — TELEPHONE ENCOUNTER
"VENLAFAXINE  Last Written Prescription Date:  11/11/19  Last Fill Quantity: 90 ,  # refills: 0   Last office visit: 11/1/2019 with prescribing provider:  YES   Future Office Visit:   Next 5 appointments (look out 90 days)    Feb 18, 2020 10:30 AM CST  Return Visit with Leena Heath MD  [facility] AT [facility] ([facility]) [address] [place] [id number] [phone]         Requested Prescriptions   Pending Prescriptions Disp Refills     venlafaxine (EFFEXOR-XR) 75 MG 24 hr capsule [Pharmacy Med Name: VENLAFAXINE ER 75MG CAPSULES] 90 capsule 0     Sig: TAKE 1 CAPSULE(75 MG) BY MOUTH DAILY       Serotonin-Norepinephrine Reuptake Inhibitors  Passed - 2/8/2020  3:55 PM        Passed - Blood pressure under 140/90 in past 12 months     BP Readings from Last 3 Encounters:   01/08/20 118/76   12/05/19 128/75   11/01/19 138/88                 Passed - PHQ-9 score of less than 5 in past 6 months     Please review last PHQ-9 score.           Passed - Medication is active on med list        Passed - Patient is age 18 or older        Passed - No active pregnancy on record        Passed - Normal serum creatinine on file in past 12 months     Recent Labs   Lab Test 12/05/19  1021   CR 0.69             Passed - No positive pregnancy test in past 12 months        Passed - Recent (6 mo) or future (30 days) visit within the authorizing provider's specialty     Patient had office visit in the last 6 months or has a visit in the next 30 days with authorizing provider or within the authorizing provider's specialty.  See \"Patient Info\" tab in inbasket, or \"Choose Columns\" in Meds & Orders section of the refill encounter.            "

## 2020-02-15 DIAGNOSIS — I48.0 PAROXYSMAL ATRIAL FIBRILLATION (H): ICD-10-CM

## 2020-02-17 RX ORDER — METOPROLOL SUCCINATE 50 MG/1
50 TABLET, EXTENDED RELEASE ORAL DAILY
Qty: 90 TABLET | Refills: 3 | Status: SHIPPED | OUTPATIENT
Start: 2020-02-17 | End: 2020-02-18

## 2020-02-17 NOTE — TELEPHONE ENCOUNTER
Signed Prescriptions:                        Disp   Refills    metoprolol succinate ER (TOPROL-XL) 50 MG *90 tab*3        Sig: Take 1 tablet (50 mg) by mouth daily DO NOT CRUSH  Authorizing Provider: DAIANA RONDON  Ordering User: HANNAH CONTE    Rx filled per refill protocol,    Hannah Conte RN

## 2020-02-18 ENCOUNTER — OFFICE VISIT (OUTPATIENT)
Dept: CARDIOLOGY | Facility: CLINIC | Age: 40
End: 2020-02-18
Payer: COMMERCIAL

## 2020-02-18 VITALS
SYSTOLIC BLOOD PRESSURE: 116 MMHG | HEART RATE: 57 BPM | BODY MASS INDEX: 40.1 KG/M2 | WEIGHT: 256 LBS | OXYGEN SATURATION: 97 % | DIASTOLIC BLOOD PRESSURE: 78 MMHG

## 2020-02-18 DIAGNOSIS — I48.0 PAROXYSMAL ATRIAL FIBRILLATION (H): ICD-10-CM

## 2020-02-18 PROCEDURE — 99214 OFFICE O/P EST MOD 30 MIN: CPT | Performed by: INTERNAL MEDICINE

## 2020-02-18 RX ORDER — METOPROLOL SUCCINATE 25 MG/1
25 TABLET, EXTENDED RELEASE ORAL DAILY
Qty: 90 TABLET | Refills: 3 | Status: SHIPPED | OUTPATIENT
Start: 2020-02-18 | End: 2020-03-24

## 2020-02-18 NOTE — PATIENT INSTRUCTIONS
Thank you for coming to the [facility] @ [facility]; please note the following instructions:    1. MEDICATION CHANGES TODAY:    * Decrease Metoprolol to 25 mg. daily    * STOP  Eliquis    * STOP Flecainide    2.Dr. Leena Heath has requested you to follow up in 6 months; please see following pages for appointment detail information.                If you have any questions regarding your visit please contact your care team:     Cardiology  Telephone Number   Hannah LUCHO, RN  Josette RUSS,RN  Shabana RAMIREZ, [place]LEON HUGHES, MA  [place], [place]   [phone]   (select option 1)    *After hours: [phone]     For scheduling appts:     [phone] or    [phone] (select option 1)    *After hours: [phone]     For the Device Clinic (Pacemakers and ICD's)  RN's :  Hanh Cochran   During business hours: [phone]    *After business hours:  [phone] (select option 4)      Normal test result notifications will be released via [employer] or mailed within 7 business days.  All other test results, will be communicated via telephone once reviewed by your cardiologist.    If you need a medication refill please contact your pharmacy.  Please allow 3 business days for your refill to be completed.    As always, thank you for trusting us with your health care needs!

## 2020-02-18 NOTE — PROGRESS NOTES
Chief complaint: Palpitations    HPI: Ms. Danitza Soto is a 38 year old  female with PMH significant for depression, GERD, LEONARDO, essential hypertension, morbid obesity S/P gastric bypass 11/2018 and paroxysmal atrial fibrillation.    The patient was last seen in cardiology for paroxysmal atrial fibrillation diagnosis in 9/2018.  Her symptoms were mild at that time therefore rate or rhythm control strategy was not pursued.  The patient underwent gastric sleeve surgery in 11/2018 and has lost 70 pounds.  The patient noticed more frequent atrial fibrillation episodes since the surgery almost weekly now lasting up to 8-10 hours.  She finds works stress as a prominent trigger for episodes.  Associated symptoms include chest pressure, chest pain and dizziness.  She denies chest discomfort with exertion at other times.  The patient denies a history of dyspnea, PND, orthopnea, pedal edema, and syncope  Current medications include lisinopril 20 mg, fish oil.  She has cut down on lisinopril from 40 mg since she has lost significant weight.  She quit smoking in 2018 (10 pack years).  No history of alcohol abuse. No history of diabetes.  Family history is negative for CAD.  Prior cardiac tests include CT coronary angiogram in 2013 which did not show evidence of CAD.  Echocardiogram dated 9/2018 showed normal LV and RV function with no significant valve disease.  Moderate LVH was reported. I personnally reviewed the images and I donot agree with the LVH diagnosis. I donot think she has LVH based on echo. Zio patch in 8/2018 showed 4% atrial fibrillation burden heart rate ranged  beats per minute.  Longest episode lasting for 8 hours.    I have reviewed her ECG 11/28/2018 which shows normal sinus rhythm with single PAC, normal NV/QRS/QTC intervals. No evidence of LVH on ECG.    Medications, personal, family, and social history reviewed with patient and revised.    Interval history 5/14/2019:  Patient returns for  follow-up to recheck on paroxysmal atrial fibrillation.  Since I saw 3 months ago, patient developed depression and started on Effexor which improved her mood.  I have started her on flecainide and metoprolol 3 months ago for frequent paroxysmal atrial fibrillation episodes.  She noticed significant decline in the frequency of the episodes however she still reports at least 2 episodes per month sometimes lasting for several hours.  She reports associated dizziness however denies chest pain, shortness of breath, lower extremity edema or syncope.  Since gastric bypass patient lost significant weight with improvement in blood pressure and sleep apnea.  She no longer uses CPAP machine.  She is overall feeling well.    Interval history 8/14/2019:  The patient returns for follow-up to recheck on paroxysmal atrial fibrillation.  When I saw patient last time 3 months ago I increased dose of flecainide to 100 mg twice daily due to symptomatic palpitations.  The patient reports doing well during the first 2 months however had 3 episodes within the last month longest episode lasting for 3 hours with associated tiredness.  Denies chest pain, dyspnea on exertion, dizziness, syncope.  The patient has did a lot of research on atrial fibrillation ablation and she expressed her interest in undergoing procedure.    Interval history 2/18/2020:  Patient underwent catheter ablation of atrial fibrillation on 10/3/2019.  Patient feels great since the ablation.  No recurrence of A. fib since then.  She is currently on flecainide 100 mg twice daily, metoprolol 50 mg and apixaban 5 mg twice daily.  Blood pressure is well controlled with lisinopril 20 mg.  Patient is currently asymptomatic from cardiac standpoint.  Denies chest pain, shortness of breath, palpitations, dizziness, lower extremity edema or syncope.    PAST MEDICAL HISTORY:  Past Medical History:   Diagnosis Date     Antiplatelet or antithrombotic long-term use      Arrhythmia       Depressive disorder 1990     Esophageal reflux      Hearing problem 2018     Hyperlipidemia LDL goal <130 7/6/2015     Hypertension      LSIL (low grade squamous intraepithelial lesion) on Pap smear 6/2014    + HPV, unable to type colp - BRISEYDA I     LEONARDO on CPAP      Other anxiety states        CURRENT MEDICATIONS:  Current Outpatient Medications   Medication Sig Dispense Refill     apixaban ANTICOAGULANT (ELIQUIS) 5 MG tablet Take 1 tablet (5 mg) by mouth 2 times daily 180 tablet 3     calcium carbonate (OS-CHIKI) 500 MG tablet Take 1 tablet by mouth daily        Cholecalciferol (VITAMIN D-3) 5000 units TABS Take 1 tablet by mouth daily       ferrous gluconate (FERGON) 324 (38 Fe) MG tablet Take 1 tablet (324 mg) by mouth daily (with breakfast) 30 tablet 3     fish oil-omega-3 fatty acids 1000 MG capsule Take 2 g by mouth every morning        flecainide (TAMBOCOR) 100 MG tablet Take 1 tablet (100 mg) by mouth 2 times daily 180 tablet 3     lisinopril (PRINIVIL/ZESTRIL) 20 MG tablet Take 1 tablet (20 mg) by mouth daily 90 tablet 1     metoprolol succinate ER (TOPROL-XL) 50 MG 24 hr tablet Take 1 tablet (50 mg) by mouth daily DO NOT CRUSH 90 tablet 3     Multiple Vitamins-Minerals (CENTRUM PO) 2 tablets per day       naltrexone (DEPADE/REVIA) 50 MG tablet Take 1 tablet (50 mg) by mouth daily 30 tablet 5     omeprazole (PRILOSEC) 40 MG DR capsule Take 1 capsule (40 mg) by mouth daily 60 capsule 5     Probiotic Product (PROBIOTIC ADVANCED PO)        topiramate (TOPAMAX) 25 MG tablet Take 1 tablet (25 mg) by mouth 2 times daily 60 tablet 5     venlafaxine (EFFEXOR-XR) 75 MG 24 hr capsule TAKE 1 CAPSULE(75 MG) BY MOUTH DAILY 90 capsule 0     VENTOLIN  (90 Base) MCG/ACT Inhaler INHALE 1-2 PUFFS EVERY 4-6 HOURS AS NEEDED FOR WHEEZING  0     vitamin B complex with vitamin C (VITAMIN  B COMPLEX) tablet Take 1 tablet by mouth daily       acetaminophen (TYLENOL) 325 MG tablet Take 2 tablets (650 mg) by mouth every 4  hours as needed for other (For optimal non-opioid multimodal pain management to improve pain control and physical function.) 100 tablet 0     lisinopril (PRINIVIL/ZESTRIL) 40 MG tablet TAKE 1 TABLET BY MOUTH DAILY (Patient not taking: No sig reported) 30 tablet 1     LORazepam (ATIVAN) 0.5 MG tablet Take 1 tablet (0.5 mg) by mouth every 8 hours as needed for anxiety (Patient not taking: Reported on 2020) 20 tablet 0       PAST SURGICAL HISTORY:  Past Surgical History:   Procedure Laterality Date     EP ABLATION FOCAL AFIB N/A 10/3/2019    Procedure: EP ABLATION FOCAL AFIB;  Surgeon: Harpreet Arevalo MD;  Location: [facility] OR     HC TOOTH EXTRACTION W/FORCEP      [place] Teeth Extraction     LAPAROSCOPIC GASTRIC SLEEVE N/A 2018    Procedure: Laparoscopic Sleeve Gastrectomy Latex Free;  Surgeon: Artis Tse MD;  Location: [facility] OR     LAPAROSCOPIC HERNIORRHAPHY HIATAL  2018    Procedure: LAPAROSCOPIC  HIATAL Hernia Repair;  Surgeon: Artis Tse MD;  Location: [facility] OR       ALLERGIES:     Allergies   Allergen Reactions     Wellbutrin [Bupropion]      Wellbutrin: Panic attacks, heart/chest pain       FAMILY HISTORY:  Family History   Problem Relation Age of Onset     Heart Disease Mother         ,mother had emphesema and  at 62     Hypertension Mother      Allergies Mother      Lipids Mother      Obesity Mother      Respiratory Mother         Emphysema     Diabetes Maternal Grandmother         Type 2?     Hypertension Maternal Grandmother      Circulatory Maternal Grandmother         Due to diabetes     Eye Disorder Maternal Grandmother         Macular degeneration/Macular degeneration     Obesity Maternal Grandmother      Hypertension Father      Lipids Father      Cancer Father      Prostate Cancer Father      Other Cancer Father      Cerebrovascular Disease Paternal Grandmother      Alcohol/Drug Maternal Grandfather      Obesity Maternal Grandfather      Psychotic Disorder Paternal  Grandfather         Committed Suicide     Depression Paternal Grandfather      Allergies Sister      Genitourinary Problems Sister          SOCIAL HISTORY:  Social History     Tobacco Use     Smoking status: Former Smoker     Packs/day: 1.00     Years: 10.00     Pack years: 10.00     Types: Cigarettes     Start date: 2004     Last attempt to quit: 8/15/2018     Years since quittin.5     Smokeless tobacco: Never Used     Tobacco comment:  pack or less a day   Substance Use Topics     Alcohol use: Yes     Alcohol/week: 0.0 standard drinks     Comment: Occas.     Drug use: No     EP PROCEDURE NOTE 10/3/2019  1. Left Atrial Wide Area Circumferential radiofrequency Ablation.  2. Trans-septal Puncture x2  3. Intracardiac Echocardiography.  4. Invasive Hemodynamic Monitoring.  5. 3D electro-anatomic Mapping using Carto3.  6. Esophageal temperature monitoring.    Exam:  /78 (BP Location: Left arm, Patient Position: Sitting, Cuff Size: Adult Large)   Pulse 57   Wt 116.1 kg (256 lb)   SpO2 97%   BMI 40.10 kg/m    GENERAL APPEARANCE: alert and no distress  CARDIOVASCULAR: regular rhythm, normal S1, S2, no S3 or S4 and no murmur, click or rub, precordium quiet with normal PMI.  EXTREMITIES: no edema     I have reviewed the labs and personally reviewed the imaging below and made my comment in the assessment and plan.    Labs:  CBC RESULTS:   Lab Results   Component Value Date    WBC 7.3 2019    RBC 4.37 2019    HGB 11.8 2019    HCT 37.4 2019    MCV 86 2019    MCH 27.0 2019    MCHC 31.6 2019    RDW 14.4 2019     2019       BMP RESULTS:  Lab Results   Component Value Date     2019    POTASSIUM 3.8 2019    CHLORIDE 108 2019    CO2 28 2019    ANIONGAP 3 2019    GLC 80 2019    BUN 11 2019    CR 0.69 2019    GFRESTIMATED >90 2019    GFRESTBLACK >90 2019    CHIKI 8.8 2019        INR  RESULTS:  Lab Results   Component Value Date    INR 0.98 11/06/2018       Echocardiogram 9/26/2018  Global and regional left ventricular function is normal with an EF of 60-65%.  Moderate concentric wall thickening consistent with left ventricular  hypertrophy is present.  Right ventricular function, chamber size, wall motion, and thickness are  normal.  Mild biatrial enlargement is present.  No significant valve abnormalities present.  The inferior vena cava was normal in size    Nuclear stress test with Lexiscan 9/6/2018  Normal myocardial SPECT study with a summed stress score of 0.     CT coronary artery angiogram 6/27/2013  No evidence of coronary artery disease    EKG 11/28/2018 normal.    Assessment and Plan:   Ms. Danitza Soto is a 38 year old  female with PMH significant for depression, GERD, LEONARDO, essential hypertension, morbid obesity S/P gastric bypass 11/2018 and paroxysmal atrial fibrillation refractory to medical treatment status post PVI on 10/3/2019.    1.  Paroxysmal atrial fibrillation: The patient underwent PVI on 10/3/2019.  Reports feeling great since then.  No recurrence of atrial fibrillation.  Patient is in sinus rhythm in clinic today.  Recommended to discontinue flecainide and apixaban (patient's  CHADSVASC score is 2 based on hypertension and female gender).  I will decrease the dose of metoprolol from 50 to 25 mg today.      2.  Hypertension: Well-controlled with current medical treatment.      3.  Obstructive sleep apnea: Resolved after gastric sleeve surgery.      Return to clinic 6 months.    A total of 30 minutes spent face-toface with greater than 50% of the time spent in counseling and coordinating cares of the issues above.     Please donot hesitate to contact me if you have any questions or concerns. Again, thank you for allowing me to participate in the care of your patient.    Leena RONDON MD  [facility] Division of Cardiology  Pager [phone]

## 2020-02-18 NOTE — NURSING NOTE
"Chief Complaint   Patient presents with     Atrial Fib     PAF . 6 month check, no symptom concerns per patient        Initial /78 (BP Location: Left arm, Patient Position: Sitting, Cuff Size: Adult Large)   Pulse 57   Wt 116.1 kg (256 lb)   SpO2 97%   BMI 40.10 kg/m   Estimated body mass index is 40.1 kg/m  as calculated from the following:    Height as of 1/8/20: 1.702 m (5' 7\").    Weight as of this encounter: 116.1 kg (256 lb)..  BP completed using cuff size: large    [facility]    "

## 2020-02-18 NOTE — LETTER
2/18/2020      RE: Danitza Soto  [address]  [facility] [address]       Dear Colleague,    Thank you for the opportunity to participate in the care of your patient, Danitza Soto, at the [facility] AT [facility]. Please see a copy of my visit note below.    Chief complaint: Palpitations    HPI: Ms. Danitza Soto is a 38 year old  female with PMH significant for depression, GERD, LEONARDO, essential hypertension, morbid obesity S/P gastric bypass 11/2018 and paroxysmal atrial fibrillation.    The patient was last seen in cardiology for paroxysmal atrial fibrillation diagnosis in 9/2018.  Her symptoms were mild at that time therefore rate or rhythm control strategy was not pursued.  The patient underwent gastric sleeve surgery in 11/2018 and has lost 70 pounds.  The patient noticed more frequent atrial fibrillation episodes since the surgery almost weekly now lasting up to 8-10 hours.  She finds works stress as a prominent trigger for episodes.  Associated symptoms include chest pressure, chest pain and dizziness.  She denies chest discomfort with exertion at other times.  The patient denies a history of dyspnea, PND, orthopnea, pedal edema, and syncope  Current medications include lisinopril 20 mg, fish oil.  She has cut down on lisinopril from 40 mg since she has lost significant weight.  She quit smoking in 2018 (10 pack years).  No history of alcohol abuse. No history of diabetes.  Family history is negative for CAD.  Prior cardiac tests include CT coronary angiogram in 2013 which did not show evidence of CAD.  Echocardiogram dated 9/2018 showed normal LV and RV function with no significant valve disease.  Moderate LVH was reported. I personnally reviewed the images and I donot agree with the LVH diagnosis. I donot think she has LVH based on echo. Zio patch in 8/2018 showed 4% atrial fibrillation burden heart rate  ranged  beats per minute.  Longest episode lasting for 8 hours.    I have reviewed her ECG 11/28/2018 which shows normal sinus rhythm with single PAC, normal FL/QRS/QTC intervals. No evidence of LVH on ECG.    Medications, personal, family, and social history reviewed with patient and revised.    Interval history 5/14/2019:  Patient returns for follow-up to recheck on paroxysmal atrial fibrillation.  Since I saw 3 months ago, patient developed depression and started on Effexor which improved her mood.  I have started her on flecainide and metoprolol 3 months ago for frequent paroxysmal atrial fibrillation episodes.  She noticed significant decline in the frequency of the episodes however she still reports at least 2 episodes per month sometimes lasting for several hours.  She reports associated dizziness however denies chest pain, shortness of breath, lower extremity edema or syncope.  Since gastric bypass patient lost significant weight with improvement in blood pressure and sleep apnea.  She no longer uses CPAP machine.  She is overall feeling well.    Interval history 8/14/2019:  The patient returns for follow-up to recheck on paroxysmal atrial fibrillation.  When I saw patient last time 3 months ago I increased dose of flecainide to 100 mg twice daily due to symptomatic palpitations.  The patient reports doing well during the first 2 months however had 3 episodes within the last month longest episode lasting for 3 hours with associated tiredness.  Denies chest pain, dyspnea on exertion, dizziness, syncope.  The patient has did a lot of research on atrial fibrillation ablation and she expressed her interest in undergoing procedure.    Interval history 2/18/2020:  Patient underwent catheter ablation of atrial fibrillation on 10/3/2019.  Patient feels great since the ablation.  No recurrence of A. fib since then.  She is currently on flecainide 100 mg twice daily, metoprolol 50 mg and apixaban 5 mg twice  daily.  Blood pressure is well controlled with lisinopril 20 mg.  Patient is currently asymptomatic from cardiac standpoint.  Denies chest pain, shortness of breath, palpitations, dizziness, lower extremity edema or syncope.    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 - BRISEYDA I     LEONARDO on CPAP      Other anxiety states        CURRENT MEDICATIONS:  Current Outpatient Medications   Medication Sig Dispense Refill     apixaban ANTICOAGULANT (ELIQUIS) 5 MG tablet Take 1 tablet (5 mg) by mouth 2 times daily 180 tablet 3     calcium carbonate (OS-CHIKI) 500 MG tablet Take 1 tablet by mouth daily        Cholecalciferol (VITAMIN D-3) 5000 units TABS Take 1 tablet by mouth daily       ferrous gluconate (FERGON) 324 (38 Fe) MG tablet Take 1 tablet (324 mg) by mouth daily (with breakfast) 30 tablet 3     fish oil-omega-3 fatty acids 1000 MG capsule Take 2 g by mouth every morning        flecainide (TAMBOCOR) 100 MG tablet Take 1 tablet (100 mg) by mouth 2 times daily 180 tablet 3     lisinopril (PRINIVIL/ZESTRIL) 20 MG tablet Take 1 tablet (20 mg) by mouth daily 90 tablet 1     metoprolol succinate ER (TOPROL-XL) 50 MG 24 hr tablet Take 1 tablet (50 mg) by mouth daily DO NOT CRUSH 90 tablet 3     Multiple Vitamins-Minerals (CENTRUM PO) 2 tablets per day       naltrexone (DEPADE/REVIA) 50 MG tablet Take 1 tablet (50 mg) by mouth daily 30 tablet 5     omeprazole (PRILOSEC) 40 MG DR capsule Take 1 capsule (40 mg) by mouth daily 60 capsule 5     Probiotic Product (PROBIOTIC ADVANCED PO)        topiramate (TOPAMAX) 25 MG tablet Take 1 tablet (25 mg) by mouth 2 times daily 60 tablet 5     venlafaxine (EFFEXOR-XR) 75  MG 24 hr capsule TAKE 1 CAPSULE(75 MG) BY MOUTH DAILY 90 capsule 0     VENTOLIN  (90 Base) MCG/ACT Inhaler INHALE 1-2 PUFFS EVERY 4-6 HOURS AS NEEDED FOR WHEEZING  0     vitamin B complex with vitamin C (VITAMIN  B COMPLEX) tablet Take 1 tablet by mouth daily       acetaminophen (TYLENOL) 325 MG tablet Take 2 tablets (650 mg) by mouth every 4 hours as needed for other (For optimal non-opioid multimodal pain management to improve pain control and physical function.) 100 tablet 0     lisinopril (PRINIVIL/ZESTRIL) 40 MG tablet TAKE 1 TABLET BY MOUTH DAILY (Patient not taking: No sig reported) 30 tablet 1     LORazepam (ATIVAN) 0.5 MG tablet Take 1 tablet (0.5 mg) by mouth every 8 hours as needed for anxiety (Patient not taking: Reported on 2020) 20 tablet 0       PAST SURGICAL HISTORY:  Past Surgical History:   Procedure Laterality Date     EP ABLATION FOCAL AFIB N/A 10/3/2019    Procedure: EP ABLATION FOCAL AFIB;  Surgeon: Harpreet Arevalo MD;  Location: [facility] OR     HC TOOTH EXTRACTION W/FORCEP      [place] Teeth Extraction     LAPAROSCOPIC GASTRIC SLEEVE N/A 2018    Procedure: Laparoscopic Sleeve Gastrectomy Latex Free;  Surgeon: Artis Tse MD;  Location: [facility] OR     LAPAROSCOPIC HERNIORRHAPHY HIATAL  2018    Procedure: LAPAROSCOPIC  HIATAL Hernia Repair;  Surgeon: Artis Tse MD;  Location: [facility] OR       ALLERGIES:     Allergies   Allergen Reactions     Wellbutrin [Bupropion]      Wellbutrin: Panic attacks, heart/chest pain       FAMILY HISTORY:  Family History   Problem Relation Age of Onset     Heart Disease Mother         ,mother had emphesema and  at 62     Hypertension Mother      Allergies Mother      Lipids Mother      Obesity Mother      Respiratory Mother         Emphysema     Diabetes Maternal Grandmother         Type 2?     Hypertension Maternal Grandmother      Circulatory Maternal Grandmother         Due to diabetes     Eye Disorder Maternal Grandmother          Macular degeneration/Macular degeneration     Obesity Maternal Grandmother      Hypertension Father      Lipids Father      Cancer Father      Prostate Cancer Father      Other Cancer Father      Cerebrovascular Disease Paternal Grandmother      Alcohol/Drug Maternal Grandfather      Obesity Maternal Grandfather      Psychotic Disorder Paternal Grandfather         Committed Suicide     Depression Paternal Grandfather      Allergies Sister      Genitourinary Problems Sister          SOCIAL HISTORY:  Social History     Tobacco Use     Smoking status: Former Smoker     Packs/day: 1.00     Years: 10.00     Pack years: 10.00     Types: Cigarettes     Start date: 2004     Last attempt to quit: 8/15/2018     Years since quittin.5     Smokeless tobacco: Never Used     Tobacco comment:  pack or less a day   Substance Use Topics     Alcohol use: Yes     Alcohol/week: 0.0 standard drinks     Comment: Occas.     Drug use: No     EP PROCEDURE NOTE 10/3/2019  1. Left Atrial Wide Area Circumferential radiofrequency Ablation.  2. Trans-septal Puncture x2  3. Intracardiac Echocardiography.  4. Invasive Hemodynamic Monitoring.  5. 3D electro-anatomic Mapping using Carto3.  6. Esophageal temperature monitoring.    Exam:  /78 (BP Location: Left arm, Patient Position: Sitting, Cuff Size: Adult Large)   Pulse 57   Wt 116.1 kg (256 lb)   SpO2 97%   BMI 40.10 kg/m     GENERAL APPEARANCE: alert and no distress  CARDIOVASCULAR: regular rhythm, normal S1, S2, no S3 or S4 and no murmur, click or rub, precordium quiet with normal PMI.  EXTREMITIES: no edema     I have reviewed the labs and personally reviewed the imaging below and made my comment in the assessment and plan.    Labs:  CBC RESULTS:   Lab Results   Component Value Date    WBC 7.3 2019    RBC 4.37 2019    HGB 11.8 2019    HCT 37.4 2019    MCV 86 2019    MCH 27.0 2019    MCHC 31.6 2019    RDW 14.4 2019      12/05/2019       BMP RESULTS:  Lab Results   Component Value Date     12/05/2019    POTASSIUM 3.8 12/05/2019    CHLORIDE 108 12/05/2019    CO2 28 12/05/2019    ANIONGAP 3 12/05/2019    GLC 80 12/05/2019    BUN 11 12/05/2019    CR 0.69 12/05/2019    GFRESTIMATED >90 12/05/2019    GFRESTBLACK >90 12/05/2019    CHIKI 8.8 12/05/2019        INR RESULTS:  Lab Results   Component Value Date    INR 0.98 11/06/2018       Echocardiogram 9/26/2018  Global and regional left ventricular function is normal with an EF of 60-65%.  Moderate concentric wall thickening consistent with left ventricular  hypertrophy is present.  Right ventricular function, chamber size, wall motion, and thickness are  normal.  Mild biatrial enlargement is present.  No significant valve abnormalities present.  The inferior vena cava was normal in size    Nuclear stress test with Lexiscan 9/6/2018  Normal myocardial SPECT study with a summed stress score of 0.     CT coronary artery angiogram 6/27/2013  No evidence of coronary artery disease    EKG 11/28/2018 normal.    Assessment and Plan:   Ms. Danitza Soto is a 38 year old  female with PMH significant for depression, GERD, LEONARDO, essential hypertension, morbid obesity S/P gastric bypass 11/2018 and paroxysmal atrial fibrillation refractory to medical treatment status post PVI on 10/3/2019.    1.  Paroxysmal atrial fibrillation: The patient underwent PVI on 10/3/2019.  Reports feeling great since then.  No recurrence of atrial fibrillation.  Patient is in sinus rhythm in clinic today.  Recommended to discontinue flecainide and apixaban (patient's  CHADSVASC score is 2 based on hypertension and female gender).  I will decrease the dose of metoprolol from 50 to 25 mg today.      2.  Hypertension: Well-controlled with current medical treatment.      3.  Obstructive sleep apnea: Resolved after gastric sleeve surgery.      Return to clinic 6 months.    A total of 30 minutes spent face-toface with  greater than 50% of the time spent in counseling and coordinating cares of the issues above.     Please donot hesitate to contact me if you have any questions or concerns. Again, thank you for allowing me to participate in the care of your patient.    Leena RONDON MD  [facility] Division of Cardiology  Pager [phone]

## 2020-03-04 DIAGNOSIS — Z86.2 HISTORY OF ANEMIA: ICD-10-CM

## 2020-03-04 NOTE — TELEPHONE ENCOUNTER
"Ferrous sulfate  Last Written Prescription Date:  10/8/19  Last Fill Quantity: 30,  # refills: 3   Last office visit: 11/1/2019 with prescribing provider:     Future Office Visit:    Requested Prescriptions   Pending Prescriptions Disp Refills     ferrous gluconate (FERGON) 324 (38 Fe) MG tablet [Pharmacy Med Name: FERROUS GLUC 324MG TABLETS] 30 tablet 3     Sig: TAKE 1 TABLET(324 MG) BY MOUTH DAILY WITH BREAKFAST       Iron Supplements Passed - 3/4/2020  3:06 AM        Passed - Patient is 12 years of age or older        Passed - Recent (12 mo) or future (30 days) visit within the authorizing provider's specialty     Patient has had an office visit with the authorizing provider or a provider within the authorizing providers department within the previous 12 mos or has a future within next 30 days. See \"Patient Info\" tab in inbasket, or \"Choose Columns\" in Meds & Orders section of the refill encounter.              Passed - Hgb OR Hct on record within the past 12 mos.     Patient need only have had a HGB or HCT on file in the past 12 mos. That result does not need to be normal.    Recent Labs   Lab Test 12/05/19  1021 10/03/19  0608 03/07/19  1251   HGB 11.8 12.9 13.6     Recent Labs   Lab Test 12/05/19  1021 10/03/19  0608 03/07/19  1251   HCT 37.4 41.4 44.1       Please verify a HGB or HCT has been checked SINCE THE LAST DOSE CHANGE.            Passed - Medication is active on med list          "

## 2020-03-05 RX ORDER — FERROUS GLUCONATE 324(38)MG
TABLET ORAL
Qty: 30 TABLET | Refills: 3 | Status: SHIPPED | OUTPATIENT
Start: 2020-03-05 | End: 2021-04-19 | Stop reason: ALTCHOICE

## 2020-03-05 NOTE — TELEPHONE ENCOUNTER
Prescription approved per [facility] Refill Protocol.  Ami Garcia RN

## 2020-03-13 ENCOUNTER — MYC MEDICAL ADVICE (OUTPATIENT)
Dept: CARDIOLOGY | Facility: CLINIC | Age: 40
End: 2020-03-13

## 2020-03-13 DIAGNOSIS — I48.0 PAROXYSMAL ATRIAL FIBRILLATION (H): ICD-10-CM

## 2020-03-20 NOTE — TELEPHONE ENCOUNTER
Dr. Heath,    Please review [employer] message and advise.  Pt is currently taking lisinopril 20 mg daily and metoprolol 25 mg daily.    Hannah Conte RN

## 2020-03-24 RX ORDER — METOPROLOL SUCCINATE 50 MG/1
50 TABLET, EXTENDED RELEASE ORAL DAILY
Qty: 90 TABLET | Refills: 3 | Status: SHIPPED | OUTPATIENT
Start: 2020-03-24 | End: 2020-09-18

## 2020-04-02 ENCOUNTER — MYC MEDICAL ADVICE (OUTPATIENT)
Dept: FAMILY MEDICINE | Facility: CLINIC | Age: 40
End: 2020-04-02

## 2020-04-03 ENCOUNTER — VIRTUAL VISIT (OUTPATIENT)
Dept: FAMILY MEDICINE | Facility: CLINIC | Age: 40
End: 2020-04-03
Payer: COMMERCIAL

## 2020-04-03 DIAGNOSIS — I10 ESSENTIAL HYPERTENSION: Primary | ICD-10-CM

## 2020-04-03 PROCEDURE — 99213 OFFICE O/P EST LOW 20 MIN: CPT | Mod: TEL | Performed by: FAMILY MEDICINE

## 2020-04-03 RX ORDER — LISINOPRIL 40 MG/1
40 TABLET ORAL DAILY
Qty: 30 TABLET | Refills: 3 | Status: SHIPPED | OUTPATIENT
Start: 2020-04-03 | End: 2020-09-02

## 2020-04-03 NOTE — PROGRESS NOTES
"Subjective     Danitza Soto is a 39 year old female who is being evaluated via a billable telephone visit.      The patient has been notified of following:     \"This telephone visit will be conducted via a call between you and your physician/provider. We have found that certain health care needs can be provided without the need for a physical exam.  This service lets us provide the care you need with a short phone conversation.  If a prescription is necessary we can send it directly to your pharmacy.  If lab work is needed we can place an order for that and you can then stop by our lab to have the test done at a later time.    If during the course of the call the physician/provider feels a telephone visit is not appropriate, you will not be charged for this service.\"     Patient has given verbal consent for Telephone visit?  Yes    Danitza Soto complains of   Chief Complaint   Patient presents with     Hypertension       ALLERGIES  Wellbutrin [bupropion]    Hypertension Follow-up- she saw her cardiologist in February and they stopped the flecainide and apixaban as she has been in sinus rhythm after doing ablation procedure . Also they initially dectreased her metoprolol to 25 mg but since her BP started to go high in the 140s over 90s she talked to them about going back to the 50 mg dose and they changed it back to 50 mg XL metoprolol or Toprol , this was 10 days ago and she has been checking her BP since and it is still elevated the systolic is in the 140s       Do you check your blood pressure regularly outside of the clinic? Yes     Are you following a low salt diet? No    Are your blood pressures ever more than 140 on the top number (systolic) OR more   than 90 on the bottom number (diastolic), for example 140/90? Yes      How many servings of fruits and vegetables do you eat daily?  2-3    On average, how many sweetened beverages do you drink each day (Examples: soda, juice, sweet tea, etc.  Do NOT " count diet or artificially sweetened beverages)?   0    How many days per week do you exercise enough to make your heart beat faster? 5    How many minutes a day do you exercise enough to make your heart beat faster? 30 - 60    How many days per week do you miss taking your medication? 0                 Patient Active Problem List   Diagnosis     Anxiety state     Gastroesophageal reflux disease without esophagitis     Morbid obesity due to excess calories (H)     Tobacco use disorder     CARDIOVASCULAR SCREENING; LDL GOAL LESS THAN 130     Hypertension goal BP (blood pressure) < 140/90     Acne     Papanicolaou smear of cervix with low grade squamous intraepithelial lesion (LGSIL)     Mixed hyperlipidemia     LEONARDO (obstructive sleep apnea)     Obesity hypoventilation syndrome (H)     Paroxysmal atrial fibrillation (H)     Morbid (severe) obesity due to excess calories (H)     Recurrent major depressive disorder, in full remission (H)     S/P ablation of atrial fibrillation     Past Surgical History:   Procedure Laterality Date     EP ABLATION FOCAL AFIB N/A 10/3/2019    Procedure: EP ABLATION FOCAL AFIB;  Surgeon: Harpreet Arevalo MD;  Location:  OR     HC TOOTH EXTRACTION /FORCE1995    [facility] Teeth Extraction     LAPAROSCOPIC GASTRIC SLEEVE N/A 2018    Procedure: Laparoscopic Sleeve Gastrectomy Latex Free;  Surgeon: Artis Tse MD;  Location:  OR     LAPAROSCOPIC HERNIORRHAPHY HIATAL  2018    Procedure: LAPAROSCOPIC  HIATAL Hernia Repair;  Surgeon: Artis Tse MD;  Location:  OR       Social History     Tobacco Use     Smoking status: Former Smoker     Packs/day: 1.00     Years: 10.00     Pack years: 10.00     Types: Cigarettes     Start date: 2004     Last attempt to quit: 8/15/2018     Years since quittin.6     Smokeless tobacco: Never Used     Tobacco comment:  pack or less a day   Substance Use Topics     Alcohol use: Yes     Alcohol/week: 0.0 standard drinks      Comment: Occas.     Family History   Problem Relation Age of Onset     Heart Disease Mother         ,mother had emphesema and  at 62     Hypertension Mother      Allergies Mother      Lipids Mother      Obesity Mother      Respiratory Mother         Emphysema     Diabetes Maternal Grandmother         Type 2?     Hypertension Maternal Grandmother      Circulatory Maternal Grandmother         Due to diabetes     Eye Disorder Maternal Grandmother         Macular degeneration/Macular degeneration     Obesity Maternal Grandmother      Hypertension Father      Lipids Father      Cancer Father      Prostate Cancer Father      Other Cancer Father      Cerebrovascular Disease Paternal Grandmother      Alcohol/Drug Maternal Grandfather      Obesity Maternal Grandfather      Psychotic Disorder Paternal Grandfather         Committed Suicide     Depression Paternal Grandfather      Allergies Sister      Genitourinary Problems Sister          Current Outpatient Medications   Medication Sig Dispense Refill     acetaminophen (TYLENOL) 325 MG tablet Take 2 tablets (650 mg) by mouth every 4 hours as needed for other (For optimal non-opioid multimodal pain management to improve pain control and physical function.) 100 tablet 0     calcium carbonate (OS-CHIKI) 500 MG tablet Take 1 tablet by mouth daily        Cholecalciferol (VITAMIN D-3) 5000 units TABS Take 1 tablet by mouth daily       ferrous gluconate (FERGON) 324 (38 Fe) MG tablet TAKE 1 TABLET(324 MG) BY MOUTH DAILY WITH BREAKFAST 30 tablet 3     fish oil-omega-3 fatty acids 1000 MG capsule Take 2 g by mouth every morning        lisinopril (PRINIVIL/ZESTRIL) 20 MG tablet Take 1 tablet (20 mg) by mouth daily 90 tablet 1     LORazepam (ATIVAN) 0.5 MG tablet Take 1 tablet (0.5 mg) by mouth every 8 hours as needed for anxiety 20 tablet 0     metoprolol succinate ER (TOPROL-XL) 50 MG 24 hr tablet Take 1 tablet (50 mg) by mouth daily DO NOT CRUSH 90 tablet 3     Multiple  Vitamins-Minerals (CENTRUM PO) 2 tablets per day       naltrexone (DEPADE/REVIA) 50 MG tablet Take 1 tablet (50 mg) by mouth daily 30 tablet 5     omeprazole (PRILOSEC) 40 MG DR capsule Take 1 capsule (40 mg) by mouth daily 60 capsule 5     Probiotic Product (PROBIOTIC ADVANCED PO)        topiramate (TOPAMAX) 25 MG tablet Take 1 tablet (25 mg) by mouth 2 times daily 60 tablet 5     venlafaxine (EFFEXOR-XR) 75 MG 24 hr capsule TAKE 1 CAPSULE(75 MG) BY MOUTH DAILY 90 capsule 0     VENTOLIN  (90 Base) MCG/ACT Inhaler INHALE 1-2 PUFFS EVERY 4-6 HOURS AS NEEDED FOR WHEEZING  0     vitamin B complex with vitamin C (VITAMIN  B COMPLEX) tablet Take 1 tablet by mouth daily       Allergies   Allergen Reactions     Wellbutrin [Bupropion]      Wellbutrin: Panic attacks, heart/chest pain     Recent Labs   Lab Test 12/05/19  1021 10/03/19  0608 03/07/19  1251  09/24/18  1452 07/24/18  1023  03/21/17  0926 08/24/16  0915 01/13/16  1138  10/08/13  0808   A1C 5.4  --   --   --   --  6.1*  --   --   --   --   --  5.4   LDL  --   --   --   --   --   --   --  155* 174* 162*  --  149*   HDL  --   --   --   --   --   --   --  30* 29* 33*  --  33*   TRIG  --   --   --   --   --   --   --  203* 231* 207*  --  179*   ALT 21  --  18  --   --  22   < > 24  --  26  --  26   CR 0.69 0.65 0.66   < >  --  0.63   < > 0.62 0.61 0.59   < > 0.60   GFRESTIMATED >90 >90 >90   < >  --  >90   < > >90  Non  GFR Calc   >90  Non  GFR Calc   >90  Non  GFR Calc     < > >90   GFRESTBLACK >90 >90 >90   < >  --  >90   < > >90   GFR Calc   >90   GFR Calc   >90   GFR Calc     < > >90   POTASSIUM 3.8 4.0 3.9   < >  --  3.7   < > 4.0 3.9 4.0   < > 3.8   TSH  --   --   --   --  1.48  --   --   --   --  1.79   < >  --     < > = values in this interval not displayed.      BP Readings from Last 3 Encounters:   02/18/20 116/78   01/08/20 118/76   12/05/19 128/75    Wt  Readings from Last 3 Encounters:   02/18/20 116.1 kg (256 lb)   01/08/20 112.5 kg (248 lb)   12/05/19 115.2 kg (254 lb)                    Reviewed and updated as needed this visit by Provider         Review of Systems   ROS COMP: Constitutional, HEENT, cardiovascular, pulmonary, gi and gu systems are negative, except as otherwise noted.       Objective   Reported vitals:  There were no vitals taken for this visit.   healthy, alert and no distress  Psych: Alert and oriented times 3; coherent speech, normal   rate and volume, able to articulate logical thoughts, able   to abstract reason, no tangential thoughts, no hallucinations   or delusions  Her affect is normal      Diagnostic Test Results:  Labs reviewed in Epic  none         Assessment/Plan:  1. Essential hypertension  We discussed increasing the dose of the lisinopril to 40 mg and take this in the am ( she was on the 20  Mg so far ) , and continue with the metoprolol 50 mg in the pm , her heart rate is in the 60s on this dose . Also she will come in the future ( three to four weeks ) for a f/u , meanwhile she can do labs not earlier than in 2 weeks but could be in 4 weeks when she comes for an OV.  - lisinopril (ZESTRIL) 40 MG tablet; Take 1 tablet (40 mg) by mouth daily  Dispense: 30 tablet; Refill: 3  - Basic metabolic panel  (Ca, Cl, CO2, Creat, Gluc, K, Na, BUN); Future    F/u in 3 to 4 weeks sooner if BP is still elevated .  RTC if no improving or worsening.  Pt is aware  and comfortable with the current plan.        Phone call duration:  6  minutes    Polly Mcbride MD

## 2020-06-06 ENCOUNTER — TELEPHONE (OUTPATIENT)
Dept: SURGERY | Facility: CLINIC | Age: 40
End: 2020-06-06

## 2020-06-06 DIAGNOSIS — E66.01 MORBID OBESITY DUE TO EXCESS CALORIES (H): ICD-10-CM

## 2020-06-07 ENCOUNTER — MYC REFILL (OUTPATIENT)
Dept: FAMILY MEDICINE | Facility: CLINIC | Age: 40
End: 2020-06-07

## 2020-06-07 DIAGNOSIS — F33.42 RECURRENT MAJOR DEPRESSIVE DISORDER, IN FULL REMISSION (H): ICD-10-CM

## 2020-06-07 RX ORDER — VENLAFAXINE HYDROCHLORIDE 75 MG/1
75 CAPSULE, EXTENDED RELEASE ORAL DAILY
Qty: 30 CAPSULE | Refills: 0 | Status: CANCELLED | OUTPATIENT
Start: 2020-06-07

## 2020-06-10 RX ORDER — NALTREXONE HYDROCHLORIDE 50 MG/1
TABLET, FILM COATED ORAL
Qty: 30 TABLET | Refills: 5 | OUTPATIENT
Start: 2020-06-10

## 2020-06-10 NOTE — TELEPHONE ENCOUNTER
[employer] message sent to patient on 6/8/2020 to schedule virtual visit. Waiting to refill until appointment is made.

## 2020-06-10 NOTE — TELEPHONE ENCOUNTER
NALTREXONE 50MG TABLETS   Last Written Prescription Date:  12/5/2019  Last Fill Quantity: 30,   # refills: 5  Last Office Visit : 12/5/2019  Future Office visit  Return to clinic in 3-4 months to see Sandy and dietitian for return medical weight management (closer f/u needed due to recent weight gain)    Routing refill request to provider for review/approval because:  Received refill request for Naltrexone . Patient needs appointment scheduled prior to any refills. Clinic Coordinator notified and will follow up with the patient as appropriate. The pharmacy has been notified that the medication will not be refilled prior to an appointment being scheduled.

## 2020-06-11 RX ORDER — VENLAFAXINE HYDROCHLORIDE 75 MG/1
CAPSULE, EXTENDED RELEASE ORAL
Qty: 30 CAPSULE | Refills: 0 | Status: SHIPPED | OUTPATIENT
Start: 2020-06-11 | End: 2020-07-14

## 2020-06-13 DIAGNOSIS — E66.01 MORBID OBESITY (H): ICD-10-CM

## 2020-06-16 DIAGNOSIS — E66.01 MORBID OBESITY (H): ICD-10-CM

## 2020-06-16 DIAGNOSIS — E66.01 MORBID OBESITY DUE TO EXCESS CALORIES (H): ICD-10-CM

## 2020-06-16 RX ORDER — NALTREXONE HYDROCHLORIDE 50 MG/1
50 TABLET, FILM COATED ORAL DAILY
Qty: 30 TABLET | Refills: 0 | Status: SHIPPED | OUTPATIENT
Start: 2020-06-16 | End: 2020-07-02

## 2020-06-16 RX ORDER — TOPIRAMATE 25 MG/1
TABLET, FILM COATED ORAL
Qty: 180 TABLET | OUTPATIENT
Start: 2020-06-16

## 2020-06-16 RX ORDER — TOPIRAMATE 25 MG/1
25 TABLET, FILM COATED ORAL 2 TIMES DAILY
Qty: 60 TABLET | Refills: 0 | Status: SHIPPED | OUTPATIENT
Start: 2020-06-16 | End: 2020-07-02

## 2020-06-16 RX ORDER — TOPIRAMATE 25 MG/1
TABLET, FILM COATED ORAL
Qty: 60 TABLET | Refills: 5 | OUTPATIENT
Start: 2020-06-16

## 2020-06-16 NOTE — TELEPHONE ENCOUNTER
Appointment made with Sandy Cervantes PA-C on 7/2/2020. Sending 1 month supply of Topiramate to pharmacy.

## 2020-06-16 NOTE — TELEPHONE ENCOUNTER
Second request from pharmacy for Topiramate. Patient needs follow up appointment. [employer] message sent to patient x2.

## 2020-07-02 ENCOUNTER — VIRTUAL VISIT (OUTPATIENT)
Dept: SURGERY | Facility: CLINIC | Age: 40
End: 2020-07-02
Payer: COMMERCIAL

## 2020-07-02 VITALS — BODY MASS INDEX: 40.65 KG/M2 | HEIGHT: 67 IN | WEIGHT: 259 LBS

## 2020-07-02 DIAGNOSIS — E66.01 MORBID OBESITY (H): ICD-10-CM

## 2020-07-02 DIAGNOSIS — E66.01 MORBID OBESITY DUE TO EXCESS CALORIES (H): ICD-10-CM

## 2020-07-02 RX ORDER — NALTREXONE HYDROCHLORIDE 50 MG/1
50 TABLET, FILM COATED ORAL DAILY
Qty: 30 TABLET | Refills: 5 | Status: SHIPPED | OUTPATIENT
Start: 2020-07-02 | End: 2021-01-26

## 2020-07-02 RX ORDER — TOPIRAMATE 25 MG/1
25 TABLET, FILM COATED ORAL 2 TIMES DAILY
Qty: 60 TABLET | Refills: 5 | Status: SHIPPED | OUTPATIENT
Start: 2020-07-02 | End: 2021-01-26

## 2020-07-02 ASSESSMENT — MIFFLIN-ST. JEOR: SCORE: 1877.45

## 2020-07-02 NOTE — NURSING NOTE
"Chief Complaint   Patient presents with     RECHECK     F/U; Hx of Gastric Sleeve 2018       Vitals:    07/02/20 0839   Weight: 117.5 kg (259 lb)   Height: 1.702 m (5' 7\")       Body mass index is 40.57 kg/m .      Katelyn Chen LPN                          "

## 2020-07-02 NOTE — PROGRESS NOTES
"Danitza Soto is a 39 year old female who is being evaluated via a billable video visit.      The patient has been notified of following:     \"This video visit will be conducted via a call between you and your physician/provider. We have found that certain health care needs can be provided without the need for an in-person physical exam.  This service lets us provide the care you need with a video conversation.  If a prescription is necessary we can send it directly to your pharmacy.  If lab work is needed we can place an order for that and you can then stop by our lab to have the test done at a later time.    Video visits are billed at different rates depending on your insurance coverage.  Please reach out to your insurance provider with any questions.    If during the course of the call the physician/provider feels a video visit is not appropriate, you will not be charged for this service.\"    Patient has given verbal consent for Video visit? Yes  How would you like to obtain your AVS? MyChart  Patient would like the video invitation sent by: Send to e-mail at: [email or web address]  Will anyone else be joining your video visit? No      Return Bariatric Surgery Note    RE: Danitza Soto  MR#: [id number]  : 1980  VISIT DATE: 2020    Dear Polly Mcbride,    I had the pleasure of seeing your patient, Danitza Soto, in my post-bariatric surgery assessment clinic.    CHIEF COMPLAINT: Post-bariatric surgery follow-up    HISTORY OF PRESENT ILLNESS:  Questions Regarding Prior Weight Loss Surgery Reviewed With Patient 2019   I had the following weight loss procedure: Sleeve Gastrectomy   What year was your surgery? 2018   How has your weight changed since your last visit? I have gained weight   Are you currently taking any weight loss medications? Yes   Do you currently have any of the following: Sleep Apnea, Heartburn, acid reflux, or GERD (acid reflux disease)?   Have you been to the Emergency " room since your last visit with us? No   Were you in the hospital since your last visit with us? Yes   Do you have any concerns today? weight gain     Doing well and feeling good but some weight gain from lowest weight.  30 lbs up from lowest weight.    Very active.    Weight stable since last visit  Taking naltrexone, was off and was eating more carbs.  Now taking again and helping  Taking topiramate 25mg BID, higher dose cause brain fog,   Still taking omeprazole 40mg in the am and 20 mg in the evening which controls mostly      Weight History:     12/5/2019   What is your highest lifetime weight? 310   What is your lowest weight since surgery? (In pounds) 228     Initial Weight (lbs): 308 lbs  Weight: 117.5 kg (259 lb)(Pt reported.)     Cumulative weight loss (lbs): 49  Weight Loss Percentage: 15.91%    Questions Regarding Co-Morbidities and Health Concerns Reviewed With Patient 12/5/2019   Pre-diabetes: Improved   Diabetes II: Never   High Blood Pressure: Improved   High cholesterol: Improved   Heartburn/Reflux: Stayed the same   Are you taking daily medication for heartburn, acid reflux, or GERD (acid reflux disease)? Yes   Sleep apnea: Improved   Do you use a CPAP? Yes   PCOS: Never   Back pain: Stayed the same   Joint pain: Improved   Lower leg swelling: Never       Eating Habits 12/5/2019   How many meals do you eat per day? 3   Do you snack between meals? Sometimes   How much food are you eating at each meal? 1/2 cup to 1 cup   Are you able to separate your meals and liquids by at least 30 minutes? No   Are you able to avoid liquid calories? Yes       Exercise Questions Reviewed With Patient 12/5/2019   How often do you exercise? 3 to 4 times per week   What is the duration of your exercise (in minutes)? 30 Minutes   What types of exercise do you do? walking, climbing stairs at work   What keeps you from being more active?  I should be more active but I just have not gotten around to it, Lack of Time  "      Social History:      12/5/2019   Are you smoking? No   Are you drinking alcohol? No       Medications:  Current Outpatient Medications   Medication     acetaminophen (TYLENOL) 325 MG tablet     calcium carbonate (OS-CHIKI) 500 MG tablet     Cholecalciferol (VITAMIN D-3) 5000 units TABS     ferrous gluconate (FERGON) 324 (38 Fe) MG tablet     fish oil-omega-3 fatty acids 1000 MG capsule     lisinopril (ZESTRIL) 40 MG tablet     LORazepam (ATIVAN) 0.5 MG tablet     metoprolol succinate ER (TOPROL-XL) 50 MG 24 hr tablet     Multiple Vitamins-Minerals (CENTRUM PO)     naltrexone (DEPADE/REVIA) 50 MG tablet     omeprazole (PRILOSEC) 40 MG DR capsule     topiramate (TOPAMAX) 25 MG tablet     venlafaxine (EFFEXOR-XR) 75 MG 24 hr capsule     vitamin B complex with vitamin C (VITAMIN  B COMPLEX) tablet     Probiotic Product (PROBIOTIC ADVANCED PO)     VENTOLIN  (90 Base) MCG/ACT Inhaler     No current facility-administered medications for this visit.          12/5/2019   Do you avoid NSAIDs such as (Ibuprofen, Aleve, Naproxen, Advil)?   Yes       ROS:  GI:      12/5/2019   Vomiting: No   Diarrhea: No   Constipation: No   Swallowing trouble: No   Abdominal pain: Yes   Heartburn: Yes   Rash in skin folds: No   Depression: No   Stress urinary incontinence No     Skin:   BAR RBS ROS - SKIN 12/5/2019   Rash in skin folds: No     Psych:      12/5/2019   Depression: No   Anxiety: No     Female Only:   BAR RBS ROS - FEMALE ONLY 12/5/2019   Female only: Regular menstrual cycles       LABS/IMAGING/MEDICAL RECORDS REVIEW:     PHYSICAL EXAMINATION:  Ht 1.702 m (5' 7\")   Wt 117.5 kg (259 lb)   BMI 40.57 kg/m         ASSESSMENT AND PLAN:      1. 18 months status laparoscopic gastric sleeve with some weight regain.   2. Morbid Obesity current BMI: Body mass index is 40.57 kg/m .  3. Post surgical malabsorption:   Labs ordered per protocol.   Follow food plan per dietitian recommendations.   Continue taking recommended post-op " vitamins.  4. Return to clinic in 6 months with me and RD for 2 year follow up sleeve with labs. Dec 2020  5. Continue topiramate 25mg BID and naltrexone 50mg      Sincerely,    Sandy Cervantes PA-C        Video-Visit Details    Type of service:  Video Visit    Video Start Time: 905  Video End Time: 915    Originating Location (pt. Location): Home    Distant Location (provider location):   [facility]     Platform used for Video Visit: [employer]    Sandy Cervantes PA-C

## 2020-07-02 NOTE — LETTER
2020       RE: Danitza Soto  [address]  [facility] [address]     Dear Colleague,    Thank you for referring your patient, Danitza Soto, to the [facility] SURG[facility] WEIGHT MANA[facility]. Please see a copy of my visit note below.    Danitza Soto is a 39 year old female who is being evaluated via a billable video visit.          Return Bariatric Surgery Note    RE: Danitza Soto  MR#: [id number]  : 1980  VISIT DATE: 2020    Dear Polly Mcbride,    I had the pleasure of seeing your patient, Danitza Soto, in my post-bariatric surgery assessment clinic.    CHIEF COMPLAINT: Post-bariatric surgery follow-up    HISTORY OF PRESENT ILLNESS:  Questions Regarding Prior Weight Loss Surgery Reviewed With Patient 2019   I had the following weight loss procedure: Sleeve Gastrectomy   What year was your surgery? 2018   How has your weight changed since your last visit? I have gained weight   Are you currently taking any weight loss medications? Yes   Do you currently have any of the following: Sleep Apnea, Heartburn, acid reflux, or GERD (acid reflux disease)?   Have you been to the Emergency room since your last visit with us? No   Were you in the hospital since your last visit with us? Yes   Do you have any concerns today? weight gain     Doing well and feeling good but some weight gain from lowest weight.  30 lbs up from lowest weight.    Very active.    Weight stable since last visit  Taking naltrexone, was off and was eating more carbs.  Now taking again and helping  Taking topiramate 25mg BID, higher dose cause brain fog,   Still taking omeprazole 40mg in the am and 20 mg in the evening which controls mostly      Weight History:     2019   What is your highest lifetime weight? 310   What is your lowest weight since surgery? (In pounds) 228     Initial Weight (lbs): 308 lbs  Weight: 117.5 kg (259 lb)(Pt reported.)      Cumulative weight loss (lbs): 49  Weight Loss Percentage: 15.91%    Questions Regarding Co-Morbidities and Health Concerns Reviewed With Patient 12/5/2019   Pre-diabetes: Improved   Diabetes II: Never   High Blood Pressure: Improved   High cholesterol: Improved   Heartburn/Reflux: Stayed the same   Are you taking daily medication for heartburn, acid reflux, or GERD (acid reflux disease)? Yes   Sleep apnea: Improved   Do you use a CPAP? Yes   PCOS: Never   Back pain: Stayed the same   Joint pain: Improved   Lower leg swelling: Never       Eating Habits 12/5/2019   How many meals do you eat per day? 3   Do you snack between meals? Sometimes   How much food are you eating at each meal? 1/2 cup to 1 cup   Are you able to separate your meals and liquids by at least 30 minutes? No   Are you able to avoid liquid calories? Yes       Exercise Questions Reviewed With Patient 12/5/2019   How often do you exercise? 3 to 4 times per week   What is the duration of your exercise (in minutes)? 30 Minutes   What types of exercise do you do? walking, climbing stairs at work   What keeps you from being more active?  I should be more active but I just have not gotten around to it, Lack of Time       Social History:      12/5/2019   Are you smoking? No   Are you drinking alcohol? No       Medications:  Current Outpatient Medications   Medication     acetaminophen (TYLENOL) 325 MG tablet     calcium carbonate (OS-CHIKI) 500 MG tablet     Cholecalciferol (VITAMIN D-3) 5000 units TABS     ferrous gluconate (FERGON) 324 (38 Fe) MG tablet     fish oil-omega-3 fatty acids 1000 MG capsule     lisinopril (ZESTRIL) 40 MG tablet     LORazepam (ATIVAN) 0.5 MG tablet     metoprolol succinate ER (TOPROL-XL) 50 MG 24 hr tablet     Multiple Vitamins-Minerals (CENTRUM PO)     naltrexone (DEPADE/REVIA) 50 MG tablet     omeprazole (PRILOSEC) 40 MG DR capsule     topiramate (TOPAMAX) 25 MG tablet     venlafaxine (EFFEXOR-XR) 75 MG 24 hr capsule      "vitamin B complex with vitamin C (VITAMIN  B COMPLEX) tablet     Probiotic Product (PROBIOTIC ADVANCED PO)     VENTOLIN  (90 Base) MCG/ACT Inhaler     No current facility-administered medications for this visit.          12/5/2019   Do you avoid NSAIDs such as (Ibuprofen, Aleve, Naproxen, Advil)?   Yes       ROS:  GI:      12/5/2019   Vomiting: No   Diarrhea: No   Constipation: No   Swallowing trouble: No   Abdominal pain: Yes   Heartburn: Yes   Rash in skin folds: No   Depression: No   Stress urinary incontinence No     Skin:   BAR RBS ROS - SKIN 12/5/2019   Rash in skin folds: No     Psych:      12/5/2019   Depression: No   Anxiety: No     Female Only:   BAR RBS ROS - FEMALE ONLY 12/5/2019   Female only: Regular menstrual cycles       LABS/IMAGING/MEDICAL RECORDS REVIEW:     PHYSICAL EXAMINATION:  Ht 1.702 m (5' 7\")   Wt 117.5 kg (259 lb)   BMI 40.57 kg/m         ASSESSMENT AND PLAN:      1. 18 months status laparoscopic gastric sleeve with some weight regain.   2. Morbid Obesity current BMI: Body mass index is 40.57 kg/m .  3. Post surgical malabsorption:   Labs ordered per protocol.   Follow food plan per dietitian recommendations.   Continue taking recommended post-op vitamins.  4. Return to clinic in 6 months with me and RD for 2 year follow up sleeve with labs. Dec 2020  5. Continue topiramate 25mg BID and naltrexone 50mg      Sincerely,    Sandy Cervantes PA-C        Video-Visit Details    Type of service:  Video Visit    Video Start Time: 905  Video End Time: 915    Originating Location (pt. Location): Home    Distant Location (provider location):  [facility] SURGICAL WEIGHT MANAGEMENT     Platform used for Video Visit: Elida"

## 2020-07-02 NOTE — PATIENT INSTRUCTIONS
"Thank you for allowing us the privilege of caring for you. We hope we provided you with the excellent service you deserve.   Please let us know if there is anything else we can do for you so that we can be sure you are completely satisfied with your care experience.    To ensure the quality of our services you may be receiving a patient satisfaction survey from an independent patient satisfaction monitoring company.    The greatest compliment you can give is a \"Likely to Recommend\"    Your visit was with Sandy Cervantes PA-C today.    Instructions per today's visit:     Return to clinic in 6 months with me and RD for 2 year follow up sleeve with labs. Dec 2020    Continue topiramate 25mg BID and naltrexone 50mg    Consider support group below.      Please call our contact center at [phone] to schedule your next appointments.  For any nursing questions or concerns call Jolene Peters LPN at [phone] or Marlene Syed RN at [phone]  Please call during clinic hours Monday through Friday 8:00a - 4:00p if you have questions or you can contact us via Planet8 at anytime and we will reply during clinic hours.    Lab results will be communicated through My Chart or letter (if My Chart not used). Please call the clinic if you have not received communication after 1 week or if you have any questions.?  Clinic Fax: [phone]  ______________________________________________________________________________________________________________________  Meal Replacement Products:    Here is the link to our new e-store where you can purchase our meal replacement products    [facility] [email or web address]    The one week starter kit is a great way to sample a variety of products and see what works for you.    If you want more information about the product go to: Fresh Steps Meals  [email or web address]    Free Shipping for orders over $75     Benefits of meal replacements products:    Portion and " calorie control  Improved nutrition  Structured eating  Simplified food choices  Avoid contact with trigger foods  _________________________________________________________________________________________________________________________________  Interested in working with a health ?  Health coaches work with you to improve your overall health and wellbeing.  They look at the whole person, and may involve discussion of different areas of life, including, but not limited to the four pillars of health (sleep, exercise, nutrition, and stress management). Discuss with your care team if you would like to start working a health .  Health Coaching-3 Pack: Schedule by calling [phone]    $99 for three health coaching visits    Visits may be done in person or via phone    Coaching is a partnership between the  and the client; Coaches do not prescribe or diagnose    Coaching helps inspire the client to reach his/her personal goals   _________________________________________________________________________________________________________________________________  Healthy Lifestyle Support Group    [facility]  Virtual Support Group Now Available    60 minutes of small group guided discussion, support and resources    Led by Health , Jennifer Lala    For questions, and to receive the invite information, contact Jennifer at [email or web address]    All our welcome!    One Friday per month, 12:30pm to 1:30pm  JOURNALING FOR HEALTH & HAPPINESS: June 26th  SELF COMPASSION: July 31st  CREATING MY WELLNESS VISION: August 28th  MINDFULNESS PRACTICES FOR A CALM BODY & MIND: September 25th  MINDFUL EATING TOOLS: October 30th  HEALTHY& HAPPY HOLIDAYS: November 20th  OPEN FORUM: December 18th  _______________________________________________________________________________________________________________________________  Connections: Bariatric Care support group  Connections: Bariatric Care is  a monthly support group for people who are interested in having bariatric surgery and those who have already had surgery and are at any point along their journey.  The group meets the second Tuesday of each month from 6:30 to 8 p.m. at [facility] in the [address] [place] [address] (A-B room). The group is led by Alexander Moore, PhD, LP, Behavioral Health Director, Surgery & Bariatric Care. There is no cost for this group and registration is not required.   You will need to get an invitation to the group from Alexander Moore, Ph.D., LP at [email or web address] and to learn about using Microsoft Teams.   ______________________________________________________________________________________________________________________  Bluetooth Scale:    We hope to provide you with high quality telephone and virtual healthcare visits while social distancing for COVID-19 is necessary, as well as in the future when virtual visits may be more convenient for you.     Our technology team made it possible for Bluetooth scales to send weight measurements to our electronic medical record. This allows weights from you weighing at home to securely flow into the medical record, which will improve telephone and virtual visits.   Additionally, studies have shown that adults actually lose more weight when their weights are automatically sent to someone else, and also that this process is not stressful for those adults.    Below is a link for purchasing the scale, with a discount code for our patients. You may call your insurance company to see if they will reimburse you for the cost of the scale, as a piece of durable medical equipment. The scales only go up to a weight of 400 pounds. This is an issue and we are working with the developer on increasing this. We found no scales that go over 400lb that have blue-tooth for connecting to [employer]vaMAIL.    Scale to purchase: the [employer]  Body  Scale:  https://www.[employer]/us/en/body/shop?[email or web address]=aw.ds    Discount Code: We have a discount code for our patients to bring the cost down to $50, the code is:  withmed     Steps to link the scale to [employer] via an Android Phone (you can always disconnect at any point in the future):  1. The order must be placed first before the patient can access Track My Health within [employer].  2. Download Google Fit marie from the Google Play Store   a. Log in or register using your Google account   3. Download the [employer] marie from Google Play Store  a. Select add organization   b. Search for [employer] and select it   c. Log into [employer]  d. Select Track My Health   e. Select the green connect my account button   f. When prompted log into your Google account   g. Select okay to confirm the account   4. Download the [employer] Health Mate marie from Google Play Store  a. Potter for [employer]   b. Go to profile   c. Tap google fit under the Apps section  d. Select the option to activate Google Fit integration   e. Select the same Google account   f. Select okay to confirm the account  g.   Steps to link the scale to [employer] via an iPhone (you can always disconnect at any point in the future):  **Note [employer] is not available for download on an iPad**  1. The order must be placed first before the patient can access Track My Health within [employer].  2. Locate the Health marie on your iPhone.  a. Set up your Apple Health account as prompted  b. The Sources page will show Apps that communicate with your Health marie. Once all steps are completed, you should see [employer] and [employer] listed under the Apps section and your iPhone under the devices section.  i. Select Health Mate  1. Under 'ALLOW  HEALTH MATE  TO WRITE DATA ensure the toggle is on for Weight.  2. This will allow the scale to add your weight to the [employer] Health  ii. Select MyChart  1. Under 'ALLOW  MYCHART  TO READ  DATA ensure the toggle is on for Weight.  2. This allows [employer] to grab the weight from [employer] so your provider can see your weights.  3. Download the [employer] lane from the Lane Store   a. Select add organization                                                  b. Search for  and select it  c. Log into [employer]  d. Select Track My Health   e. Select the green connect my account button   f. Follow prompts to link your device to [employer].  4. Download the [employer] health mate lane in the Lane Store   a. Murray for [employer]   b. Go to profile   c. So1 under the Apps section  d. If prompted to allow access with the Health Lane, toggle weight on for read and write access.   ______________________________________________________________________________________________________________________    Thank you,  FELISHA [facility] Weight Management Team

## 2020-07-13 DIAGNOSIS — F33.42 RECURRENT MAJOR DEPRESSIVE DISORDER, IN FULL REMISSION (H): ICD-10-CM

## 2020-07-13 DIAGNOSIS — E66.01 MORBID OBESITY (H): ICD-10-CM

## 2020-07-14 ENCOUNTER — MYC MEDICAL ADVICE (OUTPATIENT)
Dept: FAMILY MEDICINE | Facility: CLINIC | Age: 40
End: 2020-07-14

## 2020-07-14 RX ORDER — VENLAFAXINE HYDROCHLORIDE 75 MG/1
CAPSULE, EXTENDED RELEASE ORAL
Qty: 90 CAPSULE | Refills: 0 | Status: SHIPPED | OUTPATIENT
Start: 2020-07-14 | End: 2020-09-18

## 2020-07-14 RX ORDER — TOPIRAMATE 25 MG/1
TABLET, FILM COATED ORAL
Qty: 60 TABLET | Refills: 5 | OUTPATIENT
Start: 2020-07-14

## 2020-07-14 ASSESSMENT — PATIENT HEALTH QUESTIONNAIRE - PHQ9
10. IF YOU CHECKED OFF ANY PROBLEMS, HOW DIFFICULT HAVE THESE PROBLEMS MADE IT FOR YOU TO DO YOUR WORK, TAKE CARE OF THINGS AT HOME, OR GET ALONG WITH OTHER PEOPLE: SOMEWHAT DIFFICULT
SUM OF ALL RESPONSES TO PHQ QUESTIONS 1-9: 4
SUM OF ALL RESPONSES TO PHQ QUESTIONS 1-9: 4

## 2020-07-15 ENCOUNTER — MYC MEDICAL ADVICE (OUTPATIENT)
Dept: FAMILY MEDICINE | Facility: CLINIC | Age: 40
End: 2020-07-15

## 2020-07-15 DIAGNOSIS — F33.1 MODERATE EPISODE OF RECURRENT MAJOR DEPRESSIVE DISORDER (H): Primary | ICD-10-CM

## 2020-07-15 ASSESSMENT — PATIENT HEALTH QUESTIONNAIRE - PHQ9: SUM OF ALL RESPONSES TO PHQ QUESTIONS 1-9: 4

## 2020-07-16 NOTE — TELEPHONE ENCOUNTER
LS,    Please see [employer] message below.  Patient aware you are out of the office until 8/10.  Does not want to do a virtual visit with another provider.  Do you want to do a virtual visit?    Thanks,  Ami Garcia RN

## 2020-08-31 NOTE — TELEPHONE ENCOUNTER
Can you let her know that I have given her a referral for psychiatry but they want a recent evaluation before they can schedule her ? So would need to do at least a virtual visit .  Thanks

## 2020-09-01 ENCOUNTER — VIRTUAL VISIT (OUTPATIENT)
Dept: CARDIOLOGY | Facility: CLINIC | Age: 40
End: 2020-09-01
Payer: COMMERCIAL

## 2020-09-01 DIAGNOSIS — Z86.79 ATRIAL FIBRILLATION, CURRENTLY IN SINUS RHYTHM: ICD-10-CM

## 2020-09-01 DIAGNOSIS — Z86.79 STATUS POST ABLATION OF ATRIAL FIBRILLATION: ICD-10-CM

## 2020-09-01 DIAGNOSIS — I10 ESSENTIAL HYPERTENSION: Primary | ICD-10-CM

## 2020-09-01 DIAGNOSIS — Z98.890 STATUS POST ABLATION OF ATRIAL FIBRILLATION: ICD-10-CM

## 2020-09-01 DIAGNOSIS — G47.33 OBSTRUCTIVE SLEEP APNEA SYNDROME: ICD-10-CM

## 2020-09-01 DIAGNOSIS — E66.01 MORBID OBESITY (H): ICD-10-CM

## 2020-09-01 PROCEDURE — 99214 OFFICE O/P EST MOD 30 MIN: CPT | Mod: GT | Performed by: INTERNAL MEDICINE

## 2020-09-01 RX ORDER — HYDROCHLOROTHIAZIDE 12.5 MG/1
12.5 CAPSULE ORAL DAILY
Qty: 90 CAPSULE | Refills: 3 | Status: SHIPPED | OUTPATIENT
Start: 2020-09-01 | End: 2021-09-28

## 2020-09-01 NOTE — PROGRESS NOTES
"Danitza Soto is a 40 year old female who is being evaluated via a billable video visit.      The patient has been notified of following:     \"This video visit will be conducted via a call between you and your physician/provider. We have found that certain health care needs can be provided without the need for an in-person physical exam.  This service lets us provide the care you need with a video conversation.  If a prescription is necessary we can send it directly to your pharmacy.  If lab work is needed we can place an order for that and you can then stop by our lab to have the test done at a later time.    Video visits are billed at different rates depending on your insurance coverage.  Please reach out to your insurance provider with any questions.    If during the course of the call the physician/provider feels a video visit is not appropriate, you will not be charged for this service.\"    Patient has given verbal consent for Video visit? Yes  How would you like to obtain your AVS? MyChart  If you are dropped from the video visit, the video invite should be resent to: Send to e-mail at: [email or web address]  Will anyone else be joining your video visit? No        Video-Visit Details    Type of service:  Video Visit    Video Start Time:11:30 am (video visit duration 10 minutes)    Video End Time: 11:40 AM    Originating Location (pt. Location): Home    Distant Location (provider location):  [facility]     Platform used for Video Visit: Doximity      Chief complaint: Palpitations    HPI: Ms. Danitza Soto is a 38 year old  female with PMH significant for depression, GERD, LEONARDO, essential hypertension, morbid obesity S/P gastric bypass 11/2018 and paroxysmal atrial fibrillation.    The patient was last seen in cardiology for paroxysmal atrial fibrillation diagnosis in 9/2018.  Her symptoms were mild at that time therefore rate or rhythm control strategy was not " pursued.  The patient underwent gastric sleeve surgery in 11/2018 and has lost 70 pounds.  The patient noticed more frequent atrial fibrillation episodes since the surgery almost weekly now lasting up to 8-10 hours.  She finds works stress as a prominent trigger for episodes.  Associated symptoms include chest pressure, chest pain and dizziness.  She denies chest discomfort with exertion at other times.  The patient denies a history of dyspnea, PND, orthopnea, pedal edema, and syncope  Current medications include lisinopril 20 mg, fish oil.  She has cut down on lisinopril from 40 mg since she has lost significant weight.  She quit smoking in 2018 (10 pack years).  No history of alcohol abuse. No history of diabetes.  Family history is negative for CAD.  Prior cardiac tests include CT coronary angiogram in 2013 which did not show evidence of CAD.  Echocardiogram dated 9/2018 showed normal LV and RV function with no significant valve disease.  Moderate LVH was reported. I personnally reviewed the images and I donot agree with the LVH diagnosis. I donot think she has LVH based on echo. Zio patch in 8/2018 showed 4% atrial fibrillation burden heart rate ranged  beats per minute.  Longest episode lasting for 8 hours.    I have reviewed her ECG 11/28/2018 which shows normal sinus rhythm with single PAC, normal NC/QRS/QTC intervals. No evidence of LVH on ECG.    Medications, personal, family, and social history reviewed with patient and revised.    Interval history 5/14/2019:  Patient returns for follow-up to recheck on paroxysmal atrial fibrillation.  Since I saw 3 months ago, patient developed depression and started on Effexor which improved her mood.  I have started her on flecainide and metoprolol 3 months ago for frequent paroxysmal atrial fibrillation episodes.  She noticed significant decline in the frequency of the episodes however she still reports at least 2 episodes per month sometimes lasting for several  hours.  She reports associated dizziness however denies chest pain, shortness of breath, lower extremity edema or syncope.  Since gastric bypass patient lost significant weight with improvement in blood pressure and sleep apnea.  She no longer uses CPAP machine.  She is overall feeling well.    Interval history 8/14/2019:  The patient returns for follow-up to recheck on paroxysmal atrial fibrillation.  When I saw patient last time 3 months ago I increased dose of flecainide to 100 mg twice daily due to symptomatic palpitations.  The patient reports doing well during the first 2 months however had 3 episodes within the last month longest episode lasting for 3 hours with associated tiredness.  Denies chest pain, dyspnea on exertion, dizziness, syncope.  The patient has did a lot of research on atrial fibrillation ablation and she expressed her interest in undergoing procedure.    Interval history 2/18/2020:  Patient underwent catheter ablation of atrial fibrillation on 10/3/2019.  Patient feels great since the ablation.  No recurrence of A. fib since then.  She is currently on flecainide 100 mg twice daily, metoprolol 50 mg and apixaban 5 mg twice daily.  Blood pressure is well controlled with lisinopril 20 mg.  Patient is currently asymptomatic from cardiac standpoint.  Denies chest pain, shortness of breath, palpitations, dizziness, lower extremity edema or syncope.    Interval history 9/1/2020:  Patient reports no new cardiac symptoms since being seen in February 2020.  Denies palpitations, chest pain, shortness of breath.  Patient reports high blood pressure at home ~140/80 mmHg despite being on lisinopril 40 mg.  She reports being back on CPAP due to weight gain though she had gastric bypass surgery.  She continues to be on topiramate and naltrexone for obesity.  No alcohol or drug abuse.    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 colp - BRISEYDA I     LEONARDO on CPAP      Other anxiety states        CURRENT MEDICATIONS:  Current Outpatient Medications   Medication Sig Dispense Refill     acetaminophen (TYLENOL) 325 MG tablet Take 2 tablets (650 mg) by mouth every 4 hours as needed for other (For optimal non-opioid multimodal pain management to improve pain control and physical function.) 100 tablet 0     calcium carbonate (OS-CHIKI) 500 MG tablet Take 1 tablet by mouth daily        ferrous gluconate (FERGON) 324 (38 Fe) MG tablet TAKE 1 TABLET(324 MG) BY MOUTH DAILY WITH BREAKFAST 30 tablet 3     fish oil-omega-3 fatty acids 1000 MG capsule Take 2 g by mouth every morning        lisinopril (ZESTRIL) 40 MG tablet Take 1 tablet (40 mg) by mouth daily 30 tablet 3     Multiple Vitamins-Minerals (CENTRUM PO) 2 tablets per day       naltrexone (DEPADE/REVIA) 50 MG tablet Take 1 tablet (50 mg) by mouth daily 30 tablet 5     omeprazole (PRILOSEC) 40 MG DR capsule Take 1 capsule (40 mg) by mouth daily 60 capsule 5     Probiotic Product (PROBIOTIC ADVANCED PO)        topiramate (TOPAMAX) 25 MG tablet Take 1 tablet (25 mg) by mouth 2 times daily 60 tablet 5     venlafaxine (EFFEXOR-XR) 75 MG 24 hr capsule TAKE ONE CAPSULE BY MOUTH DAILY 90 capsule 0     VENTOLIN  (90 Base) MCG/ACT Inhaler INHALE 1-2 PUFFS EVERY 4-6 HOURS AS NEEDED FOR WHEEZING  0     vitamin B complex with vitamin C (VITAMIN  B COMPLEX) tablet Take 1 tablet by mouth daily       LORazepam (ATIVAN) 0.5 MG tablet Take 1 tablet (0.5 mg) by mouth every 8 hours as needed for anxiety (Patient not taking: Reported on 9/1/2020) 20 tablet 0     metoprolol succinate ER (TOPROL-XL) 50 MG 24 hr tablet Take 1 tablet (50 mg) by mouth daily DO NOT CRUSH (Patient  not taking: Reported on 2020) 90 tablet 3       PAST SURGICAL HISTORY:  Past Surgical History:   Procedure Laterality Date     EP ABLATION FOCAL AFIB N/A 10/3/2019    Procedure: EP ABLATION FOCAL AFIB;  Surgeon: Harpreet Arevalo MD;  Location: [facility] OR      TOOTH EXTRACTION W/FORCEP      Dearing Teeth Extraction     LAPAROSCOPIC GASTRIC SLEEVE N/A 2018    Procedure: Laparoscopic Sleeve Gastrectomy Latex Free;  Surgeon: Artis Tse MD;  Location: [facility] OR     LAPAROSCOPIC HERNIORRHAPHY HIATAL  2018    Procedure: LAPAROSCOPIC  HIATAL Hernia Repair;  Surgeon: Artis Tse MD;  Location: [facility] OR       ALLERGIES:     Allergies   Allergen Reactions     Wellbutrin [Bupropion]      Wellbutrin: Panic attacks, heart/chest pain       FAMILY HISTORY:  Family History   Problem Relation Age of Onset     Heart Disease Mother         ,mother had emphesema and  at 62     Hypertension Mother      Allergies Mother      Lipids Mother      Obesity Mother      Respiratory Mother         Emphysema     Diabetes Maternal Grandmother         Type 2?     Hypertension Maternal Grandmother      Circulatory Maternal Grandmother         Due to diabetes     Eye Disorder Maternal Grandmother         Macular degeneration/Macular degeneration     Obesity Maternal Grandmother      Hypertension Father      Lipids Father      Cancer Father      Prostate Cancer Father      Other Cancer Father      Cerebrovascular Disease Paternal Grandmother      Alcohol/Drug Maternal Grandfather      Obesity Maternal Grandfather      Psychotic Disorder Paternal Grandfather         Committed Suicide     Depression Paternal Grandfather      Allergies Sister      Genitourinary Problems Sister          SOCIAL HISTORY:  Social History     Tobacco Use     Smoking status: Former Smoker     Packs/day: 1.00     Years: 10.00     Pack years: 10.00     Types: Cigarettes     Start date: 2004     Last attempt to quit: 8/15/2018     Years since  quittin.0     Smokeless tobacco: Never Used     Tobacco comment:  pack or less a day   Substance Use Topics     Alcohol use: Yes     Alcohol/week: 0.0 standard drinks     Comment: Occas.     Drug use: No     ROS:   Constitutional: No fever, chills, or sweats. Weight stable.   ENT: No visual disturbance, ear ache, epistaxis, sore throat.   Cardiovascular: As per HPI.   Respiratory: No cough, hemoptysis.    GI: No nausea, vomiting, hematemesis, melena, or hematochezia.   Hematologic:  No easy bruising, no easy bleeding.  Neuro: Negative.     Exam:  Physical Exam Elements attainable via telehealth:       Constitutional - alert and no distress    Eyes - no redness, no discharge    Respiratory - no cough, no labored breathing    Skin - no discoloration or lesions on the face or the arm.    Neurological -alert, normal speech,and affect, no tremor.     I have reviewed the labs and personally reviewed the imaging below and made my comment in the assessment and plan.    EP PROCEDURE NOTE 10/3/2019  1. Left Atrial Wide Area Circumferential radiofrequency Ablation.  2. Trans-septal Puncture x2  3. Intracardiac Echocardiography.  4. Invasive Hemodynamic Monitoring.  5. 3D electro-anatomic Mapping using Carto3.  6. Esophageal temperature monitoring.    I have reviewed the labs and personally reviewed the imaging below and made my comment in the assessment and plan.    Labs:  CBC RESULTS:   Lab Results   Component Value Date    WBC 7.3 2019    RBC 4.37 2019    HGB 11.8 2019    HCT 37.4 2019    MCV 86 2019    MCH 27.0 2019    MCHC 31.6 2019    RDW 14.4 2019     2019       BMP RESULTS:  Lab Results   Component Value Date     2019    POTASSIUM 3.8 2019    CHLORIDE 108 2019    CO2 28 2019    ANIONGAP 3 2019    GLC 80 2019    BUN 11 2019    CR 0.69 2019    GFRESTIMATED >90 2019    GFRESTBLACK >90 2019     CHIKI 8.8 12/05/2019        INR RESULTS:  Lab Results   Component Value Date    INR 0.98 11/06/2018       Echocardiogram 9/26/2018  Global and regional left ventricular function is normal with an EF of 60-65%.  Moderate concentric wall thickening consistent with left ventricular  hypertrophy is present.  Right ventricular function, chamber size, wall motion, and thickness are  normal.  Mild biatrial enlargement is present.  No significant valve abnormalities present.  The inferior vena cava was normal in size    Nuclear stress test with Lexiscan 9/6/2018  Normal myocardial SPECT study with a summed stress score of 0.     CT coronary artery angiogram 6/27/2013  No evidence of coronary artery disease    EKG 11/28/2018 normal.    Assessment and Plan:   Ms. Danitza Soto is a 38 year old  female with PMH significant for depression, GERD, LEONARDO, essential hypertension, morbid obesity S/P gastric bypass 11/2018 and paroxysmal atrial fibrillation refractory to medical treatment status post PVI on 10/3/2019.    Patient is currently asymptomatic from cardiac standpoint.    1.  Paroxysmal atrial fibrillation: The patient underwent PVI on 10/3/2019.  Reports feeling great since then.  No recurrence of atrial fibrillation.      2.  Hypertension: Patient currently on lisinopril 40 mg.  Reports high blood pressure at home 140/80 mmHg.  Recommend to start hydrochlorothiazide 12.5 mg daily.  Potassium recheck in 2 weeks.  Patient will send [employer] message in a month regarding home blood pressure readings.    3.  Obstructive sleep apnea: Patient back on CPAP though she was not needing CPAP for sleep apnea right after gastric bypass surgery.  However she gained weight after gastric bypass surgery and requires CPAP now.  Compliant with the machine.    Medication change: Start hydrochlorothiazide 12.5 mg.  Continue all other medications.    Return to clinic 1 year.    A total of 10 minutes spent face-to-face through video encounter  today with greater than 50% of the time spent in counseling and coordinating cares of the issues above.     Please donot hesitate to contact me if you have any questions or concerns. Again, thank you for allowing me to participate in the care of your patient.    Leena RONDON MD  [facility] Division of Cardiology  Pager [phone]

## 2020-09-01 NOTE — PATIENT INSTRUCTIONS
Thank you for coming to the [facility] @ [facility]; please note the following instructions:    1.  Start hydrochlorothiazide 12.5 mg for high blood pressure.  We will recheck potassium in 2 weeks.  Please send us a [employer] message in a month regarding blood pressure measurements at home.    2.  Return to clinic 1 year.        If you have any questions regarding your visit please contact your care team:     Cardiology  Telephone Number   Hannah ALLEN., RN  Josette RUSS, RN   Shabana RAMIREZ, RMA  Elena HUGHES, RMLLOYD BAUTISTA, LPN   [phone] (option 1)   For scheduling appts:     [phone] (select option 1)       For the Device Clinic (Pacemakers and ICD's)  RN's :  Hanh Cochran   During business hours: [phone]    *After business hours:  [phone] (select option 4)      Normal test result notifications will be released via [employer] or mailed within 7 business days.  All other test results, will be communicated via telephone once reviewed by your cardiologist.    If you need a medication refill please contact your pharmacy.  Please allow 3 business days for your refill to be completed.    As always, thank you for trusting us with your health care needs!              Patient Education     Hydrochlorothiazide, HCTZ capsules or tablets  Brand Names: Esidrix, Ezide, HydroDIURIL, Microzide, Oretic, Zide  What is this medicine?  HYDROCHLOROTHIAZIDE (moy droe klor oh THYE a zide) is a diuretic. It increases the amount of urine passed, which causes the body to lose salt and water. This medicine is used to treat high blood pressure. It is also reduces the swelling and water retention caused by various medical conditions, such as heart, liver, or kidney disease.  How should I use this medicine?  Take this medicine by mouth with a glass of water. Follow the directions on the prescription label. Take your medicine at regular intervals. Remember that you will need to pass urine frequently after taking this  medicine. Do not take your doses at a time of day that will cause you problems. Do not stop taking your medicine unless your doctor tells you to.  Talk to your pediatrician regarding the use of this medicine in children. Special care may be needed.  What side effects may I notice from receiving this medicine?  Side effects that you should report to your doctor or health care professional as soon as possible:    allergic reactions such as skin rash or itching, hives, swelling of the lips, mouth, tongue, or throat    changes in vision    chest pain    eye pain    fast or irregular heartbeat    feeling faint or lightheaded, falls    gout attack    muscle pain or cramps    pain or difficulty when passing urine    pain, tingling, numbness in the hands or feet    redness, blistering, peeling or loosening of the skin, including inside the mouth    unusually weak or tired  Side effects that usually do not require medical attention (report to your doctor or health care professional if they continue or are bothersome):    change in sex drive or performance    dry mouth    headache    stomach upset  What may interact with this medicine?    cholestyramine    colestipol    digoxin    dofetilide    lithium    medicines for blood pressure    medicines for diabetes    medicines that relax muscles for surgery    other diuretics    steroid medicines like prednisone or cortisone    What if I miss a dose?  If you miss a dose, take it as soon as you can. If it is almost time for your next dose, take only that dose. Do not take double or extra doses.  Where should I keep my medicine?  Keep out of the reach of children.  Store at room temperature between 15 and 30 degrees C (59 and 86 degrees F). Do not freeze. Protect from light and moisture. Keep container closed tightly. Throw away any unused medicine after the expiration date.  What should I tell my health care provider before I take this medicine?  They need to know if you have any  of these conditions:    diabetes    gout    immune system problems, like lupus    kidney disease or kidney stones    liver disease    pancreatitis    small amount of urine or difficulty passing urine    an unusual or allergic reaction to hydrochlorothiazide, sulfa drugs, other medicines, foods, dyes, or preservatives    pregnant or trying to get pregnant    breast-feeding  What should I watch for while using this medicine?  Visit your doctor or health care professional for regular checks on your progress. Check your blood pressure as directed. Ask your doctor or health care professional what your blood pressure should be and when you should contact him or her.  You may need to be on a special diet while taking this medicine. Ask your doctor.  Check with your doctor or health care professional if you get an attack of severe diarrhea, nausea and vomiting, or if you sweat a lot. The loss of too much body fluid can make it dangerous for you to take this medicine.  You may get drowsy or dizzy. Do not drive, use machinery, or do anything that needs mental alertness until you know how this medicine affects you. Do not stand or sit up quickly, especially if you are an older patient. This reduces the risk of dizzy or fainting spells. Alcohol may interfere with the effect of this medicine. Avoid alcoholic drinks.  This medicine may affect your blood sugar level. If you have diabetes, check with your doctor or health care professional before changing the dose of your diabetic medicine.  This medicine can make you more sensitive to the sun. Keep out of the sun. If you cannot avoid being in the sun, wear protective clothing and use sunscreen. Do not use sun lamps or tanning beds/booths.  NOTE:This sheet is a summary. It may not cover all possible information. If you have questions about this medicine, talk to your doctor, pharmacist, or health care provider. Copyright  2019 [employer]           Patient Education      Hydrochlorothiazide, HCTZ capsules or tablets  Brand Names: Esidrix, Ezide, HydroDIURIL, Microzide, Oretic, Zide  What is this medicine?  HYDROCHLOROTHIAZIDE (moy droe klor oh THYE a zide) is a diuretic. It increases the amount of urine passed, which causes the body to lose salt and water. This medicine is used to treat high blood pressure. It is also reduces the swelling and water retention caused by various medical conditions, such as heart, liver, or kidney disease.  How should I use this medicine?  Take this medicine by mouth with a glass of water. Follow the directions on the prescription label. Take your medicine at regular intervals. Remember that you will need to pass urine frequently after taking this medicine. Do not take your doses at a time of day that will cause you problems. Do not stop taking your medicine unless your doctor tells you to.  Talk to your pediatrician regarding the use of this medicine in children. Special care may be needed.  What side effects may I notice from receiving this medicine?  Side effects that you should report to your doctor or health care professional as soon as possible:    allergic reactions such as skin rash or itching, hives, swelling of the lips, mouth, tongue, or throat    changes in vision    chest pain    eye pain    fast or irregular heartbeat    feeling faint or lightheaded, falls    gout attack    muscle pain or cramps    pain or difficulty when passing urine    pain, tingling, numbness in the hands or feet    redness, blistering, peeling or loosening of the skin, including inside the mouth    unusually weak or tired  Side effects that usually do not require medical attention (report to your doctor or health care professional if they continue or are bothersome):    change in sex drive or performance    dry mouth    headache    stomach upset  What may interact with this medicine?    cholestyramine    colestipol    digoxin    dofetilide    lithium    medicines  for blood pressure    medicines for diabetes    medicines that relax muscles for surgery    other diuretics    steroid medicines like prednisone or cortisone    What if I miss a dose?  If you miss a dose, take it as soon as you can. If it is almost time for your next dose, take only that dose. Do not take double or extra doses.  Where should I keep my medicine?  Keep out of the reach of children.  Store at room temperature between 15 and 30 degrees C (59 and 86 degrees F). Do not freeze. Protect from light and moisture. Keep container closed tightly. Throw away any unused medicine after the expiration date.  What should I tell my health care provider before I take this medicine?  They need to know if you have any of these conditions:    diabetes    gout    immune system problems, like lupus    kidney disease or kidney stones    liver disease    pancreatitis    small amount of urine or difficulty passing urine    an unusual or allergic reaction to hydrochlorothiazide, sulfa drugs, other medicines, foods, dyes, or preservatives    pregnant or trying to get pregnant    breast-feeding  What should I watch for while using this medicine?  Visit your doctor or health care professional for regular checks on your progress. Check your blood pressure as directed. Ask your doctor or health care professional what your blood pressure should be and when you should contact him or her.  You may need to be on a special diet while taking this medicine. Ask your doctor.  Check with your doctor or health care professional if you get an attack of severe diarrhea, nausea and vomiting, or if you sweat a lot. The loss of too much body fluid can make it dangerous for you to take this medicine.  You may get drowsy or dizzy. Do not drive, use machinery, or do anything that needs mental alertness until you know how this medicine affects you. Do not stand or sit up quickly, especially if you are an older patient. This reduces the risk of dizzy  or fainting spells. Alcohol may interfere with the effect of this medicine. Avoid alcoholic drinks.  This medicine may affect your blood sugar level. If you have diabetes, check with your doctor or health care professional before changing the dose of your diabetic medicine.  This medicine can make you more sensitive to the sun. Keep out of the sun. If you cannot avoid being in the sun, wear protective clothing and use sunscreen. Do not use sun lamps or tanning beds/booths.  NOTE:This sheet is a summary. It may not cover all possible information. If you have questions about this medicine, talk to your doctor, pharmacist, or health care provider. Copyright  2019 Elsevier

## 2020-09-02 ENCOUNTER — TELEPHONE (OUTPATIENT)
Dept: FAMILY MEDICINE | Facility: CLINIC | Age: 40
End: 2020-09-02

## 2020-09-02 NOTE — TELEPHONE ENCOUNTER
We did not reach Danitza Soto, we left a message with our contact number [phone].  If we do not hear from them within the next 3 business days we will mail a letter offering our scheduling services.    If they do call to schedule, in the future, we will inform you of the outcome.    Thank you for your referral,  [facility]

## 2020-09-17 DIAGNOSIS — I10 ESSENTIAL HYPERTENSION: ICD-10-CM

## 2020-09-17 LAB — POTASSIUM SERPL-SCNC: 3.6 MMOL/L (ref 3.4–5.3)

## 2020-09-17 PROCEDURE — 84132 ASSAY OF SERUM POTASSIUM: CPT | Performed by: INTERNAL MEDICINE

## 2020-09-17 PROCEDURE — 36415 COLL VENOUS BLD VENIPUNCTURE: CPT | Performed by: INTERNAL MEDICINE

## 2020-09-18 ENCOUNTER — OFFICE VISIT (OUTPATIENT)
Dept: FAMILY MEDICINE | Facility: CLINIC | Age: 40
End: 2020-09-18
Payer: COMMERCIAL

## 2020-09-18 VITALS
TEMPERATURE: 98.6 F | RESPIRATION RATE: 16 BRPM | DIASTOLIC BLOOD PRESSURE: 83 MMHG | BODY MASS INDEX: 41.3 KG/M2 | SYSTOLIC BLOOD PRESSURE: 126 MMHG | WEIGHT: 257 LBS | HEIGHT: 66 IN | OXYGEN SATURATION: 97 % | HEART RATE: 72 BPM

## 2020-09-18 DIAGNOSIS — N76.0 BV (BACTERIAL VAGINOSIS): ICD-10-CM

## 2020-09-18 DIAGNOSIS — Z00.00 ROUTINE GENERAL MEDICAL EXAMINATION AT A HEALTH CARE FACILITY: Primary | ICD-10-CM

## 2020-09-18 DIAGNOSIS — F33.1 MODERATE EPISODE OF RECURRENT MAJOR DEPRESSIVE DISORDER (H): ICD-10-CM

## 2020-09-18 DIAGNOSIS — K21.9 GASTROESOPHAGEAL REFLUX DISEASE WITHOUT ESOPHAGITIS: ICD-10-CM

## 2020-09-18 DIAGNOSIS — B96.89 BV (BACTERIAL VAGINOSIS): ICD-10-CM

## 2020-09-18 DIAGNOSIS — I10 ESSENTIAL HYPERTENSION: ICD-10-CM

## 2020-09-18 LAB
SPECIMEN SOURCE: ABNORMAL
WET PREP SPEC: ABNORMAL

## 2020-09-18 PROCEDURE — 90471 IMMUNIZATION ADMIN: CPT | Performed by: FAMILY MEDICINE

## 2020-09-18 PROCEDURE — 90686 IIV4 VACC NO PRSV 0.5 ML IM: CPT | Performed by: FAMILY MEDICINE

## 2020-09-18 PROCEDURE — 99396 PREV VISIT EST AGE 40-64: CPT | Mod: 25 | Performed by: FAMILY MEDICINE

## 2020-09-18 PROCEDURE — 99214 OFFICE O/P EST MOD 30 MIN: CPT | Mod: 25 | Performed by: FAMILY MEDICINE

## 2020-09-18 PROCEDURE — 87210 SMEAR WET MOUNT SALINE/INK: CPT | Performed by: FAMILY MEDICINE

## 2020-09-18 RX ORDER — LISINOPRIL 40 MG/1
40 TABLET ORAL DAILY
Qty: 90 TABLET | Refills: 1 | Status: SHIPPED | OUTPATIENT
Start: 2020-09-18 | End: 2021-04-08

## 2020-09-18 RX ORDER — METRONIDAZOLE 500 MG/1
500 TABLET ORAL 2 TIMES DAILY
Qty: 14 TABLET | Refills: 0 | Status: SHIPPED | OUTPATIENT
Start: 2020-09-18 | End: 2020-09-25

## 2020-09-18 RX ORDER — VENLAFAXINE HYDROCHLORIDE 75 MG/1
75 CAPSULE, EXTENDED RELEASE ORAL DAILY
Qty: 90 CAPSULE | Refills: 1 | Status: SHIPPED | OUTPATIENT
Start: 2020-09-18 | End: 2021-04-19

## 2020-09-18 ASSESSMENT — MIFFLIN-ST. JEOR: SCORE: 1839.55

## 2020-10-27 ENCOUNTER — MYC MEDICAL ADVICE (OUTPATIENT)
Dept: FAMILY MEDICINE | Facility: CLINIC | Age: 40
End: 2020-10-27

## 2020-10-27 RX ORDER — VENLAFAXINE HYDROCHLORIDE 75 MG/1
CAPSULE, EXTENDED RELEASE ORAL
Start: 2020-10-27

## 2020-11-21 ENCOUNTER — VIRTUAL VISIT (OUTPATIENT)
Dept: FAMILY MEDICINE | Facility: OTHER | Age: 40
End: 2020-11-21

## 2020-11-21 DIAGNOSIS — Z20.822 SUSPECTED COVID-19 VIRUS INFECTION: Primary | ICD-10-CM

## 2020-11-21 NOTE — PROGRESS NOTES
"Date: 2020 08:46:17  Clinician: Clotilde Westbrook  Clinician NPI: [id number]  Patient: Danitza Soto  Patient : 1980  Patient Address: [address] [place] [id number]  Patient Phone: [phone]  Visit Protocol: URI  Patient Summary:  Danitza is a 40 year old ( : 1980 ) female who initiated a [facility] Visit for COVID-19 (Coronavirus) evaluation and screening. When asked the question \"Please sign me up to receive news, health information and promotions from [facility].\", Danitza responded \"Yes\".    Danitza states her symptoms started gradually 5-6 days ago. After her symptoms started, they improved and then got worse again.   Her symptoms consist of chills, malaise, ageusia, and anosmia. Danitza also feels feverish.   Symptom details   Temperature: Her current temperature is 100.3 degrees Fahrenheit. Danitza has had a temperature over 100 degrees Fahrenheit for 1-2 days.    Danitza denies having vomiting, rhinitis, facial pain or pressure, myalgias, sore throat, teeth pain, diarrhea, ear pain, headache, wheezing, cough, nasal congestion, and nausea. She also denies taking antibiotic medication in the past month, having recent facial or sinus surgery in the past 60 days, and having a sinus infection within the past year. She is not experiencing dyspnea.   Precipitating events  She has not recently been exposed to someone with influenza. Danitza has not been in close contact with any high risk individuals.   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   Danitza has had a close contact with a laboratory-confirmed COVID-19 " patient within 14 days of symptom onset. She was exposed at her work. She does not know when she was exposed to the laboratory-confirmed COVID-19 patient.   Additional information about contact with COVID-19 (Coronavirus) patient as reported by the patient (free text): Two confirmed clients at work and several confirmed coworkers    Since December 2019, Danitza has been tested for COVID-19 and has not had upper respiratory infection or influenza-like illness.      Result of COVID-19 test: Negative     Date of her COVID-19 test: 06/01/2020      Pertinent medical history  Danitza does not get yeast infections when she takes antibiotics.   Danitza needs a return to work/school note.   Weight: 246 lbs   Danitza does not smoke or use smokeless tobacco.   She denies pregnancy and denies breastfeeding. She has menstruated in the past month.   Weight: 246 lbs    MEDICATIONS: Effexor XR oral, omeprazole oral, hydrochlorothiazide oral, lisinopril oral, topiramate oral, ALLERGIES: NKDA  Clinician Response:  Dear Danitza,   Your symptoms show that you may have coronavirus (COVID-19). This illness can cause fever, cough and trouble breathing. Many people get a mild case and get better on their own. Some people can get very sick.  What should I do?  We would like to test you for this virus.   1. Please call [phone] to schedule your visit. Explain that you were referred by [facility] to have a COVID-19 test. Be ready to share your [facility] visit ID number.  * If you need to schedule in [facility] please call [phone] or for [employer] employees please call [phone].  * If you need to schedule in the [place] area please call [phone]. [employer] employees call [phone].  The following will serve as your written order for this COVID Test, ordered by me, for the indication of suspected COVID [Z20.828]: The test will be ordered in [employer], our electronic health record, after you are scheduled. It will show as ordered and  "authorized by Kyle Bai MD.  Order: COVID-19 (Coronavirus) PCR for SYMPTOMATIC testing from [facility].   2. When it's time for your COVID test:  Stay at least 6 feet away from others. (If someone will drive you to your test, stay in the backseat, as far away from the  as you can.)   Cover your mouth and nose with a mask, tissue or washcloth.  Go straight to the testing site. Don't make any stops on the way there or back.      3.Starting now: Stay home and away from others (self-isolate) until:   You've had no fever---and no medicine that reduces fever---for one full day (24 hours). And...   Your other symptoms have gotten better. For example, your cough or breathing has improved. And...   At least 10 days have passed since your symptoms started.       During this time, don't leave the house except for testing or medical care.   Stay in your own room, even for meals. Use your own bathroom if you can.   Stay away from others in your home. No hugging, kissing or shaking hands. No visitors.  Don't go to work, school or anywhere else.    Clean \"high touch\" surfaces often (doorknobs, counters, handles, etc.). Use a household cleaning spray or wipes. You'll find a full list of  on the EPA website: www.epa.gov/pesticide-registration/list-n-disinfectants-use-against-sars-cov-2.   Cover your mouth and nose with a mask, tissue or washcloth to avoid spreading germs.  Wash your hands and face often. Use soap and water.  Caregivers in these groups are at risk for severe illness due to COVID-19:  o People 65 years and older  o People who live in a nursing home or long-term care facility  o People with chronic disease (lung, heart, cancer, diabetes, kidney, liver, immunologic)  o People who have a weakened immune system, including those who:   Are in cancer treatment  Take medicine that weakens the immune system, such as corticosteroids  Had a bone marrow or organ transplant  Have an immune deficiency  Have poorly " controlled HIV or AIDS  Are obese (body mass index of 40 or higher)  Smoke regularly   o Caregivers should wear gloves while washing dishes, handling laundry and cleaning bedrooms and bathrooms.  o Use caution when washing and drying laundry: Don't shake dirty laundry, and use the warmest water setting that you can.  o For more tips, go to www.cdc.gov/coronavirus/2019-ncov/downloads/10Things.pdf.    4.Sign up for [employer]. We know it's scary to hear that you might have COVID-19. We want to track your symptoms to make sure you're okay over the next 2 weeks. Please look for an email from [employer]---this is a free, online program that we'll use to keep in touch. To sign up, follow the link in the email. Learn more at [email or web address]  How can I take care of myself?   Get lots of rest. Drink extra fluids (unless a doctor has told you not to).   Take Tylenol (acetaminophen) for fever or pain. If you have liver or kidney problems, ask your family doctor if it's okay to take Tylenol.   Adults can take either:    650 mg (two 325 mg pills) every 4 to 6 hours, or...   1,000 mg (two 500 mg pills) every 8 hours as needed.    Note: Don't take more than 3,000 mg in one day. Acetaminophen is found in many medicines (both prescribed and over-the-counter medicines). Read all labels to be sure you don't take too much.   For children, check the Tylenol bottle for the right dose. The dose is based on the child's age or weight.    If you have other health problems (like cancer, heart failure, an organ transplant or severe kidney disease): Call your specialty clinic if you don't feel better in the next 2 days.       Know when to call 911. Emergency warning signs include:    Trouble breathing or shortness of breath Pain or pressure in the chest that doesn't go away Feeling confused like you haven't felt before, or not being able to wake up Bluish-colored lips or face.  Where can I get more information?   [facility]  [place] -- About COVID-19: [email or web address]   CDC -- What to Do If You're Sick: www.cdc.gov/coronavirus/2019-ncov/about/steps-when-sick.html   CDC -- Ending Home Isolation: www.cdc.gov/coronavirus/2019-ncov/hcp/disposition-in-home-patients.html   [facility] -- Caring for Someone: www.cdc.gov/coronavirus/2019-ncov/if-you-are-sick/care-for-someone.html   [facility] -- Interim Guidance for Hospital Discharge to Home: www.[facility].[place]./diseases/coronavirus/hcp/hospdischarge.pdf   [facility] clinical trials (COVID-19 research studies): clinicalaffairs.[facility]-clinical-trials    Below are the COVID-19 hotlines at the [place] Department of [facility] ([facility]). Interpreters are available.    For health questions: Call 977-620-5526 or 1-954.471.8847 (7 a.m. to 7 p.m.) For questions about schools and childcare: Call 987-780-2820 or 1-490.193.4208 (7 a.m. to 7 p.m.)    Diagnosis: Contact with and (suspected) exposure to other viral communicable diseases  Diagnosis ICD: Z20.828

## 2020-11-22 DIAGNOSIS — Z20.822 SUSPECTED COVID-19 VIRUS INFECTION: ICD-10-CM

## 2020-11-22 PROCEDURE — U0003 INFECTIOUS AGENT DETECTION BY NUCLEIC ACID (DNA OR RNA); SEVERE ACUTE RESPIRATORY SYNDROME CORONAVIRUS 2 (SARS-COV-2) (CORONAVIRUS DISEASE [COVID-19]), AMPLIFIED PROBE TECHNIQUE, MAKING USE OF HIGH THROUGHPUT TECHNOLOGIES AS DESCRIBED BY CMS-2020-01-R: HCPCS | Performed by: FAMILY MEDICINE

## 2020-11-23 LAB
SARS-COV-2 RNA SPEC QL NAA+PROBE: ABNORMAL
SPECIMEN SOURCE: ABNORMAL

## 2020-11-29 ENCOUNTER — MYC MEDICAL ADVICE (OUTPATIENT)
Dept: FAMILY MEDICINE | Facility: CLINIC | Age: 40
End: 2020-11-29

## 2020-11-30 NOTE — TELEPHONE ENCOUNTER
LS,    Please see [employer] message below.  Do you want to do a video visit with patient?    Ami Garcia RN

## 2020-12-03 ENCOUNTER — VIRTUAL VISIT (OUTPATIENT)
Dept: PSYCHIATRY | Facility: CLINIC | Age: 40
End: 2020-12-03
Payer: COMMERCIAL

## 2020-12-03 DIAGNOSIS — F33.1 MODERATE EPISODE OF RECURRENT MAJOR DEPRESSIVE DISORDER (H): ICD-10-CM

## 2020-12-03 DIAGNOSIS — F17.200 TOBACCO USE DISORDER: Primary | ICD-10-CM

## 2020-12-03 PROCEDURE — 99204 OFFICE O/P NEW MOD 45 MIN: CPT | Mod: GT | Performed by: NURSE PRACTITIONER

## 2020-12-03 RX ORDER — ARIPIPRAZOLE 2 MG/1
2 TABLET ORAL DAILY
Qty: 30 TABLET | Refills: 1 | Status: SHIPPED | OUTPATIENT
Start: 2020-12-03 | End: 2020-12-21

## 2020-12-03 ASSESSMENT — ANXIETY QUESTIONNAIRES
6. BECOMING EASILY ANNOYED OR IRRITABLE: NEARLY EVERY DAY
3. WORRYING TOO MUCH ABOUT DIFFERENT THINGS: SEVERAL DAYS
1. FEELING NERVOUS, ANXIOUS, OR ON EDGE: MORE THAN HALF THE DAYS
7. FEELING AFRAID AS IF SOMETHING AWFUL MIGHT HAPPEN: NOT AT ALL
5. BEING SO RESTLESS THAT IT IS HARD TO SIT STILL: SEVERAL DAYS
GAD7 TOTAL SCORE: 10
2. NOT BEING ABLE TO STOP OR CONTROL WORRYING: SEVERAL DAYS
IF YOU CHECKED OFF ANY PROBLEMS ON THIS QUESTIONNAIRE, HOW DIFFICULT HAVE THESE PROBLEMS MADE IT FOR YOU TO DO YOUR WORK, TAKE CARE OF THINGS AT HOME, OR GET ALONG WITH OTHER PEOPLE: VERY DIFFICULT

## 2020-12-03 ASSESSMENT — PATIENT HEALTH QUESTIONNAIRE - PHQ9
SUM OF ALL RESPONSES TO PHQ QUESTIONS 1-9: 9
5. POOR APPETITE OR OVEREATING: MORE THAN HALF THE DAYS

## 2020-12-03 NOTE — Clinical Note
Martha Mcbride, I saw Jacob today for psychiatric assessment.  She will continue her venlafaxine at its current dose to avoid an increase in her irritability.  I added Abilify 2 mg daily to adjunct that medication for mood regulation and depression symptoms.  I also referred her for psychological testing related to diagnosis clarification and to rule out ADHD and autism spectrum disorder.  I also asked her to consider genetic testing in the future to determine other medication options better suited for her.  Thank you for the referral.    Shwetha

## 2020-12-03 NOTE — PROGRESS NOTES
"                                                         Danitza Soto is a 40 year old female who is being evaluated via a billable video visit.      The patient has been notified of following:     \"This video visit will be conducted via a call between you and your physician/provider. We have found that certain health care needs can be provided without the need for an in-person physical exam.  This service lets us provide the care you need with a video conversation.  If a prescription is necessary we can send it directly to your pharmacy.  If lab work is needed we can place an order for that and you can then stop by our lab to have the test done at a later time.    Video visits are billed at different rates depending on your insurance coverage.  Please reach out to your insurance provider with any questions.    If during the course of the call the physician/provider feels a video visit is not appropriate, you will not be charged for this service.\"    Patient has given verbal consent for Video visit? Yes  How would you like to obtain your AVS? MyChart  If you are dropped from the video visit, the video invite should be resent to: Text to cell phone: [phone]  Will anyone else be joining your video visit? No        Video-Visit Details    Type of service:  Video Visit    Video Start Time: 2:27 PM  Video End Time: 3:13 PM    Originating Location (pt. Location): Home    Distant Location (provider location):  [facility]     Platform used for Video Visit: Elida Mathew NP      Outpatient Psychiatric Evaluation - Standard Adult    Name:  Danitza Soto  : 1980    Source of Referral:  Primary Care Provider: Polly Mcbride MD   Last visit: December 3, 2020  Current Psychotherapist: none       Identifying Data:  Patient is a 40 year old,   White American female  who presents for initial visit with me.  Patient is currently employed " "full time. Patient attended the session alone. Consent to communicate signed for no one . Consent for treatment signed and included in electronic medical record. Discussed limits of confidentiality today. My Practice Policy was reviewed and signed.     Patient prefers to be called: Trish      Chief Complaint:    Chief Complaint   Patient presents with     Consult         HPI:      Eric is a 40-year-old female visiting with me today to look at medication options to better manage her depression and anxiety.  She tells me she has been taking Effexor but \"needs something else\".  She has been taking the Effexor for the past 2 years.  Prior that she was on Paxil for several years.  Lately she has been having periods of irritability.  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 It can be difficult for her to come in to work some days.  She tells me she is not sitting around feeling sad but she feels like her anger is increasing.  For the past 6 to 8 months she has had a change in her attention span and it is difficult for her to sustain her attention watching television.    Physically she tells me she has had a history of atrial fibrillation that is in control now when she is off of medications.  She also is taking naltrexone after having weight loss surgery.    A life stressor for her is that she is going through a divorce and lives alone.  She tells me things are better now that she is living alone.  She had been together with her wife for 4 years.  They have been  since June of this year.    Eric questions her diagnosis and wonders if she is in the autism spectrum.  While she has always had anger issues she wants to be able to even out more.    Psychiatric Review of Symptoms:  Depression: Interest: Decrease  Concentration: Decrease  Psychomotor agitation  Suicide: No  Irritability: Increase   Ruminations: Increase "    PHQ-9 scores:   PHQ-9 SCORE 10/11/2019 7/14/2020 12/3/2020   PHQ-9 Total Score MyChart 3 (Minimal depression) 4 (Minimal depression) -   PHQ-9 Total Score 3 4 9     Vivian:  No symptoms   MDQ Score: Negative Screen  Anxiety: Feeling nervous, anxious, or on edge  Worrying too much about different things  Trouble relaxing    GABRIELA-7 scores:    GABRIELA-7 SCORE 9/28/2018 12/3/2020   Total Score 1 (minimal anxiety) -   Total Score 1 10     Panic:  No symptoms   Agoraphobia:  No   PTSD:  No symptoms   OCD:  No symptoms   Psychosis: No symptoms   ADD / ADHD: Task Completion Difficulties  Gambling or shoplifting: No   Eating Disorder:  No symptoms  Sleep:   Trouble falling asleep  Trouble staying asleep     Psychiatric History:   Hospitalizations:late teens and early 20's  History of Commitment? No   Past Treatment: counseling, inpatient mental health services, medication(s) from physician / PCP and psychiatry  Suicide Attempts:  none  Self-injurious Behavior: Denies  Electroconvulsive Therapy (ECT) or Transcranial Magnetic Stimulation (TMS): No   Genetic Testing: No     Substance Use History:  Current use of drugs or alcohol: Denies   Patient reports no problems as a result of their drinking / drug use.   Based on the clinical interview, there  are not indications of drug or alcohol abuse.  Tobacco use: Yes Cigarettes  Ready to quit?  Yes going to talk to PCP about starting Chantax  Nicotine Replacement Therapy tried: none   Caffeine: No  Patient has not received chemical dependency treatment in the past  Recovery Programming Involvement: None    Past Medical History:  Past Medical History:   Diagnosis Date     Antiplatelet or antithrombotic long-term use      Arrhythmia      Depressive disorder 1990     Esophageal reflux      Hearing problem 2018     Hyperlipidemia LDL goal <130 7/6/2015     Hypertension      LSIL (low grade squamous intraepithelial lesion) on Pap smear 6/2014    + HPV, unable to type colp - BR[place] I     LEONARDO on  CPAP      Other anxiety states       Surgery:   Past Surgical History:   Procedure Laterality Date     EP ABLATION FOCAL AFIB N/A 10/3/2019    Procedure: EP ABLATION FOCAL AFIB;  Surgeon: Harpreet Arevalo MD;  Location: [facility] OR     HC TOOTH EXTRACTION W/FORCEP  1995    [place] Teeth Extraction     LAPAROSCOPIC GASTRIC SLEEVE N/A 11/27/2018    Procedure: Laparoscopic Sleeve Gastrectomy Latex Free;  Surgeon: Artis Tse MD;  Location: [facility] OR     LAPAROSCOPIC HERNIORRHAPHY HIATAL  11/27/2018    Procedure: LAPAROSCOPIC  HIATAL Hernia Repair;  Surgeon: Artis Tse MD;  Location: [facility] OR     Allergies:     Allergies   Allergen Reactions     Wellbutrin [Bupropion]      Wellbutrin: Panic attacks, heart/chest pain     Primary Care Provider: oPlly Mcbride MD  Seizures or Head Injury: No  Diet: No Restrictions  Food Allergies: No   Exercise: No regular exercise program  Supplements: Reviewed per Electronic Medical Record Today         calcium carbonate (OS-CHIKI) 500 MG tablet, Take 1 tablet by mouth daily        ferrous gluconate (FERGON) 324 (38 Fe) MG tablet, TAKE 1 TABLET(324 MG) BY MOUTH DAILY WITH BREAKFAST       fish oil-omega-3 fatty acids 1000 MG capsule, Take 2 g by mouth every morning        hydrochlorothiazide (MICROZIDE) 12.5 MG capsule, Take 1 capsule (12.5 mg) by mouth daily       lisinopril (ZESTRIL) 40 MG tablet, Take 1 tablet (40 mg) by mouth daily       Multiple Vitamins-Minerals (CENTRUM PO), 2 tablets per day       omeprazole (PRILOSEC) 40 MG DR capsule, Take 1 capsule (40 mg) by mouth daily       topiramate (TOPAMAX) 25 MG tablet, Take 1 tablet (25 mg) by mouth 2 times daily       venlafaxine (EFFEXOR-XR) 75 MG 24 hr capsule, Take 1 capsule (75 mg) by mouth daily       vitamin B complex with vitamin C (VITAMIN  B COMPLEX) tablet, Take 1 tablet by mouth daily       acetaminophen (TYLENOL) 325 MG tablet, Take 2 tablets (650 mg) by mouth every 4 hours as needed for other (For optimal non-opioid  multimodal pain management to improve pain control and physical function.)       clindamycin (CLEOCIN) 100 MG vaginal suppository, Place 1 suppository (100 mg) vaginally At Bedtime (Patient not taking: Reported on 12/3/2020)       LORazepam (ATIVAN) 0.5 MG tablet, Take 1 tablet (0.5 mg) by mouth every 8 hours as needed for anxiety (Patient not taking: Reported on 2020)       naltrexone (DEPADE/REVIA) 50 MG tablet, Take 1 tablet (50 mg) by mouth daily       Probiotic Product (PROBIOTIC ADVANCED PO),        VENTOLIN  (90 Base) MCG/ACT Inhaler, INHALE 1-2 PUFFS EVERY 4-6 HOURS AS NEEDED FOR WHEEZING    No current facility-administered medications on file prior to visit.        The [place] Prescription Monitoring Program has been reviewed and there are no concerns about diversionary activity for controlled substances at this time.  No data for controlled substances over the last one year.     Vital Signs:  Vitals: There were no vitals taken for this visit.    Labs:    Most recent labs reviewed and no new labs.   Most recent EKG from 2019 reviewed. QTc interval 452.  Normal EKG.    Review of Systems:  10 systems (general, cardiovascular, respiratory, eyes, ENT, endocrine, GI, , M/S, neurological) were reviewed. Most pertinent finding(s) is/are: She denies chest pain, no shortness of breath, no headaches. The remaining systems are all unremarkable.  Family History:   Patient reported family history includes:   Family History   Problem Relation Age of Onset     Heart Disease Mother         ,mother had emphesema and  at 62     Hypertension Mother      Allergies Mother      Lipids Mother      Obesity Mother      Respiratory Mother         Emphysema     Diabetes Maternal Grandmother         Type 2?     Hypertension Maternal Grandmother      Circulatory Maternal Grandmother         Due to diabetes     Eye Disorder Maternal Grandmother         Macular degeneration/Macular degeneration     Obesity  Maternal Grandmother      Hypertension Father      Lipids Father      Cancer Father      Prostate Cancer Father      Other Cancer Father      Cerebrovascular Disease Paternal Grandmother      Alcohol/Drug Maternal Grandfather      Obesity Maternal Grandfather      Psychotic Disorder Paternal Grandfather         Committed Suicide     Depression Paternal Grandfather      Allergies Sister      Genitourinary Problems Sister      Mental Illness History: Yes: depression, anxiety, schizophrenia  Substance Abuse History: Yes: alcoholism  Suicide History: Yes: grandfather hung himself  Medications: Unknown     Social History:   Born: [facility], 1929[place]1984  Raised: [place]  Childhood: good family dynamics,  Bullied in school  Siblings:  2 older sisters  Highest education level was graduate school.   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 Feels safe at home.  Children: none    Firearms/Weapons Access: No: Patient denies   Service: No    Mental Status Examination:     Appearance:  awake, alert  Attitude:  cooperative   Eye Contact:  adequate  Gait and Station: No assistive Devices used and No dizziness or falls  Psychomotor Behavior:  intact station, gait and muscle tone  Oriented to:  time, person, and place  Attention Span and Concentration:  Normal  Speech:  clear, coherent and Speaks: English  Mood:  anxious, depressed and Irritable  Affect:  intensity is heightened  Associations:  no loose associations  Thought Process:  logical and goal oriented  Thought Content:  no evidence of suicidal ideation or homicidal ideation, no auditory hallucinations present and no visual hallucinations present  Recent and Remote Memory:  intact Not formally assessed. No amnesia.  Fund of Knowledge: appropriate  Insight:  good  Judgment:  intact  Impulse Control:  intact    Suicide Risk Assessment:  Today Danitza RUBIO Charles reports that she is having no  thoughts to want to harm herself or to hurt other people. In addition, there are notable risk factors for self-harm, including anxiety and mood change. However, risk is mitigated by sobriety, history of seeking help when needed, future oriented, denies suicidal intent or plan and denies homicidal ideation, intent, or plan. Therefore, based on all available evidence including the factors cited above, Danitza Soto does not appear to be at imminent risk for self-harm, does not meet criteria for a 72-hr hold, and therefore remains appropriate for ongoing outpatient level of care.  A thorough assessment of risk factors related to suicide and self-harm have been reviewed and are noted above. The patient convincingly denies acute suicidality on several occasions. Local community safety resources reviewed and printed for patient to use if needed. There was no deceit detected, and the patient presented in a manner that was believable.     DSM5  Diagnosis:  296.32 (F33.1) Major Depressive Disorder, Recurrent Episode, Moderate With mixed features  Substance-Related & Addictive Disorders Specify if: In a controlled environment, Specify current severity:  305.1(F17.200) Moderate    Medical Comorbidities Include:   Patient Active Problem List    Diagnosis Date Noted     Moderate episode of recurrent major depressive disorder (H) 09/18/2020     Priority: Medium     BV (bacterial vaginosis) 09/18/2020     Priority: Medium     S/P ablation of atrial fibrillation 10/03/2019     Priority: Medium     Recurrent major depressive disorder, in full remission (H) 04/08/2019     Priority: Medium     Morbid (severe) obesity due to excess calories (H) 11/27/2018     Priority: Medium     Paroxysmal atrial fibrillation (H) 10/23/2018     Priority: Medium     LEONARDO (obstructive sleep apnea) 04/13/2017     Priority: Medium     4/10/2017 - Home Sleep Apnea Testing - AHI 15.5/hr; Supine AHI 12.9/hr; SpO2 <= 88% for 33.8 minutes.  Titration Sleep  Study 4/19/2017 - (288.0 lbs) CPAP was titrated to a pressure of 11 with an AHI of 3.7.  Time Spent in REM supine at this pressure was 33.5.  Recommending Auto-CPAP 7 - 15 cmH2O.       Obesity hypoventilation syndrome (H) 04/13/2017     Priority: Medium     Mixed hyperlipidemia 07/06/2015     Priority: Medium     Papanicolaou smear of cervix with low grade squamous intraepithelial lesion (LGSIL) 06/17/2014     Priority: Medium     6/17/14: LSIL, + HPV, unable to type.   8/20/14:[place]:BRISEYDA I. Plan cotest pap & HPV in 1 year.   1/20116 Pap Ascus Neg HPV. Plan: [place].   5/13/16 Colposcopy not completed. Cancelled by patient  3/21/17 NIL/Neg HPV. Plan: cotest in 1 year per Dr. Mcbride  9/28/18 Tele enc: Dr. Mcbride, recommending new plan. Cotest due Jan or Feb 2019. Tracking updated. ([facility])  04/02/19 Patient is lost to pap tracking follow-up.   04/08/19: NIL pap, Neg HR HPV result. Plan cotest in 3 years per provider.        Acne 02/24/2012     Priority: Medium     Essential hypertension 01/12/2011     Priority: Medium     CARDIOVASCULAR SCREENING; LDL GOAL LESS THAN 130 10/31/2010     Priority: Medium     Morbid obesity due to excess calories (H) 05/17/2006     Priority: Medium     Problem list name updated by automated process. Provider to review       Tobacco use disorder 05/17/2006     Priority: Medium     Anxiety state      Priority: Medium     Gastroesophageal reflux disease without esophagitis      Priority: Medium       A 12-item WHODAS 2.0 assessment was completed by the patient today and recorded in EPIC.    WHODAS 2.0 Total Score 9/28/2018   Total Score 21   Total Score MyChart 21       The Patient Activation Measure (CLAYTON) score was completed and recorded in [facility]. This assesses patient knowledge, skill, and confidence for self-management.   CLAYTON Score (Last Two) 8/11/2010 12/5/2019   CLAYTON Raw Score 48 35   Activation Score 80 72.1   CLAYTON Level 4 3                Impression:  Danitza Soto is visiting with me  today to explore medication options to better manage her symptoms of depression and anxiety.  What is concerning to her is her increased irritability with anger episodes.  She is going through a divorce at this time.  She has been taking venlafaxine which had worked initially and will continue at 75 mg daily.  I added Abilify 2 mg daily to hopefully help with decreasing irritability and lessening her depression symptoms.  Eric wonders if she might be in the autism spectrum and is concerned about attentional deficits.  I did refer her for psychological testing for diagnosis clarification.  Finally I would ask her to consider genetic testing to determine future medication choices.    Medication side effects and alternatives reviewed. Health promotion activities recommended and reviewed today. All questions addressed. Education and counseling completed regarding risks and benefits of medications and psychotherapy options. Collaborative Care Psychiatry Service model reviewed today. Recommend therapy for additional support.     Treatment Plan:     1.  Continue venlafaxine ER  75 mg daily  2.  Refer for psychological testing for diagnosis clarification and rule out ADHD and autism spectrum disorder  3.  Add Abilify 2 mg daily  4.  Consider genetic testing to determine future medication choices        Continue all other medical directions per primary care provider.     Continue all other medications as reviewed per electronic medical record today.     Safety plan reviewed. To the Emergency Department as needed or call after hours crisis line at [phone] or [phone]. [place] Crisis Text [place]: Text [place] to 258832  or  Suicide LifeLine Chat: suicidepreventionlifeline.org/chat/    To schedule individual or family therapy, call [facility] at [phone].     Schedule an appointment with me in six weeks or sooner as needed.  Call [facility] at [phone] to schedule.    Follow up  with primary care provider as planned or for acute medical concerns.    Call the psychiatric nurse line with medication questions or concerns at [phone].    MyChart may be used to communicate with your provider, but this is not intended to be used for emergencies.    Crisis Resources:    National Suicide Prevention Lifeline: 749.252.1575 (TTY: 132.649.7599). Call anytime for help.  (www.suicidepreventionlifeline.org)  National [place] on Mental Illness (www.eduar.org): 182.881.9320 or 568-869-9340.   Mental Health Association (www.mentalhealth.org): 687.859.7586 or 804-063-2461.  [place] Crisis Text Line: Text [place] to 224949  Suicide LifeLine Chat: suicidepreikaSystemslifeline.org/chat    Administrative Billing:   Time spent with patient was 60 minutes and greater than 50% of time or 40 minutes was spent in counseling and coordination of care regarding above diagnoses and treatment plan.    Patient Status:  Patient will continue to be seen for ongoing consultation and stabilization.    Signed:   PATRIA Means-BC   Psychiatry

## 2020-12-03 NOTE — PATIENT INSTRUCTIONS
1.  Continue venlafaxine ER 75 mg daily  2.  Refer for psychological testing for diagnosis clarification and rule out ADHD and autism spectrum disorder  3.  Add Abilify 2 mg daily  4.  Consider genetic testing to determine future medication choices        Continue all other medical directions per primary care provider.     Continue all other medications as reviewed per electronic medical record today.     Safety plan reviewed. To the Emergency Department as needed or call after hours crisis line at [phone] or [phone]. [place] Crisis Text [place]: Text [place] to 382494  or  Suicide LifeLine Chat: suicideBringItline.org/chat/    To schedule individual or family therapy, call [facility] at [phone].     Schedule an appointment with me in six weeks or sooner as needed.  Call [facility] at [phone] to schedule.    Follow up with primary care provider as planned or for acute medical concerns.    Call the psychiatric nurse line with medication questions or concerns at [phone].    Weavlyhart may be used to communicate with your provider, but this is not intended to be used for emergencies.    Crisis Resources:    National Suicide Prevention Lifeline: 228.618.3219 (TTY: 781.880.6963). Call anytime for help.  (www.suicidepreventionlifeline.org)  National [place] on Mental Illness (www.eduar.org): 521.344.5270 or 050-124-8431.   Mental Health Association (www.mentalhealth.org): 857.587.4536 or 713-643-9980.  [place] Crisis [place]: Text [place] to 113158  Suicide LifeLine Chat: suicideBringItline.org/chat

## 2020-12-04 ENCOUNTER — VIRTUAL VISIT (OUTPATIENT)
Dept: FAMILY MEDICINE | Facility: CLINIC | Age: 40
End: 2020-12-04
Payer: COMMERCIAL

## 2020-12-04 DIAGNOSIS — Z71.6 ENCOUNTER FOR SMOKING CESSATION COUNSELING: Primary | ICD-10-CM

## 2020-12-04 PROCEDURE — 99214 OFFICE O/P EST MOD 30 MIN: CPT | Mod: GT | Performed by: FAMILY MEDICINE

## 2020-12-04 ASSESSMENT — ANXIETY QUESTIONNAIRES: GAD7 TOTAL SCORE: 10

## 2020-12-04 NOTE — PROGRESS NOTES
"Danitza Soto is a 40 year old female who is being evaluated via a billable video visit.      The patient has been notified of following:     \"This video visit will be conducted via a call between you and your physician/provider. We have found that certain health care needs can be provided without the need for an in-person physical exam.  This service lets us provide the care you need with a video conversation.  If a prescription is necessary we can send it directly to your pharmacy.  If lab work is needed we can place an order for that and you can then stop by our lab to have the test done at a later time.    Video visits are billed at different rates depending on your insurance coverage.  Please reach out to your insurance provider with any questions.    If during the course of the call the physician/provider feels a video visit is not appropriate, you will not be charged for this service.\"    Patient has given verbal consent for Video visit? Yes  How would you like to obtain your AVS? MyChart  If you are dropped from the video visit, the video invite should be resent to: Text to cell phone: [phone]  Will anyone else be joining your video visit? No      Subjective     Danitza Soto is a 40 year old female who presents today via video visit for the following health issues:    Pt here to discuss smoking cessation - would like to go back on Chantix, previously tried 2 years ago with no SEs. Pt currently smoking 1 ppd.   She quit on the chantix 2 yrs ago and was not smoking until about a month ago when she restarted smoking because of a lot of stress in her life, going through divorce, the pandemic , one of her dogs killed a foster got she was having for a while , she got sick with COVID over Thanksgiving , now feels better but still not eating much , she sees a psychiatrist for her depression and has been started on Abilify yesterday     HPI            Video Start Time: 9:30 am         Review of Systems " "  Constitutional, HEENT, cardiovascular, pulmonary, GI, , musculoskeletal, neuro, skin, endocrine and psych systems are negative, except as otherwise noted.      Objective           Vitals:  No vitals were obtained today due to virtual visit.    Physical Exam     GENERAL: Healthy, alert and no distress  EYES: Eyes grossly normal to inspection.  No discharge or erythema, or obvious scleral/conjunctival abnormalities.  RESP: No audible wheeze, cough, or visible cyanosis.  No visible retractions or increased work of breathing.    SKIN: Visible skin clear. No significant rash, abnormal pigmentation or lesions.  NEURO: Cranial nerves grossly intact.  Mentation and speech appropriate for age.  PSYCH: Mentation appears normal, affect normal/bright, judgement and insight intact, normal speech and appearance well-groomed.      No results found for this or any previous visit (from the past 24 hour(s)).        Assessment & Plan     Encounter for smoking cessation counseling  We discussed the medication and side effects  She has taken it before and has had only vivid dreams then , when she takes it with food she does nto have any nausea   - varenicline (CHANTIX WILY) 0.5 MG X 11 & 1 MG X 42 tablet; Take 0.5 mg tab daily for 3 days, THEN 0.5 mg tab twice daily for 4 days, THEN 1 mg twice daily.     Tobacco Cessation:   reports that she has been smoking cigarettes. She started smoking about 16 years ago. She has a 10.00 pack-year smoking history. She has never used smokeless tobacco.  Tobacco Cessation Action Plan: Pharmacotherapies : Chantix      BMI:   Estimated body mass index is 42.12 kg/m  as calculated from the following:    Height as of 9/18/20: 1.664 m (5' 5.5\").    Weight as of 9/18/20: 116.6 kg (257 lb).   Weight management plan: Discussed healthy diet and exercise guidelines     she has already lost about 15 lbs in a couple of months as she is not eating as much , discussed staying hydrated and small meals     RTC if " no improving or worsening.  Pt is aware  and comfortable with the current plan.      Polly Mcbride MD  [facility]      Video-Visit Details    Type of service:  Video Visit    Video End Time:9:37 AM    Originating Location (pt. Location): Home    Distant Location (provider location):  [facility]     [facility]

## 2020-12-14 ENCOUNTER — MYC MEDICAL ADVICE (OUTPATIENT)
Dept: PSYCHOLOGY | Facility: CLINIC | Age: 40
End: 2020-12-14

## 2020-12-14 DIAGNOSIS — F33.1 MODERATE EPISODE OF RECURRENT MAJOR DEPRESSIVE DISORDER (H): ICD-10-CM

## 2020-12-14 DIAGNOSIS — G47.00 INSOMNIA: Primary | ICD-10-CM

## 2020-12-15 NOTE — TELEPHONE ENCOUNTER
Last visit 12/2/20  Next visit 1/19/20    Patient requesting Abilify 2 mg to increase to 5 mg. She started Abilfy 2 mg on 12/2/20.    Treatment Plan from 12/2/20 by PATRIA Means-BC :     1.  Continue venlafaxine ER  75 mg daily  2.  Refer for psychological testing for diagnosis clarification and rule out ADHD and autism spectrum disorder  3.  Add Abilify 2 mg daily  4.  Consider genetic testing to determine future medication choices          Continue all other medical directions per primary care provider.     Continue all other medications as reviewed per electronic medical record today.     Safety plan reviewed. To the Emergency Department as needed or call after hours crisis line at [phone] or [phone]. [place] Crisis Text [place]: Text [place] to 078866  or  Suicide LifeLine Chat: suicidepreventionlifeline.org/chat/    To schedule individual or family therapy, call [facility] at [phone].   Schedule an appointment with me in six weeks or sooner as needed.  Call [facility] at [phone] to schedule.

## 2020-12-21 RX ORDER — ARIPIPRAZOLE 5 MG/1
5 TABLET ORAL DAILY
Qty: 30 TABLET | Refills: 0 | Status: SHIPPED | OUTPATIENT
Start: 2020-12-21 | End: 2021-01-19

## 2020-12-21 RX ORDER — HYDROXYZINE PAMOATE 50 MG/1
50 CAPSULE ORAL AT BEDTIME
Qty: 30 CAPSULE | Refills: 0 | Status: SHIPPED | OUTPATIENT
Start: 2020-12-21 | End: 2021-04-19

## 2020-12-27 ENCOUNTER — MYC MEDICAL ADVICE (OUTPATIENT)
Dept: FAMILY MEDICINE | Facility: CLINIC | Age: 40
End: 2020-12-27

## 2020-12-27 DIAGNOSIS — Z71.6 ENCOUNTER FOR SMOKING CESSATION COUNSELING: ICD-10-CM

## 2020-12-28 DIAGNOSIS — K21.9 GASTROESOPHAGEAL REFLUX DISEASE WITHOUT ESOPHAGITIS: ICD-10-CM

## 2020-12-28 RX ORDER — VARENICLINE TARTRATE 1 MG/1
1 TABLET, FILM COATED ORAL 2 TIMES DAILY
Qty: 60 TABLET | Refills: 0 | Status: SHIPPED | OUTPATIENT
Start: 2020-12-28 | End: 2021-08-25

## 2020-12-29 RX ORDER — OMEPRAZOLE 40 MG/1
40 CAPSULE, DELAYED RELEASE ORAL DAILY
Qty: 90 CAPSULE | Refills: 3 | Status: SHIPPED | OUTPATIENT
Start: 2020-12-29 | End: 2021-02-03

## 2020-12-29 NOTE — TELEPHONE ENCOUNTER
Prescription approved per [facility] Refill Protocol.  Mady LOO RN

## 2021-01-19 ENCOUNTER — VIRTUAL VISIT (OUTPATIENT)
Dept: PSYCHOLOGY | Facility: CLINIC | Age: 41
End: 2021-01-19
Payer: COMMERCIAL

## 2021-01-19 DIAGNOSIS — F33.0 MILD EPISODE OF RECURRENT MAJOR DEPRESSIVE DISORDER (H): ICD-10-CM

## 2021-01-19 PROCEDURE — 99214 OFFICE O/P EST MOD 30 MIN: CPT | Mod: TEL | Performed by: NURSE PRACTITIONER

## 2021-01-19 RX ORDER — ARIPIPRAZOLE 5 MG/1
5 TABLET ORAL DAILY
Qty: 90 TABLET | Refills: 0 | Status: SHIPPED | OUTPATIENT
Start: 2021-01-19 | End: 2021-03-12

## 2021-01-19 ASSESSMENT — ANXIETY QUESTIONNAIRES
5. BEING SO RESTLESS THAT IT IS HARD TO SIT STILL: NOT AT ALL
IF YOU CHECKED OFF ANY PROBLEMS ON THIS QUESTIONNAIRE, HOW DIFFICULT HAVE THESE PROBLEMS MADE IT FOR YOU TO DO YOUR WORK, TAKE CARE OF THINGS AT HOME, OR GET ALONG WITH OTHER PEOPLE: NOT DIFFICULT AT ALL
6. BECOMING EASILY ANNOYED OR IRRITABLE: SEVERAL DAYS
7. FEELING AFRAID AS IF SOMETHING AWFUL MIGHT HAPPEN: NOT AT ALL
3. WORRYING TOO MUCH ABOUT DIFFERENT THINGS: NOT AT ALL
2. NOT BEING ABLE TO STOP OR CONTROL WORRYING: NOT AT ALL
GAD7 TOTAL SCORE: 2
1. FEELING NERVOUS, ANXIOUS, OR ON EDGE: NOT AT ALL

## 2021-01-19 ASSESSMENT — PATIENT HEALTH QUESTIONNAIRE - PHQ9
5. POOR APPETITE OR OVEREATING: SEVERAL DAYS
SUM OF ALL RESPONSES TO PHQ QUESTIONS 1-9: 4

## 2021-01-19 NOTE — PATIENT INSTRUCTIONS
1.  Continue Abilify 5 mg daily    2.  Continue venlafaxine ER 75 mg daily    3.  Begin individual talk therapy when able to      Continue all other medications as reviewed per electronic medical record today.     Safety plan reviewed. To the Emergency Department as needed or call after hours crisis line at [phone] or [phone]. [place] Crisis [place]. Text [place] to 817406 or Suicide LifeLine Chat: suicideBullitt Group.org/chat/    To schedule individual or family therapy, call [facility] at [phone].    Schedule an appointment with me in 3 months or sooner as needed. Call [facility] at [phone] to schedule.    Follow up with primary care provider as planned or for acute medical concerns.    Call the psychiatric nurse line with medication questions or concerns at [phone].    Muzeekhart may be used to communicate with your provider, but this is not intended to be used for emergencies.    Crisis Resources:    National Suicide Prevention Lifeline: 338.301.6198 (TTY: 208.165.2411). Call anytime for help.  (www.suicidepreventionlifeline.org)  National [place] on Mental Illness (www.eduar.org): 819.907.3829 or 748-491-3821.   Mental Health Association (www.mentalhealth.org): 406.577.4346 or 725-966-5377.  [place] Crisis [place]: Text [place] to 282700  Suicide LifeLine Chat: suicideBullitt Group.org/chat

## 2021-01-19 NOTE — PROGRESS NOTES
"Trish Soto is a 40 year old who is being evaluated via a billable telephone visit.      What phone number would you like to be contacted at? [phone]  How would you like to obtain your AVS? [place]        Outpatient Psychiatric Progress Note    Name: Danitza Soto   : 1980                    Primary Care Provider: Polly Mcbride MD   Therapist: Just reached out for therapy appointments -      PHQ-9 scores:  PHQ-9 SCORE 2020 12/3/2020 2021   PHQ-9 Total Score MyChart 4 (Minimal depression) - -   PHQ-9 Total Score 4 9 4       GABRIELA-7 scores:  GABRIELA-7 SCORE 2018 12/3/2020 2021   Total Score 1 (minimal anxiety) - -   Total Score 1 10 2       Patient Identification:    Patient is a 40 year old year old,   White American female  who presents for return visit with me.  Patient is currently employed full time. Patient attended the session alone. Patient prefers to be called: \"Trish\".    Interim History:    I last saw Danitza Soto for outpatient psychiatry Consultation on December 3, 2020.     During that appointment, we met for the first time to explore medication options to better manage her symptoms of depression and anxiety.  What is concerning to her is her increased irritability with anger episodes.  She is going through a divorce at this time.  She has been taking venlafaxine which had worked initially and will continue at 75 mg daily.  I added Abilify 2 mg daily to hopefully help with decreasing irritability and lessening her depression symptoms.  Eric wonders if she might be in the autism spectrum and is concerned about attentional deficits.  I did refer her for psychological testing for diagnosis clarification.  Finally I would ask her to consider genetic testing to determine future medication choices.    .     Current medications include:      acetaminophen (TYLENOL) 325 MG tablet, Take 2 tablets (650 mg) by mouth every 4 hours as needed for other (For optimal " non-opioid multimodal pain management to improve pain control and physical function.)       ARIPiprazole (ABILIFY) 5 MG tablet, Take 1 tablet (5 mg) by mouth daily       calcium carbonate (OS-CHIKI) 500 MG tablet, Take 1 tablet by mouth daily        ferrous gluconate (FERGON) 324 (38 Fe) MG tablet, TAKE 1 TABLET(324 MG) BY MOUTH DAILY WITH BREAKFAST       fish oil-omega-3 fatty acids 1000 MG capsule, Take 2 g by mouth every morning        hydrochlorothiazide (MICROZIDE) 12.5 MG capsule, Take 1 capsule (12.5 mg) by mouth daily       lisinopril (ZESTRIL) 40 MG tablet, Take 1 tablet (40 mg) by mouth daily       Multiple Vitamins-Minerals (CENTRUM PO), 2 tablets per day       naltrexone (DEPADE/REVIA) 50 MG tablet, Take 1 tablet (50 mg) by mouth daily       omeprazole (PRILOSEC) 40 MG DR capsule, Take 1 capsule (40 mg) by mouth daily       Probiotic Product (PROBIOTIC ADVANCED PO),        topiramate (TOPAMAX) 25 MG tablet, Take 1 tablet (25 mg) by mouth 2 times daily       varenicline (CHANTIX) 1 MG tablet, Take 1 tablet (1 mg) by mouth 2 times daily       venlafaxine (EFFEXOR-XR) 75 MG 24 hr capsule, Take 1 capsule (75 mg) by mouth daily       VENTOLIN  (90 Base) MCG/ACT Inhaler, INHALE 1-2 PUFFS EVERY 4-6 HOURS AS NEEDED FOR WHEEZING       vitamin B complex with vitamin C (VITAMIN  B COMPLEX) tablet, Take 1 tablet by mouth daily       clindamycin (CLEOCIN) 100 MG vaginal suppository, Place 1 suppository (100 mg) vaginally At Bedtime (Patient not taking: Reported on 12/3/2020)       hydrOXYzine (VISTARIL) 50 MG capsule, Take 1 capsule (50 mg) by mouth At Bedtime (Patient not taking: Reported on 1/19/2021)    No current facility-administered medications on file prior to visit.        The [place] Prescription Monitoring Program has been reviewed and there are no concerns about diversionary activity for controlled substances at this time.      I was able to review most recent Primary Care Provider, specialty  provider, and therapy visit notes that I have access to.     Today, patient reports that there was family discord over the holidays.  She is having less anger and irritability.  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  She works full time.  Her divorce was finalized at the December.  She just changed to taking the Abilify with the Effexor in the morning as her sleep was disrupted, getting 2-5 hours of sleep per night.  Now it is getting better.  Her blood pressure has been within normal.     has a past medical history of Antiplatelet or antithrombotic long-term use, Arrhythmia, Depressive disorder (1990), Esophageal reflux, Hearing problem (2018), Hyperlipidemia LDL goal <130 (7/6/2015), Hypertension, LSIL (low grade squamous intraepithelial lesion) on Pap smear (6/2014), LEONARDO on CPAP, and Other anxiety states. She also has no past medical history of Cerebral infarction (H) or Congestive heart failure (H).    Social history updates:    She is working on painting her bedroom.  She likes watching television and reading science fiction and fantasy.  She has started seeing another person.      Substance use updates:    She does  Not drink alcohol  Tobacco use: No    Vital Signs:   There were no vitals taken for this visit.    Labs:    Most recent laboratory results reviewed and no new labs.     Review of Systems:  10 systems (general, cardiovascular, respiratory, eyes, ENT, endocrine, GI, , M/S, neurological) were reviewed. Most pertinent finding(s) is/are: she reports no chest pain, no shortness of breath, no headaches. The remaining systems are all unremarkable.    Mental Status Examination:  Appearance:  awake, alert  Attitude:  cooperative   Eye Contact:  Unable to assess  Gait and Station: No assistive Devices used and No dizziness or falls  Psychomotor Behavior:  Unable to assess  Oriented to:  time, person, and place  Attention Span and Concentration:  Normal  Speech:   clear, coherent and Speaks: English  Mood:   anxious, depressed and better  Affect:  mood congruent  Associations:  no loose associations  Thought Process:  logical and goal oriented  Thought Content:  no evidence of suicidal ideation or homicidal ideation, no auditory hallucinations present and no visual hallucinations present  Recent and Remote Memory:  intact Not formally assessed. No amnesia.  Fund of Knowledge: appropriate  Insight:  good  Judgment:  intact  Impulse Control:  intact    Suicide Risk Assessment:  Today Danitza Soto reports that she is having no thoughts to want to end her life or to harm other people. In addition, there are notable risk factors for self-harm, including single status, anxiety and mood change. However, risk is mitigated by sobriety, history of seeking help when needed, future oriented, denies suicidal intent or plan and denies homicidal ideation, intent, or plan. Therefore, based on all available evidence including the factors cited above, Danitza Soto does not appear to be at imminent risk for self-harm, does not meet criteria for a 72-hr hold, and therefore remains appropriate for ongoing outpatient level of care.  A thorough assessment of risk factors related to suicide and self-harm have been reviewed and are noted above. The patient convincingly denies suicidality on several occasions. Local community safety resources printed and reviewed for patient to use if needed. There was no deceit detected, and the patient presented in a manner that was believable.     DSM5 Diagnosis:  296.31 (F33.0) Major Depressive Disorder, Recurrent Episode, Mild With mixed features    Medical comorbidities include:   Patient Active Problem List    Diagnosis Date Noted     Moderate episode of recurrent major depressive disorder (H) 09/18/2020     Priority: Medium     BV (bacterial vaginosis) 09/18/2020     Priority: Medium     S/P ablation of atrial fibrillation 10/03/2019     Priority: Medium     Recurrent major depressive disorder,  in full remission (H) 04/08/2019     Priority: Medium     Morbid (severe) obesity due to excess calories (H) 11/27/2018     Priority: Medium     Paroxysmal atrial fibrillation (H) 10/23/2018     Priority: Medium     LEONARDO (obstructive sleep apnea) 04/13/2017     Priority: Medium     4/10/2017 - Home Sleep Apnea Testing - AHI 15.5/hr; Supine AHI 12.9/hr; SpO2 <= 88% for 33.8 minutes.  Titration Sleep Study 4/19/2017 - (288.0 lbs) CPAP was titrated to a pressure of 11 with an AHI of 3.7.  Time Spent in REM supine at this pressure was 33.5.  Recommending Auto-CPAP 7 - 15 cmH2O.       Obesity hypoventilation syndrome (H) 04/13/2017     Priority: Medium     Mixed hyperlipidemia 07/06/2015     Priority: Medium     Papanicolaou smear of cervix with low grade squamous intraepithelial lesion (LGSIL) 06/17/2014     Priority: Medium     6/17/14: LSIL, + HPV, unable to type.   8/20/14:Heth:BRISEYDA I. Plan cotest pap & HPV in 1 year.   1/20116 Pap Ascus Neg HPV. Plan: Heth.   5/13/16 Colposcopy not completed. Cancelled by patient  3/21/17 NIL/Neg HPV. Plan: cotest in 1 year per Dr. Mcbride  9/28/18 Tele enc: Dr. Mcbride, recommending new plan. Cotest due Jan or Feb 2019. Tracking updated. ([facility])  04/02/19 Patient is lost to pap tracking follow-up.   04/08/19: NIL pap, Neg HR HPV result. Plan cotest in 3 years per provider.        Acne 02/24/2012     Priority: Medium     Essential hypertension 01/12/2011     Priority: Medium     CARDIOVASCULAR SCREENING; LDL GOAL LESS THAN 130 10/31/2010     Priority: Medium     Morbid obesity due to excess calories (H) 05/17/2006     Priority: Medium     Problem list name updated by automated process. Provider to review       Tobacco use disorder 05/17/2006     Priority: Medium     Anxiety state      Priority: Medium     Gastroesophageal reflux disease without esophagitis      Priority: Medium       Assessment:    Danitza Soto reported today that she is feeling stable.  Since the increase in the  dose of Abilify to 5 mg daily, her depression is less intense.  She also is able to manage her work stress without incident.  Her divorce was final at the end of December and she is fine being alone in her home and has recently started a new relationship.  At one point she had been taking the Abilify at bedtime but found that she was not sleeping through the night.  Recently she changed to taking the Abilify with the venlafaxine in the morning and her sleep is slowly improving.  She takes topiramate and naltrexone to decrease food cravings after bariatric surgery.  Her weight is staying stable.  I will see Jacob 1 more time  in 3 months and if things remain stable, she may return to her primary provider for future refills.  She is in agreement with this plan..  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  She also mentioned if she would develop mild cardiac dyskinesia symptoms she would be willing to accept that over not feeling well with her mental health.    Medication side effects and alternatives were reviewed. Health promotion activities recommended and reviewed today. All questions addressed. Education and counseling completed regarding risks and benefits of medications and psychotherapy options.    Treatment Plan:        1.  Continue Abilify 5 mg daily    2.  Continue venlafaxine ER 75 mg daily    3.  Begin individual talk therapy when able to      Continue all other medications as reviewed per electronic medical record today.     Safety plan reviewed. To the Emergency Department as needed or call after hours crisis line at [phone] or [phone]. [place] Crisis Text [place]. Text [place] to 100446 or Suicide LifeLine Chat: suicidepreventionlifeline.org/chat/    To schedule individual or family therapy, call [facility] at [phone].    Schedule an appointment with me in 3 months  or sooner as needed. Call [facility] at [phone] to schedule.    Follow up with primary care provider as planned or for acute medical concerns.    Call the psychiatric nurse line with medication questions or concerns at [phone].    [employer] may be used to communicate with your provider, but this is not intended to be used for emergencies.    Crisis Resources:    National Suicide Prevention Lifeline: 185.690.8731 (TTY: 761.438.6742). Call anytime for help.  (www.suicidepreventionlifeline.org)  National [place] on Mental Illness (www.eduar.org): 787.713.8702 or 442-032-7661.   Mental Health Association (www.mentalhealth.org): 733.541.9786 or 465-393-4701.  [place] Crisis Text Line: Text [place] to 794823  Suicide LifeLine Chat: suicideApplied Visual Sciencesline.org/chat    Administrative Billing:   Time spent with patient was 30 minutes and greater than 50% of time or 20 minutes was spent in counseling and coordination of care regarding above diagnoses and treatment plan.    Patient Status:  Patient will continue to be seen for ongoing consultation and stabilization.    Signed:   PATRIA Means-BC   Psychiatry

## 2021-01-20 ASSESSMENT — ANXIETY QUESTIONNAIRES: GAD7 TOTAL SCORE: 2

## 2021-01-22 ENCOUNTER — TELEPHONE (OUTPATIENT)
Dept: SURGERY | Facility: CLINIC | Age: 41
End: 2021-01-22

## 2021-01-22 DIAGNOSIS — E66.01 MORBID OBESITY (H): ICD-10-CM

## 2021-01-22 DIAGNOSIS — E66.01 MORBID OBESITY DUE TO EXCESS CALORIES (H): ICD-10-CM

## 2021-01-26 ENCOUNTER — TELEPHONE (OUTPATIENT)
Dept: ENDOCRINOLOGY | Facility: CLINIC | Age: 41
End: 2021-01-26

## 2021-01-26 RX ORDER — TOPIRAMATE 25 MG/1
TABLET, FILM COATED ORAL
Qty: 60 TABLET | Refills: 5 | OUTPATIENT
Start: 2021-01-26

## 2021-01-26 RX ORDER — NALTREXONE HYDROCHLORIDE 50 MG/1
50 TABLET, FILM COATED ORAL DAILY
Qty: 30 TABLET | Refills: 0 | Status: SHIPPED | OUTPATIENT
Start: 2021-01-26 | End: 2021-02-03

## 2021-01-26 RX ORDER — NALTREXONE HYDROCHLORIDE 50 MG/1
TABLET, FILM COATED ORAL
Qty: 30 TABLET | Refills: 5 | OUTPATIENT
Start: 2021-01-26

## 2021-01-26 RX ORDER — TOPIRAMATE 25 MG/1
25 TABLET, FILM COATED ORAL 2 TIMES DAILY
Qty: 60 TABLET | Refills: 0 | Status: SHIPPED | OUTPATIENT
Start: 2021-01-26 | End: 2021-02-03

## 2021-01-26 NOTE — TELEPHONE ENCOUNTER
"LVM for patient.  Per Sandy's 7/20/20 note, \"return to clinic in 6 months with me and RD for 2 year follow-up sleeve with labs.\"  Schedule video return visit with Sandy (okay to schedule with Clotilde if need before March) for a follow-up and medication refill, and schedule a video new visit with Mady Kaminski or Hien Kelley per Sandy's note.  No labs from Sandy Cervantes ordered as of 1/26/21.  "

## 2021-01-26 NOTE — TELEPHONE ENCOUNTER
Patient has appointment scheduled with Clotilde Franklin CNP on 2/3/21. Sending 1 month refill to pharmacy.

## 2021-01-26 NOTE — TELEPHONE ENCOUNTER
Received refill request for NALTREXONE 50MG TABLETS  and TOPIRAMATE 25MG TABLETS . Patient needs appointment scheduled prior to any refills. Clinic Coordinator notified and will follow up with the patient as appropriate. The pharmacy has been notified that the medication will not be refilled prior to an appointment being scheduled.  NALTREXONE 50MG TABLETS    Last Written Prescription Date:  7/2/20  Last Fill Quantity: 30,   # refills: 5  TOPIRAMATE 25MG TABLETS  Last Written Prescription Date:  7/2/20  Last Fill Quantity: 60,   # refills: 5  Last Office Visit : 7/2/20  -. Return to clinic in 6 months with me and RD for 2 year follow up sleeve with labs. Dec 2020  -Continue topiramate 25mg BID and naltrexone 50mg  Future Office visit:  None     Scheduling has been notified to contact the pt for appointment.

## 2021-01-29 ENCOUNTER — MYC MEDICAL ADVICE (OUTPATIENT)
Dept: FAMILY MEDICINE | Facility: CLINIC | Age: 41
End: 2021-01-29

## 2021-02-01 DIAGNOSIS — Z98.84 S/P LAPAROSCOPIC SLEEVE GASTRECTOMY: Primary | ICD-10-CM

## 2021-02-01 DIAGNOSIS — E66.01 MORBID OBESITY DUE TO EXCESS CALORIES (H): ICD-10-CM

## 2021-02-03 ENCOUNTER — VIRTUAL VISIT (OUTPATIENT)
Dept: ENDOCRINOLOGY | Facility: CLINIC | Age: 41
End: 2021-02-03
Payer: COMMERCIAL

## 2021-02-03 VITALS — BODY MASS INDEX: 38.14 KG/M2 | WEIGHT: 243 LBS | HEIGHT: 67 IN

## 2021-02-03 DIAGNOSIS — E66.01 MORBID OBESITY (H): ICD-10-CM

## 2021-02-03 DIAGNOSIS — K21.9 GASTROESOPHAGEAL REFLUX DISEASE WITHOUT ESOPHAGITIS: ICD-10-CM

## 2021-02-03 DIAGNOSIS — Z71.3 NUTRITIONAL COUNSELING: ICD-10-CM

## 2021-02-03 DIAGNOSIS — Z98.84 S/P LAPAROSCOPIC SLEEVE GASTRECTOMY: Primary | ICD-10-CM

## 2021-02-03 DIAGNOSIS — E66.01 MORBID (SEVERE) OBESITY DUE TO EXCESS CALORIES (H): ICD-10-CM

## 2021-02-03 DIAGNOSIS — E66.01 MORBID OBESITY DUE TO EXCESS CALORIES (H): ICD-10-CM

## 2021-02-03 PROCEDURE — 99213 OFFICE O/P EST LOW 20 MIN: CPT | Mod: 95 | Performed by: NURSE PRACTITIONER

## 2021-02-03 PROCEDURE — 97803 MED NUTRITION INDIV SUBSEQ: CPT | Mod: GT | Performed by: DIETITIAN, REGISTERED

## 2021-02-03 RX ORDER — TOPIRAMATE 25 MG/1
25 TABLET, FILM COATED ORAL 2 TIMES DAILY
Qty: 180 TABLET | Refills: 1 | Status: SHIPPED | OUTPATIENT
Start: 2021-02-03 | End: 2021-10-01

## 2021-02-03 RX ORDER — FAMOTIDINE 20 MG/1
20 TABLET, FILM COATED ORAL 2 TIMES DAILY PRN
Qty: 60 TABLET | Refills: 3 | Status: SHIPPED | OUTPATIENT
Start: 2021-02-03 | End: 2021-02-04

## 2021-02-03 RX ORDER — OMEPRAZOLE 40 MG/1
40 CAPSULE, DELAYED RELEASE ORAL DAILY
Qty: 90 CAPSULE | Refills: 3 | Status: SHIPPED | OUTPATIENT
Start: 2021-02-03 | End: 2021-12-07

## 2021-02-03 RX ORDER — NALTREXONE HYDROCHLORIDE 50 MG/1
50 TABLET, FILM COATED ORAL DAILY
Qty: 90 TABLET | Refills: 1 | Status: SHIPPED | OUTPATIENT
Start: 2021-02-03 | End: 2021-11-02

## 2021-02-03 ASSESSMENT — MIFFLIN-ST. JEOR: SCORE: 1799.87

## 2021-02-03 ASSESSMENT — PAIN SCALES - GENERAL: PAINLEVEL: NO PAIN (0)

## 2021-02-03 NOTE — ASSESSMENT & PLAN NOTE
"Stable on 25mg topiramate twice daily and naltrexone 50mg once daily. Would like to stay at these doses- helps with cravings and \"grazing\". Losing weight again, after going up about 30lb after surgery.   "

## 2021-02-03 NOTE — PROGRESS NOTES
"Trish Soto is a 40 year old who is being evaluated via a billable video visit.      How would you like to obtain your AVS? MyChart  If the video visit is dropped, the invitation should be resent by: Text to cell phone: [phone]  Will anyone else be joining your video visit? No      Video Start Time: 1341  Video-Visit Details    Type of service:  Video Visit    Video End Time:134    Originating Location (pt. Location): Home    Distant Location (provider location):  [facility] WEIGHT MANAGEMENT [facility]     Platform used for Video Visit: [facility] Bariatric Surgery Note    RE: Danitza Soto  MR#: [id number]  : 1980  VISIT DATE: Feb 3, 2021      Dear Polly Mcbride,    I had the pleasure of seeing your patient, Danitza Soto, in my post-bariatric surgery assessment clinic.    Assessment & Plan   Problem List Items Addressed This Visit        Digestive    Gastroesophageal reflux disease without esophagitis     Omeprazole 40mg AM and 20mg PM. Has breakthrough with onions and tomato. Consider adding pepcid PRN.          Relevant Medications    omeprazole (PRILOSEC) 40 MG DR capsule    omeprazole (PRILOSEC) 20 MG DR capsule    famotidine (PEPCID) 20 MG tablet    Morbid obesity due to excess calories (H)    Relevant Medications    omeprazole (PRILOSEC) 40 MG DR capsule    naltrexone (DEPADE/REVIA) 50 MG tablet    topiramate (TOPAMAX) 25 MG tablet    omeprazole (PRILOSEC) 20 MG DR capsule    famotidine (PEPCID) 20 MG tablet    Morbid (severe) obesity due to excess calories (H)     Stable on 25mg topiramate twice daily and naltrexone 50mg once daily. Would like to stay at these doses- helps with cravings and \"grazing\". Losing weight again, after going up about 30lb after surgery.             Other    S/P laparoscopic sleeve gastrectomy - Primary     Doing well. Down 15lbs from last visit. Bariatric labs due- ordered.   Reflux flares occasionally         Relevant Medications    " omeprazole (PRILOSEC) 40 MG DR capsule    naltrexone (DEPADE/REVIA) 50 MG tablet    topiramate (TOPAMAX) 25 MG tablet    omeprazole (PRILOSEC) 20 MG DR capsule    famotidine (PEPCID) 20 MG tablet      Other Visit Diagnoses     Morbid obesity (H)        Relevant Medications    naltrexone (DEPADE/REVIA) 50 MG tablet    topiramate (TOPAMAX) 25 MG tablet               20 minutes spent on the date of the encounter doing chart review, history and exam, documentation and further activities as noted above  0956}    MEDICATIONS:  Continue current medications without change; can do pepcid PRN for break through reflux   FUTURE LABS:       - Schedule a fasting blood draw - bariatric screening, post op  FUTURE APPOINTMENTS:       - Follow-up visit in 6 months Sandy Mabry       Danitzaadriana Soto is a 40 year old female who presents to clinic today for the following health issues       CHIEF COMPLAINT: Post-bariatric surgery follow-up    HISTORY OF PRESENT ILLNESS:  Questions Regarding Prior Weight Loss Surgery Reviewed With Patient 2/2/2021   I had the following weight loss procedure: Sleeve Gastrectomy   What year was your surgery? 2018   How has your weight changed since your last visit? I have lost weight   Are you currently taking any weight loss medications? Yes   Do you currently have any of the following: Heartburn, acid reflux, or GERD (acid reflux disease)?, Hypertension (high blood pressure)?   Have you been to the Emergency room since your last visit with us? No   Were you in the hospital since your last visit with us? Yes   Do you have any concerns today? No     Last seen sandy 7/2020    Covid 11/2020    Naltrexone 50mg, topiramte 25mg bid- helps with avoiding grazing and weight maintenance.     Reflux- Omeprazole 40mg AM 20mg PM, mostly controlled- if eats trigger foods (onion and tomato)     Labs due 12/2020 - ordered     Weight History:     2/2/2021   What is your highest lifetime weight? 315   What is your  lowest weight since surgery? (In pounds) 228     Initial Weight (lbs): 308 lbs  Weight: 110.2 kg (243 lb)(pt reported)  Last Visits Weight: 117.5 kg (259 lb)  Cumulative weight loss (lbs): 65  Weight Loss Percentage: 21.1%    Questions Regarding Co-Morbidities and Health Concerns Reviewed With Patient 2/2/2021   Pre-diabetes: Gone away   Diabetes II: Never   High Blood Pressure: Stayed the same   High cholesterol: Stayed the same   Heartburn/Reflux: Worsened   Are you taking daily medication for heartburn, acid reflux, or GERD (acid reflux disease)? Yes   Sleep apnea: Improved   Do you use a CPAP? -   PCOS: Never   Back pain: Improved   Joint pain: Improved   Lower leg swelling: Never       Eating Habits 2/2/2021   How many meals do you eat per day? 3   Do you snack between meals? Sometimes   How much food are you eating at each meal? 1/2 cup to 1 cup   Are you able to separate your meals and liquids by at least 30 minutes? No   Are you able to avoid liquid calories? Yes       Exercise Questions Reviewed With Patient 2/2/2021   How often do you exercise? 3 to 4 times per week   What is the duration of your exercise (in minutes)? 30 Minutes   What types of exercise do you do? walking, climbing stairs at work   What keeps you from being more active?  I should be more active but I just have not gotten around to it, Lack of Time     Very active at work     Social History:      2/2/2021   Are you smoking? No   Are you drinking alcohol? No       Medications:  Current Outpatient Medications   Medication     acetaminophen (TYLENOL) 325 MG tablet     ARIPiprazole (ABILIFY) 5 MG tablet     calcium carbonate (OS-CHIKI) 500 MG tablet     clindamycin (CLEOCIN) 100 MG vaginal suppository     famotidine (PEPCID) 20 MG tablet     ferrous gluconate (FERGON) 324 (38 Fe) MG tablet     fish oil-omega-3 fatty acids 1000 MG capsule     hydrochlorothiazide (MICROZIDE) 12.5 MG capsule     hydrOXYzine (VISTARIL) 50 MG capsule     lisinopril  "(ZESTRIL) 40 MG tablet     Multiple Vitamins-Minerals (CENTRUM PO)     naltrexone (DEPADE/REVIA) 50 MG tablet     omeprazole (PRILOSEC) 20 MG DR capsule     omeprazole (PRILOSEC) 40 MG DR capsule     Probiotic Product (PROBIOTIC ADVANCED PO)     topiramate (TOPAMAX) 25 MG tablet     varenicline (CHANTIX) 1 MG tablet     venlafaxine (EFFEXOR-XR) 75 MG 24 hr capsule     VENTOLIN  (90 Base) MCG/ACT Inhaler     vitamin B complex with vitamin C (VITAMIN  B COMPLEX) tablet     No current facility-administered medications for this visit.          2/2/2021   Do you avoid NSAIDs such as (Ibuprofen, Aleve, Naproxen, Advil)?   Yes       ROS:  GI:      2/2/2021   Vomiting: No   Diarrhea: No   Constipation: No   Swallowing trouble: No   Abdominal pain: No   Heartburn: Yes   Rash in skin folds: No   Depression: No   Stress urinary incontinence No     Skin:   BAR RBS ROS - SKIN 2/2/2021   Rash in skin folds: No     Psych:      2/2/2021   Depression: No   Anxiety: No     Female Only:   BAR RBS ROS - FEMALE ONLY 2/2/2021   Female only: None of the above           PHYSICAL EXAM:  Objective    Ht 1.702 m (5' 7\")   Wt 110.2 kg (243 lb)   BMI 38.06 kg/m    Vitals - Patient Reported  Pain Score: No Pain (0)        Physical Exam   GENERAL: Healthy, alert and no distress  EYES: Eyes grossly normal to inspection.  No discharge or erythema, or obvious scleral/conjunctival abnormalities.  RESP: No audible wheeze, cough, or visible cyanosis.  No visible retractions or increased work of breathing.    SKIN: Visible skin clear. No significant rash, abnormal pigmentation or lesions.  NEURO: Cranial nerves grossly intact.  Mentation and speech appropriate for age.  PSYCH: Mentation appears normal, affect normal/bright, judgement and insight intact, normal speech and appearance well-groomed.        Sincerely,    Clotilde Franklin NP    "

## 2021-02-03 NOTE — ASSESSMENT & PLAN NOTE
Omeprazole 40mg AM and 20mg PM. Has breakthrough with onions and tomato. Consider adding pepcid PRN.

## 2021-02-03 NOTE — NURSING NOTE
"Chief Complaint   Patient presents with     Follow Up     Follow-up Laparscopic Gastric Sleeve with meds refill       Vitals:    02/03/21 1257   Weight: 110.2 kg (243 lb)   Height: 1.702 m (5' 7\")       Body mass index is 38.06 kg/m .                          "

## 2021-02-03 NOTE — LETTER
2/3/2021       RE: Danitza Soto  [address]  [place] [facility] [address]     Dear Colleague,    Thank you for referring your patient, Danitza Soto, to the [facility] [place] [facility] Please see a copy of my visit note below.    Trish Soto is a 40 year old who is being evaluated via a billable video visit.      How would you like to obtain your AVS? MyChart  If the video visit is dropped, the invitation should be resent by: Text to cell phone: [phone]  Will anyone else be joining your video visit? No      Video Start Time:   Video-Visit Details    Type of service:  Video Visit    Video End Time:    Originating Location (pt. Location): Home    Distant Location (provider location):  [facility] WEIGHT MANAGEMENT [facility]     Platform used for Video Visit: [facility] Bariatric Surgery Note    RE: Danitza Soto  MR#: [id number]  : 1980  VISIT DATE: Feb 3, 2021      Dear Polly Mcbride,    I had the pleasure of seeing your patient, Danitza Soto, in my post-bariatric surgery assessment clinic.    Assessment & Plan   Problem List Items Addressed This Visit        Digestive    Gastroesophageal reflux disease without esophagitis     Omeprazole 40mg AM and 20mg PM. Has breakthrough with onions and tomato. Consider adding pepcid PRN.          Relevant Medications    omeprazole (PRILOSEC) 40 MG DR capsule    omeprazole (PRILOSEC) 20 MG DR capsule    famotidine (PEPCID) 20 MG tablet    Morbid obesity due to excess calories (H)    Relevant Medications    omeprazole (PRILOSEC) 40 MG DR capsule    naltrexone (DEPADE/REVIA) 50 MG tablet    topiramate (TOPAMAX) 25 MG tablet    omeprazole (PRILOSEC) 20 MG DR capsule    famotidine (PEPCID) 20 MG tablet    Morbid (severe) obesity due to excess calories (H)     Stable on 25mg topiramate twice daily and naltrexone 50mg once daily. Would like to stay  "at these doses- helps with cravings and \"grazing\". Losing weight again, after going up about 30lb after surgery.             Other    S/P laparoscopic sleeve gastrectomy - Primary     Doing well. Down 15lbs from last visit. Bariatric labs due- ordered.   Reflux flares occasionally         Relevant Medications    omeprazole (PRILOSEC) 40 MG DR capsule    naltrexone (DEPADE/REVIA) 50 MG tablet    topiramate (TOPAMAX) 25 MG tablet    omeprazole (PRILOSEC) 20 MG DR capsule    famotidine (PEPCID) 20 MG tablet      Other Visit Diagnoses     Morbid obesity (H)        Relevant Medications    naltrexone (DEPADE/REVIA) 50 MG tablet    topiramate (TOPAMAX) 25 MG tablet               20 minutes spent on the date of the encounter doing chart review, history and exam, documentation and further activities as noted above  0956}    MEDICATIONS:  Continue current medications without change; can do pepcid PRN for break through reflux   FUTURE LABS:       - Schedule a fasting blood draw - bariatric screening, post op  FUTURE APPOINTMENTS:       - Follow-up visit in 6 months Sandy Barreraadriana Soto is a 40 year old female who presents to clinic today for the following health issues       CHIEF COMPLAINT: Post-bariatric surgery follow-up    HISTORY OF PRESENT ILLNESS:  Questions Regarding Prior Weight Loss Surgery Reviewed With Patient 2/2/2021   I had the following weight loss procedure: Sleeve Gastrectomy   What year was your surgery? 2018   How has your weight changed since your last visit? I have lost weight   Are you currently taking any weight loss medications? Yes   Do you currently have any of the following: Heartburn, acid reflux, or GERD (acid reflux disease)?, Hypertension (high blood pressure)?   Have you been to the Emergency room since your last visit with us? No   Were you in the hospital since your last visit with us? Yes   Do you have any concerns today? No     Last seen sandy 7/2020    Covid " 11/2020    Naltrexone 50mg, topiramte 25mg bid- helps with avoiding grazing and weight maintenance.     Reflux- Omeprazole 40mg AM 20mg PM, mostly controlled- if eats trigger foods (onion and tomato)     Labs due 12/2020 - ordered     Weight History:     2/2/2021   What is your highest lifetime weight? 315   What is your lowest weight since surgery? (In pounds) 228     Initial Weight (lbs): 308 lbs  Weight: 110.2 kg (243 lb)(pt reported)  Last Visits Weight: 117.5 kg (259 lb)  Cumulative weight loss (lbs): 65  Weight Loss Percentage: 21.1%    Questions Regarding Co-Morbidities and Health Concerns Reviewed With Patient 2/2/2021   Pre-diabetes: Gone away   Diabetes II: Never   High Blood Pressure: Stayed the same   High cholesterol: Stayed the same   Heartburn/Reflux: Worsened   Are you taking daily medication for heartburn, acid reflux, or GERD (acid reflux disease)? Yes   Sleep apnea: Improved   Do you use a CPAP? -   PCOS: Never   Back pain: Improved   Joint pain: Improved   Lower leg swelling: Never       Eating Habits 2/2/2021   How many meals do you eat per day? 3   Do you snack between meals? Sometimes   How much food are you eating at each meal? 1/2 cup to 1 cup   Are you able to separate your meals and liquids by at least 30 minutes? No   Are you able to avoid liquid calories? Yes       Exercise Questions Reviewed With Patient 2/2/2021   How often do you exercise? 3 to 4 times per week   What is the duration of your exercise (in minutes)? 30 Minutes   What types of exercise do you do? walking, climbing stairs at work   What keeps you from being more active?  I should be more active but I just have not gotten around to it, Lack of Time     Very active at work     Social History:      2/2/2021   Are you smoking? No   Are you drinking alcohol? No       Medications:  Current Outpatient Medications   Medication     acetaminophen (TYLENOL) 325 MG tablet     ARIPiprazole (ABILIFY) 5 MG tablet     calcium carbonate  "(OS-CHIKI) 500 MG tablet     clindamycin (CLEOCIN) 100 MG vaginal suppository     famotidine (PEPCID) 20 MG tablet     ferrous gluconate (FERGON) 324 (38 Fe) MG tablet     fish oil-omega-3 fatty acids 1000 MG capsule     hydrochlorothiazide (MICROZIDE) 12.5 MG capsule     hydrOXYzine (VISTARIL) 50 MG capsule     lisinopril (ZESTRIL) 40 MG tablet     Multiple Vitamins-Minerals (CENTRUM PO)     naltrexone (DEPADE/REVIA) 50 MG tablet     omeprazole (PRILOSEC) 20 MG DR capsule     omeprazole (PRILOSEC) 40 MG DR capsule     Probiotic Product (PROBIOTIC ADVANCED PO)     topiramate (TOPAMAX) 25 MG tablet     varenicline (CHANTIX) 1 MG tablet     venlafaxine (EFFEXOR-XR) 75 MG 24 hr capsule     VENTOLIN  (90 Base) MCG/ACT Inhaler     vitamin B complex with vitamin C (VITAMIN  B COMPLEX) tablet     No current facility-administered medications for this visit.          2/2/2021   Do you avoid NSAIDs such as (Ibuprofen, Aleve, Naproxen, Advil)?   Yes       ROS:  GI:      2/2/2021   Vomiting: No   Diarrhea: No   Constipation: No   Swallowing trouble: No   Abdominal pain: No   Heartburn: Yes   Rash in skin folds: No   Depression: No   Stress urinary incontinence No     Skin:   BAR RBS ROS - SKIN 2/2/2021   Rash in skin folds: No     Psych:      2/2/2021   Depression: No   Anxiety: No     Female Only:   [facility] RBS ROS - FEMALE ONLY 2/2/2021   Female only: None of the above           PHYSICAL EXAM:  Objective    Ht 1.702 m (5' 7\")   Wt 110.2 kg (243 lb)   BMI 38.06 kg/m    Vitals - Patient Reported  Pain Score: No Pain (0)        Physical Exam   GENERAL: Healthy, alert and no distress  EYES: Eyes grossly normal to inspection.  No discharge or erythema, or obvious scleral/conjunctival abnormalities.  RESP: No audible wheeze, cough, or visible cyanosis.  No visible retractions or increased work of breathing.    SKIN: Visible skin clear. No significant rash, abnormal pigmentation or lesions.  NEURO: Cranial nerves grossly intact.  " Mentation and speech appropriate for age.  PSYCH: Mentation appears normal, affect normal/bright, judgement and insight intact, normal speech and appearance well-groomed.        Sincerely,    Clotilde Franklin, NP

## 2021-02-03 NOTE — PROGRESS NOTES
"Danitza Soto is a 40 year old who is being evaluated via a billable video visit.      The patient has been notified of following:     \"This video visit will be conducted via a call between you and your physician/provider. We have found that certain health care needs can be provided without the need for an in-person physical exam.  This service lets us provide the care you need with a video conversation.  If a prescription is necessary we can send it directly to your pharmacy.  If lab work is needed we can place an order for that and you can then stop by our lab to have the test done at a later time.    Video visits are billed at different rates depending on your insurance coverage.  Please reach out to your insurance provider with any questions.    If during the course of the call the physician/provider feels a video visit is not appropriate, you will not be charged for this service.\"    Patient has given verbal consent for Video visit? Yes  How would you like to obtain your AVS? MyChart  If you are dropped from the video visit, the video invite should be resent to: Text to cell phone: [phone]  Will anyone else be joining your video visit? No        Video-Visit Details    Type of service:  Video Visit    Video Start Time: 2:19 PM   Video End Time: 2:29 PM    Originating Location (pt. Location): Home    Distant Location (provider location):  [facility] WEIGHT MANAGEMENT [facility]     Platform used for Video Visit: [employer]    During this virtual visit the patient is located in [place], patient verifies this as the location during the entirety of this visit.     Nutrition Reassessment  Reason For Visit:  Danitza Soto is a 38 year old female presenting today for nutrition follow-up, 2 year  3 months s/p SG with Dr Tse(11/27/18).  Patient referred by Dr Tse and Sandy Cervantes.     Anthropometrics  Initial Consult Weight: 308.8 lbs  Day of Surgery Weight(11/27/18): 274.6 lbs  Current Weight: " 243 lb  Weight loss: -65.8 lbs from initial consult; -31.6 lbs from day of surgery     Current Vitamins/Minerals: MVI/minerals BID, addtl iron (38 mg Fe) daily, calcium, B complex, vitamin D       Nutrition History:  Per previous RD visit:   - Was taking topiramate before and after surgery. Stopped topiramate briefly post-op and experineced weight gain r/t increased hunger and cravings. 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 Relying on more conveinance foods, smaller portions (cannot eat a lot of rice, bread; can eat a lot of chips, cheese)       Today: 2/3/2021: Improving her food choices, still eating lunch with residents at group home, this meal tends ot be higher in CHO. She is now consistently eating 3 meals a day.     Diet Recall:  B: apple sauce or cottage cheese   L: eat with residents   D: protein,   Snack: nuts ( cashews or peanuts) or chips    Progress Towards Previous Goals:  1) Follow bariatric regular diet. -Improving   2) Consume 60 grams of protein/day.-Improving   3) Sip on 48-64 oz of fluids/day- between meals only.-Improving, drinking more between meals, unsure how much she is drinking. Drinks a lot of crytstal light.   4) Eat slowly (>20 min/meal), chewing foods well (to applesauce-like consistency).-Continues   5) Limit portions to 1/2-1 cup/meal.-Continues  6) Take the following supplements:-Met    Multivitamin/minerals: adult dose 2 times daily    Iron: 45-60 mg elemental (18-36 mg if low risk) - may partly or fully be covered in multivitamin     Calcium Citrate containing vitamin D: 500 mg 3 times daily or 600 mg 2 times daily    Vitamin B12: sublingual form of at least 500 mcg daily or injection of 1000 mcg monthly     B-50 Complex once daily   7) Increase activity as able -Met, Walking the dog   8) Reduce snacking. Try using high protein snack (hard boiled egg, non-fat yogurt) or non-starchy vegetable instead.  -Continues     Nutrition Prescription:  Grams Protein: 60 (minimum)  Amount of Fluid: 48-64 oz     Nutrition Diagnosis  Previous: Food and nutrition-related knowledge deficit r/t lack of prior exposure to diet instruction beyond 1 year s/p SG as evidenced by Pt seeking further guidance from RD on diet instruction beyond 1 year s/p SG.   - Improving with modification     Current: Food and nutrition-related knowledge deficit r/t lack of prior exposure to diet instruction beyond 2 years s/p SG as evidenced by Pt seeking further guidance from RD on diet instruction beyond 2 year s/p SG.      Intervention  Materials/Education provided:   1. Reviewed previous goals  2. Reviewed  bariatric diet, protein intake, fluid intake, eating pace, chewing foods well, portion control, sugar/fat intake, recommended vitamin/mineral supplements.   3. Discussed mindful eating practices.   4. Encouraged to continue walking and increase exercise as able.   5. AVS via [employer]      Patient Understanding: good  Expected Compliance: good     Goals:  1) Follow bariatric regular diet.   2) Consume 60 grams of protein/day.  3) Sip on 48-64 oz of fluids/day- between meals only.  4) Eat slowly (>20 min/meal), chewing foods well (to applesauce-like consistency).  5) Limit portions to 1/2-1 cup/meal.  6) Take the following supplements:    Multivitamin/minerals: adult dose 2 times daily    Iron: 45-60 mg elemental (18-36 mg if low risk) - may partly or fully be covered in multivitamin     Calcium Citrate containing vitamin D: 500 mg 3 times daily or 600 mg 2 times daily    Vitamin B12: sublingual form of at least 500 mcg daily or injection of 1000 mcg monthly     B-50 Complex once daily   7) Increase activity as able  8) Reduce snacking. Try using high protein snack (hard boiled egg, non-fat yogurt) or non-starchy vegetable instead.        Follow-Up: 3-6 months      Time spent with patient: 10 minutes.  Hien Kelley, MS, RD, LD

## 2021-02-03 NOTE — PATIENT INSTRUCTIONS
"It was a pleasure meeting with you today.    Thank you for allowing us the privilege of caring for you. We hope we provided you with the excellent service you deserve.   Please let us know if there is anything else we can do for you so that we can be sure you are leaving completely satisfied with your care experience.    To ensure the quality of our services you may be receiving a patient satisfaction survey from an independent patient satisfaction monitoring company.    The greatest compliment you can give is a \"Likely to Recommend\"      Your visit was with Clotilde Franklin NP today.     Instructions per today's visit:   -continue omeprazole as before, can add pepcid as needed for break through reflux  -continue topiramate 25mg twice daily and naltrexone once daily   -labs ordered  -follow up 6 months- Sandy or myself       To schedule appointments with our team, please call [phone] option #1    Please call during clinic hours Monday through Friday 8:00a - 4:00p if you have questions or you can contact us via [employer] at anytime.      Nurses: [phone]  Fax: [phone]  Surgery Scheduler: [phone]    Please call the hospital at [phone] to speak with our on call MDs if you have urgent needs after hours, during weekends, or holidays.    **Please note if you need to go to the Emergency Room for any reason in the first 90 days after surgery it is important to go to the [facility] ER on the [place] on [address] off of the [address] off [address]    *For patients who are currently enrolled in our program and have not yet had weight loss surgery please note*:    You must be at your required goal weight at the time of your Anesthesia clinic visit as well as the day of surgery or your surgery will be canceled. You will not be able to obtain a new surgery date until you have met your goal weight.    Lab results will be communicated through My Chart or letter (if My Chart not used). Please call " the clinic if you have not received communication after 1 week or if you have any questions.?    Main follow-up parameters for all bariatric patients     Patients who have undergone Bariatric surgery:    1. Follow-up interval during the first year: ~1 week, 1 month, 3 month, 6 month,12 months.  Every 6 months until 5 years; and then annually thereafter.  The goal of follow-up sessions is to ensure that food intake behavior (choice of food and eating speed) and exercise/activity level are set appropriately.    2. Yearly lab tests ordered by our clinic.      3. The bariatric team should be aware and potentially evaluate all adverse gastrointestinal symptoms (dysphagia, abdominal pain, nausea, vomiting, diarrhea, heartburn, reflux, etc), which can be a sign of complication.  The bariatric surgeons perform general surgery procedures in addition to bariatric surgery (laparoscopic cholecystectomy, etc.)    4. Inability to tolerate textured food (chicken, steak, fish, eggs, beans) is NOT normal and may need to be evaluated by endoscopy performed by the bariatric surgeon.    _____________________________________________________________________________________________________________________________    Meal Replacement Products:    Here is the link to our new e-store where you can purchase our meal replacement products    [facility] [employer]  [email or web address].[email or web address]/store    The one week starter kit is a great way to sample a variety of products and see what works for you.    If you want more information about the product go to: SECU4    Free Shipping for orders over $75     Benefits of meal replacements products:    Portion and calorie control  Improved nutrition  Structured eating  Simplified food choices  Avoid contact with trigger foods  ______________________________________________________________________________________________________________________________    Healthy Lifestyle Support  Group   [facility] Weight Management Program  Virtual Support Group Now Available    60 minutes of small group guided discussion, support and resources    Led by Health , Jennifer Lala    For questions, and to receive the invite information, contact Jennifer at [email or web address]    All our welcome!    One Friday per month, 12:30pm to 1:30pm  SELF COMPASSION: July 31st  CREATING MY WELLNESS VISION: August 28th  MINDFULNESS PRACTICES FOR A CALM BODY & MIND: September 25th  MINDFUL EATING TOOLS: October 30th  HEALTHY& HAPPY HOLIDAYS: November 20th  OPEN FORUM: December 18th  _______________________________________________________________________________________________________________________________    Connections: Bariatric Care support group  Due to the Covid-19 restrictions, we will be doing our support group virtually using Microsoft Teams. You will need to get an invitation to the group from Alexander Moore, Ph.D., LP at [email or web address] and to learn about using Microsoft Teams. The next meeting is on Tuesday, July 14 between 6:30 and 8 PM and there will be no formal speaker.    This group meets the second Tuesday of each month from 6:30 to 8 PM and will be held again at [facility] in the [address] [place] [address] (A-B room) when the Covid-19 restrictions are lifted. The group is led by Alexander Moore, Ph.D., Licensed Psychologist, [facility] Comprehensive Weight Management Program. There is no cost for group participation and is open to all [facility] (and those external to this program) pre- and post-operative bariatric surgery patients as well as their support system.   _________________________________________________________________________________________________________________________________      Interested in working with a health ?  Health coaches work with you to improve your overall health and wellbeing.  They look at the whole person, and may  involve discussion of different areas of life, including, but not limited to the four pillars of health (sleep, exercise, nutrition, and stress management). Discuss with your care team if you would like to start working a health .     Health Coaching-3 Pack:      $99 for three health coaching visits    Visits may be done in person or via phone    Coaching is a partnership between the  and the client; Coaches do not prescribe or diagnose    Coaching helps inspire the client to reach his/her personal goals   __________________________________________________________________________________________________________________________________    [facility] Get Moving Program    Our team of physical therapists is trained to help you understand and take control of your condition. They will perform a thorough evaluation to determine your ability for activity and develop a customized plan to fit your goals and physical ability.  Scheduling: Unsure if the Get Moving program is right for you? Discuss the program with your medical provider or diabetes educator. You can also call us at [phone] to ask questions or schedule an appointment.   [facility] Get Moving Program  ______________________________________________________________________________________________________________________  Bluetooth Scale:    We hope to provide you with high quality telephone and virtual healthcare visits while social distancing for COVID-19 is necessary, as well as in the future when virtual visits may be more convenient for you.     Our technology team made it possible for Bluetooth scales to send weight measurements to our electronic medical record. This allows weights from you weighing at home to securely flow into the medical record, which will improve telephone and virtual visits.   Additionally, studies have shown that adults actually lose more weight when their weights are automatically sent to someone else, and  also that this process is not stressful for those adults.    Below is a link for purchasing the scale, with a discount code for our patients. You may call your insurance company to see if they will reimburse you for the cost of the scale, as a piece of durable medical equipment. The scales only go up to a weight of 400 pounds. This is an issue and we are working with the developer on increasing this. We found no scales that go over 400lb that have blue-tooth for connecting to [employer].    Scale to purchase: the SyncSum  Body  Scale: [email or web address]/body/[email or web address]=aw.ds    Discount Code: We have a discount code for our patients to bring the cost down to $50, the code is: [place]_Scale_20%off    Steps to link the scale to [employer] via an Android Phone (you can always disconnect at any point in the future):  1. The order must be placed first before the patient can access Track My Health within [employer].  2. Download Google Fit marie from the Google Play Store   a. Log in or register using your Google account   3. Download the [employer] marie from Google Play Store  a. Select add organization   b. Search for Infinit and select it   c. Log into [employer]  d. Select Track My Health   e. Select the green connect my account button   f. When prompted log into your Google account   g. Select okay to confirm the account   4. Download the [employer] Health Mate marie from Google Play Store  a. [place] for [employer]   b. Go to profile   c. Tap google fit under the Apps section  d. Select the option to activate Google Fit integration   e. Select the same Google account   f. Select okay to confirm the account  g.   Steps to link the scale to [employer] via an iPhone (you can always disconnect at any point in the future):  **Note [employer] is not available for download on an iPad**  1. The order must be placed first before the patient can access Track My Health  within Insight Plus.  2. Locate the Health lane on your iPhone.  a. Set up your Apple Health account as prompted  b. The Sources page will show Apps that communicate with your Health lane. Once all steps are completed, you should see SmartWatch Security & Sound and [employer] listed under the Apps section and your iPhone under the devices section.  i. Select SmartWatch Security & Sound  1. Under 'ALLOW  [place]  TO WRITE DATA ensure the toggle is on for Weight.  2. This will allow the scale to add your weight to the [facility] [employer] Health  ii. Select [employer]ler[employer]  1. Under 'ALLOW  [place]  TO READ DATA ensure the toggle is on for Weight.  2. This allows Insight Plus to grab the weight from [employer] so your provider can see your weights.  3. Download the Insight Plus lane from the Lane Store   a. Select add organization                                                  b. Search for MicroEval and select it  c. Log into Insight Plus  d. Select Track My Health   e. Select the green connect my account button   f. Follow prompts to link your device to Insight Plus.  4. Download the Withings health mate lane in the Lane Store   a. [place] for [employer]   b. Go to profile   c. Tap Health under the Apps section  d. If prompted to allow access with the Health Lane, toggle weight on for read and write access.

## 2021-02-03 NOTE — LETTER
"2/3/2021       RE: Danitza Soto  [address]  [facility] [address]     Dear Colleague,    Thank you for referring your patient, Danitza Soto, to the [facility] [place] [facility]. Please see a copy of my visit note below.    Danitza Soto is a 40 year old who is being evaluated via a billable video visit.      The patient has been notified of following:     \"This video visit will be conducted via a call between you and your physician/provider. We have found that certain health care needs can be provided without the need for an in-person physical exam.  This service lets us provide the care you need with a video conversation.  If a prescription is necessary we can send it directly to your pharmacy.  If lab work is needed we can place an order for that and you can then stop by our lab to have the test done at a later time.    Video visits are billed at different rates depending on your insurance coverage.  Please reach out to your insurance provider with any questions.    If during the course of the call the physician/provider feels a video visit is not appropriate, you will not be charged for this service.\"    Patient has given verbal consent for Video visit? Yes  How would you like to obtain your AVS? MyChart  If you are dropped from the video visit, the video invite should be resent to: Text to cell phone: [phone]  Will anyone else be joining your video visit? No        Video-Visit Details    Type of service:  Video Visit    Video Start Time: 2:19 PM   Video End Time: 2:29 PM    Originating Location (pt. Location): Home    Distant Location (provider location):  [facility]     Platform used for Video Visit: Doximity    During this virtual visit the patient is located in [place], patient verifies this as the location during the entirety of this visit.     Nutrition " Reassessment  Reason For Visit:  Danitza Soto is a 38 year old female presenting today for nutrition follow-up, 2 year  3 months s/p SG with Dr Tse(11/27/18).  Patient referred by Dr Tse and Sandy Cervantes.     Anthropometrics  Initial Consult Weight: 308.8 lbs  Day of Surgery Weight(11/27/18): 274.6 lbs  Current Weight: 243 lb  Weight loss: -65.8 lbs from initial consult; -31.6 lbs from day of surgery     Current Vitamins/Minerals: MVI/minerals BID, addtl iron (38 mg Fe) daily, calcium, B complex, vitamin D       Nutrition History:  Per previous RD visit:   - Was taking topiramate before and after surgery. Stopped topiramate briefly post-op and experineced weight gain r/t increased hunger and cravings. 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 Relying on more conveinance foods, smaller portions (cannot eat a lot of rice, bread; can eat a lot of chips, cheese)       Today: 2/3/2021: Improving her food choices, still eating lunch with residents at group home, this meal tends ot be higher in CHO. She is now consistently eating 3 meals a day.     Diet Recall:  B: apple sauce or cottage cheese   L: eat with residents   D: protein,   Snack: nuts ( cashews or peanuts) or chips    Progress Towards Previous Goals:  1) Follow bariatric regular diet. -Improving   2) Consume 60 grams of protein/day.-Improving   3) Sip on 48-64 oz of fluids/day- between meals only.-Improving, drinking more between meals, unsure how much she is drinking. Drinks a lot of crytstal light.   4) Eat slowly (>20 min/meal), chewing foods well (to applesauce-like consistency).-Continues   5) Limit portions to 1/2-1 cup/meal.-Continues  6) Take the following supplements:-Met    Multivitamin/minerals: adult dose 2 times daily    Iron: 45-60 mg elemental (18-36 mg if low risk) - may partly or fully be covered in multivitamin     Calcium Citrate containing vitamin D: 500 mg 3  times daily or 600 mg 2 times daily    Vitamin B12: sublingual form of at least 500 mcg daily or injection of 1000 mcg monthly     B-50 Complex once daily   7) Increase activity as able -Met, Walking the dog   8) Reduce snacking. Try using high protein snack (hard boiled egg, non-fat yogurt) or non-starchy vegetable instead. -Continues     Nutrition Prescription:  Grams Protein: 60 (minimum)  Amount of Fluid: 48-64 oz     Nutrition Diagnosis  Previous: Food and nutrition-related knowledge deficit r/t lack of prior exposure to diet instruction beyond 1 year s/p SG as evidenced by Pt seeking further guidance from RD on diet instruction beyond 1 year s/p SG.   - Improving with modification     Current: Food and nutrition-related knowledge deficit r/t lack of prior exposure to diet instruction beyond 2 years s/p SG as evidenced by Pt seeking further guidance from RD on diet instruction beyond 2 year s/p SG.      Intervention  Materials/Education provided:   1. Reviewed previous goals  2. Reviewed  bariatric diet, protein intake, fluid intake, eating pace, chewing foods well, portion control, sugar/fat intake, recommended vitamin/mineral supplements.   3. Discussed mindful eating practices.   4. Encouraged to continue walking and increase exercise as able.   5. AVS via UUSEEt      Patient Understanding: good  Expected Compliance: good     Goals:  1) Follow bariatric regular diet.   2) Consume 60 grams of protein/day.  3) Sip on 48-64 oz of fluids/day- between meals only.  4) Eat slowly (>20 min/meal), chewing foods well (to applesauce-like consistency).  5) Limit portions to 1/2-1 cup/meal.  6) Take the following supplements:    Multivitamin/minerals: adult dose 2 times daily    Iron: 45-60 mg elemental (18-36 mg if low risk) - may partly or fully be covered in multivitamin     Calcium Citrate containing vitamin D: 500 mg 3 times daily or 600 mg 2 times daily    Vitamin B12: sublingual form of at least 500 mcg daily or  injection of 1000 mcg monthly     B-50 Complex once daily   7) Increase activity as able  8) Reduce snacking. Try using high protein snack (hard boiled egg, non-fat yogurt) or non-starchy vegetable instead.        Follow-Up: 3-6 months      Time spent with patient: 10 minutes.  Hien Kelley, MS, RD, LD

## 2021-02-04 DIAGNOSIS — Z98.84 S/P LAPAROSCOPIC SLEEVE GASTRECTOMY: ICD-10-CM

## 2021-02-04 DIAGNOSIS — K21.9 GASTROESOPHAGEAL REFLUX DISEASE WITHOUT ESOPHAGITIS: ICD-10-CM

## 2021-02-04 DIAGNOSIS — E66.01 MORBID OBESITY DUE TO EXCESS CALORIES (H): ICD-10-CM

## 2021-02-04 RX ORDER — FAMOTIDINE 20 MG/1
20 TABLET, FILM COATED ORAL 2 TIMES DAILY PRN
Qty: 180 TABLET | Refills: 1 | Status: SHIPPED | OUTPATIENT
Start: 2021-02-04 | End: 2021-12-07

## 2021-03-08 ENCOUNTER — VIRTUAL VISIT (OUTPATIENT)
Dept: PSYCHOLOGY | Facility: CLINIC | Age: 41
End: 2021-03-08
Payer: COMMERCIAL

## 2021-03-08 DIAGNOSIS — F33.1 MAJOR DEPRESSIVE DISORDER, RECURRENT EPISODE, MODERATE (H): Primary | ICD-10-CM

## 2021-03-08 DIAGNOSIS — F41.1 GENERALIZED ANXIETY DISORDER: ICD-10-CM

## 2021-03-08 PROCEDURE — 90832 PSYTX W PT 30 MINUTES: CPT | Mod: GT | Performed by: PSYCHOLOGIST

## 2021-03-08 ASSESSMENT — PATIENT HEALTH QUESTIONNAIRE - PHQ9
SUM OF ALL RESPONSES TO PHQ QUESTIONS 1-9: 7
10. IF YOU CHECKED OFF ANY PROBLEMS, HOW DIFFICULT HAVE THESE PROBLEMS MADE IT FOR YOU TO DO YOUR WORK, TAKE CARE OF THINGS AT HOME, OR GET ALONG WITH OTHER PEOPLE: SOMEWHAT DIFFICULT
SUM OF ALL RESPONSES TO PHQ QUESTIONS 1-9: 7

## 2021-03-08 ASSESSMENT — COLUMBIA-SUICIDE SEVERITY RATING SCALE - C-SSRS
1. IN THE PAST MONTH, HAVE YOU WISHED YOU WERE DEAD OR WISHED YOU COULD GO TO SLEEP AND NOT WAKE UP?: NO
2. HAVE YOU ACTUALLY HAD ANY THOUGHTS OF KILLING YOURSELF LIFETIME?: NO
1. IN THE PAST MONTH, HAVE YOU WISHED YOU WERE DEAD OR WISHED YOU COULD GO TO SLEEP AND NOT WAKE UP?: YES
2. HAVE YOU ACTUALLY HAD ANY THOUGHTS OF KILLING YOURSELF?: NO

## 2021-03-08 ASSESSMENT — ANXIETY QUESTIONNAIRES
5. BEING SO RESTLESS THAT IT IS HARD TO SIT STILL: SEVERAL DAYS
1. FEELING NERVOUS, ANXIOUS, OR ON EDGE: SEVERAL DAYS
7. FEELING AFRAID AS IF SOMETHING AWFUL MIGHT HAPPEN: NOT AT ALL
3. WORRYING TOO MUCH ABOUT DIFFERENT THINGS: NOT AT ALL
GAD7 TOTAL SCORE: 3
4. TROUBLE RELAXING: NOT AT ALL
6. BECOMING EASILY ANNOYED OR IRRITABLE: SEVERAL DAYS
2. NOT BEING ABLE TO STOP OR CONTROL WORRYING: NOT AT ALL
GAD7 TOTAL SCORE: 3
7. FEELING AFRAID AS IF SOMETHING AWFUL MIGHT HAPPEN: NOT AT ALL

## 2021-03-08 NOTE — PROGRESS NOTES
Progress Note - Initial Visit    Client Name:  Danitza Soto Date: 2021         Service Type: Individual     Visit Start Time: 9:00am  Visit End Time: 9:20am    Visit #: 1    Attendees: Client attended alone    Service Modality:  Video Visit:      Provider verified identity through the following two step process.  Patient provided:  Patient photo and Patient     Telemedicine Visit: The patient's condition can be safely assessed and treated via synchronous audio and visual telemedicine encounter.      Reason for Telemedicine Visit: Patient has requested telehealth visit    Originating Site (Patient Location): [place], [place]    Distant Site (Provider Location): Provider Remote Setting    Consent:  The patient/guardian has verbally consented to: the potential risks and benefits of telemedicine (video visit) versus in person care; bill my insurance or make self-payment for services provided; and responsibility for payment of non-covered services.     Mode of Communication:  Video Conference via [employer]    As the provider I attest to compliance with applicable laws and regulations related to telemedicine.       DATA:   Interactive Complexity: No   Crisis: No     Presenting Concerns/Current Stressors:   Patient presented to session to initiate the general psychological evaluation process.      ASSESSMENT:  Mental Status Assessment:  Appearance:   Appropriate   Eye Contact:   Good   Psychomotor Behavior: Normal   Attitude:   Cooperative   Orientation:   All  Speech   Rate / Production: Normal/ Responsive   Volume:  Normal   Mood:    Normal  Affect:    Appropriate   Thought Content:  Clear   Thought Form:  Logical   Insight:    Good       Safety Issues and Plan for Safety and Risk Management:     Darling Suicide Severity Rating Scale (Lifetime/Recent)  Darling Suicide Severity Rating (Lifetime/Recent) 3/8/2021   1. Wish to be Dead (Lifetime) Yes   Wish to be Dead Description (Lifetime) About 15  years ago, no plan developed.   1. Wish to be Dead (Recent) No   2. Non-Specific Active Suicidal Thoughts (Lifetime) No   2. Non-Specific Active Suicidal Thoughts (Recent) No     Patient denies current fears or concerns for personal safety.  Patient denies current or recent suicidal ideation or behaviors.  Patient denies current or recent homicidal ideation or behaviors.  Patient denies current or recent self injurious behavior or ideation.  Patient denies other safety concerns.  Recommended that patient call 911 or go to the local ED should there be a change in any of these risk factors.       Diagnostic Criteria:  F33.1:  A. Five (or more) symptoms have been present during the same 2-week period and represent a change from previous functioning; at least one of the symptoms is either (1) depressed mood or (2) loss of interest or pleasure.    1. Diminished interest or pleasure in all, or almost all, activities.   2. Significant appetite change.  3. Significant sleep change. .    4. Diminished ability to think or concentrate, or indecisiveness.    B. The symptoms cause clinically significant distress or impairment in social, occupational, or other important areas of functioning.  C. The episode is not attributable to the physiological effects of a substance or to another medical condition.  D. The occurence of major depressive episode is not better explained by other thought / psychotic disorders.  E. There has never been a manic episode or hypomanic episode.     F41.1:  A. Excessive anxiety and worry, occurring more days than not for at least 6 months about a number of events or activities.   B. The individual finds it difficult to control the worry.  C. The anxiety and worry are associated with 3 or more of 6 symptoms.  D. The anxiety, worry, or physical symptoms cause clinically significant distress or impairment in social, occupational, or other important areas of functioning.  E. The disturbance is not  attributable to the physiological effects of a substance (e.g., a drug of abuse, a medication) or another medical condition (e.g., hyperthyroidism).  F. The disturbance is not better explained by another mental disorder (e.g., anxiety or worry about having panic attacks in panic disorder, negative evaluation in social anxiety disorder [social phobia], contamination or other obsessions in obsessive-compulsive disorder, separation from attachment figures in separation anxiety disorder, reminders of traumatic events in posttraumatic stress disorder, gaining weight in anorexia nervosa, physical complaints in somatic symptom disorder, perceived appearance flaws in body dysmorphic disorder, having a serious illness in illness anxiety disorder, or the content of delusional beliefs in schizophrenia or delusional disorder).      DSM5 Diagnoses: (Sustained by DSM5 Criteria Listed Above)  Diagnoses:   1. Major depressive disorder, recurrent episode, moderate (H)    2. Generalized anxiety disorder      Psychosocial & Contextual Factors: engaged in psychiatry services; working full-time, mental health history since puberty  WHODAS 2.0 (12 item):   WHODAS 2.0 Total Score 9/28/2018 3/8/2021   Total Score 21 20   Total Score MyChart 21 20       Intervention:              Patient reported that her psychiatrist was wanting her to be tested for ADHD, but she indicated that she is seeking Autism testing. Informed the patient that outside referrals for Autism testing could be provided as no one in the [facility] system provides this service. Discussed previous mental health diagnoses and ADHD criteria. The patient denied symptoms of ADHD, so continuation of the testing process is not warranted (patient endorsed 3/9 inattention symptoms and denied hyperactivity symptoms, indicating that criterion A of ADHD cannot be met; additionally, the patient reported that focus has worsened over time, which would be inconsistent with an ADHD  presentation).     CBT: socratic questioning, positive reinforcement  EFT: empathetic attunement  MI: open ended questions, affirmations, reflections        Attendance Agreement:  Client has not signed the attendance agreement. Discussed expectations at beginning of this first session and patient agreed.       PLAN:  Provider will provide Autism testing referrals to the patient. Provider will communicate to referring provider about outcome.     [facility]   [phone]  [employer]    [facility]      Provider will cancel patient's prescheduled follow-up appointment.     I acknowledge that, based upon current clinical information, the patient and I have reviewed and discussed issues pertaining to the purpose of therapy/testing, potential therapeutic goals, procedures, risks and benefits and estimated duration of therapy/testing. Issues pertaining to fees and confidentiality were also addressed with the patient indicated understanding. The patient was informed that their symptoms may change during the course of assessment, for better or worse, and this may impact the clinical approach and/or the duration of treatment; the patient understands that it is imperative that I am kept informed of the changes when they occur. I will not be providing any experimental procedures and, if we agree that a change in clinical procedure would be more beneficial, I will obtain specific consent for that procedure or refer you to another provider who has expertise in that area.         Senia White PsyD,   Clinical Psychologist

## 2021-03-08 NOTE — Clinical Note
Hello,  I just wanted to let you know that I saw this patient for the first appointment of the ADHD/general psych testing.  She denied sufficient symptoms associated with ADHD, so the testing process was stopped.  The patient indicated to me at the beginning of appointment that she was more interested in autism testing, and that is not a service provided by anyone in the [facility] system (general psych testing does not include autism).  I gave her some referrals so she can complete that testing.  Let me know if you have any questions or concerns!  Senia

## 2021-03-08 NOTE — PATIENT INSTRUCTIONS
Berto Chambers,     Here are the Autism testing referrals:    [facility]   [phone]  [email or web address]    [facility]      [email or web address] can be a good resources as well

## 2021-03-09 ASSESSMENT — ANXIETY QUESTIONNAIRES: GAD7 TOTAL SCORE: 3

## 2021-03-09 ASSESSMENT — PATIENT HEALTH QUESTIONNAIRE - PHQ9: SUM OF ALL RESPONSES TO PHQ QUESTIONS 1-9: 7

## 2021-03-12 ENCOUNTER — MYC MEDICAL ADVICE (OUTPATIENT)
Dept: PSYCHIATRY | Facility: CLINIC | Age: 41
End: 2021-03-12

## 2021-03-12 DIAGNOSIS — F33.0 MILD EPISODE OF RECURRENT MAJOR DEPRESSIVE DISORDER (H): ICD-10-CM

## 2021-03-12 RX ORDER — ARIPIPRAZOLE 5 MG/1
5 TABLET ORAL DAILY
Qty: 90 TABLET | Refills: 0 | Status: CANCELLED | OUTPATIENT
Start: 2021-03-12

## 2021-03-12 RX ORDER — VENLAFAXINE HYDROCHLORIDE 75 MG/1
CAPSULE, EXTENDED RELEASE ORAL
Qty: 90 CAPSULE | Refills: 1 | OUTPATIENT
Start: 2021-03-12

## 2021-03-12 NOTE — TELEPHONE ENCOUNTER
Date of Last Office Visit: 1/19/21   Date of Next Office Visit: 4/19/21  No shows since last visit: none  Cancellations since last visit: none    Medication requested: aripiprazole 5mg Date last ordered: 1/19/21 Qty: 90 Refills: 0  Medication requested: venlafaxine 75mg Date last ordered: 9/18/21 Qty: 90 Refills: 1       Lapse in medication adherence greater than 5 days?: no  If yes, call patient and gather details: N/A  Medication refill request verified as identical to current order?: yes  Result of Last DAM, VPA, Li+ Level, CBC, or Carbamazepine Level (at or since last visit): N/A      []Medication refilled per  Medication Refill in Ambulatory Care  policy.  [x]Medication unable to be refilled by RN due to criteria not met as indicated below:    []Eligibility - not seen in the last year   []Supervision - no future appointment   []Compliance - no shows, cancellations or lapse in therapy   []Verification - order discrepancy   []Controlled medication   []Medication not included in policy   [x]90-day supply request   []Other

## 2021-04-08 DIAGNOSIS — I10 ESSENTIAL HYPERTENSION: ICD-10-CM

## 2021-04-08 RX ORDER — LISINOPRIL 40 MG/1
TABLET ORAL
Qty: 90 TABLET | Refills: 0 | Status: SHIPPED | OUTPATIENT
Start: 2021-04-08 | End: 2021-07-13

## 2021-04-08 NOTE — TELEPHONE ENCOUNTER
RHONDA,    Lisinopril:    Routing refill request to provider for review/approval because:  Labs not current:  Last creatinine done 12/5/2019  Virtual visit 11/21/2020  Last physical 9/28/2020    Thanks,  Ami Garcia RN

## 2021-04-19 ENCOUNTER — VIRTUAL VISIT (OUTPATIENT)
Dept: PSYCHIATRY | Facility: CLINIC | Age: 41
End: 2021-04-19
Payer: COMMERCIAL

## 2021-04-19 DIAGNOSIS — F33.0 MILD EPISODE OF RECURRENT MAJOR DEPRESSIVE DISORDER (H): ICD-10-CM

## 2021-04-19 PROCEDURE — 99214 OFFICE O/P EST MOD 30 MIN: CPT | Mod: TEL | Performed by: NURSE PRACTITIONER

## 2021-04-19 RX ORDER — FERROUS SULFATE 324(65)MG
TABLET, DELAYED RELEASE (ENTERIC COATED) ORAL EVERY MORNING
COMMUNITY
End: 2022-11-08

## 2021-04-19 RX ORDER — ARIPIPRAZOLE 5 MG/1
5 TABLET ORAL DAILY
Qty: 90 TABLET | Refills: 0 | Status: SHIPPED | OUTPATIENT
Start: 2021-04-19 | End: 2021-12-07

## 2021-04-19 ASSESSMENT — ANXIETY QUESTIONNAIRES
7. FEELING AFRAID AS IF SOMETHING AWFUL MIGHT HAPPEN: NOT AT ALL
2. NOT BEING ABLE TO STOP OR CONTROL WORRYING: NOT AT ALL
1. FEELING NERVOUS, ANXIOUS, OR ON EDGE: NOT AT ALL
6. BECOMING EASILY ANNOYED OR IRRITABLE: NOT AT ALL
GAD7 TOTAL SCORE: 0
3. WORRYING TOO MUCH ABOUT DIFFERENT THINGS: NOT AT ALL
5. BEING SO RESTLESS THAT IT IS HARD TO SIT STILL: NOT AT ALL

## 2021-04-19 ASSESSMENT — PATIENT HEALTH QUESTIONNAIRE - PHQ9
5. POOR APPETITE OR OVEREATING: NOT AT ALL
SUM OF ALL RESPONSES TO PHQ QUESTIONS 1-9: 1

## 2021-04-19 NOTE — Clinical Note
Good afternoon, I talked with Jacob today and she has been stable on her current medication regiment of venlafaxine and Abilify.  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  She has begun a relationship that is going well for her.  Refills were given to her today with the understanding to return to her primary care provider for future refills.  If you need anything else from me please do not hesitate to contact me.  Thank you for the referral - Shwetha

## 2021-04-19 NOTE — PROGRESS NOTES
"Trish Soto is a 40 year old who is being evaluated via a billable telephone visit.      What phone number would you like to be contacted at? [phone]  How would you [facility]? [facility]          Outpatient [facility]iatric Progress Note    Name: Danitza Soto   : 1980                    Primary Care Provider: Polly Mcbride MD   Therapist: None    PHQ-9 scores:  PHQ-9 SCORE 2021 3/8/2021 2021   PHQ-9 Total Score MyChart - 7 (Mild depression) -   PHQ-9 Total Score 4 7 1       GABRIELA-7 scores:  GABRIELA-7 SCORE 2021 3/8/2021 2021   Total Score - 3 (minimal anxiety) -   Total Score 2 3 0       Patient Identification:    Patient is a 40 year old year old, single  White American female  who presents for return visit with me.  Patient is currently employed full time. Patient attended the session alone. Patient prefers to be called: \" Trish\".    Interim History:    I last saw Danitza Soto for outpatient psychiatry Return Visit on 2021 .     During that appointment, she reported  feeling stable.  Since the increase in the dose of Abilify to 5 mg daily, her depression is less intense.  She also is able to manage her work stress without incident.  Her divorce was final at the end of December and she is fine being alone in her home and has recently started a new relationship.  At one point she had been taking the Abilify at bedtime but found that she was not sleeping through the night.  Recently she changed to taking the Abilify with the venlafaxine in the morning and her sleep is slowly improving.  She takes topiramate and naltrexone to decrease food cravings after bariatric surgery.  Her weight is staying stable.  I will see Jacob 1 more time  in 3 months and if things remain stable, she may return to her primary provider for future refills.  She is in agreement with this plan..  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  She also mentioned if she would develop mild tardive dyskinesia symptoms she would be willing to accept that over not feeling well with her mental health. .     Current medications include: acetaminophen (TYLENOL) 325 MG tablet, Take 2 tablets (650 mg) by mouth every 4 hours as needed for other (For optimal non-opioid multimodal pain management to improve pain control and physical function.)  ARIPiprazole (ABILIFY) 5 MG tablet, Take 1 tablet (5 mg) by mouth daily  calcium carbonate (OS-CHIKI) 500 MG tablet, Take 1 tablet by mouth daily   famotidine (PEPCID) 20 MG tablet, Take 1 tablet (20 mg) by mouth 2 times daily as needed (reflux)  Ferrous Sulfate 324 (65 Fe) MG TBEC, One daily  fish oil-omega-3 fatty acids 1000 MG capsule, Take 2 g by mouth every morning   hydrochlorothiazide (MICROZIDE) 12.5 MG capsule, Take 1 capsule (12.5 mg) by mouth daily  hydrOXYzine (VISTARIL) 50 MG capsule, Take 1 capsule (50 mg) by mouth At Bedtime  lisinopril (ZESTRIL) 40 MG tablet, TAKE 1 TABLET BY MOUTH DAILY  Multiple Vitamins-Minerals (CENTRUM PO), 2 tablets per day  naltrexone (DEPADE/REVIA) 50 MG tablet, Take 1 tablet (50 mg) by mouth daily  omeprazole (PRILOSEC) 20 MG DR capsule, Take 1 capsule (20 mg) by mouth daily  omeprazole (PRILOSEC) 40 MG DR capsule, Take 1 capsule (40 mg) by mouth daily  Probiotic Product (PROBIOTIC ADVANCED PO),   topiramate (TOPAMAX) 25 MG tablet, Take 1 tablet (25 mg) by mouth 2 times daily  venlafaxine (EFFEXOR-XR) 75 MG 24 hr capsule, Take 1 capsule (75 mg) by mouth daily  VENTOLIN  (90 Base) MCG/ACT Inhaler, INHALE 1-2 PUFFS EVERY 4-6 HOURS AS NEEDED FOR WHEEZING  vitamin B complex with vitamin C (VITAMIN  B COMPLEX) tablet, Take 1 tablet by mouth daily  varenicline (CHANTIX) 1 MG tablet, Take 1 tablet (1 mg) by mouth 2 times daily (Patient not taking: Reported on 4/19/2021)    No current facility-administered medications on  file prior to visit.        The [place] Prescription Monitoring Program has been reviewed and there are no concerns about diversionary activity for controlled substances at this time.      I was able to review most recent Primary Care Provider, specialty provider, and therapy visit notes that I have access to.     Today, patient reports  That she is doing well.  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  Sometimes she feels tired and irritable.  ;She had the COVID vaccine given to her.  She still lives alone with her dog.  She is dating a woman and it is going well.  Her health is stable.  She is sleeping a little more than usual.   She has been going to bed earlier.  When she missed three doses of Abilify her sleep became disrupted.       has a past medical history of Antiplatelet or antithrombotic long-term use, Arrhythmia, Depressive disorder (1990), Esophageal reflux, Hearing problem (2018), Hyperlipidemia LDL goal <130 (7/6/2015), Hypertension, LSIL (low grade squamous intraepithelial lesion) on Pap smear (6/2014), LEONARDO on CPAP, and Other anxiety states. She also has no past medical history of Cerebral infarction (H) or Congestive heart failure (H).    Social history updates:    Eric is social with her peers.  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  She is  and lives alone.  She identified no financial concerns.    Substance use updates:    No alcohol use.  Tobacco use: No    Vital Signs:   There were no vitals taken for this visit.    Labs:    Most recent laboratory results reviewed and no new labs.     Review of Systems:  10 systems (general, cardiovascular, respiratory, eyes, ENT, endocrine, GI, , M/S, neurological) were reviewed. Most pertinent finding(s) is/are: Some mild joint and muscle pain, no chest pain, no shortness of breath,. The remaining systems are all unremarkable.    Mental Status Examination:  Appearance:  awake, alert  Attitude:  cooperative   Eye Contact:  Unable to  assess  Gait and Station: No assistive Devices used and No dizziness or falls  Psychomotor Behavior:  Unable to assess  Oriented to:  time, person, and place  Attention Span and Concentration:  Normal  Speech:   clear, coherent and Speaks: English  Mood:  anxious, depressed and better  Affect:  mood congruent  Associations:  no loose associations  Thought Process:  goal oriented  Thought Content:  no evidence of suicidal ideation or homicidal ideation, no auditory hallucinations present and no visual hallucinations present  Recent and Remote Memory:  intact Not formally assessed. No amnesia.  Fund of Knowledge: appropriate  Insight:  good  Judgment:  intact  Impulse Control:  intact    Suicide Risk Assessment:  Today Danitza Soto reports that she is having no thoughts to want to end her life or to harm other people. In addition, there are notable risk factors for self-harm, including anxiety and mood change. However, risk is mitigated by sobriety, history of seeking help when needed, future oriented, denies suicidal intent or plan and denies homicidal ideation, intent, or plan. Therefore, based on all available evidence including the factors cited above, Danitza Soto does not appear to be at imminent risk for self-harm, does not meet criteria for a 72-hr hold, and therefore remains appropriate for ongoing outpatient level of care.  A thorough assessment of risk factors related to suicide and self-harm have been reviewed and are noted above. The patient convincingly denies suicidality on several occasions. Local community safety resources printed and reviewed for patient to use if needed. There was no deceit detected, and the patient presented in a manner that was believable.     DSM5 Diagnosis:  296.31 (F33.0) Major Depressive Disorder, Recurrent Episode, Mild _ and With mixed features  300.02 (F41.1) Generalized Anxiety Disorder    Medical comorbidities include:   Patient Active Problem List    Diagnosis  Date Noted     S/P laparoscopic sleeve gastrectomy 02/03/2021     Priority: Medium     2018 Dr. Tse        Moderate episode of recurrent major depressive disorder (H) 09/18/2020     Priority: Medium     BV (bacterial vaginosis) 09/18/2020     Priority: Medium     S/P ablation of atrial fibrillation 10/03/2019     Priority: Medium     Recurrent major depressive disorder, in full remission (H) 04/08/2019     Priority: Medium     Morbid (severe) obesity due to excess calories (H) 11/27/2018     Priority: Medium     Sp sleeve gastrectomy       Paroxysmal atrial fibrillation (H) 10/23/2018     Priority: Medium     LEONARDO (obstructive sleep apnea) 04/13/2017     Priority: Medium     4/10/2017 - Home Sleep Apnea Testing - AHI 15.5/hr; Supine AHI 12.9/hr; SpO2 <= 88% for 33.8 minutes.  Titration Sleep Study 4/19/2017 - (288.0 lbs) CPAP was titrated to a pressure of 11 with an AHI of 3.7.  Time Spent in REM supine at this pressure was 33.5.  Recommending Auto-CPAP 7 - 15 cmH2O.       Obesity hypoventilation syndrome (H) 04/13/2017     Priority: Medium     Mixed hyperlipidemia 07/06/2015     Priority: Medium     Papanicolaou smear of cervix with low grade squamous intraepithelial lesion (LGSIL) 06/17/2014     Priority: Medium     6/17/14: LSIL, + HPV, unable to type.   8/20/14:[place]:BRISEYDA I. Plan cotest pap & HPV in 1 year.   1/20116 Pap Ascus Neg HPV. Plan: [place].   5/13/16 Colposcopy not completed. Cancelled by patient  3/21/17 NIL/Neg HPV. Plan: cotest in 1 year per Dr. Mcbride  9/28/18 Tele enc: Dr. Mcbride, recommending new plan. Cotest due Jan or Feb 2019. Tracking updated. ([facility])  04/02/19 Patient is lost to pap tracking follow-up.   04/08/19: NIL pap, Neg HR HPV result. Plan cotest in 3 years per provider.        Acne 02/24/2012     Priority: Medium     Essential hypertension 01/12/2011     Priority: Medium     CARDIOVASCULAR SCREENING; LDL GOAL LESS THAN 130 10/31/2010     Priority: Medium     Morbid obesity due to excess  calories (H) 05/17/2006     Priority: Medium     Problem list name updated by automated process. Provider to review       Tobacco use disorder 05/17/2006     Priority: Medium     Anxiety state      Priority: Medium     Gastroesophageal reflux disease without esophagitis      Priority: Medium       Assessment:    Danitza Soto is feeling less anxious and depressed despite occasional bouts of irritability that occur when she feels tired.  Work is going well for her.  She is not taking the hydroxyzine so that was discontinued.  At this point she will continue her Abilify at 5 mg daily along with the venlafaxine ER 75 mg daily.  As she is stable at this point she will return to her primary care provider for future medication refills..    Medication side effects and alternatives were reviewed. Health promotion activities recommended and reviewed today. All questions addressed. Education and counseling completed regarding risks and benefits of medications and psychotherapy options.    Treatment Plan:        1.  Continue Abilify 5 mg daily    2.  Continue venlafaxine ER 75 mg daily    3.  Hydroxyzine discontinued    4.  Return to your primary care provider for future medication refills      Continue all other medications as reviewed per electronic medical record today.     Safety plan reviewed. To the Emergency Department as needed or call after hours crisis line at [phone] or [phone]. [place] Crisis Text Line. Text [place] to 420243 or Suicide LifeLine Chat: suicidepreventionlifeline.org/chat/    To schedule individual or family therapy, call [facility] at [phone].    Schedule an appointment with me as needed. Call [facility] at [phone] to schedule.    Follow up with primary care provider as planned or for acute medical concerns.    Call the psychiatric nurse line with medication questions or concerns at [phone].    [employer] may be used to communicate with your  provider, but this is not intended to be used for emergencies.    Crisis Resources:    National Suicide Prevention Lifeline: 883.999.7296 (TTY: 254.928.7016). Call anytime for help.  (www.suicidepreventionlifeline.org)  National [place] on Mental Illness (www.eduar.org): 647.946.7862 or 036-614-3048.   Mental Health Association (www.mentalhealth.org): 941.852.1904 or 529-850-6184.  [place] Crisis Text Line: Text [place] to 709907  Suicide LifeLine Chat: suicideiwi.org/chat    Administrative Billing:   Time spent with patient was 30 minutes and greater than 50% of time or 20 minutes was spent in counseling and coordination of care regarding above diagnoses and treatment plan.    Patient Status:  The patient is being returned to the referring provider for ongoing care and medication prescribing.  The patient can be referred back to this service for further consultation as needed.    Signed:   PATRIA Means-BC   Psychiatry

## 2021-04-19 NOTE — PATIENT INSTRUCTIONS
1.  Continue Abilify 5 mg daily    2.  Continue venlafaxine ER 75 mg daily    3.  Hydroxyzine discontinued    4.  Return to your primary care provider for future medication refills      Continue all other medications as reviewed per electronic medical record today.     Safety plan reviewed. To the Emergency Department as needed or call after hours crisis line at [phone] or [phone]. [place] Crisis Text Line. Text [place] to 981392 or Suicide LifeLine Chat: suicideSmarter Pockets.org/chat/    To schedule individual or family therapy, call [facility] at [phone].    Schedule an appointment with me as needed. Call [facility] at [phone] to schedule.    Follow up with primary care provider as planned or for acute medical concerns.    Call the psychiatric nurse line with medication questions or concerns at [phone].    [employer] may be used to communicate with your provider, but this is not intended to be used for emergencies.    Crisis Resources:    National Suicide Prevention Lifeline: 764.396.9990 (TTY: 833.291.3128). Call anytime for help.  (www.suicidepreventionlifeline.org)  National [place] [employer] Mental [employer]ness (www.eduar.org): 725.697.2818 or 377-457-1156.   Mental Health Association (www.mentalhealth.org): 595.182.3634 or 184-009-6749.  [place] Crisis Text Line: Text [place] to 798857  Suicide LifeLine Chat: suicideSmarter Pockets.[email or web address]/chat

## 2021-04-20 ASSESSMENT — ANXIETY QUESTIONNAIRES: GAD7 TOTAL SCORE: 0

## 2021-05-06 ENCOUNTER — OFFICE VISIT (OUTPATIENT)
Dept: FAMILY MEDICINE | Facility: CLINIC | Age: 41
End: 2021-05-06
Payer: OTHER MISCELLANEOUS

## 2021-05-06 VITALS
WEIGHT: 267 LBS | HEART RATE: 80 BPM | DIASTOLIC BLOOD PRESSURE: 52 MMHG | RESPIRATION RATE: 12 BRPM | TEMPERATURE: 98.1 F | BODY MASS INDEX: 41.91 KG/M2 | SYSTOLIC BLOOD PRESSURE: 108 MMHG | HEIGHT: 67 IN

## 2021-05-06 DIAGNOSIS — S76.011A STRAIN OF HIP AND THIGH, RIGHT, INITIAL ENCOUNTER: ICD-10-CM

## 2021-05-06 DIAGNOSIS — S76.911A STRAIN OF HIP AND THIGH, RIGHT, INITIAL ENCOUNTER: ICD-10-CM

## 2021-05-06 DIAGNOSIS — S39.012A STRAIN OF LUMBAR REGION, INITIAL ENCOUNTER: Primary | ICD-10-CM

## 2021-05-06 PROCEDURE — 99214 OFFICE O/P EST MOD 30 MIN: CPT | Performed by: FAMILY MEDICINE

## 2021-05-06 ASSESSMENT — MIFFLIN-ST. JEOR: SCORE: 1908.73

## 2021-05-06 NOTE — PATIENT INSTRUCTIONS
Relative rest but no bed rest.  Warm packs as needed .  Follow up on 5/10 if need changes due to work  Otherwise unrestricted work 5/10

## 2021-05-06 NOTE — PROGRESS NOTES
"    Assessment & Plan     Strain of lumbar region, initial encounter  Slipped at work 5/4- excuse for work letter handed to patient   Relative rest has helped with recovery. Advised but no bed rest.  Warm packs as needed might help even more .  Follow up on 5/10 if need changes due to work  Otherwise back to  unrestricted work 5/10  Strain of hip and thigh, right, initial encounter  As above         Return in about 4 days (around 5/10/2021) for concerns,unresolved.    Zoila Echevarria MD  [facility]   Trish Soto is a 40 year old who presents for the following health issues     HPI     Musculoskeletal problem/pain  Onset/Duration: 5/4/21  Description  Location: right buttock - lower back  Joint Swelling: felt swollen , better now   Redness: no  Pain: YES- mild to moderate   Warmth: YES  Intensity:  moderate  Progression of Symptoms:  improving  Accompanying signs and symptoms:   Fevers: no  Numbness/tingling/weakness: no  History  Trauma to the area: YES  Recent illness:  no  Previous similar problem: no  Previous evaluation:  no  Precipitating or alleviating factors:  Aggravating factors include: sitting and overuse  Therapies tried and outcome: ice helps.    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   Does not take over the counter pain medications due to gastric bypass surgery  Having muscle spasm at the lower pack and right buttock region  Afraid of hurting more at work- relative rest helps.  Has not worked for 2 days  Pain is slowly getting better.      Review of Systems   Constitutional, HEENT, cardiovascular, pulmonary, GI, , musculoskeletal, neuro, skin, endocrine and psych systems are negative, except as otherwise noted.      Objective    /52   Pulse 80   Temp 98.1  F (36.7  C) (Oral)   Resp 12   Ht 1.702 m (5' 7\")   Wt 121.1 kg (267 lb)   LMP 04/19/2021   Breastfeeding No   " BMI 41.82 kg/m    Body mass index is 41.82 kg/m .  Physical Exam   GENERAL: healthy, alert and no distress  Gait normal. No pin point spinal tenderness   Lumbar region- full ROM forward flexion/ extension. No bruise  Right hip; mild tenderness over the right gluteal region- no bruise, no swelling.   Rest of the MS: no gross musculoskeletal defects noted, no edema  NECK: no adenopathy, no asymmetry, masses, or scars and thyroid normal to palpation  RESP: lungs clear to auscultation - no rales, rhonchi or wheezes    30 minutes spent on the date of the encounter doing chart review, history and exam, documentation and further activities per the note

## 2021-05-18 ENCOUNTER — OFFICE VISIT (OUTPATIENT)
Dept: OBGYN | Facility: CLINIC | Age: 41
End: 2021-05-18
Payer: COMMERCIAL

## 2021-05-18 VITALS
OXYGEN SATURATION: 97 % | WEIGHT: 268 LBS | HEART RATE: 84 BPM | SYSTOLIC BLOOD PRESSURE: 112 MMHG | DIASTOLIC BLOOD PRESSURE: 76 MMHG | BODY MASS INDEX: 41.97 KG/M2

## 2021-05-18 DIAGNOSIS — E66.01 MORBID OBESITY DUE TO EXCESS CALORIES (H): ICD-10-CM

## 2021-05-18 DIAGNOSIS — Z98.84 S/P LAPAROSCOPIC SLEEVE GASTRECTOMY: ICD-10-CM

## 2021-05-18 DIAGNOSIS — N92.0 MENORRHAGIA WITH REGULAR CYCLE: Primary | ICD-10-CM

## 2021-05-18 LAB
ALBUMIN SERPL-MCNC: 3.7 G/DL (ref 3.4–5)
ALP SERPL-CCNC: 81 U/L (ref 40–150)
ALT SERPL W P-5'-P-CCNC: 20 U/L (ref 0–50)
ANION GAP SERPL CALCULATED.3IONS-SCNC: 3 MMOL/L (ref 3–14)
AST SERPL W P-5'-P-CCNC: 11 U/L (ref 0–45)
BILIRUB SERPL-MCNC: 0.4 MG/DL (ref 0.2–1.3)
BUN SERPL-MCNC: 9 MG/DL (ref 7–30)
CALCIUM SERPL-MCNC: 9.1 MG/DL (ref 8.5–10.1)
CHLORIDE SERPL-SCNC: 110 MMOL/L (ref 94–109)
CO2 SERPL-SCNC: 28 MMOL/L (ref 20–32)
CREAT SERPL-MCNC: 0.68 MG/DL (ref 0.52–1.04)
ERYTHROCYTE [DISTWIDTH] IN BLOOD BY AUTOMATED COUNT: 13.8 % (ref 10–15)
FERRITIN SERPL-MCNC: 27 NG/ML (ref 12–150)
GFR SERPL CREATININE-BSD FRML MDRD: >90 ML/MIN/{1.73_M2}
GLUCOSE SERPL-MCNC: 81 MG/DL (ref 70–99)
HBA1C MFR BLD: 5.2 % (ref 0–5.6)
HCT VFR BLD AUTO: 43.7 % (ref 35–47)
HGB BLD-MCNC: 14.4 G/DL (ref 11.7–15.7)
MCH RBC QN AUTO: 29.4 PG (ref 26.5–33)
MCHC RBC AUTO-ENTMCNC: 33 G/DL (ref 31.5–36.5)
MCV RBC AUTO: 89 FL (ref 78–100)
PLATELET # BLD AUTO: 334 10E9/L (ref 150–450)
POTASSIUM SERPL-SCNC: 3.7 MMOL/L (ref 3.4–5.3)
PROT SERPL-MCNC: 7.3 G/DL (ref 6.8–8.8)
PTH-INTACT SERPL-MCNC: 28 PG/ML (ref 18–80)
RBC # BLD AUTO: 4.9 10E12/L (ref 3.8–5.2)
SODIUM SERPL-SCNC: 141 MMOL/L (ref 133–144)
TSH SERPL DL<=0.005 MIU/L-ACNC: 0.64 MU/L (ref 0.4–4)
VIT B12 SERPL-MCNC: 476 PG/ML (ref 193–986)
WBC # BLD AUTO: 7.4 10E9/L (ref 4–11)

## 2021-05-18 PROCEDURE — 84590 ASSAY OF VITAMIN A: CPT | Mod: 90 | Performed by: OBSTETRICS & GYNECOLOGY

## 2021-05-18 PROCEDURE — 82306 VITAMIN D 25 HYDROXY: CPT | Performed by: OBSTETRICS & GYNECOLOGY

## 2021-05-18 PROCEDURE — 85027 COMPLETE CBC AUTOMATED: CPT | Performed by: OBSTETRICS & GYNECOLOGY

## 2021-05-18 PROCEDURE — 82728 ASSAY OF FERRITIN: CPT | Performed by: OBSTETRICS & GYNECOLOGY

## 2021-05-18 PROCEDURE — 83970 ASSAY OF PARATHORMONE: CPT | Performed by: OBSTETRICS & GYNECOLOGY

## 2021-05-18 PROCEDURE — 83036 HEMOGLOBIN GLYCOSYLATED A1C: CPT | Performed by: OBSTETRICS & GYNECOLOGY

## 2021-05-18 PROCEDURE — 99000 SPECIMEN HANDLING OFFICE-LAB: CPT | Performed by: OBSTETRICS & GYNECOLOGY

## 2021-05-18 PROCEDURE — 84443 ASSAY THYROID STIM HORMONE: CPT | Performed by: OBSTETRICS & GYNECOLOGY

## 2021-05-18 PROCEDURE — 36415 COLL VENOUS BLD VENIPUNCTURE: CPT | Performed by: OBSTETRICS & GYNECOLOGY

## 2021-05-18 PROCEDURE — 82607 VITAMIN B-12: CPT | Performed by: OBSTETRICS & GYNECOLOGY

## 2021-05-18 PROCEDURE — 80053 COMPREHEN METABOLIC PANEL: CPT | Performed by: OBSTETRICS & GYNECOLOGY

## 2021-05-18 PROCEDURE — 99203 OFFICE O/P NEW LOW 30 MIN: CPT | Performed by: OBSTETRICS & GYNECOLOGY

## 2021-05-18 NOTE — PROGRESS NOTES
Danitza is a 40 year old  here today with complaint of abnormal uterine bleeding.    Over the past 5 to 6 years, her cycles have gotten longer, they were 4 to 6 days and now they are 8 or 9 days.  She bleeds the whole time, but it is not heavy, except for the first day or two.  She uses tampons and pads.   She will change them every 2 to 6 hours, based on the bleeding.  She has some clots.  She has minimal cramping the first 2 days.  She starts cramping the few days before her menses begins.   She is not able to take the NSAIDs due to her gastric sleeve use.  Tylenol does not help.    She had bleeding in the past and she used Provera and it helped.    She had A-fib in the past and she used anticoagulants until the surgery and then she has not been on them since.    She reports that her family has had early perimenopausal bleeding.  Her family members have not had surgery performed.       Past Medical History:   Diagnosis Date     Antiplatelet or antithrombotic long-term use      Arrhythmia      Depressive disorder      Esophageal reflux      Hearing problem      Hyperlipidemia LDL goal <130 2015     Hypertension      LSIL (low grade squamous intraepithelial lesion) on Pap smear 2014    + HPV, unable to type colp - BRISEYDA I     LEONARDO on CPAP      Other anxiety states        Past Surgical History:   Procedure Laterality Date     EP ABLATION FOCAL AFIB N/A 10/3/2019    Procedure: EP ABLATION FOCAL AFIB;  Surgeon: Harpreet Arevalo MD;  Location: [facility] OR      TOOTH EXTRACTION W/FORCEP      Harleigh Teeth Extraction     LAPAROSCOPIC GASTRIC SLEEVE N/A 2018    Procedure: Laparoscopic Sleeve Gastrectomy Latex Free;  Surgeon: Artis Tse MD;  Location: [facility] OR     LAPAROSCOPIC HERNIORRHAPHY HIATAL  2018    Procedure: LAPAROSCOPIC  HIATAL Hernia Repair;  Surgeon: Artis Tse MD;  Location: [facility] OR       OB History    Para Term  AB Living   0 0 0 0 0 0   SAB TAB Ectopic  Multiple Live Births   0 0 0 0 0       Gynecological History         Patient's last menstrual period was 05/18/2021 (exact date).     No STD/No PID/No IUD      See above HPI        Allergies   Allergen Reactions     Wellbutrin [Bupropion]      Wellbutrin: Panic attacks, heart/chest pain       Current Outpatient Medications   Medication Sig Dispense Refill     acetaminophen (TYLENOL) 325 MG tablet Take 2 tablets (650 mg) by mouth every 4 hours as needed for other (For optimal non-opioid multimodal pain management to improve pain control and physical function.) 100 tablet 0     ARIPiprazole (ABILIFY) 5 MG tablet Take 1 tablet (5 mg) by mouth daily 90 tablet 0     calcium carbonate (OS-CHIKI) 500 MG tablet Take 1 tablet by mouth daily        famotidine (PEPCID) 20 MG tablet Take 1 tablet (20 mg) by mouth 2 times daily as needed (reflux) 180 tablet 1     Ferrous Sulfate 324 (65 Fe) MG TBEC One daily       fish oil-omega-3 fatty acids 1000 MG capsule Take 2 g by mouth every morning        hydrochlorothiazide (MICROZIDE) 12.5 MG capsule Take 1 capsule (12.5 mg) by mouth daily 90 capsule 3     lisinopril (ZESTRIL) 40 MG tablet TAKE 1 TABLET BY MOUTH DAILY 90 tablet 0     Multiple Vitamins-Minerals (CENTRUM PO) 2 tablets per day       naltrexone (DEPADE/REVIA) 50 MG tablet Take 1 tablet (50 mg) by mouth daily 90 tablet 1     omeprazole (PRILOSEC) 20 MG DR capsule Take 1 capsule (20 mg) by mouth daily 90 capsule 1     omeprazole (PRILOSEC) 40 MG DR capsule Take 1 capsule (40 mg) by mouth daily 90 capsule 3     Probiotic Product (PROBIOTIC ADVANCED PO)        topiramate (TOPAMAX) 25 MG tablet Take 1 tablet (25 mg) by mouth 2 times daily 180 tablet 1     varenicline (CHANTIX) 1 MG tablet Take 1 tablet (1 mg) by mouth 2 times daily (Patient not taking: Reported on 4/19/2021) 60 tablet 0     venlafaxine (EFFEXOR-XR) 75 MG 24 hr capsule Take 1 capsule (75 mg) by mouth daily 90 capsule 1     VENTOLIN  (90 Base) MCG/ACT  Inhaler INHALE 1-2 PUFFS EVERY 4-6 HOURS AS NEEDED FOR WHEEZING  0     vitamin B complex with vitamin C (VITAMIN  B COMPLEX) tablet Take 1 tablet by mouth daily         Social History     Socioeconomic History     Marital status:      Spouse name: Not on file     Number of children: 0     Years of education: Some Colle     Highest education level: Not on file   Occupational History     Occupation: Mental Health Counselor   Social Needs     Financial resource strain: Not on file     Food insecurity     Worry: Not on file     Inability: Not on file     Transportation needs     Medical: Not on file     Non-medical: Not on file   Tobacco Use     Smoking status: Current Some Day Smoker     Packs/day: 1.00     Years: 10.00     Pack years: 10.00     Types: Cigarettes     Start date: 1/1/2004     Smokeless tobacco: Never Used   Substance and Sexual Activity     Alcohol use: Not Currently     Alcohol/week: 0.0 standard drinks     Comment: Occas.     Drug use: No     Sexual activity: Not Currently     Partners: Female     Birth control/protection: None   Lifestyle     Physical activity     Days per week: Not on file     Minutes per session: Not on file     Stress: Not on file   Relationships     Social connections     Talks on phone: Not on file     Gets together: Not on file     Attends [employer] service: Not on file     Active member of club or organization: Not on file     Attends meetings of clubs or organizations: Not on file     Relationship status: Not on file     Intimate partner violence     Fear of current or ex partner: Not on file     Emotionally abused: Not on file     Physically abused: Not on file     Forced sexual activity: Not on file   Other Topics Concern     Parent/sibling w/ CABG, MI or angioplasty before 65F 55M? No   Social History Narrative    Social Documentation:        Balanced Diet: YES, sometimes    Calcium intake: 2 per day    Caffeine: 5-10 per day    Exercise:  type of activity walking,  playtime;  5 times per week    Sunscreen: when remembered    Seatbelts:  Yes    Self Breast Exam:  No    Self Testicular Exam: n/a    Physical/Emotional/Sexual Abuse: No    Do you feel safe in your environment? Yes        Cholesterol screen up to date: No     Eye Exam up to date: Yes    Dental Exam up to date: No    Pap smear up to date: No:     Mammogram up to date: Does Not Apply    Dexa Scan up to date: Does Not Apply    Colonoscopy up to date: Does Not Apply    Immunizations up to date: Yes,     Glucose screen if over 40:  N/a        Jamila Coyle MA                       Family History   Problem Relation Age of Onset     Heart Disease Mother         ,mother had emphesema and  at 62     Hypertension Mother      Allergies Mother      Lipids Mother      Obesity Mother      Respiratory Mother         Emphysema     Diabetes Maternal Grandmother         Type 2?     Hypertension Maternal Grandmother      Circulatory Maternal Grandmother         Due to diabetes     Eye Disorder Maternal Grandmother         Macular degeneration/Macular degeneration     Obesity Maternal Grandmother      Hypertension Father      Lipids Father      Cancer Father      Prostate Cancer Father      Other Cancer Father      Cerebrovascular Disease Paternal Grandmother      Alcohol/Drug Maternal Grandfather      Obesity Maternal Grandfather      Psychotic Disorder Paternal Grandfather         Committed Suicide     Depression Paternal Grandfather      Allergies Sister      Genitourinary Problems Sister          Review of Systems:  10 point ROS of systems including Constitutional, Eyes, Respiratory, Cardiovascular, Gastroenterology, Genitourinary, Integumentary, Muscularskeletal, Psychiatric were all negative except for pertinent positives noted in my HPI and in the PMH.          EXAM:  /76 (BP Location: Right arm, Cuff Size: Adult Large)   Pulse 84   Wt 121.6 kg (268 lb)   LMP 2021 (Exact Date)   SpO2 97%    Breastfeeding No   BMI 41.97 kg/m    Body mass index is 41.97 kg/m .  General Appearance:  healthy, alert, active, no distress  Skin:  Normal skin turgor  Neuro:  Alert, cranial nerves grossly intact  HEENT: NCAT  Neck:  No masses or lesions carotids are +2/4. No bruits heard  Lungs:  Good respiratory effort   Abdomen: Soft, nontender.  Normal bowel sounds.  No masses  Pelvic exam:  Not performed  Extremities:  No clubbing, cyanosis or edema.        ASSESSMENT:  Menorrhagia         PLAN:  - Pelvic U/S  - Check TSH  She has her CBC and Ferritin ordered by PCP, so those labs will also be released.    We discussed the bleeding profiles as people age and approach menopause.  Even though menstrual changes and irregularities are common and expected, they may also indicate or precipitate endometrial abnormalities.   The patient and I discussed the options for evaluation.  The EMB, SIS and the D&C were reviewed with her.  The EMB will not remove a polyp, but will give a tissue sample.  The SIS will further evaluate the lining, but no tissue sample, and should she have a suspected polyp, it would not be removed.  The D&C is more expensive but is currently the gold standard.    Together we reviewed the risks and benefits of medical versus surgical therapy.  Medical therapy reviewed included hormonal manipulation with OCP's, Patch, Ring, Depo, or IUD.   We also reviewed TXA use.  We reviewed the aspects of fibroid embolization.  We reviewed endometrial ablation versus hysterectomy.  Discussed that endometrial ablation is minimally invasive compared to hysterectomy but may not be definitive.  Patient will return for further discussion and for EMB.       Lucian Reyna MD

## 2021-05-19 LAB — DEPRECATED CALCIDIOL+CALCIFEROL SERPL-MC: 40 UG/L (ref 20–75)

## 2021-05-21 LAB
ANNOTATION COMMENT IMP: NORMAL
RETINYL PALMITATE SERPL-MCNC: 0.09 MG/L (ref 0–0.1)
VIT A SERPL-MCNC: 0.56 MG/L (ref 0.3–1.2)

## 2021-05-27 NOTE — PROGRESS NOTES
"    Assessment & Plan     Left foot pain  I do not see any fractures or bony abnormalities on the X ray , we discussed using comfortable shoes and orthotic inserts but if no improvement ( and since it has been going on for a while ) referred to [facility]/ podiatry , can use rest, ice elevated at night , NSAIDs as needed for now   - XR Foot Left G/E 3 Views; Future  - Orthopedic & Spine  Referral; Future       BMI:   Estimated body mass index is 41.94 kg/m  as calculated from the following:    Height as of this encounter: 1.702 m (5' 7\").    Weight as of this encounter: 121.5 kg (267 lb 12.8 oz).       RTC if no improving or worsening.  Pt is aware  and comfortable with the current plan.      Polly Mcbride MD  [facility]   Trish Soto is a 40 year old who presents for the following health issues     HPI     Musculoskeletal problem/pain  Onset/Duration: x1.5 months may be longer , she has put up wt it , no injury , no redness   Description  Location: outside of foot - left  Joint Swelling: YES  Redness: no  Pain: YES- sharp and aching   Warmth: no  Intensity:  mild  Progression of Symptoms:  same  Accompanying signs and symptoms:   Fevers: no  Numbness/tingling/weakness: no  History  Trauma to the area: no  Recent illness:  no  Previous similar problem: no  Previous evaluation:  no  Precipitating or alleviating factors:  Aggravating factors include: walking, climbing stairs and exercise  Therapies tried and outcome: rest/inactivity - not helpful        Review of Systems   Constitutional, HEENT, cardiovascular, pulmonary, GI, , musculoskeletal, neuro, skin, endocrine and psych systems are negative, except as otherwise noted.      Objective    Resp 20   Ht 1.702 m (5' 7\")   Wt 121.5 kg (267 lb 12.8 oz)   LMP 05/18/2021 (Exact Date)   BMI 41.94 kg/m    Body mass index is 41.94 kg/m .  Physical Exam   GENERAL: healthy, alert and no distress  EYES: Eyes grossly normal to " inspection, PERRL and conjunctivae and sclerae normal  NECK: no adenopathy, no asymmetry, masses, or scars and thyroid normal to palpation  MS: no gross musculoskeletal defects noted, no edema, some pain with palpation over the metatarsal base ( 5th one ) on the left foot , no erythema and no edema and is able to bear weight   NEURO: Normal strength and tone, mentation intact and speech normal  PSYCH: mentation appears normal, affect normal/bright    Xray - Reviewed and interpreted by me.   Normal

## 2021-05-28 ENCOUNTER — ANCILLARY PROCEDURE (OUTPATIENT)
Dept: ULTRASOUND IMAGING | Facility: CLINIC | Age: 41
End: 2021-05-28
Attending: OBSTETRICS & GYNECOLOGY
Payer: COMMERCIAL

## 2021-06-01 ENCOUNTER — OFFICE VISIT (OUTPATIENT)
Dept: FAMILY MEDICINE | Facility: CLINIC | Age: 41
End: 2021-06-01
Payer: COMMERCIAL

## 2021-06-01 ENCOUNTER — ANCILLARY PROCEDURE (OUTPATIENT)
Dept: GENERAL RADIOLOGY | Facility: CLINIC | Age: 41
End: 2021-06-01
Attending: FAMILY MEDICINE
Payer: COMMERCIAL

## 2021-06-01 VITALS
HEIGHT: 67 IN | SYSTOLIC BLOOD PRESSURE: 112 MMHG | OXYGEN SATURATION: 98 % | TEMPERATURE: 98.6 F | BODY MASS INDEX: 42.03 KG/M2 | RESPIRATION RATE: 20 BRPM | WEIGHT: 267.8 LBS | DIASTOLIC BLOOD PRESSURE: 74 MMHG | HEART RATE: 66 BPM

## 2021-06-01 DIAGNOSIS — M79.672 LEFT FOOT PAIN: ICD-10-CM

## 2021-06-01 DIAGNOSIS — M79.672 LEFT FOOT PAIN: Primary | ICD-10-CM

## 2021-06-01 PROCEDURE — 99214 OFFICE O/P EST MOD 30 MIN: CPT | Performed by: FAMILY MEDICINE

## 2021-06-01 PROCEDURE — 73630 X-RAY EXAM OF FOOT: CPT | Mod: LT | Performed by: RADIOLOGY

## 2021-06-01 ASSESSMENT — MIFFLIN-ST. JEOR: SCORE: 1912.36

## 2021-06-15 ENCOUNTER — OFFICE VISIT (OUTPATIENT)
Dept: OBGYN | Facility: CLINIC | Age: 41
End: 2021-06-15
Payer: COMMERCIAL

## 2021-06-15 VITALS
OXYGEN SATURATION: 97 % | BODY MASS INDEX: 41.94 KG/M2 | WEIGHT: 267.8 LBS | HEART RATE: 76 BPM | DIASTOLIC BLOOD PRESSURE: 75 MMHG | SYSTOLIC BLOOD PRESSURE: 110 MMHG

## 2021-06-15 DIAGNOSIS — N92.0 MENORRHAGIA WITH REGULAR CYCLE: Primary | ICD-10-CM

## 2021-06-15 DIAGNOSIS — N83.292 COMPLEX CYST OF LEFT OVARY: ICD-10-CM

## 2021-06-15 PROCEDURE — 99213 OFFICE O/P EST LOW 20 MIN: CPT | Mod: 25 | Performed by: OBSTETRICS & GYNECOLOGY

## 2021-06-15 PROCEDURE — 58100 BIOPSY OF UTERUS LINING: CPT | Performed by: OBSTETRICS & GYNECOLOGY

## 2021-06-15 NOTE — PROGRESS NOTES
Danitza is a 40 year old female, .  She is here today for follow up of heavy abnormal uterine bleeding.      Over the past 5 to 6 years, her cycles have gotten longer, they were 4 to 6 days and now they are 8 or 9 days.  She bleeds the whole time, but it is not heavy, except for the first day or two.  She uses tampons and pads.   She will change them every 2 to 6 hours, based on the bleeding.  She has some clots.  She has minimal cramping the first 2 days.  She starts cramping the few days before her menses begins.   She is not able to take the NSAIDs due to her gastric sleeve use.  Tylenol does not help.    She had bleeding in the past and she used Provera and it helped.    She had A-fib in the past and she used anticoagulants until the surgery and then she has not been on them since.    She reports that her family has had early perimenopausal bleeding.  Her family members have not had surgery performed.   She had the following ultrasound performed and the findings are reviewed with her.  We reviewed the films and the report.   PELVIC ULTRASOUND WITH ENDOVAGINAL TRANSDUCER    2021 9:47 AM   HISTORY: Abnormal uterine bleeding; Menorrhagia with regular cycle.  TECHNIQUE:  Endovaginal sonography was added to the transabdominal  exam.  COMPARISON: Pelvic ultrasound on 10/22/2014.  FINDINGS:   Endometrium: Endometrium is 8 mm thick.  Uterus: Measures 7.6 x 4.0 x 4.6 cm. No uterine fibroids.  Right ovary: Measures 4.3 x 3.9 x 2.2 cm. It demonstrates normal follicular structure and color-flow. There is 1.5 x 1.3 x 1.5 cm anechoic cyst in the right ovary, likely represent physiological follicle.  Left ovary: Measures 7.8 x 6.2 x 5.2 cm. A few ovarian cystic lesions including 7.1 x 5.7 x 4.8 cm hypoechoic cyst, could represent large hemorrhagic cyst or endometrioma. Similar smaller cystic lesion measuring 2.0 x 1.5 x 2.1 cm. Smaller anechoic cyst, likely represents physiological follicle.  Additional findings:  Trace amount of free fluid in the pelvis.   IMPRESSION: Two mildly complex hypoechoic cystic lesions in the leftovary, with the largest lesion measures up to 7.8 cm, indeterminate,could represent hemorrhagic cysts, endometriomas versus less likelycystic ovarian neoplasm (although these could be new lesions,similar lesions are seen on 10/22/2014 pelvic ultrasound), recommend further evaluation with pelvic MRI for better characterization.     Past Medical History:   Diagnosis Date     Antiplatelet or antithrombotic long-term use      Arrhythmia      Depressive disorder 1990     Esophageal reflux      Hearing problem 2018     Hyperlipidemia LDL goal <130 7/6/2015     Hypertension      LSIL (low grade squamous intraepithelial lesion) on Pap smear 6/2014    + HPV, unable to type colp - BRISEYDA I     LEONARDO on CPAP      Other anxiety states        Past Surgical History:   Procedure Laterality Date     EP ABLATION FOCAL AFIB N/A 10/3/2019    Procedure: EP ABLATION FOCAL AFIB;  Surgeon: Harpreet Arevalo MD;  Location: [facility] OR     HC TOOTH EXTRACTION W/FORCEP  1995    [place] Teeth Extraction     LAPAROSCOPIC GASTRIC SLEEVE N/A 11/27/2018    Procedure: Laparoscopic Sleeve Gastrectomy Latex Free;  Surgeon: Artis Tse MD;  Location: [facility] OR     LAPAROSCOPIC HERNIORRHAPHY HIATAL  11/27/2018    Procedure: LAPAROSCOPIC  HIATAL Hernia Repair;  Surgeon: Artis Tse MD;  Location: [facility] OR          Allergies   Allergen Reactions     Wellbutrin [Bupropion]      Wellbutrin: Panic attacks, heart/chest pain       Current Outpatient Medications   Medication Sig Dispense Refill     acetaminophen (TYLENOL) 325 MG tablet Take 2 tablets (650 mg) by mouth every 4 hours as needed for other (For optimal non-opioid multimodal pain management to improve pain control and physical function.) 100 tablet 0     ARIPiprazole (ABILIFY) 5 MG tablet Take 1 tablet (5 mg) by mouth daily 90 tablet 0     calcium carbonate (OS-CHIKI) 500 MG tablet Take 1  tablet by mouth daily        famotidine (PEPCID) 20 MG tablet Take 1 tablet (20 mg) by mouth 2 times daily as needed (reflux) 180 tablet 1     Ferrous Sulfate 324 (65 Fe) MG TBEC One daily       fish oil-omega-3 fatty acids 1000 MG capsule Take 2 g by mouth every morning        hydrochlorothiazide (MICROZIDE) 12.5 MG capsule Take 1 capsule (12.5 mg) by mouth daily 90 capsule 3     lisinopril (ZESTRIL) 40 MG tablet TAKE 1 TABLET BY MOUTH DAILY 90 tablet 0     Multiple Vitamins-Minerals (CENTRUM PO) 2 tablets per day       naltrexone (DEPADE/REVIA) 50 MG tablet Take 1 tablet (50 mg) by mouth daily 90 tablet 1     omeprazole (PRILOSEC) 20 MG DR capsule Take 1 capsule (20 mg) by mouth daily 90 capsule 1     omeprazole (PRILOSEC) 40 MG DR capsule Take 1 capsule (40 mg) by mouth daily 90 capsule 3     Probiotic Product (PROBIOTIC ADVANCED PO)        topiramate (TOPAMAX) 25 MG tablet Take 1 tablet (25 mg) by mouth 2 times daily 180 tablet 1     venlafaxine (EFFEXOR-XR) 75 MG 24 hr capsule Take 1 capsule (75 mg) by mouth daily 90 capsule 1     VENTOLIN  (90 Base) MCG/ACT Inhaler INHALE 1-2 PUFFS EVERY 4-6 HOURS AS NEEDED FOR WHEEZING  0     vitamin B complex with vitamin C (VITAMIN  B COMPLEX) tablet Take 1 tablet by mouth daily       varenicline (CHANTIX) 1 MG tablet Take 1 tablet (1 mg) by mouth 2 times daily (Patient not taking: Reported on 4/19/2021) 60 tablet 0       Social History     Socioeconomic History     Marital status:      Spouse name: Not on file     Number of children: 0     Years of education: Some Colle     Highest education level: Not on file   Occupational History     Occupation: Mental Health Counselor   Social Needs     Financial resource strain: Not on file     Food insecurity     Worry: Not on file     Inability: Not on file     Transportation needs     Medical: Not on file     Non-medical: Not on file   Tobacco Use     Smoking status: Former Smoker     Packs/day: 1.00     Years: 10.00      Pack years: 10.00     Types: Cigarettes     Start date: 2004     Quit date: 2021     Years since quittin.0     Smokeless tobacco: Never Used   Substance and Sexual Activity     Alcohol use: Not Currently     Alcohol/week: 0.0 standard drinks     Comment: Occas.     Drug use: No     Sexual activity: Not Currently     Partners: Female     Birth control/protection: None   Lifestyle     Physical activity     Days per week: Not on file     Minutes per session: Not on file     Stress: Not on file   Relationships     Social connections     Talks on phone: Not on file     Gets together: Not on file     Attends Yazidi service: Not on file     Active member of club or organization: Not on file     Attends meetings of clubs or organizations: Not on file     Relationship status: Not on file     Intimate partner violence     Fear of current or ex partner: Not on file     Emotionally abused: Not on file     Physically abused: Not on file     Forced sexual activity: Not on file   Other Topics Concern     Parent/sibling w/ CABG, MI or angioplasty before 65F 55M? No   Social History Narrative    Social Documentation:        Balanced Diet: YES, sometimes    Calcium intake: 2 per day    Caffeine: 5-10 per day    Exercise:  type of activity walking, playtime;  5 times per week    Sunscreen: when remembered    Seatbelts:  Yes    Self Breast Exam:  No    Self Testicular Exam: n/a    Physical/Emotional/Sexual Abuse: No    Do you feel safe in your environment? Yes        Cholesterol screen up to date: No     Eye Exam up to date: Yes    Dental Exam up to date: No    Pap smear up to date: No:     Mammogram up to date: Does Not Apply    Dexa Scan up to date: Does Not Apply    Colonoscopy up to date: Does Not Apply    Immunizations up to date: Yes,     Glucose screen if over 40:  N/a        Jamila Coyle MA                       Family History   Problem Relation Age of Onset     Heart Disease Mother          ,mother had emphesema and  at 62     Hypertension Mother      Allergies Mother      Lipids Mother      Obesity Mother      Respiratory Mother         Emphysema     Diabetes Maternal Grandmother         Type 2?     Hypertension Maternal Grandmother      Circulatory Maternal Grandmother         Due to diabetes     Eye Disorder Maternal Grandmother         Macular degeneration/Macular degeneration     Obesity Maternal Grandmother      Hypertension Father      Lipids Father      Cancer Father      Prostate Cancer Father      Other Cancer Father      Cerebrovascular Disease Paternal Grandmother      Alcohol/Drug Maternal Grandfather      Obesity Maternal Grandfather      Psychotic Disorder Paternal Grandfather         Committed Suicide     Depression Paternal Grandfather      Allergies Sister      Genitourinary Problems Sister        Review of Systems:  10 point ROS of systems including Constitutional, Eyes, Respiratory, Cardiovascular, Gastroenterology, Genitourinary, Integumentary, Muscularskeletal, Psychiatric were all negative except for pertinent positives noted in my HPI and in the PMH.      Exam  /75 (BP Location: Right arm, Cuff Size: Adult Large)   Pulse 76   Wt 121.5 kg (267 lb 12.8 oz)   LMP 2021 (Exact Date)   SpO2 97%   BMI 41.94 kg/m    General:  WNWD female, NAD  Alert  Oriented x 3  Gait:  Normal  Skin:  Normal skin turgor  HEENT:  NC/AT, EOMI  Abdomen:  Non-tender, non-distended.  Vulva: No external lesions, normal hair distribution, no adenopathy  BUS:  Normal, no masses noted  Urethra:  No hypermobility seen  Urethral meatus:  No masses noted  Vagina: Moist, pink, no abnormal discharge, well rugated, no lesions  Cervix: Smooth, pink, no visible lesions  Uterus: Normal size, anteverted, non-tender, mobile  Ovaries: No mass, non-tender, mobile  Perianal:  No masses noted  Extremities:  No clubbing, no cyanosis and no edema.    Assessment  Menorrhagia, with regular cycle  Ovarian  cyst      Plan  With regards to the menorrhagia, she has elected to have the endometrial biopsy performed.  We also discussed the management options for the bleeding.    With regards to the ovarian cyst, she has had a history of ovarian cyst in the past and it appears the cysts in the ultrasound might be the same cysts seen in 2014.  We reviewed the radiology recommendations.    Management Recommendation:  Simple Cyst:  Reproductive Age Patient     < 5cm: No follow up.     5-7cm: Yearly US.     > 7 cm: MRI.  Postmenopausal Patient  < 1cm: No follow up.  1-7cm: Yearly US.  > 7cm: MRI.  Hemorrhagic Cyst:  Reproductive Age Patient     < 5cm: No follow up.     > 5cm: Follow up US in 6-12 weeks.     Endometrioma: Any Age, Any Size: Follow up US in 6-12 weeks. If stable and no surgical intervention, then yearly US.  Cyst with single thin wall septation or calcification in the wall:  Postmenopausal patient      1-7 cm:  yearly ultrasound   Reference: Asymptomatic Ovarian and Other Adnexal Cysts Imaged at US. Radiology: Volume 256: Number 3-September 2010  Due to the size and complex nature of the cyst, the recommendations by Radiology are to have the MRI performed.   She does not desire to have the MRI but she is willing to have the repeat ultrasound performed at about 6 to 8 weeks.    We reviewed management plans for the bleeding, which might also include the management of the ovarian cysts (depending upon the type of cyst).  She is leaning toward the Nexplanon.  Breakthrough bleeding is discussed and she voices understanding.    Repeat the ultrasound   EMB today  RTC for Nexplanon.     Lucian Reyna MD        PROCEDURE NOTE:  The procedure was reviewed with patient.  After consenting to the procedure she was placed into the dorsal lithotomy position.  The examination was performed.  The speculum was placed into the vagina.  The cervix was prepped with Betadine.  The anterior lip of the cervix was grasped with the  Allis tenaculum.  The uterus was sounded and the Pipelle was used to sample the endometrium.    Instruments were removed and the specimen was sent to pathology.  Results to the patient in 1-2 weeks when they are returned.      Lucian Reyna MD

## 2021-06-17 ENCOUNTER — OFFICE VISIT (OUTPATIENT)
Dept: PODIATRY | Facility: CLINIC | Age: 41
End: 2021-06-17
Payer: COMMERCIAL

## 2021-06-17 VITALS — DIASTOLIC BLOOD PRESSURE: 77 MMHG | HEART RATE: 74 BPM | SYSTOLIC BLOOD PRESSURE: 128 MMHG

## 2021-06-17 DIAGNOSIS — M79.672 LEFT FOOT PAIN: ICD-10-CM

## 2021-06-17 PROCEDURE — 88305 TISSUE EXAM BY PATHOLOGIST: CPT | Performed by: PATHOLOGY

## 2021-06-17 PROCEDURE — 99203 OFFICE O/P NEW LOW 30 MIN: CPT | Performed by: PODIATRIST

## 2021-06-17 NOTE — PROGRESS NOTES
Subjective:    Pt is seen today in consult from Dr. Mcbride with the c/c of painful left foot.  This has been symptomatic for the past 2 months.  Pt denies any hx of trauma.  Aggravated by activity and releaved by rest.  Describes as burning pain.  Is slowly getting worse.   Denies numbness, weakness, ecchymosis, or edema.  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    ROS:   A 10-point review of systems was performed and is positive for that noted in the HPI and as seen below.  All other areas are negative.        Allergies   Allergen Reactions     Wellbutrin [Bupropion]      Wellbutrin: Panic attacks, heart/chest pain       Current Outpatient Medications   Medication Sig Dispense Refill     acetaminophen (TYLENOL) 325 MG tablet Take 2 tablets (650 mg) by mouth every 4 hours as needed for other (For optimal non-opioid multimodal pain management to improve pain control and physical function.) 100 tablet 0     ARIPiprazole (ABILIFY) 5 MG tablet Take 1 tablet (5 mg) by mouth daily 90 tablet 0     calcium carbonate (OS-CHIKI) 500 MG tablet Take 1 tablet by mouth daily        famotidine (PEPCID) 20 MG tablet Take 1 tablet (20 mg) by mouth 2 times daily as needed (reflux) 180 tablet 1     Ferrous Sulfate 324 (65 Fe) MG TBEC One daily       fish oil-omega-3 fatty acids 1000 MG capsule Take 2 g by mouth every morning        hydrochlorothiazide (MICROZIDE) 12.5 MG capsule Take 1 capsule (12.5 mg) by mouth daily 90 capsule 3     lisinopril (ZESTRIL) 40 MG tablet TAKE 1 TABLET BY MOUTH DAILY 90 tablet 0     Multiple Vitamins-Minerals (CENTRUM PO) 2 tablets per day       naltrexone (DEPADE/REVIA) 50 MG tablet Take 1 tablet (50 mg) by mouth daily 90 tablet 1     omeprazole (PRILOSEC) 20 MG DR capsule Take 1 capsule (20 mg) by mouth daily 90 capsule 1     omeprazole (PRILOSEC) 40 MG DR capsule Take 1 capsule (40 mg) by mouth daily 90 capsule 3     Probiotic Product (PROBIOTIC ADVANCED PO)        topiramate (TOPAMAX) 25 MG tablet  Take 1 tablet (25 mg) by mouth 2 times daily 180 tablet 1     varenicline (CHANTIX) 1 MG tablet Take 1 tablet (1 mg) by mouth 2 times daily (Patient not taking: Reported on 4/19/2021) 60 tablet 0     venlafaxine (EFFEXOR-XR) 75 MG 24 hr capsule Take 1 capsule (75 mg) by mouth daily 90 capsule 1     VENTOLIN  (90 Base) MCG/ACT Inhaler INHALE 1-2 PUFFS EVERY 4-6 HOURS AS NEEDED FOR WHEEZING  0     vitamin B complex with vitamin C (VITAMIN  B COMPLEX) tablet Take 1 tablet by mouth daily         Patient Active Problem List   Diagnosis     Anxiety state     Gastroesophageal reflux disease without esophagitis     Morbid obesity due to excess calories (H)     Tobacco use disorder     CARDIOVASCULAR SCREENING; LDL GOAL LESS THAN 130     Essential hypertension     Acne     Papanicolaou smear of cervix with low grade squamous intraepithelial lesion (LGSIL)     Mixed hyperlipidemia     LEONARDO (obstructive sleep apnea)     Obesity hypoventilation syndrome (H)     Paroxysmal atrial fibrillation (H)     Morbid (severe) obesity due to excess calories (H)     Recurrent major depressive disorder, in full remission (H)     S/P ablation of atrial fibrillation     Moderate episode of recurrent major depressive disorder (H)     BV (bacterial vaginosis)     S/P laparoscopic sleeve gastrectomy     Strain of lumbar region, initial encounter     Strain of hip and thigh, right, initial encounter       Past Medical History:   Diagnosis Date     Antiplatelet or antithrombotic long-term use      Arrhythmia      Depressive disorder 1990     Esophageal reflux      Hearing problem 2018     Hyperlipidemia LDL goal <130 7/6/2015     Hypertension      LSIL (low grade squamous intraepithelial lesion) on Pap smear 6/2014    + HPV, unable to type colp - BRISEYDA I     LEONARDO on CPAP      Other anxiety states        Past Surgical History:   Procedure Laterality Date     EP ABLATION FOCAL AFIB N/A 10/3/2019    Procedure: EP ABLATION FOCAL AFIB;  Surgeon:  Harpreet Arevalo MD;  Location: [facility] OR     HC TOOTH EXTRACTION W/FORCEP      [place] Teeth Extraction     LAPAROSCOPIC GASTRIC SLEEVE N/A 2018    Procedure: Laparoscopic Sleeve Gastrectomy Latex Free;  Surgeon: Artis Tse MD;  Location: [facility] OR     LAPAROSCOPIC HERNIORRHAPHY HIATAL  2018    Procedure: LAPAROSCOPIC  HIATAL Hernia Repair;  Surgeon: Artis Tse MD;  Location: [facility] OR       Family History   Problem Relation Age of Onset     Heart Disease Mother         ,mother had emphesema and  at 62     Hypertension Mother      Allergies Mother      Lipids Mother      Obesity Mother      Respiratory Mother         Emphysema     Diabetes Maternal Grandmother         Type 2?     Hypertension Maternal Grandmother      Circulatory Maternal Grandmother         Due to diabetes     Eye Disorder Maternal Grandmother         Macular degeneration/Macular degeneration     Obesity Maternal Grandmother      Hypertension Father      Lipids Father      Cancer Father      Prostate Cancer Father      Other Cancer Father      Cerebrovascular Disease Paternal Grandmother      Alcohol/Drug Maternal Grandfather      Obesity Maternal Grandfather      Psychotic Disorder Paternal Grandfather         Committed Suicide     Depression Paternal Grandfather      Allergies Sister      Genitourinary Problems Sister        Social History     Tobacco Use     Smoking status: Former Smoker     Packs/day: 1.00     Years: 10.00     Pack years: 10.00     Types: Cigarettes     Start date: 2004     Quit date: 2021     Years since quittin.0     Smokeless tobacco: Never Used   Substance Use Topics     Alcohol use: Not Currently     Alcohol/week: 0.0 standard drinks     Comment: Occas.             O:  /77   Pulse 74   LMP 2021 (Exact Date) .       Constitutional/ general:  Pt is in no apparent distress, appears well-nourished.  Cooperative with history and physical exam.     Psych:  The patient  answered questions appropriately.  Normal affect.  Seems to have reasonable expectations, in terms of treatment.     Eyes:  Visual scanning/ tracking without deficit.     Ears:  Response to auditory stimuli is normal.  No  hearing aid devices.  Auricles in proper alignment.     Lymphatic:  Popliteal lymph nodes not enlarged.     Lungs:  Non labored breathing, non labored speech. No cough.  No audible wheezing. Even, quiet breathing.       Vascular:  Pedal pulses are palpable bilaterally for both the DP and PT arteries.  CFT < 3 sec.  No edema.  Pedal hair growth noted.     Neuro:  Alert and oriented x 3. Coordinated gait.  Light touch sensation is intact to the L4, L5, S1 distributions. No obvious deficits.  No evidence of neurological-based weakness, spasticity, or contracture in the lower extremities.     Derm: Normal texture and turgor.  No erythema, ecchymosis, or cyanosis.  No open lesions.     Musculoskeletal:   Somewhat pronated foot with weightbearing bilateral.  No forefoot or rear foot deformities noted.  MS 5/5 all compartments.  Normal ROM all fore foot and rearfoot joints with no pain or crepitus.  No equinus.  Pain lateral and dorsal on styloid process left foot.  Slight edema.  No masses.  No ecchymosis.     Radiographic Exam:  X-Ray Findings:  I personally reviewed the films, normal    A: Left foot styloid process pain    Plan:  X-rays from past personally reviewed.  Discussed etiology and treatment options in detail w/ the pt.  Patient to get good shoes and wear these at all times both inside and outside the house and I made suggestions.  She will try over-the-counter arch supports.  We discussed orthotics.  She will call if she would like a pair.  Avoid activities that bother this.   Ice bid.  If not but better discussed next step CAM Walker.  RTC as needed.  Thank you for allowing me participate in the care of this patient.        Yomi Mayorga, RICKY, FACFAS

## 2021-06-17 NOTE — PATIENT INSTRUCTIONS
We wish you continued good healing. If you have any questions or concerns, please do not hesitate to contact us at [phone]    [facility] (secure e-mail communication and access to your chart) to send a message or to make an appointment.    Please remember to call and schedule a follow up appointment if one was recommended at your earliest convenience.     +++OF MARCH 2020+++ LOCATION AND HOURS HAVE CHANGED    PLEASE CALL CLINICS TO VERIFY DAYS AND TIMES  PODIATRY CLINIC HOURS  TELEPHONE NUMBER    Dr. Yomi BUENOPLUCY [facility]        Clinics:  [facility]   Tuesday 1PM-6PM  [place]  Wednesday 745AM-330PM  [place]  Thursday/Friday 745AM-230PM  [place] [facility] APPOINTMENTS  [phone]    [place] APPOINTMENTS  [phone]          If you need a medication refill, please contact us you may need lab work and/or a follow up visit prior to your refill (i.e. Antifungal medications).    If MRI needed please call Imaging at [phone] or [phone]    HOW DO I GET MY KNEE SCOOTER? Knee scooters can be picked up at ANY Medical Supply stores with your knee scooter Prescription.  OR    Bring your signed prescription to an [facility] showroom.

## 2021-06-17 NOTE — LETTER
6/17/2021         RE: Danitza Soto  [address]  [facility] [id number]        Dear Colleague,    Thank you for referring your patient, Danitza Soto, to the [facility]. Please see a copy of my visit note below.    Subjective:    Pt is seen today in consult from Dr. Mcbride with the c/c of painful left foot.  This has been symptomatic for the past 2 months.  Pt denies any hx of trauma.  Aggravated by activity and releaved by rest.  Describes as burning pain.  Is slowly getting worse.   Denies numbness, weakness, ecchymosis, or edema.  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    ROS:   A 10-point review of systems was performed and is positive for that noted in the HPI and as seen below.  All other areas are negative.        Allergies   Allergen Reactions     Wellbutrin [Bupropion]      Wellbutrin: Panic attacks, heart/chest pain       Current Outpatient Medications   Medication Sig Dispense Refill     acetaminophen (TYLENOL) 325 MG tablet Take 2 tablets (650 mg) by mouth every 4 hours as needed for other (For optimal non-opioid multimodal pain management to improve pain control and physical function.) 100 tablet 0     ARIPiprazole (ABILIFY) 5 MG tablet Take 1 tablet (5 mg) by mouth daily 90 tablet 0     calcium carbonate (OS-CHIKI) 500 MG tablet Take 1 tablet by mouth daily        famotidine (PEPCID) 20 MG tablet Take 1 tablet (20 mg) by mouth 2 times daily as needed (reflux) 180 tablet 1     Ferrous Sulfate 324 (65 Fe) MG TBEC One daily       fish oil-omega-3 fatty acids 1000 MG capsule Take 2 g by mouth every morning        hydrochlorothiazide (MICROZIDE) 12.5 MG capsule Take 1 capsule (12.5 mg) by mouth daily 90 capsule 3     lisinopril (ZESTRIL) 40 MG tablet TAKE 1 TABLET BY MOUTH DAILY 90 tablet 0     Multiple Vitamins-Minerals (CENTRUM PO) 2 tablets per day       naltrexone (DEPADE/REVIA) 50 MG tablet Take 1 tablet (50 mg) by mouth daily 90 tablet 1     omeprazole  (PRILOSEC) 20 MG DR capsule Take 1 capsule (20 mg) by mouth daily 90 capsule 1     omeprazole (PRILOSEC) 40 MG DR capsule Take 1 capsule (40 mg) by mouth daily 90 capsule 3     Probiotic Product (PROBIOTIC ADVANCED PO)        topiramate (TOPAMAX) 25 MG tablet Take 1 tablet (25 mg) by mouth 2 times daily 180 tablet 1     varenicline (CHANTIX) 1 MG tablet Take 1 tablet (1 mg) by mouth 2 times daily (Patient not taking: Reported on 4/19/2021) 60 tablet 0     venlafaxine (EFFEXOR-XR) 75 MG 24 hr capsule Take 1 capsule (75 mg) by mouth daily 90 capsule 1     VENTOLIN  (90 Base) MCG/ACT Inhaler INHALE 1-2 PUFFS EVERY 4-6 HOURS AS NEEDED FOR WHEEZING  0     vitamin B complex with vitamin C (VITAMIN  B COMPLEX) tablet Take 1 tablet by mouth daily         Patient Active Problem List   Diagnosis     Anxiety state     Gastroesophageal reflux disease without esophagitis     Morbid obesity due to excess calories (H)     Tobacco use disorder     CARDIOVASCULAR SCREENING; LDL GOAL LESS THAN 130     Essential hypertension     Acne     Papanicolaou smear of cervix with low grade squamous intraepithelial lesion (LGSIL)     Mixed hyperlipidemia     LEONARDO (obstructive sleep apnea)     Obesity hypoventilation syndrome (H)     Paroxysmal atrial fibrillation (H)     Morbid (severe) obesity due to excess calories (H)     Recurrent major depressive disorder, in full remission (H)     S/P ablation of atrial fibrillation     Moderate episode of recurrent major depressive disorder (H)     BV (bacterial vaginosis)     S/P laparoscopic sleeve gastrectomy     Strain of lumbar region, initial encounter     Strain of hip and thigh, right, initial encounter       Past Medical History:   Diagnosis Date     Antiplatelet or antithrombotic long-term use      Arrhythmia      Depressive disorder 1990     Esophageal reflux      Hearing problem 2018     Hyperlipidemia LDL goal <130 7/6/2015     Hypertension      LSIL (low grade squamous intraepithelial  lesion) on Pap smear 2014    + HPV, unable to type colp - BRISEYDA I     LEONARDO on CPAP      Other anxiety states        Past Surgical History:   Procedure Laterality Date     EP ABLATION FOCAL AFIB N/A 10/3/2019    Procedure: EP ABLATION FOCAL AFIB;  Surgeon: Harpreet Arevalo MD;  Location: [facility] OR     HC TOOTH EXTRACTION W/FORCEP      [place] Teeth Extraction     LAPAROSCOPIC GASTRIC SLEEVE N/A 2018    Procedure: Laparoscopic Sleeve Gastrectomy Latex Free;  Surgeon: Artis Tse MD;  Location: [facility] OR     LAPAROSCOPIC HERNIORRHAPHY HIATAL  2018    Procedure: LAPAROSCOPIC  HIATAL Hernia Repair;  Surgeon: Artis Tse MD;  Location: [facility] OR       Family History   Problem Relation Age of Onset     Heart Disease Mother         ,mother had emphesema and  at 62     Hypertension Mother      Allergies Mother      Lipids Mother      Obesity Mother      Respiratory Mother         Emphysema     Diabetes Maternal Grandmother         Type 2?     Hypertension Maternal Grandmother      Circulatory Maternal Grandmother         Due to diabetes     Eye Disorder Maternal Grandmother         Macular degeneration/Macular degeneration     Obesity Maternal Grandmother      Hypertension Father      Lipids Father      Cancer Father      Prostate Cancer Father      Other Cancer Father      Cerebrovascular Disease Paternal Grandmother      Alcohol/Drug Maternal Grandfather      Obesity Maternal Grandfather      Psychotic Disorder Paternal Grandfather         Committed Suicide     Depression Paternal Grandfather      Allergies Sister      Genitourinary Problems Sister        Social History     Tobacco Use     Smoking status: Former Smoker     Packs/day: 1.00     Years: 10.00     Pack years: 10.00     Types: Cigarettes     Start date: 2004     Quit date: 2021     Years since quittin.0     Smokeless tobacco: Never Used   Substance Use Topics     Alcohol use: Not Currently     Alcohol/week: 0.0 standard drinks      Comment: Occas.             O:  /77   Pulse 74   LMP 05/18/2021 (Exact Date) .       Constitutional/ general:  Pt is in no apparent distress, appears well-nourished.  Cooperative with history and physical exam.     Psych:  The patient answered questions appropriately.  Normal affect.  Seems to have reasonable expectations, in terms of treatment.     Eyes:  Visual scanning/ tracking without deficit.     Ears:  Response to auditory stimuli is normal.  No  hearing aid devices.  Auricles in proper alignment.     Lymphatic:  Popliteal lymph nodes not enlarged.     Lungs:  Non labored breathing, non labored speech. No cough.  No audible wheezing. Even, quiet breathing.       Vascular:  Pedal pulses are palpable bilaterally for both the DP and PT arteries.  CFT < 3 sec.  No edema.  Pedal hair growth noted.     Neuro:  Alert and oriented x 3. Coordinated gait.  Light touch sensation is intact to the L4, L5, S1 distributions. No obvious deficits.  No evidence of neurological-based weakness, spasticity, or contracture in the lower extremities.     Derm: Normal texture and turgor.  No erythema, ecchymosis, or cyanosis.  No open lesions.     Musculoskeletal:   Somewhat pronated foot with weightbearing bilateral.  No forefoot or rear foot deformities noted.  MS 5/5 all compartments.  Normal ROM all fore foot and rearfoot joints with no pain or crepitus.  No equinus.  Pain lateral and dorsal on styloid process left foot.  Slight edema.  No masses.  No ecchymosis.     Radiographic Exam:  X-Ray Findings:  I personally reviewed the films, normal    A: Left foot styloid process pain    Plan:  X-rays from past personally reviewed.  Discussed etiology and treatment options in detail w/ the pt.  Patient to get good shoes and wear these at all times both inside and outside the house and I made suggestions.  She will try over-the-counter arch supports.  We discussed orthotics.  She will call if she would like a pair.  Avoid  activities that bother this.   Ice bid.  If not but better discussed next step CAM Walker.  RTC as needed.  Thank you for allowing me participate in the care of this patient.        Yomi Mayorga DPM, FACFAS           Again, thank you for allowing me to participate in the care of your patient.        Sincerely,        Yomi Mayorga DPM

## 2021-06-21 LAB — COPATH REPORT: NORMAL

## 2021-07-30 ENCOUNTER — OFFICE VISIT (OUTPATIENT)
Dept: OBGYN | Facility: CLINIC | Age: 41
End: 2021-07-30
Payer: COMMERCIAL

## 2021-07-30 ENCOUNTER — ANCILLARY PROCEDURE (OUTPATIENT)
Dept: ULTRASOUND IMAGING | Facility: CLINIC | Age: 41
End: 2021-07-30
Attending: OBSTETRICS & GYNECOLOGY
Payer: COMMERCIAL

## 2021-07-30 VITALS — BODY MASS INDEX: 43.54 KG/M2 | WEIGHT: 278 LBS | DIASTOLIC BLOOD PRESSURE: 84 MMHG | SYSTOLIC BLOOD PRESSURE: 122 MMHG

## 2021-07-30 DIAGNOSIS — Z30.017 INSERTION OF IMPLANTABLE SUBDERMAL CONTRACEPTIVE: ICD-10-CM

## 2021-07-30 DIAGNOSIS — N80.129 ENDOMETRIOMA OF OVARY: Primary | ICD-10-CM

## 2021-07-30 DIAGNOSIS — N92.0 MENORRHAGIA WITH REGULAR CYCLE: ICD-10-CM

## 2021-07-30 DIAGNOSIS — N83.292 COMPLEX CYST OF LEFT OVARY: ICD-10-CM

## 2021-07-30 PROCEDURE — 99213 OFFICE O/P EST LOW 20 MIN: CPT | Mod: 25 | Performed by: OBSTETRICS & GYNECOLOGY

## 2021-07-30 PROCEDURE — 11983 REMOVE/INSERT DRUG IMPLANT: CPT | Performed by: OBSTETRICS & GYNECOLOGY

## 2021-07-30 PROCEDURE — 76830 TRANSVAGINAL US NON-OB: CPT | Performed by: RADIOLOGY

## 2021-07-30 NOTE — PROGRESS NOTES
Danitza is a 40-year-old female, G0, P0.  She presents today for follow-up of heavy abnormal uterine bleeding as has been previously described.  She has decided she would like to proceed with the Nexplanon for menstrual regulation.    Of note she had her past ultrasound May 28, 2021.  At that time she had a physiologic follicle in the right ovary however the left ovary showed a hemorrhagic or endometrioma.  The patient also had the recent ultrasound noted below.  The patient I have reviewed the films and the report.  I reviewed the radiology guidelines on ovarian cysts with her.  As a hemorrhagic cyst, being over 5 cm, requires additional surveillance which she has undertaken earlier this morning.  Also, as this may be an endometrioma, she is also performed the follow-up necessary, as noted in the guidelines below.  With both of these follow-up would be considered in 1 year.  However with the discussion regarding the cysts, she does not desire to conserve the ovaries with the cysts and she would like a surgical exploration with possible oophorectomy versus ovarian cystectomy.  The laparoscopic approach as well as the laparotomy approach are reviewed with her.      ULTRASOUND TRANSVAGINAL NON OB July 30, 2021 9:22 AM  CLINICAL HISTORY: History of complex cyst, possible endometrioma.  Complex cyst of left ovary.  TECHNIQUE: Transvaginal imaging only.  COMPARISON: 5/28/2021.  FINDINGS:  UTERUS: 7.6 x 4.1 x 4.6 cm. Normal in size and position with no masses. A few nabothian cysts are noted.  ENDOMETRIUM: 11 mm. Normal smooth endometrium. Trace fluid in the endometrial canal.  RIGHT OVARY: 5.3 x 4.5 x 2.5 cm. Complex cyst in the right ovary measures 2.1 x 1.2 x 1.5 cm. Normal color flow present in the right ovary.  LEFT OVARY: 8.5 x 7.2 x 5.2 cm. Complex cyst with uniform low-level internal echogenicity and measures 8.2 x 6.2 x 4.2 cm, similar to prior. Normal color flow.  Trace pelvic free fluid.                                                              IMPRESSION:  1.  Large complex cystic structure in the left ovary has the appearance of an endometrioma and is similar in size to the prior study.  2.  Complex cyst in the right ovary is new compared to the prior study and likely physiologic.      Radiology Guidelines on Ovarian Cyst Management Recommendation:  Simple Cyst:  Reproductive Age Patient     < 5cm: No follow up.     5-7cm: Yearly US.     > 7 cm: MRI.  Postmenopausal Patient  < 1cm: No follow up.  1-7cm: Yearly US.  > 7cm: MRI.  Hemorrhagic Cyst:  Reproductive Age Patient     < 5cm: No follow up.     > 5cm: Follow up US in 6-12 weeks.     Endometrioma: Any Age, Any Size: Follow up US in 6-12 weeks. If stable and no surgical intervention, then yearly US.  Cyst with single thin wall septation or calcification in the wall:  Postmenopausal patient      1-7 cm:  yearly ultrasound   Reference: Asymptomatic Ovarian and Other Adnexal Cysts Imaged at US. Radiology: Volume 256: Number 3-September 2010      PELVIC ULTRASOUND WITH ENDOVAGINAL TRANSDUCER    5/28/2021 9:47 AM   HISTORY: Abnormal uterine bleeding; Menorrhagia with regular cycle.  TECHNIQUE:  Endovaginal sonography was added to the transabdominal  exam.  COMPARISON: Pelvic ultrasound on 10/22/2014.  FINDINGS:   Endometrium: Endometrium is 8 mm thick.  Uterus: Measures 7.6 x 4.0 x 4.6 cm. No uterine fibroids.  Right ovary: Measures 4.3 x 3.9 x 2.2 cm. It demonstrates normal follicular structure and color-flow. There is 1.5 x 1.3 x 1.5 cm anechoic cyst in the right ovary, likely represent physiological follicle.  Left ovary: Measures 7.8 x 6.2 x 5.2 cm. A few ovarian cystic lesions including 7.1 x 5.7 x 4.8 cm hypoechoic cyst, could represent large hemorrhagic cyst or endometrioma. Similar smaller cystic lesion measuring 2.0 x 1.5 x 2.1 cm. Smaller anechoic cyst, likely represents physiological follicle.  Additional findings: Trace amount of free fluid in the  pelvis.   IMPRESSION: Two mildly complex hypoechoic cystic lesions in the leftovary, with the largest lesion measures up to 7.8 cm, indeterminate,could represent hemorrhagic cysts, endometriomas versus less likelycystic ovarian neoplasm (although these could be new lesions,similar lesions are seen on 10/22/2014 pelvic ultrasound), recommend further evaluation with pelvic MRI for better characterization.    Her Endometrial biopsy results are reviewed with her, that she had performed at her past visit.   MR#: [id number]   Specimen #: [id number]   Collected: 6/17/2021   Received: 6/18/2021   Reported: 6/21/2021 16:16     SPECIMEN(S):   Endometrial biopsy     FINAL DIAGNOSIS:   Endometrium, biopsy:   - Secretory endometrium.   - Negative for endometrial polyp, hyperplasia and malignancy.         Past Medical History:   Diagnosis Date     Antiplatelet or antithrombotic long-term use      Arrhythmia      Depressive disorder 1990     Esophageal reflux      Hearing problem 2018     Hyperlipidemia LDL goal <130 7/6/2015     Hypertension      LSIL (low grade squamous intraepithelial lesion) on Pap smear 6/2014    + HPV, unable to type colp - BRISEYDA I     LEONARDO on CPAP      Other anxiety states      Past Surgical History:   Procedure Laterality Date     EP ABLATION FOCAL AFIB N/A 10/3/2019    Procedure: EP ABLATION FOCAL AFIB;  Surgeon: Harpreet Arevalo MD;  Location: [facility] OR     HC TOOTH EXTRACTION W/FORCEP  1995    [place] Teeth Extraction     LAPAROSCOPIC GASTRIC SLEEVE N/A 11/27/2018    Procedure: Laparoscopic Sleeve Gastrectomy Latex Free;  Surgeon: Artis Tse MD;  Location: [facility] OR     LAPAROSCOPIC HERNIORRHAPHY HIATAL  11/27/2018    Procedure: LAPAROSCOPIC  HIATAL Hernia Repair;  Surgeon: Artis Tse MD;  Location: [facility] OR        Allergies   Allergen Reactions     Wellbutrin [Bupropion]      Wellbutrin: Panic attacks, heart/chest pain       Current Outpatient Medications   Medication Sig Dispense Refill      acetaminophen (TYLENOL) 325 MG tablet Take 2 tablets (650 mg) by mouth every 4 hours as needed for other (For optimal non-opioid multimodal pain management to improve pain control and physical function.) 100 tablet 0     ARIPiprazole (ABILIFY) 5 MG tablet Take 1 tablet (5 mg) by mouth daily 90 tablet 0     calcium carbonate (OS-CHIKI) 500 MG tablet Take 1 tablet by mouth daily        famotidine (PEPCID) 20 MG tablet Take 1 tablet (20 mg) by mouth 2 times daily as needed (reflux) 180 tablet 1     Ferrous Sulfate 324 (65 Fe) MG TBEC One daily       fish oil-omega-3 fatty acids 1000 MG capsule Take 2 g by mouth every morning        hydrochlorothiazide (MICROZIDE) 12.5 MG capsule TAKE 1 CAPSULE(12.5 MG) BY MOUTH DAILY 90 capsule 3     lisinopril (ZESTRIL) 40 MG tablet Take 1 tablet (40 mg) by mouth daily 90 tablet 1     Multiple Vitamins-Minerals (CENTRUM PO) 2 tablets per day       naltrexone (DEPADE/REVIA) 50 MG tablet Take 1 tablet (50 mg) by mouth daily 90 tablet 1     omeprazole (PRILOSEC) 20 MG DR capsule Take 1 capsule (20 mg) by mouth daily 90 capsule 1     omeprazole (PRILOSEC) 40 MG DR capsule Take 1 capsule (40 mg) by mouth daily 90 capsule 3     Probiotic Product (PROBIOTIC ADVANCED PO)        topiramate (TOPAMAX) 25 MG tablet Take 1 tablet (25 mg) by mouth 2 times daily 180 tablet 1     venlafaxine (EFFEXOR-XR) 75 MG 24 hr capsule Take 1 capsule (75 mg) by mouth daily 90 capsule 1     VENTOLIN  (90 Base) MCG/ACT Inhaler INHALE 1-2 PUFFS EVERY 4-6 HOURS AS NEEDED FOR WHEEZING  0     vitamin B complex with vitamin C (VITAMIN  B COMPLEX) tablet Take 1 tablet by mouth daily         Social History     Socioeconomic History     Marital status:      Spouse name: Not on file     Number of children: 0     Years of education: Some Colle     Highest education level: Not on file   Occupational History     Occupation: Mental Health Counselor   Tobacco Use     Smoking status: Former Smoker     Packs/day:  1.00     Years: 10.00     Pack years: 10.00     Types: Cigarettes     Start date: 2004     Quit date: 2021     Years since quittin.3     Smokeless tobacco: Never Used   Vaping Use     Vaping Use: Never used   Substance and Sexual Activity     Alcohol use: Not Currently     Alcohol/week: 0.0 standard drinks     Comment: Occas.     Drug use: No     Sexual activity: Not Currently     Partners: Female     Birth control/protection: None   Other Topics Concern     Parent/sibling w/ CABG, MI or angioplasty before 65F 55M? No   Social History Narrative    Social Documentation:        Balanced Diet: YES, sometimes    Calcium intake: 2 per day    Caffeine: 5-10 per day    Exercise:  type of activity walking, playtime;  5 times per week    Sunscreen: when remembered    Seatbelts:  Yes    Self Breast Exam:  No    Self Testicular Exam: n/a    Physical/Emotional/Sexual Abuse: No    Do you feel safe in your environment? Yes        Cholesterol screen up to date: No 2006    Eye Exam up to date: Yes    Dental Exam up to date: No    Pap smear up to date: No:     Mammogram up to date: Does Not Apply    Dexa Scan up to date: Does Not Apply    Colonoscopy up to date: Does Not Apply    Immunizations up to date: Yes,     Glucose screen if over 40:  N/a        Jamila Coyle MA                     Social Determinants of Health     Financial Resource Strain:      Difficulty of Paying Living Expenses:    Food Insecurity:      Worried About Running Out of Food in the Last Year:      Ran Out of Food in the Last Year:    Transportation Needs:      Lack of Transportation (Medical):      Lack of Transportation (Non-Medical):    Physical Activity:      Days of Exercise per Week:      Minutes of Exercise per Session:    Stress:      Feeling of Stress :    Social Connections:      Frequency of Communication with Friends and Family:      Frequency of Social Gatherings with Friends and Family:      Attends [employer] Services:       Active Member of Clubs or Organizations:      Attends Club or Organization Meetings:      Marital Status:    Intimate Partner Violence:      Fear of Current or Ex-Partner:      Emotionally Abused:      Physically Abused:      Sexually Abused:        Family History   Problem Relation Age of Onset     Heart Disease Mother         ,mother had emphesema and  at 62     Hypertension Mother      Allergies Mother      Lipids Mother      Obesity Mother      Respiratory Mother         Emphysema     Diabetes Maternal Grandmother         Type 2?     Hypertension Maternal Grandmother      Circulatory Maternal Grandmother         Due to diabetes     Eye Disorder Maternal Grandmother         Macular degeneration/Macular degeneration     Obesity Maternal Grandmother      Hypertension Father      Lipids Father      Cancer Father      Prostate Cancer Father      Other Cancer Father      Cerebrovascular Disease Paternal Grandmother      Alcohol/Drug Maternal Grandfather      Obesity Maternal Grandfather      Psychotic Disorder Paternal Grandfather         Committed Suicide     Depression Paternal Grandfather      Allergies Sister      Genitourinary Problems Sister        Review of Systems:  10 point ROS of systems including Constitutional, Eyes, Respiratory, Cardiovascular, Gastroenterology, Genitourinary, Integumentary, Muscularskeletal, Psychiatric were all negative except for pertinent positives noted in my HPI and in the PMH.      Exam  /84 (BP Location: Right arm, Cuff Size: Adult Regular)   Wt 126.1 kg (278 lb)   LMP 2021 (Approximate)   Breastfeeding No   BMI 43.54 kg/m    General:  WNWD female, NAD  Alert  Oriented x 3  Gait:  Normal  Skin:  Normal skin turgor  HEENT:  NC/AT, EOMI  Abdomen:  Non-tender, non-distended.  Pelvic exam:  Not performed  Extremities:  No clubbing, no cyanosis and no edema.      Assessment  Abnormal uterine bleeding, Menorrhagia with regular cycle.  Endometrioma of left ovary        Plan  The patient and I discussed the ovaries and the Radiology guidelines as noted above.  Since the findings appear stable, I don't feel she needs the MRI.   She does not want to continue with the observational management.  She desires a surgical approach.  She is aware that while the goal is to conserve the ovary or with minimal invasive procedures that the approaches might change.  Especially once within the abdomen to explore the pelvis.  At this time she is interested in removal of the ovary as this might give the shorter surgical and hospital stay.  She will be scheduled for the laparoscopic oophorectomy, however she will return to clinic for further discussion of the options for management.  At this time she also desires to continue with having the Nexplanon for the uterine bleeding.  She does not need the product for contraception as she is in a same-sex relationship.    Lucian Reyna MD      PROCEDURE NOTE  We reviewed the mechanism of actions, goals and limitations of the use of the medication, as well as the contraindications.  Bleeding patterns were also reviewed with her. She voiced her understanding.  The patient and I reviewed Nexplanon removal, and should she choose to have the product removed, she needs to have someone trained for the insertion and removal.  Informed consent obtained.  Patient was placed in a supine position. Left arm was flexed at the elbow and externally rotated. Insertion site was noted at 6 cm above the elbow crease at the inner side of the upper arm overlying the grove between the biceps and the triceps. A second lizzie was made 6 cm from the first to use as a guide. The area of the insertion site was prepped with Betadine. Lidocaine 1% was used along the planned insertion site. The Nexplanon was removed from its packaging.   The Nexplanon applicator was held just above the needle at the textured surface area and the transparent protection cap was removed from the  needle. The implant could be seen by looking into the tip of the needle. The implant could be seen by looking into the tip of the needle.  The skin was stretched at the insertion site. The needle was inserted with beveled side up just underneath the skin. Tenting was undertaken while the insertion needle to its full length. The purple slider was unlocked. The slider was moved fully back until it stopped.  The applicator was removed from her arm.  After the insertion, the implant was palpated by both myself and the patient. The betadine was removed from her arm. Benzoin and Steri Strips were applied.  Telfa was applied to site, along with pressure dressing and sterile gauze.  The patient was given aftercare instructions, her user card with the lot number. She tolerated the procedure well.  No apparent complications.    Lucian Reyna MD

## 2021-08-04 ENCOUNTER — TELEPHONE (OUTPATIENT)
Dept: OBGYN | Facility: CLINIC | Age: 41
End: 2021-08-04

## 2021-08-04 NOTE — TELEPHONE ENCOUNTER
Associated Diagnoses    Endometrioma of ovary [N80.1]  - Primary       Source Order Set    Order Set Name Order ID    [id number]   Comments    Pre surgical discussion needed          Detailed Information    Priority and Order Details           Order Questions    Question Answer Comment   Procedure name(s) - multi select laparocopic left oophorectomy, possible open procedure    Is this a multi surgeon case? Yes Dr. Agbeh   Laterality Left    Explant? No    Reason for procedure large right ovarian endometrioma    Urgency of Surgery Patient Choice/Next Available    Location of Case: [facility]    Surgeon Procedure Time (incision to closure) in minutes (per procedure as applicable) 60    Note:  Surgical Case Time Needed (in minutes)   Patient Class (for admit prior to surgery, specify number of days in comments): Same day (surgery center outpatient)    Anesthesia General    Is the patient known to have any of the following? None of the above    H&P To Be Completed By: PCP     needed? No    Post-Op Appointment 2 weeks    Vendor Needed? No    Spinal Cord Monitoring? No      SURGERY SCHEDULING AND PRECERTIFICATION    Medical Record Number: [id number]  Danitza Soto  YOB: 1980   Phone: [phone] (home)   Primary Provider: Polly Mcbride    Reason for Admit:  large right ovarian endometrioma  ICD-10 CODE:   [N80.1]    Surgeon: Drs Bassett and Agbeh  Surgical Procedure: laparoscopic left oophorectomy, possible open procedure  Date of Surgery 10/20 Time of Surgery 7:30 a.m.  Surgery to be performed at:  [facility]  Status: Outpatient  Type of Anesthesia Anticipated: General    Sterilization consent:  Not applicable to procedure being performed.    Pre-Op: On 10/13 Polly Mcbride 9:40 a.m.  Post-Op:  2 weeks on 11/5 9:00 Kerry  Presurgical consult on 10/1 9:00 a.m.  COVID testing:  10/13 9:00 [facility]    Pre-certification routed to Financial Counsel[employer]:  Yes    Surgery  packet mailed to patient's home address: Yes  Patient instructed NPO 12 hours prior to surgery, arrive 1.5 hour(s) prior to surgery, must have a .  Patient understood and agrees to the plan.      Requestor:  Paula Viera     Location:  [place] [id number]

## 2021-08-10 ENCOUNTER — TELEPHONE (OUTPATIENT)
Dept: OBGYN | Facility: CLINIC | Age: 41
End: 2021-08-10

## 2021-08-10 DIAGNOSIS — Z11.52 ENCOUNTER FOR PREPROCEDURE SCREENING LABORATORY TESTING FOR COVID-19: Primary | ICD-10-CM

## 2021-08-10 DIAGNOSIS — Z01.812 ENCOUNTER FOR PREPROCEDURE SCREENING LABORATORY TESTING FOR COVID-19: Primary | ICD-10-CM

## 2021-08-10 NOTE — TELEPHONE ENCOUNTER
RN placed COVID test orders for pt's surgery for 10/20 per TC request. RN placed as standing with MD ellis.    Mena Nice RN on 8/10/2021 at 9:45 AM

## 2021-08-25 ENCOUNTER — E-VISIT (OUTPATIENT)
Dept: FAMILY MEDICINE | Facility: CLINIC | Age: 41
End: 2021-08-25
Payer: COMMERCIAL

## 2021-08-25 ENCOUNTER — LAB (OUTPATIENT)
Dept: URGENT CARE | Facility: URGENT CARE | Age: 41
End: 2021-08-25
Attending: FAMILY MEDICINE
Payer: COMMERCIAL

## 2021-08-25 DIAGNOSIS — Z20.822 ENCOUNTER FOR LABORATORY TESTING FOR COVID-19 VIRUS: ICD-10-CM

## 2021-08-25 DIAGNOSIS — Z20.822 SUSPECTED COVID-19 VIRUS INFECTION: ICD-10-CM

## 2021-08-25 DIAGNOSIS — Z20.822 ENCOUNTER FOR LABORATORY TESTING FOR COVID-19 VIRUS: Primary | ICD-10-CM

## 2021-08-25 LAB — SARS-COV-2 RNA RESP QL NAA+PROBE: NEGATIVE

## 2021-08-25 PROCEDURE — U0005 INFEC AGEN DETEC AMPLI PROBE: HCPCS

## 2021-08-25 PROCEDURE — 99421 OL DIG E/M SVC 5-10 MIN: CPT | Performed by: FAMILY MEDICINE

## 2021-08-25 PROCEDURE — U0003 INFECTIOUS AGENT DETECTION BY NUCLEIC ACID (DNA OR RNA); SEVERE ACUTE RESPIRATORY SYNDROME CORONAVIRUS 2 (SARS-COV-2) (CORONAVIRUS DISEASE [COVID-19]), AMPLIFIED PROBE TECHNIQUE, MAKING USE OF HIGH THROUGHPUT TECHNOLOGIES AS DESCRIBED BY CMS-2020-01-R: HCPCS

## 2021-08-25 NOTE — PATIENT INSTRUCTIONS
Dear Danitza Soto,    Your symptoms show that you may have coronavirus (COVID-19). This illness can cause fever, cough and trouble breathing. Many people get a mild case and get better on their own. Some people can get very sick.    Will I be tested for COVID-19?  We would like to test you for Covid-19 virus. I have placed orders for this test.     To schedule: go to your [facility] home page and scroll down to the section that says  You have an appointment that needs to be scheduled  and click the large green button that says  Schedule Now  and follow the steps to find the next available openings.    If you are unable to complete these [facility] scheduling steps, please call [phone] to schedule your testing.     Return to work/school/ guidance:  Please let your workplace manager and staffing office know when your quarantine ends     We can t give you an exact date as it depends on the above. You can calculate this on your own or work with your manager/staffing office to calculate this. (For example if you were exposed on 10/4, you would have to quarantine for 14 full days. That would be through 10/18. You could return on 10/19.)      If you receive a positive COVID-19 test result, follow the guidance of the those who are giving you the results. Usually the return to work is 10 (or in some cases 20 days from symptom onset.) If you work at [facility], you must also be cleared by Employee Occupational Health and Safety to return to work.        If you receive a negative COVID-19 test result and did not have a high risk exposure to someone with a known positive COVID-19 test, you can return to work once you're free of fever for 24 hours without fever-reducing medication and your symptoms are improving or resolved.      If you receive a negative COVID-19 test and If you had a high risk exposure to someone who has tested positive for COVID-19 then you can return to work 14 days after your last contact  with the positive individual    Note: If you have ongoing exposure to the covid positive person, this quarantine period may be more than 14 days. (For example, if you are continued to be exposed to your child who tested positive and cannot isolate from them, then the quarantine of 7-14 days can't start until your child is no longer contagious. This is typically 10 days from onset of the child's symptoms. So the total duration may be 17-24 days in this case.)    Sign up for [facility].   We know it's scary to hear that you might have COVID-19. We want to track your symptoms to make sure you're okay over the next 2 weeks. Please look for an email from [facility]--this is a free, online program that we'll use to keep in touch. To sign up, follow the link in the email you will receive. Learn more at [email or web address]    How can I take care of myself?    Get lots of rest. Drink extra fluids (unless a doctor has told you not to)    Take Tylenol (acetaminophen) or ibuprofen for fever or pain. If you have liver or kidney problems, ask your family doctor if it's okay to take Tylenol o ibuprofen    If you have other health problems (like cancer, heart failure, an organ transplant or severe kidney disease): Call your specialty clinic if you don't feel better in the next 2 days.    Know when to call 911. Emergency warning signs include:  o Trouble breathing or shortness of breath  o Pain or pressure in the chest that doesn't go away  o Feeling confused like you haven't felt before, or not being able to wake up  o Bluish-colored lips or face    Where can I get more information?  [facility] - About COVID-19:   [email or web address]    CDC - What to Do If You're Sick:   www.cdc.gov/coronavirus/2019-ncov/about/steps-when-sick.html

## 2021-09-20 NOTE — TELEPHONE ENCOUNTER
PB DOS: 10/20/2021  Type of Procedure: laparoscopic left oophorectomy, possible open procedure  CPT Codes: 86122  ICD10 Codes: large right ovarian endometrioma  ICD-10 CODE:   [N80.1]     Surgeon/Ordering provider: Dr. Reyna and Agbeh  Pre-cert/Authorization completed:  No PA Required  Payer: [employer]  Date Checked: 9/20/2021  Spoke to Online [employer] PA portal  Ref. # NA/ Auth # [id number]  Valid Dates: NA    Surgery form and last OV note faxed to [facility].

## 2021-09-25 ENCOUNTER — HEALTH MAINTENANCE LETTER (OUTPATIENT)
Age: 41
End: 2021-09-25

## 2021-09-28 DIAGNOSIS — I10 ESSENTIAL HYPERTENSION: ICD-10-CM

## 2021-09-28 RX ORDER — HYDROCHLOROTHIAZIDE 12.5 MG/1
CAPSULE ORAL
Qty: 90 CAPSULE | Refills: 3 | Status: SHIPPED | OUTPATIENT
Start: 2021-09-28 | End: 2022-06-07

## 2021-09-28 NOTE — TELEPHONE ENCOUNTER
Routing refill request to provider for review/approval because:  Last Rx from outside provider  Mady LOO RN

## 2021-10-01 ENCOUNTER — OFFICE VISIT (OUTPATIENT)
Dept: OBGYN | Facility: CLINIC | Age: 41
End: 2021-10-01
Payer: COMMERCIAL

## 2021-10-01 VITALS
OXYGEN SATURATION: 97 % | WEIGHT: 264.2 LBS | SYSTOLIC BLOOD PRESSURE: 124 MMHG | HEART RATE: 83 BPM | DIASTOLIC BLOOD PRESSURE: 84 MMHG | BODY MASS INDEX: 41.38 KG/M2

## 2021-10-01 DIAGNOSIS — N80.129 ENDOMETRIOMA OF OVARY: Primary | ICD-10-CM

## 2021-10-01 DIAGNOSIS — N83.291 COMPLEX CYST OF RIGHT OVARY: ICD-10-CM

## 2021-10-01 PROCEDURE — 99214 OFFICE O/P EST MOD 30 MIN: CPT | Performed by: OBSTETRICS & GYNECOLOGY

## 2021-10-01 NOTE — PROGRESS NOTES
Danitza is a 41 year old female, .  She presents today for further discussion of the ovarian cysts.     Of note she had her past ultrasound May 28, 2021.  At that time she had a physiologic follicle in the right ovary however the left ovary showed a hemorrhagic or endometrioma.  The patient also had the recent ultrasound noted below.  The patient and I have reviewed the report again today.  I reviewed the radiology guidelines on ovarian cysts.  As a hemorrhagic cyst, being over 5 cm, requires additional surveillance which she has undertaken earlier this morning.  Also, as this may be an endometrioma, she is also performed the follow-up necessary, as noted in the guidelines below.  With both of these follow-up would be considered in 1 year.  However with the discussion regarding the cysts, she does not desire to conserve the ovaries with the cysts and she would like a surgical exploration with possible oophorectomy versus ovarian cystectomy.  The laparoscopic approach as well as the laparotomy approach are reviewed with her.        ULTRASOUND TRANSVAGINAL NON OB 2021 9:22 AM  CLINICAL HISTORY: History of complex cyst, possible endometrioma.  Complex cyst of left ovary.  TECHNIQUE: Transvaginal imaging only.  COMPARISON: 2021.  FINDINGS:  UTERUS: 7.6 x 4.1 x 4.6 cm. Normal in size and position with no masses. A few nabothian cysts are noted.  ENDOMETRIUM: 11 mm. Normal smooth endometrium. Trace fluid in the endometrial canal.  RIGHT OVARY: 5.3 x 4.5 x 2.5 cm. Complex cyst in the right ovary measures 2.1 x 1.2 x 1.5 cm. Normal color flow present in the right ovary.  LEFT OVARY: 8.5 x 7.2 x 5.2 cm. Complex cyst with uniform low-level internal echogenicity and measures 8.2 x 6.2 x 4.2 cm, similar to prior. Normal color flow.  Trace pelvic free fluid.                                                             IMPRESSION:  1.  Large complex cystic structure in the left ovary has the appearance of an  endometrioma and is similar in size to the prior study.  2.  Complex cyst in the right ovary is new compared to the prior study and likely physiologic.       Radiology Guidelines on Ovarian Cyst Management Recommendation:  Simple Cyst:  Reproductive Age Patient     < 5cm: No follow up.     5-7cm: Yearly US.     > 7 cm: MRI.  Postmenopausal Patient  < 1cm: No follow up.  1-7cm: Yearly US.  > 7cm: MRI.  Hemorrhagic Cyst:  Reproductive Age Patient     < 5cm: No follow up.     > 5cm: Follow up US in 6-12 weeks.     Endometrioma: Any Age, Any Size: Follow up US in 6-12 weeks. If stable and no surgical intervention, then yearly US.  Cyst with single thin wall septation or calcification in the wall:  Postmenopausal patient      1-7 cm:  yearly ultrasound   Reference: Asymptomatic Ovarian and Other Adnexal Cysts Imaged at US. Radiology: Volume 256: Number 3-September 2010        PELVIC ULTRASOUND WITH ENDOVAGINAL TRANSDUCER    5/28/2021 9:47 AM   HISTORY: Abnormal uterine bleeding; Menorrhagia with regular cycle.  TECHNIQUE:  Endovaginal sonography was added to the transabdominal  exam.  COMPARISON: Pelvic ultrasound on 10/22/2014.  FINDINGS:   Endometrium: Endometrium is 8 mm thick.  Uterus: Measures 7.6 x 4.0 x 4.6 cm. No uterine fibroids.  Right ovary: Measures 4.3 x 3.9 x 2.2 cm. It demonstrates normal follicular structure and color-flow. There is 1.5 x 1.3 x 1.5 cm anechoic cyst in the right ovary, likely represent physiological follicle.  Left ovary: Measures 7.8 x 6.2 x 5.2 cm. A few ovarian cystic lesions including 7.1 x 5.7 x 4.8 cm hypoechoic cyst, could represent large hemorrhagic cyst or endometrioma. Similar smaller cystic lesion measuring 2.0 x 1.5 x 2.1 cm. Smaller anechoic cyst, likely represents physiological follicle.  Additional findings: Trace amount of free fluid in the pelvis.   IMPRESSION: Two mildly complex hypoechoic cystic lesions in the leftovary, with the largest lesion measures up to 7.8 cm,  indeterminate,could represent hemorrhagic cysts, endometriomas versus less likelycystic ovarian neoplasm (although these could be new lesions,similar lesions are seen on 10/22/2014 pelvic ultrasound), recommend further evaluation with pelvic MRI for better characterization.     Her Endometrial biopsy results are reviewed with her, that she had performed at her past visit.   MR#: [id number]   Specimen #: [id number]   Collected: 6/17/2021   Received: 6/18/2021   Reported: 6/21/2021 16:16     SPECIMEN(S):   Endometrial biopsy     FINAL DIAGNOSIS:   Endometrium, biopsy:   - Secretory endometrium.   - Negative for endometrial polyp, hyperplasia and malignancy.          The patient's medical history, social history and family history are reviewed and no updates at this time.  Medication update performed and changes were made.  See medication list.       Review of Systems:  10 point ROS of systems including Constitutional, Eyes, Respiratory, Cardiovascular, Gastroenterology, Genitourinary, Integumentary, Muscularskeletal, Psychiatric were all negative except for pertinent positives noted in my HPI and in the PMH.      Exam  /84 (BP Location: Right arm, Cuff Size: Adult Large)   Pulse 83   Wt 119.8 kg (264 lb 3.2 oz)   LMP 08/27/2021 (Approximate)   SpO2 97%   BMI 41.38 kg/m    General:  WNWD female, NAD  Alert  Oriented x 3  Gait:  Normal  Skin:  Normal skin turgor  HEENT:  NC/AT, EOMI  Abdomen:  Non-tender, non-distended.  Pelvic exam:  Not performed  Extremities:  No clubbing, no cyanosis and no edema.      Assessment  Left ovarian endometrioma   Right complex cyst.        Plan  She desires a surgical approach.  She is aware that while the goal is to conserve the ovary or with minimal invasive procedures that the approaches might change.  Especially once within the abdomen to explore the pelvis.  At this time she is interested in removal of the ovary as this might give the shorter surgical and hospital stay.   She is scheduled for the left laparoscopic oophorectomy. She is aware that she might need right oophorectomy.  She is ok with a total hysterectomy and bilateral oophorectomy.  The goal, however, is to perform the left oophorectomy.   The laparoscopic and the laparotomy are reviewed.  We reviewed the risks and benefits and goals and limitations.   The risks include, but are not limited to, death, brain damage, paralysis of any or all limbs, loss of an organ, function of an organ, disfiguring scars, bleeding, infection, damage to the uterus, tubes, ovaries, bowel, bladder, cervix and vagina.  In addition, there is a possibility of other procedures that might be deemed necessary at the time of the surgery, depending upon findings and/or complications.  This may also include laparoscopy, laparotomy, and rarely colostomy (which often times can be reversible in the future).  Risks with blood include but are not limited to HIV/AIDS, hepatitis and transfusion reactions, with transfusion reactions being the greatest risk.  I called [facility] to confirm the times (not in our chart) and the planned procedure (we have left and right as concerns).  She is planning to have the LSO, but is open to any additional procedures as deemed necessary.     Total time preparing to see patient with reviewing prior encounter and labs, face to face time,  and coordinating care on the same calendar date:  35 minutes.        Lucian Reyna MD

## 2021-10-05 DIAGNOSIS — F41.1 ANXIETY STATE: ICD-10-CM

## 2021-10-05 RX ORDER — VENLAFAXINE HYDROCHLORIDE 75 MG/1
75 CAPSULE, EXTENDED RELEASE ORAL DAILY
Qty: 90 CAPSULE | Refills: 1 | Status: SHIPPED | OUTPATIENT
Start: 2021-10-05 | End: 2021-11-02

## 2021-10-12 NOTE — PATIENT INSTRUCTIONS

## 2021-10-12 NOTE — PROGRESS NOTES
[facility]  [address]  [facility] [address]  Phone: [phone]  Primary Provider: Polly Mcbride  Preoperative clearing done by Polly Mcbride M.D.      PREOPERATIVE EVALUATION:  Today's date: 10/13/2021    Danitza Soto is a 41 year old female who presents for a preoperative evaluation.    Surgical Information:  Surgery/Procedure: laparoscopic left oophorectomy, possible open procedure  Surgery Location: [facility]  Surgeon: Dr. Reyna  Surgery Date: 10/20/21  Time of Surgery: 7:30 AM  Where patient plans to recover: At home with family  Fax number for surgical facility: Note does not need to be faxed, will be available electronically in Epic.    Type of Anesthesia Anticipated: General    Assessment & Plan     The proposed surgical procedure is considered INTERMEDIATE risk.    Preop general physical exam  Cleared for surgery     Essential hypertension  Is well controlled currently     Left ovarian cyst - laparoscopic removal        Risks and Recommendations:  The patient has the following additional risks and recommendations for perioperative complications:   - Morbid obesity (BMI >40)    Medication Instructions:  Patient is to take all scheduled medications on the day of surgery   She should take her BP medications on the day of surgery with small sip of water     RECOMMENDATION:    Pt is cleared for surgery         Subjective     HPI related to upcoming procedure: is doing well now healthy       Preop Questions 10/13/2021   1. Have you ever had a heart attack or stroke? No   2. Have you ever had surgery on your heart or blood vessels, such as a stent placement, a coronary artery bypass, or surgery on an artery in your head, neck, heart, or legs? No   3. Do you have chest pain with activity? No   4. Do you have a history of  heart failure? No   5. Do you currently have a cold, bronchitis or symptoms of other infection? No   6. Do you have a cough, shortness of  breath, or wheezing? No   7. Do you or anyone in your family have previous history of blood clots? UNKNOWN    8. Do you or does anyone in your family have a serious bleeding problem such as prolonged bleeding following surgeries or cuts? No   9. Have you ever had problems with anemia or been told to take iron pills? YES - minor anemia years ago, currently takes iron pills   10. Have you had any abnormal blood loss such as black, tarry or bloody stools, or abnormal vaginal bleeding? No   11. Have you ever had a blood transfusion? No   12. Are you willing to have a blood transfusion if it is medically needed before, during, or after your surgery? Yes   13. Have you or any of your relatives ever had problems with anesthesia? No   14. Do you have sleep apnea, excessive snoring or daytime drowsiness? No   15. Do you have any artifical heart valves or other implanted medical devices like a pacemaker, defibrillator, or continuous glucose monitor? No   16. Do you have artificial joints? No   17. Are you allergic to latex? No   18. Is there any chance that you may be pregnant? No     Health Care Directive:  Patient does not have a Health Care Directive or Living Will: Discussed advance care planning with patient; however, patient declined at this time.    Preoperative Review of :        Status of Chronic Conditions:  HYPERTENSION - Patient has longstanding history of HTN , currently denies any symptoms referable to elevated blood pressure. Specifically denies chest pain, palpitations, dyspnea, orthopnea, PND or peripheral edema. Blood pressure readings have been in normal range. Current medication regimen is as listed below. Patient denies any side effects of medication.       Review of Systems  CONSTITUTIONAL: NEGATIVE for fever, chills, change in weight  INTEGUMENTARY/SKIN: NEGATIVE for worrisome rashes, moles or lesions  EYES: NEGATIVE for vision changes or irritation  ENT/MOUTH: NEGATIVE for ear, mouth and throat  problems  RESP: NEGATIVE for significant cough or SOB  CV: NEGATIVE for chest pain, palpitations or peripheral edema  GI: NEGATIVE for nausea, abdominal pain, heartburn, or change in bowel habits  : NEGATIVE for frequency, dysuria, or hematuria  MUSCULOSKELETAL: NEGATIVE for significant arthralgias or myalgia  NEURO: NEGATIVE for weakness, dizziness or paresthesias  ENDOCRINE: NEGATIVE for temperature intolerance, skin/hair changes  HEME: NEGATIVE for bleeding problems  PSYCHIATRIC: NEGATIVE for changes in mood or affect    Patient Active Problem List    Diagnosis Date Noted     Strain of lumbar region, initial encounter 05/06/2021     Priority: Medium     Slipped at work 5/4- excused form work till 5/7- Realtive rest helping. Return to work 5/10 unrestricted        Strain of hip and thigh, right, initial encounter 05/06/2021     Priority: Medium     Slipped at work 5/4- excused form work till 5/7- Realtive rest helping. Return to work 5/10 unrestricted        S/P laparoscopic sleeve gastrectomy 02/03/2021     Priority: Medium     2018 Dr. Tse        Moderate episode of recurrent major depressive disorder (H) 09/18/2020     Priority: Medium     BV (bacterial vaginosis) 09/18/2020     Priority: Medium     S/P ablation of atrial fibrillation 10/03/2019     Priority: Medium     Recurrent major depressive disorder, in full remission (H) 04/08/2019     Priority: Medium     Morbid (severe) obesity due to excess calories (H) 11/27/2018     Priority: Medium     Sp sleeve gastrectomy       Paroxysmal atrial fibrillation (H) 10/23/2018     Priority: Medium     LEONARDO (obstructive sleep apnea) 04/13/2017     Priority: Medium     4/10/2017 - Home Sleep Apnea Testing - AHI 15.5/hr; Supine AHI 12.9/hr; SpO2 <= 88% for 33.8 minutes.  Titration Sleep Study 4/19/2017 - (288.0 lbs) CPAP was titrated to a pressure of 11 with an AHI of 3.7.  Time Spent in REM supine at this pressure was 33.5.  Recommending Auto-CPAP 7 - 15 cmH2O.        Obesity hypoventilation syndrome (H) 04/13/2017     Priority: Medium     Mixed hyperlipidemia 07/06/2015     Priority: Medium     Papanicolaou smear of cervix with low grade squamous intraepithelial lesion (LGSIL) 06/17/2014     Priority: Medium     6/17/14: LSIL, + HPV, unable to type.   8/20/14:[place]:BRISEYDA I. Plan cotest pap & HPV in 1 year.   1/20116 Pap Ascus Neg HPV. Plan: [place].   5/13/16 Colposcopy not completed. Cancelled by patient  3/21/17 NIL/Neg HPV. Plan: cotest in 1 year per Dr. Mcbride  9/28/18 Tele enc: Dr. Mcbride, recommending new plan. Cotest due Jan or Feb 2019. Tracking updated. ([facility])  04/02/19 Patient is lost to pap tracking follow-up.   04/08/19: NIL pap, Neg HR HPV result. Plan cotest in 3 years per provider.        Acne 02/24/2012     Priority: Medium     Essential hypertension 01/12/2011     Priority: Medium     CARDIOVASCULAR SCREENING; LDL GOAL LESS THAN 130 10/31/2010     Priority: Medium     Morbid obesity due to excess calories (H) 05/17/2006     Priority: Medium     Problem list name updated by automated process. Provider to review       Tobacco use disorder 05/17/2006     Priority: Medium     Anxiety state      Priority: Medium     Gastroesophageal reflux disease without esophagitis      Priority: Medium      Past Medical History:   Diagnosis Date     Antiplatelet or antithrombotic long-term use      Arrhythmia      Depressive disorder 1990     Esophageal reflux      Hearing problem 2018     Hyperlipidemia LDL goal <130 7/6/2015     Hypertension      LSIL (low grade squamous intraepithelial lesion) on Pap smear 6/2014    + HPV, unable to type colp - [facility] I     LEONARDO on CPAP      Other anxiety states      Past Surgical History:   Procedure Laterality Date     EP ABLATION FOCAL AFIB N/A 10/3/2019    Procedure: EP ABLATION FOCAL AFIB;  Surgeon: Harpreet Arevalo MD;  Location:  OR      TOOTH EXTRACTION W/FORCEP  1995    [place] Teeth Extraction     LAPAROSCOPIC GASTRIC SLEEVE N/A  11/27/2018    Procedure: Laparoscopic Sleeve Gastrectomy Latex Free;  Surgeon: Artis Tse MD;  Location: [facility] OR     LAPAROSCOPIC HERNIORRHAPHY HIATAL  11/27/2018    Procedure: LAPAROSCOPIC  HIATAL Hernia Repair;  Surgeon: Artis Tse MD;  Location: [facility] OR     Current Outpatient Medications   Medication Sig Dispense Refill     acetaminophen (TYLENOL) 325 MG tablet Take 2 tablets (650 mg) by mouth every 4 hours as needed for other (For optimal non-opioid multimodal pain management to improve pain control and physical function.) 100 tablet 0     ARIPiprazole (ABILIFY) 5 MG tablet Take 1 tablet (5 mg) by mouth daily 90 tablet 0     calcium carbonate (OS-CHIKI) 500 MG tablet Take 1 tablet by mouth daily        etonogestrel (NEXPLANON) 68 MG IMPL 1 each (68 mg) by Subdermal route once       famotidine (PEPCID) 20 MG tablet Take 1 tablet (20 mg) by mouth 2 times daily as needed (reflux) 180 tablet 1     Ferrous Sulfate 324 (65 Fe) MG TBEC One daily       hydrochlorothiazide (MICROZIDE) 12.5 MG capsule TAKE 1 CAPSULE(12.5 MG) BY MOUTH DAILY 90 capsule 3     lisinopril (ZESTRIL) 40 MG tablet Take 1 tablet (40 mg) by mouth daily 90 tablet 1     Multiple Vitamins-Minerals (CENTRUM PO) 2 tablets per day       naltrexone (DEPADE/REVIA) 50 MG tablet Take 1 tablet (50 mg) by mouth daily 90 tablet 1     omeprazole (PRILOSEC) 20 MG DR capsule Take 1 capsule (20 mg) by mouth daily 90 capsule 1     omeprazole (PRILOSEC) 40 MG DR capsule Take 1 capsule (40 mg) by mouth daily 90 capsule 3     Probiotic Product (PROBIOTIC ADVANCED PO)        venlafaxine (EFFEXOR-XR) 75 MG 24 hr capsule Take 1 capsule (75 mg) by mouth daily 90 capsule 1     VENTOLIN  (90 Base) MCG/ACT Inhaler INHALE 1-2 PUFFS EVERY 4-6 HOURS AS NEEDED FOR WHEEZING  0     vitamin B complex with vitamin C (VITAMIN  B COMPLEX) tablet Take 1 tablet by mouth daily         Allergies   Allergen Reactions     Wellbutrin [Bupropion]      Wellbutrin: Panic  "attacks, heart/chest pain        Social History     Tobacco Use     Smoking status: Former Smoker     Packs/day: 1.00     Years: 10.00     Pack years: 10.00     Types: Cigarettes     Start date: 2004     Quit date: 2021     Years since quittin.3     Smokeless tobacco: Never Used   Substance Use Topics     Alcohol use: Not Currently     Alcohol/week: 0.0 standard drinks     Comment: Occas.     Family History   Problem Relation Age of Onset     Heart Disease Mother         ,mother had emphesema and  at 62     Hypertension Mother      Allergies Mother      Lipids Mother      Obesity Mother      Respiratory Mother         Emphysema     Diabetes Maternal Grandmother         Type 2?     Hypertension Maternal Grandmother      Circulatory Maternal Grandmother         Due to diabetes     Eye Disorder Maternal Grandmother         Macular degeneration/Macular degeneration     Obesity Maternal Grandmother      Hypertension Father      Lipids Father      Cancer Father      Prostate Cancer Father      Other Cancer Father      Cerebrovascular Disease Paternal Grandmother      Alcohol/Drug Maternal Grandfather      Obesity Maternal Grandfather      Psychotic Disorder Paternal Grandfather         Committed Suicide     Depression Paternal Grandfather      Allergies Sister      Genitourinary Problems Sister      History   Drug Use No         Objective     /78 (Patient Position: Sitting, Cuff Size: Adult Large)   Pulse 83   Temp 97.4  F (36.3  C) (Tympanic)   Resp 22   Ht 1.702 m (5' 7\")   Wt 119.5 kg (263 lb 8 oz)   SpO2 97%   BMI 41.27 kg/m      Physical Exam    GENERAL APPEARANCE: healthy, alert and no distress     EYES: EOMI, PERRL     HENT: ear canals and TM's normal and nose and mouth without ulcers or lesions     NECK: no adenopathy, no asymmetry, masses, or scars and thyroid normal to palpation     RESP: lungs clear to auscultation - no rales, rhonchi or wheezes     CV: regular rates and rhythm, " normal S1 S2, no S3 or S4 and no murmur, click or rub     ABDOMEN:  soft, nontender, no HSM or masses and bowel sounds normal     MS: extremities normal- no gross deformities noted, no evidence of inflammation in joints, FROM in all extremities.     SKIN: no suspicious lesions or rashes     NEURO: Normal strength and tone, sensory exam grossly normal, mentation intact and speech normal     PSYCH: mentation appears normal. and affect normal/bright     LYMPHATICS: No cervical adenopathy    Recent Labs   Lab Test 05/18/21  1029 09/17/20  0950 12/05/19  1021 12/05/19  1021   HGB 14.4  --   --  11.8     --   --  323     --   --  138   POTASSIUM 3.7 3.6   < > 3.8   CR 0.68  --   --  0.69   A1C 5.2  --   --  5.4    < > = values in this interval not displayed.        Diagnostics:  No labs were ordered during this visit.   No EKG required for low risk surgery (cataract, skin procedure, breast biopsy, etc).    Revised Cardiac Risk Index (RCRI):  The patient has the following serious cardiovascular risks for perioperative complications:   - No serious cardiac risks   Has a BMI of 41   HTN is well controlled on the current meds         Signed Electronically by: Polly Mcbride MD  Copy of this evaluation report is provided to requesting physician.

## 2021-10-13 ENCOUNTER — LAB (OUTPATIENT)
Dept: LAB | Facility: CLINIC | Age: 41
End: 2021-10-13

## 2021-10-13 ENCOUNTER — OFFICE VISIT (OUTPATIENT)
Dept: FAMILY MEDICINE | Facility: CLINIC | Age: 41
End: 2021-10-13
Payer: COMMERCIAL

## 2021-10-13 VITALS
BODY MASS INDEX: 41.36 KG/M2 | HEART RATE: 83 BPM | RESPIRATION RATE: 22 BRPM | OXYGEN SATURATION: 97 % | HEIGHT: 67 IN | WEIGHT: 263.5 LBS | TEMPERATURE: 97.4 F | DIASTOLIC BLOOD PRESSURE: 78 MMHG | SYSTOLIC BLOOD PRESSURE: 105 MMHG

## 2021-10-13 DIAGNOSIS — Z11.52 ENCOUNTER FOR PREPROCEDURE SCREENING LABORATORY TESTING FOR COVID-19: ICD-10-CM

## 2021-10-13 DIAGNOSIS — Z01.812 ENCOUNTER FOR PREPROCEDURE SCREENING LABORATORY TESTING FOR COVID-19: ICD-10-CM

## 2021-10-13 DIAGNOSIS — Z01.818 PREOP GENERAL PHYSICAL EXAM: Primary | ICD-10-CM

## 2021-10-13 DIAGNOSIS — N83.202 LEFT OVARIAN CYST: ICD-10-CM

## 2021-10-13 DIAGNOSIS — I10 ESSENTIAL HYPERTENSION: ICD-10-CM

## 2021-10-13 LAB — SARS-COV-2 RNA RESP QL NAA+PROBE: NEGATIVE

## 2021-10-13 PROCEDURE — U0005 INFEC AGEN DETEC AMPLI PROBE: HCPCS

## 2021-10-13 PROCEDURE — 99214 OFFICE O/P EST MOD 30 MIN: CPT | Performed by: FAMILY MEDICINE

## 2021-10-13 PROCEDURE — U0003 INFECTIOUS AGENT DETECTION BY NUCLEIC ACID (DNA OR RNA); SEVERE ACUTE RESPIRATORY SYNDROME CORONAVIRUS 2 (SARS-COV-2) (CORONAVIRUS DISEASE [COVID-19]), AMPLIFIED PROBE TECHNIQUE, MAKING USE OF HIGH THROUGHPUT TECHNOLOGIES AS DESCRIBED BY CMS-2020-01-R: HCPCS

## 2021-10-13 RX ORDER — LISINOPRIL 40 MG/1
40 TABLET ORAL DAILY
Qty: 90 TABLET | Refills: 1 | Status: CANCELLED | OUTPATIENT
Start: 2021-10-13

## 2021-10-13 RX ORDER — HYDROCHLOROTHIAZIDE 12.5 MG/1
CAPSULE ORAL
Qty: 90 CAPSULE | Refills: 3 | Status: CANCELLED | OUTPATIENT
Start: 2021-10-13

## 2021-10-13 ASSESSMENT — PATIENT HEALTH QUESTIONNAIRE - PHQ9
10. IF YOU CHECKED OFF ANY PROBLEMS, HOW DIFFICULT HAVE THESE PROBLEMS MADE IT FOR YOU TO DO YOUR WORK, TAKE CARE OF THINGS AT HOME, OR GET ALONG WITH OTHER PEOPLE: SOMEWHAT DIFFICULT
SUM OF ALL RESPONSES TO PHQ QUESTIONS 1-9: 6
SUM OF ALL RESPONSES TO PHQ QUESTIONS 1-9: 6

## 2021-10-13 ASSESSMENT — MIFFLIN-ST. JEOR: SCORE: 1887.86

## 2021-10-20 ENCOUNTER — TRANSFERRED RECORDS (OUTPATIENT)
Dept: HEALTH INFORMATION MANAGEMENT | Facility: CLINIC | Age: 41
End: 2021-10-20

## 2021-11-02 ENCOUNTER — OFFICE VISIT (OUTPATIENT)
Dept: CARDIOLOGY | Facility: CLINIC | Age: 41
End: 2021-11-02
Payer: COMMERCIAL

## 2021-11-02 VITALS
DIASTOLIC BLOOD PRESSURE: 88 MMHG | WEIGHT: 268 LBS | OXYGEN SATURATION: 97 % | HEART RATE: 92 BPM | BODY MASS INDEX: 41.97 KG/M2 | SYSTOLIC BLOOD PRESSURE: 138 MMHG

## 2021-11-02 DIAGNOSIS — I10 BENIGN ESSENTIAL HYPERTENSION: ICD-10-CM

## 2021-11-02 DIAGNOSIS — Z98.890 STATUS POST CATHETER ABLATION OF ATRIAL FIBRILLATION: ICD-10-CM

## 2021-11-02 DIAGNOSIS — I48.0 PAROXYSMAL ATRIAL FIBRILLATION (H): Primary | ICD-10-CM

## 2021-11-02 DIAGNOSIS — G47.33 OSA (OBSTRUCTIVE SLEEP APNEA): ICD-10-CM

## 2021-11-02 DIAGNOSIS — E66.01 MORBID OBESITY (H): ICD-10-CM

## 2021-11-02 PROCEDURE — 99214 OFFICE O/P EST MOD 30 MIN: CPT | Performed by: INTERNAL MEDICINE

## 2021-11-02 NOTE — PROGRESS NOTES
Chief complaint: Palpitations    HPI: Ms. Danitza Soto is a 38 year old  female with PMH significant for depression, GERD, LEONARDO, essential hypertension, morbid obesity S/P gastric bypass 11/2018 and paroxysmal atrial fibrillation.    The patient was last seen in cardiology for paroxysmal atrial fibrillation diagnosis in 9/2018.  Her symptoms were mild at that time therefore rate or rhythm control strategy was not pursued.  The patient underwent gastric sleeve surgery in 11/2018 and has lost 70 pounds.  The patient noticed more frequent atrial fibrillation episodes since the surgery almost weekly now lasting up to 8-10 hours.  She finds works stress as a prominent trigger for episodes.  Associated symptoms include chest pressure, chest pain and dizziness.  She denies chest discomfort with exertion at other times.  The patient denies a history of dyspnea, PND, orthopnea, pedal edema, and syncope  Current medications include lisinopril 20 mg, fish oil.  She has cut down on lisinopril from 40 mg since she has lost significant weight.  She quit smoking in 2018 (10 pack years).  No history of alcohol abuse. No history of diabetes.  Family history is negative for CAD.  Prior cardiac tests include CT coronary angiogram in 2013 which did not show evidence of CAD.  Echocardiogram dated 9/2018 showed normal LV and RV function with no significant valve disease.  Moderate LVH was reported. I personnally reviewed the images and I donot agree with the LVH diagnosis. I donot think she has LVH based on echo. Zio patch in 8/2018 showed 4% atrial fibrillation burden heart rate ranged  beats per minute.  Longest episode lasting for 8 hours.    I have reviewed her ECG 11/28/2018 which shows normal sinus rhythm with single PAC, normal WV/QRS/QTC intervals. No evidence of LVH on ECG.    Medications, personal, family, and social history reviewed with patient and revised.    Interval history 5/14/2019:  Patient returns for  follow-up to recheck on paroxysmal atrial fibrillation.  Since I saw 3 months ago, patient developed depression and started on Effexor which improved her mood.  I have started her on flecainide and metoprolol 3 months ago for frequent paroxysmal atrial fibrillation episodes.  She noticed significant decline in the frequency of the episodes however she still reports at least 2 episodes per month sometimes lasting for several hours.  She reports associated dizziness however denies chest pain, shortness of breath, lower extremity edema or syncope.  Since gastric bypass patient lost significant weight with improvement in blood pressure and sleep apnea.  She no longer uses CPAP machine.  She is overall feeling well.    Interval history 8/14/2019:  The patient returns for follow-up to recheck on paroxysmal atrial fibrillation.  When I saw patient last time 3 months ago I increased dose of flecainide to 100 mg twice daily due to symptomatic palpitations.  The patient reports doing well during the first 2 months however had 3 episodes within the last month longest episode lasting for 3 hours with associated tiredness.  Denies chest pain, dyspnea on exertion, dizziness, syncope.  The patient has did a lot of research on atrial fibrillation ablation and she expressed her interest in undergoing procedure.    Interval history 2/18/2020:  Patient underwent catheter ablation of atrial fibrillation on 10/3/2019.  Patient feels great since the ablation.  No recurrence of A. fib since then.  She is currently on flecainide 100 mg twice daily, metoprolol 50 mg and apixaban 5 mg twice daily.  Blood pressure is well controlled with lisinopril 20 mg.  Patient is currently asymptomatic from cardiac standpoint.  Denies chest pain, shortness of breath, palpitations, dizziness, lower extremity edema or syncope.    Interval history 9/1/2020:  Patient reports no new cardiac symptoms since being seen in February 2020.  Denies palpitations,  chest pain, shortness of breath.  Patient reports high blood pressure at home ~140/80 mmHg despite being on lisinopril 40 mg.  She reports being back on CPAP due to weight gain though she had gastric bypass surgery.  She continues to be on topiramate and naltrexone for obesity.  No alcohol or drug abuse.    Interval history 11/12/2021:  Patient is being seen today for annual follow-up. She reports occasional flutters less than 1 minute (once a month). Otherwise she has not felt any recurrent atrial fibrillation since catheter ablation. Denies chest pain, shortness of breath, dizziness, syncope or lower extremity edema.  Recently Effexor dose was increased from 75 mg to 150 mg mg daily. Patient's blood pressure is mildly elevated today. She does not monitor blood pressure at home.    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 - BRISEYDA I     LEONARDO on CPAP      Other anxiety states        CURRENT MEDICATIONS:  Current Outpatient Medications   Medication Sig Dispense Refill     acetaminophen (TYLENOL) 325 MG tablet Take 2 tablets (650 mg) by mouth every 4 hours as needed for other (For optimal non-opioid multimodal pain management to improve pain control and physical function.) 100 tablet 0     ARIPiprazole (ABILIFY) 5 MG tablet Take 1 tablet (5 mg) by mouth daily 90 tablet 0     calcium carbonate (OS-CHIKI) 500 MG tablet Take 1 tablet by mouth daily        etonogestrel (NEXPLANON) 68 MG IMPL 1 each (68 mg) by Subdermal route once       famotidine (PEPCID) 20 MG tablet Take 1 tablet (20 mg) by mouth 2 times daily as needed (reflux) 180 tablet 1     Ferrous Sulfate 324 (65 Fe) MG TBEC One daily        hydrochlorothiazide (MICROZIDE) 12.5 MG capsule TAKE 1 CAPSULE(12.5 MG) BY MOUTH DAILY 90 capsule 3     lisinopril (ZESTRIL) 40 MG tablet Take 1 tablet (40 mg) by mouth daily 90 tablet 1     Multiple Vitamins-Minerals (CENTRUM PO) 2 tablets per day       naltrexone (DEPADE/REVIA) 50 MG tablet Take 1 tablet (50 mg) by mouth daily 90 tablet 1     omeprazole (PRILOSEC) 20 MG DR capsule Take 1 capsule (20 mg) by mouth daily 90 capsule 1     omeprazole (PRILOSEC) 40 MG DR capsule Take 1 capsule (40 mg) by mouth daily 90 capsule 3     Probiotic Product (PROBIOTIC ADVANCED PO)        venlafaxine (EFFEXOR-ER) 150 MG 24 hr tablet Take 1 tablet (150 mg) by mouth daily 30 tablet 1     venlafaxine (EFFEXOR-XR) 75 MG 24 hr capsule Take 1 capsule (75 mg) by mouth daily 90 capsule 1     VENTOLIN  (90 Base) MCG/ACT Inhaler INHALE 1-2 PUFFS EVERY 4-6 HOURS AS NEEDED FOR WHEEZING  0     vitamin B complex with vitamin C (VITAMIN  B COMPLEX) tablet Take 1 tablet by mouth daily         PAST SURGICAL HISTORY:  Past Surgical History:   Procedure Laterality Date     EP ABLATION FOCAL AFIB N/A 10/03/2019    Procedure: EP ABLATION FOCAL AFIB;  Surgeon: Harpreet Arevalo MD;  Location:  OR      TOOTH EXTRACTION W/FORCEP      [place] Teeth Extraction     LAPAROSCOPIC GASTRIC SLEEVE N/A 2018    Procedure: Laparoscopic Sleeve Gastrectomy Latex Free;  Surgeon: Artis Tse MD;  Location:  OR     LAPAROSCOPIC HERNIORRHAPHY HIATAL  2018    Procedure: LAPAROSCOPIC  HIATAL Hernia Repair;  Surgeon: Artis Tse MD;  Location:  OR     SALPINGO-OOPHORECTOMY, COMBINED Left 10/20/2021    Mucinous cystadenoma       ALLERGIES:     Allergies   Allergen Reactions     Wellbutrin [Bupropion]      Wellbutrin: Panic attacks, heart/chest pain       FAMILY HISTORY:  Family History   Problem Relation Age of Onset     Heart Disease Mother         ,mother had emphesema and  at 62     Hypertension Mother      Allergies  Mother      Lipids Mother      Obesity Mother      Respiratory Mother         Emphysema     Diabetes Maternal Grandmother         Type 2?     Hypertension Maternal Grandmother      Circulatory Maternal Grandmother         Due to diabetes     Eye Disorder Maternal Grandmother         Macular degeneration/Macular degeneration     Obesity Maternal Grandmother      Hypertension Father      Lipids Father      Cancer Father      Prostate Cancer Father      Other Cancer Father      Cerebrovascular Disease Paternal Grandmother      Alcohol/Drug Maternal Grandfather      Obesity Maternal Grandfather      Psychotic Disorder Paternal Grandfather         Committed Suicide     Depression Paternal Grandfather      Allergies Sister      Genitourinary Problems Sister          SOCIAL HISTORY:  Social History     Tobacco Use     Smoking status: Former Smoker     Packs/day: 1.00     Years: 10.00     Pack years: 10.00     Types: Cigarettes     Start date: 2004     Quit date: 2021     Years since quittin.4     Smokeless tobacco: Never Used   Vaping Use     Vaping Use: Never used   Substance Use Topics     Alcohol use: Not Currently     Alcohol/week: 0.0 standard drinks     Comment: Occas.     Drug use: No       Exam:  /88 (BP Location: Left arm, Patient Position: Sitting, Cuff Size: Adult Large)   Pulse 92   Wt 121.6 kg (268 lb)   SpO2 97%   BMI 41.97 kg/m    GENERAL APPEARANCE: alert and no distress  HEENT: no icterus, no central cyanosis  RESPIRATORY: lungs clear to auscultation - no rales, rhonchi or wheezes, no use of accessory muscles, no retractions, respirations are unlabored, normal respiratory rate  CARDIOVASCULAR: regular rhythm, normal S1, S2, no S3 or S4 and no murmur, click or rub, precordium quiet with normal PMI.  EXTREMITIES: no edema  NEURO: alert, normal speech,and affect  SKIN: no ecchymoses, no rashes     I have reviewed the labs and personally reviewed the imaging below and made my comment in  the assessment and plan.      EP PROCEDURE NOTE 10/3/2019  1. Left Atrial Wide Area Circumferential radiofrequency Ablation.  2. Trans-septal Puncture x2  3. Intracardiac Echocardiography.  4. Invasive Hemodynamic Monitoring.  5. 3D electro-anatomic Mapping using Carto3.  6. Esophageal temperature monitoring.    I have reviewed the labs and personally reviewed the imaging below and made my comment in the assessment and plan.    Labs:  CBC RESULTS:   Lab Results   Component Value Date    WBC 7.4 05/18/2021    RBC 4.90 05/18/2021    HGB 14.4 05/18/2021    HCT 43.7 05/18/2021    MCV 89 05/18/2021    MCH 29.4 05/18/2021    MCHC 33.0 05/18/2021    RDW 13.8 05/18/2021     05/18/2021       BMP RESULTS:  Lab Results   Component Value Date     05/18/2021    POTASSIUM 3.7 05/18/2021    CHLORIDE 110 (H) 05/18/2021    CO2 28 05/18/2021    ANIONGAP 3 05/18/2021    GLC 81 05/18/2021    BUN 9 05/18/2021    CR 0.68 05/18/2021    GFRESTIMATED >90 05/18/2021    GFRESTBLACK >90 05/18/2021    CHIKI 9.1 05/18/2021        INR RESULTS:  Lab Results   Component Value Date    INR 0.98 11/06/2018       Echocardiogram 9/26/2018  Global and regional left ventricular function is normal with an EF of 60-65%.  Moderate concentric wall thickening consistent with left ventricular  hypertrophy is present.  Right ventricular function, chamber size, wall motion, and thickness are  normal.  Mild biatrial enlargement is present.  No significant valve abnormalities present.  The inferior vena cava was normal in size    Nuclear stress test with Lexiscan 9/6/2018  Normal myocardial SPECT study with a summed stress score of 0.     CT coronary artery angiogram 6/27/2013  No evidence of coronary artery disease    EKG 11/28/2018 normal.    Assessment and Plan:   Ms. Danitza Soto is a 38 year old  female with PMH significant for depression, GERD, LEOANRDO, essential hypertension, morbid obesity S/P gastric bypass 11/2018 and paroxysmal atrial  fibrillation refractory to medical treatment status post PVI on 10/3/2019.    Patient did not have any recurrent A. fib since catheter ablation. Currently doing very well except for very short occasional flutters less than a minute (maybe once a month).     1.  Paroxysmal atrial fibrillation: The patient underwent PVI on 10/3/2019.  Reports feeling great since then.  No recurrence.      2.  Hypertension: Blood pressure was well controlled with lisinopril hydrochlorothiazide combination until recently. She is on a higher dose of Effexor now which is probably affecting her blood pressure. Blood pressure in clinic is 138/88 mmHg. I recommend her to monitor blood pressure at home and discuss the results with PCP in order to change to a different antidepressant.    3.  Obstructive sleep apnea: On CPAP.    No medication changes today.    Return to clinic 2 years.     Total time spent 30 minutes including face-to-face clinic visit and medical documentation.    Please donot hesitate to contact me if you have any questions or concerns. Again, thank you for allowing me to participate in the care of your patient.    Leena RONDON MD  [facility] Division of Cardiology  Pager [phone]

## 2021-11-02 NOTE — NURSING NOTE
"Chief Complaint   Patient presents with     Hypertension     per patient palpitations occationally       Initial /88 (BP Location: Left arm, Patient Position: Sitting, Cuff Size: Adult Large)   Pulse 92   Wt 121.6 kg (268 lb)   SpO2 97%   BMI 41.97 kg/m   Estimated body mass index is 41.97 kg/m  as calculated from the following:    Height as of 10/13/21: 1.702 m (5' 7\").    Weight as of this encounter: 121.6 kg (268 lb)..  BP completed using cuff size: shea Little L.P.N.    "

## 2021-11-02 NOTE — PATIENT INSTRUCTIONS
Thank you for coming to the [facility] @ [place]; please note the following instructions:    1. Monitor blood pressure at home , then relay readings to family doctor if readings are over 120/80. Family Doctor will need to access if Anti depressant medication effecting Blood pressure readings     2. Dr. Stern Can would like you to return for a cardiac follow up in 2 years  (November).  We will contact you regarding your appointment when the time draws closer or you may call [phone] to arrange an appointment.  Mean while, if you should have any questions or concerns regarding your heart health, please contact us.  Thank you for choosing [facility] for your care.            If you have any questions regarding your visit please contact your care team:     Cardiology  Telephone Number   Hannah YEPEZ, RN  Josette RUSS,BOBBY RAMIREZ, NAPOLEON HUGHES, MA  [place], [place]   [phone]   (select option 1)    *After hours: [phone]     For scheduling appts:     [phone] or    [phone] (select option 1)    *After hours: [phone]     For the Device Clinic (Pacemakers and ICD's)  RN's :  Hanh Cochran   During business hours: [phone]    *After business hours:  [phone] (select option 4)      Normal test result notifications will be released via [address] or mailed within 7 business days.  All other test results, will be communicated via telephone once reviewed by your cardiologist.    If you need a medication refill please contact your pharmacy.  Please allow 3 business days for your refill to be completed.    As always, thank you for trusting us with your health care needs!

## 2021-11-02 NOTE — LETTER
11/2/2021      RE: Danitza Soto  [address]  [facility] [address]       Dear Colleague,    Thank you for the opportunity to participate in the care of your patient, Danitza Soto, at the [facility] at [facility]. Please see a copy of my visit note below.    Chief complaint: Palpitations    HPI: Ms. Danitza Soto is a 38 year old  female with PMH significant for depression, GERD, LEONARDO, essential hypertension, morbid obesity S/P gastric bypass 11/2018 and paroxysmal atrial fibrillation.    The patient was last seen in cardiology for paroxysmal atrial fibrillation diagnosis in 9/2018.  Her symptoms were mild at that time therefore rate or rhythm control strategy was not pursued.  The patient underwent gastric sleeve surgery in 11/2018 and has lost 70 pounds.  The patient noticed more frequent atrial fibrillation episodes since the surgery almost weekly now lasting up to 8-10 hours.  She finds works stress as a prominent trigger for episodes.  Associated symptoms include chest pressure, chest pain and dizziness.  She denies chest discomfort with exertion at other times.  The patient denies a history of dyspnea, PND, orthopnea, pedal edema, and syncope  Current medications include lisinopril 20 mg, fish oil.  She has cut down on lisinopril from 40 mg since she has lost significant weight.  She quit smoking in 2018 (10 pack years).  No history of alcohol abuse. No history of diabetes.  Family history is negative for CAD.  Prior cardiac tests include CT coronary angiogram in 2013 which did not show evidence of CAD.  Echocardiogram dated 9/2018 showed normal LV and RV function with no significant valve disease.  Moderate LVH was reported. I personnally reviewed the images and I donot agree with the LVH diagnosis. I donot think she has LVH based on echo. Zio patch in 8/2018 showed 4% atrial fibrillation burden heart rate ranged   beats per minute.  Longest episode lasting for 8 hours.    I have reviewed her ECG 11/28/2018 which shows normal sinus rhythm with single PAC, normal AZ/QRS/QTC intervals. No evidence of LVH on ECG.    Medications, personal, family, and social history reviewed with patient and revised.    Interval history 5/14/2019:  Patient returns for follow-up to recheck on paroxysmal atrial fibrillation.  Since I saw 3 months ago, patient developed depression and started on Effexor which improved her mood.  I have started her on flecainide and metoprolol 3 months ago for frequent paroxysmal atrial fibrillation episodes.  She noticed significant decline in the frequency of the episodes however she still reports at least 2 episodes per month sometimes lasting for several hours.  She reports associated dizziness however denies chest pain, shortness of breath, lower extremity edema or syncope.  Since gastric bypass patient lost significant weight with improvement in blood pressure and sleep apnea.  She no longer uses CPAP machine.  She is overall feeling well.    Interval history 8/14/2019:  The patient returns for follow-up to recheck on paroxysmal atrial fibrillation.  When I saw patient last time 3 months ago I increased dose of flecainide to 100 mg twice daily due to symptomatic palpitations.  The patient reports doing well during the first 2 months however had 3 episodes within the last month longest episode lasting for 3 hours with associated tiredness.  Denies chest pain, dyspnea on exertion, dizziness, syncope.  The patient has did a lot of research on atrial fibrillation ablation and she expressed her interest in undergoing procedure.    Interval history 2/18/2020:  Patient underwent catheter ablation of atrial fibrillation on 10/3/2019.  Patient feels great since the ablation.  No recurrence of A. fib since then.  She is currently on flecainide 100 mg twice daily, metoprolol 50 mg and apixaban 5 mg twice daily.   Blood pressure is well controlled with lisinopril 20 mg.  Patient is currently asymptomatic from cardiac standpoint.  Denies chest pain, shortness of breath, palpitations, dizziness, lower extremity edema or syncope.    Interval history 9/1/2020:  Patient reports no new cardiac symptoms since being seen in February 2020.  Denies palpitations, chest pain, shortness of breath.  Patient reports high blood pressure at home ~140/80 mmHg despite being on lisinopril 40 mg.  She reports being back on CPAP due to weight gain though she had gastric bypass surgery.  She continues to be on topiramate and naltrexone for obesity.  No alcohol or drug abuse.    Interval history 11/12/2021:  Patient is being seen today for annual follow-up. She reports occasional flutters less than 1 minute (once a month). Otherwise she has not felt any recurrent atrial fibrillation since catheter ablation. Denies chest pain, shortness of breath, dizziness, syncope or lower extremity edema.  Recently Effexor dose was increased from 75 mg to 150 mg mg daily. Patient's blood pressure is mildly elevated today. She does not monitor blood pressure at home.    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 - BRISEYDA I     LEONARDO on CPAP      Other anxiety states        CURRENT MEDICATIONS:  Current Outpatient Medications   Medication Sig Dispense Refill     acetaminophen (TYLENOL) 325 MG tablet Take 2 tablets (650 mg) by mouth every 4 hours as needed for other (For optimal non-opioid multimodal pain management to improve pain control and physical function.) 100 tablet 0     ARIPiprazole (ABILIFY) 5 MG tablet Take 1 tablet (5 mg) by mouth daily 90  tablet 0     calcium carbonate (OS-CHIKI) 500 MG tablet Take 1 tablet by mouth daily        etonogestrel (NEXPLANON) 68 MG IMPL 1 each (68 mg) by Subdermal route once       famotidine (PEPCID) 20 MG tablet Take 1 tablet (20 mg) by mouth 2 times daily as needed (reflux) 180 tablet 1     Ferrous Sulfate 324 (65 Fe) MG TBEC One daily       hydrochlorothiazide (MICROZIDE) 12.5 MG capsule TAKE 1 CAPSULE(12.5 MG) BY MOUTH DAILY 90 capsule 3     lisinopril (ZESTRIL) 40 MG tablet Take 1 tablet (40 mg) by mouth daily 90 tablet 1     Multiple Vitamins-Minerals (CENTRUM PO) 2 tablets per day       naltrexone (DEPADE/REVIA) 50 MG tablet Take 1 tablet (50 mg) by mouth daily 90 tablet 1     omeprazole (PRILOSEC) 20 MG DR capsule Take 1 capsule (20 mg) by mouth daily 90 capsule 1     omeprazole (PRILOSEC) 40 MG DR capsule Take 1 capsule (40 mg) by mouth daily 90 capsule 3     Probiotic Product (PROBIOTIC ADVANCED PO)        venlafaxine (EFFEXOR-ER) 150 MG 24 hr tablet Take 1 tablet (150 mg) by mouth daily 30 tablet 1     venlafaxine (EFFEXOR-XR) 75 MG 24 hr capsule Take 1 capsule (75 mg) by mouth daily 90 capsule 1     VENTOLIN  (90 Base) MCG/ACT Inhaler INHALE 1-2 PUFFS EVERY 4-6 HOURS AS NEEDED FOR WHEEZING  0     vitamin B complex with vitamin C (VITAMIN  B COMPLEX) tablet Take 1 tablet by mouth daily         PAST SURGICAL HISTORY:  Past Surgical History:   Procedure Laterality Date     EP ABLATION FOCAL AFIB N/A 10/03/2019    Procedure: EP ABLATION FOCAL AFIB;  Surgeon: Harpreet Arevalo MD;  Location:  OR     HC TOOTH EXTRACTION W/FORCEP  1995    [facility]a Teeth Extraction     LAPAROSCOPIC GASTRIC SLEEVE N/A 11/27/2018    Procedure: Laparoscopic Sleeve Gastrectomy Latex Free;  Surgeon: Artis Tse MD;  Location: [facility] OR     LAPAROSCOPIC HERNIORRHAPHY HIATAL  11/27/2018    Procedure: LAPAROSCOPIC  HIATAL Hernia Repair;  Surgeon: Artis Tse MD;  Location: [facility] OR     SALPINGO-OOPHORECTOMY, COMBINED Left  10/20/2021    Mucinous cystadenoma       ALLERGIES:     Allergies   Allergen Reactions     Wellbutrin [Bupropion]      Wellbutrin: Panic attacks, heart/chest pain       FAMILY HISTORY:  Family History   Problem Relation Age of Onset     Heart Disease Mother         ,mother had emphesema and  at 62     Hypertension Mother      Allergies Mother      Lipids Mother      Obesity Mother      Respiratory Mother         Emphysema     Diabetes Maternal Grandmother         Type 2?     Hypertension Maternal Grandmother      Circulatory Maternal Grandmother         Due to diabetes     Eye Disorder Maternal Grandmother         Macular degeneration/Macular degeneration     Obesity Maternal Grandmother      Hypertension Father      Lipids Father      Cancer Father      Prostate Cancer Father      Other Cancer Father      Cerebrovascular Disease Paternal Grandmother      Alcohol/Drug Maternal Grandfather      Obesity Maternal Grandfather      Psychotic Disorder Paternal Grandfather         Committed Suicide     Depression Paternal Grandfather      Allergies Sister      Genitourinary Problems Sister          SOCIAL HISTORY:  Social History     Tobacco Use     Smoking status: Former Smoker     Packs/day: 1.00     Years: 10.00     Pack years: 10.00     Types: Cigarettes     Start date: 2004     Quit date: 2021     Years since quittin.4     Smokeless tobacco: Never Used   Vaping Use     Vaping Use: Never used   Substance Use Topics     Alcohol use: Not Currently     Alcohol/week: 0.0 standard drinks     Comment: Occas.     Drug use: No       Exam:  /88 (BP Location: Left arm, Patient Position: Sitting, Cuff Size: Adult Large)   Pulse 92   Wt 121.6 kg (268 lb)   SpO2 97%   BMI 41.97 kg/m    GENERAL APPEARANCE: alert and no distress  HEENT: no icterus, no central cyanosis  RESPIRATORY: lungs clear to auscultation - no rales, rhonchi or wheezes, no use of accessory muscles, no retractions, respirations are  unlabored, normal respiratory rate  CARDIOVASCULAR: regular rhythm, normal S1, S2, no S3 or S4 and no murmur, click or rub, precordium quiet with normal PMI.  EXTREMITIES: no edema  NEURO: alert, normal speech,and affect  SKIN: no ecchymoses, no rashes     I have reviewed the labs and personally reviewed the imaging below and made my comment in the assessment and plan.      EP PROCEDURE NOTE 10/3/2019  1. Left Atrial Wide Area Circumferential radiofrequency Ablation.  2. Trans-septal Puncture x2  3. Intracardiac Echocardiography.  4. Invasive Hemodynamic Monitoring.  5. 3D electro-anatomic Mapping using Carto3.  6. Esophageal temperature monitoring.    I have reviewed the labs and personally reviewed the imaging below and made my comment in the assessment and plan.    Labs:  CBC RESULTS:   Lab Results   Component Value Date    WBC 7.4 05/18/2021    RBC 4.90 05/18/2021    HGB 14.4 05/18/2021    HCT 43.7 05/18/2021    MCV 89 05/18/2021    MCH 29.4 05/18/2021    MCHC 33.0 05/18/2021    RDW 13.8 05/18/2021     05/18/2021       BMP RESULTS:  Lab Results   Component Value Date     05/18/2021    POTASSIUM 3.7 05/18/2021    CHLORIDE 110 (H) 05/18/2021    CO2 28 05/18/2021    ANIONGAP 3 05/18/2021    GLC 81 05/18/2021    BUN 9 05/18/2021    CR 0.68 05/18/2021    GFRESTIMATED >90 05/18/2021    GFRESTBLACK >90 05/18/2021    CHIKI 9.1 05/18/2021        INR RESULTS:  Lab Results   Component Value Date    INR 0.98 11/06/2018       Echocardiogram 9/26/2018  Global and regional left ventricular function is normal with an EF of 60-65%.  Moderate concentric wall thickening consistent with left ventricular  hypertrophy is present.  Right ventricular function, chamber size, wall motion, and thickness are  normal.  Mild biatrial enlargement is present.  No significant valve abnormalities present.  The inferior vena cava was normal in size    Nuclear stress test with Lexiscan 9/6/2018  Normal myocardial SPECT study with a  summed stress score of 0.     CT coronary artery angiogram 6/27/2013  No evidence of coronary artery disease    EKG 11/28/2018 normal.    Assessment and Plan:   Ms. Danitza Soto is a 38 year old  female with PMH significant for depression, GERD, LEONARDO, essential hypertension, morbid obesity S/P gastric bypass 11/2018 and paroxysmal atrial fibrillation refractory to medical treatment status post PVI on 10/3/2019.    Patient did not have any recurrent A. fib since catheter ablation. Currently doing very well except for very short occasional flutters less than a minute (maybe once a month).     1.  Paroxysmal atrial fibrillation: The patient underwent PVI on 10/3/2019.  Reports feeling great since then.  No recurrence.      2.  Hypertension: Blood pressure was well controlled with lisinopril hydrochlorothiazide combination until recently. She is on a higher dose of Effexor now which is probably affecting her blood pressure. Blood pressure in clinic is 138/88 mmHg. I recommend her to monitor blood pressure at home and discuss the results with PCP in order to change to a different antidepressant.    3.  Obstructive sleep apnea: On CPAP.    No medication changes today.    Return to clinic 2 years.     Total time spent 30 minutes including face-to-face clinic visit and medical documentation.    Please donot hesitate to contact me if you have any questions or concerns. Again, thank you for allowing me to participate in the care of your patient.    Leena RONDON MD  [facility] Division of Cardiology  Pager [phone]

## 2021-11-03 ENCOUNTER — OFFICE VISIT (OUTPATIENT)
Dept: OBGYN | Facility: CLINIC | Age: 41
End: 2021-11-03
Payer: COMMERCIAL

## 2021-11-03 VITALS
WEIGHT: 267.6 LBS | BODY MASS INDEX: 41.91 KG/M2 | HEART RATE: 83 BPM | OXYGEN SATURATION: 97 % | DIASTOLIC BLOOD PRESSURE: 87 MMHG | SYSTOLIC BLOOD PRESSURE: 129 MMHG

## 2021-11-03 DIAGNOSIS — E78.1 HYPERTRIGLYCERIDEMIA: ICD-10-CM

## 2021-11-03 DIAGNOSIS — Z12.31 VISIT FOR SCREENING MAMMOGRAM: ICD-10-CM

## 2021-11-03 DIAGNOSIS — Z98.890 POSTOPERATIVE STATE: Primary | ICD-10-CM

## 2021-11-03 PROCEDURE — 99024 POSTOP FOLLOW-UP VISIT: CPT | Performed by: OBSTETRICS & GYNECOLOGY

## 2021-11-03 NOTE — LETTER
November 3, 2021      RE: Danitza Soto  [address]  [facility] [address]       To whom it may concern:    Danitza Soto was seen in our clinic today. She  may return to work without restrictions on   November 8, 2021.    Sincerely,            Lucian Reyna MD

## 2021-11-03 NOTE — PROGRESS NOTES
Danitza is a 41 year old , She is 2 weeks s/p minilap LSO.  Her postop recovery has been uncomplicated.  She is currently requiring no medications for pain management.  She had some vaginal bleeding, no hot flashes. Energy level is returning to normal.  Denies fever, chills.    The pathology report showed:   Final Diagnosis   Ovary and fallopian tube, left, salpingo-oophorectomy: Mucinous cystadenoma. Unremarkable fallopian tube.         The medical history, social history and family history have been reviewed and updated as indicated.      ROS:   ROS:4 point ROS including Respiratory, CV, GI and , other than that noted in the HPI and the PMH, is negative     PE: /87 (BP Location: Right arm, Cuff Size: Adult Large)   Pulse 83   Wt 121.4 kg (267 lb 9.6 oz)   SpO2 97%   BMI 41.91 kg/m    HEENT:  NC/AT, EOMI  Abd: soft, non tender, no masses  Incision: incisions are healing well.     ASSESSMENT:  Post op state  Hypertriglyceridemia     PLAN:  Future order for lipid profile  Schedule for the mammogram   Keep annual exams.     Lucian Reyna MD

## 2021-11-04 ENCOUNTER — MYC MEDICAL ADVICE (OUTPATIENT)
Dept: CARDIOLOGY | Facility: CLINIC | Age: 41
End: 2021-11-04
Payer: COMMERCIAL

## 2021-11-04 DIAGNOSIS — I10 PRIMARY HYPERTENSION: Primary | ICD-10-CM

## 2021-11-08 RX ORDER — AMLODIPINE BESYLATE 5 MG/1
5 TABLET ORAL DAILY
Qty: 90 TABLET | Refills: 3 | Status: SHIPPED | OUTPATIENT
Start: 2021-11-08 | End: 2021-12-03

## 2021-11-09 NOTE — CONFIDENTIAL NOTE
Patient reviewed [employer]HART message.  Ok to close encounter.    Hannah Conte RN

## 2021-11-20 ENCOUNTER — HEALTH MAINTENANCE LETTER (OUTPATIENT)
Age: 41
End: 2021-11-20

## 2021-12-02 ENCOUNTER — ANCILLARY PROCEDURE (OUTPATIENT)
Dept: MAMMOGRAPHY | Facility: CLINIC | Age: 41
End: 2021-12-02
Attending: OBSTETRICS & GYNECOLOGY
Payer: COMMERCIAL

## 2021-12-02 DIAGNOSIS — Z12.31 VISIT FOR SCREENING MAMMOGRAM: ICD-10-CM

## 2021-12-02 PROCEDURE — 77067 SCR MAMMO BI INCL CAD: CPT | Mod: TC | Performed by: RADIOLOGY

## 2021-12-04 ENCOUNTER — MYC MEDICAL ADVICE (OUTPATIENT)
Dept: FAMILY MEDICINE | Facility: CLINIC | Age: 41
End: 2021-12-04
Payer: COMMERCIAL

## 2021-12-04 DIAGNOSIS — F41.9 ANXIETY: ICD-10-CM

## 2021-12-06 NOTE — TELEPHONE ENCOUNTER
It must be someone else prescribing it , I do not see any effexor prescribed by me   Can you double check and see who the prescriber is ? Just because she has HTN and is on BP meds and effexor has elevated BP as a side effect    Thanks

## 2021-12-07 ENCOUNTER — VIRTUAL VISIT (OUTPATIENT)
Dept: ENDOCRINOLOGY | Facility: CLINIC | Age: 41
End: 2021-12-07
Payer: COMMERCIAL

## 2021-12-07 ENCOUNTER — TELEPHONE (OUTPATIENT)
Dept: ENDOCRINOLOGY | Facility: CLINIC | Age: 41
End: 2021-12-07

## 2021-12-07 ENCOUNTER — MYC MEDICAL ADVICE (OUTPATIENT)
Dept: FAMILY MEDICINE | Facility: CLINIC | Age: 41
End: 2021-12-07

## 2021-12-07 VITALS — HEIGHT: 67 IN | WEIGHT: 265 LBS | BODY MASS INDEX: 41.59 KG/M2

## 2021-12-07 DIAGNOSIS — Z98.84 S/P LAPAROSCOPIC SLEEVE GASTRECTOMY: ICD-10-CM

## 2021-12-07 DIAGNOSIS — E66.01 MORBID OBESITY DUE TO EXCESS CALORIES (H): Primary | ICD-10-CM

## 2021-12-07 DIAGNOSIS — K21.9 GASTROESOPHAGEAL REFLUX DISEASE WITHOUT ESOPHAGITIS: ICD-10-CM

## 2021-12-07 DIAGNOSIS — Z20.822 ENCOUNTER FOR LABORATORY TESTING FOR COVID-19 VIRUS: Primary | ICD-10-CM

## 2021-12-07 DIAGNOSIS — E66.01 MORBID (SEVERE) OBESITY DUE TO EXCESS CALORIES (H): ICD-10-CM

## 2021-12-07 PROCEDURE — 99213 OFFICE O/P EST LOW 20 MIN: CPT | Mod: GT | Performed by: NURSE PRACTITIONER

## 2021-12-07 RX ORDER — OMEPRAZOLE 40 MG/1
40 CAPSULE, DELAYED RELEASE ORAL DAILY
Qty: 90 CAPSULE | Refills: 3 | Status: SHIPPED | OUTPATIENT
Start: 2021-12-07 | End: 2022-06-09

## 2021-12-07 RX ORDER — SEMAGLUTIDE 0.5 MG/.5ML
0.5 INJECTION, SOLUTION SUBCUTANEOUS
Qty: 2 ML | Refills: 1 | Status: SHIPPED | OUTPATIENT
Start: 2021-12-07 | End: 2022-01-17 | Stop reason: DRUGHIGH

## 2021-12-07 RX ORDER — VENLAFAXINE HYDROCHLORIDE 150 MG/1
150 CAPSULE, EXTENDED RELEASE ORAL DAILY
Qty: 30 CAPSULE | Refills: 1 | Status: SHIPPED | OUTPATIENT
Start: 2021-12-07 | End: 2022-02-08

## 2021-12-07 RX ORDER — SEMAGLUTIDE 0.25 MG/.5ML
0.25 INJECTION, SOLUTION SUBCUTANEOUS
Qty: 2 ML | Refills: 0 | Status: SHIPPED | OUTPATIENT
Start: 2021-12-07 | End: 2022-01-17

## 2021-12-07 ASSESSMENT — MIFFLIN-ST. JEOR: SCORE: 1894.66

## 2021-12-07 NOTE — TELEPHONE ENCOUNTER
She saw a psychiatrist Shwetha Mathew NP on April 19th, 2021  and she Rx her Effexor 75 mg daily dose   On October 5th, there was a refill request for the effexor 75 mg - I have not seen her for this for the past yr , so needs to clarify if she is taking 75 or 150 mg I do not have a problem refilling but it seems that after the psychiatric NP appointment end of April there are no other notes about Effexor and dose changes !  Can you call and ask her when she switched from 75 mg to 150 mg ?    Thanks

## 2021-12-07 NOTE — LETTER
2021       RE: Danitza Soto  [address]  [facility] [address]     Dear Colleague,    Thank you for referring your patient, Danitza Soto, to the [facility]IN[facility] [place] at [facility]. Please see a copy of my visit note below.    During this virtual visit the patient is located in [place], patient verifies this as the location during the entirety of this visit.     Trish Soto is a 41 year old who is being evaluated via a billable video visit.      How would you like to obtain your AVS? MyChart  If the video visit is dropped, the invitation should be resent by: Text to cell phone:  [phone]  Will anyone else be joining your video visit? No      Video Start Time: 932  Video-Visit Details    Type of service:  Video Visit    Video End Time:946    Originating Location (pt. Location): Home    Distant Location (provider location):  [facility] WE[facility]NAG[facility]     Platform used for Video Visit: Elida Seth NREMT        Return Bariatric Surgery Note    RE: Danitza Soto  MR#: [id number]  : 1980  VISIT DATE: Dec 7, 2021      Dear Polly Mcbride,    I had the pleasure of seeing your patient, Danitza Soto, in my post-bariatric surgery assessment clinic.    Assessment & Plan   Problem List Items Addressed This Visit        Digestive    Gastroesophageal reflux disease without esophagitis    Relevant Medications    omeprazole (PRILOSEC) 20 MG DR capsule    omeprazole (PRILOSEC) 40 MG DR capsule    Morbid obesity due to excess calories (H) - Primary    Relevant Medications    omeprazole (PRILOSEC) 20 MG DR capsule    omeprazole (PRILOSEC) 40 MG DR capsule    Semaglutide-Weight Management (WEGOVY) 0.25 MG/0.5ML SOAJ    Semaglutide-Weight Management (WEGOVY) 0.5 MG/0.5ML SOAJ    Other Relevant Orders    MENTAL HEALTH REFERRAL  - Adult; Outpatient Treatment;  "Individual/Couples/Family/Group Therapy/Health Psychology; [facility] - Health Psychology - Anju Tinajero [phone]; Patient call to schedule    Med Therapy Management Referral    Morbid (severe) obesity due to excess calories (H)    Relevant Medications    omeprazole (PRILOSEC) 20 MG DR capsule    omeprazole (PRILOSEC) 40 MG DR capsule    Semaglutide-Weight Management (WEGOVY) 0.25 MG/0.5ML SOAJ    Semaglutide-Weight Management (WEGOVY) 0.5 MG/0.5ML SOAJ    Other Relevant Orders    MENTAL HEALTH REFERRAL  - Adult; Outpatient Treatment; Individual/Couples/Family/Group Therapy/Health Psychology; [facility] - [facility] Psychology - Anju Tinajero [phone]; Patient call to schedule       Other    S/P laparoscopic sleeve gastrectomy     3 years s/p sleeve gastrectomy. 20lb weight regain since last seen 8 months ago. Has stopped topiramate and naltrexone. Didn't feel topiramate at lower doses helpful and at higher doses caused adverse side effects. Naltrexone was maybe helpful but not to where she wanted to continue taking it. Not happy with the weight regain and felt best at 250 or lower. Would like to get back to there. Feels like food is a \"vice\" and she may just be \"meant\" to be this size. Open to working with health psych- referral placed. Opened to trying new med, will send for WEGOVY.     Plan:  -try wegovy  -Lauren Bloch [facility] Pharmacist  in5-6 weeks if you start wegovy  -continue omeprazole twice daily   -consider follow up with Sanaz Carlos (health psych)  -follow up in 3 months   -labs due 5/2022         Relevant Medications    omeprazole (PRILOSEC) 20 MG DR capsule    omeprazole (PRILOSEC) 40 MG DR capsule    Other Relevant Orders    MENTAL HEALTH REFERRAL  - Adult; Outpatient Treatment; Individual/Couples/Family/Group Therapy/Health Psychology; [facility] - Health Psychology - Anju Tinajero [phone]; Patient call to schedule    Med Therapy Management Referral             27 minutes spent on the date of the encounter " doing chart review, history and exam, documentation and further activities per the note    CHIEF COMPLAINT: Post-bariatric surgery follow-up    HISTORY OF PRESENT ILLNESS:  Questions Regarding Prior Weight Loss Surgery Reviewed With Patient 12/7/2021   I had the following weight loss procedure: Sleeve Gastrectomy   What year was your surgery? 2018   How has your weight changed since your last visit? I have gained weight   Are you currently taking any weight loss medications? No   Do you currently have any of the following: Sleep Apnea, Heartburn, acid reflux, or GERD (acid reflux disease)?, Hypertension (high blood pressure)?   Have you been to the Emergency room since your last visit with us? No   Were you in the hospital since your last visit with us? Yes   Do you have any concerns today? No     Last seen 2/2021- sleeve 2018- regain of 30lb and starting to lose again   Labs 5/2021- within normal limits    topiramate 25mg tiwce daily, naltrexone 50mg once daily     Today:   Stopped both topiramate and naltrexone- didn't want to be taking them- topiramate not effective at lower dose, naltrexone was sometimes helpful    Weight has been holding stable recently   Feels like food is a vice, feels like she is supposed to be fat- whole family is fat- doesn't like this      Reflux well managed with omeprazole 40mg once daily plus 20mg once daily in the evening  No constipation    Preferred to be under 250lb   Open to considering alternative medications  Has therapist but wouldn't be helpful with lifestyle modifications    Weight History:     12/7/2021   What is your highest lifetime weight? 315   What is your lowest weight since surgery? (In pounds) 228     Initial Weight (lbs): 308 lbs  Weight: 120.2 kg (265 lb)  Last Visits Weight: 110.2 kg (243 lb)  Cumulative weight loss (lbs): 43  Weight Loss Percentage: 13.96%    Questions Regarding Co-Morbidities and Health Concerns Reviewed With Patient 12/7/2021   Pre-diabetes:  Improved   Diabetes II: Never   High Blood Pressure: Stayed the same   High cholesterol: Stayed the same   Heartburn/Reflux: Worsened   Are you taking daily medication for heartburn, acid reflux, or GERD (acid reflux disease)? Yes   Sleep apnea: Stayed the same   Do you use a CPAP? Yes   PCOS: Never   Back pain: Stayed the same   Joint pain: Never   Lower leg swelling: Never       Eating Habits 12/7/2021   How many meals do you eat per day? 3   Do you snack between meals? Sometimes   How much food are you eating at each meal? Greater than 1 cup   Are you able to separate your meals and liquids by at least 30 minutes? Sometimes   Are you able to avoid liquid calories? Yes       Exercise Questions Reviewed With Patient 12/7/2021   How often do you exercise? 1 to 2 times per week   What is the duration of your exercise (in minutes)? -   What types of exercise do you do? walking, climbing stairs at work   What keeps you from being more active?  Pain       Social History:      12/7/2021   Are you smoking? Yes   How much are you smoking? 10-20 a day   Are you drinking alcohol? No       Medications:  Current Outpatient Medications   Medication     omeprazole (PRILOSEC) 20 MG DR capsule     omeprazole (PRILOSEC) 40 MG DR capsule     Semaglutide-Weight Management (WEGOVY) 0.25 MG/0.5ML SOAJ     Semaglutide-Weight Management (WEGOVY) 0.5 MG/0.5ML SOAJ     acetaminophen (TYLENOL) 325 MG tablet     amLODIPine (NORVASC) 10 MG tablet     calcium carbonate (OS-CHIKI) 500 MG tablet     etonogestrel (NEXPLANON) 68 MG IMPL     Ferrous Sulfate 324 (65 Fe) MG TBEC     hydrochlorothiazide (MICROZIDE) 12.5 MG capsule     lisinopril (ZESTRIL) 40 MG tablet     Multiple Vitamins-Minerals (CENTRUM PO)     Probiotic Product (PROBIOTIC ADVANCED PO)     vitamin B complex with vitamin C (VITAMIN  B COMPLEX) tablet     No current facility-administered medications for this visit.         12/7/2021   Do you avoid NSAIDs such as (Ibuprofen, Aleve,  "Naproxen, Advil)?   Yes       ROS:  GI:      12/7/2021   Vomiting: No   Diarrhea: No   Constipation: No   Swallowing trouble: No   Abdominal pain: No   Heartburn: Yes   Rash in skin folds: Yes   Depression: Yes   Stress urinary incontinence No     Skin:   BAR RBS ROS - SKIN 12/7/2021   Rash in skin folds: Yes     Psych:      12/7/2021   Depression: Yes   Anxiety: No     Female Only:   BAR RBS ROS - FEMALE ONLY 12/7/2021   Female only: None of the above       Lab on 10/13/2021   Component Date Value Ref Range Status     SARS CoV2 PCR 10/13/2021 Negative  Negative Final    NEGATIVE: SARS-CoV-2 (COVID-19) RNA not detected, presumed negative.         PHYSICAL EXAM:  Objective    Ht 1.702 m (5' 7\")   Wt 120.2 kg (265 lb)   BMI 41.50 kg/m           Vitals:  No vitals were obtained today due to virtual visit.    Physical Exam   GENERAL: Healthy, alert and no distress  EYES: Eyes grossly normal to inspection.  No discharge or erythema, or obvious scleral/conjunctival abnormalities.  RESP: No audible wheeze, cough, or visible cyanosis.  No visible retractions or increased work of breathing.    SKIN: Visible skin clear. No significant rash, abnormal pigmentation or lesions.  NEURO: Cranial nerves grossly intact.  Mentation and speech appropriate for age.  PSYCH: Mentation appears normal, affect normal/bright, judgement and insight intact, normal speech and appearance well-groomed.        Sincerely,    Clotilde Franklin NP  "

## 2021-12-07 NOTE — PROGRESS NOTES
During this virtual visit the patient is located in [place], patient verifies this as the location during the entirety of this visit.     Trish Soto is a 41 year old who is being evaluated via a billable video visit.      How would you like to obtain your AVS? MyChart  If the video visit is dropped, the invitation should be resent by: Text to cell phone:  [phone]  Will anyone else be joining your video visit? No      Video Start Time: 0932  Video-Visit Details    Type of service:  Video Visit    Video End Time:0946    Originating Location (pt. Location): Home    Distant Location (provider location):  [facility]NAGEMENT [facility] [place]     Platform used for Video Visit: Elida Seth NREMT

## 2021-12-07 NOTE — PROGRESS NOTES
Return Bariatric Surgery Note    RE: Danitza Soto  MR#: [id number]  : 1980  VISIT DATE: Dec 7, 2021      Dear Polly Mcbride,    I had the pleasure of seeing your patient, Danitza Soto, in my post-bariatric surgery assessment clinic.    Assessment & Plan   Problem List Items Addressed This Visit        Digestive    Gastroesophageal reflux disease without esophagitis    Relevant Medications    omeprazole (PRILOSEC) 20 MG DR capsule    omeprazole (PRILOSEC) 40 MG DR capsule    Morbid obesity due to excess calories (H) - Primary    Relevant Medications    omeprazole (PRILOSEC) 20 MG DR capsule    omeprazole (PRILOSEC) 40 MG DR capsule    Semaglutide-Weight Management (WEGOVY) 0.25 MG/0.5ML SOAJ    Semaglutide-Weight Management (WEGOVY) 0.5 MG/0.5ML SOAJ    Other Relevant Orders    MENTAL HEALTH REFERRAL  - Adult; Outpatient Treatment; Individual/Couples/Family/Group Therapy/Health Psychology; [facility] - Health Psychology - Anju Tinajero [phone]; Patient call to schedule    Med Therapy Management Referral    Morbid (severe) obesity due to excess calories (H)    Relevant Medications    omeprazole (PRILOSEC) 20 MG DR capsule    omeprazole (PRILOSEC) 40 MG DR capsule    Semaglutide-Weight Management (WEGOVY) 0.25 MG/0.5ML SOAJ    Semaglutide-Weight Management (WEGOVY) 0.5 MG/0.5ML SOAJ    Other Relevant Orders    MENTAL HEALTH REFERRAL  - Adult; Outpatient Treatment; Individual/Couples/Family/Group Therapy/Health Psychology; [facility] - [facility] - Anju Tinajero [phone]; Patient call to schedule       Other    S/P laparoscopic sleeve gastrectomy     3 years s/p sleeve gastrectomy. 20lb weight regain since last seen 8 months ago. Has stopped topiramate and naltrexone. Didn't feel topiramate at lower doses helpful and at higher doses caused adverse side effects. Naltrexone was maybe helpful but not to where she wanted to continue taking it. Not happy with the weight regain and felt best at 250 or  "lower. Would like to get back to there. Feels like food is a \"vice\" and she may just be \"meant\" to be this size. Open to working with health psych- referral placed. Opened to trying new med, will send for WEGOVY.     Plan:  -try wegovy  -Lauren Bloch [facility] Pharmacist  in5-6 weeks if you start wegovy  -continue omeprazole twice daily   -consider follow up with Sanaz Carlos (health psych)  -follow up in 3 months   -labs due 5/2022         Relevant Medications    omeprazole (PRILOSEC) 20 MG DR capsule    omeprazole (PRILOSEC) 40 MG DR capsule    Other Relevant Orders    MENTAL HEALTH REFERRAL  - Adult; Outpatient Treatment; Individual/Couples/Family/Group Therapy/Health Psychology; [facility] - Health Psychology - Anju Tinajero [phone]; Patient call to schedule    Med Therapy Management Referral             27 minutes spent on the date of the encounter doing chart review, history and exam, documentation and further activities per the note    CHIEF COMPLAINT: Post-bariatric surgery follow-up    HISTORY OF PRESENT ILLNESS:  Questions Regarding Prior Weight Loss Surgery Reviewed With Patient 12/7/2021   I had the following weight loss procedure: Sleeve Gastrectomy   What year was your surgery? 2018   How has your weight changed since your last visit? I have gained weight   Are you currently taking any weight loss medications? No   Do you currently have any of the following: Sleep Apnea, Heartburn, acid reflux, or GERD (acid reflux disease)?, Hypertension (high blood pressure)?   Have you been to the Emergency room since your last visit with us? No   Were you in the hospital since your last visit with us? Yes   Do you have any concerns today? No     Last seen 2/2021- sleeve 2018- regain of 30lb and starting to lose again   Labs 5/2021- within normal limits    topiramate 25mg tiwce daily, naltrexone 50mg once daily     Today:   Stopped both topiramate and naltrexone- didn't want to be taking them- topiramate not effective at " lower dose, naltrexone was sometimes helpful    Weight has been holding stable recently   Feels like food is a vice, feels like she is supposed to be fat- whole family is fat- doesn't like this      Reflux well managed with omeprazole 40mg once daily plus 20mg once daily in the evening  No constipation    Preferred to be under 250lb   Open to considering alternative medications  Has therapist but wouldn't be helpful with lifestyle modifications    Weight History:     12/7/2021   What is your highest lifetime weight? 315   What is your lowest weight since surgery? (In pounds) 228     Initial Weight (lbs): 308 lbs  Weight: 120.2 kg (265 lb)  Last Visits Weight: 110.2 kg (243 lb)  Cumulative weight loss (lbs): 43  Weight Loss Percentage: 13.96%    Questions Regarding Co-Morbidities and Health Concerns Reviewed With Patient 12/7/2021   Pre-diabetes: Improved   Diabetes II: Never   High Blood Pressure: Stayed the same   High cholesterol: Stayed the same   Heartburn/Reflux: Worsened   Are you taking daily medication for heartburn, acid reflux, or GERD (acid reflux disease)? Yes   Sleep apnea: Stayed the same   Do you use a CPAP? Yes   PCOS: Never   Back pain: Stayed the same   Joint pain: Never   Lower leg swelling: Never       Eating Habits 12/7/2021   How many meals do you eat per day? 3   Do you snack between meals? Sometimes   How much food are you eating at each meal? Greater than 1 cup   Are you able to separate your meals and liquids by at least 30 minutes? Sometimes   Are you able to avoid liquid calories? Yes       Exercise Questions Reviewed With Patient 12/7/2021   How often do you exercise? 1 to 2 times per week   What is the duration of your exercise (in minutes)? -   What types of exercise do you do? walking, climbing stairs at work   What keeps you from being more active?  Pain       Social History:      12/7/2021   Are you smoking? Yes   How much are you smoking? 10-20 a day   Are you drinking alcohol? No  "      Medications:  Current Outpatient Medications   Medication     omeprazole (PRILOSEC) 20 MG DR capsule     omeprazole (PRILOSEC) 40 MG DR capsule     Semaglutide-Weight Management (WEGOVY) 0.25 MG/0.5ML SOAJ     Semaglutide-Weight Management (WEGOVY) 0.5 MG/0.5ML SOAJ     acetaminophen (TYLENOL) 325 MG tablet     amLODIPine (NORVASC) 10 MG tablet     calcium carbonate (OS-CHIKI) 500 MG tablet     etonogestrel (NEXPLANON) 68 MG IMPL     Ferrous Sulfate 324 (65 Fe) MG TBEC     hydrochlorothiazide (MICROZIDE) 12.5 MG capsule     lisinopril (ZESTRIL) 40 MG tablet     Multiple Vitamins-Minerals (CENTRUM PO)     Probiotic Product (PROBIOTIC ADVANCED PO)     vitamin B complex with vitamin C (VITAMIN  B COMPLEX) tablet     No current facility-administered medications for this visit.         12/7/2021   Do you avoid NSAIDs such as (Ibuprofen, Aleve, Naproxen, Advil)?   Yes       ROS:  GI:      12/7/2021   Vomiting: No   Diarrhea: No   Constipation: No   Swallowing trouble: No   Abdominal pain: No   Heartburn: Yes   Rash in skin folds: Yes   Depression: Yes   Stress urinary incontinence No     Skin:   BAR RBS ROS - SKIN 12/7/2021   Rash in skin folds: Yes     Psych:      12/7/2021   Depression: Yes   Anxiety: No     Female Only:   BAR RBS ROS - FEMALE ONLY 12/7/2021   Female only: None of the above       Lab on 10/13/2021   Component Date Value Ref Range Status     SARS CoV2 PCR 10/13/2021 Negative  Negative Final    NEGATIVE: SARS-CoV-2 (COVID-19) RNA not detected, presumed negative.         PHYSICAL EXAM:  Objective    Ht 1.702 m (5' 7\")   Wt 120.2 kg (265 lb)   BMI 41.50 kg/m           Vitals:  No vitals were obtained today due to virtual visit.    Physical Exam   GENERAL: Healthy, alert and no distress  EYES: Eyes grossly normal to inspection.  No discharge or erythema, or obvious scleral/conjunctival abnormalities.  RESP: No audible wheeze, cough, or visible cyanosis.  No visible retractions or increased work of " breathing.    SKIN: Visible skin clear. No significant rash, abnormal pigmentation or lesions.  NEURO: Cranial nerves grossly intact.  Mentation and speech appropriate for age.  PSYCH: Mentation appears normal, affect normal/bright, judgement and insight intact, normal speech and appearance well-groomed.        Sincerely,    Clotilde Franklin, NP

## 2021-12-07 NOTE — TELEPHONE ENCOUNTER
I have signed the Rx so she continues on it , if any side effects or BP is elevated would need to be seen  Thanks

## 2021-12-07 NOTE — ASSESSMENT & PLAN NOTE
"3 years s/p sleeve gastrectomy. 20lb weight regain since last seen 8 months ago. Has stopped topiramate and naltrexone. Didn't feel topiramate at lower doses helpful and at higher doses caused adverse side effects. Naltrexone was maybe helpful but not to where she wanted to continue taking it. Not happy with the weight regain and felt best at 250 or lower. Would like to get back to there. Feels like food is a \"vice\" and she may just be \"meant\" to be this size. Open to working with health psych- referral placed. Opened to trying new med, will send for WEGOVY.     Plan:  -try wegovy  -Lauren Bloch [facility] Pharmacist  in5-6 weeks if you start wegovy  -continue omeprazole twice daily   -consider follow up with Sanaz Carlos (health psych)  -follow up in 3 months   -labs due 5/2022  "

## 2021-12-07 NOTE — NURSING NOTE
Chief Complaint   Patient presents with     Follow Up     Return after batriatric surgery.       Vitals:    12/07/21 0853   Weight: 265 lb       Body mass index is 41.5 kg/m .      Isaac Seth, EMT  Surgery Clinic

## 2021-12-07 NOTE — TELEPHONE ENCOUNTER
LS,   Patient calling about this  Is upset and has been out of pills for a few days now   States this was discussed with you at her appointment  Doesn't have anyone else Rx'ing it  If you look in med hx, Rx's have been from you   Pended Rx for capsules if you approve  Needs ASAP - getting upset from not being on meds  Thanks,  Mady LOO, RN

## 2021-12-07 NOTE — TELEPHONE ENCOUNTER
"Prior Authorization Retail Medication Request    Medication/Dose: Wegovy    ICD code (if different than what is on RX):     Previously Tried and Failed: Sleeve Gastrectomy, failed topiramate due to lack of significant weight loss, failed naltrexone due to lack of significant weight loss, Watching portions or calories, Exercise      Rationale: Sleep Apnea, GERD, Hypertension, back pain, prediabetes, Hyperlipidemia, Weight Bearing Joint Pain    Patient qualifies for use of weight loss medication(s) because they have a BMI of at least 30 kg/m^2.     Estimated body mass index is 41.5 kg/m  as calculated from the following:    Height as of an earlier encounter on 12/7/21: 1.702 m (5' 7\").    Weight as of an earlier encounter on 12/7/21: 120.2 kg (265 lb).    Insurance Name:    Insurance ID:        Pharmacy Information (if different than what is on RX)  Name:  [id number] [place] - [address] AT [facility] OF  [address]  Phone:  [phone]    "

## 2021-12-07 NOTE — PATIENT INSTRUCTIONS
"Thank you for allowing us the privilege of caring for you. We hope we provided you with the excellent service you deserve.   Please let us know if there is anything else we can do for you so that we can be sure you are completely satisfied with your care experience.    To ensure the quality of our services you may be receiving a patient satisfaction survey from an independent patient satisfaction monitoring company.    The greatest compliment you can give is a \"Likely to Recommend\"    Your visit was with Clotilde Franklin NP today.    Instructions per today's visit:     Berto Soto, it was great to visit with you today.  Here is a review of our visit.  If our clinic scheduler is not able to reach you please call [phone] to schedule your next appointments.  Plan:  -try wegovy -Lauren Bloch MTM Pharmacist  in5-6 weeks if you start wegovy  -continue omeprazole twice daily   -consider follow up with Sanaz Carlos (health psych)  -follow up in 3 months   -labs due 5/2022      Information about Video Visits with [facility]: video visit information  _________________________________________________________________________________________________________________________________________________________  Important contact and scheduling information:  Please call our contact center at [phone] to schedule your next appointments.  To find a lab location near you, please call [phone].  For any nursing questions or concerns call Jolene Rich LPN at [phone] or Marlene Syed RN at [phone]  Please call during clinic hours Monday through Friday 8:00a - 4:00p if you have questions or you can contact us via BuildingOps at anytime and we will reply during clinic hours.    Lab results will be communicated through My Chart or letter (if My Chart not used). Please call the clinic if you have not received communication after 1 week or if you have any questions.?  Clinic Fax: " [phone]  _________________________________________________________________________________________________________________________________________________________  Meal Replacement Products:    Here is the link to our new e-store where you can purchase our meal replacement products     [employer] [place] E-Store  [employer]/store    The one week starter kit is a great way to sample a variety of products and see what works for you.    If you want more information about the product go to: [employer] Meals  [employer] Senior Home Sale Services    If you are an employee or [facility] or  [employer] [place] please contact your care team for a 10% estore discount    Free Shipping for orders over $75     Benefits of meal replacements products:    Portion and calorie control  Improved nutrition  Structured eating  Simplified food choices  Avoid contact with trigger foods  _________________________________________________________________________________________________________________________________________________________  Interested in working with a health ?  Health coaches work with you to improve your overall health and wellbeing.  They look at the whole person, and may involve discussion of different areas of life, including, but not limited to the four pillars of health (sleep, exercise, nutrition, and stress management). Discuss with your care team if you would like to start working a health .  Health Coaching-3 Pack: Schedule by calling [phone]    $99 for three health coaching visits    Visits may be done in person or via phone    Coaching is a partnership between the  and the client; Coaches do not prescribe or diagnose    Coaching helps inspire the client to reach his/her personal goals   _________________________________________________________________________________________________________________________________________________________  24 Week Healthy Lifestyle Plan:    Our  mission in the 24-week Healthy Lifestyle Plan is to provide you with individualized care by giving you the tools, education and support you need to lose weight and maintain a healthy lifestyle. In your 24-week journey, you ll be supported by a dedicated weight loss team that includes registered dietitians, medical weight management providers, health coaches, and nurses -- all with special expertise in weight loss -- to help you every step of the way.     Monthly meetings with your registered dietician or medical weight management provider help to review your progress, update your care plan, and make any adjustments needed to ensure success. Between these visits, weekly and bi-weekly health  visits will help you focus on the four pillars of weight loss -- stress, sleep, nutrition, and exercise -- and how you can best adapt each to achieve sustainable weight loss results.    In addition, you will be given exclusive access to online wellbeing classes through [employer].  Your initial visit will be with a medical weight management provider who will help to understand your weight loss goals and ensure this program is the right fit for you. Please let our team know if you are interested in the 24 week plan by sending a message to your care team or calling [phone] to schedule.  _________________________________________________________________________________________________________________________________________________________    COMPREHENSIVE WEIGHT MANAGEMENT PROGRAM  VIRTUAL SUPPORT GROUPS    For Support Group Information:      We offer support groups for patients who are working on weight loss and considering, preparing for or have had weight loss surgery.   There is no cost for this opportunity.  You are invited to attend the?Virtual Support Groups?provided by any of the following locations:    1. [employer] via [employer] Teams with Alicia Pineda RN  2.   [employer] via [employer] Teams with Alexander Moore, PhD,  "  3.   [place] via Re[employer]nooble Teams with Jennifer Giordano RN  4.   [facility] via [employer] Teams with RAMON [facility]    The following Support Group information can also be found on our website:  [email or web address]      [facility] Weight Loss Surgery Support Group    [facility] Weight Loss Surgery Support Group  The support group is a patient-lead forum that meets monthly to share experiences, encouragement and education. It is open to those who have had weight loss surgery, are scheduled for surgery, and those who are considering surgery.   WHEN: This group meets on the 3rd Wednesday of each month from 5:00PM - 6:00PM virtually using Microsoft Teams.   FACILITATOR: Led by Alicia Pineda, the program's Clinical Coordinator.   TO REGISTER: Please contact the clinic via LoveThatFit or call the nurse line directly at [phone] to inform our staff that you would like an invite sent to you and to let us know the email you would like the invite sent to. Prior to the meeting, a link with directions on how to join the meeting will be sent to you.    2021 Meetings  August 18: \"Let's Talk\" a time for the group to share.  September 15: \"Let's Talk\" a time for the group to share.  October 20: \"Let's Talk\" a time for the group to share.  November 17: Neetu Ashley RD, TIANA \"Protein, Metabolism and Meal Planning\"  December 15: Noe Levy RD, LD will speak, \"Recipe Modification\"    [facility] Support Groups    Connections: Bariatric Care Support Group?  This is open to all [facility] (and those external to this program) pre- and post- operative bariatric surgery patients as well as their support system.   WHEN: This group meets the 2nd Tuesday of each month from 6:30 PM - 8:00 PM virtually using Microsoft Teams.   FACILITATOR: Led by Alexander Moore, Ph.D who is a Licensed " "Psychologist with the [facility] Comprehensive Weight Management Program.   TO REGISTER: Please send an email to Alexander Moore, Ph.D., LP at?[email or web address]?if you would like an invitation to the group and to learn about using Microsoft Teams.    2021 Meetings  August 10: Open Forum  September 14: Guest Speaker: Jennifer Giordano RN,CBN, CIC, [facility] Comprehensive Weight Management Program, \"Your Hospital Stay and Post-Operative Compliance\"  October 12: Open Forum  November 9: Guest Speaker: Vira Butts RD,LD, [facility] Comprehensive Weight Management Program,\"Holiday Eating\".  December 14: Guest Speaker Katy Crawford MD, MPH, [facility], \"Body Contouring Surgery for Bariatric Surgery Patients\"     Connections: Post-Operative Bariatric Surgery Support Group  This is a support group for [facility] bariatric patients (and those external to [facility]) who have had bariatric surgery and are at least 3 months post-surgery.  WHEN: This support group meets the 4th Wednesday of the month from 11:00 AM - 12:00 PM virtually using Microsoft Teams.   FACILITATOR: Led by Certified Bariatric Nurse, Jennifer Giordano RN.   TO REGISTER: Please send an email to Jennifer at [email or web address] if you would like an invitation to the group and to learn about using SPIRIT Navigation.      [facility] Healthy Lifestyle Virtual Support Group    Healthy Lifestyle Virtual Support Group?  This is 60 minutes of small group guided discussion, support and resources. All are welcome who want a healthy lifestyle.  WHEN: This group meets monthly on a Friday from 12:30 PM - to 1:30 PM virtually using Microsoft Teams.   FACILITATOR: Led by National Board Certified Health , Jennifer Lala, [facility].   TO REGISTER: Please send an email to Jennifer [email or web address] to receive monthly invites to the group or if you have any questions " about having a health .  Prior to the meeting, a link with directions on how to join the meeting will be sent to you.    2021 Meetings  August 27: Open Forum  September 24: Sleep and the 7 Types of Rest  October 29: Open Forum  November 19: Gratitude     December 10: Open Forum    2022 Meetings January 21: Healthy Habits  February 25: Open Forum  March 18: Setting limits and Boundaries  April 29: Nutrition  May 20: Open Forum  ____________________________________________________________________________________________________________________________________________________________________________  [place] [facility] Get Moving Program  Our team of physical therapists is trained to help you understand and take control of your condition. They will perform a thorough evaluation to determine your ability for activity and develop a customized plan to fit your goals and physical ability.  Scheduling: Unsure if the Get Moving program is right for you? Discuss the program with your medical provider or diabetes educator. You can also call us at [phone] to ask questions or schedule an appointment.   [facility] Get Moving Program  ____________________________________________________________________________________________________________________________________________________________________________  [facility] Diabetes Prevention Program (DPP)  If you have prediabetes and Medicare please contact us via [email or web address] to learn more about the Diabetes Prevention Program (DPP)  Program Details:  [facility] offers the year-long Diabetes Prevention Program (DPP). The program helps you to make lifestyle changes that prevent or delay type 2 diabetes by supporting healthy eating, increased physical activity, stress reduction and use of coping skills.   On average, previous [facility] DPP cohorts have lost and maintained at least 5% of their starting weight throughout the program and averaged more than  150 minutes of physical activity per week.  Participants meet weekly for one-hour group sessions over sixteen weeks, every other week for the next 8 weeks, and monthly for the last six months.   A year-long maintenance program is also available for participants who complete the first year.   Location & Cost:   During the COVID-19 Public Health Emergency, the program is offered virtually. When in-person classes can resume, they will be held at [facility].  For people with Medicare, the program is covered in full. A self-pay option will also be available for those with non-Medicare insurance plans.   _________________________________________________________________________________________________________________________________________________________  Bluetooth Scale:    We hope to provide you with high quality virtual healthcare visits while social distancing for COVID-19 is necessary, as well as in the future when virtual visits may be more convenient for you.     Our technology team made it possible for Bluetooth scales to send weight measurements to our electronic medical record. This allows weights from you weighing at home to securely flow into the medical record, which will improve telephone and virtual visits.   Additionally, studies have shown that adults actually lose more weight when their weights are automatically sent to someone else, and also that this process is not stressful for those adults.    Below is a link for purchasing the scale, with a discount code for our patients. You may call your insurance company to see if they will reimburse you for the cost of the scale, as a piece of durable medical equipment. The scales only go up to a weight of 400 pounds. This is an issue and we are working with the developer on increasing this. We found no scales that go over 400lb that have blue-tooth for connecting to MyChart.    Scale to purchase: the [employer]  Body  Scale:  [email or web address]=aw.ds    Discount Code: We have a discount code for our patients to bring the cost down to $50, Discount code is: [place]_Scale_20%off      Thank you,   [facility] Comprehensive Weight Management Team

## 2021-12-08 ENCOUNTER — LAB (OUTPATIENT)
Dept: URGENT CARE | Facility: URGENT CARE | Age: 41
End: 2021-12-08
Attending: FAMILY MEDICINE
Payer: COMMERCIAL

## 2021-12-08 DIAGNOSIS — Z20.822 SUSPECTED COVID-19 VIRUS INFECTION: ICD-10-CM

## 2021-12-08 LAB — SARS-COV-2 RNA RESP QL NAA+PROBE: NEGATIVE

## 2021-12-08 PROCEDURE — U0003 INFECTIOUS AGENT DETECTION BY NUCLEIC ACID (DNA OR RNA); SEVERE ACUTE RESPIRATORY SYNDROME CORONAVIRUS 2 (SARS-COV-2) (CORONAVIRUS DISEASE [COVID-19]), AMPLIFIED PROBE TECHNIQUE, MAKING USE OF HIGH THROUGHPUT TECHNOLOGIES AS DESCRIBED BY CMS-2020-01-R: HCPCS

## 2021-12-08 PROCEDURE — U0005 INFEC AGEN DETEC AMPLI PROBE: HCPCS

## 2021-12-08 NOTE — TELEPHONE ENCOUNTER
Central Prior Authorization Team   Phone: [phone]      PA Initiation-Key provided in request is for the wrong strength. Must get lower strength approved 1st.     Medication: Wegovy  Insurance Company:    Pharmacy Filling the Rx: [id number] - [place], [place] - [address] AT [facility] [address]  Filling Pharmacy Phone: [phone]  Filling Pharmacy Fax:    Start Date: 12/8/2021

## 2021-12-11 NOTE — PROGRESS NOTES
12/11/21 1111   Encounter Summary   Services provided to: Patient   Referral/Consult From: Palliative Care   Complexity of Encounter Low   Length of Encounter 15 minutes   Spiritual/Methodist   Type Spiritual support   Assessment Sleeping   Intervention Prayer Subjective     Danitza Soto is a 39 year old female who presents to clinic today for the following health issues:    HPI   Post op visit--need return to work note.  She saw cardiology in terms of her atrial fibrilation and had the atrial ablation procedure done on October 3rd, went well and she has not had any afib episodes since then , she is still on the Pradaxa and also on flecainide daily but she has been placed on work restrictions , she has not been back to work yet . Wants me to write her a note to go back to work tomorrow but per cardiology she cannot lift weight for another week, so after Oct 15th she can be able to lift weight , she works in a group home.  2) BP follow up , she has been on the lisinopril 20mg daily dose and seems to be controlling her BP well and no side effects on it   3) hx of anemia , after the gastric bypass and she has beenon the iron pills daily but ran out of them , she needs a refill for this as well  Needs her flu shot as well .          Patient Active Problem List   Diagnosis     Anxiety state     Esophageal reflux     Morbid obesity due to excess calories (H)     Tobacco use disorder     CARDIOVASCULAR SCREENING; LDL GOAL LESS THAN 130     Hypertension goal BP (blood pressure) < 140/90     Acne     Papanicolaou smear of cervix with low grade squamous intraepithelial lesion (LGSIL)     Mixed hyperlipidemia     LEONARDO (obstructive sleep apnea)     Obesity hypoventilation syndrome (H)     Paroxysmal atrial fibrillation (H)     Morbid (severe) obesity due to excess calories (H)     Recurrent major depressive disorder, in full remission (H)     S/P ablation of atrial fibrillation     Past Surgical History:   Procedure Laterality Date     EP ABLATION FOCAL AFIB N/A 10/3/2019    Procedure: EP ABLATION FOCAL AFIB;  Surgeon: Harpreet Arevalo MD;  Location:  OR      TOOTH EXTRACTION W/FORCEP  1995    [place] Teeth Extraction     LAPAROSCOPIC GASTRIC SLEEVE N/A 11/27/2018    Procedure:  Laparoscopic Sleeve Gastrectomy Latex Free;  Surgeon: Artis Tse MD;  Location:  OR     LAPAROSCOPIC HERNIORRHAPHY HIATAL  2018    Procedure: LAPAROSCOPIC  HIATAL Hernia Repair;  Surgeon: Artis Tse MD;  Location:  OR       Social History     Tobacco Use     Smoking status: Former Smoker     Packs/day: 1.00     Years: 10.00     Pack years: 10.00     Types: Cigarettes     Start date: 2004     Last attempt to quit: 8/15/2018     Years since quittin.1     Smokeless tobacco: Never Used     Tobacco comment:  pack or less a day   Substance Use Topics     Alcohol use: Yes     Alcohol/week: 0.0 standard drinks     Comment: Occas.     Family History   Problem Relation Age of Onset     Heart Disease Mother         ,mother had emphesema and  at 62     Hypertension Mother      Allergies Mother      Lipids Mother      Obesity Mother      Respiratory Mother         Emphysema     Diabetes Maternal Grandmother         Type 2?     Hypertension Maternal Grandmother      Circulatory Maternal Grandmother         Due to diabetes     Eye Disorder Maternal Grandmother         Macular degeneration/Macular degeneration     Obesity Maternal Grandmother      Hypertension Father      Lipids Father      Cancer Father      Prostate Cancer Father      Other Cancer Father      Cerebrovascular Disease Paternal Grandmother      Alcohol/Drug Maternal Grandfather      Obesity Maternal Grandfather      Psychotic Disorder Paternal Grandfather         Committed Suicide     Depression Paternal Grandfather      Allergies Sister      Genitourinary Problems Sister          Current Outpatient Medications   Medication Sig Dispense Refill     calcium carbonate (OS-CHIKI) 500 MG tablet Take 1 tablet by mouth daily        Cholecalciferol (VITAMIN D-3) 5000 units TABS Take 1 tablet by mouth daily       dabigatran ANTICOAGULANT (PRADAXA ANTICOAGULANT) 150 MG capsule Take 1 capsule (150 mg) by mouth 2 times daily Store in  original 's bottle or blister pack; use within 120 days of opening. 60 capsule 0     ferrous gluconate (FERGON) 324 (38 Fe) MG tablet Take 1 tablet (324 mg) by mouth daily (with breakfast) 30 tablet 3     fish oil-omega-3 fatty acids 1000 MG capsule Take 2 g by mouth every morning        flecainide (TAMBOCOR) 100 MG tablet Take 1 tablet (100 mg) by mouth 2 times daily 180 tablet 3     lisinopril (PRINIVIL/ZESTRIL) 20 MG tablet Take 1 tablet (20 mg) by mouth daily 90 tablet 1     LORazepam (ATIVAN) 0.5 MG tablet Take 1 tablet (0.5 mg) by mouth every 8 hours as needed for anxiety 20 tablet 0     metoprolol succinate ER (TOPROL-XL) 50 MG 24 hr tablet Take 1 tablet (50 mg) by mouth daily 90 tablet 3     Multiple Vitamins-Minerals (CENTRUM PO)        Probiotic Product (PROBIOTIC ADVANCED PO)        topiramate (TOPAMAX) 25 MG tablet Take 1 tablet (25 mg) by mouth daily 30 tablet 5     venlafaxine (EFFEXOR-XR) 75 MG 24 hr capsule TAKE 1 CAPSULE(75 MG) BY MOUTH DAILY 90 capsule 0     VENTOLIN  (90 Base) MCG/ACT Inhaler INHALE 1-2 PUFFS EVERY 4-6 HOURS AS NEEDED FOR WHEEZING  0     vitamin B complex with vitamin C (VITAMIN  B COMPLEX) tablet Take 1 tablet by mouth daily       acetaminophen (TYLENOL) 325 MG tablet Take 2 tablets (650 mg) by mouth every 4 hours as needed for other (For optimal non-opioid multimodal pain management to improve pain control and physical function.) (Patient not taking: Reported on 10/8/2019) 100 tablet 0     lisinopril (PRINIVIL/ZESTRIL) 40 MG tablet TAKE 1 TABLET BY MOUTH DAILY (Patient taking differently: 20 mg daily ) 30 tablet 1     naltrexone (DEPADE/REVIA) 50 MG tablet Take 1 tablet (50 mg) by mouth daily (Patient not taking: Reported on 10/8/2019) 30 tablet 5     Allergies   Allergen Reactions     Wellbutrin [Bupropion]      Wellbutrin: Panic attacks, heart/chest pain     Recent Labs   Lab Test 10/03/19  0608 03/07/19  1251  09/24/18  1452 07/24/18  1023 06/19/18  0921   "03/21/17  0926 08/24/16  0915 01/13/16  1138  10/08/13  0808   A1C  --   --   --   --  6.1*  --   --   --   --   --   --  5.4   LDL  --   --   --   --   --   --   --  155* 174* 162*  --  149*   HDL  --   --   --   --   --   --   --  30* 29* 33*  --  33*   TRIG  --   --   --   --   --   --   --  203* 231* 207*  --  179*   ALT  --  18  --   --  22 33   < > 24  --  26  --  26   CR 0.65 0.66   < >  --  0.63 0.65   < > 0.62 0.61 0.59   < > 0.60   GFRESTIMATED >90 >90   < >  --  >90 >90   < > >90  Non  GFR Calc   >90  Non  GFR Calc   >90  Non  GFR Calc     < > >90   GFRESTBLACK >90 >90   < >  --  >90 >90   < > >90   GFR Calc   >90   GFR Calc   >90   GFR Calc     < > >90   POTASSIUM 4.0 3.9   < >  --  3.7 3.9   < > 4.0 3.9 4.0   < > 3.8   TSH  --   --   --  1.48  --   --   --   --   --  1.79   < >  --     < > = values in this interval not displayed.      BP Readings from Last 3 Encounters:   10/08/19 127/80   10/03/19 124/77   09/04/19 137/85    Wt Readings from Last 3 Encounters:   10/08/19 110.2 kg (243 lb)   10/03/19 108.7 kg (239 lb 10.2 oz)   09/04/19 108 kg (238 lb)                      Reviewed and updated as needed this visit by Provider         Review of Systems   ROS COMP: Constitutional, HEENT, cardiovascular, pulmonary, GI, , musculoskeletal, neuro, skin, endocrine and psych systems are negative, except as otherwise noted.      Objective    /80 (BP Location: Right arm, Patient Position: Sitting, Cuff Size: Adult Large)   Pulse 70   Temp 98.4  F (36.9  C) (Oral)   Resp 16   Ht 1.702 m (5' 7\")   Wt 110.2 kg (243 lb)   LMP 09/22/2019   SpO2 96%   Breastfeeding? No   BMI 38.06 kg/m    Body mass index is 38.06 kg/m .  Physical Exam   GENERAL: healthy, alert and no distress  NECK: no adenopathy, no asymmetry, masses, or scars and thyroid normal to palpation  RESP: lungs clear to auscultation - no rales, " rhonchi or wheezes  CV: regular rate and rhythm, normal S1 S2, no S3 or S4, no murmur, click or rub, no peripheral edema and peripheral pulses strong  ABDOMEN: soft, nontender, no hepatosplenomegaly, no masses and bowel sounds normal  MS: no gross musculoskeletal defects noted, no edema    Diagnostic Test Results:  Labs reviewed in Epic        Assessment & Plan     1. History of anemia  She has had anemia and she needs to continue on the iron pills daily   - ferrous gluconate (FERGON) 324 (38 Fe) MG tablet; Take 1 tablet (324 mg) by mouth daily (with breakfast)  Dispense: 30 tablet; Refill: 3    2. Essential hypertension with goal blood pressure less than 140/90  Her BP is well controlled on the lisinopril , she will continue same , Rx sent   - lisinopril (PRINIVIL/ZESTRIL) 20 MG tablet; Take 1 tablet (20 mg) by mouth daily  Dispense: 90 tablet; Refill: 1    3. Encounter for immunization  Got her flu shot today   - FLU VACCINE, 3 YRS +, IM (FLUZONE)  - VACCINE ADMINISTRATION, INITIAL  - ADMIN INFLUENZA VIRUS VAC    4. Need for prophylactic vaccination and inoculation against influenza  As above   - Vaccine Administration, Initial [49924]     Polly Mcbride MD  [facility]    '

## 2021-12-14 ENCOUNTER — MYC MEDICAL ADVICE (OUTPATIENT)
Dept: FAMILY MEDICINE | Facility: CLINIC | Age: 41
End: 2021-12-14

## 2021-12-14 DIAGNOSIS — G47.33 OSA (OBSTRUCTIVE SLEEP APNEA): Primary | ICD-10-CM

## 2021-12-16 ENCOUNTER — DOCUMENTATION ONLY (OUTPATIENT)
Dept: SLEEP MEDICINE | Facility: CLINIC | Age: 41
End: 2021-12-16

## 2021-12-16 DIAGNOSIS — G47.33 OSA (OBSTRUCTIVE SLEEP APNEA): ICD-10-CM

## 2021-12-16 DIAGNOSIS — E66.2 OBESITY HYPOVENTILATION SYNDROME (H): ICD-10-CM

## 2021-12-16 NOTE — PROGRESS NOTES
12/16/2021- JL- RETURNED CALL AND EXPLAINED SHE WILL NEED TO CALL RESPIRONICS WITH DREAMSTATION ISSUE.   DUE TO RECALL [facility] IS UNABLE TO ISSUES LOANER OR SEND MACHINE IN FOR REPAIRS.

## 2021-12-20 ENCOUNTER — MYC MEDICAL ADVICE (OUTPATIENT)
Dept: CARDIOLOGY | Facility: CLINIC | Age: 41
End: 2021-12-20

## 2022-01-07 NOTE — PROGRESS NOTES
"  Assessment & Plan     Other fatigue  Will check labs , but since she has insomnia and LEONARDO , would be better to see her sleep specialist - she has an upcoming appointment with them in February   - TSH with free T4 reflex; Future  - Comprehensive metabolic panel (BMP + Alb, Alk Phos, ALT, AST, Total. Bili, TP); Future  - TSH with free T4 reflex  - Comprehensive metabolic panel (BMP + Alb, Alk Phos, ALT, AST, Total. Bili, TP)    Insomnia, unspecified type  As above , she has lost weight recently but has fatigue and insomnia   - TSH with free T4 reflex; Future  - TSH with free T4 reflex    Acute bilateral low back pain without sciatica  Referred to the spine specialist because of her continuous back pain , also rest , use Tylenol, heat pads , avoid strenuous physical activities   - Spine Referral; Future             BMI:   Estimated body mass index is 38.33 kg/m  as calculated from the following:    Height as of 12/7/21: 1.702 m (5' 7\").    Weight as of this encounter: 111 kg (244 lb 11.2 oz).     RTC if no improving or worsening.  Pt is aware  and comfortable with the current plan.      Polly Mcbride MD  [facility]   Trish Soto is a 41 year old who presents for the following health issues     HPI   1)Tired for months , she has lost weight ovet 20 lbs in the past couple of  months , has been on the Wegovy for a couple of months now   2)now she has insomnia, falling asleep is hard , staying asleep is a problem , she has been on the Wegovy for four weeks now for weight loss and she has lost 20 lbs   Insomnia, LEONARDO , she has a machine but it is broken , she has an appointment with sleep specialist   Onset/Duration:  2 weeks   Description:   Frequency of insomnia:  several times a week  Time to fall asleep (sleep latency): 2 hours  Middle of night awakening:  no  Early morning awakening:  no  Progression of Symptoms:  worsening  Accompanying Signs & Symptoms:  Daytime " sleepiness/napping: no  Excessive snoring/apnea: YES  Restless legs: no  Waking to urinate: no  Chronic pain:  YES- Lower back  Depression symptoms (if yes, do PHQ9): no  Anxiety symptoms (if yes, do GABRIELA-7): no  History:  Prior Insomnia: YES  New stressful situation: no  Precipitating factors:   Caffeine intake: no  OTC decongestants: no  Any new medications: no  Alleviating factors:  Self medicating (alcohol, etc.):  no  Stress-reduction (exercise, yoga, meditation etc): no  Therapies tried and outcome: none          Review of Systems   Constitutional, HEENT, cardiovascular, pulmonary, GI, , musculoskeletal, neuro, skin, endocrine and psych systems are negative, except as otherwise noted.      Objective    There were no vitals taken for this visit.  There is no height or weight on file to calculate BMI.  Physical Exam   GENERAL: healthy, alert and no distress  EYES: Eyes grossly normal to inspection, PERRL and conjunctivae and sclerae normal  NECK: no adenopathy, no asymmetry, masses, or scars and thyroid normal to palpation  RESP: lungs clear to auscultation - no rales, rhonchi or wheezes  CV: regular rate and rhythm, normal S1 S2, no S3 or S4, no murmur, click or rub, no peripheral edema and peripheral pulses strong  ABDOMEN: soft, nontender, no hepatosplenomegaly, no masses and bowel sounds normal  MS: no gross musculoskeletal defects noted, no edema  NEURO: Normal strength and tone, mentation intact and speech normal    Results for orders placed or performed in visit on 01/11/22   TSH with free T4 reflex     Status: Normal   Result Value Ref Range    TSH 2.86 0.40 - 4.00 mU/L   Comprehensive metabolic panel (BMP + Alb, Alk Phos, ALT, AST, Total. Bili, TP)     Status: Normal   Result Value Ref Range    Sodium 137 133 - 144 mmol/L    Potassium 4.2 3.4 - 5.3 mmol/L    Chloride 107 94 - 109 mmol/L    Carbon Dioxide (CO2) 21 20 - 32 mmol/L    Anion Gap 9 3 - 14 mmol/L    Urea Nitrogen 8 7 - 30 mg/dL    Creatinine  0.66 0.52 - 1.04 mg/dL    Calcium 9.6 8.5 - 10.1 mg/dL    Glucose 74 70 - 99 mg/dL    Alkaline Phosphatase 91 40 - 150 U/L    AST 15 0 - 45 U/L    ALT 26 0 - 50 U/L    Protein Total 8.0 6.8 - 8.8 g/dL    Albumin 4.2 3.4 - 5.0 g/dL    Bilirubin Total 0.2 0.2 - 1.3 mg/dL    GFR Estimate >90 >60 mL/min/1.73m2

## 2022-01-11 ENCOUNTER — OFFICE VISIT (OUTPATIENT)
Dept: FAMILY MEDICINE | Facility: CLINIC | Age: 42
End: 2022-01-11
Payer: COMMERCIAL

## 2022-01-11 VITALS
WEIGHT: 244.7 LBS | BODY MASS INDEX: 38.33 KG/M2 | DIASTOLIC BLOOD PRESSURE: 84 MMHG | OXYGEN SATURATION: 95 % | TEMPERATURE: 97.5 F | HEART RATE: 88 BPM | SYSTOLIC BLOOD PRESSURE: 123 MMHG

## 2022-01-11 DIAGNOSIS — G47.00 INSOMNIA, UNSPECIFIED TYPE: ICD-10-CM

## 2022-01-11 DIAGNOSIS — M54.50 ACUTE BILATERAL LOW BACK PAIN WITHOUT SCIATICA: ICD-10-CM

## 2022-01-11 DIAGNOSIS — R53.83 OTHER FATIGUE: Primary | ICD-10-CM

## 2022-01-11 PROCEDURE — 84443 ASSAY THYROID STIM HORMONE: CPT | Performed by: FAMILY MEDICINE

## 2022-01-11 PROCEDURE — 80053 COMPREHEN METABOLIC PANEL: CPT | Performed by: FAMILY MEDICINE

## 2022-01-11 PROCEDURE — 36415 COLL VENOUS BLD VENIPUNCTURE: CPT | Performed by: FAMILY MEDICINE

## 2022-01-11 PROCEDURE — 99214 OFFICE O/P EST MOD 30 MIN: CPT | Performed by: FAMILY MEDICINE

## 2022-01-12 LAB
ALBUMIN SERPL-MCNC: 4.2 G/DL (ref 3.4–5)
ALP SERPL-CCNC: 91 U/L (ref 40–150)
ALT SERPL W P-5'-P-CCNC: 26 U/L (ref 0–50)
ANION GAP SERPL CALCULATED.3IONS-SCNC: 9 MMOL/L (ref 3–14)
AST SERPL W P-5'-P-CCNC: 15 U/L (ref 0–45)
BILIRUB SERPL-MCNC: 0.2 MG/DL (ref 0.2–1.3)
BUN SERPL-MCNC: 8 MG/DL (ref 7–30)
CALCIUM SERPL-MCNC: 9.6 MG/DL (ref 8.5–10.1)
CHLORIDE BLD-SCNC: 107 MMOL/L (ref 94–109)
CO2 SERPL-SCNC: 21 MMOL/L (ref 20–32)
CREAT SERPL-MCNC: 0.66 MG/DL (ref 0.52–1.04)
GFR SERPL CREATININE-BSD FRML MDRD: >90 ML/MIN/1.73M2
GLUCOSE BLD-MCNC: 74 MG/DL (ref 70–99)
POTASSIUM BLD-SCNC: 4.2 MMOL/L (ref 3.4–5.3)
PROT SERPL-MCNC: 8 G/DL (ref 6.8–8.8)
SODIUM SERPL-SCNC: 137 MMOL/L (ref 133–144)
TSH SERPL DL<=0.005 MIU/L-ACNC: 2.86 MU/L (ref 0.4–4)

## 2022-01-17 ENCOUNTER — VIRTUAL VISIT (OUTPATIENT)
Dept: PHARMACY | Facility: CLINIC | Age: 42
End: 2022-01-17
Attending: NURSE PRACTITIONER
Payer: COMMERCIAL

## 2022-01-17 DIAGNOSIS — Z98.84 S/P LAPAROSCOPIC SLEEVE GASTRECTOMY: ICD-10-CM

## 2022-01-17 DIAGNOSIS — I10 ESSENTIAL HYPERTENSION: ICD-10-CM

## 2022-01-17 DIAGNOSIS — I10 PRIMARY HYPERTENSION: ICD-10-CM

## 2022-01-17 DIAGNOSIS — Z78.9 USES BIRTH CONTROL: ICD-10-CM

## 2022-01-17 DIAGNOSIS — F33.42 RECURRENT MAJOR DEPRESSIVE DISORDER, IN FULL REMISSION (H): ICD-10-CM

## 2022-01-17 DIAGNOSIS — K21.9 GERD WITHOUT ESOPHAGITIS: ICD-10-CM

## 2022-01-17 DIAGNOSIS — E66.01 MORBID OBESITY DUE TO EXCESS CALORIES (H): Primary | ICD-10-CM

## 2022-01-17 PROCEDURE — 99605 MTMS BY PHARM NP 15 MIN: CPT | Performed by: PHARMACIST

## 2022-01-17 PROCEDURE — 99607 MTMS BY PHARM ADDL 15 MIN: CPT | Performed by: PHARMACIST

## 2022-01-17 RX ORDER — SEMAGLUTIDE 0.25 MG/.5ML
0.25 INJECTION, SOLUTION SUBCUTANEOUS
Qty: 2 ML | Refills: 0 | Status: SHIPPED | OUTPATIENT
Start: 2022-01-17 | End: 2022-02-08 | Stop reason: ALTCHOICE

## 2022-01-17 NOTE — PROGRESS NOTES
Medication Therapy Management (MTM) Encounter    ASSESSMENT:                            Medication Adherence/Access: No issues identified    Obesity: Because of the patient's recent complaints of lightheadedness whose cause may be due to multiple factors (started and increased amlodipine in last month, Wegovy start, and 20 lb weight loss over month), it is reasonable to have the patient continue on the Wegovy 0.25 mg weekly for now. Should the patient's symptoms improve, it would be reasonable to start titrating her up on Wegovy in the future. Due to lightheadedness without other symptoms associated with lows, not thinking issues is from blood sugar perspective at this time.      S/P Sleeve Gastrectomy: Patient may benefit from changing her calcium carbonate to calcium citrate to prevent improve absorption due to s/p surgery and on PPI. Additionally, the 2016 ASMBS guidelines recommend 8598-8156 mg of calcium daily in patients post-sleeve gastrectomy therefore increasing dose to twice daily could be warranted.      GERD: Stable.    Hypertension: The patient's recent blood pressures have been under control, but there is concern for her lightheadedness. The recent addition of amlodipine in November 2021 and its dose increase in December 2021 may be contributing to these symptoms. The pharmacist will reach out to the patient's cardiologist to determine appropriate next steps, such as decrease amlodipine to 5 mg daily. Can consider taking amlodipine at night instead of in the morning, and hydrochlorothiazide in the morning to avoid any instances of nocturia and would still allow for spreading out blood pressure medications. Also, once the patient is able to replace the batteries to her blood pressure monitor it would be beneficial for her to check it whenever she has an instance of lightheadedness, as this would provide some insight into its cause.     Depression: Improved from subjective perspective, may benefit from  PHQ9 repeat as not rechecked since dose change.    Contraception: Stable.    PLAN:                            1. Continue on Wegovy 0.25 mg once weekly.    2. Check your blood pressure when you have instances of lightheadedness and record the numbers.    3. Pharmacist to follow up with cardiologist regarding blood pressure medications.  Update 1/18/22: Dr. Heath did authorize decreasing amlodipine to 5 mg daily. Patient was informed via phone. Patient was encouraged to mychart back updated blood pressure from at home monitor in 1-2 weeks from dose change. New rx sent into patient's pharmacy.     4. May change to taking amlodipine in the evening and hydrochlorothiazide in the morning.    5. Can consider changing calcium to calcium citrate 500 mg twice daily.    6. Sending PHQ9 via [employer].     Follow-up: Return in about 4 weeks (around 2/14/2022).    SUBJECTIVE/OBJECTIVE:                          Danitza Soto is a 41 year old female called for an initial visit. She was referred to me from Clotilde Franklin.      Reason for visit: Wegovy start.    Allergies/ADRs: Reviewed in chart  Past Medical History: Reviewed in chart  Tobacco: She reports that she quit smoking about 7 months ago. Her smoking use included cigarettes. She started smoking about 18 years ago. She has a 10.00 pack-year smoking history. She has never used smokeless tobacco. Vapes daily.  Alcohol: none  Caffeine: 4-5 16 oz bottles of diet pop per day    Medication Adherence/Access: no issues reported    Obesity:   Wegovy 0.25 mg weekly     Followed by Clotilde Franklin NP, seen 12/07/2021 for Re-Establish Post-Bariatric Consult . Patient is experiencing the follow side effects: mild constipation but tolerable. Patient reports eating way less, thinking about food way less. She has been on Wegovy 0.25 mg weekly for 4 weeks at this time. Reports no symptoms of low blood sugar. Has reported dizziness, see below for more information - not sure if related to Wegovy versus  "blood pressure/BP medications.   Failed medications include: Naltrexone: 50 mg daily, topiramate: 25 mg twice daily.     Wt Readings from Last 4 Encounters:   01/11/22 244 lb 11.2 oz (111 kg)   12/07/21 265 lb (120.2 kg)   11/03/21 267 lb 9.6 oz (121.4 kg)   11/02/21 268 lb (121.6 kg)     Estimated body mass index is 38.33 kg/m  as calculated from the following:    Height as of 12/7/21: 5' 7\" (1.702 m).    Weight as of 1/11/22: 244 lb 11.2 oz (111 kg).      Hemoglobin A1C POCT   Date Value Ref Range Status   05/18/2021 5.2 0 - 5.6 % Final     Comment:     Normal <5.7% Prediabetes 5.7-6.4%  Diabetes 6.5% or higher - adopted from ADA   consensus guidelines.        S/P Sleeve Gastrectomy:   Multivitamin Centrum 2 tablets daily  Calcium carbonate 500 mg daily  Ferrous sulfate 324 mg daily  B complex + vitamin C    Patient had sleeve gastrectomy in 2018. Reports highest weight in life: 315 lb. Lowest weight in life: 228. Reports weight regain 20 lbs. She does not have issues with swallowing food/pills. Patient doesn't drink milk but does consume cheese almost daily, does try to eat protein every day (eggs, chicken). Patient reports no side effects from these supplements.    Hemoglobin   Date Value Ref Range Status   05/18/2021 14.4 11.7 - 15.7 g/dL Final       Ferritin   Date Value Ref Range Status   05/18/2021 27 12 - 150 ng/mL Final     Lab Results   Component Value Date    VITDT 40 05/18/2021     Lab Results   Component Value Date    PTHI 28 05/18/2021     Lab Results   Component Value Date    B12 476 05/18/2021       Lab Results   Component Value Date    TASIA 0.56 05/18/2021       GERD:  Prilosec (omeprazole) 40 mg in the morning and 20 mg in the evening    Patient feels that current regimen is effective.    Hypertension:   Lisinopril 40 mg daily in the morning  Hydrochlorothiazide 12.5 mg daily at night  Amlodipine 10 mg daily in the morning - started 5 mg 11/3/21 and increased to 10 mg daily 12/3/2021    Patient " "does self-monitor blood pressure, though hasn't recently because her monitor's battery . Home BP monitoring in range of 120's systolic over 80's diastolic. Patient reports the following medication side effects: lightheadedness (patient reports passing out during blood draw at recent doctor visit). Patient has felt similar to this in a past blood draw due to anxiety, but states that this time \"felt different\". Patient reports adequate water intake ever since her surgery in 2018 (\"I always have a drink of water on me\"). Dizziness has occurred since this event almost every day (and sometimes multiple times).    BP Readings from Last 3 Encounters:   22 123/84   21 129/87   21 138/88     Creatinine   Date Value Ref Range Status   2022 0.66 0.52 - 1.04 mg/dL Final   2021 0.68 0.52 - 1.04 mg/dL Final     Potassium   Date Value Ref Range Status   2022 4.2 3.4 - 5.3 mmol/L Final   2021 3.7 3.4 - 5.3 mmol/L Final     Depression:   Venlafaxine 150mg once daily    Patient describes that mood is good, and feels better since Venlafaxine dose was increased a couple months ago. Reports when venlafaxine dose was increased, blood pressure did increase as well.   PHQ-9 SCORE 3/8/2021 2021 10/13/2021   PHQ-9 Total Score [facility] 7 (Mild depression) - 6 (Mild depression)   PHQ-9 Total Score 7 1 6     Contraception:  Nexplanon implant    No complaints.     ----------------    I spent 45 minutes with this patient today. A copy of the visit note was provided to the patient's provider(s).    The patient was sent via [employer] a summary of these recommendations.     Lauren Bloch, PharmD, BCACP   Medication Therapy Management Pharmacist   [facility] [employer]    Rhett Mares, PD4 Pharmacy Student     Telemedicine Visit Details  Type of service:  Telephone visit  Start Time: 2:00 PM  End Time: 2:45 PM  Originating Location (patient location): Home  Distant Location " (provider location):  [facility]     Medication Therapy Recommendations  Essential hypertension    Current Medication: amLODIPine (NORVASC) 10 MG tablet (Discontinued)   Rationale: Dose too high - Dosage too high - Safety   Recommendation: Decrease Dose - amLODIPine 5 MG tablet   Status: Accepted per Provider          Current Medication: hydrochlorothiazide (MICROZIDE) 12.5 MG capsule   Rationale: Incorrect administration - Adverse medication event - Safety   Recommendation: Change Medication   Status: Accepted - no CPA Needed         Obesity hypoventilation syndrome (H)    Current Medication: Semaglutide-Weight Management (WEGOVY) 0.5 MG/0.5ML SOAJ (Discontinued)   Rationale: Dose too high - Dosage too high - Safety   Recommendation: Decrease Dose - Wegovy 0.25 MG/0.5ML Soaj   Status: Accepted per CPA         S/P laparoscopic sleeve gastrectomy    Current Medication: calcium carbonate (OS-CHIKI) 500 MG tablet   Rationale: More effective medication available - Ineffective medication - Effectiveness   Recommendation: Change Medication - Calcium Citrate + 315-200 MG-UNIT Tabs - twice daily   Status: Accepted - no CPA Needed              Update 11/18/22: Authorization from Dr. Heath - see above.

## 2022-01-17 NOTE — PATIENT INSTRUCTIONS
Recommendations from today's MTM visit:                                                    MTM (medication therapy management) is a service provided by a clinical pharmacist designed to help you get the most of out of your medicines.   Today we reviewed what your medicines are for, how to know if they are working, that your medicines are safe and how to make your medicine regimen as easy as possible.      1. Continue on Wegovy 0.25 mg once weekly.     2. Check your blood pressure when you have instances of lightheadedness and record the numbers.     3. Pharmacist to follow up with cardiologist regarding blood pressure medications.     4. May change to taking amlodipine in the evening and hydrochlorothiazide in the morning.     5. May increase calcium carbonate to 500 mg twice daily OR change to calcium citrate 500 mg twice daily.    6. Please completed questionnaire to check in on your mood/depression.      Follow-up: Return in about 4 weeks (around 2/14/2022).    It was great to speak with you today.  I value your experience and would be very thankful for your time with providing feedback on our clinic survey. You may receive a survey via email or text message in the next few days.       My Clinical Pharmacist's contact information:                                                      Please feel free to contact me with any questions or concerns you have.      Rhett Mares, PD4 student pharmacist  Lauren Bloch, PharmD  Medication Therapy Management Pharmacist   [facility]

## 2022-01-18 RX ORDER — AMLODIPINE BESYLATE 5 MG/1
5 TABLET ORAL DAILY
Qty: 90 TABLET | Refills: 3 | Status: SHIPPED | OUTPATIENT
Start: 2022-01-18 | End: 2022-05-04

## 2022-01-24 ENCOUNTER — MYC MEDICAL ADVICE (OUTPATIENT)
Dept: OBGYN | Facility: CLINIC | Age: 42
End: 2022-01-24

## 2022-01-24 DIAGNOSIS — N92.1 BREAKTHROUGH BLEEDING ON NEXPLANON: Primary | ICD-10-CM

## 2022-01-24 DIAGNOSIS — Z97.5 BREAKTHROUGH BLEEDING ON NEXPLANON: Primary | ICD-10-CM

## 2022-01-24 RX ORDER — ESTRADIOL 1 MG/1
1 TABLET ORAL DAILY
Qty: 30 TABLET | Refills: 1 | Status: SHIPPED | OUTPATIENT
Start: 2022-01-24 | End: 2022-01-25

## 2022-01-24 NOTE — TELEPHONE ENCOUNTER
I called Trish and we reviewed the Nexplanon and the breakthrough bleeding may be treated with supplemental estrogen.  A prescription is written for 1 month.  If the symptoms continue, then return for discussion of other options.

## 2022-01-24 NOTE — TELEPHONE ENCOUNTER
Pt last seen 11/3/2021 for post op minilap LSO.    Pt currently has nexplanon in place, pt having concerns for continued daily irregular bleeding. States bleeding is light, pt denies vaginal itching, burning, or abnormal discharge.    RN routing to provider for advisement on irregular bleeding or if pt should be seen.    Mena Nice RN on 1/24/2022 at 9:49 AM

## 2022-01-25 ENCOUNTER — ANCILLARY PROCEDURE (OUTPATIENT)
Dept: GENERAL RADIOLOGY | Facility: CLINIC | Age: 42
End: 2022-01-25
Attending: NURSE PRACTITIONER
Payer: COMMERCIAL

## 2022-01-25 ENCOUNTER — OFFICE VISIT (OUTPATIENT)
Dept: NEUROSURGERY | Facility: CLINIC | Age: 42
End: 2022-01-25
Attending: FAMILY MEDICINE
Payer: COMMERCIAL

## 2022-01-25 ENCOUNTER — TELEPHONE (OUTPATIENT)
Dept: PHARMACY | Facility: CLINIC | Age: 42
End: 2022-01-25

## 2022-01-25 VITALS
SYSTOLIC BLOOD PRESSURE: 150 MMHG | DIASTOLIC BLOOD PRESSURE: 95 MMHG | HEIGHT: 67 IN | WEIGHT: 244 LBS | HEART RATE: 107 BPM | OXYGEN SATURATION: 97 % | BODY MASS INDEX: 38.3 KG/M2

## 2022-01-25 DIAGNOSIS — G89.29 CHRONIC MIDLINE LOW BACK PAIN WITHOUT SCIATICA: ICD-10-CM

## 2022-01-25 DIAGNOSIS — N92.1 BREAKTHROUGH BLEEDING ON NEXPLANON: ICD-10-CM

## 2022-01-25 DIAGNOSIS — M54.50 ACUTE BILATERAL LOW BACK PAIN WITHOUT SCIATICA: ICD-10-CM

## 2022-01-25 DIAGNOSIS — M54.50 CHRONIC MIDLINE LOW BACK PAIN WITHOUT SCIATICA: Primary | ICD-10-CM

## 2022-01-25 DIAGNOSIS — M54.50 CHRONIC MIDLINE LOW BACK PAIN WITHOUT SCIATICA: ICD-10-CM

## 2022-01-25 DIAGNOSIS — Z97.5 BREAKTHROUGH BLEEDING ON NEXPLANON: ICD-10-CM

## 2022-01-25 DIAGNOSIS — G89.29 CHRONIC MIDLINE LOW BACK PAIN WITHOUT SCIATICA: Primary | ICD-10-CM

## 2022-01-25 PROCEDURE — 99214 OFFICE O/P EST MOD 30 MIN: CPT | Performed by: NURSE PRACTITIONER

## 2022-01-25 PROCEDURE — 72100 X-RAY EXAM L-S SPINE 2/3 VWS: CPT | Performed by: RADIOLOGY

## 2022-01-25 RX ORDER — ESTRADIOL 1 MG/1
1 TABLET ORAL DAILY
Qty: 90 TABLET | Refills: 0 | Status: SHIPPED | OUTPATIENT
Start: 2022-01-25 | End: 2022-05-04

## 2022-01-25 ASSESSMENT — MIFFLIN-ST. JEOR: SCORE: 1799.41

## 2022-01-25 ASSESSMENT — PAIN SCALES - GENERAL: PAINLEVEL: SEVERE PAIN (7)

## 2022-01-25 NOTE — TELEPHONE ENCOUNTER
estradiol (ESTRACE) 1 MG tablet was prescribed to pt on 1/24/2 with a 30 day supply and one refill.  Pt is asking for a 90 day supply.    Routing to Dr. Reyna to approve.    Senia Couch RN

## 2022-01-25 NOTE — PATIENT INSTRUCTIONS
-Lumbar xray ordered. We will send the message with the results.  -Physical therapy ordered. They will contact you to schedule.  -Please contact our clinic with questions or concerns at [phone].

## 2022-01-25 NOTE — PROGRESS NOTES
Spoke with pt on phone regarding recent decrease to amlodipine 5 mg daily. Patient reports dizziness has completely resolved since this dose decrease. Patient did state that she has noticed blood pressure has increased slightly since the change (130s/80s, with most recent reading today being 132/89 but she denies any palpitations. Because of this, no changes are needed at this time.    Plan:  1. Continue on current medication regimen.    Rhett Mares, PD4 student pharmacist  Lauren Bloch MTM Pharmacist   
N/A

## 2022-01-25 NOTE — LETTER
1/25/2022         RE: Danitza Soto  [address]  [facility] [address]        Dear Colleague,    Thank you for referring your patient, Danitza Soto, [facility]. Please see a copy of my visit note below.    [facility]  Neurosurgery Clinic Visit      CC: back pain    Primary care Provider: Polly Mcbride    Reason For Visit:   I was asked by Dr. Polly Mcbride to consult on the patient for acute bilateral low back pain without sciatica.    HPI: Danitza Soto is a 41 year old female with a history of chronic low back pain who presents for progressively worsening symptoms. She states she fell down the stairs at college when she was 17. She has intermittently gone to a chiropractor to help this in the past. She has not been seen recently. She describes midline low back pain without radicular leg pain. No paresthesias or weakness. No bowel/bladder complaints or foot drop. She is unsure of aggravating factors. She does not take any medication for this. She has not had any recent imaging.    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    Past Surgical History:   Procedure Laterality Date     EP ABLATION FOCAL AFIB N/A 10/03/2019    Procedure: EP ABLATION FOCAL AFIB;  Surgeon: Harpreet Arevalo MD;  Location:  OR      TOOTH EXTRACTION W/FORCEP  1995    [place] Teeth Extraction     LAPAROSCOPIC GASTRIC SLEEVE N/A 11/27/2018    Procedure: Laparoscopic Sleeve Gastrectomy Latex Free;  Surgeon: Artis Tse MD;  Location:  OR      LAPAROSCOPIC HERNIORRHAPHY HIATAL  2018    Procedure: LAPAROSCOPIC  HIATAL Hernia Repair;  Surgeon: Artis Tse MD;  Location: [facility] OR     SALPINGO-OOPHORECTOMY, COMBINED Left 10/20/2021    Mucinous cystadenoma     Past Surgical History reviewed with patient during visit.    Current Outpatient Medications   Medication     acetaminophen (TYLENOL) 325 MG tablet     amLODIPine (NORVASC) 5 MG tablet     calcium carbonate (OS-CHIKI) 500 MG tablet     estradiol (ESTRACE) 1 MG tablet     etonogestrel (NEXPLANON) 68 MG IMPL     Ferrous Sulfate 324 (65 Fe) MG TBEC     hydrochlorothiazide (MICROZIDE) 12.5 MG capsule     lisinopril (ZESTRIL) 40 MG tablet     Multiple Vitamins-Minerals (CENTRUM PO)     omeprazole (PRILOSEC) 20 MG DR capsule     omeprazole (PRILOSEC) 40 MG DR capsule     Semaglutide-Weight Management (WEGOVY) 0.25 MG/0.5ML SOAJ     venlafaxine (EFFEXOR-XR) 150 MG 24 hr capsule     vitamin B complex with vitamin C (VITAMIN  B COMPLEX) tablet     No current facility-administered medications for this visit.       Allergies   Allergen Reactions     Bupropion Other (See Comments)     Other reaction(s): Chest Pain,Panic attacks, heart/chest pain     Wellbutrin [Bupropion]      Wellbutrin: Panic attacks, heart/chest pain       Social History     Socioeconomic History     Marital status:      Spouse name: None     Number of children: 0     Years of education: Some Colle     Highest education level: None   Occupational History     Occupation: Mental Health Counselor   Tobacco Use     Smoking status: Former Smoker     Packs/day: 1.00     Years: 10.00     Pack years: 10.00     Types: Cigarettes     Start date: 2004     Quit date: 2021     Years since quittin.6     Smokeless tobacco: Never Used   Vaping Use     Vaping Use: None   Substance and Sexual Activity     Alcohol use: Not Currently     Alcohol/week: 0.0 standard drinks     Comment: Occas.     Drug use: No     Sexual activity: Not  Currently     Partners: Female     Birth control/protection: None   Other Topics Concern     Parent/sibling w/ CABG, MI or angioplasty before 65F 55M? No   Social History Narrative    Social Documentation:        Balanced Diet: YES, sometimes    Calcium intake: 2 per day    Caffeine: 5-10 per day    Exercise:  type of activity walking, playtime;  5 times per week    Sunscreen: when remembered    Seatbelts:  Yes    Self Breast Exam:  No    Self Testicular Exam: n/a    Physical/Emotional/Sexual Abuse: No    Do you feel safe in your environment? Yes        Cholesterol screen up to date: No 2006    Eye Exam up to date: Yes    Dental Exam up to date: No    Pap smear up to date: No:     Mammogram up to date: Does Not Apply    Dexa Scan up to date: Does Not Apply    Colonoscopy up to date: Does Not Apply    Immunizations up to date: Yes,     Glucose screen if over 40:  N/a        Jamila Coyle MA                     Social Determinants of Health     Financial Resource Strain: Not on file   Food Insecurity: Not on file   Transportation Needs: Not on file   Physical Activity: Not on file   Stress: Not on file   Social Connections: Not on file   Intimate Partner Violence: Not At Risk     Fear of Current or Ex-Partner: No     Emotionally Abused: No     Physically Abused: No     Sexually Abused: No   Housing Stability: Not on file       Family History   Problem Relation Age of Onset     Heart Disease Mother         ,mother had emphesema and  at 62     Hypertension Mother      Allergies Mother      Lipids Mother      Obesity Mother      Respiratory Mother         Emphysema     Diabetes Maternal Grandmother         Type 2?     Hypertension Maternal Grandmother      Circulatory Maternal Grandmother         Due to diabetes     Eye Disorder Maternal Grandmother         Macular degeneration/Macular degeneration     Obesity Maternal Grandmother      Hypertension Father      Lipids Father      Cancer Father      Prostate  "Cancer Father      Other Cancer Father      Cerebrovascular Disease Paternal Grandmother      Alcohol/Drug Maternal Grandfather      Obesity Maternal Grandfather      Psychotic Disorder Paternal Grandfather         Committed Suicide     Depression Paternal Grandfather      Allergies Sister      Genitourinary Problems Sister          ROS: 10 point ROS neg other than the symptoms noted above in the HPI.    Vital Signs:   BP (!) 150/95 (BP Location: Right arm, Patient Position: Sitting, Cuff Size: Adult Large)   Pulse 107   Ht 5' 7\" (1.702 m)   Wt 244 lb (110.7 kg)   SpO2 97%   BMI 38.22 kg/m        Examination:  Constitutional:  Alert, well nourished, NAD.  Memory: recent and remote memory   HEENT: Normocephalic, atraumatic.   Pulm:  Without shortness of breath   CV:  No pitting edema of BLE.      Neurological:  Awake  Alert  Oriented x 3  Speech clear  Tongue midline    Motor exam:   Hip Flexor:                 Right: 5/5  Left:  5/5  Hip Adductor:             Right:  5/5  Left:  5/5  Hip Abductor:             Right:  5/5  Left:  5/5  Gastroc Soleus:        Right:  5/5  Left:  5/5  Tib/Ant:                      Right:  5/5  Left:  5/5  EHL:                          Right:  5/5  Left:  5/5     Sensation normal to bilateral upper and lower extremities  Muscle tone to bilateral upper and lower extremities   Gait: Able to stand from a seated position. Normal non-antalgic, non-myelopathic gait.  Able to heel/toe walk without loss of balance    Lumbar examination reveals no tenderness of the spine or paraspinous muscles.  Hip height is symmetrical. Negative SI joint, sciatic notch or greater trochanteric tenderness to palpation bilaterally.  Straight leg raise is negative bilaterally.      Imaging: None    Assessment/Plan:   Chronic midline low back pain without sciatic. Plan for lumbar XR today and begin PT. If symptoms persist or worsen, would recommend lumbar MRI for further evaluation. She verbalized understanding " and agreement.    Patient Instructions   -Lumbar xray ordered. We will send the message with the results.  -Physical therapy ordered. They will contact you to schedule.  -Please contact our clinic with questions or concerns at [phone].      Marsha Chavez CNP  [facility]  [address] [place] [id number]  Tel [phone]  Fax [phone]          Again, thank you for allowing me to participate in the care of your patient.        Sincerely,        Marsha Chavez NP

## 2022-01-25 NOTE — TELEPHONE ENCOUNTER
Patient to continue to monitor blood pressure, patient reports blood pressure 130s/80s. Continue amlodipine 5 mg daily for now.     Lauren Bloch, PharmD, BCACP   Medication Therapy Management Pharmacist   [facility] [place]

## 2022-01-25 NOTE — TELEPHONE ENCOUNTER
Estradiol 1mg  Pharmacy sent message that patient is requesting 90 day supply.  FRANCHESCA Carroll 1/25/2022

## 2022-01-25 NOTE — PROGRESS NOTES
[facility]surgery  Neurosurgery Clinic Visit      CC: back pain    Primary care Provider: Polly Mcbride    Reason For Visit:   I was asked by Dr. Polly Mcbride to consult on the patient for acute bilateral low back pain without sciatica.    HPI: Danitza Soto is a 41 year old female with a history of chronic low back pain who presents for progressively worsening symptoms. She states she fell down the stairs at college when she was 17. She has intermittently gone to a chiropractor to help this in the past. She has not been seen recently. She describes midline low back pain without radicular leg pain. No paresthesias or weakness. No bowel/bladder complaints or foot drop. She is unsure of aggravating factors. She does not take any medication for this. She has not had any recent imaging.    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    Past Surgical History:   Procedure Laterality Date     EP ABLATION FOCAL AFIB N/A 10/03/2019    Procedure: EP ABLATION FOCAL AFIB;  Surgeon: Harpreet Arevalo MD;  Location:  OR      TOOTH EXTRACTION W/FORCEP  1995    [place] Teeth Extraction     LAPAROSCOPIC GASTRIC SLEEVE N/A 11/27/2018    Procedure: Laparoscopic Sleeve Gastrectomy Latex Free;  Surgeon: Artis Tse MD;  Location:  OR     LAPAROSCOPIC HERNIORRHAPHY HIATAL  11/27/2018    Procedure: LAPAROSCOPIC  HIATAL Hernia Repair;  Surgeon: Artis Tse MD;  Location:  OR     SALPINGO-OOPHORECTOMY, COMBINED Left 10/20/2021    Mucinous cystadenoma     Past Surgical History reviewed with  patient during visit.    Current Outpatient Medications   Medication     acetaminophen (TYLENOL) 325 MG tablet     amLODIPine (NORVASC) 5 MG tablet     calcium carbonate (OS-CHIKI) 500 MG tablet     estradiol (ESTRACE) 1 MG tablet     etonogestrel (NEXPLANON) 68 MG IMPL     Ferrous Sulfate 324 (65 Fe) MG TBEC     hydrochlorothiazide (MICROZIDE) 12.5 MG capsule     lisinopril (ZESTRIL) 40 MG tablet     Multiple Vitamins-Minerals (CENTRUM PO)     omeprazole (PRILOSEC) 20 MG DR capsule     omeprazole (PRILOSEC) 40 MG DR capsule     Semaglutide-Weight Management (WEGOVY) 0.25 MG/0.5ML SOAJ     venlafaxine (EFFEXOR-XR) 150 MG 24 hr capsule     vitamin B complex with vitamin C (VITAMIN  B COMPLEX) tablet     No current facility-administered medications for this visit.       Allergies   Allergen Reactions     Bupropion Other (See Comments)     Other reaction(s): Chest Pain,Panic attacks, heart/chest pain     Wellbutrin [Bupropion]      Wellbutrin: Panic attacks, heart/chest pain       Social History     Socioeconomic History     Marital status:      Spouse name: None     Number of children: 0     Years of education: Some Colle     Highest education level: None   Occupational History     Occupation: Mental Health Counselor   Tobacco Use     Smoking status: Former Smoker     Packs/day: 1.00     Years: 10.00     Pack years: 10.00     Types: Cigarettes     Start date: 2004     Quit date: 2021     Years since quittin.6     Smokeless tobacco: Never Used   Vaping Use     Vaping Use: None   Substance and Sexual Activity     Alcohol use: Not Currently     Alcohol/week: 0.0 standard drinks     Comment: Occas.     Drug use: No     Sexual activity: Not Currently     Partners: Female     Birth control/protection: None   Other Topics Concern     Parent/sibling w/ CABG, MI or angioplasty before 65F 55M? No   Social History Narrative    Social Documentation:        Balanced Diet: YES, sometimes    Calcium intake: 2 per  day    Caffeine: 5-10 per day    Exercise:  type of activity walking, playtime;  5 times per week    Sunscreen: when remembered    Seatbelts:  Yes    Self Breast Exam:  No    Self Testicular Exam: n/a    Physical/Emotional/Sexual Abuse: No    Do you feel safe in your environment? Yes        Cholesterol screen up to date: No     Eye Exam up to date: Yes    Dental Exam up to date: No    Pap smear up to date: No:     Mammogram up to date: Does Not Apply    Dexa Scan up to date: Does Not Apply    Colonoscopy up to date: Does Not Apply    Immunizations up to date: Yes,     Glucose screen if over 40:  N/a        Jamila Coyle MA                     Social Determinants of Health     Financial Resource Strain: Not on file   Food Insecurity: Not on file   Transportation Needs: Not on file   Physical Activity: Not on file   Stress: Not on file   Social Connections: Not on file   Intimate Partner Violence: Not At Risk     Fear of Current or Ex-Partner: No     Emotionally Abused: No     Physically Abused: No     Sexually Abused: No   Housing Stability: Not on file       Family History   Problem Relation Age of Onset     Heart Disease Mother         ,mother had emphesema and  at 62     Hypertension Mother      Allergies Mother      Lipids Mother      Obesity Mother      Respiratory Mother         Emphysema     Diabetes Maternal Grandmother         Type 2?     Hypertension Maternal Grandmother      Circulatory Maternal Grandmother         Due to diabetes     Eye Disorder Maternal Grandmother         Macular degeneration/Macular degeneration     Obesity Maternal Grandmother      Hypertension Father      Lipids Father      Cancer Father      Prostate Cancer Father      Other Cancer Father      Cerebrovascular Disease Paternal Grandmother      Alcohol/Drug Maternal Grandfather      Obesity Maternal Grandfather      Psychotic Disorder Paternal Grandfather         Committed Suicide     Depression Paternal Grandfather   "    Allergies Sister      Genitourinary Problems Sister          ROS: 10 point ROS neg other than the symptoms noted above in the HPI.    Vital Signs:   BP (!) 150/95 (BP Location: Right arm, Patient Position: Sitting, Cuff Size: Adult Large)   Pulse 107   Ht 5' 7\" (1.702 m)   Wt 244 lb (110.7 kg)   SpO2 97%   BMI 38.22 kg/m        Examination:  Constitutional:  Alert, well nourished, NAD.  Memory: recent and remote memory   HEENT: Normocephalic, atraumatic.   Pulm:  Without shortness of breath   CV:  No pitting edema of BLE.      Neurological:  Awake  Alert  Oriented x 3  Speech clear  Tongue midline    Motor exam:   Hip Flexor:                 Right: 5/5  Left:  5/5  Hip Adductor:             Right:  5/5  Left:  5/5  Hip Abductor:             Right:  5/5  Left:  5/5  Gastroc Soleus:        Right:  5/5  Left:  5/5  Tib/Ant:                      Right:  5/5  Left:  5/5  EHL:                          Right:  5/5  Left:  5/5     Sensation normal to bilateral upper and lower extremities  Muscle tone to bilateral upper and lower extremities   Gait: Able to stand from a seated position. Normal non-antalgic, non-myelopathic gait.  Able to heel/toe walk without loss of balance    Lumbar examination reveals no tenderness of the spine or paraspinous muscles.  Hip height is symmetrical. Negative SI joint, sciatic notch or greater trochanteric tenderness to palpation bilaterally.  Straight leg raise is negative bilaterally.      Imaging: None    Assessment/Plan:   Chronic midline low back pain without sciatic. Plan for lumbar XR today and begin PT. If symptoms persist or worsen, would recommend lumbar MRI for further evaluation. She verbalized understanding and agreement.    Patient Instructions   -Lumbar xray ordered. We will send the message with the results.  -Physical therapy ordered. They will contact you to schedule.  -Please contact our clinic with questions or concerns at [phone].      Marsha RAMIREZ" BELKYS Chavez  [address]  [address]  [place] [address]  Tel [phone]  Fax [phone]

## 2022-02-07 ENCOUNTER — MYC MEDICAL ADVICE (OUTPATIENT)
Dept: FAMILY MEDICINE | Facility: CLINIC | Age: 42
End: 2022-02-07

## 2022-02-07 NOTE — TELEPHONE ENCOUNTER
LS,  Please see below [employer] message and advise.  Would you fill out FMLA for pt?  Visit needed?  Thanks,  Mady LOO RN

## 2022-02-08 ENCOUNTER — VIRTUAL VISIT (OUTPATIENT)
Dept: PHARMACY | Facility: CLINIC | Age: 42
End: 2022-02-08
Payer: COMMERCIAL

## 2022-02-08 ENCOUNTER — TELEPHONE (OUTPATIENT)
Dept: PHARMACY | Facility: CLINIC | Age: 42
End: 2022-02-08

## 2022-02-08 ENCOUNTER — TELEPHONE (OUTPATIENT)
Dept: ENDOCRINOLOGY | Facility: CLINIC | Age: 42
End: 2022-02-08

## 2022-02-08 DIAGNOSIS — E66.01 MORBID OBESITY DUE TO EXCESS CALORIES (H): Primary | ICD-10-CM

## 2022-02-08 DIAGNOSIS — I10 ESSENTIAL HYPERTENSION: ICD-10-CM

## 2022-02-08 PROCEDURE — 99606 MTMS BY PHARM EST 15 MIN: CPT | Performed by: PHARMACIST

## 2022-02-08 RX ORDER — LIRAGLUTIDE 6 MG/ML
INJECTION, SOLUTION SUBCUTANEOUS
Qty: 15 ML | Refills: 2 | Status: SHIPPED | OUTPATIENT
Start: 2022-02-08 | End: 2022-09-23

## 2022-02-08 NOTE — PROGRESS NOTES
Medication Therapy Management (MTM) Encounter    ASSESSMENT:                            Medication Adherence/Access: No issues identified    Obesity: Due to continued dizziness issues on lowest dose Wegovy, could try once daily Saxenda as there are easier ways to adjust dosing as multi use pen, could even do lower than 0.6 mg dose if needed.     Hypertension: Stable blood pressure at goal <140/90 mmHg for more recent BPs than outlier from 1/25/22.      PLAN:                            1. Stop Wegovy.     2. Will start Prior Authorization for Saxenda  Saxenda Dosing:   Start Saxenda: It is a subcutaneous injection that you inject once daily and titrate the dose slowly over time. Make sure inject the same time each day.   Week 1: Inject 0.6 mg once daily  Week 2: If tolerating, increase to 1.2 mg once daily   Week 3: If tolerating, increase to 1.8 mg once daily   Week 4: If tolerating, increase to 2.4 mg once daily   Week 5 & on: If tolerating, increase to 3 mg once daily   *If you are having some nausea to where you are hesitant to move up to the next dose, stay at the same dose you are on for an additional week to see if nausea improves/resolves. Make sure to take this time to hydrate and ensure you are drinking at least 64 oz water per day.     Saxenda Administration Video:   https://www.[employer]/about-saxenda/how-to-use-the-pen.html     Saxenda Storage and Stability:   Store new, unused Saxenda pens in the refrigerator. After first use, store in a refrigerator or at room temperature. Pens in use should be thrown away after 30 days even if they still have Saxenda left in them.     Saxenda Common Side Effects:   Nausea, diarrhea, constipation, headache, tiredness (fatigue), dizziness, stomach upset/pain. Less commonly, Saxenda can cause low blood sugar (symptoms: shaky, dizzy, sweaty, agitation). It is important to ensure that you are eating consistent meals and not skipping meals. Ensure you are getting at least  "64 oz water daily.     Follow-up: 1 month with Clotilde Franklin CNP and 2-3 months with Lauren Bloch, Medication Therapy Management (MTM)  pharmacist    SUBJECTIVE/OBJECTIVE:                          Danitza Soto is a 41 year old female called for a follow-up visit.  Today's visit is a follow-up MTM visit from 1/17/2022.       Reason for visit: Wegovy follow up - dizziness returned.    Allergies/ADRs: Reviewed in chart  Past Medical History: Reviewed in chart  Tobacco: She reports that she quit smoking about 8 months ago. Her smoking use included cigarettes. She started smoking about 18 years ago. She has a 10.00 pack-year smoking history. She has never used smokeless tobacco.  Alcohol: not currently using    Medication Adherence/Access: no issues reported    Obesity:   Wegovy 0.25 mg weekly Monday     Followed by Clotilde Franklin NP, seen 12/07/2021 for Re-Establish Post-Bariatric Consult. Patient is experiencing the follow side effects: mild constipation but tolerable and dizziness has returned. Patient describes when she was out of wegovy she took late and dizziness totally resolved when she was off Wegovy for 1 week. Restarted and now greater issues of dizziness again. Occurring daily, randomly. No other symptoms coincide, no shaky sweaty agitation that would indicate related to low blood sugar. She is wondering what other options available. She found that on Wegovy she is not obsessing with foods as much. Eating 2 meals per day. Smaller portions.  She finds she is getting in adequate water intake, 48+ oz water daily.   Failed medications include: Naltrexone: 50 mg daily, topiramate: 25 mg twice daily.     Weight today: 245 lb   Wt Readings from Last 4 Encounters:   01/25/22 244 lb (110.7 kg)   01/11/22 244 lb 11.2 oz (111 kg)   12/07/21 265 lb (120.2 kg)   11/03/21 267 lb 9.6 oz (121.4 kg)     Estimated body mass index is 38.33 kg/m  as calculated from the following:    Height as of 12/7/21: 5' 7\" (1.702 m).    Weight " as of 1/11/22: 244 lb 11.2 oz (111 kg).    Hemoglobin A1C POCT   Date Value Ref Range Status   05/18/2021 5.2 0 - 5.6 % Final     Comment:     Normal <5.7% Prediabetes 5.7-6.4%  Diabetes 6.5% or higher - adopted from ADA   consensus guidelines.       Hypertension:   Lisinopril 40 mg daily in the morning  Hydrochlorothiazide 12.5 mg daily morning   Amlodipine 5 mg daily evening, decreased 1/17/22 from 10 mg daily     Patient reports dizziness improved with lowering amlodipine dose when this was completed. New dizziness issues see above. 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 Blood pressures on at home blood pressure monitor are 120s/80s, rarely systolic in 130s. Did end up switching hydrochlorothiazide from bedtime to AM and amlodipine to bedtime as discussed last MTM visit.     BP Readings from Last 3 Encounters:   01/25/22 (!) 150/95   01/11/22 123/84   11/03/21 129/87      Creatinine   Date Value Ref Range Status   01/11/2022 0.66 0.52 - 1.04 mg/dL Final   05/18/2021 0.68 0.52 - 1.04 mg/dL Final     Potassium   Date Value Ref Range Status   01/11/2022 4.2 3.4 - 5.3 mmol/L Final   05/18/2021 3.7 3.4 - 5.3 mmol/L Final     ----------------      I spent 15 minutes with this patient today. All changes were made via collaborative practice agreement with Clotilde Franklin CNP. A copy of the visit note was provided to the patient's provider(s).    The patient was sent via [employer] a summary of these recommendations.     Lauren Bloch, PharmD, BCACP   Medication Therapy Management Pharmacist   [facility] [place]    Telemedicine Visit Details  Type of service:  Telephone visit  Start Time: 2:30 PM  End Time: 2:45 PM  Originating Location (patient location): Home  Distant Location (provider location):  [facility] [place]     Medication Therapy Recommendations  Morbid (severe) obesity due to excess calories (H)    Current  Medication: Semaglutide-Weight Management (WEGOVY) 0.25 MG/0.5ML SOAJ (Discontinued)   Rationale: Undesirable effect - Adverse medication event - Safety   Recommendation: Change Medication - Saxenda 18 MG/3ML Sopn   Status: Accepted per CPA

## 2022-02-08 NOTE — Clinical Note
She is having correlated dizziness with Wegovy so going to trial off Wegovy onto Saxenda as more room for adjustment with this as she cannot tolerate lowest Wegovy dose.

## 2022-02-08 NOTE — TELEPHONE ENCOUNTER
"Prior Authorization Retail Medication Request    Medication/Dose: Saxenda     ICD code (if different than what is on RX): same as on RX     Previously Tried and Failed: Sleeve Gastrectomy, failed topiramate due to lack of significant weight loss, failed naltrexone due to lack of significant weight loss, Watching portions or calories, Exercise. Failed Wegovy (semaglutide) due to unmanageable side effects.      Rationale: Sleep Apnea, GERD, Hypertension, back pain, prediabetes, Hyperlipidemia, Weight Bearing Joint Pain.   Patient qualifies for use of weight loss medication(s) because they have a BMI of at least 30 kg/m^2.      Estimated body mass index is 41.5 kg/m  as calculated from the following:    Height as of an earlier encounter on 12/7/21: 1.702 m (5' 7\").    Weight as of an earlier encounter on 12/7/21: 120.2 kg (265 lb).    Insurance: [employer] [facility]    Pharmacy: [facility] Lauren Bloch, PharmD, BCACP   Medication Therapy Management Pharmacist   [facility]        "

## 2022-02-08 NOTE — PATIENT INSTRUCTIONS
Recommendations from today's MTM visit:                                                      1. Stop Wegovy.     2. Will start Prior Authorization for Saxenda  Saxenda Dosing:   Start Saxenda: It is a subcutaneous injection that you inject once daily and titrate the dose slowly over time. Make sure inject the same time each day.   Week 1: Inject 0.6 mg once daily  Week 2: If tolerating, increase to 1.2 mg once daily   Week 3: If tolerating, increase to 1.8 mg once daily   Week 4: If tolerating, increase to 2.4 mg once daily   Week 5 & on: If tolerating, increase to 3 mg once daily   *If you are having some nausea to where you are hesitant to move up to the next dose, stay at the same dose you are on for an additional week to see if nausea improves/resolves. Make sure to take this time to hydrate and ensure you are drinking at least 64 oz water per day.     Saxenda Administration Video:   https://www.[employer]/about-saxenda/how-to-use-the-pen.html     Saxenda Storage and Stability:   Store new, unused Saxenda pens in the refrigerator. After first use, store in a refrigerator or at room temperature. Pens in use should be thrown away after 30 days even if they still have Saxenda left in them.     Saxenda Common Side Effects:   Nausea, diarrhea, constipation, headache, tiredness (fatigue), dizziness, stomach upset/pain. Less commonly, Saxenda can cause low blood sugar (symptoms: shaky, dizzy, sweaty, agitation). It is important to ensure that you are eating consistent meals and not skipping meals. Ensure you are getting at least 64 oz water daily.     Follow-up: 1 month       It was great to speak with you today.  I value your experience and would be very thankful for your time with providing feedback on our clinic survey. You may receive a survey via email or text message in the next few days.       My Clinical Pharmacist's contact information:                                                      Please feel free to  contact me with any questions or concerns you have.      Lauren Bloch, PharmD  Medication Therapy Management Pharmacist   [facility]

## 2022-02-09 ENCOUNTER — MYC MEDICAL ADVICE (OUTPATIENT)
Dept: FAMILY MEDICINE | Facility: CLINIC | Age: 42
End: 2022-02-09

## 2022-02-09 NOTE — TELEPHONE ENCOUNTER
LS,    Please see [employer] message.  Refill for one month supply sent yesterday    Note from 1/11/22 OV:     Return in about 4 weeks (around 2/8/2022) for follow up visit.    Do you want another OV or is VV ok?    Ami Garcia RN

## 2022-02-10 NOTE — PROGRESS NOTES
"Trish Soto is a 41 year old who is being evaluated via a billable video visit.      How would you like to obtain your AVS? MyChart  If the video visit is dropped, the invitation should be resent by: Text to cell phone: [phone]  Will anyone else be joining your video visit? No      Video Start Time: 4:12 pm     Assessment & Plan     Insomnia, unspecified type  She has an upcoming appointment with the sleep specialist on February 24th, 2022 , will discuss and see what else can be done for her insomnia.  She has been on the Wegovy for weight loss for a while and has lost over 30 lbs since July of 2021 but is currently off it because of dizziness that it caused as a side effects   Is seeing a MTM - Lauren Bloch - will be starting Saxenda injections once daily in increasing dose as tolerated for the weight loss .    Anxiety  Is well controlled on the current dose of Effexor and needs three months refills at a time   - venlafaxine (EFFEXOR-XR) 150 MG 24 hr capsule; TAKE 1 CAPSULE(150 MG) BY MOUTH DAILY    Essential hypertension  Is now well controlled and no issues with the medications that are affecting her BP     Also, I have filled out her [place] today as she is having difficulties with her work because of the insomnia- some days has to leave early or skip a day if the insomnia is pretty bad          BMI:   Estimated body mass index is 38.22 kg/m  as calculated from the following:    Height as of 1/25/22: 1.702 m (5' 7\").    Weight as of 1/25/22: 110.7 kg (244 lb).     RTC if no improving or worsening.  Pt is aware  and comfortable with the current plan.        Polly Mcbride MD  [facility]    Subjective   Trish Soto is a 41 year old who presents for the following health issues     HPI     Patient would like to go over [place] paperwork with provider and fill it out.  She has been suffering from insomnia since about January 15th or mid January , seems to be getting worse , she is not " able to sleep , hard to fall asleep mostly and some nights very limited amount of sleep before she needs to go to work , feels that she has to leave work early some days as she is not rena to focus or concentrate on the work she is doing   Is seeing the sleep specialist on February 24th, 2022 , but meanwhile because of the insomnia , she feels not able to go to work and perform her duties and needs to be on FMLA , this VV we are discussing the FMLA forms and her symptoms   2)HTN  3)Anxiety- on Effexor 150 mg daily         Review of Systems   Constitutional, HEENT, cardiovascular, pulmonary, GI, , musculoskeletal, neuro, skin, endocrine and psych systems are negative, except as otherwise noted.      Objective           Vitals:  No vitals were obtained today due to virtual visit.    Physical Exam   GENERAL: Healthy, alert and no distress  EYES: Eyes grossly normal to inspection.  No discharge or erythema, or obvious scleral/conjunctival abnormalities.  RESP: No audible wheeze, cough, or visible cyanosis.  No visible retractions or increased work of breathing.    SKIN: Visible skin clear. No significant rash, abnormal pigmentation or lesions.  NEURO: Cranial nerves grossly intact.  Mentation and speech appropriate for age.  PSYCH: Mentation appears normal, affect normal/bright, judgement and insight intact, normal speech and appearance well-groomed.    No results found for this or any previous visit (from the past 24 hour(s)).            Video-Visit Details    Type of service:  Video Visit    Video End Time:4:24 pm     Originating Location (pt. Location): Home    Distant Location (provider location):  [facility]     Platform used for Video Visit: [employer]

## 2022-02-11 ENCOUNTER — VIRTUAL VISIT (OUTPATIENT)
Dept: FAMILY MEDICINE | Facility: CLINIC | Age: 42
End: 2022-02-11
Payer: COMMERCIAL

## 2022-02-11 DIAGNOSIS — I10 ESSENTIAL HYPERTENSION: ICD-10-CM

## 2022-02-11 DIAGNOSIS — F41.9 ANXIETY: ICD-10-CM

## 2022-02-11 DIAGNOSIS — G47.00 INSOMNIA, UNSPECIFIED TYPE: Primary | ICD-10-CM

## 2022-02-11 PROBLEM — I48.0 PAROXYSMAL ATRIAL FIBRILLATION (H): Status: RESOLVED | Noted: 2018-10-23 | Resolved: 2022-02-11

## 2022-02-11 PROCEDURE — 99214 OFFICE O/P EST MOD 30 MIN: CPT | Mod: GT | Performed by: FAMILY MEDICINE

## 2022-02-11 RX ORDER — VENLAFAXINE HYDROCHLORIDE 150 MG/1
CAPSULE, EXTENDED RELEASE ORAL
Qty: 90 CAPSULE | Refills: 1 | Status: SHIPPED | OUTPATIENT
Start: 2022-02-11 | End: 2022-06-09

## 2022-02-14 ENCOUNTER — THERAPY VISIT (OUTPATIENT)
Dept: PHYSICAL THERAPY | Facility: CLINIC | Age: 42
End: 2022-02-14
Attending: NURSE PRACTITIONER
Payer: COMMERCIAL

## 2022-02-14 ENCOUNTER — MYC MEDICAL ADVICE (OUTPATIENT)
Dept: OBGYN | Facility: CLINIC | Age: 42
End: 2022-02-14

## 2022-02-14 DIAGNOSIS — M54.50 LUMBAR SPINE PAIN: ICD-10-CM

## 2022-02-14 DIAGNOSIS — G89.29 CHRONIC MIDLINE LOW BACK PAIN WITHOUT SCIATICA: ICD-10-CM

## 2022-02-14 DIAGNOSIS — M54.50 CHRONIC MIDLINE LOW BACK PAIN WITHOUT SCIATICA: ICD-10-CM

## 2022-02-14 PROCEDURE — 97110 THERAPEUTIC EXERCISES: CPT | Mod: GP | Performed by: PHYSICAL THERAPIST

## 2022-02-14 PROCEDURE — 97161 PT EVAL LOW COMPLEX 20 MIN: CPT | Mod: GP | Performed by: PHYSICAL THERAPIST

## 2022-02-14 NOTE — PROGRESS NOTES
Physical Therapy Initial Evaluation  Subjective:  The history is provided by the patient.   Therapist Generated HPI Evaluation  Problem details: When she was 17 fell down stairs and has had back pain since then. Pain is in low back and not into the legs. Lifting will increase pain. Sitting and standing for 30 minutes will make pain worse. Ibuprofen will make it better, but can no longer take that due to weight loss surgery. .         Type of problem:  Lumbar.    This is a chronic condition.  Condition occurred with:  A fall/slip.    Patient reports pain:  Mid lumbar spine, lower lumbar spine and central lumbar spine.  Pain is described as aching and is constant.  Pain radiates to:  No radiation. Pain is the same all the time.  Since onset symptoms are unchanged.  Associated symptoms:  Loss of motion/stiffness.  and relieved by NSAID's.  Special tests included:  X-ray.    Restrictions due to condition include:  Working in normal job with restrictions.  Barriers include:  None as reported by patient.    Patient Health History  Danitza Soto being seen for Lower back pain.     Problem began: 2/14/1998.   Problem occurred: Falling down stairs and degenerating discs   Pain is reported as 4/10 on pain scale.  General health as reported by patient is good.  Pertinent medical history includes: depression, concussions/dizziness, heart problems, high blood pressure, overweight, sleep disorder/apnea and smoking.   Red flags:  None as reported by patient.   Other medical allergies details: Wellbutrin.   Surgeries include:  Heart surgery and other. Other surgery history details: A fib / Weight loss / Removal left ovary.    Current medications:  Anti-depressants and high blood pressure medication.    Current occupation is FT.   Primary job tasks include:  Computer work, lifting/carrying, driving, prolonged sitting and repetitive tasks.                                    Objective:    Gait:    Gait Type:  Normal                     Lumbar/SI Evaluation  ROM:    AROM Lumbar:   Flexion:          6 inches from floor / Repeated motions - Pain in lumbar spine  Ext:                    15 degrees / Repeated motion - pain in lumbar spine   Side Bend:        Left:  Fingertips to knee joint    Right:  Fingertips to knee joint  Rotation:           Left:     Right:   Side Glide:        Left:     Right:           Lumbar Myotomes:  normal (Hip extension 4/5 on right and 5/5 on left)            Lumbar DTR's:    L4 (Quad):  Left:  2   Right:  2      Lumbar Dermtomes:  normal                Neural Tension/Mobility:      Left side:SLR; SLR w/DF or Slump  negative.     Right side:   SLR w/DF; Slump or SLR  negative.   Lumbar Palpation:    Tenderness present at Left:    Erector Spinae; Piriformis and Ischial Tuberosity  Tenderness present at Right: Erector Spinae      Spinal Segmental Conclusions: Hypomobility of L3-L5. Hypertonicity of lumbar paraspinals B          SI joint/Sacrum:      Provocation:  Negative SI compression and SI gapping    Left negative at:    Thigh thrust and Sacral thrust    Right negative at:  Thigh thrust and Sacral thrust                                                 General     ROS    Assessment/Plan:    Patient is a 41 year old female with lumbar complaints.    Patient has the following significant findings with corresponding treatment plan.                Diagnosis 1:  Lumbar spine pain  Pain -  electric stimulation, manual therapy, self management, education, directional preference exercise and home program  Decreased ROM/flexibility - manual therapy, therapeutic exercise, therapeutic activity and home program  Decreased joint mobility - manual therapy, therapeutic exercise, therapeutic activity and home program  Decreased strength - therapeutic exercise, therapeutic activities and home program  Decreased function - therapeutic activities and home program    Therapy Evaluation Codes:     Cumulative Therapy Evaluation is: Low  complexity.    Previous and current functional limitations:  (See Goal Flow Sheet for this information)    Short term and Long term goals: (See Goal Flow Sheet for this information)     Communication ability:  Patient appears to be able to clearly communicate and understand verbal and written communication and follow directions correctly.  Treatment Explanation - The following has been discussed with the patient:   RX ordered/plan of care  Anticipated outcomes  Possible risks and side effects  This patient would benefit from PT intervention to resume normal activities.   Rehab potential is good.    Frequency:  1 X week, once daily  Duration:  for 8 weeks  Discharge Plan:  Achieve all LTG.  Independent in home treatment program.  Reach maximal therapeutic benefit.    Please refer to the daily flowsheet for treatment today, total treatment time and time spent performing 1:1 timed codes.

## 2022-02-14 NOTE — TELEPHONE ENCOUNTER
Forms are completed and signed on 's Desk  Will Fax to Trish Soto at [phone] and send for scanning    [facility] Unit Coordinator

## 2022-02-14 NOTE — TELEPHONE ENCOUNTER
Forms on LS's desk to be completed  wrot Fax number on them to Fax when Completed  Marina Hardy  Care Unit Coordinator

## 2022-02-15 NOTE — TELEPHONE ENCOUNTER
PA Initiation    Medication: liraglutide - Weight Management (SAXENDA) 18 MG/3ML pen   Insurance Company: [employer] - Phone [phone] Fax [phone]  Pharmacy Filling the Rx: [id number] - [place], [place] - [address] AT [facility] [address]  Filling Pharmacy Phone: [phone]  Filling Pharmacy Fax: [phone]  Start Date: 2/15/2022

## 2022-02-16 NOTE — TELEPHONE ENCOUNTER
Informed patient of approval via [employer].       Lauren Bloch, PharmD, BCACP   Medication Therapy Management Pharmacist   [facility] [place]

## 2022-02-16 NOTE — TELEPHONE ENCOUNTER
Prior Authorization Approval    Authorization Effective Date: 2/15/2022  Authorization Expiration Date: 6/15/2022  Medication: liraglutide - Weight Management (SAXENDA) 18 MG/3ML pen--APPROVED  Approved Dose/Quantity:   Reference #:     Insurance Company: [employer] - Phone [phone] Fax [phone]  Expected CoPay:       CoPay Card Available:      Foundation Assistance Needed:    Which Pharmacy is filling the prescription (Not needed for infusion/clinic administered): [id number] - [facility], [place] - [address] AT [facility] [address]  Pharmacy Notified: Yes  Patient Notified: Yes **Instructed pharmacy to notify patient when script is ready to /ship.**

## 2022-02-17 ENCOUNTER — OFFICE VISIT (OUTPATIENT)
Dept: OBGYN | Facility: CLINIC | Age: 42
End: 2022-02-17
Payer: COMMERCIAL

## 2022-02-17 VITALS
WEIGHT: 244.4 LBS | DIASTOLIC BLOOD PRESSURE: 93 MMHG | SYSTOLIC BLOOD PRESSURE: 140 MMHG | HEART RATE: 95 BPM | OXYGEN SATURATION: 99 % | BODY MASS INDEX: 38.28 KG/M2

## 2022-02-17 DIAGNOSIS — Z97.5 BREAKTHROUGH BLEEDING ON NEXPLANON: ICD-10-CM

## 2022-02-17 DIAGNOSIS — N92.0 MENORRHAGIA WITH REGULAR CYCLE: Primary | ICD-10-CM

## 2022-02-17 DIAGNOSIS — N92.1 BREAKTHROUGH BLEEDING ON NEXPLANON: ICD-10-CM

## 2022-02-17 PROCEDURE — 99214 OFFICE O/P EST MOD 30 MIN: CPT | Performed by: OBSTETRICS & GYNECOLOGY

## 2022-02-17 NOTE — PROGRESS NOTES
Danitza is a 41 year old  who is here today with complaint of abnormal uterine bleeding.    She had the Nexplanon placed in , for heavy menstrual bleeding.  Since then, she has had the Left ovary removed.  She has been using the Estradiol supplementation for the breakthrough bleeding and the bleeding is every day, and last all day.  She will use pads and/or panty liners.  She expresses that the heavy bleeding she had previously is preferential to the daily spotting and bleeding.   She has had dizziness, but she is uncertain if related to the above  No shortness of breath.  No new fatigue. No chest pain, and no pelvic pain.     ULTRASOUND TRANSVAGINAL NON OB 2021 9:22 AM  CLINICAL HISTORY: History of complex cyst, possible endometrioma.  Complex cyst of left ovary.  TECHNIQUE: Transvaginal imaging only.  COMPARISON: 2021.  FINDINGS:  UTERUS: 7.6 x 4.1 x 4.6 cm. Normal in size and position with no  masses. A few nabothian cysts are noted.  ENDOMETRIUM: 11 mm. Normal smooth endometrium. Trace fluid in the  endometrial canal.  RIGHT OVARY: 5.3 x 4.5 x 2.5 cm. Complex cyst in the right ovary  measures 2.1 x 1.2 x 1.5 cm. Normal color flow present in the right  ovary.  LEFT OVARY: 8.5 x 7.2 x 5.2 cm. Complex cyst with uniform low-level  internal echogenicity and measures 8.2 x 6.2 x 4.2 cm, similar to  prior. Normal color flow.  Trace pelvic free fluid.                                                             IMPRESSION:  1.  Large complex cystic structure in the left ovary has the  appearance of an endometrioma and is similar in size to the prior  study.  2.  Complex cyst in the right ovary is new compared to the prior study  and likely physiologic.      She had the following EMB prior to the Nexplanon being inserted:      Collected: 2021   Received: 2021   Reported: 2021 16:16   SPECIMEN(S):   Endometrial biopsy   FINAL DIAGNOSIS:   Endometrium, biopsy:   - Secretory  endometrium.   - Negative for endometrial polyp, hyperplasia and malignancy.       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 - BRISEYDA I     LEONARDO on CPAP      Other anxiety states        Past Surgical History:   Procedure Laterality Date     EP ABLATION FOCAL AFIB N/A 10/03/2019    Procedure: EP ABLATION FOCAL AFIB;  Surgeon: Harpreet Arevalo MD;  Location: [facility] OR     HC TOOTH EXTRACTION W/FORCEP      [place] Teeth Extraction     LAPAROSCOPIC GASTRIC SLEEVE N/A 2018    Procedure: Laparoscopic Sleeve Gastrectomy Latex Free;  Surgeon: Artis Tse MD;  Location: [facility] OR     LAPAROSCOPIC HERNIORRHAPHY HIATAL  2018    Procedure: LAPAROSCOPIC  HIATAL Hernia Repair;  Surgeon: Artis Tse MD;  Location: [facility] OR     SALPINGO-OOPHORECTOMY, COMBINED Left 10/20/2021    Mucinous cystadenoma       OB History    Para Term  AB Living   0 0 0 0 0 0   SAB IAB Ectopic Multiple Live Births   0 0 0 0 0       Gynecological History         No LMP recorded. Patient has had an implant.     STD: HPV/No PID/No IUD      see above HPI        Allergies   Allergen Reactions     Bupropion Other (See Comments)     Other reaction(s): Chest Pain,Panic attacks, heart/chest pain     Wellbutrin [Bupropion]      Wellbutrin: Panic attacks, heart/chest pain       Current Outpatient Medications   Medication Sig Dispense Refill     acetaminophen (TYLENOL) 325 MG tablet Take 2 tablets (650 mg) by mouth every 4 hours as needed for other (For optimal non-opioid multimodal pain management to improve pain control and physical function.) 100 tablet 0     amLODIPine (NORVASC) 5 MG tablet Take 1 tablet (5 mg) by mouth  daily 90 tablet 3     calcium carbonate (OS-CHIKI) 500 MG tablet Take 1 tablet by mouth daily        estradiol (ESTRACE) 1 MG tablet Take 1 tablet (1 mg) by mouth daily 90 tablet 0     etonogestrel (NEXPLANON) 68 MG IMPL 1 each (68 mg) by Subdermal route once       Ferrous Sulfate 324 (65 Fe) MG TBEC One daily       hydrochlorothiazide (MICROZIDE) 12.5 MG capsule TAKE 1 CAPSULE(12.5 MG) BY MOUTH DAILY 90 capsule 3     insulin pen needle (32G X 6 MM) 32G X 6 MM miscellaneous Use 1 pen needles daily or as directed with Saxenda. 100 each 1     liraglutide - Weight Management (SAXENDA) 18 MG/3ML pen Inject subcutaneously. Week 1: 0.6 mg daily, week 2: 1.2 mg daily, week 3: 1.8 mg daily, week 4: 2.4 mg daily, week 5 & on: 3 mg daily 15 mL 2     lisinopril (ZESTRIL) 40 MG tablet TAKE 1 TABLET(40 MG) BY MOUTH DAILY 90 tablet 1     Multiple Vitamins-Minerals (CENTRUM PO) 2 tablets per day       omeprazole (PRILOSEC) 20 MG DR capsule Take 1 capsule (20 mg) by mouth daily 90 capsule 3     omeprazole (PRILOSEC) 40 MG DR capsule Take 1 capsule (40 mg) by mouth daily 90 capsule 3     venlafaxine (EFFEXOR-XR) 150 MG 24 hr capsule TAKE 1 CAPSULE(150 MG) BY MOUTH DAILY 90 capsule 1     vitamin B complex with vitamin C (VITAMIN  B COMPLEX) tablet Take 1 tablet by mouth daily         Social History     Socioeconomic History     Marital status:      Spouse name: Not on file     Number of children: 0     Years of education: Some Colle     Highest education level: Not on file   Occupational History     Occupation: Mental Health Counselor   Tobacco Use     Smoking status: Former Smoker     Packs/day: 1.00     Years: 10.00     Pack years: 10.00     Types: Cigarettes     Start date: 2004     Quit date: 2021     Years since quittin.7     Smokeless tobacco: Never Used   Vaping Use     Vaping Use: Every day     Substances: Nicotine, Flavoring     Devices: Disposable   Substance and Sexual Activity     Alcohol use: Not  Currently     Alcohol/week: 0.0 standard drinks     Comment: Occas.     Drug use: No     Sexual activity: Yes     Partners: Female, Male     Birth control/protection: Condom, Implant   Other Topics Concern     Parent/sibling w/ CABG, MI or angioplasty before 65F 55M? No   Social History Narrative    Social Documentation:        Balanced Diet: YES, sometimes    Calcium intake: 2 per day    Caffeine: 5-10 per day    Exercise:  type of activity walking, playtime;  5 times per week    Sunscreen: when remembered    Seatbelts:  Yes    Self Breast Exam:  No    Self Testicular Exam: n/a    Physical/Emotional/Sexual Abuse: No    Do you feel safe in your environment? Yes        Cholesterol screen up to date: No 2006    Eye Exam up to date: Yes    Dental Exam up to date: No    Pap smear up to date: No:     Mammogram up to date: Does Not Apply    Dexa Scan up to date: Does Not Apply    Colonoscopy up to date: Does Not Apply    Immunizations up to date: Yes,     Glucose screen if over 40:  N/a        Jamila Coyle MA                     Social Determinants of Health     Financial Resource Strain: Not on file   Food Insecurity: Not on file   Transportation Needs: Not on file   Physical Activity: Not on file   Stress: Not on file   Social Connections: Not on file   Intimate Partner Violence: Not At Risk     Fear of Current or Ex-Partner: No     Emotionally Abused: No     Physically Abused: No     Sexually Abused: No   Housing Stability: Not on file       Family History   Problem Relation Age of Onset     Heart Disease Mother         ,mother had emphesema and  at 62     Hypertension Mother      Allergies Mother      Lipids Mother      Obesity Mother      Respiratory Mother         Emphysema     Diabetes Maternal Grandmother         Type 2?     Hypertension Maternal Grandmother      Circulatory Maternal Grandmother         Due to diabetes     Eye Disorder Maternal Grandmother         Macular degeneration/Macular  degeneration     Obesity Maternal Grandmother      Hypertension Father      Lipids Father      Cancer Father      Prostate Cancer Father      Other Cancer Father      Cerebrovascular Disease Paternal Grandmother      Alcohol/Drug Maternal Grandfather      Obesity Maternal Grandfather      Psychotic Disorder Paternal Grandfather         Committed Suicide     Depression Paternal Grandfather      Allergies Sister      Genitourinary Problems Sister          Review of Systems:  10 point ROS of systems including Constitutional, Eyes, Respiratory, Cardiovascular, Gastroenterology, Genitourinary, Integumentary, Muscularskeletal, Psychiatric were all negative except for pertinent positives noted in my HPI and in the PMH.          EXAM:  BP (!) 140/93 (BP Location: Right arm, Patient Position: Chair, Cuff Size: Adult Regular)   Pulse 95   Wt 110.9 kg (244 lb 6.4 oz)   SpO2 99%   BMI 38.28 kg/m    Body mass index is 38.28 kg/m .  General Appearance:  healthy, alert, active, no distress  Skin:  Normal skin turgor  Neuro:  Alert, cranial nerves grossly intact  HEENT: NCAT  Neck:  No masses or lesions carotids are +2/4. No bruits heard  Lungs:  Good respiratory effort   Pelvic exam:  Deferred.   Extremities:  No clubbing, cyanosis or edema.        ASSESSMENT:  Breakthrough bleeding on Nexplanon  Menorrhagia       PLAN:  She is currently not on any antiplatelet/antifbrinolytics, as she has been in the past.  The use was discontinued after the surgery she had.    We discussed the bleeding profiles as people age and approach menopause.  Even though menstrual changes and irregularities are common and expected, they may also indicate or precipitate endometrial abnormalities.   The patient and I discussed the options for evaluation.  The EMB, SIS and the D&C were reviewed with her.  The EMB will not remove a polyp, but will give a tissue sample.  The SIS will further evaluate the lining, but no tissue sample, and should she have a  suspected polyp, it would not be removed.  The D&C is more expensive but is currently the gold standard.    Together we reviewed the risks and benefits of medical versus surgical therapy.  Medical therapy reviewed included hormonal manipulation with OCP's, Patch, Ring, Depo, or IUD.   We reviewed endometrial ablation versus hysterectomy.  Discussed that endometrial ablation is minimally invasive compared to hysterectomy but may not be definitive.  She desires to have the hysterectomy over the ablation.    RTC for further discussion  Perioperative worksheet completed.     Total time preparing to see patient with reviewing prior encounter and labs, face to face time,  and coordinating care on the same calendar date:  33 minutes    Lucian Reyna MD

## 2022-02-21 ENCOUNTER — TELEPHONE (OUTPATIENT)
Dept: OBGYN | Facility: CLINIC | Age: 42
End: 2022-02-21

## 2022-02-21 NOTE — TELEPHONE ENCOUNTER
Lucian Reyna MD Bassett, Cecil Emerson, MD       Associated Diagnoses    Menorrhagia with regular cycle  - Primary       Breakthrough bleeding on Nexplanon         Comments    Please schedule for pre surgical discussion.            Order Questions    Question Answer   Procedure name(s) - multi select TO   Is this a multi surgeon case? No   Laterality N/A   Reason for procedure menorrhagia, breakthrough bleeding on Nexplanon   Location of Case: [facility]   Surgeon Procedure Time (incision to closure) in minutes (per procedure as applicable) 90   Note: Surgical Case Time Needed (in minutes)   Patient Class (for admit prior to surgery, specify number of days in comments): Surgery admit   Length of Stay: 2 days   Anesthesia General    needed? No   Post-Op Appointment 6 weeks   Vendor Needed? No   Spinal Cord Monitoring? No     SURGERY SCHEDULING AND PRECERTIFICATION    Medical Record Number: [id number]  Danitza Soto  YOB: 1980   Phone: [phone] (home)   Primary Provider: Polly Mcbride    Reason for Admit: Menorrhagia   ICD-10 CODE:  N 92.0    Surgeon: Lucian Reyna MD  Surgical Procedure: TO    Date of Surgery 4/1/22 Time of Surgery 12:00 pm  Surgery to be performed at:  [facility]  Status: Inpatient- Length of stay:  2 days.  Type of Anesthesia Anticipated: General    Sterilization consent:  will sign at pre surgical consult.     Pre-Op: On 3/29/22 with Polly [place]bride [place] at 8:00 am   COVID testing:  The Covid test has been scheduled for 3/29/22 at 9AM [place]  at 9 am.  Post-Op:  6 weeks on 5/10/22 with Kerry Knox at 7:30 am with Dr. Reyna    Pre-certification routed to Financial Counselors:  Yes    Surgery packet mailed to patient's home address: Yes  Patient instructed NPO 12 hours prior to surgery, arrive according to the time the nurse gives patient when called prior to surgery, must have a .  Patient understood and agrees to  the plan.      Requestor:  Ami Crowder     Location:  [place] [id number]

## 2022-02-23 PROBLEM — G47.33 OSA (OBSTRUCTIVE SLEEP APNEA): Chronic | Status: ACTIVE | Noted: 2017-04-13

## 2022-02-23 PROBLEM — B96.89 BV (BACTERIAL VAGINOSIS): Status: RESOLVED | Noted: 2020-09-18 | Resolved: 2022-02-23

## 2022-02-23 PROBLEM — F33.1 MODERATE EPISODE OF RECURRENT MAJOR DEPRESSIVE DISORDER (H): Chronic | Status: ACTIVE | Noted: 2020-09-18

## 2022-02-23 PROBLEM — E66.01 MORBID (SEVERE) OBESITY DUE TO EXCESS CALORIES (H): Chronic | Status: ACTIVE | Noted: 2018-11-27

## 2022-02-23 PROBLEM — E66.2 OBESITY HYPOVENTILATION SYNDROME (H): Chronic | Status: ACTIVE | Noted: 2017-04-13

## 2022-02-23 PROBLEM — N76.0 BV (BACTERIAL VAGINOSIS): Status: RESOLVED | Noted: 2020-09-18 | Resolved: 2022-02-23

## 2022-02-23 NOTE — TELEPHONE ENCOUNTER
PB DOS: 4/1/2022  Type of Procedure: TO  CPT Codes: 58150 x2 day admit  ICD10 Codes: N92.0  Surgeon/Ordering provider: Lucian Reyna MD [id number]  Pre-cert/Authorization completed:  Pending review for admit  Payer: [employer]  Spoke to [employer] PA portal  Ref. # [id number]/ Auth #   Valid Dates:        none

## 2022-02-24 ENCOUNTER — OFFICE VISIT (OUTPATIENT)
Dept: SLEEP MEDICINE | Facility: CLINIC | Age: 42
End: 2022-02-24
Attending: FAMILY MEDICINE
Payer: COMMERCIAL

## 2022-02-24 VITALS
DIASTOLIC BLOOD PRESSURE: 84 MMHG | HEART RATE: 98 BPM | BODY MASS INDEX: 38.39 KG/M2 | OXYGEN SATURATION: 98 % | HEIGHT: 67 IN | RESPIRATION RATE: 12 BRPM | SYSTOLIC BLOOD PRESSURE: 128 MMHG | WEIGHT: 244.6 LBS

## 2022-02-24 DIAGNOSIS — E66.2 OBESITY HYPOVENTILATION SYNDROME (H): ICD-10-CM

## 2022-02-24 DIAGNOSIS — G47.33 OSA (OBSTRUCTIVE SLEEP APNEA): Primary | ICD-10-CM

## 2022-02-24 PROCEDURE — 99204 OFFICE O/P NEW MOD 45 MIN: CPT | Performed by: PHYSICIAN ASSISTANT

## 2022-02-24 ASSESSMENT — SLEEP AND FATIGUE QUESTIONNAIRES
HOW LIKELY ARE YOU TO NOD OFF OR FALL ASLEEP WHILE SITTING AND TALKING TO SOMEONE: SLIGHT CHANCE OF DOZING
HOW LIKELY ARE YOU TO NOD OFF OR FALL ASLEEP WHEN YOU ARE A PASSENGER IN A CAR FOR AN HOUR WITHOUT A BREAK: SLIGHT CHANCE OF DOZING
HOW LIKELY ARE YOU TO NOD OFF OR FALL ASLEEP WHILE SITTING AND READING: SLIGHT CHANCE OF DOZING
HOW LIKELY ARE YOU TO NOD OFF OR FALL ASLEEP WHILE SITTING INACTIVE IN A PUBLIC PLACE: SLIGHT CHANCE OF DOZING
HOW LIKELY ARE YOU TO NOD OFF OR FALL ASLEEP WHILE SITTING QUIETLY AFTER LUNCH WITHOUT ALCOHOL: SLIGHT CHANCE OF DOZING
HOW LIKELY ARE YOU TO NOD OFF OR FALL ASLEEP IN A CAR, WHILE STOPPED FOR A FEW MINUTES IN TRAFFIC: WOULD NEVER DOZE
HOW LIKELY ARE YOU TO NOD OFF OR FALL ASLEEP WHILE LYING DOWN TO REST IN THE AFTERNOON WHEN CIRCUMSTANCES PERMIT: MODERATE CHANCE OF DOZING
HOW LIKELY ARE YOU TO NOD OFF OR FALL ASLEEP WHILE WATCHING TV: HIGH CHANCE OF DOZING

## 2022-02-24 NOTE — PATIENT INSTRUCTIONS
Your BMI is Body mass index is 38.31 kg/m .  Weight management is a personal decision.  If you are interested in exploring weight loss strategies, the following discussion covers the approaches that may be successful. Body mass index (BMI) is one way to tell whether you are at a healthy weight, overweight, or obese. It measures your weight in relation to your height.  A BMI of 18.5 to 24.9 is in the healthy range. A person with a BMI of 25 to 29.9 is considered overweight, and someone with a BMI of 30 or greater is considered obese. More than two-thirds of American adults are considered overweight or obese.  Being overweight or obese increases the risk for further weight gain. Excess weight may lead to heart disease and diabetes.  Creating and following plans for healthy eating and physical activity may help you improve your health.  Weight control is part of healthy lifestyle and includes exercise, emotional health, and healthy eating habits. Careful eating habits lifelong are the mainstay of weight control. Though there are significant health benefits from weight loss, long-term weight loss with diet alone may be very difficult to achieve- studies show long-term success with dietary management in less than 10% of people. Attaining a healthy weight may be especially difficult to achieve in those with severe obesity. In some cases, medications, devices and surgical management might be considered.  What can you do?  If you are overweight or obese and are interested in methods for weight loss, you should discuss this with your provider.     Consider reducing daily calorie intake by 500 calories.     Keep a food journal.     Avoiding skipping meals, consider cutting portions instead.    Diet combined with exercise helps maintain muscle while optimizing fat loss. Strength training is particularly important for building and maintaining muscle mass. Exercise helps reduce stress, increase energy, and improves fitness.  Increasing exercise without diet control, however, may not burn enough calories to loose weight.       Start walking three days a week 10-20 minutes at a time    Work towards walking thirty minutes five days a week     Eventually, increase the speed of your walking for 1-2 minutes at time    And look into health and wellness programs that may be available through your health insurance provider, employer, local community center, or sriram club.

## 2022-02-24 NOTE — PROGRESS NOTES
Outpatient Sleep Medicine Consultation:  February 24, 2022    Name: Danitza Soto MRN# [id number]   Age: 41 year old YOB: 1980     Date of Consultation: February 24, 2022  Consultation is requested by: Polly Mcbride MD  [address] [place] [address] Polly Mcbride  Primary care provider: Polly Mcbride       Reason for Sleep Consult:     Danitza Soto is sent by Polly Mcbride for a sleep consultation regarding previously diagnosed obstructive sleep apnea.    Patient s Reason for visit  Danitza Soto main reason for visit: Insomnia. CPAP broken  Patient states problem(s) started: Insomnia: December?  Danitza Soto's goals for this visit: Help with insomnia. Figure out something with cpap           Assessment and Plan:     Summary Sleep Diagnoses &  Recommendations:  1. Moderate obstructive sleep apnea. Patient was doing very well before her CPAP stopped working.   She needs a new CPAP.   Comprehensive DME order placed for a replacement CPAP.    2. Insomnia, sleep onset and sleep maintenance, worsened since her CPAP broke. Sleep latency is fast when she delays sleep until she is very sleep.   We reviewed the pros and cons of pharmacological versus cognitive behavioral treatment. Patient is not interested pharmacological treatment at this time. We initially recommended no sleeping in on the weekends, limiting naps and caffeine. She will only go to bed when very sleepy.  Will re-evaluate once she obtains a new CPAP.     Orders Placed This Encounter   Procedures     Comprehensive DME       Follow up 2 months after obtaining the new CPAP if compliance is required. If not, follow up in 2 years.     Total time spent reviewing medical records, history and physical examination, review of previous testing and interpretation as well as documentation on this date: 40 minutes         History of Present Illness:     Danitza Soto is a 41 year old female with history of moderate  obstructive sleep apnea.     Initially presented with snoring, witnessed apnea and excessive daytime sleepiness.    4/10/2017 - Home Sleep Apnea Testing - AHI 15.5/hr; Supine AHI 12.9/hr; SpO2 <= 88% for 33.8 minutes.    Titration Sleep Study 4/19/2017 - (288.0 lbs) CPAP was titrated to a pressure of 11 with an AHI of 3.7. Time Spent in REM supine at this pressure was 33.5.  Recommending Auto-CPAP 7 - 15 cmH2O.    Her CPAP stopped working on 12/12/2021. Her machine is also subject to recall. Her insomnia is worse while CPAP is broken.     Her PAP interface is Nasal Mask.    Respironics  Auto-PAP 10 - 15 cmH2O 30 day usage data: 11/13/2021-12/12/2021   100% of days with > 4 hours of use. 0/30 days with no use.   Average use 9 hours and 31 minutes per day.   Average leak 0 seconds  CPAP 90% pressure 11.3 cm.   AHI 2.2 events per hour.      SLEEP-WAKE SCHEDULE:     Work/School Days: Patient goes to school/work: Yes   Usually gets into bed at Between 9p and 12a  Takes patient about Good nights: within a few minutes. Bad nights: hours to fall asleep  Has trouble falling asleep 6 nights per week  Wakes up in the middle of the night 0-1 times per night due to Snorting self awake, Use the bathroom, Uncertain  She has trouble falling back asleep 6   times a week and it usually takes Don t know  to get back to sleep  Patient is usually up at 430a-630a  Uses alarm? Yes    Weekends/Non-work Days/All Other Days  Usually gets into bed at 9p-12a   Takes patient about Same to fall asleep  Patient is usually up at 7a-10a  Uses alarm? No      Sleep Need  Patient gets  Recently 2-3 hours at a time sleep on average   Patient thinks She needs about 8-9 hours sleep      Danitza Soto prefers to sleep in this position(s): Back, Stomach   Patient states they do the following activities in bed: Read, Use phone, computer, or tablet    Naps  Patient takes a purposeful nap 0-1 times a week and naps are usually 1-3 hours in duration  She  feels better after a nap: Yes  She dozes off unintentionally  0. I don t let myself  days per week  Patient has had a driving accident or near-miss due to sleepiness/drowsiness? No      Sleep Disruptions:  Breathing/Snoring  Patient snores:Yes  Other people complain about Her snoring: No  Patient has been told She stops breathing in Her sleep: Yes  She has issues with any of the following: Morning mouth dryness, Stuffy nose when you wake up, Heartburn or reflux at night      Movement:  Patient gets pain, discomfort, with an urge to move: Yes  It happens when She is resting: Yes  It happens more at night:Yes  Patient has been told She kicks Her legs at night:No       Behaviors in Sleep:  Danitza Soto has experienced the following behaviors while sleeping:    She has experienced sudden muscle weakness during the day: No    Is there anything else you would like your sleep provider to know:        Caffeine, Alcohol and Other Substances:  Number of caffeinated beverages(coffee, tea, soda, energy drinks) that patient consumes  per day: 4  Last caffeine use is usually: 6p  List of any prescribed or over the counter stimulants that patient takes: None  List of any prescribed or over the counter sleep medication patient takes: None  List of previous sleep medications that patients have tried: Melatonin. Hydroxyzine paomate  Patient drinks alcohol to help them sleep: No  Patient drinks alcohol near bedtime: No      Family History:  Patient has a family member been diagnosed with a sleep disorder: No          Past Sleep Evaluations:      Scales:  [place] Sleepiness Scale   How likely are you to doze off or fall asleep in the following situations, in contrast to just feeling tired?    This refers to your usual way of life recently.    Sitting and reading: Slight chance of dozing  Watching TV: High chance of dozing  Sitting, inactive in a public place (e.g. a theatre or a meeting): Slight chance of dozing  As a passenger in  "a car for an hour without a break: Slight chance of dozing  Lying down to rest in the afternoon when circumstances permit: Moderate chance of dozing  Sitting and talking to someone: Slight chance of dozing  Sitting quietly after a lunch without alcohol: Slight chance of dozing  In a car, while stopped for a few minutes in traffic: Would never doze    Total score - Froid: 10     NIVIA Hill  8348-6230 ESS - [place]/English - Final version - 21 Nov 07 - [facility].       INSOMNIA SEVERITY INDEX (JAZLYN)    The Insomnia Severity Index has seven questions. The seven answers are added up to get a total score. When you have your total score, look at the \"Guidelines for Scoring/Interpretation\" below to see where your sleep difficulty fits.     For each question, please choose the number that best describes your answer.     Please rate the CURRENT (i.e LAST 2 WEEKS) SEVERITY of your insomnia problem(s).     1. Difficulty falling asleep: Very Severe  2. Difficulty staying asleep: Mild  3. Problems waking up too early: Very Severe  4. How SATISFIED/DISSATISFIED are you with your CURRENT sleep pattern? Very Dissatisfied  5. How NOTICEABLE to others do you think your sleep problem is in terms of impairing the quality of your life? Very Much Noticeable  6. How WORRIED/DISTRESSED are you about your sleep problem? Very Much Worried  7. To what extent do you consider your sleep problem to INTERFERE with your daily functioning (e.g. daytime fatigue, mood, ability to functon at work/daily chores, concentration, memory, mood, etc.) CURRENTLY? Very Much Interfering    JAZLYN Total Score: 25    Guidelines for Scoring/Interpretation:    Total score categories:  0-7 = No clinically significant insomnia   8-14 = Subthreshold insomnia   15-21 = Clinical insomnia (moderate severity)  22-28 = Clinical insomnia (severe)    Used via courtesy of www.[facility].va.gov with permission from Justin Starr PhD., [facility]      GAD7  GABRIELA-7 "  4/19/2021   1. Feeling nervous, anxious, or on edge 0   2. Not being able to stop or control worrying 0   3. Worrying too much about different things 0   4. Trouble relaxing 0   5. Being so restless that it is hard to sit still 0   6. Becoming easily annoyed or irritable 0   7. Feeling afraid, as if something awful might happen 0   GABRIELA-7 Total Score 0   If you checked any problems, how difficult have they made it for you to do your work, take care of things at home, or get along with other people? -         PHQ 3/8/2021 4/19/2021 10/13/2021   PHQ-9 Total Score 7 1 6   Q9: Thoughts of better off dead/self-harm past 2 weeks Not at all Not at all Not at all     Allergies:    Allergies   Allergen Reactions     Bupropion Other (See Comments)     Other reaction(s): Chest Pain,Panic attacks, heart/chest pain     Wellbutrin [Bupropion]      Wellbutrin: Panic attacks, heart/chest pain       Medications:    Current Outpatient Medications   Medication Sig Dispense Refill     acetaminophen (TYLENOL) 325 MG tablet Take 2 tablets (650 mg) by mouth every 4 hours as needed for other (For optimal non-opioid multimodal pain management to improve pain control and physical function.) 100 tablet 0     amLODIPine (NORVASC) 5 MG tablet Take 1 tablet (5 mg) by mouth daily 90 tablet 3     calcium carbonate (OS-CHIKI) 500 MG tablet Take 1 tablet by mouth daily        estradiol (ESTRACE) 1 MG tablet Take 1 tablet (1 mg) by mouth daily 90 tablet 0     etonogestrel (NEXPLANON) 68 MG IMPL 1 each (68 mg) by Subdermal route once       Ferrous Sulfate 324 (65 Fe) MG TBEC One daily       hydrochlorothiazide (MICROZIDE) 12.5 MG capsule TAKE 1 CAPSULE(12.5 MG) BY MOUTH DAILY 90 capsule 3     insulin pen needle (32G X 6 MM) 32G X 6 MM miscellaneous Use 1 pen needles daily or as directed with Saxenda. 100 each 1     liraglutide - Weight Management (SAXENDA) 18 MG/3ML pen Inject subcutaneously. Week 1: 0.6 mg daily, week 2: 1.2 mg daily, week 3: 1.8 mg  daily, week 4: 2.4 mg daily, week 5 & on: 3 mg daily 15 mL 2     lisinopril (ZESTRIL) 40 MG tablet TAKE 1 TABLET(40 MG) BY MOUTH DAILY 90 tablet 1     Multiple Vitamins-Minerals (CENTRUM PO) 2 tablets per day       omeprazole (PRILOSEC) 20 MG DR capsule Take 1 capsule (20 mg) by mouth daily 90 capsule 3     omeprazole (PRILOSEC) 40 MG DR capsule Take 1 capsule (40 mg) by mouth daily 90 capsule 3     venlafaxine (EFFEXOR-XR) 150 MG 24 hr capsule TAKE 1 CAPSULE(150 MG) BY MOUTH DAILY 90 capsule 1     vitamin B complex with vitamin C (VITAMIN  B COMPLEX) tablet Take 1 tablet by mouth daily         Problem List:  Patient Active Problem List    Diagnosis Date Noted     Lumbar spine pain 02/14/2022     Priority: Medium     Insomnia, unspecified type 02/11/2022     Priority: Medium     Strain of lumbar region, initial encounter 05/06/2021     Priority: Medium     Slipped at work 5/4- excused form work till 5/7- Realtive rest helping. Return to work 5/10 unrestricted        Strain of hip and thigh, right, initial encounter 05/06/2021     Priority: Medium     Slipped at work 5/4- excused form work till 5/7- Realtive rest helping. Return to work 5/10 unrestricted        S/P laparoscopic sleeve gastrectomy 02/03/2021     Priority: Medium     2018 Dr. Tse        Moderate episode of recurrent major depressive disorder (H) 09/18/2020     Priority: Medium     S/P ablation of atrial fibrillation 10/03/2019     Priority: Medium     Recurrent major depressive disorder, in full remission (H) 04/08/2019     Priority: Medium     Morbid (severe) obesity due to excess calories (H) 11/27/2018     Priority: Medium     Sp sleeve gastrectomy       LEONARDO (obstructive sleep apnea) 04/13/2017     Priority: Medium     4/10/2017 - Home Sleep Apnea Testing - AHI 15.5/hr; Supine AHI 12.9/hr; SpO2 <= 88% for 33.8 minutes.  Titration Sleep Study 4/19/2017 - (288.0 lbs) CPAP was titrated to a pressure of 11 with an AHI of 3.7.  Time Spent in REM  supine at this pressure was 33.5.  Recommending Auto-CPAP 7 - 15 cmH2O.       Obesity hypoventilation syndrome (H) 04/13/2017     Priority: Medium     Mixed hyperlipidemia 07/06/2015     Priority: Medium     Papanicolaou smear of cervix with low grade squamous intraepithelial lesion (LGSIL) 06/17/2014     Priority: Medium     6/17/14: LSIL, + HPV, unable to type.   8/20/14:[place]:BRISEYDA I. Plan cotest pap & HPV in 1 year.   1/20116 Pap Ascus Neg HPV. Plan: [place].   5/13/16 Colposcopy not completed. Cancelled by patient  3/21/17 NIL/Neg HPV. Plan: cotest in 1 year per Dr. Mcbride  9/28/18 Tele enc: Dr. Mcbride, recommending new plan. Cotest due Jan or Feb 2019. Tracking updated. ([facility])  04/02/19 Patient is lost to pap tracking follow-up.   04/08/19: NIL pap, Neg HR HPV result. Plan cotest in 3 years per provider.        Acne 02/24/2012     Priority: Medium     Essential hypertension 01/12/2011     Priority: Medium     CARDIOVASCULAR SCREENING; LDL GOAL LESS THAN 130 10/31/2010     Priority: Medium     Tobacco use disorder 05/17/2006     Priority: Medium     Anxiety      Priority: Medium     Gastroesophageal reflux disease without esophagitis      Priority: Medium        Past Medical/Surgical History:  Past Medical History:   Diagnosis Date     Antiplatelet or antithrombotic long-term use     resolved     Arrhythmia      BV (bacterial vaginosis) 9/18/2020     Depressive disorder 1990     Esophageal reflux      Hearing problem 2018     Hyperlipidemia LDL goal <130 07/06/2015     Hypertension      LSIL (low grade squamous intraepithelial lesion) on Pap smear 06/2014    + HPV, unable to type colp - BRISEYDA I     LEONARDO on CPAP      Other anxiety states      Past Surgical History:   Procedure Laterality Date     EP ABLATION FOCAL AFIB N/A 10/03/2019    Procedure: EP ABLATION FOCAL AFIB;  Surgeon: Harpreet Arevalo MD;  Location:  OR      TOOTH EXTRACTION W/FORCEP  1995    [place] Teeth Extraction     LAPAROSCOPIC GASTRIC SLEEVE N/A  2018    Procedure: Laparoscopic Sleeve Gastrectomy Latex Free;  Surgeon: Artis Tse MD;  Location: [facility] OR     LAPAROSCOPIC HERNIORRHAPHY HIATAL  2018    Procedure: LAPAROSCOPIC  HIATAL Hernia Repair;  Surgeon: Artis Tse MD;  Location: [facility] OR     SALPINGO-OOPHORECTOMY, COMBINED Left 10/20/2021    Mucinous cystadenoma       Social History:  Social History     Socioeconomic History     Marital status:      Spouse name: Not on file     Number of children: 0     Years of education: Some Colle     Highest education level: Not on file   Occupational History     Occupation: Mental Health Counselor   Tobacco Use     Smoking status: Former Smoker     Packs/day: 1.00     Years: 10.00     Pack years: 10.00     Types: Cigarettes, Vaping Device     Start date: 2004     Quit date: 2021     Years since quittin.7     Smokeless tobacco: Never Used   Vaping Use     Vaping Use: Every day     Substances: Nicotine, Flavoring     Devices: Disposable   Substance and Sexual Activity     Alcohol use: Not Currently     Alcohol/week: 0.0 standard drinks     Comment: Occas.     Drug use: No     Sexual activity: Yes     Partners: Female, Male     Birth control/protection: Condom, Implant   Other Topics Concern     Parent/sibling w/ CABG, MI or angioplasty before 65F 55M? No   Social History Narrative    Social Documentation:        Balanced Diet: YES, sometimes    Calcium intake: 2 per day    Caffeine: 5-10 per day    Exercise:  type of activity walking, playtime;  5 times per week    Sunscreen: when remembered    Seatbelts:  Yes    Self Breast Exam:  No    Self Testicular Exam: n/a    Physical/Emotional/Sexual Abuse: No    Do you feel safe in your environment? Yes        Cholesterol screen up to date: No 2006    Eye Exam up to date: Yes    Dental Exam up to date: No    Pap smear up to date: No: 2006    Mammogram up to date: Does Not Apply    Dexa Scan up to date: Does Not Apply    Colonoscopy  up to date: Does Not Apply    Immunizations up to date: Yes,     Glucose screen if over 40:  N/a        Jamila Coyle MA                     Social Determinants of Health     Financial Resource Strain: Not on file   Food Insecurity: Not on file   Transportation Needs: Not on file   Physical Activity: Not on file   Stress: Not on file   Social Connections: Not on file   Intimate Partner Violence: Not At Risk     Fear of Current or Ex-Partner: No     Emotionally Abused: No     Physically Abused: No     Sexually Abused: No   Housing Stability: Not on file       Family History:  Family History   Problem Relation Age of Onset     Heart Disease Mother         ,mother had emphesema and  at 62     Hypertension Mother      Allergies Mother      Lipids Mother      Obesity Mother      Respiratory Mother         Emphysema     Diabetes Maternal Grandmother         Type 2?     Hypertension Maternal Grandmother      Circulatory Maternal Grandmother         Due to diabetes     Eye Disorder Maternal Grandmother         Macular degeneration/Macular degeneration     Obesity Maternal Grandmother      Hypertension Father      Lipids Father      Cancer Father      Prostate Cancer Father      Other Cancer Father      Cerebrovascular Disease Paternal Grandmother      Alcohol/Drug Maternal Grandfather      Obesity Maternal Grandfather      Psychotic Disorder Paternal Grandfather         Committed Suicide     Depression Paternal Grandfather      Allergies Sister      Genitourinary Problems Sister        Review of Systems:  A complete review of systems reviewed by me is negative with the exeption of what has been mentioned in the history of present illness.  In the last TWO WEEKS have you experienced any of the following symptoms?  Fevers: No  Night Sweats: No  Weight Gain: No  Pain at Night: Yes  Double Vision: No  Changes in Vision: No  Difficulty Breathing through Nose: No  Sore Throat in Morning: No  Dry Mouth in the Morning:  "Yes  Shortness of Breath Lying Flat: No  Shortness of Breath With Activity: No  Awakening with Shortness of Breath: No  Increased Cough: No  Heart Racing at Night: No  Swelling in Feet or Legs: No  Diarrhea at Night: No  Heartburn at Night: Yes  Urinating More than Once at Night: No  Losing Control of Urine at Night: No  Joint Pains at Night: No  Headaches in Morning: Yes  Weakness in Arms or Legs: No  Depressed Mood: No  Anxiety: Yes    Physical Examination:  Vitals: /84   Pulse 98   Resp 12   Ht 1.702 m (5' 7\")   Wt 110.9 kg (244 lb 9.6 oz)   SpO2 98%   BMI 38.31 kg/m    BMI= Body mass index is 38.31 kg/m .    Neck Cir (cm): 39 cm      GENERAL APPEARANCE: alert and no distress  EYES: Eyes grossly normal to inspection  RESP: breathing is non-labored  PSYCH: mentation appears normal and affect normal/bright           Data: All pertinent previous laboratory data reviewed     No results found for: PH, PHARTERIAL, PO2, PR5PBUMOLIH, SAT, PCO2, HCO3, BASEEXCESS, CARMINA, BEB  TSH (mU/L)   Date Value   01/11/2022 2.86   05/18/2021 0.64   09/24/2018 1.48     Glucose (mg/dL)   Date Value   01/11/2022 74   05/18/2021 81   12/05/2019 80     Hemoglobin (g/dL)   Date Value   05/18/2021 14.4   12/05/2019 11.8     Urea Nitrogen (mg/dL)   Date Value   01/11/2022 8   05/18/2021 9   12/05/2019 11     Creatinine (mg/dL)   Date Value   01/11/2022 0.66   05/18/2021 0.68   12/05/2019 0.69     AST (U/L)   Date Value   01/11/2022 15   05/18/2021 11   12/05/2019 11     ALT (U/L)   Date Value   01/11/2022 26   05/18/2021 20   12/05/2019 21     Alkaline Phosphatase (U/L)   Date Value   01/11/2022 91   05/18/2021 81   12/05/2019 77     Bilirubin Total (mg/dL)   Date Value   01/11/2022 0.2   05/18/2021 0.4   12/05/2019 0.3     Bilirubin Conjugated (mg/dL)   Date Value   08/11/2010 0.0     No results found for: UAMP, UBARB, BENZODIAZEUR, UCANN, UCOC, OPIT, UPCP  Ferritin   Date/Time Value Ref Range Status   05/18/2021 10:29 AM 27 12 - " 150 ng/mL Final     Iron   Date/Time Value Ref Range Status   08/18/2010 10:33 AM 37 35 - 180 ug/dL Final         CC: PRECIOUS [place] 2/24/2022

## 2022-02-24 NOTE — NURSING NOTE
"Chief Complaint   Patient presents with     CPAP Follow Up     Sleep Problem     Insomnia, CPAP is broken, is also recalled (is on the list)       Initial /84   Pulse 98   Resp 12   Ht 1.702 m (5' 7\")   Wt 110.9 kg (244 lb 9.6 oz)   SpO2 98%   BMI 38.31 kg/m   Estimated body mass index is 38.31 kg/m  as calculated from the following:    Height as of this encounter: 1.702 m (5' 7\").    Weight as of this encounter: 110.9 kg (244 lb 9.6 oz).    Medication Reconciliation: complete   ESS: 10  Neck circumference: 39 centimeters.  DME: None  Trish Castillo CMA      "

## 2022-02-24 NOTE — NURSING NOTE
DME orders have been automatically faxed to [facility].  Trish Castilol, CMA

## 2022-02-28 ENCOUNTER — THERAPY VISIT (OUTPATIENT)
Dept: PHYSICAL THERAPY | Facility: CLINIC | Age: 42
End: 2022-02-28
Payer: COMMERCIAL

## 2022-02-28 ENCOUNTER — TELEPHONE (OUTPATIENT)
Dept: SLEEP MEDICINE | Facility: CLINIC | Age: 42
End: 2022-02-28

## 2022-02-28 DIAGNOSIS — G47.33 OSA (OBSTRUCTIVE SLEEP APNEA): ICD-10-CM

## 2022-02-28 DIAGNOSIS — M54.50 LUMBAR SPINE PAIN: ICD-10-CM

## 2022-02-28 DIAGNOSIS — E66.2 OBESITY HYPOVENTILATION SYNDROME (H): ICD-10-CM

## 2022-02-28 PROCEDURE — 97140 MANUAL THERAPY 1/> REGIONS: CPT | Mod: GP | Performed by: PHYSICAL THERAPIST

## 2022-02-28 PROCEDURE — 97110 THERAPEUTIC EXERCISES: CPT | Mod: GP | Performed by: PHYSICAL THERAPIST

## 2022-02-28 NOTE — TELEPHONE ENCOUNTER
Informed patient of CPAP shortage and she wants to be on the wait list to be setup in [facility] showroom.

## 2022-03-01 NOTE — TELEPHONE ENCOUNTER
Spoke with Marcelino at [facility] and he said that No Prior Authorizaton is required for an admit for the [facility].   Auth number- [id number]    Facesheet, clinical notes, and Surgery form faxed to [facility]     Please contact patient to let them know and to check benefits.     Thanks!    Rosa Jacob    Financial Counselor  [address] [place] [address]  Phone- [phone]  Fax- [phone]

## 2022-03-15 ENCOUNTER — OFFICE VISIT (OUTPATIENT)
Dept: OBGYN | Facility: CLINIC | Age: 42
End: 2022-03-15
Payer: COMMERCIAL

## 2022-03-15 VITALS
HEART RATE: 95 BPM | BODY MASS INDEX: 39.31 KG/M2 | WEIGHT: 251 LBS | DIASTOLIC BLOOD PRESSURE: 84 MMHG | OXYGEN SATURATION: 99 % | SYSTOLIC BLOOD PRESSURE: 134 MMHG

## 2022-03-15 DIAGNOSIS — N92.0 MENORRHAGIA WITH REGULAR CYCLE: Primary | ICD-10-CM

## 2022-03-15 DIAGNOSIS — N92.1 BREAKTHROUGH BLEEDING ON NEXPLANON: ICD-10-CM

## 2022-03-15 DIAGNOSIS — Z97.5 BREAKTHROUGH BLEEDING ON NEXPLANON: ICD-10-CM

## 2022-03-15 PROCEDURE — 99214 OFFICE O/P EST MOD 30 MIN: CPT | Performed by: OBSTETRICS & GYNECOLOGY

## 2022-03-15 NOTE — PROGRESS NOTES
Trish is a 41 year old female, .  She presents today for follow up of abnormal uterine bleeding.    She had the Nexplanon placed in , for heavy menstrual bleeding.  Since then, she has had the Left ovary removed.  She has been using the Estradiol supplementation for the breakthrough bleeding and the bleeding is every day, and last all day.  She will use pads and/or panty liners.  She expresses that the heavy bleeding she had previously is preferential to the daily spotting and bleeding.   She has had dizziness, but she is uncertain if related to the above  No shortness of breath.  No new fatigue. No chest pain, and no pelvic pain.      ULTRASOUND TRANSVAGINAL NON OB 2021 9:22 AM  CLINICAL HISTORY: History of complex cyst, possible endometrioma.  Complex cyst of left ovary.  TECHNIQUE: Transvaginal imaging only.  COMPARISON: 2021.  FINDINGS:  UTERUS: 7.6 x 4.1 x 4.6 cm. Normal in size and position with no masses. A few nabothian cysts are noted.  ENDOMETRIUM: 11 mm. Normal smooth endometrium. Trace fluid in the endometrial canal.  RIGHT OVARY: 5.3 x 4.5 x 2.5 cm. Complex cyst in the right ovary measures 2.1 x 1.2 x 1.5 cm. Normal color flow present in the right ovary.  LEFT OVARY: 8.5 x 7.2 x 5.2 cm. Complex cyst with uniform low-level internal echogenicity and measures 8.2 x 6.2 x 4.2 cm, similar to prior. Normal color flow.  Trace pelvic free fluid.                                                             IMPRESSION:  1.  Large complex cystic structure in the left ovary has the appearance of an endometrioma and is similar in size to the prior study.  2.  Complex cyst in the right ovary is new compared to the prior study and likely physiologic.      She had the following EMB prior to the Nexplanon being inserted:    Collected: 2021   Received: 2021   Reported: 2021 16:16   SPECIMEN(S):   Endometrial biopsy   FINAL DIAGNOSIS:   Endometrium, biopsy:   - Secretory  endometrium.   - Negative for endometrial polyp, hyperplasia and malignancy.      We reviewed the approaches for hysterectomy.   We discussed the different routes of surgery including abdominal, vaginal, and laparoscopic and the possibility that the route of surgery may change during the procedure.  We discussed both total and supracervical hysterectomy and the benefits and contraindications involved.  We discussed ovarian sparing as well as oophorectomy, and the associated risks and benefits.  She also is informed that with ovarian sparing, she could still have inadvertent ovarian failure and the reasons for this were reviewed.  I also reviewed with her that she would have a 25% chance of needing surgery in the future with the ovarian sparing (pain, cysts, malignancies).  This also means that she would have ~75% chance that she would never need surgery in the future, in regard to the ovaries.  The ACOG pamphlets have been given and reviewed with the patient. The patient is aware that hysterectomy will render her sterile and unable to have further children.The risks of surgery were reviewed and include, but are not limited to, death, brain damage, paralysis of any or all limbs, loss of an organ, function of an organ, disfiguring scars, bleeding, infection, damage to the ovaries, bowel, bladder and vagina.  In addition, there is a possibility of other procedures that might be deemed necessary at the time of the surgery, depending upon findings and/or complications.  This may also include laparoscopy, laparotomy, and rarely colostomy (which often times can be reversible in the future).  Risks with blood include but are not limited to HIV/AIDS, hepatitis and transfusion reactions, with transfusion reactions being the greatest risk.    We reviewed pre and post op course. Pre op enemas were reviewed.  NPO after MN.  Post op pain management, ambulation, tena packing's were also reviewed.  Discharge precautions, lifting  restrictions, driving restrictions as well as the pelvic activity restrictions were reviewed with her.  Patient was given the opportunity to ask questions and have them answered.  The medical history, social history and family history are documented in the electronic record in the appropriate sections.  No updates at this visit.    Review of Systems:  10 point ROS of systems including Constitutional, Eyes, Respiratory, Cardiovascular, Gastroenterology, Genitourinary, Integumentary, Muscularskeletal, Psychiatric were all negative except for pertinent positives noted in my HPI and in the PMH.    Exam   /84 (BP Location: Right arm, Cuff Size: Adult Large)   Pulse 95   Wt 113.9 kg (251 lb)   SpO2 99%   BMI 39.31 kg/m    General:  WNWD female, NAD  Alert  Oriented x 3  Gait:  Normal  Skin:  Normal skin turgor  HEENT:  NC/AT, EOMI  Abdomen:  Non-tender, non-distended.  Pelvic exam:  Not performed   Extremities:  No clubbing, no cyanosis and no edema.      Assessment    Menorrhagia with regular cycle   BTB with Nexplanon       Plan  She desires to proceed with the hysterectomy.  She desires to conserve the remaining ovary and to remove the cervix (if possible).  She has not had any pap smears with HSIL or high grade dysplasia.  She had BRISEYDA I in 2014.  Pap smear is not needed at this time.  Since hysterectomy will be for benign reasons, she will not need pap smears after this procedure, unless surgical pathology suggests otherwise.    Questions seem to be answered.   Total time preparing to see patient with reviewing prior encounter and labs, face to face time,  and coordinating care on the same calendar date: 35 minutes.     Lucian Reyna MD

## 2022-03-17 NOTE — PATIENT INSTRUCTIONS
Preparing for Your Surgery  Getting started  A nurse will call you to review your health history and instructions. They will give you an arrival time based on your scheduled surgery time. Please be ready to share:    Your doctor's clinic name and phone number    Your medical, surgical and anesthesia history    A list of allergies and sensitivities    A list of medicines, including herbal treatments and over-the-counter drugs    Whether the patient has a legal guardian (ask how to send us the papers in advance)  Please tell us if you're pregnant--or if there's any chance you might be pregnant. Some surgeries may injure a fetus (unborn baby), so they require a pregnancy test. Surgeries that are safe for a fetus don't always need a test, and you can choose whether to have one.   If you have a child who's having surgery, please ask for a copy of Preparing for Your Child's Surgery.    Preparing for surgery    Within 30 days of surgery: Have a pre-op exam (sometimes called an H&P, or History and Physical). This can be done at a clinic or pre-operative center.  ? If you're having a , you may not need this exam. Talk to your care team.    At your pre-op exam, talk to your care team about all medicines you take. If you need to stop any medicines before surgery, ask when to start taking them again.  ? We do this for your safety. Many medicines can make you bleed too much during surgery. Some change how well surgery (anesthesia) drugs work.    Call your insurance company to let them know you're having surgery. (If you don't have insurance, call [phone].)    Call your clinic if there's any change in your health. This includes signs of a cold or flu (sore throat, runny nose, cough, rash, fever). It also includes a scrape or scratch near the surgery site.    If you have questions on the day of surgery, call your hospital or surgery center.  COVID testing  You may need to be tested for COVID-19 before having  surgery. If so, your surgical team will give you instructions for scheduling this test, separate from your preoperative history and physical.  Eating and drinking guidelines  For your safety: Unless your surgeon tells you otherwise, follow the guidelines below.    Eat and drink as usual until 8 hours before surgery. After that, no food or milk.    Drink clear liquids until 2 hours before surgery. These are liquids you can see through, like water, Gatorade and Propel Water. You may also have black coffee and tea (no cream or milk).    Nothing by mouth within 2 hours of surgery. This includes gum, candy and breath mints.    If you drink alcohol: Stop drinking it the night before surgery.    If your care team tells you to take medicine on the morning of surgery, it's okay to take it with a sip of water.  Preventing infection    Shower or bathe the night before and morning of your surgery. Follow the instructions your clinic gave you. (If no instructions, use regular soap.)    Don't shave or clip hair near your surgery site. We'll remove the hair if needed.    Don't smoke or vape the morning of surgery. You may chew nicotine gum up to 2 hours before surgery. A nicotine patch is okay.  ? Note: Some surgeries require you to completely quit smoking and nicotine. Check with your surgeon.    Your care team will make every effort to keep you safe from infection. We will:  ? Clean our hands often with soap and water (or an alcohol-based hand rub).  ? Clean the skin at your surgery site with a special soap that kills germs.  ? Give you a special gown to keep you warm. (Cold raises the risk of infection.)  ? Wear special hair covers, masks, gowns and gloves during surgery.  ? Give antibiotic medicine, if prescribed. Not all surgeries need antibiotics.  What to bring on the day of surgery    Photo ID and insurance card    Copy of your health care directive, if you have one    Glasses and hearing aides (bring cases)  ? You can't  wear contacts during surgery    Inhaler and eye drops, if you use them (tell us about these when you arrive)    CPAP machine or breathing device, if you use them    A few personal items, if spending the night    If you have . . .  ? A pacemaker, ICD (cardiac defibrillator) or other implant: Bring the ID card.  ? An implanted stimulator: Bring the remote control.  ? A legal guardian: Bring a copy of the certified (court-stamped) guardianship papers.  Please remove any jewelry, including body piercings. Leave jewelry and other valuables at home.  If you're going home the day of surgery    You must have a responsible adult drive you home. They should stay with you overnight as well.    If you don't have someone to stay with you, and you aren't safe to go home alone, we may keep you overnight. Insurance often won't pay for this.  After surgery  If it's hard to control your pain or you need more pain medicine, please call your surgeon's office.  Questions?   If you have any questions for your care team, list them here: _________________________________________________________________________________________________________________________________________________________________________ ____________________________________ ____________________________________ ____________________________________  For informational purposes only. Not to replace the advice of your health care provider. Copyright   2003, 2019 [facility]. All rights reserved. Clinically reviewed by Rosita Rome MD. [employer] [id number] - REV 07/21.

## 2022-03-17 NOTE — PROGRESS NOTES
[facility]  [address], [address]  [facility] [address]  Phone: [phone]  Primary Provider: Polly Solano  Pre-op Performing Provider: POLLY SOLANO      PREOPERATIVE EVALUATION:  Today's date: 3/29/2022    Danitza Soto is a 41 year old female who presents for a preoperative evaluation.    Answers for HPI/ROS submitted by the patient on 3/29/2022  If you checked off any problems, how difficult have these problems made it for you to do your work, take care of things at home, or get along with other people?: Somewhat difficult  PHQ9 TOTAL SCORE: 2      Surgical Information:  Surgery/Procedure: Hysterectomy abdominal approach  Surgery Location: [facility]  Surgeon: Lucian Reyna MD  Surgery Date: 4/1/2022  Time of Surgery: 12pm  Where patient plans to recover: At home with family   Fax number for surgical facility: [phone]        Type of Anesthesia Anticipated: General    Assessment & Plan     The proposed surgical procedure is considered INTERMEDIATE risk.    Preop general physical exam  Cleared for surgery   - REVIEW OF HEALTH MAINTENANCE PROTOCOL ORDERS  - CBC with platelets; Future    DUB (dysfunctional uterine bleeding)  She will have a hysterectomy     Primary hypertension  We discussed increasing the dose of the amlodipine to 10 mg as BP has nae borderline recently , but she was on 10 mg of this and was switched to 5 mg a couple of weeks ago or so - will need ot monitor her BP at home and send me the readings over Mychart ,   - amLODIPine (NORVASC) 10 MG tablet; Take 1 tablet (10 mg) by mouth daily  F/u here on this after the surgery        Risks and Recommendations:  No identifiable additional risks     Medication Instructions:  Patient is to take all scheduled medications on the day of surgery    RECOMMENDATION:  APPROVAL GIVEN to proceed with proposed procedure, without further diagnostic evaluation.          Subjective     HPI related to  upcoming procedure: as above       Preop Questions 3/29/2022   1. Have you ever had a heart attack or stroke? No   2. Have you ever had surgery on your heart or blood vessels, such as a stent placement, a coronary artery bypass, or surgery on an artery in your head, neck, heart, or legs? YES - she had atrial ablation for afib   3. Do you have chest pain with activity? No   4. Do you have a history of  heart failure? No   5. Do you currently have a cold, bronchitis or symptoms of other infection? No   6. Do you have a cough, shortness of breath, or wheezing? No   7. Do you or anyone in your family have previous history of blood clots? UNKNOWN -    8. Do you or does anyone in your family have a serious bleeding problem such as prolonged bleeding following surgeries or cuts? No   9. Have you ever had problems with anemia or been told to take iron pills? YES - from DUB    10. Have you had any abnormal blood loss such as black, tarry or bloody stools, or abnormal vaginal bleeding? No   11. Have you ever had a blood transfusion? No   12. Are you willing to have a blood transfusion if it is medically needed before, during, or after your surgery? Yes   13. Have you or any of your relatives ever had problems with anesthesia? No   14. Do you have sleep apnea, excessive snoring or daytime drowsiness? YES - uses CPAP    14a. Do you have a CPAP machine? No   15. Do you have any artifical heart valves or other implanted medical devices like a pacemaker, defibrillator, or continuous glucose monitor? No   16. Do you have artificial joints? No   17. Are you allergic to latex? No   18. Is there any chance that you may be pregnant? No     Health Care Directive:  Patient does not have a Health Care Directive or Living Will:     Preoperative Review of :        Status of Chronic Conditions:  HYPERTENSION - Patient has longstanding history of HTN , currently denies any symptoms referable to elevated blood pressure. Specifically denies  chest pain, palpitations, dyspnea, orthopnea, PND or peripheral edema. Blood pressure readings have been in normal range. Current medication regimen is as listed below. Patient denies any side effects of medication.       Review of Systems  CONSTITUTIONAL: NEGATIVE for fever, chills, change in weight  INTEGUMENTARY/SKIN: NEGATIVE for worrisome rashes, moles or lesions  EYES: NEGATIVE for vision changes or irritation  ENT/MOUTH: NEGATIVE for ear, mouth and throat problems  RESP: NEGATIVE for significant cough or SOB  CV: NEGATIVE for chest pain, palpitations or peripheral edema  GI: NEGATIVE for nausea, abdominal pain, heartburn, or change in bowel habits  : NEGATIVE for frequency, dysuria, or hematuria  MUSCULOSKELETAL: NEGATIVE for significant arthralgias or myalgia  NEURO: NEGATIVE for weakness, dizziness or paresthesias  ENDOCRINE: NEGATIVE for temperature intolerance, skin/hair changes  HEME: NEGATIVE for bleeding problems  PSYCHIATRIC: NEGATIVE for changes in mood or affect    Patient Active Problem List    Diagnosis Date Noted     Lumbar spine pain 02/14/2022     Priority: Medium     Insomnia, unspecified type 02/11/2022     Priority: Medium     Strain of lumbar region, initial encounter 05/06/2021     Priority: Medium     Slipped at work 5/4- excused form work till 5/7- Realtive rest helping. Return to work 5/10 unrestricted        Strain of hip and thigh, right, initial encounter 05/06/2021     Priority: Medium     Slipped at work 5/4- excused form work till 5/7- Realtive rest helping. Return to work 5/10 unrestricted        S/P laparoscopic sleeve gastrectomy 02/03/2021     Priority: Medium     2018 Dr. Tse        Moderate episode of recurrent major depressive disorder (H) 09/18/2020     Priority: Medium     S/P ablation of atrial fibrillation 10/03/2019     Priority: Medium     Recurrent major depressive disorder, in full remission (H) 04/08/2019     Priority: Medium     Morbid (severe) obesity due to  excess calories (H) 11/27/2018     Priority: Medium     Sp sleeve gastrectomy       LEONARDO (obstructive sleep apnea) 04/13/2017     Priority: Medium     4/10/2017 - Home Sleep Apnea Testing - AHI 15.5/hr; Supine AHI 12.9/hr; SpO2 <= 88% for 33.8 minutes.  Titration Sleep Study 4/19/2017 - (288.0 lbs) CPAP was titrated to a pressure of 11 with an AHI of 3.7.  Time Spent in REM supine at this pressure was 33.5.  Recommending Auto-CPAP 7 - 15 cmH2O.       Obesity hypoventilation syndrome (H) 04/13/2017     Priority: Medium     Mixed hyperlipidemia 07/06/2015     Priority: Medium     Papanicolaou smear of cervix with low grade squamous intraepithelial lesion (LGSIL) 06/17/2014     Priority: Medium     6/17/14: LSIL, + HPV, unable to type.   8/20/14:[place]:BRISEYDA I. Plan cotest pap & HPV in 1 year.   1/20116 Pap Ascus Neg HPV. Plan: [place].   5/13/16 Colposcopy not completed. Cancelled by patient  3/21/17 NIL/Neg HPV. Plan: cotest in 1 year per Dr. Mcbride  9/28/18 Tele enc: Dr. Mcbride, recommending new plan. Cotest due Jan or Feb 2019. Tracking updated. ([facility])  04/02/19 Patient is lost to pap tracking follow-up.   04/08/19: NIL pap, Neg HR HPV result. Plan cotest in 3 years per provider.        Acne 02/24/2012     Priority: Medium     Essential hypertension 01/12/2011     Priority: Medium     CARDIOVASCULAR SCREENING; LDL GOAL LESS THAN 130 10/31/2010     Priority: Medium     Tobacco use disorder 05/17/2006     Priority: Medium     Anxiety      Priority: Medium     Gastroesophageal reflux disease without esophagitis      Priority: Medium      Past Medical History:   Diagnosis Date     Antiplatelet or antithrombotic long-term use     resolved     Arrhythmia      BV (bacterial vaginosis) 9/18/2020     Depressive disorder 1990     Esophageal reflux      Hearing problem 2018     Hyperlipidemia LDL goal <130 07/06/2015     Hypertension      LSIL (low grade squamous intraepithelial lesion) on Pap smear 06/2014    + HPV, unable to type  colp - BRISEYDA I     LEONARDO on CPAP      Other anxiety states      Past Surgical History:   Procedure Laterality Date     EP ABLATION FOCAL AFIB N/A 10/03/2019    Procedure: EP ABLATION FOCAL AFIB;  Surgeon: Harpreet Arevalo MD;  Location: [facility] OR     HC TOOTH EXTRACTION W/FORCEP  1995    [place] Teeth Extraction     LAPAROSCOPIC GASTRIC SLEEVE N/A 11/27/2018    Procedure: Laparoscopic Sleeve Gastrectomy Latex Free;  Surgeon: Artis Tse MD;  Location: [facility] OR     LAPAROSCOPIC HERNIORRHAPHY HIATAL  11/27/2018    Procedure: LAPAROSCOPIC  HIATAL Hernia Repair;  Surgeon: Artis Tse MD;  Location: [facility] OR     SALPINGO-OOPHORECTOMY, COMBINED Left 10/20/2021    Mucinous cystadenoma     Current Outpatient Medications   Medication Sig Dispense Refill     acetaminophen (TYLENOL) 325 MG tablet Take 2 tablets (650 mg) by mouth every 4 hours as needed for other (For optimal non-opioid multimodal pain management to improve pain control and physical function.) 100 tablet 0     amLODIPine (NORVASC) 5 MG tablet Take 1 tablet (5 mg) by mouth daily 90 tablet 3     calcium carbonate (OS-CHIKI) 500 MG tablet Take 1 tablet by mouth daily        estradiol (ESTRACE) 1 MG tablet Take 1 tablet (1 mg) by mouth daily 90 tablet 0     etonogestrel (NEXPLANON) 68 MG IMPL 1 each (68 mg) by Subdermal route once       Ferrous Sulfate 324 (65 Fe) MG TBEC One daily       hydrochlorothiazide (MICROZIDE) 12.5 MG capsule TAKE 1 CAPSULE(12.5 MG) BY MOUTH DAILY 90 capsule 3     insulin pen needle (32G X 6 MM) 32G X 6 MM miscellaneous Use 1 pen needles daily or as directed with Saxenda. 100 each 1     liraglutide - Weight Management (SAXENDA) 18 MG/3ML pen Inject subcutaneously. Week 1: 0.6 mg daily, week 2: 1.2 mg daily, week 3: 1.8 mg daily, week 4: 2.4 mg daily, week 5 & on: 3 mg daily 15 mL 2     lisinopril (ZESTRIL) 40 MG tablet TAKE 1 TABLET(40 MG) BY MOUTH DAILY 90 tablet 1     Multiple Vitamins-Minerals (CENTRUM PO) 2 tablets per day        omeprazole (PRILOSEC) 20 MG DR capsule Take 1 capsule (20 mg) by mouth daily 90 capsule 3     omeprazole (PRILOSEC) 40 MG DR capsule Take 1 capsule (40 mg) by mouth daily 90 capsule 3     venlafaxine (EFFEXOR-XR) 150 MG 24 hr capsule TAKE 1 CAPSULE(150 MG) BY MOUTH DAILY 90 capsule 1     vitamin B complex with vitamin C (VITAMIN  B COMPLEX) tablet Take 1 tablet by mouth daily         Allergies   Allergen Reactions     Bupropion Other (See Comments)     Other reaction(s): Chest Pain,Panic attacks, heart/chest pain     Wellbutrin [Bupropion]      Wellbutrin: Panic attacks, heart/chest pain        Social History     Tobacco Use     Smoking status: Former Smoker     Packs/day: 1.00     Years: 10.00     Pack years: 10.00     Types: Cigarettes, Vaping Device     Start date: 2004     Quit date: 2021     Years since quittin.7     Smokeless tobacco: Never Used   Substance Use Topics     Alcohol use: Not Currently     Alcohol/week: 0.0 standard drinks     Comment: Occas.     Family History   Problem Relation Age of Onset     Heart Disease Mother         ,mother had emphesema and  at 62     Hypertension Mother      Allergies Mother      Lipids Mother      Obesity Mother      Respiratory Mother         Emphysema     Diabetes Maternal Grandmother         Type 2?     Hypertension Maternal Grandmother      Circulatory Maternal Grandmother         Due to diabetes     Eye Disorder Maternal Grandmother         Macular degeneration/Macular degeneration     Obesity Maternal Grandmother      Hypertension Father      Lipids Father      Cancer Father      Prostate Cancer Father      Other Cancer Father      Cerebrovascular Disease Paternal Grandmother      Alcohol/Drug Maternal Grandfather      Obesity Maternal Grandfather      Psychotic Disorder Paternal Grandfather         Committed Suicide     Depression Paternal Grandfather      Allergies Sister      Genitourinary Problems Sister      History   Drug Use No          Objective     There were no vitals taken for this visit.    Physical Exam    GENERAL APPEARANCE: healthy, alert and no distress     EYES: EOMI, PERRL     HENT: ear canals and TM's normal and nose and mouth without ulcers or lesions     NECK: no adenopathy, no asymmetry, masses, or scars and thyroid normal to palpation     RESP: lungs clear to auscultation - no rales, rhonchi or wheezes     CV: regular rates and rhythm, normal S1 S2, no S3 or S4 and no murmur, click or rub     ABDOMEN:  soft, nontender, no HSM or masses and bowel sounds normal     MS: extremities normal- no gross deformities noted, no evidence of inflammation in joints, FROM in all extremities.     SKIN: no suspicious lesions or rashes     NEURO: Normal strength and tone, sensory exam grossly normal, mentation intact and speech normal     PSYCH: mentation appears normal. and affect normal/bright     LYMPHATICS: No cervical adenopathy    Recent Labs   Lab Test 01/11/22  1230 05/18/21  1029   HGB  --  14.4   PLT  --  334    141   POTASSIUM 4.2 3.7   CR 0.66 0.68   A1C  --  5.2        Diagnostics:  Labs pending at this time.  Results will be reviewed when available.   No EKG required for low risk surgery (cataract, skin procedure, breast biopsy, etc).    Revised Cardiac Risk Index (RCRI):  The patient has the following serious cardiovascular risks for perioperative complications:   - No serious cardiac risks = 0 points              Signed Electronically by: Polly Mcbride MD  Copy of this evaluation report is provided to requesting physician.

## 2022-03-29 ENCOUNTER — OFFICE VISIT (OUTPATIENT)
Dept: FAMILY MEDICINE | Facility: CLINIC | Age: 42
End: 2022-03-29
Payer: COMMERCIAL

## 2022-03-29 VITALS
DIASTOLIC BLOOD PRESSURE: 89 MMHG | BODY MASS INDEX: 38.67 KG/M2 | SYSTOLIC BLOOD PRESSURE: 143 MMHG | OXYGEN SATURATION: 100 % | HEART RATE: 94 BPM | TEMPERATURE: 97.3 F | WEIGHT: 246.9 LBS

## 2022-03-29 DIAGNOSIS — Z01.818 PREOP GENERAL PHYSICAL EXAM: Primary | ICD-10-CM

## 2022-03-29 DIAGNOSIS — I10 PRIMARY HYPERTENSION: ICD-10-CM

## 2022-03-29 DIAGNOSIS — N93.8 DUB (DYSFUNCTIONAL UTERINE BLEEDING): ICD-10-CM

## 2022-03-29 LAB
ERYTHROCYTE [DISTWIDTH] IN BLOOD BY AUTOMATED COUNT: 13.9 % (ref 10–15)
HCT VFR BLD AUTO: 45.2 % (ref 35–47)
HGB BLD-MCNC: 15.2 G/DL (ref 11.7–15.7)
MCH RBC QN AUTO: 30.5 PG (ref 26.5–33)
MCHC RBC AUTO-ENTMCNC: 33.6 G/DL (ref 31.5–36.5)
MCV RBC AUTO: 91 FL (ref 78–100)
PLATELET # BLD AUTO: 370 10E3/UL (ref 150–450)
RBC # BLD AUTO: 4.99 10E6/UL (ref 3.8–5.2)
SARS-COV-2 RNA RESP QL NAA+PROBE: NEGATIVE
WBC # BLD AUTO: 8.4 10E3/UL (ref 4–11)

## 2022-03-29 PROCEDURE — 99214 OFFICE O/P EST MOD 30 MIN: CPT | Performed by: FAMILY MEDICINE

## 2022-03-29 PROCEDURE — U0005 INFEC AGEN DETEC AMPLI PROBE: HCPCS | Performed by: FAMILY MEDICINE

## 2022-03-29 PROCEDURE — U0003 INFECTIOUS AGENT DETECTION BY NUCLEIC ACID (DNA OR RNA); SEVERE ACUTE RESPIRATORY SYNDROME CORONAVIRUS 2 (SARS-COV-2) (CORONAVIRUS DISEASE [COVID-19]), AMPLIFIED PROBE TECHNIQUE, MAKING USE OF HIGH THROUGHPUT TECHNOLOGIES AS DESCRIBED BY CMS-2020-01-R: HCPCS | Performed by: FAMILY MEDICINE

## 2022-03-29 PROCEDURE — 36415 COLL VENOUS BLD VENIPUNCTURE: CPT | Performed by: FAMILY MEDICINE

## 2022-03-29 PROCEDURE — 85027 COMPLETE CBC AUTOMATED: CPT | Performed by: FAMILY MEDICINE

## 2022-03-29 RX ORDER — AMLODIPINE BESYLATE 10 MG/1
10 TABLET ORAL DAILY
Qty: 30 TABLET | Refills: 0
Start: 2022-03-29 | End: 2022-06-09

## 2022-03-29 ASSESSMENT — PATIENT HEALTH QUESTIONNAIRE - PHQ9
10. IF YOU CHECKED OFF ANY PROBLEMS, HOW DIFFICULT HAVE THESE PROBLEMS MADE IT FOR YOU TO DO YOUR WORK, TAKE CARE OF THINGS AT HOME, OR GET ALONG WITH OTHER PEOPLE: SOMEWHAT DIFFICULT
SUM OF ALL RESPONSES TO PHQ QUESTIONS 1-9: 2
SUM OF ALL RESPONSES TO PHQ QUESTIONS 1-9: 2

## 2022-03-30 ASSESSMENT — PATIENT HEALTH QUESTIONNAIRE - PHQ9: SUM OF ALL RESPONSES TO PHQ QUESTIONS 1-9: 2

## 2022-04-01 PROBLEM — M54.50 LUMBAR SPINE PAIN: Status: RESOLVED | Noted: 2022-02-14 | Resolved: 2022-04-01

## 2022-04-01 NOTE — PROGRESS NOTES
DISCHARGE REPORT    Progress reporting period is from 2/14/2022 to 2/28/2022.       SUBJECTIVE  Subjective: Doing HEP once per day. Back has been feeling better..    Current Pain level: 3/10.     Initial Pain level: 4/10.   Changes in function:  None  Adverse reaction to treatment or activity: None        ASSESSMENT/PLAN  Updated problem list and treatment plan: Diagnosis 1:  Lumbar spine pain  STG/LTGs have been met or progress has been made towards goals:  None  Assessment of Progress: The patient's condition is unchanged.  Self Management Plans:  Patient has been instructed in a home treatment program.  Patient is independent in a home treatment program.  I have re-evaluated this patient and find that the nature, scope, duration and intensity of the therapy is appropriate for the medical condition of the patient.    Recommendations:  Pt has not returned to physical therapy in over 1 month and will be discharged to [facility] at this time.

## 2022-04-07 ENCOUNTER — OFFICE VISIT (OUTPATIENT)
Dept: OBGYN | Facility: CLINIC | Age: 42
End: 2022-04-07
Payer: COMMERCIAL

## 2022-04-07 VITALS
TEMPERATURE: 98.3 F | HEART RATE: 96 BPM | BODY MASS INDEX: 38.61 KG/M2 | WEIGHT: 246.5 LBS | DIASTOLIC BLOOD PRESSURE: 81 MMHG | OXYGEN SATURATION: 98 % | SYSTOLIC BLOOD PRESSURE: 118 MMHG

## 2022-04-07 DIAGNOSIS — Z98.890 POSTOPERATIVE STATE: Primary | ICD-10-CM

## 2022-04-07 PROCEDURE — 99024 POSTOP FOLLOW-UP VISIT: CPT | Performed by: OBSTETRICS & GYNECOLOGY

## 2022-04-07 NOTE — PROGRESS NOTES
Danitza is a 41 year old , She is 1 weeks s/p TO.  Her postop recovery has been complicated by some post op burning sensation at the right angle.  She is currently requiring no narcotics for pain management.  no vaginal bleeding, no hot flashes. Energy level is improving.  Denies fever, chills.    The pathology report showed:   Uterus, cervix and right fallopian tube, hysterectomy and right salpingectomy -  1.  Weakly proliferative endometrium.  2.  Subserosal leiomyoma.  3.  Benign cervix and fallopian tube.    The medical history, social history and family history have been reviewed and updated as indicated.      ROS:   ROS:4 point ROS including Respiratory, CV, GI and , other than that noted in the HPI and the PMH, is negative     PE: /81 (BP Location: Right arm, Cuff Size: Adult Large)   Pulse 96   Temp 98.3  F (36.8  C) (Oral)   Wt 111.8 kg (246 lb 8 oz)   SpO2 98%   BMI 38.61 kg/m    HEENT:  NC/AT, EOMI  Abd: soft, non tender, no masses.  No induration or drainage.  Incision: intact, no erythema, induration or discharge    ASSESSMENT:  Post op state     PLAN:  Keep 6 week post op check.     Lucian Reyna MD

## 2022-04-08 ENCOUNTER — MYC MEDICAL ADVICE (OUTPATIENT)
Dept: FAMILY MEDICINE | Facility: CLINIC | Age: 42
End: 2022-04-08

## 2022-04-11 ENCOUNTER — MYC MEDICAL ADVICE (OUTPATIENT)
Dept: OBGYN | Facility: CLINIC | Age: 42
End: 2022-04-11

## 2022-05-03 NOTE — PROGRESS NOTES
"   Assessment & Plan     Memory loss  We discussed checking labs today , also if all labs are normal , will consider and gave her a referral for neurology consult   - CBC with platelets; Future  - Vitamin B12; Future  - Vitamin D Deficiency; Future  - TSH with free T4 reflex; Future  - Comprehensive metabolic panel (BMP + Alb, Alk Phos, ALT, AST, Total. Bili, TP); Future  - Magnesium; Future  - Adult Neurology  Referral; Future  - CBC with platelets  - Vitamin B12  - Vitamin D Deficiency  - TSH with free T4 reflex  - Comprehensive metabolic panel (BMP + Alb, Alk Phos, ALT, AST, Total. Bili, TP)  - Magnesium  - T4 free             BMI:   Estimated body mass index is 39 kg/m  as calculated from the following:    Height as of this encounter: 1.702 m (5' 7\").    Weight as of this encounter: 112.9 kg (249 lb).   RTC if no improving or worsening.  Pt is aware  and comfortable with the current plan.      Polly Mcbride MD  [facility]   Trish is a 41 year old who presents for the following health issues     History of Present Illness       Reason for visit:  Memory problems  Symptom onset:  More than a month  Symptoms include:  Short term memory loss  Symptom intensity:  Moderate  Symptom progression:  Worsening  Had these symptoms before:  No  What makes it worse:  N/a  What makes it better:  N/a    She eats 2-3 servings of fruits and vegetables daily.She consumes 5 sweetened beverage(s) daily.She exercises with enough effort to increase her heart rate 20 to 29 minutes per day.  She exercises with enough effort to increase her heart rate 4 days per week. She is missing 1 dose(s) of medications per week.  She is not taking prescribed medications regularly due to remembering to take.     1)Lost over 60 lbs 2018 with the surgery , recently with wegovy , switched to Saxenda which is daily injections   Hysterectomy on April 1st , healing well going ok uterus and one ovary removed , " and one ovary left   No need for PAP   2) memory since the wieght loss surgery in 2018 , for over three yrs really bad short term memory ,is affected the most ,  hard to remember things , asks same questions over and over , not constant , off work now for over a month , same thing   Takes 40 mg omeprazole in the am and 20 mg in the pm , now GERD is better on that dose     Review of Systems   Constitutional, HEENT, cardiovascular, pulmonary, GI, , musculoskeletal, neuro, skin, endocrine and psych systems are negative, except as otherwise noted.      Objective    There were no vitals taken for this visit.  There is no height or weight on file to calculate BMI.  Physical Exam   GENERAL: healthy, alert and no distress  EYES: Eyes grossly normal to inspection, PERRL and conjunctivae and sclerae normal  NECK: no adenopathy, no asymmetry, masses, or scars and thyroid normal to palpation  RESP: lungs clear to auscultation - no rales, rhonchi or wheezes  CV: regular rate and rhythm, normal S1 S2, no S3 or S4, no murmur, click or rub, no peripheral edema and peripheral pulses strong  ABDOMEN: soft, nontender, no hepatosplenomegaly, no masses and bowel sounds normal  MS: no gross musculoskeletal defects noted, no edema  NEURO: Normal strength and tone, mentation intact and speech normal  PSYCH: mentation appears normal, affect normal/bright    Results for orders placed or performed in visit on 05/04/22   CBC with platelets     Status: Normal   Result Value Ref Range    WBC Count 9.3 4.0 - 11.0 10e3/uL    RBC Count 4.93 3.80 - 5.20 10e6/uL    Hemoglobin 15.1 11.7 - 15.7 g/dL    Hematocrit 45.7 35.0 - 47.0 %    MCV 93 78 - 100 fL    MCH 30.6 26.5 - 33.0 pg    MCHC 33.0 31.5 - 36.5 g/dL    RDW 13.5 10.0 - 15.0 %    Platelet Count 365 150 - 450 10e3/uL   Vitamin B12     Status: Normal   Result Value Ref Range    Vitamin B12 401 193 - 986 pg/mL   Vitamin D Deficiency     Status: Normal   Result Value Ref Range    Vitamin D, Total  (25-Hydroxy) 43 20 - 75 ug/L    Narrative    Season, race, dietary intake, and treatment affect the concentration of 25-hydroxy-Vitamin D. Values may decrease during winter months and increase during summer months. Values 20-29 ug/L may indicate Vitamin D insufficiency and values <20 ug/L may indicate Vitamin D deficiency.    Vitamin D determination is routinely performed by an immunoassay specific for 25 hydroxyvitamin D3.  If an individual is on vitamin D2(ergocalciferol) supplementation, please specify 25 OH vitamin D2 and D3 level determination by LCMSMS test [id number].     TSH with free T4 reflex     Status: Abnormal   Result Value Ref Range    TSH 4.09 (H) 0.40 - 4.00 mU/L   Comprehensive metabolic panel (BMP + Alb, Alk Phos, ALT, AST, Total. Bili, TP)     Status: Normal   Result Value Ref Range    Sodium 136 133 - 144 mmol/L    Potassium 3.7 3.4 - 5.3 mmol/L    Chloride 105 94 - 109 mmol/L    Carbon Dioxide (CO2) 23 20 - 32 mmol/L    Anion Gap 8 3 - 14 mmol/L    Urea Nitrogen 9 7 - 30 mg/dL    Creatinine 0.67 0.52 - 1.04 mg/dL    Calcium 9.2 8.5 - 10.1 mg/dL    Glucose 79 70 - 99 mg/dL    Alkaline Phosphatase 104 40 - 150 U/L    AST 12 0 - 45 U/L    ALT 20 0 - 50 U/L    Protein Total 7.7 6.8 - 8.8 g/dL    Albumin 3.7 3.4 - 5.0 g/dL    Bilirubin Total 0.3 0.2 - 1.3 mg/dL    GFR Estimate >90 >60 mL/min/1.73m2   Magnesium     Status: Normal   Result Value Ref Range    Magnesium 2.3 1.6 - 2.3 mg/dL   T4 free     Status: Normal   Result Value Ref Range    Free T4 0.96 0.76 - 1.46 ng/dL

## 2022-05-04 ENCOUNTER — OFFICE VISIT (OUTPATIENT)
Dept: FAMILY MEDICINE | Facility: CLINIC | Age: 42
End: 2022-05-04

## 2022-05-04 VITALS
BODY MASS INDEX: 39.08 KG/M2 | WEIGHT: 249 LBS | SYSTOLIC BLOOD PRESSURE: 122 MMHG | DIASTOLIC BLOOD PRESSURE: 79 MMHG | HEART RATE: 85 BPM | OXYGEN SATURATION: 97 % | TEMPERATURE: 97.9 F | HEIGHT: 67 IN

## 2022-05-04 DIAGNOSIS — R41.3 MEMORY LOSS: Primary | ICD-10-CM

## 2022-05-04 LAB
ERYTHROCYTE [DISTWIDTH] IN BLOOD BY AUTOMATED COUNT: 13.5 % (ref 10–15)
HCT VFR BLD AUTO: 45.7 % (ref 35–47)
HGB BLD-MCNC: 15.1 G/DL (ref 11.7–15.7)
MCH RBC QN AUTO: 30.6 PG (ref 26.5–33)
MCHC RBC AUTO-ENTMCNC: 33 G/DL (ref 31.5–36.5)
MCV RBC AUTO: 93 FL (ref 78–100)
PLATELET # BLD AUTO: 365 10E3/UL (ref 150–450)
RBC # BLD AUTO: 4.93 10E6/UL (ref 3.8–5.2)
VIT B12 SERPL-MCNC: 401 PG/ML (ref 193–986)
WBC # BLD AUTO: 9.3 10E3/UL (ref 4–11)

## 2022-05-04 PROCEDURE — 83735 ASSAY OF MAGNESIUM: CPT | Performed by: FAMILY MEDICINE

## 2022-05-04 PROCEDURE — 82607 VITAMIN B-12: CPT | Performed by: FAMILY MEDICINE

## 2022-05-04 PROCEDURE — 84443 ASSAY THYROID STIM HORMONE: CPT | Performed by: FAMILY MEDICINE

## 2022-05-04 PROCEDURE — 36415 COLL VENOUS BLD VENIPUNCTURE: CPT | Performed by: FAMILY MEDICINE

## 2022-05-04 PROCEDURE — 80053 COMPREHEN METABOLIC PANEL: CPT | Performed by: FAMILY MEDICINE

## 2022-05-04 PROCEDURE — 85027 COMPLETE CBC AUTOMATED: CPT | Performed by: FAMILY MEDICINE

## 2022-05-04 PROCEDURE — 82306 VITAMIN D 25 HYDROXY: CPT | Performed by: FAMILY MEDICINE

## 2022-05-04 PROCEDURE — 99213 OFFICE O/P EST LOW 20 MIN: CPT | Performed by: FAMILY MEDICINE

## 2022-05-04 PROCEDURE — 84439 ASSAY OF FREE THYROXINE: CPT | Performed by: FAMILY MEDICINE

## 2022-05-05 LAB
ALBUMIN SERPL-MCNC: 3.7 G/DL (ref 3.4–5)
ALP SERPL-CCNC: 104 U/L (ref 40–150)
ALT SERPL W P-5'-P-CCNC: 20 U/L (ref 0–50)
ANION GAP SERPL CALCULATED.3IONS-SCNC: 8 MMOL/L (ref 3–14)
AST SERPL W P-5'-P-CCNC: 12 U/L (ref 0–45)
BILIRUB SERPL-MCNC: 0.3 MG/DL (ref 0.2–1.3)
BUN SERPL-MCNC: 9 MG/DL (ref 7–30)
CALCIUM SERPL-MCNC: 9.2 MG/DL (ref 8.5–10.1)
CHLORIDE BLD-SCNC: 105 MMOL/L (ref 94–109)
CO2 SERPL-SCNC: 23 MMOL/L (ref 20–32)
CREAT SERPL-MCNC: 0.67 MG/DL (ref 0.52–1.04)
DEPRECATED CALCIDIOL+CALCIFEROL SERPL-MC: 43 UG/L (ref 20–75)
GFR SERPL CREATININE-BSD FRML MDRD: >90 ML/MIN/1.73M2
GLUCOSE BLD-MCNC: 79 MG/DL (ref 70–99)
MAGNESIUM SERPL-MCNC: 2.3 MG/DL (ref 1.6–2.3)
POTASSIUM BLD-SCNC: 3.7 MMOL/L (ref 3.4–5.3)
PROT SERPL-MCNC: 7.7 G/DL (ref 6.8–8.8)
SODIUM SERPL-SCNC: 136 MMOL/L (ref 133–144)
T4 FREE SERPL-MCNC: 0.96 NG/DL (ref 0.76–1.46)
TSH SERPL DL<=0.005 MIU/L-ACNC: 4.09 MU/L (ref 0.4–4)

## 2022-05-06 ENCOUNTER — MYC MEDICAL ADVICE (OUTPATIENT)
Dept: FAMILY MEDICINE | Facility: CLINIC | Age: 42
End: 2022-05-06

## 2022-05-06 DIAGNOSIS — E03.9 ACQUIRED HYPOTHYROIDISM: Primary | ICD-10-CM

## 2022-05-09 RX ORDER — LEVOTHYROXINE SODIUM 25 UG/1
25 TABLET ORAL DAILY
Qty: 30 TABLET | Refills: 1 | Status: SHIPPED | OUTPATIENT
Start: 2022-05-09 | End: 2022-06-09

## 2022-05-10 ENCOUNTER — OFFICE VISIT (OUTPATIENT)
Dept: OBGYN | Facility: CLINIC | Age: 42
End: 2022-05-10

## 2022-05-10 VITALS
WEIGHT: 248 LBS | DIASTOLIC BLOOD PRESSURE: 88 MMHG | HEART RATE: 88 BPM | BODY MASS INDEX: 38.84 KG/M2 | OXYGEN SATURATION: 97 % | SYSTOLIC BLOOD PRESSURE: 126 MMHG

## 2022-05-10 DIAGNOSIS — Z30.46 ENCOUNTER FOR NEXPLANON REMOVAL: ICD-10-CM

## 2022-05-10 DIAGNOSIS — Z98.890 POSTOPERATIVE STATE: Primary | ICD-10-CM

## 2022-05-10 PROCEDURE — 99024 POSTOP FOLLOW-UP VISIT: CPT | Mod: 25 | Performed by: OBSTETRICS & GYNECOLOGY

## 2022-05-10 PROCEDURE — 11982 REMOVE DRUG IMPLANT DEVICE: CPT | Performed by: OBSTETRICS & GYNECOLOGY

## 2022-05-10 NOTE — LETTER
May 10, 2022      Danitza RAMIRO Soto  [address]  [facility] [address]          To whom it may concern,    Trish has been under my care for surgery. She had post op visit on 5/10/22 and is ok to return to work with no restriction starting 5/13/22.        Sincerely,      MD Kerry Welch Ob/Gyn

## 2022-05-10 NOTE — PROGRESS NOTES
Danitza is a 41 year old , She is 6 weeks s/p TO.  Her postop recovery has been uncomplicated.  She is currently requiring no medications for pain management.  No vaginal bleeding, No hot flashes. Energy level is normal.  Denies fever, chills.    The pathology report showed:   Uterus, cervix and right fallopian tube, hysterectomy and right salpingectomy -  1.  Weakly proliferative endometrium.  2.  Subserosal leiomyoma.  3.  Benign cervix and fallopian tube.    The medical history, social history and family history have been reviewed and updated as indicated.      ROS:   ROS:4 point ROS including Respiratory, CV, GI and , other than that noted in the HPI and the PMH, is negative     PE: /88 (BP Location: Right arm, Cuff Size: Adult Large)   Pulse 88   Wt 112.5 kg (248 lb)   LMP 2022   SpO2 97%   BMI 38.84 kg/m    HEENT:  NC/AT, EOMI  Abd: soft, non tender, no masses  Incision: intact, no erythema, induration or discharge  NEFG  Vagina: cuff healing, no lesions      ASSESSMENT:  Post op state  Nexplanon removal     PLAN:  Remove nexplanon    Lucian Reyna MD      PROCEDURE NOTE:  Danitza was counseled about removal. She voiced her understanding and informed consent was obtained.  She was placed in a supine position. Left arm was flexed at the elbow and externally rotated. The implant was located by palpation and marked at the end closest to the elbow with a sterile marker. The implant was palpated by both myself and the patient.    The area was cleaned and antiseptic applied. I injected sufficient anesthetic, 1cc of 1% Lidocaine just underneath the end of the implant closest to the elbow.  I pressed down on the end of the implant closest to the axilla and made a 3 mm incision in the arm, at the tip of the implant closest to the elbow. I gently pushed the implant toward the incision until the tip was visible and grasped the implant with sterile mosquito forceps and gently removed it.   The  incision was closed with a butterfly closure and an adhesive bandage applied. A sterile gauze with pressure bandage was also applied.  Aseptic conditions were maintained throughout the procedure. The patient tolerated the procedure well.  No apparent complications.     Lucian Reyna MD

## 2022-05-12 ENCOUNTER — ANCILLARY PROCEDURE (OUTPATIENT)
Dept: ULTRASOUND IMAGING | Facility: CLINIC | Age: 42
End: 2022-05-12
Attending: FAMILY MEDICINE

## 2022-05-12 DIAGNOSIS — E03.9 ACQUIRED HYPOTHYROIDISM: ICD-10-CM

## 2022-05-12 PROCEDURE — 76536 US EXAM OF HEAD AND NECK: CPT | Mod: GC | Performed by: RADIOLOGY

## 2022-06-09 ENCOUNTER — TELEPHONE (OUTPATIENT)
Dept: ENDOCRINOLOGY | Facility: CLINIC | Age: 42
End: 2022-06-09
Payer: COMMERCIAL

## 2022-06-09 NOTE — TELEPHONE ENCOUNTER
Confirmed with patient that she did request this pharmacy change. Dr. Polly Mcbride signed off on this.

## 2022-06-09 NOTE — TELEPHONE ENCOUNTER
Refills available on this prescription at [facility] pharmacy. [employer] message sent to patient to see if she requested a pharmacy change.

## 2022-06-10 ENCOUNTER — TELEPHONE (OUTPATIENT)
Dept: ENDOCRINOLOGY | Facility: CLINIC | Age: 42
End: 2022-06-10

## 2022-06-13 NOTE — TELEPHONE ENCOUNTER
I only refilled the 40 mg omeprazole , but noticed on her med list both dosages - can you double check with the pt which one she is taking ?  Thanks

## 2022-06-13 NOTE — TELEPHONE ENCOUNTER
RHONDA,    Called [facility].  They received 2 Omeprazole Rx's: one for 40 mg and one for 20 mg.    Needs clarification on what dose patient is to be taking.    Ami Garcia RN

## 2022-06-22 ENCOUNTER — OFFICE VISIT (OUTPATIENT)
Dept: FAMILY MEDICINE | Facility: CLINIC | Age: 42
End: 2022-06-22
Payer: COMMERCIAL

## 2022-06-22 VITALS
SYSTOLIC BLOOD PRESSURE: 128 MMHG | OXYGEN SATURATION: 98 % | WEIGHT: 252 LBS | TEMPERATURE: 98.7 F | HEART RATE: 101 BPM | DIASTOLIC BLOOD PRESSURE: 80 MMHG | BODY MASS INDEX: 39.47 KG/M2

## 2022-06-22 DIAGNOSIS — R42 DIZZINESS: ICD-10-CM

## 2022-06-22 DIAGNOSIS — Z86.79 S/P ABLATION OF ATRIAL FIBRILLATION: ICD-10-CM

## 2022-06-22 DIAGNOSIS — R01.1 HEART MURMUR: ICD-10-CM

## 2022-06-22 DIAGNOSIS — R53.82 CHRONIC FATIGUE: ICD-10-CM

## 2022-06-22 DIAGNOSIS — Z98.890 S/P ABLATION OF ATRIAL FIBRILLATION: ICD-10-CM

## 2022-06-22 DIAGNOSIS — J34.89 SINUS PRESSURE: Primary | ICD-10-CM

## 2022-06-22 DIAGNOSIS — F41.9 ANXIETY: ICD-10-CM

## 2022-06-22 DIAGNOSIS — F33.1 MODERATE EPISODE OF RECURRENT MAJOR DEPRESSIVE DISORDER (H): Chronic | ICD-10-CM

## 2022-06-22 PROCEDURE — 99213 OFFICE O/P EST LOW 20 MIN: CPT | Performed by: PHYSICIAN ASSISTANT

## 2022-06-22 RX ORDER — PREDNISONE 20 MG/1
20 TABLET ORAL DAILY
Qty: 5 TABLET | Refills: 0 | Status: SHIPPED | OUTPATIENT
Start: 2022-06-22 | End: 2022-06-27

## 2022-06-22 ASSESSMENT — ENCOUNTER SYMPTOMS
LIGHT-HEADEDNESS: 1
SINUS PAIN: 1
HEADACHES: 1
SLEEP DISTURBANCE: 1
FATIGUE: 1
DECREASED CONCENTRATION: 1

## 2022-06-22 NOTE — PROGRESS NOTES
"  Assessment & Plan     Sinus pressure  Not sure of cause.  Try prednisone.  Consider ENT referral   - predniSONE (DELTASONE) 20 MG tablet; Take 1 tablet (20 mg) by mouth daily for 5 days    Chronic fatigue  For now no changes.  At this time will need to work on managing symptoms.  Keep neurology appointment.      Dizziness  With heart murmur and some changes noted on her last echo will repeat.      Heart murmur  As above  - Echocardiogram Complete; Future    Moderate episode of recurrent major depressive disorder (H)  Stable.      S/P ablation of atrial fibrillation  As above               BMI:   Estimated body mass index is 39.47 kg/m  as calculated from the following:    Height as of 5/4/22: 1.702 m (5' 7\").    Weight as of this encounter: 114.3 kg (252 lb).   Weight management plan: did not address        Return in about 4 weeks (around 7/20/2022).    Heidi Carey PA-C  [facility] ANDRZEJ Chambers is a 41 year old accompanied by her partner., presenting for the following health issues:  Fatigue      Fatigue  Associated symptoms include fatigue and headaches.   History of Present Illness       Reason for visit:  Fatigue and general malaise    She eats 0-1 servings of fruits and vegetables daily.She consumes 0 sweetened beverage(s) daily.She exercises with enough effort to increase her heart rate 30 to 60 minutes per day.  She exercises with enough effort to increase her heart rate 3 or less days per week. She is missing 1 dose(s) of medications per week.  She is not taking prescribed medications regularly due to remembering to take.     Has had symptoms for years.  Couldn't get into with primary for long time.  Has had some workup for this already.  Was to see neurology but no opening for several months, has an appointment in Aug with them for her memory concerns.  Did start synthroid a few weeks ago to see if that would help with fatigue.  Hasn't yet.  She is more concerned " today about the pressure in ears, eyes, nose-feels like she will get a cold but nothing will start.  Has had this for seveal weeks.  She thinks she has chronic fatigue syndrome but the new face/sinus symptoms caused her to worry more.  No hx of seasonal allergies.        Her chronic fatigue/memory concerns started before her hysterectomy she had this year.  Also before covid but ? If worse since having that in 2020     Forget converstations, tasks.  Not loosing things.  Loosing words.    Mood is stable.  Has autism, doesn't think she has adhd-didn't use to have problems with concentrating.        Sleep is up and down.  Has had both insomnia and then sleeping all the time.  No triggers for insomnia.  Doesn't matter how long she sleeps always tired.  Activity makes it worse.  Wears cpap.  More mentaly tired than physically tired but can nap.  Tried melatonin.  Has good sleep hygiene.        Dizziness comes and goes.  randomly will get tunnel vision and she will have to sotp and take a break.  Did have an ablation for paroxysmal afib but doesn't feel any palpitations when this happens. No nausea.              Review of Systems   Constitutional: Positive for fatigue.   HENT: Positive for sinus pain.    Neurological: Positive for light-headedness and headaches.   Psychiatric/Behavioral: Positive for decreased concentration and sleep disturbance.            Objective    /80   Pulse 101   Temp 98.7  F (37.1  C) (Oral)   Wt 114.3 kg (252 lb)   LMP 04/01/2022   SpO2 98%   BMI 39.47 kg/m    Body mass index is 39.47 kg/m .  Physical Exam  Constitutional:       Appearance: She is obese.   HENT:      Nose: Nose normal.   Eyes:      Extraocular Movements: Extraocular movements intact.      Pupils: Pupils are equal, round, and reactive to light.   Cardiovascular:      Rate and Rhythm: Normal rate and regular rhythm.      Heart sounds: Murmur (systolic ) heard.   Pulmonary:      Effort: Pulmonary effort is normal.       Breath sounds: Normal breath sounds.   Lymphadenopathy:      Cervical: No cervical adenopathy.   Neurological:      Mental Status: She is alert.      Cranial Nerves: Cranial nerves 2-12 are intact.      Deep Tendon Reflexes:      Reflex Scores:       Patellar reflexes are 1+ on the right side and 1+ on the left side.  Psychiatric:         Mood and Affect: Mood normal.                            .  ..

## 2022-06-23 ENCOUNTER — MYC MEDICAL ADVICE (OUTPATIENT)
Dept: FAMILY MEDICINE | Facility: CLINIC | Age: 42
End: 2022-06-23

## 2022-06-23 RX ORDER — VENLAFAXINE HYDROCHLORIDE 150 MG/1
CAPSULE, EXTENDED RELEASE ORAL
Qty: 1 CAPSULE | Refills: 0 | OUTPATIENT
Start: 2022-06-23

## 2022-06-23 NOTE — TELEPHONE ENCOUNTER
Printed forms placed in LS's box to complete  Also see msg below from patient and determine if you need a Appt to complete the form  And if and when done contac patient for what to do with form  [facility] Coordinator

## 2022-06-25 NOTE — PROGRESS NOTES
Trish is a 41 year old who is being evaluated via a billable video visit.      How would you like to obtain your AVS? MyChart  If the video visit is dropped, the invitation should be resent by: Text to cell phone: [phone]  Will anyone else be joining your video visit? No        Assessment & Plan     Gastroesophageal reflux disease with esophagitis without hemorrhage  She has been on the 60 mg of ompeprazole for a few yrs now and this is over the recommended dose and for an extended period of time, I have advised seeing a GI as she states that her GERD is bad if she goes down to the 40 mg even . Referral given for GI consult   - Adult GI  Referral - Consult Only; Future    Memory loss  See below     Fatigue, unspecified type  Filled out her work forms FMLA so she can be excused if she is feeling really tired dome days , allowed a couple days a week if needed for a few hours          f/u in three months sooner if needed , she has an approximant scheduled with neurology as well       Polly Mcbride MD  [facility]   Trish is a 41 year old, presenting for the following health issues:  No chief complaint on file.      History of Present Illness       Reason for visit:  Fatigue and general malaise    She eats 0-1 servings of fruits and vegetables daily.She consumes 0 sweetened beverage(s) daily.She exercises with enough effort to increase her heart rate 30 to 60 minutes per day.  She exercises with enough effort to increase her heart rate 3 or less days per week. She is missing 1 dose(s) of medications per week.  She is not taking prescribed medications regularly due to remembering to take.             Review of Systems   Constitutional, HEENT, cardiovascular, pulmonary, GI, , musculoskeletal, neuro, skin, endocrine and psych systems are negative, except as otherwise noted.      Objective           Vitals:  No vitals were obtained today due to virtual visit.    Physical  Exam   GENERAL: Healthy, alert and no distress  EYES: Eyes grossly normal to inspection.  No discharge or erythema, or obvious scleral/conjunctival abnormalities.  RESP: No audible wheeze, cough, or visible cyanosis.  No visible retractions or increased work of breathing.    SKIN: Visible skin clear. No significant rash, abnormal pigmentation or lesions.  NEURO: Cranial nerves grossly intact.  Mentation and speech appropriate for age.  PSYCH: Mentation appears normal, affect normal/bright, judgement and insight intact, normal speech and appearance well-groomed.    No results found for this or any previous visit (from the past 24 hour(s)).            Video-Visit Details    Video Start Time: 8:59 am     Type of service:  Video Visit    Video End Time:9:10 AM    Originating Location (pt. Location): Home    Distant Location (provider location):  [facility]     Platform used for Video Visit: Xenapto    .  Mary.

## 2022-06-29 ENCOUNTER — VIRTUAL VISIT (OUTPATIENT)
Dept: FAMILY MEDICINE | Facility: CLINIC | Age: 42
End: 2022-06-29
Payer: COMMERCIAL

## 2022-06-29 DIAGNOSIS — R41.3 MEMORY LOSS: ICD-10-CM

## 2022-06-29 DIAGNOSIS — K21.00 GASTROESOPHAGEAL REFLUX DISEASE WITH ESOPHAGITIS WITHOUT HEMORRHAGE: Primary | ICD-10-CM

## 2022-06-29 DIAGNOSIS — R53.83 FATIGUE, UNSPECIFIED TYPE: ICD-10-CM

## 2022-06-29 PROCEDURE — 99213 OFFICE O/P EST LOW 20 MIN: CPT | Mod: GT | Performed by: FAMILY MEDICINE

## 2022-06-29 ASSESSMENT — PAIN SCALES - GENERAL: PAINLEVEL: NO PAIN (0)

## 2022-07-06 ENCOUNTER — MYC MEDICAL ADVICE (OUTPATIENT)
Dept: FAMILY MEDICINE | Facility: CLINIC | Age: 42
End: 2022-07-06

## 2022-07-06 ENCOUNTER — TELEPHONE (OUTPATIENT)
Dept: GASTROENTEROLOGY | Facility: CLINIC | Age: 42
End: 2022-07-06

## 2022-07-06 DIAGNOSIS — K21.00 GASTROESOPHAGEAL REFLUX DISEASE WITH ESOPHAGITIS WITHOUT HEMORRHAGE: Primary | ICD-10-CM

## 2022-07-06 NOTE — TELEPHONE ENCOUNTER
Health Call Center    Phone Message    May a detailed message be left on voicemail: yes     Reason for Call: Appointment Intake    Referring Provider Name: Polly Mcbride MD  Diagnosis and/or Symptoms: Gastroesophageal reflux disease with esophagitis without hemorrhage [K21.00]    Per triage notes patient is to be seen urgently for reflux and is currently scheduled in November with Dr. Patino. Current appointment is scheduled outside requested time frame. Please review for further follow up. Thanks!     Action Taken: Message routed to:  [facility] ([facility] GI    Travel Screening: Not Applicable

## 2022-07-28 NOTE — TELEPHONE ENCOUNTER
Left message regarding a opening. Writers number left for call back. Informed will continue to call patient and first come first serve.

## 2022-08-02 NOTE — TELEPHONE ENCOUNTER
REFERRAL INFORMATION:    Referring Provider:  Dr. Polly Mcbride     Referring Clinic:  [facility]     Reason for Visit/Diagnosis: GERD with esophagitis      FUTURE VISIT INFORMATION:    Appointment Date: 11/1/2022    Appointment Time: 10 AM      NOTES STATUS DETAILS   OFFICE NOTE from Referring Provider Internal 6/29/2022 Office visit with Dr. Mcbride      OFFICE NOTE from Other Specialist N/A    HOSPITAL DISCHARGE SUMMARY/  ED VISITS N/A    OPERATIVE REPORT N/A    MEDICATION LIST Internal         ENDOSCOPY  N/A    COLONOSCOPY N/A    ERCP N/A    EUS N/A    STOOL TESTING N/A    PERTINENT LABS Internal    PATHOLOGY REPORTS (RELATED) N/A    IMAGING (CT, MRI, EGD, MRCP, Small Bowel Follow Through/SBT, MR/CT Enterography) Internal US Thyroid: 5/12/2022

## 2022-08-08 NOTE — TELEPHONE ENCOUNTER
Lm regarding a sooner appointment. Informed this will be the last call to get her in sooner. If no word back by the end of the day tomorrow will keep scheduled appointment on 11/1/2022.

## 2022-08-17 NOTE — PROGRESS NOTES
INITIAL NEUROLOGY CONSULTATION    DATE OF VISIT: 8/18/2022  CLINIC LOCATION: [facility]  MRN: [id number]  PATIENT NAME: Danitza Soto  YOB: 1980    REASON FOR VISIT: No chief complaint on file.    HISTORY OF PRESENT ILLNESS:                                                    Ms. Danitza Soto is 42 year old right handed female patient with past medical history of anxiety, hypertension, hyperlipidemia, obstructive sleep apnea, obesity, depression, atrial fibrillation, status post ablation, and insomnia, who was seen today for cognitive complaints.    Per patient's report and review of past medical records, she developed memory issues after her weight loss surgery in 2018.  She feels that her short-term memory is preferentially affected.  It is hard to remember things.  She tends to ask the same questions over again.  She denies any focal neurological symptoms.  No prior history of head injuries, seizures, or CNS infections.    Laboratory evaluation from May 2022 includes unremarkable CMP, elevated TSH (4.09) with normal free T4 (0.96), normal vitamin D (43) vitamin B12 (401), and CBC.    No prior brain imaging.    No additional useful information is available in Care Everywhere, which was reviewed.  PAST MEDICAL/SURGICAL HISTORY:                                                    I personally reviewed patient's past medical and surgical history with the patient at today's visit.  MEDICATIONS:                                                    I personally reviewed patient's medications and allergies with the patient at today's visit.  ALLERGIES:                                                      Allergies   Allergen Reactions     Bupropion Other (See Comments)     Other reaction(s): Chest Pain,Panic attacks, heart/chest pain     Wellbutrin [Bupropion]      Wellbutrin: Panic attacks, heart/chest pain     EXAM:                                                    VITAL SIGNS:    LMP 04/01/2022   Mini-Cog Assessment:       General: pt is in NAD, cooperative.  Skin: normal turgor, moist mucous membranes, no lesions/rashes noticed.  HEENT: ATNC, EOMI, PERRL, white sclera, normal conjunctiva, no nystagmus or ptosis. No carotid bruits bilaterally.  Respiratory: lung sounds clear to auscultation bilaterally, no crackles, wheezes, rhonchi. Symmetric lung excursion, no accessory respiratory muscle use.  Cardiovascular: normal S1/S2, no murmurs/rubs/gallops.   Abdomen: Not distended.  : deferred.    Neurological:  Mental: alert, follows commands,  /5 with ***/3 on memory recall, no aphasia or dysarthria. Fund of knowledge is {MYAPPROPRIATE:[id number]}  Cranial Nerves:  CN II: visual acuity - able to accurately count fingers with each eye. Visual fields intact, fundi: discs sharp, no papilledema and normal vessels bilaterally.  CN III, IV, VI: EOM intact, pupils equal and reactive  CN V: facial sensation nl  CN VII: face symmetric, no facial droop  CN VIII: hearing normal  CN IX: palate elevation symmetric, uvula at midline  CN XI SCM normal, shoulder shrug nl  CN XII: tongue midline  Motor: Strength: 5/5 in all major groups of all extremities. Normal tone. No abnormal movements. No pronator drift b/l.  Reflexes: Triceps, biceps, brachioradialis, patellar, and achilles reflexes normal and symmetric. No clonus noted. Toes are down-going b/l.   Sensory: light touch, pinprick, and vibration intact. Romberg: negative.  Coordination: FNF and heel-shin tests intact b/l.   Gait:  Normal, able to tandem walk *** without difficulty.  DATA:   LABS/EEG/IMAGING/OTHER STUDIES: I reviewed pertinent medical records, as detailed in the history of present illness.  ASSESSMENT AND PLAN:      ASSESSMENT: Danitza Soto is a 42 year old female patient with listed above past medical history, who presents with ***.    We had a detailed discussion with the patient regarding her presenting complaints.  The neurological  exam today is ***.    DIAGNOSES:  No diagnosis found.  PLAN: There are no Patient Instructions on file for this visit.    Total Time: *** minutes spent on the date of the encounter doing chart review, history and exam, documentation and further activities per the note.    Garcia Triana MD  [facility]rology  (Chart documentation was completed in part with Dragon voice-recognition software. Even though reviewed, some grammatical, spelling, and word errors may remain.)

## 2022-08-18 ENCOUNTER — OFFICE VISIT (OUTPATIENT)
Dept: NEUROLOGY | Facility: CLINIC | Age: 42
End: 2022-08-18
Attending: FAMILY MEDICINE
Payer: COMMERCIAL

## 2022-08-18 VITALS
DIASTOLIC BLOOD PRESSURE: 88 MMHG | HEIGHT: 67 IN | BODY MASS INDEX: 40.49 KG/M2 | SYSTOLIC BLOOD PRESSURE: 139 MMHG | WEIGHT: 258 LBS | OXYGEN SATURATION: 99 % | HEART RATE: 81 BPM

## 2022-08-18 DIAGNOSIS — R41.9 COGNITIVE COMPLAINTS: Primary | ICD-10-CM

## 2022-08-18 PROCEDURE — 99205 OFFICE O/P NEW HI 60 MIN: CPT | Performed by: PSYCHIATRY & NEUROLOGY

## 2022-08-18 ASSESSMENT — MONTREAL COGNITIVE ASSESSMENT (MOCA)
12. MEMORY INDEX SCORE: 4
4. NAME EACH OF THE THREE ANIMALS SHOWN: 3
6. READ LIST OF DIGITS [FORWARD/BACKWARD]: 2
11. FOR EACH PAIR OF WORDS, WHAT CATEGORY DO THEY BELONG TO (OUT OF 2): 2
WHAT IS THE TOTAL SCORE (OUT OF 30): 29
10. [FLUENCY] NAME WORDS STARTING WITH DESIGNATED LETTER: 1
VISUOSPATIAL/EXECUTIVE SUBSCORE: 5
WHAT LEVEL OF EDUCATION WAS ATTAINED: 0
8. SERIAL SUBTRACTION OF 7S: 3
9. REPEAT EACH SENTENCE: 2
7. [VIGILENCE] TAP WHEN HEARING DESIGNATED LETTER: 1
13. ORIENTATION SUBSCORE: 6

## 2022-08-18 NOTE — PATIENT INSTRUCTIONS
AFTER VISIT SUMMARY (AVS):    At today's visit we thoroughly discussed various diagnostic possibilities for your symptoms, necessary evaluation, and the plan, which includes:  Orders Placed This Encounter   Procedures    Adult Neuropsychology Referral     No new medications.    Stay physically and mentally active with particular emphasis on daily mentally stimulating activities of your choice (such as crosswords, puzzles, sudoku, etc.), stretching exercises, walking, and healthy eating.     Additional recommendations after the work-up.    Next follow-up appointment is in the next 4 months or earlier if needed.    Please do not hesitate to call me with any questions or concerns.    Thanks.

## 2022-08-18 NOTE — PROGRESS NOTES
INITIAL NEUROLOGY CONSULTATION    DATE OF VISIT: 8/18/2022  CLINIC LOCATION: [facility]  MRN: [id number]  PATIENT NAME: Danitza Soto  YOB: 1980    REASON FOR VISIT:   Chief Complaint   Patient presents with     Consult     Memory concerns, fatigue, joint pain      HISTORY OF PRESENT ILLNESS:                                                    Ms. Danitza Soto is 42 year old right handed female patient with past medical history of anxiety, hypertension, hyperlipidemia, obstructive sleep apnea, obesity, depression, atrial fibrillation, status post ablation, and insomnia, who was seen today for cognitive complaints.  Accompanied by wife.    Per patient's report and review of past medical records, she developed memory issues after her weight loss surgery in 2018.  She feels that her short-term memory is preferentially affected.  It is hard to remember things.  She tends to ask the same questions over again.  She has word finding difficulties.  Occasionally, she might forget to take her medications on time.  No problem with finances or calendar.  Could stutter at times.  Mood is okay, though admits having increased irritability at work.  She denies any focal neurological symptoms, but reports fatigue and joint pain.  Takes 2 naps every day and still feels exhausted.  She has diagnosis of sleep apnea but does not use CPAP regularly.  No prior history of head injuries, seizures, or CNS infections.  Her wife does not have any additional concerns.    Laboratory evaluation from May 2022 includes unremarkable CMP, elevated TSH (4.09) with normal free T4 (0.96), normal vitamin D (43), vitamin B12 (401), and CBC.    No prior brain imaging.    No additional useful information is available in Care Everywhere, which was reviewed.  PAST MEDICAL/SURGICAL HISTORY:                                                    I personally reviewed patient's past medical and surgical history with the  "patient at today's visit.  MEDICATIONS:                                                    I personally reviewed patient's medications and allergies with the patient at today's visit.  ALLERGIES:                                                      Allergies   Allergen Reactions     Bupropion Other (See Comments)     Other reaction(s): Chest Pain,Panic attacks, heart/chest pain     Wellbutrin [Bupropion]      Wellbutrin: Panic attacks, heart/chest pain     EXAM:                                                    VITAL SIGNS:   /88 (BP Location: Right arm, Patient Position: Sitting, Cuff Size: Adult Large)   Pulse 81   Ht 1.702 m (5' 7\")   Wt 117 kg (258 lb)   LMP 04/01/2022   SpO2 99%   BMI 40.41 kg/m    [place] Cognitive Assessment:    [place] Cognitive Assessment (MOCA)  Visuospatial/Executive : 5  Naming: 3  Attention - Digits: 2  Attention - Letters: 1  Attention - Subtraction: 3  Language - Repeat: 2  Language - Fluency : 1  Abstraction: 2  Delayed Recall: 4  Orientation: 6  Education: 0  MOCA Score: 29  Administered by: : Rossi DRISCOLL     [place] Cognitive Assessment Score:  MOCA Score: 29/30.     General: pt is in NAD, cooperative.  Skin: normal turgor, moist mucous membranes, no lesions/rashes noticed.  HEENT: ATNC, EOMI, PERRL, white sclera, normal conjunctiva, no nystagmus or ptosis. No carotid bruits bilaterally.  Respiratory: lung sounds clear to auscultation bilaterally, no crackles, wheezes, rhonchi. Symmetric lung excursion, no accessory respiratory muscle use.  Cardiovascular: normal S1/S2, no murmurs/rubs/gallops.   Abdomen: Not distended.  : deferred.    Neurological:  Mental: alert, follows commands, MoCA as per above, no aphasia or dysarthria. Fund of knowledge is appropriate for age.  Cranial Nerves:  CN II: visual acuity - able to accurately count fingers with each eye. Visual fields intact, fundi: discs sharp, no papilledema and normal vessels bilaterally.  CN III, IV, VI: EOM " intact, pupils equal and reactive  CN V: facial sensation nl  CN VII: face symmetric, no facial droop  CN VIII: hearing normal  CN IX: palate elevation symmetric, uvula at midline  CN XI SCM normal, shoulder shrug nl  CN XII: tongue midline  Motor: Strength: 5/5 in all major groups of all extremities. Normal tone. No abnormal movements. No pronator drift b/l.  Reflexes: Triceps, biceps, brachioradialis, patellar, and achilles reflexes normal and symmetric. No clonus noted. Toes are down-going b/l.   Sensory: light touch, pinprick, and vibration intact. Romberg: negative.  Coordination: FNF and heel-shin tests intact b/l.   Gait:  Normal, able to tandem walk without difficulty.  DATA:   LABS/EEG/IMAGING/OTHER STUDIES: I reviewed pertinent medical records, as detailed in the history of present illness.  ASSESSMENT AND PLAN:      ASSESSMENT: Danitza Soto is a 42 year old female patient with listed above past medical history, who presents with cognitive complaints since weight loss surgery in 2018.    We had a detailed discussion with the patient and her wife regarding her presenting complaints.  MoCA today is 29/30 (within normal limits).  The neurological exam today is non-focal.  No prior brain imaging.  Recent laboratory work-up is unrevealing.    We discussed that her clinical presentation might be explained by not adequately treated sleep apnea, mental health conditions, and medication side effects.  I do not think that likelihood of structural or vascular brain lesion is very high, though we discussed the utility of obtaining brain MRI.  Decided to hold off for now.  I will placed a referral for neuropsychology evaluation and also suggested for her to see her sleep specialist.  The rest of our discussion is summarized below.    DIAGNOSES:    ICD-10-CM    1. Cognitive complaints  R41.9 Adult Neurology  Referral     PLAN: At today's visit we thoroughly discussed various diagnostic possibilities for  patient's symptoms, necessary evaluation, and the plan, which includes:  Orders Placed This Encounter   Procedures     Adult Neuropsychology Referral     No new medications.    Advised the patient to stay physically and mentally active with particular emphasis on daily mentally stimulating activities of  her choice (such as crosswords, puzzles, sudoku, etc.), stretching exercises, walking, and healthy eating.     Additional recommendations after the work-up.    Next follow-up appointment is in the next 4 months or earlier if needed.    Total Time: 61 minutes spent on the date of the encounter doing chart review, history and exam, documentation and further activities per the note.    Garcia Triana MD  [facility] Neurology  (Chart documentation was completed in part with Dragon voice-recognition software. Even though reviewed, some grammatical, spelling, and word errors may remain.)

## 2022-08-18 NOTE — PROGRESS NOTES
"Danitza Soto is a 42 year old female who presents for:  Chief Complaint   Patient presents with     Consult     Memory concerns, fatigue, joint pain         Initial Vitals:  /88 (BP Location: Right arm, Patient Position: Sitting, Cuff Size: Adult Large)   Pulse 81   Ht 1.702 m (5' 7\")   Wt 117 kg (258 lb)   LMP 04/01/2022   SpO2 99%   BMI 40.41 kg/m   Estimated body mass index is 40.41 kg/m  as calculated from the following:    Height as of this encounter: 1.702 m (5' 7\").    Weight as of this encounter: 117 kg (258 lb).. Body surface area is 2.35 meters squared. BP completed using cuff size: large    Visit Facilitator Comments: MoCA 1= 29/30    Rossi Ferguson  "

## 2022-08-18 NOTE — LETTER
8/18/2022         RE: Danitza Soto  [address]  [facility] [address]        Dear Colleague,    Thank you for referring your patient, Danitza Soto, to the [facility]. Please see a copy of my visit note below.    INITIAL NEUROLOGY CONSULTATION    DATE OF VISIT: 8/18/2022  CLINIC LOCATION: [facility]  MRN: [id number]  PATIENT NAME: Danitza Soto  YOB: 1980    REASON FOR VISIT:   Chief Complaint   Patient presents with     Consult     Memory concerns, fatigue, joint pain      HISTORY OF PRESENT ILLNESS:                                                    Ms. Danitza Soto is 42 year old right handed female patient with past medical history of anxiety, hypertension, hyperlipidemia, obstructive sleep apnea, obesity, depression, atrial fibrillation, status post ablation, and insomnia, who was seen today for cognitive complaints.  Accompanied by wife.    Per patient's report and review of past medical records, she developed memory issues after her weight loss surgery in 2018.  She feels that her short-term memory is preferentially affected.  It is hard to remember things.  She tends to ask the same questions over again.  She has word finding difficulties.  Occasionally, she might forget to take her medications on time.  No problem with finances or calendar.  Could stutter at times.  Mood is okay, though admits having increased irritability at work.  She denies any focal neurological symptoms, but reports fatigue and joint pain.  Takes 2 naps every day and still feels exhausted.  She has diagnosis of sleep apnea but does not use CPAP regularly.  No prior history of head injuries, seizures, or CNS infections.  Her wife does not have any additional concerns.    Laboratory evaluation from May 2022 includes unremarkable CMP, elevated TSH (4.09) with normal free T4 (0.96), normal vitamin D (43), vitamin B12 (401), and CBC.    No prior  "brain imaging.    No additional useful information is available in Care Everywhere, which was reviewed.  PAST MEDICAL/SURGICAL HISTORY:                                                    I personally reviewed patient's past medical and surgical history with the patient at today's visit.  MEDICATIONS:                                                    I personally reviewed patient's medications and allergies with the patient at today's visit.  ALLERGIES:                                                      Allergies   Allergen Reactions     Bupropion Other (See Comments)     Other reaction(s): Chest Pain,Panic attacks, heart/chest pain     Wellbutrin [Bupropion]      Wellbutrin: Panic attacks, heart/chest pain     EXAM:                                                    VITAL SIGNS:   /88 (BP Location: Right arm, Patient Position: Sitting, Cuff Size: Adult Large)   Pulse 81   Ht 1.702 m (5' 7\")   Wt 117 kg (258 lb)   LMP 04/01/2022   SpO2 99%   BMI 40.41 kg/m    [place] Cognitive Assessment:    [place] Cognitive Assessment (MOCA)  Visuospatial/Executive : 5  Naming: 3  Attention - Digits: 2  Attention - Letters: 1  Attention - Subtraction: 3  Language - Repeat: 2  Language - Fluency : 1  Abstraction: 2  Delayed Recall: 4  Orientation: 6  Education: 0  MOCA Score: 29  Administered by: : Rossi DRISCOLL     [place] Cognitive Assessment Score:  MOCA Score: 29/30.     General: pt is in NAD, cooperative.  Skin: normal turgor, moist mucous membranes, no lesions/rashes noticed.  HEENT: ATNC, EOMI, PERRL, white sclera, normal conjunctiva, no nystagmus or ptosis. No carotid bruits bilaterally.  Respiratory: lung sounds clear to auscultation bilaterally, no crackles, wheezes, rhonchi. Symmetric lung excursion, no accessory respiratory muscle use.  Cardiovascular: normal S1/S2, no murmurs/rubs/gallops.   Abdomen: Not distended.  : deferred.    Neurological:  Mental: alert, follows commands, MoCA as per above, no " aphasia or dysarthria. Fund of knowledge is appropriate for age.  Cranial Nerves:  CN II: visual acuity - able to accurately count fingers with each eye. Visual fields intact, fundi: discs sharp, no papilledema and normal vessels bilaterally.  CN III, IV, VI: EOM intact, pupils equal and reactive  CN V: facial sensation nl  CN VII: face symmetric, no facial droop  CN VIII: hearing normal  CN IX: palate elevation symmetric, uvula at midline  CN XI SCM normal, shoulder shrug nl  CN XII: tongue midline  Motor: Strength: 5/5 in all major groups of all extremities. Normal tone. No abnormal movements. No pronator drift b/l.  Reflexes: Triceps, biceps, brachioradialis, patellar, and achilles reflexes normal and symmetric. No clonus noted. Toes are down-going b/l.   Sensory: light touch, pinprick, and vibration intact. Romberg: negative.  Coordination: FNF and heel-shin tests intact b/l.   Gait:  Normal, able to tandem walk without difficulty.  DATA:   LABS/EEG/IMAGING/OTHER STUDIES: I reviewed pertinent medical records, as detailed in the history of present illness.  ASSESSMENT AND PLAN:      ASSESSMENT: Danitza Soto is a 42 year old female patient with listed above past medical history, who presents with cognitive complaints since weight loss surgery in 2018.    We had a detailed discussion with the patient and her wife regarding her presenting complaints.  MoCA today is 29/30 (within normal limits).  The neurological exam today is non-focal.  No prior brain imaging.  Recent laboratory work-up is unrevealing.    We discussed that her clinical presentation might be explained by not adequately treated sleep apnea, mental health conditions, and medication side effects.  I do not think that likelihood of structural or vascular brain lesion is very high, though we discussed the utility of obtaining brain MRI.  Decided to hold off for now.  I will placed a referral for neuropsychology evaluation and also suggested for her  to see her sleep specialist.  The rest of our discussion is summarized below.    DIAGNOSES:    ICD-10-CM    1. Cognitive complaints  R41.9 Adult Neurology  Referral     PLAN: At today's visit we thoroughly discussed various diagnostic possibilities for patient's symptoms, necessary evaluation, and the plan, which includes:  Orders Placed This Encounter   Procedures     Adult Neuropsychology Referral     No new medications.    Advised the patient to stay physically and mentally active with particular emphasis on daily mentally stimulating activities of  her choice (such as crosswords, puzzles, sudoku, etc.), stretching exercises, walking, and healthy eating.     Additional recommendations after the work-up.    Next follow-up appointment is in the next 4 months or earlier if needed.    Total Time: 61 minutes spent on the date of the encounter doing chart review, history and exam, documentation and further activities per the note.    Garcia Triana MD  [facility] Neurology  (Chart documentation was completed in part with Dragon voice-recognition software. Even though reviewed, some grammatical, spelling, and word errors may remain.)            Again, thank you for allowing me to participate in the care of your patient.        Sincerely,        Garcia Triana MD

## 2022-08-19 ENCOUNTER — MYC MEDICAL ADVICE (OUTPATIENT)
Dept: SLEEP MEDICINE | Facility: CLINIC | Age: 42
End: 2022-08-19

## 2022-08-24 NOTE — LETTER
"9/1/2020      RE: Danitza Soto  [address]  [facility] [address]       Dear Colleague,    Thank you for the opportunity to participate in the care of your patient, Danitza Soto, at the [facility]. Please see a copy of my visit note below.    Danitza Soto is a 40 year old female who is being evaluated via a billable video visit.      The patient has been notified of following:     \"This video visit will be conducted via a call between you and your physician/provider. We have found that certain health care needs can be provided without the need for an in-person physical exam.  This service lets us provide the care you need with a video conversation.  If a prescription is necessary we can send it directly to your pharmacy.  If lab work is needed we can place an order for that and you can then stop by our lab to have the test done at a later time.    Video visits are billed at different rates depending on your insurance coverage.  Please reach out to your insurance provider with any questions.    If during the course of the call the physician/provider feels a video visit is not appropriate, you will not be charged for this service.\"    Patient has given verbal consent for Video visit? Yes  How would you like to obtain your AVS? MyChart  If you are dropped from the video visit, the video invite should be resent to: Send to e-mail at: [email or web address]  Will anyone else be joining your video visit? No        Video-Visit Details    Type of service:  Video Visit    Video Start Time:11:30 am (video visit duration 10 minutes)    Video End Time: 11:40 AM    Originating Location (pt. Location): Home    Distant Location (provider location):  [facility]     Platform used for Video Visit: Doximity      Chief complaint: Palpitations    HPI: Ms. Danitza Soto " is a 38 year old  female with PMH significant for depression, GERD, LEONARDO, essential hypertension, morbid obesity S/P gastric bypass 11/2018 and paroxysmal atrial fibrillation.    The patient was last seen in cardiology for paroxysmal atrial fibrillation diagnosis in 9/2018.  Her symptoms were mild at that time therefore rate or rhythm control strategy was not pursued.  The patient underwent gastric sleeve surgery in 11/2018 and has lost 70 pounds.  The patient noticed more frequent atrial fibrillation episodes since the surgery almost weekly now lasting up to 8-10 hours.  She finds works stress as a prominent trigger for episodes.  Associated symptoms include chest pressure, chest pain and dizziness.  She denies chest discomfort with exertion at other times.  The patient denies a history of dyspnea, PND, orthopnea, pedal edema, and syncope  Current medications include lisinopril 20 mg, fish oil.  She has cut down on lisinopril from 40 mg since she has lost significant weight.  She quit smoking in 2018 (10 pack years).  No history of alcohol abuse. No history of diabetes.  Family history is negative for CAD.  Prior cardiac tests include CT coronary angiogram in 2013 which did not show evidence of CAD.  Echocardiogram dated 9/2018 showed normal LV and RV function with no significant valve disease.  Moderate LVH was reported. I personnally reviewed the images and I donot agree with the LVH diagnosis. I donot think she has LVH based on echo. Zio patch in 8/2018 showed 4% atrial fibrillation burden heart rate ranged  beats per minute.  Longest episode lasting for 8 hours.    I have reviewed her ECG 11/28/2018 which shows normal sinus rhythm with single PAC, normal IL/QRS/QTC intervals. No evidence of LVH on ECG.    Medications, personal, family, and social history reviewed with patient and revised.    Interval history 5/14/2019:  Patient returns for follow-up to recheck on paroxysmal atrial fibrillation.  Since I  saw 3 months ago, patient developed depression and started on Effexor which improved her mood.  I have started her on flecainide and metoprolol 3 months ago for frequent paroxysmal atrial fibrillation episodes.  She noticed significant decline in the frequency of the episodes however she still reports at least 2 episodes per month sometimes lasting for several hours.  She reports associated dizziness however denies chest pain, shortness of breath, lower extremity edema or syncope.  Since gastric bypass patient lost significant weight with improvement in blood pressure and sleep apnea.  She no longer uses CPAP machine.  She is overall feeling well.    Interval history 8/14/2019:  The patient returns for follow-up to recheck on paroxysmal atrial fibrillation.  When I saw patient last time 3 months ago I increased dose of flecainide to 100 mg twice daily due to symptomatic palpitations.  The patient reports doing well during the first 2 months however had 3 episodes within the last month longest episode lasting for 3 hours with associated tiredness.  Denies chest pain, dyspnea on exertion, dizziness, syncope.  The patient has did a lot of research on atrial fibrillation ablation and she expressed her interest in undergoing procedure.    Interval history 2/18/2020:  Patient underwent catheter ablation of atrial fibrillation on 10/3/2019.  Patient feels great since the ablation.  No recurrence of A. fib since then.  She is currently on flecainide 100 mg twice daily, metoprolol 50 mg and apixaban 5 mg twice daily.  Blood pressure is well controlled with lisinopril 20 mg.  Patient is currently asymptomatic from cardiac standpoint.  Denies chest pain, shortness of breath, palpitations, dizziness, lower extremity edema or syncope.    Interval history 9/1/2020:  Patient reports no new cardiac symptoms since being seen in February 2020.  Denies palpitations, chest pain, shortness of breath.  Patient reports high blood pressure  at home ~140/80 mmHg despite being on lisinopril 40 mg.  She reports being back on CPAP due to weight gain though she had gastric bypass surgery.  She continues to be on topiramate and naltrexone for obesity.  No alcohol or drug abuse.    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 - BRISEYDA I     LEONARDO on CPAP      Other anxiety states        CURRENT MEDICATIONS:  Current Outpatient Medications   Medication Sig Dispense Refill     acetaminophen (TYLENOL) 325 MG tablet Take 2 tablets (650 mg) by mouth every 4 hours as needed for other (For optimal non-opioid multimodal pain management to improve pain control and physical function.) 100 tablet 0     calcium carbonate (OS-CHIKI) 500 MG tablet Take 1 tablet by mouth daily        ferrous gluconate (FERGON) 324 (38 Fe) MG tablet TAKE 1 TABLET(324 MG) BY MOUTH DAILY WITH BREAKFAST 30 tablet 3     fish oil-omega-3 fatty acids 1000 MG capsule Take 2 g by mouth every morning        lisinopril (ZESTRIL) 40 MG tablet Take 1 tablet (40 mg) by mouth daily 30 tablet 3     Multiple Vitamins-Minerals (CENTRUM PO) 2 tablets per day       naltrexone (DEPADE/REVIA) 50 MG tablet Take 1 tablet (50 mg) by mouth daily 30 tablet 5     omeprazole (PRILOSEC) 40 MG DR capsule Take 1 capsule (40 mg) by mouth daily 60 capsule 5     Probiotic Product (PROBIOTIC ADVANCED PO)        topiramate (TOPAMAX) 25 MG tablet Take 1 tablet (25 mg) by mouth 2 times daily 60 tablet 5     venlafaxine (EFFEXOR-XR) 75 MG 24 hr capsule TAKE ONE CAPSULE BY MOUTH DAILY 90 capsule 0     VENTOLIN  (90 Base) MCG/ACT Inhaler INHALE 1-2 PUFFS EVERY 4-6 HOURS AS NEEDED FOR WHEEZING  0     vitamin B  complex with vitamin C (VITAMIN  B COMPLEX) tablet Take 1 tablet by mouth daily       LORazepam (ATIVAN) 0.5 MG tablet Take 1 tablet (0.5 mg) by mouth every 8 hours as needed for anxiety (Patient not taking: Reported on 2020) 20 tablet 0     metoprolol succinate ER (TOPROL-XL) 50 MG 24 hr tablet Take 1 tablet (50 mg) by mouth daily DO NOT CRUSH (Patient not taking: Reported on 2020) 90 tablet 3       PAST SURGICAL HISTORY:  Past Surgical History:   Procedure Laterality Date     EP ABLATION FOCAL AFIB N/A 10/3/2019    Procedure: EP ABLATION FOCAL AFIB;  Surgeon: Harpreet Arevalo MD;  Location: [facility] OR     HC TOOTH EXTRACTION W/FORCEP      [place] Teeth Extraction     LAPAROSCOPIC GASTRIC SLEEVE N/A 2018    Procedure: Laparoscopic Sleeve Gastrectomy Latex Free;  Surgeon: Artis Tse MD;  Location: [facility] OR     LAPAROSCOPIC HERNIORRHAPHY HIATAL  2018    Procedure: LAPAROSCOPIC  HIATAL Hernia Repair;  Surgeon: Artis Tse MD;  Location: [facility] OR       ALLERGIES:     Allergies   Allergen Reactions     Wellbutrin [Bupropion]      Wellbutrin: Panic attacks, heart/chest pain       FAMILY HISTORY:  Family History   Problem Relation Age of Onset     Heart Disease Mother         ,mother had emphesema and  at 62     Hypertension Mother      Allergies Mother      Lipids Mother      Obesity Mother      Respiratory Mother         Emphysema     Diabetes Maternal Grandmother         Type 2?     Hypertension Maternal Grandmother      Circulatory Maternal Grandmother         Due to diabetes     Eye Disorder Maternal Grandmother         Macular degeneration/Macular degeneration     Obesity Maternal Grandmother      Hypertension Father      Lipids Father      Cancer Father      Prostate Cancer Father      Other Cancer Father      Cerebrovascular Disease Paternal Grandmother      Alcohol/Drug Maternal Grandfather      Obesity Maternal Grandfather      Psychotic Disorder Paternal Grandfather          Committed Suicide     Depression Paternal Grandfather      Allergies Sister      Genitourinary Problems Sister          SOCIAL HISTORY:  Social History     Tobacco Use     Smoking status: Former Smoker     Packs/day: 1.00     Years: 10.00     Pack years: 10.00     Types: Cigarettes     Start date: 2004     Last attempt to quit: 8/15/2018     Years since quittin.0     Smokeless tobacco: Never Used     Tobacco comment:  pack or less a day   Substance Use Topics     Alcohol use: Yes     Alcohol/week: 0.0 standard drinks     Comment: Occas.     Drug use: No     ROS:   Constitutional: No fever, chills, or sweats. Weight stable.   ENT: No visual disturbance, ear ache, epistaxis, sore throat.   Cardiovascular: As per HPI.   Respiratory: No cough, hemoptysis.    GI: No nausea, vomiting, hematemesis, melena, or hematochezia.   Hematologic:  No easy bruising, no easy bleeding.  Neuro: Negative.     Exam:  Physical Exam Elements attainable via telehealth:       Constitutional - alert and no distress    Eyes - no redness, no discharge    Respiratory - no cough, no labored breathing    Skin - no discoloration or lesions on the face or the arm.    Neurological -alert, normal speech,and affect, no tremor.     I have reviewed the labs and personally reviewed the imaging below and made my comment in the assessment and plan.    EP PROCEDURE NOTE 10/3/2019  1. Left Atrial Wide Area Circumferential radiofrequency Ablation.  2. Trans-septal Puncture x2  3. Intracardiac Echocardiography.  4. Invasive Hemodynamic Monitoring.  5. 3D electro-anatomic Mapping using Carto3.  6. Esophageal temperature monitoring.    I have reviewed the labs and personally reviewed the imaging below and made my comment in the assessment and plan.    Labs:  CBC RESULTS:   Lab Results   Component Value Date    WBC 7.3 2019    RBC 4.37 2019    HGB 11.8 2019    HCT 37.4 2019    MCV 86 2019    MCH 27.0 2019    MCHC 31.6  12/05/2019    RDW 14.4 12/05/2019     12/05/2019       BMP RESULTS:  Lab Results   Component Value Date     12/05/2019    POTASSIUM 3.8 12/05/2019    CHLORIDE 108 12/05/2019    CO2 28 12/05/2019    ANIONGAP 3 12/05/2019    GLC 80 12/05/2019    BUN 11 12/05/2019    CR 0.69 12/05/2019    GFRESTIMATED >90 12/05/2019    GFRESTBLACK >90 12/05/2019    CHIKI 8.8 12/05/2019        INR RESULTS:  Lab Results   Component Value Date    INR 0.98 11/06/2018       Echocardiogram 9/26/2018  Global and regional left ventricular function is normal with an EF of 60-65%.  Moderate concentric wall thickening consistent with left ventricular  hypertrophy is present.  Right ventricular function, chamber size, wall motion, and thickness are  normal.  Mild biatrial enlargement is present.  No significant valve abnormalities present.  The inferior vena cava was normal in size    Nuclear stress test with Lexiscan 9/6/2018  Normal myocardial SPECT study with a summed stress score of 0.     CT coronary artery angiogram 6/27/2013  No evidence of coronary artery disease    EKG 11/28/2018 normal.    Assessment and Plan:   Ms. Danitza Soto is a 38 year old  female with PMH significant for depression, GERD, LEONARDO, essential hypertension, morbid obesity S/P gastric bypass 11/2018 and paroxysmal atrial fibrillation refractory to medical treatment status post PVI on 10/3/2019.    Patient is currently asymptomatic from cardiac standpoint.    1.  Paroxysmal atrial fibrillation: The patient underwent PVI on 10/3/2019.  Reports feeling great since then.  No recurrence of atrial fibrillation.      2.  Hypertension: Patient currently on lisinopril 40 mg.  Reports high blood pressure at home 140/80 mmHg.  Recommend to start hydrochlorothiazide 12.5 mg daily.  Potassium recheck in 2 weeks.  Patient will send [facility] message in a month regarding home blood pressure readings.    3.  Obstructive sleep apnea: Patient back on CPAP though she was not  needing CPAP for sleep apnea right after gastric bypass surgery.  However she gained weight after gastric bypass surgery and requires CPAP now.  Compliant with the machine.    Medication change: Start hydrochlorothiazide 12.5 mg.  Continue all other medications.    Return to clinic 1 year.    A total of 10 minutes spent face-to-face through video encounter today with greater than 50% of the time spent in counseling and coordinating cares of the issues above.     Please donot hesitate to contact me if you have any questions or concerns. Again, thank you for allowing me to participate in the care of your patient.    Leena RONDON MD  [facility] Division of Cardiology  Pager [phone]    Please do not hesitate to contact me if you have any questions/concerns.     Sincerely,     Leena Rondon MD     to go home

## 2022-08-26 ENCOUNTER — MYC MEDICAL ADVICE (OUTPATIENT)
Dept: FAMILY MEDICINE | Facility: CLINIC | Age: 42
End: 2022-08-26

## 2022-08-31 ENCOUNTER — VIRTUAL VISIT (OUTPATIENT)
Dept: FAMILY MEDICINE | Facility: CLINIC | Age: 42
End: 2022-08-31
Payer: COMMERCIAL

## 2022-08-31 DIAGNOSIS — M25.50 MULTIPLE JOINT PAIN: ICD-10-CM

## 2022-08-31 DIAGNOSIS — F33.1 MODERATE EPISODE OF RECURRENT MAJOR DEPRESSIVE DISORDER (H): ICD-10-CM

## 2022-08-31 DIAGNOSIS — R41.3 MEMORY LOSS: ICD-10-CM

## 2022-08-31 DIAGNOSIS — F41.9 ANXIETY: Primary | ICD-10-CM

## 2022-08-31 PROCEDURE — 99214 OFFICE O/P EST MOD 30 MIN: CPT | Mod: GT | Performed by: FAMILY MEDICINE

## 2022-08-31 RX ORDER — MULTIVITAMIN WITH IRON
1 TABLET ORAL EVERY EVENING
COMMUNITY

## 2022-08-31 RX ORDER — B-COMPLEX WITH VITAMIN C
250 TABLET ORAL EVERY EVENING
COMMUNITY

## 2022-08-31 ASSESSMENT — PATIENT HEALTH QUESTIONNAIRE - PHQ9: SUM OF ALL RESPONSES TO PHQ QUESTIONS 1-9: 6

## 2022-08-31 NOTE — TELEPHONE ENCOUNTER
Virtual visit completed today (8/31/22) form completed, signed, faxed to patient work at [phone] (per patient) and sent for scanning  [facility] Coordinator

## 2022-08-31 NOTE — PROGRESS NOTES
Trish is a 42 year old who is being evaluated via a billable video visit.      How would you like to obtain your AVS? MyChart  If the video visit is dropped, the invitation should be resent by: Text to cell phone: [phone]  Will anyone else be joining your video visit? No      Assessment & Plan     Anxiety  Worsening recently with stress at work, she has had dizziness and insomnia , concerned with memory loss as below , seeing neurology , doing the work up     Moderate episode of recurrent major depressive disorder (H)  Currently on Effexor 150 mg daily , she is stressed out at work , which is exacerbating her anxiety and sleep disturbances , depression. She is seeing a therapist currently , she saw a neurologist for the memory loss and is in the process of working up and doing additional tests     Multiple joint pain  Discussed and referred to rheumatology as she is having joints pain in shoulders, knees , wrists   - Adult Rheumatology  Referral; Future    Memory loss  As above saw neurology , she is set up for more testing which will be done in a few months     We also went over and filled out her FMLA paperwork as it would be good for her [facility] health to take a month off work from September 1st 2022 until October 3rd 2022 .        Polly Mcbride MD  [facility]   Trish is a 42 year old, presenting for the following health issues:  FMLA Paperwork    HPI     Anxiety, depression, memory loss, insomnia -getting worse lately with stress at work, she is taking FMLA one month   Will be giong over symptoms and filling out her FMLA forms      Review of Systems   Constitutional, HEENT, cardiovascular, pulmonary, GI, , musculoskeletal, neuro, skin, endocrine and psych systems are negative, except as otherwise noted.      Objective           Vitals:  No vitals were obtained today due to virtual visit.    Physical Exam   GENERAL: Healthy, alert and no distress  EYES: Eyes  grossly normal to inspection.  No discharge or erythema, or obvious scleral/conjunctival abnormalities.  RESP: No audible wheeze, cough, or visible cyanosis.  No visible retractions or increased work of breathing.    SKIN: Visible skin clear. No significant rash, abnormal pigmentation or lesions.  NEURO: Cranial nerves grossly intact.  Mentation and speech appropriate for age.  PSYCH: Mentation appears normal, affect normal/bright, judgement and insight intact, normal speech and appearance well-groomed.    No results found for this or any previous visit (from the past 24 hour(s)).            Video-Visit Details    Video Start Time: 2:44 pm     Type of service:  Video Visit    Video End Time:2:54 pm    Originating Location (pt. Location): Home    Distant Location (provider location):  [facility]     Platform used for Video Visit: [employer]    Mary Prakash

## 2022-09-16 ENCOUNTER — TELEPHONE (OUTPATIENT)
Dept: FAMILY MEDICINE | Facility: CLINIC | Age: 42
End: 2022-09-16

## 2022-09-16 NOTE — TELEPHONE ENCOUNTER
Forms/Letter Request    Type of form/letter: attending physician statement    Have you been seen for this request: Yes     Do we have the form/letter: Yes:  Form     When is form/letter needed by:  asap     How would you like the form/letter returned: Fax [phone]    Patient Notified form requests are processed in 3-5 business days:Yes    Could we send this information to you [facility] or would you prefer to receive a phone call?:   No preference   Okay to leave a detailed message?: Yes at Other phone number:  [phone]

## 2022-09-22 ASSESSMENT — ANXIETY QUESTIONNAIRES
7. FEELING AFRAID AS IF SOMETHING AWFUL MIGHT HAPPEN: MORE THAN HALF THE DAYS
8. IF YOU CHECKED OFF ANY PROBLEMS, HOW DIFFICULT HAVE THESE MADE IT FOR YOU TO DO YOUR WORK, TAKE CARE OF THINGS AT HOME, OR GET ALONG WITH OTHER PEOPLE?: EXTREMELY DIFFICULT
5. BEING SO RESTLESS THAT IT IS HARD TO SIT STILL: SEVERAL DAYS
GAD7 TOTAL SCORE: 11
3. WORRYING TOO MUCH ABOUT DIFFERENT THINGS: SEVERAL DAYS
1. FEELING NERVOUS, ANXIOUS, OR ON EDGE: SEVERAL DAYS
2. NOT BEING ABLE TO STOP OR CONTROL WORRYING: SEVERAL DAYS
6. BECOMING EASILY ANNOYED OR IRRITABLE: NEARLY EVERY DAY
7. FEELING AFRAID AS IF SOMETHING AWFUL MIGHT HAPPEN: MORE THAN HALF THE DAYS
GAD7 TOTAL SCORE: 11
GAD7 TOTAL SCORE: 11
IF YOU CHECKED OFF ANY PROBLEMS ON THIS QUESTIONNAIRE, HOW DIFFICULT HAVE THESE PROBLEMS MADE IT FOR YOU TO DO YOUR WORK, TAKE CARE OF THINGS AT HOME, OR GET ALONG WITH OTHER PEOPLE: EXTREMELY DIFFICULT
4. TROUBLE RELAXING: MORE THAN HALF THE DAYS

## 2022-09-22 ASSESSMENT — PATIENT HEALTH QUESTIONNAIRE - PHQ9
SUM OF ALL RESPONSES TO PHQ QUESTIONS 1-9: 10
SUM OF ALL RESPONSES TO PHQ QUESTIONS 1-9: 10
10. IF YOU CHECKED OFF ANY PROBLEMS, HOW DIFFICULT HAVE THESE PROBLEMS MADE IT FOR YOU TO DO YOUR WORK, TAKE CARE OF THINGS AT HOME, OR GET ALONG WITH OTHER PEOPLE: VERY DIFFICULT

## 2022-09-23 ENCOUNTER — VIRTUAL VISIT (OUTPATIENT)
Dept: FAMILY MEDICINE | Facility: CLINIC | Age: 42
End: 2022-09-23
Payer: COMMERCIAL

## 2022-09-23 DIAGNOSIS — F41.9 ANXIETY: Primary | ICD-10-CM

## 2022-09-23 DIAGNOSIS — R41.3 MEMORY LOSS: ICD-10-CM

## 2022-09-23 DIAGNOSIS — G47.00 INSOMNIA, UNSPECIFIED TYPE: ICD-10-CM

## 2022-09-23 PROCEDURE — 99214 OFFICE O/P EST MOD 30 MIN: CPT | Mod: GT | Performed by: FAMILY MEDICINE

## 2022-09-23 ASSESSMENT — PATIENT HEALTH QUESTIONNAIRE - PHQ9
SUM OF ALL RESPONSES TO PHQ QUESTIONS 1-9: 10
10. IF YOU CHECKED OFF ANY PROBLEMS, HOW DIFFICULT HAVE THESE PROBLEMS MADE IT FOR YOU TO DO YOUR WORK, TAKE CARE OF THINGS AT HOME, OR GET ALONG WITH OTHER PEOPLE: VERY DIFFICULT

## 2022-09-23 ASSESSMENT — ANXIETY QUESTIONNAIRES: GAD7 TOTAL SCORE: 11

## 2022-09-23 NOTE — PROGRESS NOTES
Trish is a 42 year old who is being evaluated via a billable video visit.      How would you like to obtain your AVS? MyChart  If the video visit is dropped, the invitation should be resent by: Text to cell phone: [phone]  Will anyone else be joining your video visit? No        Assessment & Plan     Anxiety  She has been off work for a while and is much better now     Memory loss  She will be seeing neurology and rheumatology first week in January 2023 , also will be doing the neuropsychology testing     Insomnia, unspecified type  As above all closely linked to stress at work, anxiety , as above   I have filled out her forms for work for her short term time off today as well   Pt has been off work since beginning of September and htings are getting better as far as stress       Polly Mcbride MD  [facility]   Trish is a 42 year old, presenting for the following health issues:  Depression, Anxiety, and Forms (Short Term Disability )      History of Present Illness       Mental Health Follow-up:  Patient presents to follow-up on Depression & Anxiety.Patient's depression since last visit has been:  Medium  The patient is having other symptoms associated with depression.  Patient's anxiety since last visit has been:  Better  The patient is having other symptoms associated with anxiety.  Any significant life events: job concerns, financial concerns, housing concerns and health concerns  Patient is feeling anxious or having panic attacks.  Patient has no concerns about alcohol or drug use.    Reason for visit:  Paperwork for short term disability    She eats 2-3 servings of fruits and vegetables daily.She consumes 1 sweetened beverage(s) daily.She exercises with enough effort to increase her heart rate 10 to 19 minutes per day.  She exercises with enough effort to increase her heart rate 3 or less days per week. She is missing 1 dose(s) of medications per week.  She is not taking  prescribed medications regularly due to cost of medication.    Today's PHQ-9         PHQ-9 Total Score: 10    PHQ-9 Q9 Thoughts of better off dead/self-harm past 2 weeks :   Not at all    How difficult have these problems made it for you to do your work, take care of things at home, or get along with other people: Very difficult  Today's GABRIELA-7 Score: 11             Review of Systems   Constitutional, HEENT, cardiovascular, pulmonary, GI, , musculoskeletal, neuro, skin, endocrine and psych systems are negative, except as otherwise noted.      Objective    Vitals - Patient Reported  Pain Score: No Pain (0)      Vitals:  No vitals were obtained today due to virtual visit.    Physical Exam   GENERAL: Healthy, alert and no distress  EYES: Eyes grossly normal to inspection.  No discharge or erythema, or obvious scleral/conjunctival abnormalities.  RESP: No audible wheeze, cough, or visible cyanosis.  No visible retractions or increased work of breathing.    SKIN: Visible skin clear. No significant rash, abnormal pigmentation or lesions.  NEURO: Cranial nerves grossly intact.  Mentation and speech appropriate for age.  PSYCH: Mentation appears normal, affect normal/bright, judgement and insight intact, normal speech and appearance well-groomed.    No results found for this or any previous visit (from the past 24 hour(s)).            Video-Visit Details    Video Start Time: 10:38 am     Type of service:  Video Visit    Video End Time:10:46 AM    Originating Location (pt. Location): Home    Distant Location (provider location):  [facility]     Platform used for Video Visit: [employer]

## 2022-10-06 ENCOUNTER — MYC MEDICAL ADVICE (OUTPATIENT)
Dept: FAMILY MEDICINE | Facility: CLINIC | Age: 42
End: 2022-10-06

## 2022-10-07 ASSESSMENT — PATIENT HEALTH QUESTIONNAIRE - PHQ9: SUM OF ALL RESPONSES TO PHQ QUESTIONS 1-9: 6

## 2022-10-14 ENCOUNTER — VIRTUAL VISIT (OUTPATIENT)
Dept: FAMILY MEDICINE | Facility: CLINIC | Age: 42
End: 2022-10-14
Payer: COMMERCIAL

## 2022-10-14 DIAGNOSIS — F33.1 MODERATE EPISODE OF RECURRENT MAJOR DEPRESSIVE DISORDER (H): ICD-10-CM

## 2022-10-14 DIAGNOSIS — R59.1 LYMPHADENOPATHY: Primary | ICD-10-CM

## 2022-10-14 DIAGNOSIS — F41.9 ANXIETY: ICD-10-CM

## 2022-10-14 PROCEDURE — 99214 OFFICE O/P EST MOD 30 MIN: CPT | Mod: GT | Performed by: FAMILY MEDICINE

## 2022-10-14 ASSESSMENT — ANXIETY QUESTIONNAIRES
6. BECOMING EASILY ANNOYED OR IRRITABLE: SEVERAL DAYS
IF YOU CHECKED OFF ANY PROBLEMS ON THIS QUESTIONNAIRE, HOW DIFFICULT HAVE THESE PROBLEMS MADE IT FOR YOU TO DO YOUR WORK, TAKE CARE OF THINGS AT HOME, OR GET ALONG WITH OTHER PEOPLE: SOMEWHAT DIFFICULT
7. FEELING AFRAID AS IF SOMETHING AWFUL MIGHT HAPPEN: NOT AT ALL
8. IF YOU CHECKED OFF ANY PROBLEMS, HOW DIFFICULT HAVE THESE MADE IT FOR YOU TO DO YOUR WORK, TAKE CARE OF THINGS AT HOME, OR GET ALONG WITH OTHER PEOPLE?: SOMEWHAT DIFFICULT
5. BEING SO RESTLESS THAT IT IS HARD TO SIT STILL: NOT AT ALL
7. FEELING AFRAID AS IF SOMETHING AWFUL MIGHT HAPPEN: NOT AT ALL
GAD7 TOTAL SCORE: 5
4. TROUBLE RELAXING: SEVERAL DAYS
1. FEELING NERVOUS, ANXIOUS, OR ON EDGE: SEVERAL DAYS
3. WORRYING TOO MUCH ABOUT DIFFERENT THINGS: SEVERAL DAYS
2. NOT BEING ABLE TO STOP OR CONTROL WORRYING: SEVERAL DAYS
GAD7 TOTAL SCORE: 5
GAD7 TOTAL SCORE: 5

## 2022-10-14 NOTE — PROGRESS NOTES
Trish is a 42 year old who is being evaluated via a billable video visit.      How would you like to obtain your AVS? MyChart  If the video visit is dropped, the invitation should be resent by: Text to cell phone: [phone]  Will anyone else be joining your video visit? No          Assessment & Plan     Lymphadenopathy  We discussed re checking her US as she had a thyroid US done back in May of this year and thyroid was normal but there was a small left neck lymph node at 3.2 cm in size , to follow up on this and make sure it is resolved now   - US Thyroid; Future    Anxiety  She has been on the Effexor  mg daily dose and that has been working for her anxiety and depression but she is wondering as some days she forgets to take it in the morning and then feels some mood changes later in the day , if she can be started on an additional antidepressant to take only in those ch she forgets the Effexor .  Discussed with the pt that this would not work well, there is a risk for serotonin syndrome of the two interacting , also the antidepressant needs to be taken daily and not as needed , otherwise would not get the benefit of it , only side effects   Would be better if she sets up reminders ( alarm, etc) to remember to take it daily     Moderate episode of recurrent major depressive disorder (H)  As above , could consider a psychiatry referral or a different antidepressant but this has been working well for her so far and overall no side effects , it is just some days when she forgets to take it and those are not very frequent   Pt has started a new job recently , less stress and much better for her         Polly Mcbride MD  [facility]   Trish is a 42 year old, presenting for the following health issues:  Depression (Medication change possible)      History of Present Illness       Mental Health Follow-up:  Patient presents to follow-up on Depression.Patient's depression since  last visit has been:  No change  The patient is not having other symptoms associated with depression.      Any significant life events: job concerns  Patient is not feeling anxious or having panic attacks.  Patient has no concerns about alcohol or drug use.    She eats 2-3 servings of fruits and vegetables daily.She consumes 0 sweetened beverage(s) daily.She exercises with enough effort to increase her heart rate 10 to 19 minutes per day.  She exercises with enough effort to increase her heart rate 3 or less days per week. She is missing 1 dose(s) of medications per week.  She is not taking prescribed medications regularly due to remembering to take.    Today's PHQ-9         PHQ-9 Total Score: 6    PHQ-9 Q9 Thoughts of better off dead/self-harm past 2 weeks :   Not at all    How difficult have these problems made it for you to do your work, take care of things at home, or get along with other people: Somewhat difficult  Today's GABRIELA-7 Score: 5         1) anxiety   2) depression   3) enlarged cervical lymph node on a thyroid US done in May of this year , we were supposed to repeat that US to make sure this has resolved     Review of Systems   Constitutional, HEENT, cardiovascular, pulmonary, GI, , musculoskeletal, neuro, skin, endocrine and psych systems are negative, except as otherwise noted.      Objective    Vitals - Patient Reported  Pain Score: No Pain (0)      Vitals:  No vitals were obtained today due to virtual visit.    Physical Exam   GENERAL: Healthy, alert and no distress  EYES: Eyes grossly normal to inspection.  No discharge or erythema, or obvious scleral/conjunctival abnormalities.  RESP: No audible wheeze, cough, or visible cyanosis.  No visible retractions or increased work of breathing.    SKIN: Visible skin clear. No significant rash, abnormal pigmentation or lesions.  NEURO: Cranial nerves grossly intact.  Mentation and speech appropriate for age.  PSYCH: Mentation appears normal, affect  normal/bright, judgement and insight intact, normal speech and appearance well-groomed.    No results found for this or any previous visit (from the past 24 hour(s)).            Video-Visit Details    Video Start Time: 4:35 pm     Type of service:  Video Visit    Video End Time:4:47 PM    Originating Location (pt. Location): Home    Distant Location (provider location):  On-site    Platform used for Video Visit: [employer]

## 2022-11-01 ENCOUNTER — PRE VISIT (OUTPATIENT)
Dept: GASTROENTEROLOGY | Facility: CLINIC | Age: 42
End: 2022-11-01

## 2022-11-01 ENCOUNTER — VIRTUAL VISIT (OUTPATIENT)
Dept: GASTROENTEROLOGY | Facility: CLINIC | Age: 42
End: 2022-11-01
Attending: FAMILY MEDICINE
Payer: COMMERCIAL

## 2022-11-01 DIAGNOSIS — Z98.84 S/P LAPAROSCOPIC SLEEVE GASTRECTOMY: ICD-10-CM

## 2022-11-01 DIAGNOSIS — K21.00 GASTROESOPHAGEAL REFLUX DISEASE WITH ESOPHAGITIS WITHOUT HEMORRHAGE: ICD-10-CM

## 2022-11-01 DIAGNOSIS — Z98.890 S/P ABLATION OF ATRIAL FIBRILLATION: ICD-10-CM

## 2022-11-01 DIAGNOSIS — E66.01 MORBID (SEVERE) OBESITY DUE TO EXCESS CALORIES (H): Primary | Chronic | ICD-10-CM

## 2022-11-01 DIAGNOSIS — Z86.79 S/P ABLATION OF ATRIAL FIBRILLATION: ICD-10-CM

## 2022-11-01 PROCEDURE — 99205 OFFICE O/P NEW HI 60 MIN: CPT | Mod: 95 | Performed by: PHYSICIAN ASSISTANT

## 2022-11-01 NOTE — PROGRESS NOTES
Trish is a 42 year old who is being evaluated via a billable video visit.      How would you like to obtain your AVS? MyChart  If the video visit is dropped, the invitation should be resent by: Text to cell phone: [phone]  Will anyone else be joining your video visit? No        Video-Visit Details    Video Start Time: 10:05 AM    Type of service:  Video Visit    Video End Time:10:50 AM    Originating Location (pt. Location): Home        Distant Location (provider location):  Off-site    Platform used for Video Visit: [facility]  Gastroenterology Visit for: Danitza Soto 1980   MRN: [id number]     Reason for Visit:  chief complaint    Referred by: Reed  / [facility] [address] / [employer] [address]  Patient Care Team:  Polly Mcbride MD as PCP - General  Polly Mcbride MD as Assigned PCP  Brandee Salazar RN as Specialty Care Coordinator (Cardiology)  Harpreet Arevalo MD as MD (Clinical Cardiac Electrophysiology)  Leena Heath MD as MD (Cardiology)  Leena Heath MD as Assigned Heart and Vascular Provider  Lucian Reyna MD as Assigned OBGYN Provider  Clotilde Franklin NP as Assigned Surgical Provider  Yomi Mayorga DPM as Assigned Musculoskeletal Provider  Bloch, Lauren Turner, RPH as Pharmacist (Pharmacist)  Rhett Mares Gennadiy Vasilyevich, MD as MD (Neurology)  Garcia Triana MD as MD (Neurology)  Jeff Patino DO as MD (Gastroenterology)  Garcia Triana MD as Assigned Neuroscience Provider  Bloch, Lauren Turner, RPH as Assigned MTM Pharmacist    History of Present Illness:     Danitza Soto is a 42 year old female with significant past medical history for anxiety, depression, insomnia, HTN, AFIB s/p ablation, GERD, enlarged cervical lymph node incidentally noted on ultrasound thyroid May of this year and surgical history of gastric sleeve in 2018 w/ Artis Alvarado who is presenting as a new patient in consultation at the  "request of Dr. Mcbride for GERD/dysphagia with a chief complaint of heartburn/regurgitation.  -----------------------------------------------------------------------      In 2018 had gastric sleeve procedure and with this the heartburn/regurgitaion became worse. Prior to the surgery was taking Omeprazole 20 mg once daily. Notes that symptoms are different in nature than prior to the surgery. Symptoms include substernal chest burning/discomfort that radiates to the entirety of the chest. Intermittently will have regurgitation which she reports she did not have prior to the surgery. Currently prescribed omeprazole 40 mg once daily and 20 mg at night with well control of symptoms. With consumption of any liquid will have relief of symptoms. Minimal short term relief with TUMs. Has also tried Nexium however leans toward Omeprazole as this is the least expensive.     No associated weight loss attributed to these above symptoms.     In \"general\" doesn't have dysphagia however reports specific difficulty with starchy foods and rice. With consumption of starches reports having more regurgitation symptoms.     Denies weight loss, nausea, emesis, odynophagia, abdominal pain, bloating/fullness, post prandial bloating, diarrhea, constipation, incontinence, melena, hematochezia and BRBR.     Has been using Ibuprofen for shoulder pain. However this does not aggravate the heartburn/regurgitaiton symptoms.    No use of Tylenol. No use of OTC herbal supplements/weight loss products.      Denies use of ETOH. Vapes daily. No recreational drug use.     No additional family history or GI related malignancy (esophageal, gastric, liver or colon) or family history of IBD/celiac disease.     Father had pancreatic cancer diagnosed at age 74.        Wt Readings from Last 5 Encounters:   08/18/22 117 kg (258 lb)   06/22/22 114.3 kg (252 lb)   05/10/22 112.5 kg (248 lb)   05/04/22 112.9 kg (249 lb)   04/07/22 111.8 kg (246 lb 8 oz) "        Esophageal Questionnaire(s)    BEDQ Questionnaire  BEDQ Questionnaire: How Often Have You Had the Following? 11/1/2022   Trouble eating solid food (meat, bread, vegetables) 1   Trouble eating soft foods (yogurt, jello, pudding) 0   Trouble swallowing liquids 0   Pain while swallowing 0   Coughing or choking while swallowing foods or liquids 1   Total Score: 2     BEDQ Questionnaire: Discomfort/Pain Ratings 11/1/2022   Eating solid food (meat, bread, vegetables) 3   Eating soft foods (yogurt, jello, pudding) 0   Drinking liquid 1   Total Score: 4       Eckardt Questionnaire  Eckardt Questionnaire 11/1/2022   Dysphagia 0   Regurgitation 2   Retrosternal Pain 1   Weight Loss (kg) 0   Total Score:  3       Promis 10 Questionnaire  PROMIS 10 FLOWSHEET DATA 7/23/2018 12/5/2019 11/1/2022   In general, would you say your health is: 3 3 3   In general, would you say your quality of life is: 4 4 3   In general, how would you rate your physical health? 3 3 2   In general, how would you rate your mental health, including your mood and your ability to think? 4 3 4   In general, how would you rate your satisfaction with your social activities and relationships? 4 3 4   In general, please rate how well you carry out your usual social activities and roles. (This includes activities at home, at work and in your community, and responsibilities as a parent, child, spouse, employee, friend, etc.) 3 3 4   To what extent are you able to carry out your everyday physical activities such as walking, climbing stairs, carrying groceries, or moving a chair? 3 5 4   In the past 7 days, how often have you been bothered by emotional problems such as feeling anxious, depressed, or irritable? 1 1 2   In the past 7 days, how would you rate your fatigue on average? 2 2 3   In the past 7 days, how would you rate your pain on average, where 0 means no pain, and 10 means worst imaginable pain? 6 2 6   Mental health question re-calculation - no  clinical value 5 5 4   Physical health question re-calculation - no clinical value 4 4 3   Pain question re-calculation - no clinical value 3 4 3   Global Mental Health Score 17 15 15   Global Physical Health Score 13 16 12   PROMIS TOTAL - SUBSCORES 30 31 27       STUDIES & PROCEDURES:    EGD:       Colonoscopy:     EndoFLIP directed at the U[facility] or LES (8cm (EF-325) balloon length or 16cm (EF-322) balloon length):   Date:  8cm balloon  Balloon inflation Balloon pressure CSA (mm^2) DI (mm^2/mmHg) Dmin (mm) Compliance   20 (ladmark ID)        30        40        50           16cm balloon  Balloon inflation Balloon pressure CSA (mm^2) DI (mm^2/mmHg) Dmin (mm) Compliance   30 (ladmark ID)        40        50        60        70           High Resolution Manometry:    PH/Impedance:       Bravo:      CT:    Esophagram:      Prior medical records were reviewed including, but not limited to, notes from referring providers, lab work, radiographic tests, and other diagnostic tests. Pertinent results were summarized above.     History     Past Medical History:   Diagnosis Date     Antiplatelet or antithrombotic long-term use     resolved     Arrhythmia      BV (bacterial vaginosis) 9/18/2020     Depressive disorder 1990     Esophageal reflux      Hearing problem 2018     Hyperlipidemia LDL goal <130 07/06/2015     Hypertension      LSIL (low grade squamous intraepithelial lesion) on Pap smear 06/2014    + HPV, unable to type colp - BRISEYDA I     LEONARDO on CPAP      Other anxiety states        Past Surgical History:   Procedure Laterality Date     EP ABLATION FOCAL AFIB N/A 10/03/2019    Procedure: EP ABLATION FOCAL AFIB;  Surgeon: Harpreet Arevalo MD;  Location:  OR      TOOTH EXTRACTION W/FORCEP  1995    [place] Teeth Extraction     LAPAROSCOPIC GASTRIC SLEEVE N/A 11/27/2018    Procedure: Laparoscopic Sleeve Gastrectomy Latex Free;  Surgeon: Artis Tse MD;  Location: [facility] OR     LAPAROSCOPIC HERNIORRHAPHY HIATAL   2018    Procedure: LAPAROSCOPIC  HIATAL Hernia Repair;  Surgeon: Artis Tse MD;  Location: [facility] OR     SALPINGO-OOPHORECTOMY, COMBINED Left 10/20/2021    Mucinous cystadenoma       Social History     Socioeconomic History     Marital status:      Spouse name: Not on file     Number of children: 0     Years of education: Some Colle     Highest education level: Not on file   Occupational History     Occupation: Mental Health Counselor   Tobacco Use     Smoking status: Former     Packs/day: 1.00     Years: 10.00     Pack years: 10.00     Types: Cigarettes, Vaping Device     Start date: 2004     Quit date: 2021     Years since quittin.4     Smokeless tobacco: Never   Vaping Use     Vaping Use: Every day     Substances: Nicotine, Flavoring     Devices: Disposable   Substance and Sexual Activity     Alcohol use: Not Currently     Alcohol/week: 0.0 standard drinks     Comment: Occas.     Drug use: No     Sexual activity: Yes     Partners: Female, Male     Birth control/protection: Condom, Implant     Comment: trans partners   Other Topics Concern     Parent/sibling w/ CABG, MI or angioplasty before 65F 55M? No   Social History Narrative    Social Documentation:        Balanced Diet: YES, sometimes    Calcium intake: 2 per day    Caffeine: 5-10 per day    Exercise:  type of activity walking, playtime;  5 times per week    Sunscreen: when remembered    Seatbelts:  Yes    Self Breast Exam:  No    Self Testicular Exam: n/a    Physical/Emotional/Sexual Abuse: No    Do you feel safe in your environment? Yes        Cholesterol screen up to date: No 2006    Eye Exam up to date: Yes    Dental Exam up to date: No    Pap smear up to date: No: 2006    Mammogram up to date: Does Not Apply    Dexa Scan up to date: Does Not Apply    Colonoscopy up to date: Does Not Apply    Immunizations up to date: Yes,     Glucose screen if over 40:  N/a        Jamila Coyle MA                     Social  Determinants of Health     Financial Resource Strain: Not on file   Food Insecurity: Not on file   Transportation Needs: Not on file   Physical Activity: Not on file   Stress: Not on file   Social Connections: Not on file   Intimate Partner Violence: Not At Risk     Fear of Current or Ex-Partner: No     Emotionally Abused: No     Physically Abused: No     Sexually Abused: No   Housing Stability: Not on file       Family History   Problem Relation Age of Onset     Heart Disease Mother         ,mother had emphesema and  at 62     Hypertension Mother      Allergies Mother      Lipids Mother      Obesity Mother      Respiratory Mother         Emphysema     Diabetes Maternal Grandmother         Type 2?     Hypertension Maternal Grandmother      Circulatory Maternal Grandmother         Due to diabetes     Eye Disorder Maternal Grandmother         Macular degeneration/Macular degeneration     Obesity Maternal Grandmother      Hypertension Father      Lipids Father      Cancer Father      Prostate Cancer Father      Other Cancer Father      Cerebrovascular Disease Paternal Grandmother      Alcohol/Drug Maternal Grandfather      Obesity Maternal Grandfather      Psychotic Disorder Paternal Grandfather         Committed Suicide     Depression Paternal Grandfather      Allergies Sister      Genitourinary Problems Sister      Family history reviewed and edited as appropriate    Medications and Allergies:     Outpatient Encounter Medications as of 2022   Medication Sig Dispense Refill     acetaminophen (TYLENOL) 325 MG tablet Take 2 tablets (650 mg) by mouth every 4 hours as needed for other (For optimal non-opioid multimodal pain management to improve pain control and physical function.) 100 tablet 0     amLODIPine (NORVASC) 10 MG tablet Take 1 tablet (10 mg) by mouth daily 90 tablet 3     calcium carbonate (OS-CHIKI) 500 MG tablet Take 1 tablet by mouth daily        Cyanocobalamin (B-12) 1000 MCG TBCR        Ferrous  Sulfate 324 (65 Fe) MG TBEC One daily       hydrochlorothiazide (MICROZIDE) 12.5 MG capsule TAKE 1 CAPSULE(12.5 MG) BY MOUTH DAILY 90 capsule 3     lisinopril (ZESTRIL) 40 MG tablet Take 1 tablet (40 mg) by mouth daily 90 tablet 3     magnesium 250 MG tablet Take 1 tablet by mouth daily       Multiple Vitamins-Minerals (CENTRUM PO) 2 tablets per day       omeprazole (PRILOSEC) 20 MG DR capsule Take 1 capsule (20 mg) by mouth daily 90 capsule 3     omeprazole (PRILOSEC) 40 MG DR capsule Take 1 capsule (40 mg) by mouth daily 90 capsule 3     venlafaxine (EFFEXOR XR) 150 MG 24 hr capsule TAKE 1 CAPSULE(150 MG) BY MOUTH DAILY 90 capsule 1     vitamin B complex with vitamin C (STRESS TAB) tablet Take 1 tablet by mouth daily       vitamin B1 (THIAMINE) 250 MG tablet Take 250 mg by mouth daily       No facility-administered encounter medications on file as of 11/1/2022.        Allergies   Allergen Reactions     Bupropion Other (See Comments)     Other reaction(s): Chest Pain,Panic attacks, heart/chest pain     Wellbutrin [Bupropion]      Wellbutrin: Panic attacks, heart/chest pain        Review of systems:  A full 10 point review of systems was obtained and was negative except for the pertinent positives and negatives stated within the HPI.    Objective Findings:   Physical Exam:    Constitutional: LMP 04/01/2022   General: Alert, cooperative, no distress, well-appearing  Head: Atraumatic, normocephalic, no obvious abnormalities   Eyes: Sclera anicteric, no obvious conjunctival hemorrhage   Nose: Nares normal, no obvious malformation, no obvious rhinorrhea   Respiratory: Resting comfortably  Skin: No jaundice, no obvious rash  Neurologic: AAOx3, no obvious neurologic abnormality  Psychiatric: Normal Affect, appropriate mood     Labs, Radiology, Pathology     Lab Results   Component Value Date    WBC 9.3 05/04/2022    WBC 8.4 03/29/2022    WBC 7.4 05/18/2021    HGB 15.1 05/04/2022    HGB 15.2 03/29/2022    HGB 14.4  05/18/2021     05/04/2022     03/29/2022     05/18/2021    CHOL 226 (H) 03/21/2017    CHOL 249 (H) 08/24/2016    CHOL 236 (H) 01/13/2016    TRIG 203 (H) 03/21/2017    TRIG 231 (H) 08/24/2016    TRIG 207 (H) 01/13/2016    HDL 30 (L) 03/21/2017    HDL 29 (L) 08/24/2016    HDL 33 (L) 01/13/2016    ALT 20 05/04/2022    ALT 26 01/11/2022    ALT 20 05/18/2021    AST 12 05/04/2022    AST 15 01/11/2022    AST 11 05/18/2021     05/04/2022     01/11/2022     05/18/2021    BUN 9 05/04/2022    BUN 8 01/11/2022    BUN 9 05/18/2021    CO2 23 05/04/2022    CO2 21 01/11/2022    CO2 28 05/18/2021    TSH 4.09 (H) 05/04/2022    TSH 2.86 01/11/2022    TSH 0.64 05/18/2021    INR 0.98 11/06/2018        Liver Function Studies -   Recent Labs   Lab Test 05/04/22  1144   PROTTOTAL 7.7   ALBUMIN 3.7   BILITOTAL 0.3   ALKPHOS 104   AST 12   ALT 20          Patient Active Problem List    Diagnosis Date Noted     Memory loss 06/29/2022     Priority: Medium     Gastroesophageal reflux disease with esophagitis without hemorrhage 06/29/2022     Priority: Medium     Fatigue, unspecified type 06/29/2022     Priority: Medium     Insomnia, unspecified type 02/11/2022     Priority: Medium     Strain of lumbar region, initial encounter 05/06/2021     Priority: Medium     Slipped at work 5/4- excused form work till 5/7- Realtive rest helping. Return to work 5/10 unrestricted        Strain of hip and thigh, right, initial encounter 05/06/2021     Priority: Medium     Slipped at work 5/4- excused form work till 5/7- Realtive rest helping. Return to work 5/10 unrestricted        S/P laparoscopic sleeve gastrectomy 02/03/2021     Priority: Medium     2018 Dr. Tse        Moderate episode of recurrent major depressive disorder (H) 09/18/2020     Priority: Medium     S/P ablation of atrial fibrillation 10/03/2019     Priority: Medium     Recurrent major depressive disorder, in full remission (H) 04/08/2019     Priority:  Medium     Morbid (severe) obesity due to excess calories (H) 11/27/2018     Priority: Medium     Sp sleeve gastrectomy       LEONARDO (obstructive sleep apnea) 04/13/2017     Priority: Medium     4/10/2017 - Home Sleep Apnea Testing - AHI 15.5/hr; Supine AHI 12.9/hr; SpO2 <= 88% for 33.8 minutes.  Titration Sleep Study 4/19/2017 - (288.0 lbs) CPAP was titrated to a pressure of 11 with an AHI of 3.7.  Time Spent in REM supine at this pressure was 33.5.  Recommending Auto-CPAP 7 - 15 cmH2O.       Obesity hypoventilation syndrome (H) 04/13/2017     Priority: Medium     Mixed hyperlipidemia 07/06/2015     Priority: Medium     Papanicolaou smear of cervix with low grade squamous intraepithelial lesion (LGSIL) 06/17/2014     Priority: Medium     6/17/14: LSIL, + HPV, unable to type.   8/20/14:[place]:BRISEYDA I. Plan cotest pap & HPV in 1 year.   1/20116 Pap Ascus Neg HPV. Plan: [facility].   5/13/16 Colposcopy not completed. Cancelled by patient  3/21/17 NIL/Neg HPV. Plan: cotest in 1 year per Dr. Mcbride  9/28/18 Tele enc: Dr. Mcbride, recommending new plan. Cotest due Jan or Feb 2019. Tracking updated. ([facility])  04/02/19 Patient is lost to pap tracking follow-up.   04/08/19: NIL pap, Neg HR HPV result. Plan cotest in 3 years per provider.        Acne 02/24/2012     Priority: Medium     Primary hypertension 01/12/2011     Priority: Medium     CARDIOVASCULAR SCREENING; LDL GOAL LESS THAN 130 10/31/2010     Priority: Medium     Tobacco use disorder 05/17/2006     Priority: Medium     Anxiety      Priority: Medium     Gastroesophageal reflux disease without esophagitis      Priority: Medium      Assessment and Plan   Assessment:    Danitza Soto is a 42 year old female with significant past medical history for anxiety, depression, insomnia, HTN, AFIB s/p ablation, GERD, enlarged cervical lymph node incidentally noted on ultrasound thyroid May of this year and surgical history of gastric sleeve in 2018 w/ Artis Alvarado who is  presenting as a new patient in consultation at the request of Dr. Mcbride for GERD/dysphagia with a chief complaint of heartburn/regurgitation.    #Heartburn  #Regurgitation  Previously well controlled with Omeprazole 20mg once daily prior to gastric sleeve surgery in 2018. Now with well control of symptoms without alarms signs with Omeprazole 40 mg once daily and 20 mg at night. Pt has concerns regarding long term effects of PPI use and ideally would like to deescalate regimen. Discussed that increase in reflux/heartburn symptoms are to be expected with gastric sleeve surgery secondary to the alterations created to the flap valve/mechanics of the LES as well as altering the effective volume of the gastric cavity. Will schedule for upper endoscopy to evaluate for reflux changes of the esophagus and evaluation of the gastric sleeve.    Future considerations would be to hold acid suppressive therapy for minimum of 2 weeks and undergo BRAVO to definitively quantify acid exposure.     #Family History of Pancreatic Cancer  -- Message sent to Dr. Becerra to discuss if additional screening is necessary     #Enlarged Cervical Lymph Node   -- Reiterated to pt that she should follow up with PCP for additional evaluation     #Vaping   -- Counseled on smoking cessation     Plan:     Upper endoscopy   Continue current acid suppressive regimen   Can attempt to open and take contents of Omeprazole 20 mg capsules in the morning as this has had benefit in patients with Missael En Y Gastric bypass however unknown to the benefit to patients with history of gastric sleeve   Follow up with Artis Alvarado for any additional recommendations regarding worsening of heartburn/regurgitation in setting of gastric sleeve  Message sent to Dr. Becerra to discuss if additional screening is necessary for first degree family member with pancreatic cancer   Complete tobacco use cessation   Follow up with primary care provider for additional  evaluation of enlarged lymph node    Follow up plan:   Return to clinic 3 months and as needed.    The risks and benefits of my recommendations, as well as other treatment options were discussed with the patient and any available family today. All questions were answered.     o Follow up: As planned above. Today, I personally spent 45 minutes in direct face to face time with the patient, of which greater than 50% of the time was spent in patient education and counseling as described above. Approximately 15 minutes were spent on indirect care associated with the patient's consultation including but not limited to review of: patient medical records to date, clinic visits, hospital records, lab results, imaging studies, procedural documentation, and coordinating care with other providers. The findings from this review are summarized in the above note. All of the above accounted for a cumulative time of 60 minutes and was performed on the date of service.     The patient verbalized understanding of the plan and was appreciative for the time spent and information provided during the office visit.     Anju Leal PA-C  Division of Gastroenterology, Hepatology, and Nutrition  [facility]       I spoke with and evaluated the patient, participating in the key portions of the service. I reviewed Anju Leal PA-C's note. I agree with the Anju Leal PA-C's findings and plan. The note was reviewed and edited as needed.    Patient was seen in video telemedicine consultation regarding her symptoms of heartburn that are controlled with PPI.  The patient had noticed a need for increased PPI following her gastric sleeve procedure.  She notes that she has been on PPI long-term and has some concerns about long-term use. The risks and benefits of PPI use were discussed including but not limited to: kidney injury, dementia, fracture, C. Difficile, and community acquired pneumonia.  Recent literature suggests a possible  relationship between PPI use with a greater likelihood of reporting a positive COVID-19 test (Shanthi CV, Harriet WD, Trinidad BMR. Am J Gastroenterol 2020;115:6022-7353).This was discussed with the patient in detail. The quality of research studies was described (retrospective vs prospective). Data from recent review article provided (Sukumar VIZCARRA, Vandana PO. Proton Pump Inhibitors in 2021: Pros, Cons, and Everything in Between. [employer]. May 2021. Doi:10.1177/[id number]). The value of joint decision making was emphasized to ensure that the medication is utilized in the appropriate clinical setting.     Because she denies having prior endoscopy and with new/worsening symptoms of reflux I have recommended an upper endoscopy.  She should discontinue her PPI at least 2 weeks prior to the procedure.  She may try open capsule PPI to see if this improves absorption and symptom control with lesser doses.  We discussed weight loss as a means to decrease the need for PPI therapy.    I have recommended that the patient follow-up with her bariatric surgeon so he is in the loop with respect to her symptoms should he ever need to reoperate.    The patient has a family history of a first-degree relative with pancreatic cancer.  This does not meet criteria for routine screening however we have reached out to our pancreatic specialist to confirm no additional testing is needed at this time.    Jeff Patino DO   of Medicine  Director, Esophageal Disorders Program  Division of Gastroenterology, Hepatology, and Nutrition  [facility]    Documentation assisted by voice recognition and documentation system.

## 2022-11-01 NOTE — LETTER
11/1/2022         RE: Danitza Soto  [address]  [facility] [address]        Dear Colleague,    Thank you for referring your patient, Danitza Soto, to the [facility] [place]. Please see a copy of my visit note below.    Gastroenterology Visit for: Danitza Soto 1980   MRN: [id number]     Reason for Visit:  chief complaint    Referred by: Reed  / [address]  / [facility] [id number]  Patient Care Team:  Polly Mcbride MD as PCP - General  Polly Mcbride MD as Assigned PCP  Brandee Salazar, RN as Specialty Care Coordinator (Cardiology)  Harpreet Arevalo MD as MD (Clinical Cardiac Electrophysiology)  Leena Heath MD as MD (Cardiology)  Leena Heath MD as Assigned Heart and Vascular Provider  Lucian Reyna MD as Assigned OBGYN Provider  Clotilde Franklin NP as Assigned Surgical Provider  Yomi Mayorga DPM as Assigned Musculoskeletal Provider  Bloch, Lauren Turner, RPH as Pharmacist (Pharmacist)  Rhett Mares Gennadiy Vasilyevich, MD as MD (Neurology)  Garcia Triana MD as MD (Neurology)  Jeff Patino DO as MD (Gastroenterology)  Garcia Triana MD as Assigned Neuroscience Provider  Bloch, Lauren Turner, RPH as Assigned MTM Pharmacist    History of Present Illness:     Danitza Soto is a 42 year old female with significant past medical history for anxiety, depression, insomnia, HTN, AFIB s/p ablation, GERD, enlarged cervical lymph node incidentally noted on ultrasound thyroid May of this year and surgical history of gastric sleeve in 2018 w/ Artis Alvarado who is presenting as a new patient in consultation at the request of Dr. Mcbride for GERD/dysphagia with a chief complaint of heartburn/regurgitation.  -----------------------------------------------------------------------      In 2018 had gastric sleeve procedure and with this the heartburn/regurgitaion became  "worse. Prior to the surgery was taking Omeprazole 20 mg once daily. Notes that symptoms are different in nature than prior to the surgery. Symptoms include substernal chest burning/discomfort that radiates to the entirety of the chest. Intermittently will have regurgitation which she reports she did not have prior to the surgery. Currently prescribed omeprazole 40 mg once daily and 20 mg at night with well control of symptoms. With consumption of any liquid will have relief of symptoms. Minimal short term relief with TUMs. Has also tried Nexium however leans toward Omeprazole as this is the least expensive.     No associated weight loss attributed to these above symptoms.     In \"general\" doesn't have dysphagia however reports specific difficulty with starchy foods and rice. With consumption of starches reports having more regurgitation symptoms.     Denies weight loss, nausea, emesis, odynophagia, abdominal pain, bloating/fullness, post prandial bloating, diarrhea, constipation, incontinence, melena, hematochezia and BRBR.     Has been using Ibuprofen for shoulder pain. However this does not aggravate the heartburn/regurgitaiton symptoms.    No use of Tylenol. No use of OTC herbal supplements/weight loss products.      Denies use of ETOH. Vapes daily. No recreational drug use.     No additional family history or GI related malignancy (esophageal, gastric, liver or colon) or family history of IBD/celiac disease.     Father had pancreatic cancer diagnosed at age 74.        Wt Readings from Last 5 Encounters:   08/18/22 117 kg (258 lb)   06/22/22 114.3 kg (252 lb)   05/10/22 112.5 kg (248 lb)   05/04/22 112.9 kg (249 lb)   04/07/22 111.8 kg (246 lb 8 oz)        Esophageal Questionnaire(s)    BEDQ Questionnaire  BEDQ Questionnaire: How Often Have You Had the Following? 11/1/2022   Trouble eating solid food (meat, bread, vegetables) 1   Trouble eating soft foods (yogurt, jello, pudding) 0   Trouble swallowing liquids 0 "   Pain while swallowing 0   Coughing or choking while swallowing foods or liquids 1   Total Score: 2     BEDQ Questionnaire: Discomfort/Pain Ratings 11/1/2022   Eating solid food (meat, bread, vegetables) 3   Eating soft foods (yogurt, jello, pudding) 0   Drinking liquid 1   Total Score: 4       Eckardt Questionnaire  Eckardt Questionnaire 11/1/2022   Dysphagia 0   Regurgitation 2   Retrosternal Pain 1   Weight Loss (kg) 0   Total Score:  3       Promis 10 Questionnaire  PROMIS 10 FLOWSHEET DATA 7/23/2018 12/5/2019 11/1/2022   In general, would you say your health is: 3 3 3   In general, would you say your quality of life is: 4 4 3   In general, how would you rate your physical health? 3 3 2   In general, how would you rate your mental health, including your mood and your ability to think? 4 3 4   In general, how would you rate your satisfaction with your social activities and relationships? 4 3 4   In general, please rate how well you carry out your usual social activities and roles. (This includes activities at home, at work and in your community, and responsibilities as a parent, child, spouse, employee, friend, etc.) 3 3 4   To what extent are you able to carry out your everyday physical activities such as walking, climbing stairs, carrying groceries, or moving a chair? 3 5 4   In the past 7 days, how often have you been bothered by emotional problems such as feeling anxious, depressed, or irritable? 1 1 2   In the past 7 days, how would you rate your fatigue on average? 2 2 3   In the past 7 days, how would you rate your pain on average, where 0 means no pain, and 10 means worst imaginable pain? 6 2 6   Mental health question re-calculation - no clinical value 5 5 4   Physical health question re-calculation - no clinical value 4 4 3   Pain question re-calculation - no clinical value 3 4 3   Global Mental Health Score 17 15 15   Global Physical Health Score 13 16 12   PROMIS TOTAL - SUBSCORES 30 31 27        STUDIES & PROCEDURES:    EGD:       Colonoscopy:     EndoFLIP directed at the UES or LES (8cm (EF-325) balloon length or 16cm (EF-322) balloon length):   Date:  8cm balloon  Balloon inflation Balloon pressure CSA (mm^2) DI (mm^2/mmHg) Dmin (mm) Compliance   20 (ladmark ID)        30        40        50           16cm balloon  Balloon inflation Balloon pressure CSA (mm^2) DI (mm^2/mmHg) Dmin (mm) Compliance   30 (ladmark ID)        40        50        60        70           High Resolution Manometry:    PH/Impedance:       Bravo:      CT:    Esophagram:      Prior medical records were reviewed including, but not limited to, notes from referring providers, lab work, radiographic tests, and other diagnostic tests. Pertinent results were summarized above.     History     Past Medical History:   Diagnosis Date     Antiplatelet or antithrombotic long-term use     resolved     Arrhythmia      BV (bacterial vaginosis) 9/18/2020     Depressive disorder 1990     Esophageal reflux      Hearing problem 2018     Hyperlipidemia LDL goal <130 07/06/2015     Hypertension      LSIL (low grade squamous intraepithelial lesion) on Pap smear 06/2014    + HPV, unable to type colp - BRISEYDA I     LEONARDO on CPAP      Other anxiety states        Past Surgical History:   Procedure Laterality Date     EP ABLATION FOCAL AFIB N/A 10/03/2019    Procedure: EP ABLATION FOCAL AFIB;  Surgeon: Harpreet Arevalo MD;  Location:  OR      TOOTH EXTRACTION W/FORCEP  1995    [place] Teeth Extraction     LAPAROSCOPIC GASTRIC SLEEVE N/A 11/27/2018    Procedure: Laparoscopic Sleeve Gastrectomy Latex Free;  Surgeon: Artis Tse MD;  Location:  OR     LAPAROSCOPIC HERNIORRHAPHY HIATAL  11/27/2018    Procedure: LAPAROSCOPIC  HIATAL Hernia Repair;  Surgeon: Artis Tse MD;  Location: [facility] OR     SALPINGO-OOPHORECTOMY, COMBINED Left 10/20/2021    Mucinous cystadenoma       Social History     Socioeconomic History     Marital status:       Spouse name: Not on file     Number of children: 0     Years of education: Some Colle     Highest education level: Not on file   Occupational History     Occupation: Mental Health Counselor   Tobacco Use     Smoking status: Former     Packs/day: 1.00     Years: 10.00     Pack years: 10.00     Types: Cigarettes, Vaping Device     Start date: 2004     Quit date: 2021     Years since quittin.4     Smokeless tobacco: Never   Vaping Use     Vaping Use: Every day     Substances: Nicotine, Flavoring     Devices: Disposable   Substance and Sexual Activity     Alcohol use: Not Currently     Alcohol/week: 0.0 standard drinks     Comment: Occas.     Drug use: No     Sexual activity: Yes     Partners: Female, Male     Birth control/protection: Condom, Implant     Comment: trans partners   Other Topics Concern     Parent/sibling w/ CABG, MI or angioplasty before 65F 55M? No   Social History Narrative    Social Documentation:        Balanced Diet: YES, sometimes    Calcium intake: 2 per day    Caffeine: 5-10 per day    Exercise:  type of activity walking, playtime;  5 times per week    Sunscreen: when remembered    Seatbelts:  Yes    Self Breast Exam:  No    Self Testicular Exam: n/a    Physical/Emotional/Sexual Abuse: No    Do you feel safe in your environment? Yes        Cholesterol screen up to date: No 2006    Eye Exam up to date: Yes    Dental Exam up to date: No    Pap smear up to date: No:     Mammogram up to date: Does Not Apply    Dexa Scan up to date: Does Not Apply    Colonoscopy up to date: Does Not Apply    Immunizations up to date: Yes,     Glucose screen if over 40:  N/a        Jamila Coyle MA                     Social Determinants of Health     Financial Resource Strain: Not on file   Food Insecurity: Not on file   Transportation Needs: Not on file   Physical Activity: Not on file   Stress: Not on file   Social Connections: Not on file   Intimate Partner Violence: Not At Risk     Fear of  Current or Ex-Partner: No     Emotionally Abused: No     Physically Abused: No     Sexually Abused: No   Housing Stability: Not on file       Family History   Problem Relation Age of Onset     Heart Disease Mother         ,mother had emphesema and  at 62     Hypertension Mother      Allergies Mother      Lipids Mother      Obesity Mother      Respiratory Mother         Emphysema     Diabetes Maternal Grandmother         Type 2?     Hypertension Maternal Grandmother      Circulatory Maternal Grandmother         Due to diabetes     Eye Disorder Maternal Grandmother         Macular degeneration/Macular degeneration     Obesity Maternal Grandmother      Hypertension Father      Lipids Father      Cancer Father      Prostate Cancer Father      Other Cancer Father      Cerebrovascular Disease Paternal Grandmother      Alcohol/Drug Maternal Grandfather      Obesity Maternal Grandfather      Psychotic Disorder Paternal Grandfather         Committed Suicide     Depression Paternal Grandfather      Allergies Sister      Genitourinary Problems Sister      Family history reviewed and edited as appropriate    Medications and Allergies:     Outpatient Encounter Medications as of 2022   Medication Sig Dispense Refill     acetaminophen (TYLENOL) 325 MG tablet Take 2 tablets (650 mg) by mouth every 4 hours as needed for other (For optimal non-opioid multimodal pain management to improve pain control and physical function.) 100 tablet 0     amLODIPine (NORVASC) 10 MG tablet Take 1 tablet (10 mg) by mouth daily 90 tablet 3     calcium carbonate (OS-CHIKI) 500 MG tablet Take 1 tablet by mouth daily        Cyanocobalamin (B-12) 1000 MCG TBCR        Ferrous Sulfate 324 (65 Fe) MG TBEC One daily       hydrochlorothiazide (MICROZIDE) 12.5 MG capsule TAKE 1 CAPSULE(12.5 MG) BY MOUTH DAILY 90 capsule 3     lisinopril (ZESTRIL) 40 MG tablet Take 1 tablet (40 mg) by mouth daily 90 tablet 3     magnesium 250 MG tablet Take 1 tablet by  mouth daily       Multiple Vitamins-Minerals (CENTRUM PO) 2 tablets per day       omeprazole (PRILOSEC) 20 MG DR capsule Take 1 capsule (20 mg) by mouth daily 90 capsule 3     omeprazole (PRILOSEC) 40 MG DR capsule Take 1 capsule (40 mg) by mouth daily 90 capsule 3     venlafaxine (EFFEXOR XR) 150 MG 24 hr capsule TAKE 1 CAPSULE(150 MG) BY MOUTH DAILY 90 capsule 1     vitamin B complex with vitamin C (STRESS TAB) tablet Take 1 tablet by mouth daily       vitamin B1 (THIAMINE) 250 MG tablet Take 250 mg by mouth daily       No facility-administered encounter medications on file as of 11/1/2022.        Allergies   Allergen Reactions     Bupropion Other (See Comments)     Other reaction(s): Chest Pain,Panic attacks, heart/chest pain     Wellbutrin [Bupropion]      Wellbutrin: Panic attacks, heart/chest pain        Review of systems:  A full 10 point review of systems was obtained and was negative except for the pertinent positives and negatives stated within the HPI.    Objective Findings:   Physical Exam:    Constitutional: LMP 04/01/2022   General: Alert, cooperative, no distress, well-appearing  Head: Atraumatic, normocephalic, no obvious abnormalities   Eyes: Sclera anicteric, no obvious conjunctival hemorrhage   Nose: Nares normal, no obvious malformation, no obvious rhinorrhea   Respiratory: Resting comfortably  Skin: No jaundice, no obvious rash  Neurologic: AAOx3, no obvious neurologic abnormality  Psychiatric: Normal Affect, appropriate mood     Labs, Radiology, Pathology     Lab Results   Component Value Date    WBC 9.3 05/04/2022    WBC 8.4 03/29/2022    WBC 7.4 05/18/2021    HGB 15.1 05/04/2022    HGB 15.2 03/29/2022    HGB 14.4 05/18/2021     05/04/2022     03/29/2022     05/18/2021    CHOL 226 (H) 03/21/2017    CHOL 249 (H) 08/24/2016    CHOL 236 (H) 01/13/2016    TRIG 203 (H) 03/21/2017    TRIG 231 (H) 08/24/2016    TRIG 207 (H) 01/13/2016    HDL 30 (L) 03/21/2017    HDL 29 (L)  08/24/2016    HDL 33 (L) 01/13/2016    ALT 20 05/04/2022    ALT 26 01/11/2022    ALT 20 05/18/2021    AST 12 05/04/2022    AST 15 01/11/2022    AST 11 05/18/2021     05/04/2022     01/11/2022     05/18/2021    BUN 9 05/04/2022    BUN 8 01/11/2022    BUN 9 05/18/2021    CO2 23 05/04/2022    CO2 21 01/11/2022    CO2 28 05/18/2021    TSH 4.09 (H) 05/04/2022    TSH 2.86 01/11/2022    TSH 0.64 05/18/2021    INR 0.98 11/06/2018        Liver Function Studies -   Recent Labs   Lab Test 05/04/22  1144   PROTTOTAL 7.7   ALBUMIN 3.7   BILITOTAL 0.3   ALKPHOS 104   AST 12   ALT 20          Patient Active Problem List    Diagnosis Date Noted     Memory loss 06/29/2022     Priority: Medium     Gastroesophageal reflux disease with esophagitis without hemorrhage 06/29/2022     Priority: Medium     Fatigue, unspecified type 06/29/2022     Priority: Medium     Insomnia, unspecified type 02/11/2022     Priority: Medium     Strain of lumbar region, initial encounter 05/06/2021     Priority: Medium     Slipped at work 5/4- excused form work till 5/7- Realtive rest helping. Return to work 5/10 unrestricted        Strain of hip and thigh, right, initial encounter 05/06/2021     Priority: Medium     Slipped at work 5/4- excused form work till 5/7- Realtive rest helping. Return to work 5/10 unrestricted        S/P laparoscopic sleeve gastrectomy 02/03/2021     Priority: Medium     2018 Dr. Tse        Moderate episode of recurrent major depressive disorder (H) 09/18/2020     Priority: Medium     S/P ablation of atrial fibrillation 10/03/2019     Priority: Medium     Recurrent major depressive disorder, in full remission (H) 04/08/2019     Priority: Medium     Morbid (severe) obesity due to excess calories (H) 11/27/2018     Priority: Medium     Sp sleeve gastrectomy       LEONARDO (obstructive sleep apnea) 04/13/2017     Priority: Medium     4/10/2017 - Home Sleep Apnea Testing - AHI 15.5/hr; Supine AHI 12.9/hr; SpO2 <= 88%  for 33.8 minutes.  Titration Sleep Study 4/19/2017 - (288.0 lbs) CPAP was titrated to a pressure of 11 with an AHI of 3.7.  Time Spent in REM supine at this pressure was 33.5.  Recommending Auto-CPAP 7 - 15 cmH2O.       Obesity hypoventilation syndrome (H) 04/13/2017     Priority: Medium     Mixed hyperlipidemia 07/06/2015     Priority: Medium     Papanicolaou smear of cervix with low grade squamous intraepithelial lesion (LGSIL) 06/17/2014     Priority: Medium     6/17/14: LSIL, + HPV, unable to type.   8/20/14:[place]:BRISEYDA I. Plan cotest pap & HPV in 1 year.   1/20116 Pap Ascus Neg HPV. Plan: [place].   5/13/16 Colposcopy not completed. Cancelled by patient  3/21/17 NIL/Neg HPV. Plan: cotest in 1 year per Dr. Mcbride  9/28/18 Tele enc: Dr. Mcbride, recommending new plan. Cotest due Jan or Feb 2019. Tracking updated. ([facility])  04/02/19 Patient is lost to pap tracking follow-up.   04/08/19: NIL pap, Neg HR HPV result. Plan cotest in 3 years per provider.        Acne 02/24/2012     Priority: Medium     Primary hypertension 01/12/2011     Priority: Medium     CARDIOVASCULAR SCREENING; LDL GOAL LESS THAN 130 10/31/2010     Priority: Medium     Tobacco use disorder 05/17/2006     Priority: Medium     Anxiety      Priority: Medium     Gastroesophageal reflux disease without esophagitis      Priority: Medium      Assessment and Plan   Assessment:    Danitza Soto is a 42 year old female with significant past medical history for anxiety, depression, insomnia, HTN, AFIB s/p ablation, GERD, enlarged cervical lymph node incidentally noted on ultrasound thyroid May of this year and surgical history of gastric sleeve in 2018 w/ Artis Alvarado who is presenting as a new patient in consultation at the request of Dr. Mcbride for GERD/dysphagia with a chief complaint of heartburn/regurgitation.    #Heartburn  #Regurgitation  Previously well controlled with Omeprazole 20mg once daily prior to gastric sleeve surgery in 2018. Now  with well control of symptoms without alarms signs with Omeprazole 40 mg once daily and 20 mg at night. Pt has concerns regarding long term effects of PPI use and ideally would like to deescalate regimen. Discussed that increase in reflux/heartburn symptoms are to be expected with gastric sleeve surgery secondary to the alterations created to the flap valve/mechanics of the LES as well as altering the effective volume of the gastric cavity. Will schedule for upper endoscopy to evaluate for reflux changes of the esophagus and evaluation of the gastric sleeve.    Future considerations would be to hold acid suppressive therapy for minimum of 2 weeks and undergo BRAVO to definitively quantify acid exposure.     #Family History of Pancreatic Cancer  -- Message sent to Dr. Becerra to discuss if additional screening is necessary     #Enlarged Cervical Lymph Node   -- Reiterated to pt that she should follow up with PCP for additional evaluation     #Vaping   -- Counseled on smoking cessation     Plan:     Upper endoscopy   Continue current acid suppressive regimen   Can attempt to open and take contents of Omeprazole 20 mg capsules in the morning as this has had benefit in patients with Missael En Y Gastric bypass however unknown to the benefit to patients with history of gastric sleeve   Follow up with Artis Alvarado for any additional recommendations regarding worsening of heartburn/regurgitation in setting of gastric sleeve  Message sent to Dr. Becerra to discuss if additional screening is necessary for first degree family member with pancreatic cancer   Complete tobacco use cessation   Follow up with primary care provider for additional evaluation of enlarged lymph node    Follow up plan:   Return to clinic 3 months and as needed.    The risks and benefits of my recommendations, as well as other treatment options were discussed with the patient and any available family today. All questions were answered.      o Follow up: As planned above. Today, I personally spent 45 minutes in direct face to face time with the patient, of which greater than 50% of the time was spent in patient education and counseling as described above. Approximately 15 minutes were spent on indirect care associated with the patient's consultation including but not limited to review of: patient medical records to date, clinic visits, hospital records, lab results, imaging studies, procedural documentation, and coordinating care with other providers. The findings from this review are summarized in the above note. All of the above accounted for a cumulative time of 60 minutes and was performed on the date of service.     The patient verbalized understanding of the plan and was appreciative for the time spent and information provided during the office visit.     Anju Leal PA-C  Division of Gastroenterology, Hepatology, and Nutrition  [facility]       I spoke with and evaluated the patient, participating in the key portions of the service. I reviewed Anju Leal PA-C's note. I agree with the Anju Leal PA-C's findings and plan. The note was reviewed and edited as needed.    Patient was seen in video telemedicine consultation regarding her symptoms of heartburn that are controlled with PPI.  The patient had noticed a need for increased PPI following her gastric sleeve procedure.  She notes that she has been on PPI long-term and has some concerns about long-term use. The risks and benefits of PPI use were discussed including but not limited to: kidney injury, dementia, fracture, C. Difficile, and community acquired pneumonia.  Recent literature suggests a possible relationship between PPI use with a greater likelihood of reporting a positive COVID-19 test (Shanthi CV, Harriet WD, Trinidad BMR. Am J Gastroenterol 2020;115:0414-1102).This was discussed with the patient in detail. The quality of research studies was described  (retrospective vs prospective). Data from recent review article provided (Sukumar VIZCARRA, Vandana PO. Proton Pump Inhibitors in 2021: Pros, Cons, and Everything in Between. [employer]. May 2021. Doi:10.1177/[id number]). The value of joint decision making was emphasized to ensure that the medication is utilized in the appropriate clinical setting.     Because she denies having prior endoscopy and with new/worsening symptoms of reflux I have recommended an upper endoscopy.  She should discontinue her PPI at least 2 weeks prior to the procedure.  She may try open capsule PPI to see if this improves absorption and symptom control with lesser doses.  We discussed weight loss as a means to decrease the need for PPI therapy.    I have recommended that the patient follow-up with her bariatric surgeon so he is in the loop with respect to her symptoms should he ever need to reoperate.    The patient has a family history of a first-degree relative with pancreatic cancer.  This does not meet criteria for routine screening however we have reached out to our pancreatic specialist to confirm no additional testing is needed at this time.    Jeff Patino DO   of Medicine  Director, Esophageal Disorders Program  Division of Gastroenterology, Hepatology, and Nutrition  [facility]    Documentation assisted by voice recognition and documentation system.

## 2022-11-01 NOTE — TELEPHONE ENCOUNTER
Prior Authorization Approval    Authorization Effective Date: 12/8/2021  Authorization Expiration Date: 7/8/2022  Medication: [id number]  Approved Dose/Quantity:   Reference #:     Insurance Company: [employer] [phone] Fax [phone]  Expected CoPay:       CoPay Card Available:      Foundation Assistance Needed:    Which Pharmacy is filling the prescription (Not needed for infusion/clinic administered): [id number] [address] AT [facility] [address]  Pharmacy Notified: Yes  Patient Notified: No         Detail Level: Simple Detail Level: Generalized Detail Level: Zone Detail Level: Detailed

## 2022-11-01 NOTE — PATIENT INSTRUCTIONS
It was a pleasure taking care of you today.  I've included a brief summary of our discussion and care plan from today's visit below.  Please review this information with your primary care provider.  _______________________________________________________________________    My recommendations are summarized as follows:    Upper endoscopy   Continue current acid suppressive regimen   Can attempt to open and take contents of Omeprazole 20 mg capsules in the morning as this has had benefit in patients with Missael En Y Gastric bypass however unknown to the benefit to patients with history of gastric sleeve   Follow up with Artis Alvarado for any additional recommendations regarding worsening of heartburn/regurgitation in setting of gastric sleeve  Message sent to Dr. Becerra to discuss if additional screening is necessary for first degree family member with pancreatic cancer   Complete tobacco use cessation   Follow up with primary care provider for additional evaluation of enlarged lymph node    To schedule endoscopic procedures you may call: [phone]  To schedule radiology (imaging) tests you may call: [phone]  To schedule an ENT appointment you may call: [phone]    Please call my nurse Sirisha ([phone]), Jeet ([phone]) with any questions or concerns.      Return to GI Clinic in 3 months to review your progress.    _______________________________________________________________________    Who do I call with any questions after my visit?  Please be in touch if there are any further questions that arise following today's visit.  There are multiple ways to contact your gastroenterology care team.      During business hours, you may reach a Gastroenterology nurse at [phone] and choose option 3.       To schedule or reschedule an appointment, please call [phone].     You can always send a secure message through MyChart.  MyChart messages are answered by your nurse or doctor typically  within 24 hours.  Please allow extra time on weekends and holidays.      For urgent/emergent questions after business hours, you may reach the on-call GI Fellow by contacting the [facility]  at [phone].     How will I get the results of any tests ordered?    You will receive all of your results.  If you have signed up for [employer], any tests ordered at your visit will be available to you after your physician reviews them.  Typically this takes 1-2 weeks.  If there are urgent results that require a change in your care plan, your physician or nurse will call you to discuss the next steps.      What is [employer]?  [employer] is a secure way for you to access all of your healthcare records from the [facility].  It is a web based computer program, so you can sign on to it from any location.  It also allows you to send secure messages to your care team.  I recommend signing up for [employer] access if you have not already done so and are comfortable with using a computer.      How to I schedule a follow-up visit?  If you did not schedule a follow-up visit today, please call [phone] to schedule a follow-up office visit.      If you feel you received exceptional care and are interested in supporting the clinical and research goals of Dr. Patino/Anju Leal or the Division of Gastroenterology, Hepatology, and Nutrition please contact [email or web address] from the [facility] to discuss opportunities to donate.    Sincerely,    Anju Leal PA-C  Division of Gastroenterology, Hepatology, and Nutrition  [facility]

## 2022-11-02 ENCOUNTER — TELEPHONE (OUTPATIENT)
Dept: GASTROENTEROLOGY | Facility: CLINIC | Age: 42
End: 2022-11-02

## 2022-11-02 NOTE — TELEPHONE ENCOUNTER
Screening Questions    BlueKIND OF PREP RedLOCATION [review exclusion criteria] GreenSEDATION TYPE    N Have you had a positive covid test in the last 90 days?   If yes, what date?      Y Your able to give consent for your medical care?    Y Are you active on mychart?     BCBS What insurance do you carry?      ARY SULLIVAN Ordering/Referring Provider:     40.7 BMI [BMI OVER 40-EXTENDED PREP]  BMI OVER 40 NEED PAC EVALUATION FOR [facility]    Respiratory Screening  [If yes to any of the following HOSPITAL setting only]     N      Do you use daily home oxygen?   Y     Do you have mod to severe Obstructive Sleep Apnea?  N      Do you have Pulmonary Hypertension? NEED PAC APPT AT [facility]    N      Do you have UNCONTROLLED asthma?      N Have you had a heart or lung transplant?       N Are you currently on dialysis? [If yes, G-PREP & HOSPITAL setting only]   N  Do you have chronic kidney disease? [If yes, G-PREP]  N  Do you have a diagnosis of diabetes?[If yes, G-PREP]    N Have you had a stroke or Transient ischemic attack (TIA - aka  mini stroke ) within 6 months? (If yes, please review exclusion criteria)  N  In the past 6 months, have you had any heart related issues including cardiomyopathy or heart attack?   N  If yes, did it require cardiac stenting or other implantable device?       N Do you have any implantable devices in your body (pacemaker, defib, LVAD)? (If yes, please review exclusion criteria)    N Do you take nitroglycerin?   N If yes, how often?  (If yes, HOSPITAL setting ONLY)  N Are you currently taking any blood thinners?           [IF YES, INFORM PATIENT TO FOLLOW UP W/ ORDERING PROVIDER FOR BRIDGING INSTRUCTIONS]     N  Do you take Phentermine?   Yes-> Hold for 7 days before procedure.  Please consult your prescribing provider if you have questions about holding this medication.     N    [FEMALES] are you currently pregnant?    N    If yes, how many weeks? [Greater than 12 weeks, OR NEEDED]    N Any prescription pain medications taken daily like narcotics? [EXTENDED PREP AND MAC]    N Any chemical dependencies such as alcohol, street drugs, or methadone? [If yes, MAC]    N Any post-traumatic stress syndrome, severe anxiety or history of psychosis? [If yes, MAC]    Y Can you transfer from bed to chair? (If NO, please HOSPITAL setting  only)       On a regular basis do you go 3-5 days without a bowel movement?[ If yes, EXTENDED PREP.]     Preferred LOCAL Pharmacy for Pre Prescription:     [facility]                        Scheduling Details    Danitza Caller:   (Please ask for phone number if not scheduled by patient)    Type of Procedure Scheduled: Upper Endoscopy [EGD]     Which Colonoscopy Prep was Sent:   CONCHA CF PATIENTS & GROEN'S PATIENTS NEEDS EXTENDED PREP    11/29 Date of Procedure:   JOSE Surgeon:   [facility] Location:     MAC Sedation Type:     Conscious Sedation- Needs  for 6 hours after the procedure  MAC/General-Needs  for 24 hours after procedure    Y Pre-op Required at [facility], [place], [place] and OR for MAC sedation: 11/8       (Advise patient they will need a pre-op WITH IN 30 DAYS prior to procedure -)      Y Informed patient they will need an adult          Cannot take any type of public or medical transportation alone    Y Confirmed Nurse will call to complete assessment     Y Pre-Procedure Cov[facility] test to be [facility]]    DOUBLE CHECK BMI AND LEONARDO      HOME Additional comments:

## 2022-11-03 NOTE — TELEPHONE ENCOUNTER
FUTURE VISIT INFORMATION      SURGERY INFORMATION:    Date: 22    Location:  gi    Surgeon:  Jeff Patino DO    Anesthesia Type:  mac    Procedure: ESOPHAGOGASTRODUODENOSCOPY (EGD)    Consult: virtual visit 22    RECORDS REQUESTED FROM:       Primary Care Provider: Polly Mcbride MD- [facility]    Pertinent Medical History: hypertension    Most recent EKG+ Tracin20    Most recent ECHO: 22    Most recent Cardiac Stress Test: 19    Most recent Coronary Angiogram: 13    Most recent Sleep Study:  17

## 2022-11-08 ENCOUNTER — ANESTHESIA EVENT (OUTPATIENT)
Dept: SURGERY | Facility: CLINIC | Age: 42
End: 2022-11-08

## 2022-11-08 ENCOUNTER — PRE VISIT (OUTPATIENT)
Dept: SURGERY | Facility: CLINIC | Age: 42
End: 2022-11-08

## 2022-11-08 ENCOUNTER — VIRTUAL VISIT (OUTPATIENT)
Dept: SURGERY | Facility: CLINIC | Age: 42
End: 2022-11-08
Payer: COMMERCIAL

## 2022-11-08 DIAGNOSIS — K21.00 GASTROESOPHAGEAL REFLUX DISEASE WITH ESOPHAGITIS WITHOUT HEMORRHAGE: ICD-10-CM

## 2022-11-08 DIAGNOSIS — Z01.818 PREOP EXAMINATION: Primary | ICD-10-CM

## 2022-11-08 PROCEDURE — 99204 OFFICE O/P NEW MOD 45 MIN: CPT | Mod: 95 | Performed by: PHYSICIAN ASSISTANT

## 2022-11-08 RX ORDER — MULTIVIT-MIN/IRON/FOLIC ACID/K 18-600-40
CAPSULE ORAL EVERY EVENING
COMMUNITY

## 2022-11-08 RX ORDER — IBUPROFEN 200 MG
200 TABLET ORAL EVERY 4 HOURS PRN
COMMUNITY

## 2022-11-08 ASSESSMENT — ENCOUNTER SYMPTOMS
SEIZURES: 0
DYSRHYTHMIAS: 1

## 2022-11-08 ASSESSMENT — LIFESTYLE VARIABLES: TOBACCO_USE: 1

## 2022-11-08 ASSESSMENT — PAIN SCALES - GENERAL: PAINLEVEL: MODERATE PAIN (4)

## 2022-11-08 NOTE — PATIENT INSTRUCTIONS
Name:  Danitza Soto   MRN:  [id number]   :  1980   Today's Date:  2022     GI Lab procedures:    A representative from the GI Lab will contact you regarding arrival date and time.      You were seen today in the PAC Clinic.   (Pre-operative Anesthesia Assessment Center)  [place] [id number]   phone [phone]    You had a pre-operative assessment done.    Anesthesia recommendations for medications:    Hold Aspirin for 2 days before procedure.  Hold Multivitamins for 7 days before procedure.   Hold Herbal medications and Supplements for 7 days before procedure.  Hold Ibuprofen for 2 days before procedure.   Hold Naproxen for 2 days before procedure.         Please hold the following medications the day of procedure:  Hydrochlorothiazide           Please take these medications the day of procedure:  All other usual am prescription medications               For questions or appointments, call:  [facility]: [phone], option 2.  Monday through Friday, 8 a.m. to 4:30 p.m.  (If it is after hours, please reach out to the clinic or provider you were scheduled with.)

## 2022-11-08 NOTE — H&P
Pre-Operative H & P     CC:  Preoperative exam to assess for increased cardiopulmonary risk while undergoing surgery and anesthesia.    Date of Encounter: 11/8/2022  Primary Care Physician:  Polly Mcbride     Reason for visit:   Encounter Diagnoses   Name Primary?     Preop examination Yes     Gastroesophageal reflux disease with esophagitis without hemorrhage        HPI  Danitza Soto is a 42 year old female who presents for pre-operative H & P in preparation for  Procedure Information     Date/Time: 11/29/2022     Procedure: EGD    Anesthesia type: MAC    Pre-op diagnosis: GERD with esophagitis without hemorrhage    Location: [facility]    Providers: Dr. Patino          This is a 42 year old female with PMH significant for hypertension, atrial fibrillation s/p EP ablation 10/3/2019, LEONARDO, enlarged cervical lymph node, GERD, status post lap sleeve gastrectomy 2018, depression and anxiety.  She was recently evaluated by Dr. Patino for complaint of heartburn and regurgitation that has been getting worse recently.  She has substernal chest burning and discomfort and intermittent regurgitation. The above procedure is planned for further evaluation.    History is obtained from the patient and chart review    Hx of abnormal bleeding or anti-platelet use: denies    Menstrual history: Patient's last menstrual period was 04/01/2022.:     Past Medical History  Past Medical History:   Diagnosis Date     Antiplatelet or antithrombotic long-term use     resolved     Arrhythmia      BV (bacterial vaginosis) 9/18/2020     Depressive disorder 1990     Esophageal reflux      Hearing problem 2018     Hyperlipidemia LDL goal <130 07/06/2015     Hypertension      LSIL (low grade squamous intraepithelial lesion) on Pap smear 06/2014    + HPV, unable to type colp - BRISEYDA I     LEONARDO on CPAP      Other anxiety states        Past Surgical History  Past Surgical History:   Procedure  Laterality Date     EP ABLATION FOCAL AFIB N/A 10/03/2019    Procedure: EP ABLATION FOCAL AFIB;  Surgeon: Harpreet Arevalo MD;  Location: [facility] OR     HC TOOTH EXTRACTION W/FORCEP  1995    Sawyer Teeth Extraction     LAPAROSCOPIC GASTRIC SLEEVE N/A 11/27/2018    Procedure: Laparoscopic Sleeve Gastrectomy Latex Free;  Surgeon: Artis Tse MD;  Location: [facility] OR     LAPAROSCOPIC HERNIORRHAPHY HIATAL  11/27/2018    Procedure: LAPAROSCOPIC  HIATAL Hernia Repair;  Surgeon: Artis Tse MD;  Location: [facility] OR     SALPINGO-OOPHORECTOMY, COMBINED Left 10/20/2021    Mucinous cystadenoma       Prior to Admission Medications  Current Outpatient Medications   Medication Sig Dispense Refill     acetaminophen (TYLENOL) 325 MG tablet Take 2 tablets (650 mg) by mouth every 4 hours as needed for other (For optimal non-opioid multimodal pain management to improve pain control and physical function.) 100 tablet 0     amLODIPine (NORVASC) 10 MG tablet Take 1 tablet (10 mg) by mouth daily (Patient taking differently: Take 10 mg by mouth every evening) 90 tablet 3     Ascorbic Acid (VITAMIN C) 500 MG CAPS Take by mouth every evening       calcium carbonate (OS-CHIKI) 500 MG tablet Take 1 tablet by mouth every evening       Cyanocobalamin (B-12) 1000 MCG TBCR Take by mouth every evening       hydrochlorothiazide (MICROZIDE) 12.5 MG capsule TAKE 1 CAPSULE(12.5 MG) BY MOUTH DAILY (Patient taking differently: Take 12.5 mg by mouth every morning TAKE 1 CAPSULE(12.5 MG) BY MOUTH DAILY) 90 capsule 3     ibuprofen (ADVIL/MOTRIN) 200 MG tablet Take 200 mg by mouth every 4 hours as needed for pain       lisinopril (ZESTRIL) 40 MG tablet Take 1 tablet (40 mg) by mouth daily (Patient taking differently: Take 40 mg by mouth every morning) 90 tablet 3     magnesium 250 MG tablet Take 1 tablet by mouth every evening       Multiple Vitamins-Iron (MULTI-DAY PLUS IRON PO) Take by mouth every morning       omeprazole (PRILOSEC) 20 MG DR capsule  Take 1 capsule (20 mg) by mouth daily (Patient taking differently: Take 20 mg by mouth every evening) 90 capsule 3     omeprazole (PRILOSEC) 40 MG DR capsule Take 1 capsule (40 mg) by mouth daily (Patient taking differently: Take 40 mg by mouth every morning) 90 capsule 3     venlafaxine (EFFEXOR XR) 150 MG 24 hr capsule TAKE 1 CAPSULE(150 MG) BY MOUTH DAILY (Patient taking differently: 150 mg every morning TAKE 1 CAPSULE(150 MG) BY MOUTH DAILY) 90 capsule 1     vitamin B1 (THIAMINE) 250 MG tablet Take 250 mg by mouth every evening       Vitamin D, Cholecalciferol, 25 MCG (1000 UT) TABS Take by mouth every evening         Allergies  Allergies   Allergen Reactions     Bupropion Other (See Comments)     Other reaction(s): Chest Pain,Panic attacks, heart/chest pain     Wellbutrin [Bupropion]      Wellbutrin: Panic attacks, heart/chest pain       Social History  Social History     Socioeconomic History     Marital status:      Spouse name: Not on file     Number of children: 0     Years of education: Some Colle     Highest education level: Not on file   Occupational History     Occupation: Mental Health Counselor   Tobacco Use     Smoking status: Former     Packs/day: 1.00     Years: 10.00     Pack years: 10.00     Types: Cigarettes, Vaping Device     Start date: 2004     Quit date: 2021     Years since quittin.4     Smokeless tobacco: Never   Vaping Use     Vaping Use: Every day     Substances: Nicotine, Flavoring     Devices: Disposable   Substance and Sexual Activity     Alcohol use: Not Currently     Alcohol/week: 0.0 standard drinks     Comment: Occas.     Drug use: No     Sexual activity: Yes     Partners: Female, Male     Birth control/protection: Condom, Implant     Comment: trans partners   Other Topics Concern     Parent/sibling w/ CABG, MI or angioplasty before 65F 55M? No   Social History Narrative    Social Documentation:        Balanced Diet: YES, sometimes    Calcium intake: 2 per day     Caffeine: 5-10 per day    Exercise:  type of activity walking, playtime;  5 times per week    Sunscreen: when remembered    Seatbelts:  Yes    Self Breast Exam:  No    Self Testicular Exam: n/a    Physical/Emotional/Sexual Abuse: No    Do you feel safe in your environment? Yes        Cholesterol screen up to date: No 2006    Eye Exam up to date: Yes    Dental Exam up to date: No    Pap smear up to date: No:     Mammogram up to date: Does Not Apply    Dexa Scan up to date: Does Not Apply    Colonoscopy up to date: Does Not Apply    Immunizations up to date: Yes,     Glucose screen if over 40:  N/a        Jamila Coyle MA                     Social Determinants of Health     Financial Resource Strain: Not on file   Food Insecurity: Not on file   Transportation Needs: Not on file   Physical Activity: Not on file   Stress: Not on file   Social Connections: Not on file   Intimate Partner Violence: Not At Risk     Fear of Current or Ex-Partner: No     Emotionally Abused: No     Physically Abused: No     Sexually Abused: No   Housing Stability: Not on file       Family History  Family History   Problem Relation Age of Onset     Heart Disease Mother         ,mother had emphesema and  at 62     Hypertension Mother      Allergies Mother      Lipids Mother      Obesity Mother      Respiratory Mother         Emphysema     Hypertension Father      Lipids Father      Cancer Father      Prostate Cancer Father      Other Cancer Father      Allergies Sister      Genitourinary Problems Sister      Diabetes Maternal Grandmother         Type 2?     Hypertension Maternal Grandmother      Circulatory Maternal Grandmother         Due to diabetes     Eye Disorder Maternal Grandmother         Macular degeneration/Macular degeneration     Obesity Maternal Grandmother      Alcohol/Drug Maternal Grandfather      Obesity Maternal Grandfather      Cerebrovascular Disease Paternal Grandmother      Psychotic Disorder Paternal  Grandfather         Committed Suicide     Depression Paternal Grandfather      Anesthesia Reaction No family hx of      Bleeding Disorder No family hx of      Venous thrombosis No family hx of        Review of Systems  The complete review of systems is negative other than noted in the HPI or here.       Anesthesia Evaluation   Pt has had prior anesthetic.     No history of anesthetic complications       ROS/MED HX  ENT/Pulmonary:     (+) sleep apnea, doesn't use CPAP, tobacco use (vapes), Current use,  (-) asthma   Neurologic:  - neg neurologic ROS  (-) no seizures and no CVA   Cardiovascular:     (+) hypertension-----dysrhythmias (s/p EP ablation 10/2019), a-fib, Previous cardiac testing   Echo: Date: 2019 Results:  EDY  Left Ventricle  Global and regional left ventricular function is normal with an EF of 60-65%.  Left ventricular size is normal. Left ventricular wall thickness is normal.     Right Ventricle  The right ventricle is normal size. Global right ventricular function is  normal.     Atria  The left atrial appendage is normal. It is free of spontaneous echo contrast  and thrombus. Mild to moderate left atrial enlargement is present. Mild right  atrial enlargement is present. The atrial septum is intact as assessed by  color Doppler . Bubble study not performed.     Mitral Valve  The mitral valve is normal.        Aortic Valve  Aortic valve is normal in structure and function.     Tricuspid Valve  The tricuspid valve is normal.     Pulmonic Valve  The pulmonic valve is normal.     Vessels  The aorta root is normal. The inferior vena cava is normal. Normal pulmonary  vein velocity.     Pericardium  No pericardial effusion is present.  Stress Test: Date: Results:    ECG Reviewed: Date: Results:    Cath: Date: Results:      METS/Exercise Tolerance: >4 METS    Hematologic:  - neg hematologic  ROS  (-) history of blood clots and history of blood transfusion   Musculoskeletal:  - neg musculoskeletal ROS      GI/Hepatic: Comment: S/p LSG 2018    (+) GERD,     Renal/Genitourinary:  - neg Renal ROS     Endo:     (+) Obesity,     Psychiatric/Substance Use:     (+) psychiatric history anxiety and depression     Infectious Disease:  - neg infectious disease ROS     Malignancy:  - neg malignancy ROS     Other: Comment: Enlarged cervical lymph node - neg other ROS          Virtual visit -  No vitals were obtained    Physical Exam  Constitutional: Awake, alert, cooperative, no apparent distress, and appears stated age.  HENT: Normocephalic  Respiratory: non labored breathing   Neurologic: Awake, alert, oriented to name, place and time.   Neuropsychiatric: Calm, cooperative. Normal affect.      Prior Labs/Diagnostic Studies   All labs and imaging personally reviewed     Component      Latest Ref Rng & Units 5/4/2022   Sodium      133 - 144 mmol/L 136   Potassium      3.4 - 5.3 mmol/L 3.7   Chloride      94 - 109 mmol/L 105   Carbon Dioxide      20 - 32 mmol/L 23   Anion Gap      3 - 14 mmol/L 8   Urea Nitrogen      7 - 30 mg/dL 9   Creatinine      0.52 - 1.04 mg/dL 0.67   Calcium      8.5 - 10.1 mg/dL 9.2   Glucose      70 - 99 mg/dL 79   Alkaline Phosphatase      40 - 150 U/L 104   AST      0 - 45 U/L 12   ALT      0 - 50 U/L 20   Protein Total      6.8 - 8.8 g/dL 7.7   Albumin      3.4 - 5.0 g/dL 3.7   Bilirubin Total      0.2 - 1.3 mg/dL 0.3   GFR Estimate      >60 mL/min/1.73m2 >90     Component      Latest Ref Rng & Units 5/4/2022   WBC      4.0 - 11.0 10e3/uL 9.3   RBC Count      3.80 - 5.20 10e6/uL 4.93   Hemoglobin      11.7 - 15.7 g/dL 15.1   Hematocrit      35.0 - 47.0 % 45.7   MCV      78 - 100 fL 93   MCH      26.5 - 33.0 pg 30.6   MCHC      31.5 - 36.5 g/dL 33.0   RDW      10.0 - 15.0 % 13.5   Platelet Count      150 - 450 10e3/uL 365       EKG/echo/ stress test - if available please see in ROS above       The patient's records and results personally reviewed by this provider.     Outside records reviewed from:  Care Everywhere  Reviewed cardiology notes, GI notes and labs and testing as above    Assessment      Danitza Soto is a 42 year old female seen as a PAC referral for risk assessment and optimization for anesthesia.    Plan/Recommendations  Pt will be optimized for the proposed procedure.  See below for details on the assessment, risk, and preoperative recommendations    NEUROLOGY  - No history of TIA, CVA or seizure  - Post Op delirium risk factors:  No risk identified    ENT  - No current airway concerns.  Will need to be reassessed day of surgery.  Mallampati: Unable to assess  TM: Unable to assess    CARDIAC  - Afib s/p EP ablation 10/2019.  Followed by cardiology with appointment tomorrow 11/9. She states she has been having intermittent episodes of afib for the past month.  She does not have chest pain or SOB with these episodes.  She reports mild lightheadedness.  - denies any chest pain, SOB, VAZQUEZ  - Hypertension, continue amlodipine at HS and lisinopril may be taken DOS.  Hold hydrochlorothiazide DOS.    - METS (Metabolic Equivalents)  Patient performs 4 or more METS exercise without symptoms            Total Score: 0      RCRI-Very low risk: Class 1 0.4% complication rate            Total Score: 0        PULMONARY  - Obstructive Sleep Apnea  LEONARDO without home CPAP use.  LEONARDO High Risk            Total Score: 6    LEONARDO: Snores loudly    LEONARDO: Often tired    LEONARDO: Observed stopped breathing    LEONARDO: Hypertension    LEONARDO: BMI over 35 kg/m2    LEONARDO: Neck Circum >16 in      - Denies asthma or inhaler use  - Tobacco History      History   Smoking Status     Former     Packs/day: 1.00     Years: 10.00     Types: Cigarettes, Vaping Device     Start date: 1/1/2004     Quit date: 6/1/2021   Smokeless Tobacco     Never       GI  - GERD  Not controlled on medications   - continue omeprazole bid    PONV Medium Risk  Total Score: 2           1 AN PONV: Pt is Female    1 AN PONV: Patient is not a current smoker      - s/p lap  "sleeve gastrectomy 2018      /RENAL  - Baseline Creatinine 0.67    ENDOCRINE    - BMI: Estimated body mass index is 40.41 kg/m  as calculated from the following:    Height as of 8/18/22: 1.702 m (5' 7\").    Weight as of 8/18/22: 117 kg (258 lb).  Class 3 Obesity (BMI > 40)  - No history of Diabetes Mellitus    HEME  VTE Low Risk 0.26%            Total Score: 1    VTE: BMI greater than 39      - No history of abnormal bleeding or antiplatelet use.      PSYCH  - continue Effexor for mood        The patient is optimized for their procedure. Will update note with any recommendations/changes based on cardiology visit tomorrow 11/9.    Please refer to the physical examination documented by the anesthesiologist in the anesthesia record on the day of surgery.    Video-Visit Details    Type of service:  Video Visit    Provider received verbal consent for a Video Visit from the patient? Yes    Video 7 minutes    Originating Location (pt. Location): Home    Distant Location (provider location): Off-site    Mode of Communication:  Video Conference via Elida Han PA-C  Preoperative Assessment Center  [facility]  Clinic and Surgery Center  Phone: [phone]  Fax: [phone]  "

## 2022-11-08 NOTE — PROGRESS NOTES
Trish is a 42 year old who is being evaluated via a billable video visit.      How would you like to obtain your AVS? MyChart        HPI         Review of Systems         Objective    Vitals - Patient Reported  Pain Score: Moderate Pain (4)        Physical Exam     AD Khan LPN

## 2022-11-18 ENCOUNTER — TELEPHONE (OUTPATIENT)
Dept: SURGERY | Facility: AMBULATORY SURGERY CENTER | Age: 42
End: 2022-11-18

## 2022-11-18 NOTE — TELEPHONE ENCOUNTER
Pre assessment questions completed for upcoming EGD procedure scheduled on 11.29.2022    COVID policy reviewed. Patient to complete rapid antigen test one to two days before their scheduled procedure. Patient to bring photo of the results when they come in for their procedure.    Pre-op done at [facility] on 11.8.2022    Reviewed procedural arrival time 0630 and facility location [facility].    Designated  policy reviewed. Instructed to have someone stay 24 hours post procedure.     Procedure indication: History of gastric sleeve now with increased heartburn/regurgitation well controlled on Omperazole 40mg in the morning and 20mg nightly. Pt wanting to decrease PPI if possible.    Prep instructions sent via [employer].    Reviewed EGD prep instructions.     Anticoagulation/blood thinners? No    Diabetic? No     Electronic implanted devices? No    Patient verbalized understanding and had no questions or concerns at this time.    Kassy Bryson RN      
- - -

## 2022-11-28 ENCOUNTER — TELEPHONE (OUTPATIENT)
Dept: GASTROENTEROLOGY | Facility: CLINIC | Age: 42
End: 2022-11-28

## 2022-11-28 ENCOUNTER — ANESTHESIA EVENT (OUTPATIENT)
Dept: GASTROENTEROLOGY | Facility: CLINIC | Age: 42
End: 2022-11-28
Payer: COMMERCIAL

## 2022-11-28 NOTE — TELEPHONE ENCOUNTER
OUTBOUND CALL MaDE TO: Danitza Soto       Call to inform pt of arrival time @ [facility] for EGD procedure on 11/29/2022 will be 6:30 AM.     NH

## 2022-11-29 ENCOUNTER — ANESTHESIA (OUTPATIENT)
Dept: GASTROENTEROLOGY | Facility: CLINIC | Age: 42
End: 2022-11-29
Payer: COMMERCIAL

## 2022-11-29 ENCOUNTER — HOSPITAL ENCOUNTER (OUTPATIENT)
Facility: CLINIC | Age: 42
Discharge: HOME | End: 2022-11-29
Attending: INTERNAL MEDICINE | Admitting: INTERNAL MEDICINE
Payer: COMMERCIAL

## 2022-11-29 VITALS
WEIGHT: 257.5 LBS | RESPIRATION RATE: 16 BRPM | DIASTOLIC BLOOD PRESSURE: 85 MMHG | SYSTOLIC BLOOD PRESSURE: 134 MMHG | TEMPERATURE: 98.4 F | HEART RATE: 81 BPM | HEIGHT: 67 IN | OXYGEN SATURATION: 98 % | BODY MASS INDEX: 40.42 KG/M2

## 2022-11-29 LAB — UPPER GI ENDOSCOPY: NORMAL

## 2022-11-29 PROCEDURE — 88305 TISSUE EXAM BY PATHOLOGIST: CPT | Mod: TC | Performed by: INTERNAL MEDICINE

## 2022-11-29 PROCEDURE — 250N000011 HC RX IP 250 OP 636

## 2022-11-29 PROCEDURE — 43239 EGD BIOPSY SINGLE/MULTIPLE: CPT | Performed by: INTERNAL MEDICINE

## 2022-11-29 PROCEDURE — 370N000017 HC ANESTHESIA TECHNICAL FEE, PER MIN: Performed by: INTERNAL MEDICINE

## 2022-11-29 PROCEDURE — 88305 TISSUE EXAM BY PATHOLOGIST: CPT | Mod: 26 | Performed by: PATHOLOGY

## 2022-11-29 PROCEDURE — 258N000003 HC RX IP 258 OP 636

## 2022-11-29 PROCEDURE — 250N000009 HC RX 250

## 2022-11-29 RX ORDER — NALOXONE HYDROCHLORIDE 0.4 MG/ML
0.2 INJECTION, SOLUTION INTRAMUSCULAR; INTRAVENOUS; SUBCUTANEOUS
Status: DISCONTINUED | OUTPATIENT
Start: 2022-11-29 | End: 2022-11-29 | Stop reason: HOSPADM

## 2022-11-29 RX ORDER — FENTANYL CITRATE 50 UG/ML
50 INJECTION, SOLUTION INTRAMUSCULAR; INTRAVENOUS EVERY 5 MIN PRN
Status: DISCONTINUED | OUTPATIENT
Start: 2022-11-29 | End: 2022-11-29 | Stop reason: HOSPADM

## 2022-11-29 RX ORDER — FENTANYL CITRATE 50 UG/ML
25 INJECTION, SOLUTION INTRAMUSCULAR; INTRAVENOUS EVERY 5 MIN PRN
Status: DISCONTINUED | OUTPATIENT
Start: 2022-11-29 | End: 2022-11-29 | Stop reason: HOSPADM

## 2022-11-29 RX ORDER — ONDANSETRON 4 MG/1
4 TABLET, ORALLY DISINTEGRATING ORAL EVERY 30 MIN PRN
Status: DISCONTINUED | OUTPATIENT
Start: 2022-11-29 | End: 2022-11-29 | Stop reason: HOSPADM

## 2022-11-29 RX ORDER — SODIUM CHLORIDE, SODIUM LACTATE, POTASSIUM CHLORIDE, CALCIUM CHLORIDE 600; 310; 30; 20 MG/100ML; MG/100ML; MG/100ML; MG/100ML
INJECTION, SOLUTION INTRAVENOUS CONTINUOUS
Status: DISCONTINUED | OUTPATIENT
Start: 2022-11-29 | End: 2022-11-29 | Stop reason: HOSPADM

## 2022-11-29 RX ORDER — MEPERIDINE HYDROCHLORIDE 25 MG/ML
12.5 INJECTION INTRAMUSCULAR; INTRAVENOUS; SUBCUTANEOUS
Status: DISCONTINUED | OUTPATIENT
Start: 2022-11-29 | End: 2022-11-29 | Stop reason: HOSPADM

## 2022-11-29 RX ORDER — ONDANSETRON 2 MG/ML
4 INJECTION INTRAMUSCULAR; INTRAVENOUS EVERY 30 MIN PRN
Status: DISCONTINUED | OUTPATIENT
Start: 2022-11-29 | End: 2022-11-29 | Stop reason: HOSPADM

## 2022-11-29 RX ORDER — HYDROMORPHONE HYDROCHLORIDE 1 MG/ML
0.2 INJECTION, SOLUTION INTRAMUSCULAR; INTRAVENOUS; SUBCUTANEOUS EVERY 5 MIN PRN
Status: DISCONTINUED | OUTPATIENT
Start: 2022-11-29 | End: 2022-11-29 | Stop reason: HOSPADM

## 2022-11-29 RX ORDER — FLUMAZENIL 0.1 MG/ML
0.2 INJECTION, SOLUTION INTRAVENOUS
Status: DISCONTINUED | OUTPATIENT
Start: 2022-11-29 | End: 2022-11-29 | Stop reason: HOSPADM

## 2022-11-29 RX ORDER — NALOXONE HYDROCHLORIDE 0.4 MG/ML
0.4 INJECTION, SOLUTION INTRAMUSCULAR; INTRAVENOUS; SUBCUTANEOUS
Status: DISCONTINUED | OUTPATIENT
Start: 2022-11-29 | End: 2022-11-29 | Stop reason: HOSPADM

## 2022-11-29 RX ORDER — PROCHLORPERAZINE MALEATE 10 MG
10 TABLET ORAL EVERY 6 HOURS PRN
Status: DISCONTINUED | OUTPATIENT
Start: 2022-11-29 | End: 2022-11-29 | Stop reason: HOSPADM

## 2022-11-29 RX ORDER — ONDANSETRON 2 MG/ML
4 INJECTION INTRAMUSCULAR; INTRAVENOUS
Status: DISCONTINUED | OUTPATIENT
Start: 2022-11-29 | End: 2022-11-29 | Stop reason: HOSPADM

## 2022-11-29 RX ORDER — FENTANYL CITRATE 50 UG/ML
25 INJECTION, SOLUTION INTRAMUSCULAR; INTRAVENOUS
Status: DISCONTINUED | OUTPATIENT
Start: 2022-11-29 | End: 2022-11-29 | Stop reason: HOSPADM

## 2022-11-29 RX ORDER — ONDANSETRON 2 MG/ML
4 INJECTION INTRAMUSCULAR; INTRAVENOUS EVERY 6 HOURS PRN
Status: DISCONTINUED | OUTPATIENT
Start: 2022-11-29 | End: 2022-11-29 | Stop reason: HOSPADM

## 2022-11-29 RX ORDER — LIDOCAINE HYDROCHLORIDE 20 MG/ML
INJECTION, SOLUTION INFILTRATION; PERINEURAL PRN
Status: DISCONTINUED | OUTPATIENT
Start: 2022-11-29 | End: 2022-11-29

## 2022-11-29 RX ORDER — PROPOFOL 10 MG/ML
INJECTION, EMULSION INTRAVENOUS CONTINUOUS PRN
Status: DISCONTINUED | OUTPATIENT
Start: 2022-11-29 | End: 2022-11-29

## 2022-11-29 RX ORDER — SODIUM CHLORIDE, SODIUM LACTATE, POTASSIUM CHLORIDE, CALCIUM CHLORIDE 600; 310; 30; 20 MG/100ML; MG/100ML; MG/100ML; MG/100ML
INJECTION, SOLUTION INTRAVENOUS CONTINUOUS PRN
Status: DISCONTINUED | OUTPATIENT
Start: 2022-11-29 | End: 2022-11-29

## 2022-11-29 RX ORDER — ONDANSETRON 4 MG/1
4 TABLET, ORALLY DISINTEGRATING ORAL EVERY 6 HOURS PRN
Status: DISCONTINUED | OUTPATIENT
Start: 2022-11-29 | End: 2022-11-29 | Stop reason: HOSPADM

## 2022-11-29 RX ORDER — HYDROMORPHONE HYDROCHLORIDE 1 MG/ML
0.4 INJECTION, SOLUTION INTRAMUSCULAR; INTRAVENOUS; SUBCUTANEOUS EVERY 5 MIN PRN
Status: DISCONTINUED | OUTPATIENT
Start: 2022-11-29 | End: 2022-11-29 | Stop reason: HOSPADM

## 2022-11-29 RX ORDER — LIDOCAINE 40 MG/G
CREAM TOPICAL
Status: DISCONTINUED | OUTPATIENT
Start: 2022-11-29 | End: 2022-11-29 | Stop reason: HOSPADM

## 2022-11-29 RX ADMIN — PROPOFOL 150 MCG/KG/MIN: 10 INJECTION, EMULSION INTRAVENOUS at 08:35

## 2022-11-29 RX ADMIN — LIDOCAINE HYDROCHLORIDE 100 MG: 20 INJECTION, SOLUTION INFILTRATION; PERINEURAL at 08:35

## 2022-11-29 RX ADMIN — PROPOFOL 50 MG: 10 INJECTION, EMULSION INTRAVENOUS at 08:36

## 2022-11-29 RX ADMIN — SODIUM CHLORIDE, POTASSIUM CHLORIDE, SODIUM LACTATE AND CALCIUM CHLORIDE: 600; 310; 30; 20 INJECTION, SOLUTION INTRAVENOUS at 08:33

## 2022-11-29 ASSESSMENT — ENCOUNTER SYMPTOMS: DYSRHYTHMIAS: 1

## 2022-11-29 ASSESSMENT — LIFESTYLE VARIABLES: TOBACCO_USE: 1

## 2022-11-29 ASSESSMENT — ACTIVITIES OF DAILY LIVING (ADL)
ADLS_ACUITY_SCORE: 35
ADLS_ACUITY_SCORE: 35

## 2022-11-29 NOTE — ANESTHESIA PREPROCEDURE EVALUATION
Anesthesia Pre-Procedure Evaluation    Patient: Danitza Soto   MRN: [id number] : 1980        Procedure : Procedure(s):  ESOPHAGOGASTRODUODENOSCOPY (EGD)          Past Medical History:   Diagnosis Date     Antiplatelet or antithrombotic long-term use     resolved     Arrhythmia      BV (bacterial vaginosis) 2020     Depressive disorder      Esophageal reflux      Hearing problem      Hyperlipidemia LDL goal <130 2015     Hypertension      LSIL (low grade squamous intraepithelial lesion) on Pap smear 2014    + HPV, unable to type colp - BRISEYDA I     LEONARDO on CPAP      Other anxiety states       Past Surgical History:   Procedure Laterality Date     EP ABLATION FOCAL AFIB N/A 10/03/2019    Procedure: EP ABLATION FOCAL AFIB;  Surgeon: Harpreet Arevalo MD;  Location: [facility] OR     HC TOOTH EXTRACTION W/FORCEP      [place] Teeth Extraction     LAPAROSCOPIC GASTRIC SLEEVE N/A 2018    Procedure: Laparoscopic Sleeve Gastrectomy Latex Free;  Surgeon: Artis Tse MD;  Location: [facility] OR     LAPAROSCOPIC HERNIORRHAPHY HIATAL  2018    Procedure: LAPAROSCOPIC  HIATAL Hernia Repair;  Surgeon: Artis Tse MD;  Location: [facility] OR     SALPINGO-OOPHORECTOMY, COMBINED Left 10/20/2021    Mucinous cystadenoma      Allergies   Allergen Reactions     Bupropion Other (See Comments)     Other reaction(s): Chest Pain,Panic attacks, heart/chest pain     Wellbutrin [Bupropion]      Wellbutrin: Panic attacks, heart/chest pain      Social History     Tobacco Use     Smoking status: Former     Packs/day: 1.00     Years: 10.00     Pack years: 10.00     Types: Cigarettes, Vaping Device     Start date: 2004     Quit date: 2021     Years since quittin.4     Smokeless tobacco: Never   Substance Use Topics     Alcohol use: Not Currently     Alcohol/week: 0.0 standard drinks     Comment: Occas.      Wt Readings from Last 1 Encounters:   22 116.8 kg (257 lb 8 oz)        Anesthesia  Evaluation            ROS/MED HX  ENT/Pulmonary:     (+) sleep apnea, tobacco use, Past use,     Neurologic:       Cardiovascular:     (+) hypertension-----dysrhythmias (s/p ablation),     METS/Exercise Tolerance:     Hematologic:       Musculoskeletal:       GI/Hepatic:     (+) GERD,     Renal/Genitourinary:       Endo:     (+) Obesity,     Psychiatric/Substance Use:     (+) psychiatric history anxiety     Infectious Disease:       Malignancy:       Other:            Physical Exam    Airway        Mallampati: II   TM distance: > 3 FB   Neck ROM: full   Mouth opening: > 3 cm    Respiratory Devices and Support         Dental  no notable dental history         Cardiovascular   cardiovascular exam normal          Pulmonary                   OUTSIDE LABS:  CBC:   Lab Results   Component Value Date    WBC 9.3 05/04/2022    WBC 8.4 03/29/2022    HGB 15.1 05/04/2022    HGB 15.2 03/29/2022    HCT 45.7 05/04/2022    HCT 45.2 03/29/2022     05/04/2022     03/29/2022     BMP:   Lab Results   Component Value Date     05/04/2022     01/11/2022    POTASSIUM 3.7 05/04/2022    POTASSIUM 4.2 01/11/2022    CHLORIDE 105 05/04/2022    CHLORIDE 107 01/11/2022    CO2 23 05/04/2022    CO2 21 01/11/2022    BUN 9 05/04/2022    BUN 8 01/11/2022    CR 0.67 05/04/2022    CR 0.66 01/11/2022    GLC 79 05/04/2022    GLC 74 01/11/2022     COAGS:   Lab Results   Component Value Date    INR 0.98 11/06/2018     POC:   Lab Results   Component Value Date    BGM 88 10/03/2019    HCG Negative 10/03/2019     HEPATIC:   Lab Results   Component Value Date    ALBUMIN 3.7 05/04/2022    PROTTOTAL 7.7 05/04/2022    ALT 20 05/04/2022    AST 12 05/04/2022    ALKPHOS 104 05/04/2022    BILITOTAL 0.3 05/04/2022     OTHER:   Lab Results   Component Value Date    A1C 5.2 05/18/2021    CHIKI 9.2 05/04/2022    PHOS 3.5 11/28/2018    MAG 2.3 05/04/2022    LIPASE 137 08/11/2010    AMYLASE 33 08/11/2010    TSH 4.09 (H) 05/04/2022    T4 0.96  05/04/2022       Anesthesia Plan    ASA Status:  3   NPO Status:  NPO Appropriate    Anesthesia Type: MAC.     - Reason for MAC: immobility needed, straight local not clinically adequate   Induction: Intravenous.   Maintenance: TIVA.        Consents    Anesthesia Plan(s) and associated risks, benefits, and realistic alternatives discussed. Questions answered and patient/representative(s) expressed understanding.     - Discussed: Risks, Benefits and Alternatives for BOTH SEDATION and the PROCEDURE were discussed     - Discussed with:  Patient         Postoperative Care    Pain management: IV analgesics, Multi-modal analgesia.   PONV prophylaxis: Ondansetron (or other 5HT-3), Background Propofol Infusion     Comments:                Sixto Henson MD

## 2022-11-29 NOTE — ANESTHESIA CARE TRANSFER NOTE
Patient: Danitza oSto    Procedure: Procedure(s):  ESOPHAGOGASTRODUODENOSCOPY, WITH BIOPSY       Diagnosis: Gastroesophageal reflux disease with esophagitis without hemorrhage [K21.00]  Diagnosis Additional Information: No value filed.    Anesthesia Type:   MAC     Note:    Oropharynx: oropharynx clear of all foreign objects and spontaneously breathing  Level of Consciousness: awake  Oxygen Supplementation: room air    Independent Airway: airway patency satisfactory and stable  Dentition: dentition unchanged  Vital Signs Stable: post-procedure vital signs reviewed and stable  Report to RN Given: handoff report given  Patient transferred to: Phase II    Handoff Report: Identifed the Patient, Identified the Reponsible Provider, Reviewed the pertinent medical history, Discussed the surgical course, Reviewed Intra-OP anesthesia mangement and issues during anesthesia, Set expectations for post-procedure period and Allowed opportunity for questions and acknowledgement of understanding      Vitals:  Vitals Value Taken Time   /95 11/29/22 0853   Temp     Pulse 84 11/29/22 0853   Resp 16 11/29/22 0853   SpO2 100 % 11/29/22 0853       Electronically Signed By: JOSE LUIS Rodrigez CRNA  November 29, 2022  8:54 AM

## 2022-11-29 NOTE — ANESTHESIA POSTPROCEDURE EVALUATION
Patient: Danitza Soto    Procedure: Procedure(s):  ESOPHAGOGASTRODUODENOSCOPY, WITH BIOPSY       Anesthesia Type:  MAC    Note:  Disposition: Outpatient   Postop Pain Control: Uneventful            Sign Out: Well controlled pain   PONV: No   Neuro/Psych: Uneventful            Sign Out: Acceptable/Baseline neuro status   Airway/Respiratory: Uneventful            Sign Out: Acceptable/Baseline resp. status   CV/Hemodynamics: Uneventful            Sign Out: Acceptable CV status; No obvious hypovolemia; No obvious fluid overload   Other NRE: NONE   DID A NON-ROUTINE EVENT OCCUR? No           Last vitals:  Vitals Value Taken Time   /85 11/29/22 0909   Temp     Pulse 81 11/29/22 0909   Resp 16 11/29/22 0909   SpO2 98 % 11/29/22 0909       Electronically Signed By: Sixto Henson MD  November 29, 2022  9:41 AM

## 2022-11-29 NOTE — H&P
Danitza Soto  [id number]  female  42 year old      Reason for procedure/surgery: egd, reflux post gastric sleeve      Patient Active Problem List   Diagnosis     Anxiety     Gastroesophageal reflux disease without esophagitis     Tobacco use disorder     CARDIOVASCULAR SCREENING; LDL GOAL LESS THAN 130     Primary hypertension     Acne     Papanicolaou smear of cervix with low grade squamous intraepithelial lesion (LGSIL)     Mixed hyperlipidemia     LEONARDO (obstructive sleep apnea)     Obesity hypoventilation syndrome (H)     Morbid (severe) obesity due to excess calories (H)     Recurrent major depressive disorder, in full remission (H)     S/P ablation of atrial fibrillation     Moderate episode of recurrent major depressive disorder (H)     S/P laparoscopic sleeve gastrectomy     Strain of lumbar region, initial encounter     Strain of hip and thigh, right, initial encounter     Insomnia, unspecified type     Memory loss     Gastroesophageal reflux disease with esophagitis without hemorrhage     Fatigue, unspecified type       Past Surgical History:    Past Surgical History:   Procedure Laterality Date     EP ABLATION FOCAL AFIB N/A 10/03/2019    Procedure: EP ABLATION FOCAL AFIB;  Surgeon: Harpreet Arevalo MD;  Location:  OR     HC TOOTH EXTRACTION W/FORCEP  1995    [place] Teeth Extraction     LAPAROSCOPIC GASTRIC SLEEVE N/A 11/27/2018    Procedure: Laparoscopic Sleeve Gastrectomy Latex Free;  Surgeon: Artsi Tse MD;  Location: [facility] OR     LAPAROSCOPIC HERNIORRHAPHY HIATAL  11/27/2018    Procedure: LAPAROSCOPIC  HIATAL Hernia Repair;  Surgeon: Artis Tse MD;  Location: [facility] OR     SALPINGO-OOPHORECTOMY, COMBINED Left 10/20/2021    Mucinous cystadenoma       Past Medical History:   Past Medical History:   Diagnosis Date     Antiplatelet or antithrombotic long-term use     resolved     Arrhythmia      BV (bacterial vaginosis) 9/18/2020     Depressive disorder 1990     Esophageal reflux       Hearing problem 2018     Hyperlipidemia LDL goal <130 2015     Hypertension      LSIL (low grade squamous intraepithelial lesion) on Pap smear 2014    + HPV, unable to type colp - BRISEYDA I     LEONARDO on CPAP      Other anxiety states        Social History:   Social History     Tobacco Use     Smoking status: Former     Packs/day: 1.00     Years: 10.00     Pack years: 10.00     Types: Cigarettes, Vaping Device     Start date: 2004     Quit date: 2021     Years since quittin.4     Smokeless tobacco: Never   Substance Use Topics     Alcohol use: Not Currently     Alcohol/week: 0.0 standard drinks     Comment: Occas.       Family History:   Family History   Problem Relation Age of Onset     Heart Disease Mother         ,mother had emphesema and  at 62     Hypertension Mother      Allergies Mother      Lipids Mother      Obesity Mother      Respiratory Mother         Emphysema     Hypertension Father      Lipids Father      Cancer Father      Prostate Cancer Father      Other Cancer Father      Allergies Sister      Genitourinary Problems Sister      Diabetes Maternal Grandmother         Type 2?     Hypertension Maternal Grandmother      Circulatory Maternal Grandmother         Due to diabetes     Eye Disorder Maternal Grandmother         Macular degeneration/Macular degeneration     Obesity Maternal Grandmother      Alcohol/Drug Maternal Grandfather      Obesity Maternal Grandfather      Cerebrovascular Disease Paternal Grandmother      Psychotic Disorder Paternal Grandfather         Committed Suicide     Depression Paternal Grandfather      Anesthesia Reaction No family hx of      Bleeding Disorder No family hx of      Venous thrombosis No family hx of        Allergies:   Allergies   Allergen Reactions     Bupropion Other (See Comments)     Other reaction(s): Chest Pain,Panic attacks, heart/chest pain     Wellbutrin [Bupropion]      Wellbutrin: Panic attacks, heart/chest pain       Active  "Medications:   No current outpatient medications on file.       Systemic Review:   CONSTITUTIONAL: NEGATIVE for fever, chills, change in weight  ENT/MOUTH: NEGATIVE for ear, mouth and throat problems  RESP: NEGATIVE for significant cough or SOB  CV: NEGATIVE for chest pain, palpitations or peripheral edema    Physical Examination:   Vital Signs: /76 (BP Location: Left arm)   Pulse 88   Temp 98.4  F (36.9  C)   Resp 16   Ht 1.702 m (5' 7\")   Wt 116.8 kg (257 lb 8 oz)   LMP 04/01/2022   SpO2 97%   BMI 40.33 kg/m    GENERAL: healthy, alert and no distress  NECK: no adenopathy, no asymmetry, masses, or scars  RESP: lungs clear to auscultation - no rales, rhonchi or wheezes  CV: regular rate and rhythm, normal S1 S2, no S3 or S4, no murmur, click or rub, no peripheral edema and peripheral pulses strong  ABDOMEN: soft, nontender, no hepatosplenomegaly, no masses and bowel sounds normal  MS: no gross musculoskeletal defects noted, no edema    Plan: Appropriate to proceed as scheduled.      Jeff Patino DO  11/29/2022    PCP:  Polly Mcbride    "

## 2022-11-30 LAB
PATH REPORT.COMMENTS IMP SPEC: NORMAL
PATH REPORT.COMMENTS IMP SPEC: NORMAL
PATH REPORT.FINAL DX SPEC: NORMAL
PATH REPORT.GROSS SPEC: NORMAL
PATH REPORT.MICROSCOPIC SPEC OTHER STN: NORMAL
PATH REPORT.RELEVANT HX SPEC: NORMAL
PHOTO IMAGE: NORMAL

## 2022-12-21 ENCOUNTER — VIRTUAL VISIT (OUTPATIENT)
Dept: CARDIOLOGY | Facility: CLINIC | Age: 42
End: 2022-12-21
Payer: COMMERCIAL

## 2022-12-21 DIAGNOSIS — I48.0 PAF (PAROXYSMAL ATRIAL FIBRILLATION) (H): Primary | ICD-10-CM

## 2022-12-21 DIAGNOSIS — E66.01 MORBID OBESITY (H): ICD-10-CM

## 2022-12-21 DIAGNOSIS — I10 HYPERTENSION, WELL CONTROLLED: ICD-10-CM

## 2022-12-21 DIAGNOSIS — G47.33 OBSTRUCTIVE SLEEP APNEA SYNDROME: ICD-10-CM

## 2022-12-21 PROCEDURE — 99213 OFFICE O/P EST LOW 20 MIN: CPT | Mod: 95 | Performed by: INTERNAL MEDICINE

## 2022-12-21 NOTE — LETTER
12/21/2022      RE: Danitza Soto  [address]  [facility] [address]       Dear Colleague,    Thank you for the opportunity to participate in the care of your patient, Danitza Soto, at the [facility]. Please see a copy of my visit note below.      Chief complaint: Palpitations    HPI: Ms. Danitza Soto is a 38 year old  female with PMH significant for depression, GERD, LEONARDO, essential hypertension, morbid obesity S/P gastric bypass 11/2018 and paroxysmal atrial fibrillation.    The patient was last seen in cardiology for paroxysmal atrial fibrillation diagnosis in 9/2018.  Her symptoms were mild at that time therefore rate or rhythm control strategy was not pursued.  The patient underwent gastric sleeve surgery in 11/2018 and has lost 70 pounds.  The patient noticed more frequent atrial fibrillation episodes since the surgery almost weekly now lasting up to 8-10 hours.  She finds works stress as a prominent trigger for episodes.  Associated symptoms include chest pressure, chest pain and dizziness.  She denies chest discomfort with exertion at other times.  The patient denies a history of dyspnea, PND, orthopnea, pedal edema, and syncope  Current medications include lisinopril 20 mg, fish oil.  She has cut down on lisinopril from 40 mg since she has lost significant weight.  She quit smoking in 2018 (10 pack years).  No history of alcohol abuse. No history of diabetes.  Family history is negative for CAD.  Prior cardiac tests include CT coronary angiogram in 2013 which did not show evidence of CAD.  Echocardiogram dated 9/2018 showed normal LV and RV function with no significant valve disease.  Moderate LVH was reported. I personnally reviewed the images and I donot agree with the LVH diagnosis. I donot think she has LVH based on echo. Zio patch in 8/2018 showed 4% atrial fibrillation burden heart rate ranged   beats per minute.  Longest episode lasting for 8 hours.    I have reviewed her ECG 11/28/2018 which shows normal sinus rhythm with single PAC, normal NE/QRS/QTC intervals. No evidence of LVH on ECG.    Medications, personal, family, and social history reviewed with patient and revised.    Interval history 5/14/2019:  Patient returns for follow-up to recheck on paroxysmal atrial fibrillation.  Since I saw 3 months ago, patient developed depression and started on Effexor which improved her mood.  I have started her on flecainide and metoprolol 3 months ago for frequent paroxysmal atrial fibrillation episodes.  She noticed significant decline in the frequency of the episodes however she still reports at least 2 episodes per month sometimes lasting for several hours.  She reports associated dizziness however denies chest pain, shortness of breath, lower extremity edema or syncope.  Since gastric bypass patient lost significant weight with improvement in blood pressure and sleep apnea.  She no longer uses CPAP machine.  She is overall feeling well.    Interval history 8/14/2019:  The patient returns for follow-up to recheck on paroxysmal atrial fibrillation.  When I saw patient last time 3 months ago I increased dose of flecainide to 100 mg twice daily due to symptomatic palpitations.  The patient reports doing well during the first 2 months however had 3 episodes within the last month longest episode lasting for 3 hours with associated tiredness.  Denies chest pain, dyspnea on exertion, dizziness, syncope.  The patient has did a lot of research on atrial fibrillation ablation and she expressed her interest in undergoing procedure.    Interval history 2/18/2020:  Patient underwent catheter ablation of atrial fibrillation on 10/3/2019.  Patient feels great since the ablation.  No recurrence of A. fib since then.  She is currently on flecainide 100 mg twice daily, metoprolol 50 mg and apixaban 5 mg twice daily.   Blood pressure is well controlled with lisinopril 20 mg.  Patient is currently asymptomatic from cardiac standpoint.  Denies chest pain, shortness of breath, palpitations, dizziness, lower extremity edema or syncope.    Interval history 9/1/2020:  Patient reports no new cardiac symptoms since being seen in February 2020.  Denies palpitations, chest pain, shortness of breath.  Patient reports high blood pressure at home ~140/80 mmHg despite being on lisinopril 40 mg.  She reports being back on CPAP due to weight gain though she had gastric bypass surgery.  She continues to be on topiramate and naltrexone for obesity.  No alcohol or drug abuse.    Interval history 11/12/2021:  Patient is being seen today for annual follow-up. She reports occasional flutters less than 1 minute (once a month). Otherwise she has not felt any recurrent atrial fibrillation since catheter ablation. Denies chest pain, shortness of breath, dizziness, syncope or lower extremity edema.  Recently Effexor dose was increased from 75 mg to 150 mg mg daily. Patient's blood pressure is mildly elevated today. She does not monitor blood pressure at home.    Interval history 12/21/2022:  Patient is being seen today for follow-up.  Over the last 3 months or so she has started feeling palpitations lasting for 5 to 10 minutes.  This is new to her since ablation.  Sometimes episodes are clustered few days in a row.  Over the last month she had 3 or 4 episodes like that.  She does not have any other cardiac symptoms.  No alcohol.  Reports normal blood pressure at home at 120/80 mmHg.  She has not been using her CPAP because the mask does not fit her very well.  She needs to get a new mask.    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 - BRISEYDA I     LEONARDO on CPAP      Other anxiety states        CURRENT MEDICATIONS:  Current Outpatient Medications   Medication Sig Dispense Refill     acetaminophen (TYLENOL) 325 MG tablet Take 2 tablets (650 mg) by mouth every 4 hours as needed for other (For optimal non-opioid multimodal pain management to improve pain control and physical function.) 100 tablet 0     amLODIPine (NORVASC) 10 MG tablet Take 1 tablet (10 mg) by mouth daily (Patient taking differently: Take 10 mg by mouth every evening) 90 tablet 3     Ascorbic Acid (VITAMIN C) 500 MG CAPS Take by mouth every evening       calcium carbonate (OS-CHIKI) 500 MG tablet Take 1 tablet by mouth every evening       Cyanocobalamin (B-12) 1000 MCG TBCR Take by mouth every evening       hydrochlorothiazide (MICROZIDE) 12.5 MG capsule TAKE 1 CAPSULE(12.5 MG) BY MOUTH DAILY (Patient taking differently: Take 12.5 mg by mouth every morning TAKE 1 CAPSULE(12.5 MG) BY MOUTH DAILY) 90 capsule 3     ibuprofen (ADVIL/MOTRIN) 200 MG tablet Take 200 mg by mouth every 4 hours as needed for pain       lisinopril (ZESTRIL) 40 MG tablet Take 1 tablet (40 mg) by mouth daily (Patient taking differently: Take 40 mg by mouth every morning) 90 tablet 3     magnesium 250 MG tablet Take 1 tablet by mouth every evening       Multiple Vitamins-Iron (MULTI-DAY PLUS IRON PO) Take by mouth every morning       omeprazole (PRILOSEC) 20 MG DR capsule Take 1 capsule (20 mg) by mouth daily (Patient taking differently: Take 20 mg by mouth every evening) 90 capsule 3     omeprazole (PRILOSEC) 40 MG DR capsule Take 1 capsule (40 mg) by mouth daily (Patient taking differently: Take 40 mg by mouth every morning) 90 capsule 3     venlafaxine (EFFEXOR XR) 150 MG 24 hr capsule TAKE 1 CAPSULE(150 MG) BY MOUTH DAILY (Patient taking differently: 150  mg every morning TAKE 1 CAPSULE(150 MG) BY MOUTH DAILY) 90 capsule 1     vitamin B1 (THIAMINE) 250 MG tablet Take 250 mg by mouth every evening       Vitamin D (Cholecalciferol) 25 MCG (1000 UT) TABS Take by mouth every evening         PAST SURGICAL HISTORY:  Past Surgical History:   Procedure Laterality Date     EP ABLATION FOCAL AFIB N/A 10/03/2019    Procedure: EP ABLATION FOCAL AFIB;  Surgeon: Harpreet Arevalo MD;  Location:  OR     ESOPHAGOSCOPY, GASTROSCOPY, DUODENOSCOPY (EGD), COMBINED N/A 2022    Procedure: ESOPHAGOGASTRODUODENOSCOPY, WITH BIOPSY;  Surgeon: Jeff Patino DO;  Location:  GI     HC TOOTH EXTRACTION W/FORCEP      Stanton Teeth Extraction     LAPAROSCOPIC GASTRIC SLEEVE N/A 2018    Procedure: Laparoscopic Sleeve Gastrectomy Latex Free;  Surgeon: Artis Tse MD;  Location:  OR     LAPAROSCOPIC HERNIORRHAPHY HIATAL  2018    Procedure: LAPAROSCOPIC  HIATAL Hernia Repair;  Surgeon: Artis Tse MD;  Location:  OR     SALPINGO-OOPHORECTOMY, COMBINED Left 10/20/2021    Mucinous cystadenoma       ALLERGIES:     Allergies   Allergen Reactions     Bupropion Other (See Comments)     Other reaction(s): Chest Pain,Panic attacks, heart/chest pain     Wellbutrin [Bupropion]      Wellbutrin: Panic attacks, heart/chest pain       FAMILY HISTORY:  Family History   Problem Relation Age of Onset     Heart Disease Mother         ,mother had emphesema and  at 62     Hypertension Mother      Allergies Mother      Lipids Mother      Obesity Mother      Respiratory Mother         Emphysema     Hypertension Father      Lipids Father      Cancer Father      Prostate Cancer Father      Other Cancer Father      Allergies Sister      Genitourinary Problems Sister      Diabetes Maternal Grandmother         Type 2?     Hypertension Maternal Grandmother      Circulatory Maternal Grandmother         Due to diabetes     Eye Disorder Maternal Grandmother         Macular  degeneration/Macular degeneration     Obesity Maternal Grandmother      Alcohol/Drug Maternal Grandfather      Obesity Maternal Grandfather      Cerebrovascular Disease Paternal Grandmother      Psychotic Disorder Paternal Grandfather         Committed Suicide     Depression Paternal Grandfather      Anesthesia Reaction No family hx of      Bleeding Disorder No family hx of      Venous thrombosis No family hx of          SOCIAL HISTORY:  Social History     Tobacco Use     Smoking status: Former     Packs/day: 1.00     Years: 10.00     Pack years: 10.00     Types: Cigarettes, Vaping Device     Start date: 2004     Quit date: 2021     Years since quittin.5     Smokeless tobacco: Never   Vaping Use     Vaping Use: Every day     Substances: Nicotine, Flavoring     Devices: Disposable   Substance Use Topics     Alcohol use: Not Currently     Alcohol/week: 0.0 standard drinks     Comment: Occas.     Drug use: No       Exam:  Today's visit is telephone visit     I have reviewed the labs and personally reviewed the imaging below and made my comment in the assessment and plan.      EP PROCEDURE NOTE 10/3/2019  1. Left Atrial Wide Area Circumferential radiofrequency Ablation.  2. Trans-septal Puncture x2  3. Intracardiac Echocardiography.  4. Invasive Hemodynamic Monitoring.  5. 3D electro-anatomic Mapping using Carto3.  6. Esophageal temperature monitoring.    I have reviewed the labs and personally reviewed the imaging below and made my comment in the assessment and plan.    Labs:  CBC RESULTS:   Lab Results   Component Value Date    WBC 9.3 2022    WBC 7.4 2021    RBC 4.93 2022    RBC 4.90 2021    HGB 15.1 2022    HGB 14.4 2021    HCT 45.7 2022    HCT 43.7 2021    MCV 93 2022    MCV 89 2021    MCH 30.6 2022    MCH 29.4 2021    MCHC 33.0 2022    MCHC 33.0 2021    RDW 13.5 2022    RDW 13.8 2021     2022      05/18/2021       BMP RESULTS:  Lab Results   Component Value Date     05/04/2022     05/18/2021    POTASSIUM 3.7 05/04/2022    POTASSIUM 3.7 05/18/2021    CHLORIDE 105 05/04/2022    CHLORIDE 110 (H) 05/18/2021    CO2 23 05/04/2022    CO2 28 05/18/2021    ANIONGAP 8 05/04/2022    ANIONGAP 3 05/18/2021    GLC 79 05/04/2022    GLC 81 05/18/2021    BUN 9 05/04/2022    BUN 9 05/18/2021    CR 0.67 05/04/2022    CR 0.68 05/18/2021    GFRESTIMATED >90 05/04/2022    GFRESTIMATED >90 05/18/2021    GFRESTBLACK >90 05/18/2021    CHIKI 9.2 05/04/2022    CHIKI 9.1 05/18/2021        INR RESULTS:  Lab Results   Component Value Date    INR 0.98 11/06/2018       Echocardiogram 9/26/2018  Global and regional left ventricular function is normal with an EF of 60-65%.  Moderate concentric wall thickening consistent with left ventricular  hypertrophy is present.  Right ventricular function, chamber size, wall motion, and thickness are  normal.  Mild biatrial enlargement is present.  No significant valve abnormalities present.  The inferior vena cava was normal in size    Nuclear stress test with Lexiscan 9/6/2018  Normal myocardial SPECT study with a summed stress score of 0.     CT coronary artery angiogram 6/27/2013  No evidence of coronary artery disease    EKG 11/28/2018 normal.    Assessment and Plan:   Ms. Danitza Soto is a 38 year old  female with PMH significant for depression, GERD, LEONARDO, essential hypertension, morbid obesity S/P gastric bypass 11/2018 and paroxysmal atrial fibrillation refractory to medical treatment status post PVI on 10/3/2019.    Today patient is being seen via telephone visit since she has started feeling palpitations over the last 3 months which is new since ablation.  Usually last for 5 to 10 minutes.  Patient reports the sensation is very similar to her prior A. fib episodes.  No other cardiac symptoms.    1.  Paroxysmal atrial fibrillation:   -Probably she is having new A. fib  episodes over the last 3 months.  Has not had any palpitation over the last few weeks.  Recommended Zio patch for 2 weeks if she starts feeling frequent episodes.  Today patient told me that she does not want to be on any medications for A. fib.    2.  Hypertension: Patient reports well-controlled blood pressure at home.  Continue amlodipine 10 mg and lisinopril hydrochlorothiazide 40/12.5 mg    3.  Obstructive sleep apnea: CPAP mask is not fitting her very well.  She needs to order a new one.  I emphasized the importance of sleep apnea treatment.    No medication changes.    Return to clinic 1 year.    Total time spent 20 minutes including telephone visit and medical documentation.    Please donot hesitate to contact me if you have any questions or concerns. Again, thank you for allowing me to participate in the care of your patient.    Leena RONDON MD  [facility] Division of Cardiology  Pager [phone]

## 2022-12-21 NOTE — PATIENT INSTRUCTIONS
Thank you for coming to the [facility] at [place]; please note the following instructions:    1.  Recommend Zio patch for 2 weeks but we will wait for this test until you call us when you start feeling more frequent palpitations.  We can schedule a nurse visit to put this Zio patch on you.    2.  Return to clinic 1 year.        If you have any questions regarding your visit, please contact your care team:     CARDIOLOGY  TELEPHONE NUMBER   Hannah YEPEZ, Registered Nurse  Josette RUSS, Registered Nurse  Elena HUGHES, Registered Medical Assistant  Marlene DESHPANDE, Certified Medical Assistant  Kristina AZAR, Visit Facilitator [phone] (select option 1)    *After hours: [phone]   For Scheduling Appts:     [phone] (select option 1)    *After hours: [phone]   For the Device Clinic (Pacemakers and ICD's)  Hanh CORTES, Registered Nurse   During business hours: [phone]    *After business hours:  [phone] (select option 4)      Normal test result notifications will be released via "[employer] or mailed within 7 business days.  All other test results, will be communicated via telephone once reviewed by your cardiologist.    If you need a medication refill, please contact your pharmacy.  Please allow 3 business days for your refill to be completed.    As always, thank you for trusting us with your health care needs!

## 2022-12-21 NOTE — PROGRESS NOTES
Trish is a 42 year old who is being evaluated via a billable telephone visit.      What phone number would you like to be contacted at?   How would you like to obtain your AVS? Mail a copy    Distant Location (provider location):  On-site      Phone call duration: 15 minutes (telephone visit started at 2:15 PM)        Chief complaint: Palpitations    HPI: Ms. Danitza Soto is a 38 year old  female with PMH significant for depression, GERD, LEONARDO, essential hypertension, morbid obesity S/P gastric bypass 11/2018 and paroxysmal atrial fibrillation.    The patient was last seen in cardiology for paroxysmal atrial fibrillation diagnosis in 9/2018.  Her symptoms were mild at that time therefore rate or rhythm control strategy was not pursued.  The patient underwent gastric sleeve surgery in 11/2018 and has lost 70 pounds.  The patient noticed more frequent atrial fibrillation episodes since the surgery almost weekly now lasting up to 8-10 hours.  She finds works stress as a prominent trigger for episodes.  Associated symptoms include chest pressure, chest pain and dizziness.  She denies chest discomfort with exertion at other times.  The patient denies a history of dyspnea, PND, orthopnea, pedal edema, and syncope  Current medications include lisinopril 20 mg, fish oil.  She has cut down on lisinopril from 40 mg since she has lost significant weight.  She quit smoking in 2018 (10 pack years).  No history of alcohol abuse. No history of diabetes.  Family history is negative for CAD.  Prior cardiac tests include CT coronary angiogram in 2013 which did not show evidence of CAD.  Echocardiogram dated 9/2018 showed normal LV and RV function with no significant valve disease.  Moderate LVH was reported. I personnally reviewed the images and I donot agree with the LVH diagnosis. I donot think she has LVH based on echo. Zio patch in 8/2018 showed 4% atrial fibrillation burden heart rate ranged  beats per minute.  Longest  episode lasting for 8 hours.    I have reviewed her ECG 11/28/2018 which shows normal sinus rhythm with single PAC, normal ME/QRS/QTC intervals. No evidence of LVH on ECG.    Medications, personal, family, and social history reviewed with patient and revised.    Interval history 5/14/2019:  Patient returns for follow-up to recheck on paroxysmal atrial fibrillation.  Since I saw 3 months ago, patient developed depression and started on Effexor which improved her mood.  I have started her on flecainide and metoprolol 3 months ago for frequent paroxysmal atrial fibrillation episodes.  She noticed significant decline in the frequency of the episodes however she still reports at least 2 episodes per month sometimes lasting for several hours.  She reports associated dizziness however denies chest pain, shortness of breath, lower extremity edema or syncope.  Since gastric bypass patient lost significant weight with improvement in blood pressure and sleep apnea.  She no longer uses CPAP machine.  She is overall feeling well.    Interval history 8/14/2019:  The patient returns for follow-up to recheck on paroxysmal atrial fibrillation.  When I saw patient last time 3 months ago I increased dose of flecainide to 100 mg twice daily due to symptomatic palpitations.  The patient reports doing well during the first 2 months however had 3 episodes within the last month longest episode lasting for 3 hours with associated tiredness.  Denies chest pain, dyspnea on exertion, dizziness, syncope.  The patient has did a lot of research on atrial fibrillation ablation and she expressed her interest in undergoing procedure.    Interval history 2/18/2020:  Patient underwent catheter ablation of atrial fibrillation on 10/3/2019.  Patient feels great since the ablation.  No recurrence of A. fib since then.  She is currently on flecainide 100 mg twice daily, metoprolol 50 mg and apixaban 5 mg twice daily.  Blood pressure is well controlled  with lisinopril 20 mg.  Patient is currently asymptomatic from cardiac standpoint.  Denies chest pain, shortness of breath, palpitations, dizziness, lower extremity edema or syncope.    Interval history 9/1/2020:  Patient reports no new cardiac symptoms since being seen in February 2020.  Denies palpitations, chest pain, shortness of breath.  Patient reports high blood pressure at home ~140/80 mmHg despite being on lisinopril 40 mg.  She reports being back on CPAP due to weight gain though she had gastric bypass surgery.  She continues to be on topiramate and naltrexone for obesity.  No alcohol or drug abuse.    Interval history 11/12/2021:  Patient is being seen today for annual follow-up. She reports occasional flutters less than 1 minute (once a month). Otherwise she has not felt any recurrent atrial fibrillation since catheter ablation. Denies chest pain, shortness of breath, dizziness, syncope or lower extremity edema.  Recently Effexor dose was increased from 75 mg to 150 mg mg daily. Patient's blood pressure is mildly elevated today. She does not monitor blood pressure at home.    Interval history 12/21/2022:  Patient is being seen today for follow-up.  Over the last 3 months or so she has started feeling palpitations lasting for 5 to 10 minutes.  This is new to her since ablation.  Sometimes episodes are clustered few days in a row.  Over the last month she had 3 or 4 episodes like that.  She does not have any other cardiac symptoms.  No alcohol.  Reports normal blood pressure at home at 120/80 mmHg.  She has not been using her CPAP because the mask does not fit her very well.  She needs to get a new mask.    PAST MEDICAL HISTORY:  Past Medical History:   Diagnosis Date     Antiplatelet or antithrombotic long-term use     resolved     Arrhythmia      BV (bacterial vaginosis) 9/18/2020     Depressive disorder 1990     Esophageal reflux      Hearing problem 2018     Hyperlipidemia LDL goal <130 07/06/2015      Hypertension      LSIL (low grade squamous intraepithelial lesion) on Pap smear 06/2014    + HPV, unable to type colp - BRISEYDA I     LEONARDO on CPAP      Other anxiety states        CURRENT MEDICATIONS:  Current Outpatient Medications   Medication Sig Dispense Refill     acetaminophen (TYLENOL) 325 MG tablet Take 2 tablets (650 mg) by mouth every 4 hours as needed for other (For optimal non-opioid multimodal pain management to improve pain control and physical function.) 100 tablet 0     amLODIPine (NORVASC) 10 MG tablet Take 1 tablet (10 mg) by mouth daily (Patient taking differently: Take 10 mg by mouth every evening) 90 tablet 3     Ascorbic Acid (VITAMIN C) 500 MG CAPS Take by mouth every evening       calcium carbonate (OS-CHIKI) 500 MG tablet Take 1 tablet by mouth every evening       Cyanocobalamin (B-12) 1000 MCG TBCR Take by mouth every evening       hydrochlorothiazide (MICROZIDE) 12.5 MG capsule TAKE 1 CAPSULE(12.5 MG) BY MOUTH DAILY (Patient taking differently: Take 12.5 mg by mouth every morning TAKE 1 CAPSULE(12.5 MG) BY MOUTH DAILY) 90 capsule 3     ibuprofen (ADVIL/MOTRIN) 200 MG tablet Take 200 mg by mouth every 4 hours as needed for pain       lisinopril (ZESTRIL) 40 MG tablet Take 1 tablet (40 mg) by mouth daily (Patient taking differently: Take 40 mg by mouth every morning) 90 tablet 3     magnesium 250 MG tablet Take 1 tablet by mouth every evening       Multiple Vitamins-Iron (MULTI-DAY PLUS IRON PO) Take by mouth every morning       omeprazole (PRILOSEC) 20 MG DR capsule Take 1 capsule (20 mg) by mouth daily (Patient taking differently: Take 20 mg by mouth every evening) 90 capsule 3     omeprazole (PRILOSEC) 40 MG DR capsule Take 1 capsule (40 mg) by mouth daily (Patient taking differently: Take 40 mg by mouth every morning) 90 capsule 3     venlafaxine (EFFEXOR XR) 150 MG 24 hr capsule TAKE 1 CAPSULE(150 MG) BY MOUTH DAILY (Patient taking differently: 150 mg every morning TAKE 1 CAPSULE(150 MG) BY  MOUTH DAILY) 90 capsule 1     vitamin B1 (THIAMINE) 250 MG tablet Take 250 mg by mouth every evening       Vitamin D (Cholecalciferol) 25 MCG (1000 UT) TABS Take by mouth every evening         PAST SURGICAL HISTORY:  Past Surgical History:   Procedure Laterality Date     EP ABLATION FOCAL AFIB N/A 10/03/2019    Procedure: EP ABLATION FOCAL AFIB;  Surgeon: Harpreet Arevalo MD;  Location:  OR     ESOPHAGOSCOPY, GASTROSCOPY, DUODENOSCOPY (EGD), COMBINED N/A 2022    Procedure: ESOPHAGOGASTRODUODENOSCOPY, WITH BIOPSY;  Surgeon: Jeff Patino DO;  Location: [facility]     HC TOOTH EXTRACTION W/FORCEP      [place] Teeth Extraction     LAPAROSCOPIC GASTRIC SLEEVE N/A 2018    Procedure: Laparoscopic Sleeve Gastrectomy Latex Free;  Surgeon: Artis Tse MD;  Location:  OR     LAPAROSCOPIC HERNIORRHAPHY HIATAL  2018    Procedure: LAPAROSCOPIC  HIATAL Hernia Repair;  Surgeon: Artis Tse MD;  Location: [facility]     SALPINGO-OOPHORECTOMY, COMBINED Left 10/20/2021    Mucinous cystadenoma       ALLERGIES:     Allergies   Allergen Reactions     Bupropion Other (See Comments)     Other reaction(s): Chest Pain,Panic attacks, heart/chest pain     Wellbutrin [Bupropion]      Wellbutrin: Panic attacks, heart/chest pain       FAMILY HISTORY:  Family History   Problem Relation Age of Onset     Heart Disease Mother         ,mother had emphesema and  at 62     Hypertension Mother      Allergies Mother      Lipids Mother      Obesity Mother      Respiratory Mother         Emphysema     Hypertension Father      Lipids Father      Cancer Father      Prostate Cancer Father      Other Cancer Father      Allergies Sister      Genitourinary Problems Sister      Diabetes Maternal Grandmother         Type 2?     Hypertension Maternal Grandmother      Circulatory Maternal Grandmother         Due to diabetes     Eye Disorder Maternal Grandmother         Macular degeneration/Macular degeneration     Obesity  Maternal Grandmother      Alcohol/Drug Maternal Grandfather      Obesity Maternal Grandfather      Cerebrovascular Disease Paternal Grandmother      Psychotic Disorder Paternal Grandfather         Committed Suicide     Depression Paternal Grandfather      Anesthesia Reaction No family hx of      Bleeding Disorder No family hx of      Venous thrombosis No family hx of          SOCIAL HISTORY:  Social History     Tobacco Use     Smoking status: Former     Packs/day: 1.00     Years: 10.00     Pack years: 10.00     Types: Cigarettes, Vaping Device     Start date: 2004     Quit date: 2021     Years since quittin.5     Smokeless tobacco: Never   Vaping Use     Vaping Use: Every day     Substances: Nicotine, Flavoring     Devices: Disposable   Substance Use Topics     Alcohol use: Not Currently     Alcohol/week: 0.0 standard drinks     Comment: Occas.     Drug use: No       Exam:  Today's visit is telephone visit     I have reviewed the labs and personally reviewed the imaging below and made my comment in the assessment and plan.      EP PROCEDURE NOTE 10/3/2019  1. Left Atrial Wide Area Circumferential radiofrequency Ablation.  2. Trans-septal Puncture x2  3. Intracardiac Echocardiography.  4. Invasive Hemodynamic Monitoring.  5. 3D electro-anatomic Mapping using Carto3.  6. Esophageal temperature monitoring.    I have reviewed the labs and personally reviewed the imaging below and made my comment in the assessment and plan.    Labs:  CBC RESULTS:   Lab Results   Component Value Date    WBC 9.3 2022    WBC 7.4 2021    RBC 4.93 2022    RBC 4.90 2021    HGB 15.1 2022    HGB 14.4 2021    HCT 45.7 2022    HCT 43.7 2021    MCV 93 2022    MCV 89 2021    MCH 30.6 2022    MCH 29.4 2021    MCHC 33.0 2022    MCHC 33.0 2021    RDW 13.5 2022    RDW 13.8 2021     2022     2021       BMP RESULTS:  Lab  Results   Component Value Date     05/04/2022     05/18/2021    POTASSIUM 3.7 05/04/2022    POTASSIUM 3.7 05/18/2021    CHLORIDE 105 05/04/2022    CHLORIDE 110 (H) 05/18/2021    CO2 23 05/04/2022    CO2 28 05/18/2021    ANIONGAP 8 05/04/2022    ANIONGAP 3 05/18/2021    GLC 79 05/04/2022    GLC 81 05/18/2021    BUN 9 05/04/2022    BUN 9 05/18/2021    CR 0.67 05/04/2022    CR 0.68 05/18/2021    GFRESTIMATED >90 05/04/2022    GFRESTIMATED >90 05/18/2021    GFRESTBLACK >90 05/18/2021    CHIKI 9.2 05/04/2022    CHIKI 9.1 05/18/2021        INR RESULTS:  Lab Results   Component Value Date    INR 0.98 11/06/2018       Echocardiogram 9/26/2018  Global and regional left ventricular function is normal with an EF of 60-65%.  Moderate concentric wall thickening consistent with left ventricular  hypertrophy is present.  Right ventricular function, chamber size, wall motion, and thickness are  normal.  Mild biatrial enlargement is present.  No significant valve abnormalities present.  The inferior vena cava was normal in size    Nuclear stress test with Lexiscan 9/6/2018  Normal myocardial SPECT study with a summed stress score of 0.     CT coronary artery angiogram 6/27/2013  No evidence of coronary artery disease    EKG 11/28/2018 normal.    Assessment and Plan:   Ms. Danitza Soto is a 38 year old  female with PMH significant for depression, GERD, LEONARDO, essential hypertension, morbid obesity S/P gastric bypass 11/2018 and paroxysmal atrial fibrillation refractory to medical treatment status post PVI on 10/3/2019.    Today patient is being seen via telephone visit since she has started feeling palpitations over the last 3 months which is new since ablation.  Usually last for 5 to 10 minutes.  Patient reports the sensation is very similar to her prior A. fib episodes.  No other cardiac symptoms.    1.  Paroxysmal atrial fibrillation:   -Probably she is having new A. fib episodes over the last 3 months.  Has not had any  palpitation over the last few weeks.  Recommended Zio patch for 2 weeks if she starts feeling frequent episodes.  Today patient told me that she does not want to be on any medications for A. fib.    2.  Hypertension: Patient reports well-controlled blood pressure at home.  Continue amlodipine 10 mg and lisinopril hydrochlorothiazide 40/12.5 mg    3.  Obstructive sleep apnea: CPAP mask is not fitting her very well.  She needs to order a new one.  I emphasized the importance of sleep apnea treatment.    No medication changes.    Return to clinic 1 year.    Total time spent 20 minutes including telephone visit and medical documentation.    Please donot hesitate to contact me if you have any questions or concerns. Again, thank you for allowing me to participate in the care of your patient.    Leena RONDON MD  [facility] Division of Cardiology  Pager [phone]

## 2022-12-24 NOTE — LETTER
FOR: Work         RE: Danitza Soto  : 21              Danitza  is seen today 21 at our clinic.     Please excuse from work   21 till 21.  Ok to return to work on 5/10/21 without any restrictions .  She will follow up in clinic if not better.             Sincerely,      Zoila Echevarria MD                   Negative Screen

## 2022-12-26 ENCOUNTER — HEALTH MAINTENANCE LETTER (OUTPATIENT)
Age: 42
End: 2022-12-26

## 2023-01-04 ENCOUNTER — VIRTUAL VISIT (OUTPATIENT)
Dept: RHEUMATOLOGY | Facility: CLINIC | Age: 43
End: 2023-01-04
Attending: NURSE PRACTITIONER
Payer: COMMERCIAL

## 2023-01-04 DIAGNOSIS — E66.01 MORBID (SEVERE) OBESITY DUE TO EXCESS CALORIES (H): Chronic | ICD-10-CM

## 2023-01-04 DIAGNOSIS — M25.50 POLYARTHRALGIA: Primary | ICD-10-CM

## 2023-01-04 DIAGNOSIS — R53.83 FATIGUE, UNSPECIFIED TYPE: ICD-10-CM

## 2023-01-04 PROCEDURE — G0463 HOSPITAL OUTPT CLINIC VISIT: HCPCS | Mod: PN,GT | Performed by: NURSE PRACTITIONER

## 2023-01-04 PROCEDURE — 99214 OFFICE O/P EST MOD 30 MIN: CPT | Mod: 95 | Performed by: NURSE PRACTITIONER

## 2023-01-04 NOTE — PROGRESS NOTES
Trish is a 42 year old who is being evaluated via a billable video visit.      How would you like to obtain your AVS? MyChart  If the video visit is dropped, the invitation should be resent by: Other e-mail: k  Will anyone else be joining your video visit? No        Video-Visit Details    Type of service:  Video Visit   Pt in [place]  Time start: 1:04  End : 1:24  Originating Location (pt. Location): Home    Distant Location (provider location):  On-site  Platform used for Video Visit: [facility]          Rheumatology Visit  Beata Davidson, BELKYS      Danitza Soto  YOB: 1980    Age: 42 year old   MRN# [id number]       Date of Visit: 01/04/2023  Primary care provider: Polly Mcbride              Subjective:     Danitza is a 42 year old female presents today for consult for joint pain. Referred by Dr. Polly Mcbride.       HPI:  Approx [id number] started having memory issues. Now the last year has fatigue. New joint pain x 1 year and is located R>L Shoulder, elbow, wrist. Thinks R elbow swells sometimes. No worse time of day. Nothing seems to correlate with worsening.  Ibuprofen takes edge off. Does have numbness tingling of elbow down to fingers, unsure how much. Joints have not changed the way they look and has normal function.  Hip and knees are stiff for a few min to a lot of the am.  Pain is specific to joints of arm.     ROS: Patient denies recent infections, fevers, LUIS, weight loss, diarrhea, rashes, SOB, mouth sores, denies tri-color changes, hair loss, blood clots or miscarriages. No known inflammation in eyes.   Has 1 functioning ovary.   +Occ dry eye.       Past Rheum tx:      Social History  Works full time,  service, on computer   +VAPE  No ETOH    FMH:  No known AI.     Active Problem list:  Patient Active Problem List    Diagnosis Date Noted     Memory loss 06/29/2022     Priority: Medium     Gastroesophageal reflux disease with esophagitis without hemorrhage 06/29/2022     Priority:  Medium     Fatigue, unspecified type 06/29/2022     Priority: Medium     Insomnia, unspecified type 02/11/2022     Priority: Medium     Strain of lumbar region, initial encounter 05/06/2021     Priority: Medium     Slipped at work 5/4- excused form work till 5/7- Realtive rest helping. Return to work 5/10 unrestricted        Strain of hip and thigh, right, initial encounter 05/06/2021     Priority: Medium     Slipped at work 5/4- excused form work till 5/7- Realtive rest helping. Return to work 5/10 unrestricted        S/P laparoscopic sleeve gastrectomy 02/03/2021     Priority: Medium     2018 Dr. Tse        Moderate episode of recurrent major depressive disorder (H) 09/18/2020     Priority: Medium     S/P ablation of atrial fibrillation 10/03/2019     Priority: Medium     Recurrent major depressive disorder, in full remission (H) 04/08/2019     Priority: Medium     Morbid (severe) obesity due to excess calories (H) 11/27/2018     Priority: Medium     Sp sleeve gastrectomy       LEONARDO (obstructive sleep apnea) 04/13/2017     Priority: Medium     4/10/2017 - Home Sleep Apnea Testing - AHI 15.5/hr; Supine AHI 12.9/hr; SpO2 <= 88% for 33.8 minutes.  Titration Sleep Study 4/19/2017 - (288.0 lbs) CPAP was titrated to a pressure of 11 with an AHI of 3.7.  Time Spent in REM supine at this pressure was 33.5.  Recommending Auto-CPAP 7 - 15 cmH2O.       Obesity hypoventilation syndrome (H) 04/13/2017     Priority: Medium     Mixed hyperlipidemia 07/06/2015     Priority: Medium     Papanicolaou smear of cervix with low grade squamous intraepithelial lesion (LGSIL) 06/17/2014     Priority: Medium     6/17/14: LSIL, + HPV, unable to type.   8/20/14:[place]:BRISEYDA I. Plan cotest pap & HPV in 1 year.   1/20116 Pap Ascus Neg HPV. Plan: [place].   5/13/16 Colposcopy not completed. Cancelled by patient  3/21/17 NIL/Neg HPV. Plan: cotest in 1 year per Dr. Mcbride  9/28/18 Tele enc: Dr. Mcbride, recommending new plan. Cotest due Jan or Feb  2019. Tracking updated. ([facility])  04/02/19 Patient is lost to pap tracking follow-up.   04/08/19: NIL pap, Neg HR HPV result. Plan cotest in 3 years per provider.        Acne 02/24/2012     Priority: Medium     Primary hypertension 01/12/2011     Priority: Medium     CARDIOVASCULAR SCREENING; LDL GOAL LESS THAN 130 10/31/2010     Priority: Medium     Tobacco use disorder 05/17/2006     Priority: Medium     Anxiety      Priority: Medium     Gastroesophageal reflux disease without esophagitis      Priority: Medium            Objective:     Physical Exam  Last Menstrual Period 04/01/2022   There is no height or weight on file to calculate BMI.    Constitutional: Lg body habitus with out gross deformities. Well groomed.   Psych: nl judgement, orientation, memory, affect.  Eyes: wnl EOM, vision, conjunctiva, sclera   Resp: Normal work of breathing      MSK- (T=tender to palpation, S=swelling)  NROM of wrists and fingers, no visible swelling or redness    Labs:    Lab Results   Component Value Date    ALT 20 05/04/2022    AST 12 05/04/2022    CR 0.67 05/04/2022       Imaging:           Assessment and Plan:     #1) Polyarthralgia's:  1 yr of joint pain as well as fatigue that is most prominent of R >L UE with out a typical pattern to it or known triggers of worsening.   No joint swelling. Mild am stiffness of knees in am.  Remaining ROS relatively negative. Video exam of hands and wrists show NROM.   Unlikely inflammatory however potentially this is early inflammatory arthropathy vs thyroid dx rt vs OA. Will do CCP, CRP, ESR,RF, HEP C and see pt back next available in May for joint exam. Likely developing cubital tunnel syndrome as well. Education provided on this, if numbness and tingling don't improve she will Follow-up with her provider for this. R shoulder worse joint and some pain with ROM, will have pt do PT. Given slight elevate TSH, this may be playing a role in arthralgias and fatigue, however replacement did not  improve these symptoms.     Pt states understanding and agreement to plan of care, has no further questions.       Beata Davidson CNP  Rheumatology,  Health

## 2023-01-04 NOTE — PATIENT INSTRUCTIONS
1) Labs in the next few weeks and xrays    2) PT for shoulder pain     3) See me back May

## 2023-01-04 NOTE — LETTER
1/4/2023       RE: Danitza Soto  [address]  [facility] [address]     Dear Colleague,    Thank you for referring your patient, Danitza Soto, to the [facility] RHEUMATOLOGY at [facility]. Please see a copy of my visit note below.    Trish is a 42 year old who is being evaluated via a billable video visit.      How would you like to obtain your AVS? MyChart  If the video visit is dropped, the invitation should be resent by: Other e-mail: k  Will anyone else be joining your video visit? No        Video-Visit Details    Type of service:  Video Visit   Pt in [place]  Time start: 1:04  End : 1:24  Originating Location (pt. Location): Home    Distant Location (provider location):  On-site  Platform used for Video Visit: [facility]          Rheumatology Visit  Beata Davidson CNP      Danitza Soto  YOB: 1980    Age: 42 year old   MRN# [id number]       Date of Visit: 01/04/2023  Primary care provider: Polly Mcbride              Subjective:     Danitza is a 42 year old female presents today for consult for joint pain. Referred by Dr. Polly Mcbride.       HPI:  Approx [id number] started having memory issues. Now the last year has fatigue. New joint pain x 1 year and is located R>L Shoulder, elbow, wrist. Thinks R elbow swells sometimes. No worse time of day. Nothing seems to correlate with worsening.  Ibuprofen takes edge off. Does have numbness tingling of elbow down to fingers, unsure how much. Joints have not changed the way they look and has normal function.  Hip and knees are stiff for a few min to a lot of the am.  Pain is specific to joints of arm.     ROS: Patient denies recent infections, fevers, LUIS, weight loss, diarrhea, rashes, SOB, mouth sores, denies tri-color changes, hair loss, blood clots or miscarriages. No known inflammation in eyes.   Has 1 functioning ovary.   +Occ dry eye.       Past Rheum tx:      Social  History  Works full time, [employer] service, on [employer]   +VAPE  No ETOH    FMH:  No known AI.     Active Problem list:  Patient Active Problem List    Diagnosis Date Noted     Memory loss 06/29/2022     Priority: Medium     Gastroesophageal reflux disease with esophagitis without hemorrhage 06/29/2022     Priority: Medium     Fatigue, unspecified type 06/29/2022     Priority: Medium     Insomnia, unspecified type 02/11/2022     Priority: Medium     Strain of lumbar region, initial encounter 05/06/2021     Priority: Medium     Slipped at work 5/4- excused form work till 5/7- Realtive rest helping. Return to work 5/10 unrestricted        Strain of hip and thigh, right, initial encounter 05/06/2021     Priority: Medium     Slipped at work 5/4- excused form work till 5/7- Realtive rest helping. Return to work 5/10 unrestricted        S/P laparoscopic sleeve gastrectomy 02/03/2021     Priority: Medium     2018 Dr. Tse        Moderate episode of recurrent major depressive disorder (H) 09/18/2020     Priority: Medium     S/P ablation of atrial fibrillation 10/03/2019     Priority: Medium     Recurrent major depressive disorder, in full remission (H) 04/08/2019     Priority: Medium     Morbid (severe) obesity due to excess calories (H) 11/27/2018     Priority: Medium     Sp sleeve gastrectomy       LEONARDO (obstructive sleep apnea) 04/13/2017     Priority: Medium     4/10/2017 - Home Sleep Apnea Testing - AHI 15.5/hr; Supine AHI 12.9/hr; SpO2 <= 88% for 33.8 minutes.  Titration Sleep Study 4/19/2017 - (288.0 lbs) CPAP was titrated to a pressure of 11 with an AHI of 3.7.  Time Spent in REM supine at this pressure was 33.5.  Recommending Auto-CPAP 7 - 15 cmH2O.       Obesity hypoventilation syndrome (H) 04/13/2017     Priority: Medium     Mixed hyperlipidemia 07/06/2015     Priority: Medium     Papanicolaou smear of cervix with low grade squamous intraepithelial lesion (LGSIL) 06/17/2014     Priority: Medium     6/17/14:  LSIL, + HPV, unable to type.   8/20/14:[place]:BRISEYDA I. Plan cotest pap & HPV in 1 year.   1/20116 Pap Ascus Neg HPV. Plan: [place].   5/13/16 Colposcopy not completed. Cancelled by patient  3/21/17 NIL/Neg HPV. Plan: cotest in 1 year per Dr. Mcbride  9/28/18 Tele enc: Dr. Mcbride, recommending new plan. Cotest due Jan or Feb 2019. Tracking updated. ([facility])  04/02/19 Patient is lost to pap tracking follow-up.   04/08/19: NIL pap, Neg HR HPV result. Plan cotest in 3 years per provider.        Acne 02/24/2012     Priority: Medium     Primary hypertension 01/12/2011     Priority: Medium     CARDIOVASCULAR SCREENING; LDL GOAL LESS THAN 130 10/31/2010     Priority: Medium     Tobacco use disorder 05/17/2006     Priority: Medium     Anxiety      Priority: Medium     Gastroesophageal reflux disease without esophagitis      Priority: Medium            Objective:     Physical Exam  Last Menstrual Period 04/01/2022   There is no height or weight on file to calculate BMI.    Constitutional: Lg body habitus with out gross deformities. Well groomed.   Psych: nl judgement, orientation, memory, affect.  Eyes: wnl EOM, vision, conjunctiva, sclera   Resp: Normal work of breathing      MSK- (T=tender to palpation, S=swelling)  NROM of wrists and fingers, no visible swelling or redness    Labs:    Lab Results   Component Value Date    ALT 20 05/04/2022    AST 12 05/04/2022    CR 0.67 05/04/2022       Imaging:           Assessment and Plan:     #1) Polyarthralgia's:  1 yr of joint pain as well as fatigue that is most prominent of R >L UE with out a typical pattern to it or known triggers of worsening.   No joint swelling. Mild am stiffness of knees in am.  Remaining ROS relatively negative. Video exam of hands and wrists show NROM.   Unlikely inflammatory however potentially this is early inflammatory arthropathy vs thyroid dx rt vs OA. Will do CCP, CRP, ESR,RF, HEP C and see pt back next available in May for joint exam. Likely developing  cubital tunnel syndrome as well. Education provided on this, if numbness and tingling don't improve she will Follow-up with her provider for this. R shoulder worse joint and some pain with ROM, will have pt do PT. Given slight elevate TSH, this may be playing a role in arthralgias and fatigue, however replacement did not improve these symptoms.     Pt states understanding and agreement to plan of care, has no further questions.     Beata Davidson, CNP  Rheumatology, [facility]

## 2023-02-07 ENCOUNTER — MYC MEDICAL ADVICE (OUTPATIENT)
Dept: FAMILY MEDICINE | Facility: CLINIC | Age: 43
End: 2023-02-07
Payer: COMMERCIAL

## 2023-02-08 NOTE — TELEPHONE ENCOUNTER
Due for physical.  [email or web address] message sent to patient asking her to schedule appointment.    Ami Garcia RN

## 2023-02-17 ENCOUNTER — MYC MEDICAL ADVICE (OUTPATIENT)
Dept: FAMILY MEDICINE | Facility: CLINIC | Age: 43
End: 2023-02-17
Payer: COMMERCIAL

## 2023-02-17 DIAGNOSIS — M54.50 ACUTE LOW BACK PAIN WITHOUT SCIATICA, UNSPECIFIED BACK PAIN LATERALITY: Primary | ICD-10-CM

## 2023-02-19 ENCOUNTER — TRANSFERRED RECORDS (OUTPATIENT)
Dept: HEALTH INFORMATION MANAGEMENT | Facility: CLINIC | Age: 43
End: 2023-02-19
Payer: COMMERCIAL

## 2023-03-06 DIAGNOSIS — F41.9 ANXIETY: ICD-10-CM

## 2023-03-07 RX ORDER — VENLAFAXINE HYDROCHLORIDE 150 MG/1
CAPSULE, EXTENDED RELEASE ORAL
Qty: 30 CAPSULE | Refills: 0 | Status: SHIPPED | OUTPATIENT
Start: 2023-03-07 | End: 2023-03-21

## 2023-03-10 ENCOUNTER — OFFICE VISIT (OUTPATIENT)
Dept: NEUROPSYCHOLOGY | Facility: CLINIC | Age: 43
End: 2023-03-10
Attending: PSYCHIATRY & NEUROLOGY
Payer: COMMERCIAL

## 2023-03-10 DIAGNOSIS — F51.12 INSUFFICIENT SLEEP SYNDROME: ICD-10-CM

## 2023-03-10 DIAGNOSIS — R41.3 COMPLAINTS OF MEMORY DISTURBANCE: Primary | ICD-10-CM

## 2023-03-10 DIAGNOSIS — F39 MOOD DISORDER (H): ICD-10-CM

## 2023-03-10 DIAGNOSIS — F41.9 ANXIOUSNESS: ICD-10-CM

## 2023-03-10 PROCEDURE — 96138 PSYCL/NRPSYC TECH 1ST: CPT | Performed by: CLINICAL NEUROPSYCHOLOGIST

## 2023-03-10 PROCEDURE — 90791 PSYCH DIAGNOSTIC EVALUATION: CPT | Performed by: CLINICAL NEUROPSYCHOLOGIST

## 2023-03-10 PROCEDURE — 96133 NRPSYC TST EVAL PHYS/QHP EA: CPT | Performed by: CLINICAL NEUROPSYCHOLOGIST

## 2023-03-10 PROCEDURE — 96139 PSYCL/NRPSYC TST TECH EA: CPT | Performed by: CLINICAL NEUROPSYCHOLOGIST

## 2023-03-10 PROCEDURE — 96132 NRPSYC TST EVAL PHYS/QHP 1ST: CPT | Performed by: CLINICAL NEUROPSYCHOLOGIST
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
## 2023-03-15 ENCOUNTER — TRANSFERRED RECORDS (OUTPATIENT)
Dept: HEALTH INFORMATION MANAGEMENT | Facility: CLINIC | Age: 43
End: 2023-03-15
Payer: COMMERCIAL

## 2023-03-16 NOTE — PROGRESS NOTES
NAME  Danitza Soto (Jessie)   MRN  [id number]      1980     AGE  42     SEX  Female     HANDEDNESS Right     EDUCATION 18     VAZQUEZ  3/10/2023     PROVIDER  [place]  [place]     STATION  OP            ORIENTATION      Time  0     Place      Personal Info.     Presidents             TOMM       Trial 1 49      Trial 2 50             DCT       ERR: 0      UG: 3.83      .67      E-Score: 6                           WRAT   5     SS %ile GE   Word Reading 113 81 >12.9          WAIS-IV       VCI: 116      LESLIE: 127 RDS: 9             Raw SS    Similarities 31 13    Vocabulary 49 13    Block Design 59 15    Matrix Reasoning 24 15    Digit Span  26 9    Coding  82 12            COWAT   FAS   Raw 56      SS 14      T 55              BOSTON NAMING TEST    Raw 58 /60     SS 13      T 50      %ile 0             GROOVED PEGBOARD       Raw Drops SSa T   RH 63 0 10 43   LH 69 0 10 45          TRAIL MAKING TEST       Time Errors SSa T   A 15 0 15 66   B 34 0 15 61          STROOP        Raw T     Word 99 42     Color 69 40     C/W 46 50            HAILE-O COMPLEX FIGURE       Raw T %ile   Time to Copy 98  >16   Copy  29  <1   Short Delay Recall 21 49 46   Long Delay Recall 19 45 31   Recognition Total 21 50 50           AVLT 30-91  OQ   T1 T2 T3 T4 T5   6 7 11 11 14   TB T6 30'     4 10 8       Raw T  %ile   Trials 1-5   49 40 8-16   Short Delay Recall 10 40 8-16   30' Recall  8 36 8-16   30' Recog. Hit/FP 15/1 48 32-68   Mem. Efficiency 1.75 42 17-31           WMS-IV   YA    Raw SSa/%ile     LM I 34 13     LM II 35 15     LM Rec. 30 >75     VR I 42 13     VR II 26 10     VR Rec.  7 >75            BDI-II       Raw 12      Interp. Minimal/Borderline            STAI        Raw %ile Interpretation   State: 35 55 Normal    Trait: 38 70 Normal           MMPI-3       RCd 50 CRIN 51    RC1 52 VRIN 43    RC2 57 HILDA 73    RC4 48 F 47    RC6 40 Fp 58    RC7 48 Fs 47    RC8 44 FBS 53    RC9 39 RBS 58      L 40      K 50
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
## 2023-03-21 ENCOUNTER — OFFICE VISIT (OUTPATIENT)
Dept: FAMILY MEDICINE | Facility: CLINIC | Age: 43
End: 2023-03-21
Payer: COMMERCIAL

## 2023-03-21 VITALS
HEART RATE: 79 BPM | TEMPERATURE: 97.5 F | OXYGEN SATURATION: 98 % | WEIGHT: 261.4 LBS | DIASTOLIC BLOOD PRESSURE: 81 MMHG | BODY MASS INDEX: 42.01 KG/M2 | HEIGHT: 66 IN | SYSTOLIC BLOOD PRESSURE: 120 MMHG | RESPIRATION RATE: 16 BRPM

## 2023-03-21 DIAGNOSIS — E78.2 MIXED HYPERLIPIDEMIA: ICD-10-CM

## 2023-03-21 DIAGNOSIS — Z98.84 S/P LAPAROSCOPIC SLEEVE GASTRECTOMY: ICD-10-CM

## 2023-03-21 DIAGNOSIS — E66.01 MORBID (SEVERE) OBESITY DUE TO EXCESS CALORIES (H): ICD-10-CM

## 2023-03-21 DIAGNOSIS — Z00.00 ROUTINE GENERAL MEDICAL EXAMINATION AT A HEALTH CARE FACILITY: Primary | ICD-10-CM

## 2023-03-21 DIAGNOSIS — I10 ESSENTIAL HYPERTENSION: ICD-10-CM

## 2023-03-21 DIAGNOSIS — I10 PRIMARY HYPERTENSION: ICD-10-CM

## 2023-03-21 DIAGNOSIS — Z13.220 SCREENING FOR HYPERLIPIDEMIA: ICD-10-CM

## 2023-03-21 DIAGNOSIS — M25.50 POLYARTHRALGIA: ICD-10-CM

## 2023-03-21 DIAGNOSIS — F41.9 ANXIETY: ICD-10-CM

## 2023-03-21 DIAGNOSIS — Z83.3 FAMILY HISTORY OF DIABETES MELLITUS: ICD-10-CM

## 2023-03-21 DIAGNOSIS — K21.9 GASTROESOPHAGEAL REFLUX DISEASE WITHOUT ESOPHAGITIS: ICD-10-CM

## 2023-03-21 LAB
ALBUMIN SERPL BCG-MCNC: 4.1 G/DL (ref 3.5–5.2)
ALP SERPL-CCNC: 91 U/L (ref 35–104)
ALT SERPL W P-5'-P-CCNC: 11 U/L (ref 10–35)
ANION GAP SERPL CALCULATED.3IONS-SCNC: 13 MMOL/L (ref 7–15)
AST SERPL W P-5'-P-CCNC: 21 U/L (ref 10–35)
BILIRUB SERPL-MCNC: 0.2 MG/DL
BUN SERPL-MCNC: 8.8 MG/DL (ref 6–20)
CALCIUM SERPL-MCNC: 9.3 MG/DL (ref 8.6–10)
CHLORIDE SERPL-SCNC: 102 MMOL/L (ref 98–107)
CHOLEST SERPL-MCNC: 259 MG/DL
CREAT SERPL-MCNC: 0.64 MG/DL (ref 0.51–0.95)
CRP SERPL-MCNC: 3.05 MG/L
DEPRECATED HCO3 PLAS-SCNC: 23 MMOL/L (ref 22–29)
ERYTHROCYTE [DISTWIDTH] IN BLOOD BY AUTOMATED COUNT: 13.9 % (ref 10–15)
ERYTHROCYTE [SEDIMENTATION RATE] IN BLOOD BY WESTERGREN METHOD: 7 MM/HR (ref 0–20)
GFR SERPL CREATININE-BSD FRML MDRD: >90 ML/MIN/1.73M2
GLUCOSE SERPL-MCNC: 101 MG/DL (ref 70–99)
HBA1C MFR BLD: 5.3 % (ref 0–5.6)
HCT VFR BLD AUTO: 45.2 % (ref 35–47)
HCV AB SERPL QL IA: NONREACTIVE
HDLC SERPL-MCNC: 42 MG/DL
HGB BLD-MCNC: 15.1 G/DL (ref 11.7–15.7)
LDLC SERPL CALC-MCNC: 185 MG/DL
MCH RBC QN AUTO: 29.8 PG (ref 26.5–33)
MCHC RBC AUTO-ENTMCNC: 33.4 G/DL (ref 31.5–36.5)
MCV RBC AUTO: 89 FL (ref 78–100)
NONHDLC SERPL-MCNC: 217 MG/DL
PLATELET # BLD AUTO: 357 10E3/UL (ref 150–450)
POTASSIUM SERPL-SCNC: 4 MMOL/L (ref 3.4–5.3)
PROT SERPL-MCNC: 7.1 G/DL (ref 6.4–8.3)
RBC # BLD AUTO: 5.07 10E6/UL (ref 3.8–5.2)
RHEUMATOID FACT SER NEPH-ACNC: <7 IU/ML
SODIUM SERPL-SCNC: 138 MMOL/L (ref 136–145)
TRIGL SERPL-MCNC: 161 MG/DL
TSH SERPL DL<=0.005 MIU/L-ACNC: 4.19 UIU/ML (ref 0.3–4.2)
WBC # BLD AUTO: 8.5 10E3/UL (ref 4–11)

## 2023-03-21 PROCEDURE — 85027 COMPLETE CBC AUTOMATED: CPT | Performed by: FAMILY MEDICINE

## 2023-03-21 PROCEDURE — 83036 HEMOGLOBIN GLYCOSYLATED A1C: CPT | Performed by: FAMILY MEDICINE

## 2023-03-21 PROCEDURE — 36415 COLL VENOUS BLD VENIPUNCTURE: CPT | Performed by: FAMILY MEDICINE

## 2023-03-21 PROCEDURE — 84443 ASSAY THYROID STIM HORMONE: CPT | Performed by: FAMILY MEDICINE

## 2023-03-21 PROCEDURE — 99214 OFFICE O/P EST MOD 30 MIN: CPT | Mod: 25 | Performed by: FAMILY MEDICINE

## 2023-03-21 PROCEDURE — 80061 LIPID PANEL: CPT | Performed by: FAMILY MEDICINE

## 2023-03-21 PROCEDURE — 86140 C-REACTIVE PROTEIN: CPT | Performed by: FAMILY MEDICINE

## 2023-03-21 PROCEDURE — 86200 CCP ANTIBODY: CPT | Performed by: FAMILY MEDICINE

## 2023-03-21 PROCEDURE — 80053 COMPREHEN METABOLIC PANEL: CPT | Performed by: FAMILY MEDICINE

## 2023-03-21 PROCEDURE — 86803 HEPATITIS C AB TEST: CPT | Performed by: FAMILY MEDICINE

## 2023-03-21 PROCEDURE — 85652 RBC SED RATE AUTOMATED: CPT | Performed by: FAMILY MEDICINE

## 2023-03-21 PROCEDURE — 99396 PREV VISIT EST AGE 40-64: CPT | Performed by: FAMILY MEDICINE

## 2023-03-21 PROCEDURE — 86431 RHEUMATOID FACTOR QUANT: CPT | Performed by: FAMILY MEDICINE

## 2023-03-21 RX ORDER — AMLODIPINE BESYLATE 10 MG/1
10 TABLET ORAL EVERY EVENING
Qty: 90 TABLET | Refills: 1 | Status: SHIPPED | OUTPATIENT
Start: 2023-03-21 | End: 2023-08-03

## 2023-03-21 RX ORDER — HYDROCHLOROTHIAZIDE 12.5 MG/1
12.5 CAPSULE ORAL EVERY MORNING
Qty: 90 CAPSULE | Refills: 1 | Status: SHIPPED | OUTPATIENT
Start: 2023-03-21 | End: 2023-08-03

## 2023-03-21 RX ORDER — LISINOPRIL 40 MG/1
40 TABLET ORAL EVERY MORNING
Qty: 90 TABLET | Refills: 1 | Status: SHIPPED | OUTPATIENT
Start: 2023-03-21 | End: 2023-08-03

## 2023-03-21 RX ORDER — OMEPRAZOLE 40 MG/1
40 CAPSULE, DELAYED RELEASE ORAL EVERY MORNING
Qty: 90 CAPSULE | Refills: 1 | Status: SHIPPED | OUTPATIENT
Start: 2023-03-21 | End: 2023-11-01

## 2023-03-21 RX ORDER — VENLAFAXINE HYDROCHLORIDE 150 MG/1
CAPSULE, EXTENDED RELEASE ORAL
Qty: 90 CAPSULE | Refills: 1 | Status: SHIPPED | OUTPATIENT
Start: 2023-03-21 | End: 2023-08-03

## 2023-03-21 ASSESSMENT — ENCOUNTER SYMPTOMS
ABDOMINAL PAIN: 0
HEMATOCHEZIA: 0
ARTHRALGIAS: 0
NAUSEA: 0
SORE THROAT: 0
PALPITATIONS: 0
CHILLS: 0
NERVOUS/ANXIOUS: 0
COUGH: 0
HEARTBURN: 1
JOINT SWELLING: 0
SHORTNESS OF BREATH: 0
CONSTIPATION: 0
MYALGIAS: 0
PARESTHESIAS: 0
EYE PAIN: 0
DIZZINESS: 0
FREQUENCY: 0
HEADACHES: 1
FEVER: 0
WEAKNESS: 0
DIARRHEA: 0
HEMATURIA: 0
DYSURIA: 0

## 2023-03-21 ASSESSMENT — ANXIETY QUESTIONNAIRES
3. WORRYING TOO MUCH ABOUT DIFFERENT THINGS: NOT AT ALL
4. TROUBLE RELAXING: SEVERAL DAYS
7. FEELING AFRAID AS IF SOMETHING AWFUL MIGHT HAPPEN: NOT AT ALL
GAD7 TOTAL SCORE: 2
8. IF YOU CHECKED OFF ANY PROBLEMS, HOW DIFFICULT HAVE THESE MADE IT FOR YOU TO DO YOUR WORK, TAKE CARE OF THINGS AT HOME, OR GET ALONG WITH OTHER PEOPLE?: NOT DIFFICULT AT ALL
2. NOT BEING ABLE TO STOP OR CONTROL WORRYING: NOT AT ALL
6. BECOMING EASILY ANNOYED OR IRRITABLE: SEVERAL DAYS
5. BEING SO RESTLESS THAT IT IS HARD TO SIT STILL: NOT AT ALL
GAD7 TOTAL SCORE: 2
1. FEELING NERVOUS, ANXIOUS, OR ON EDGE: NOT AT ALL
IF YOU CHECKED OFF ANY PROBLEMS ON THIS QUESTIONNAIRE, HOW DIFFICULT HAVE THESE PROBLEMS MADE IT FOR YOU TO DO YOUR WORK, TAKE CARE OF THINGS AT HOME, OR GET ALONG WITH OTHER PEOPLE: NOT DIFFICULT AT ALL
GAD7 TOTAL SCORE: 2
7. FEELING AFRAID AS IF SOMETHING AWFUL MIGHT HAPPEN: NOT AT ALL

## 2023-03-21 ASSESSMENT — PAIN SCALES - GENERAL: PAINLEVEL: MODERATE PAIN (4)

## 2023-03-21 ASSESSMENT — PATIENT HEALTH QUESTIONNAIRE - PHQ9
SUM OF ALL RESPONSES TO PHQ QUESTIONS 1-9: 9
SUM OF ALL RESPONSES TO PHQ QUESTIONS 1-9: 9
10. IF YOU CHECKED OFF ANY PROBLEMS, HOW DIFFICULT HAVE THESE PROBLEMS MADE IT FOR YOU TO DO YOUR WORK, TAKE CARE OF THINGS AT HOME, OR GET ALONG WITH OTHER PEOPLE: SOMEWHAT DIFFICULT

## 2023-03-21 NOTE — PROGRESS NOTES
SUBJECTIVE:   CC: Trish is an 42 year old who presents for preventive health visit.   Patient has been advised of split billing requirements and indicates understanding: Yes  Healthy Habits:     Getting at least 3 servings of Calcium per day:  NO    Bi-annual eye exam:  Yes    Dental care twice a year:  NO    Sleep apnea or symptoms of sleep apnea:  Daytime drowsiness, Excessive snoring and Sleep apnea    Diet:  Regular (no restrictions)    Frequency of exercise:  None    Taking medications regularly:  Yes    Medication side effects:  None    PHQ-2 Total Score: 2    Additional concerns today:  Yes  Answers for HPI/ROS submitted by the patient on 3/21/2023  If you checked off any problems, how difficult have these problems made it for you to do your work, take care of things at home, or get along with other people?: Somewhat difficult  PHQ9 TOTAL SCORE: 9  GABRIELA 7 TOTAL SCORE: 2                Today's PHQ-2 Score:   PHQ-2 (  [employer]) 3/21/2023   Q1: Little interest or pleasure in doing things 1   Q2: Feeling down, depressed or hopeless 1   PHQ-2 Score 2   PHQ-2 Total Score (12-17 Years)- Positive if 3 or more points; Administer PHQ-A if positive -   Q1: Little interest or pleasure in doing things Several days   Q2: Feeling down, depressed or hopeless Several days   PHQ-2 Score 2     Several times a year gets UTIs and last time she had a UTI was a month ago , she took nitrofurantoin , before that she took cefalexin   Last year more frequent       Social History     Tobacco Use     Smoking status: Former     Packs/day: 1.00     Years: 10.00     Pack years: 10.00     Types: Cigarettes, Vaping Device     Start date: 2004     Quit date: 2021     Years since quittin.8     Smokeless tobacco: Never   Substance Use Topics     Alcohol use: Not Currently     Alcohol/week: 0.0 standard drinks     Comment: Occas.         Alcohol Use 3/21/2023   Prescreen: >3 drinks/day or >7 drinks/week? Not Applicable   AUDIT  SCORE  -     AUDIT - Alcohol Use Disorders Identification Test - Reproduced from the World Health Organization Audit 2001 (Second Edition) 9/21/2018   1.  How often do you have a drink containing alcohol? Never   2.  How many drinks containing alcohol do you have on a typical day when you are drinking? 1 or 2   3.  How often do you have five or more drinks on one occasion? Never   4.  How often during the last year have you found that you were not able to stop drinking once you had started? Never   5.  How often during the last year have you failed to do what was normally expected of you because of drinking? Never   6.  How often during the last year have you needed a first drink in the morning to get yourself going after a heavy drinking session? Never   7.  How often during the last year have you had a feeling of guilt or remorse after drinking? Never   8.  How often during the last year have you been unable to remember what happened the night before because of your drinking? Never   9.  Have you or someone else been injured because of your drinking? No   10. Has a relative, friend, doctor or other health care worker been concerned about your drinking or suggested you cut down? No   TOTAL SCORE 0       Reviewed orders with patient.  Reviewed health maintenance and updated orders accordingly - Yes  Lab work is in process  Labs reviewed in EPIC  BP Readings from Last 3 Encounters:   03/21/23 120/81   11/29/22 134/85   08/18/22 139/88    Wt Readings from Last 3 Encounters:   03/21/23 118.6 kg (261 lb 6.4 oz)   11/29/22 116.8 kg (257 lb 8 oz)   08/18/22 117 kg (258 lb)                  Patient Active Problem List   Diagnosis     Anxiety     Gastroesophageal reflux disease without esophagitis     Tobacco use disorder     CARDIOVASCULAR SCREENING; LDL GOAL LESS THAN 130     Primary hypertension     Acne     Papanicolaou smear of cervix with low grade squamous intraepithelial lesion (LGSIL)     Mixed hyperlipidemia      LEONARDO (obstructive sleep apnea)     Obesity hypoventilation syndrome (H)     Morbid (severe) obesity due to excess calories (H)     Recurrent major depressive disorder, in full remission (H)     S/P ablation of atrial fibrillation     Moderate episode of recurrent major depressive disorder (H)     S/P laparoscopic sleeve gastrectomy     Strain of lumbar region, initial encounter     Strain of hip and thigh, right, initial encounter     Insomnia, unspecified type     Memory loss     Gastroesophageal reflux disease with esophagitis without hemorrhage     Fatigue, unspecified type     Past Surgical History:   Procedure Laterality Date     EP ABLATION FOCAL AFIB N/A 10/03/2019    Procedure: EP ABLATION FOCAL AFIB;  Surgeon: Harpreet Arevalo MD;  Location:  OR     ESOPHAGOSCOPY, GASTROSCOPY, DUODENOSCOPY (EGD), COMBINED N/A 2022    Procedure: ESOPHAGOGASTRODUODENOSCOPY, WITH BIOPSY;  Surgeon: Jeff Patino DO;  Location:  GI     HC TOOTH EXTRACTION W/FORCEP      [place] Teeth Extraction     LAPAROSCOPIC GASTRIC SLEEVE N/A 2018    Procedure: Laparoscopic Sleeve Gastrectomy Latex Free;  Surgeon: Artis Tse MD;  Location:  OR     LAPAROSCOPIC HERNIORRHAPHY HIATAL  2018    Procedure: LAPAROSCOPIC  HIATAL Hernia Repair;  Surgeon: Artis Tse MD;  Location:  OR     SALPINGO-OOPHORECTOMY, COMBINED Left 10/20/2021    Mucinous cystadenoma       Social History     Tobacco Use     Smoking status: Former     Packs/day: 1.00     Years: 10.00     Pack years: 10.00     Types: Cigarettes, Vaping Device     Start date: 2004     Quit date: 2021     Years since quittin.8     Smokeless tobacco: Never   Substance Use Topics     Alcohol use: Not Currently     Alcohol/week: 0.0 standard drinks     Comment: Occas.     Family History   Problem Relation Age of Onset     Heart Disease Mother         ,mother had emphesema and  at 62     Hypertension Mother      Allergies Mother       Lipids Mother      Obesity Mother      Respiratory Mother         Emphysema     Hypertension Father      Lipids Father      Cancer Father      Prostate Cancer Father      Other Cancer Father      Allergies Sister      Genitourinary Problems Sister      Diabetes Maternal Grandmother         Type 2?     Hypertension Maternal Grandmother      Circulatory Maternal Grandmother         Due to diabetes     Eye Disorder Maternal Grandmother         Macular degeneration/Macular degeneration     Obesity Maternal Grandmother      Alcohol/Drug Maternal Grandfather      Obesity Maternal Grandfather      Cerebrovascular Disease Paternal Grandmother      Psychotic Disorder Paternal Grandfather         Committed Suicide     Depression Paternal Grandfather      Anesthesia Reaction No family hx of      Bleeding Disorder No family hx of      Venous thrombosis No family hx of          Current Outpatient Medications   Medication Sig Dispense Refill     acetaminophen (TYLENOL) 325 MG tablet Take 2 tablets (650 mg) by mouth every 4 hours as needed for other (For optimal non-opioid multimodal pain management to improve pain control and physical function.) 100 tablet 0     amLODIPine (NORVASC) 10 MG tablet Take 1 tablet (10 mg) by mouth every evening 90 tablet 1     Ascorbic Acid (VITAMIN C) 500 MG CAPS Take by mouth every evening       calcium carbonate (OS-CHIKI) 500 MG tablet Take 1 tablet by mouth every evening       Cyanocobalamin (B-12) 1000 MCG TBCR Take by mouth every evening       hydrochlorothiazide (MICROZIDE) 12.5 MG capsule Take 1 capsule (12.5 mg) by mouth every morning TAKE 1 CAPSULE(12.5 MG) BY MOUTH DAILY 90 capsule 1     ibuprofen (ADVIL/MOTRIN) 200 MG tablet Take 200 mg by mouth every 4 hours as needed for pain       lisinopril (ZESTRIL) 40 MG tablet Take 1 tablet (40 mg) by mouth every morning 90 tablet 1     magnesium 250 MG tablet Take 1 tablet by mouth every evening       Multiple Vitamins-Iron (MULTI-DAY PLUS IRON PO)  Take by mouth every morning       omeprazole (PRILOSEC) 40 MG DR capsule Take 1 capsule (40 mg) by mouth every morning 90 capsule 1     venlafaxine (EFFEXOR XR) 150 MG 24 hr capsule TAKE 1 CAPSULE(150 MG) BY MOUTH DAILY 90 capsule 1     vitamin B1 (THIAMINE) 250 MG tablet Take 250 mg by mouth every evening       Vitamin D (Cholecalciferol) 25 MCG (1000 UT) TABS Take by mouth every evening       Allergies   Allergen Reactions     Bupropion Other (See Comments)     Other reaction(s): Chest Pain,Panic attacks, heart/chest pain     Wellbutrin [Bupropion]      Wellbutrin: Panic attacks, heart/chest pain     Recent Labs   Lab Test 03/21/23  0816 05/04/22  1144 01/11/22  1230 05/18/21  1029 09/17/20  0950 12/05/19  1021 10/13/17  1445 03/21/17  0926 08/24/16  0915 01/13/16  1138   A1C 5.3  --   --  5.2  --  5.4   < >  --   --   --    LDL  --   --   --   --   --   --   --  155* 174* 162*   HDL  --   --   --   --   --   --   --  30* 29* 33*   TRIG  --   --   --   --   --   --   --  203* 231* 207*   ALT  --  20 26 20  --  21   < > 24  --  26   CR  --  0.67 0.66 0.68  --  0.69   < > 0.62 0.61 0.59   GFRESTIMATED  --  >90 >90 >90  --  >90   < > >90  Non  GFR Calc   >90  Non  GFR Calc   >90  Non  GFR Calc     GFRESTBLACK  --   --   --  >90  --  >90   < > >90   GFR Calc   >90   GFR Calc   >90   GFR Calc     POTASSIUM  --  3.7 4.2 3.7   < > 3.8   < > 4.0 3.9 4.0   TSH  --  4.09* 2.86 0.64  --   --    < >  --   --  1.79    < > = values in this interval not displayed.        Breast Cancer Screening:    Breast CA Risk Assessment (FHS-7) 3/29/2022   Do you have a family history of breast, colon, or ovarian cancer? No / Unknown         Mammogram Screening - Offered annual screening and updated [facility] for [facility] plan based on risk factor consideration    Pertinent mammograms are reviewed under the imaging tab.    History of  abnormal Pap smear: NO - age 30- 65 PAP every 3 years recommended  PAP / HPV Latest Ref Rng & Units 4/8/2019 3/21/2017 1/13/2016   PAP (Historical) - NIL NIL ASC-US(A)   HPV16 NEG:Negative Negative Negative Negative   HPV18 NEG:Negative Negative Negative Negative   HRHPV NEG:Negative Negative Negative Negative     Reviewed and updated as needed this visit by clinical staff                  Reviewed and updated as needed this visit by Provider                 Past Medical History:   Diagnosis Date     Antiplatelet or antithrombotic long-term use     resolved     Arrhythmia      BV (bacterial vaginosis) 9/18/2020     Depressive disorder 1990     Esophageal reflux      Hearing problem 2018     Hyperlipidemia LDL goal <130 07/06/2015     Hypertension      LSIL (low grade squamous intraepithelial lesion) on Pap smear 06/2014    + HPV, unable to type colp - BRISEYDA I     LEONARDO on CPAP      Other anxiety states       Past Surgical History:   Procedure Laterality Date     EP ABLATION FOCAL AFIB N/A 10/03/2019    Procedure: EP ABLATION FOCAL AFIB;  Surgeon: Harpreet Arevalo MD;  Location:  OR     ESOPHAGOSCOPY, GASTROSCOPY, DUODENOSCOPY (EGD), COMBINED N/A 11/29/2022    Procedure: ESOPHAGOGASTRODUODENOSCOPY, WITH BIOPSY;  Surgeon: Jeff Patino DO;  Location:  GI     HC TOOTH EXTRACTION W/FORCEP  1995    [place] Teeth Extraction     LAPAROSCOPIC GASTRIC SLEEVE N/A 11/27/2018    Procedure: Laparoscopic Sleeve Gastrectomy Latex Free;  Surgeon: Artis Tse MD;  Location:  OR     LAPAROSCOPIC HERNIORRHAPHY HIATAL  11/27/2018    Procedure: LAPAROSCOPIC  HIATAL Hernia Repair;  Surgeon: Artis Tse MD;  Location:  OR     SALPINGO-OOPHORECTOMY, COMBINED Left 10/20/2021    Mucinous cystadenoma       Review of Systems   Constitutional: Negative for chills and fever.   HENT: Positive for hearing loss. Negative for congestion, ear pain and sore throat.    Eyes: Negative for pain and visual disturbance.   Respiratory:  Negative for cough and shortness of breath.    Cardiovascular: Negative for chest pain, palpitations and peripheral edema.   Gastrointestinal: Positive for heartburn. Negative for abdominal pain, constipation, diarrhea, hematochezia and nausea.   Genitourinary: Negative for dysuria, frequency, genital sores, hematuria and urgency.   Musculoskeletal: Negative for arthralgias, joint swelling and myalgias.   Skin: Negative for rash.   Neurological: Positive for headaches. Negative for dizziness, weakness and paresthesias.   Psychiatric/Behavioral: Negative for mood changes. The patient is not nervous/anxious.      CONSTITUTIONAL: NEGATIVE for fever, chills, change in weight  INTEGUMENTARU/SKIN: NEGATIVE for worrisome rashes, moles or lesions  EYES: NEGATIVE for vision changes or irritation  ENT: NEGATIVE for ear, mouth and throat problems  RESP: NEGATIVE for significant cough or SOB  BREAST: NEGATIVE for masses, tenderness or discharge  CV: NEGATIVE for chest pain, palpitations or peripheral edema  GI: NEGATIVE for nausea, abdominal pain, heartburn, or change in bowel habits  : NEGATIVE for unusual urinary or vaginal symptoms. Periods are regular.  MUSCULOSKELETAL: NEGATIVE for significant arthralgias or myalgia  NEURO: NEGATIVE for weakness, dizziness or paresthesias  PSYCHIATRIC: NEGATIVE for changes in mood or affect     OBJECTIVE:   LMP 04/01/2022   Physical Exam  GENERAL: healthy, alert and no distress  EYES: Eyes grossly normal to inspection, PERRL and conjunctivae and sclerae normal  HENT: ear canals and TM's normal, nose and mouth without ulcers or lesions  NECK: no adenopathy, no asymmetry, masses, or scars and thyroid normal to palpation  RESP: lungs clear to auscultation - no rales, rhonchi or wheezes  BREAST: normal without masses, tenderness or nipple discharge and no palpable axillary masses or adenopathy  CV: regular rate and rhythm, normal S1 S2, no S3 or S4, no murmur, click or rub, no peripheral  edema and peripheral pulses strong  ABDOMEN: soft, nontender, no hepatosplenomegaly, no masses and bowel sounds normal  MS: no gross musculoskeletal defects noted, no edema  SKIN: no suspicious lesions or rashes  NEURO: Normal strength and tone, mentation intact and speech normal  PSYCH: mentation appears normal, affect normal/bright    Diagnostic Test Results:  Labs reviewed in Epic  Results for orders placed or performed in visit on 03/21/23 (from the past 24 hour(s))   Hemoglobin A1c   Result Value Ref Range    Hemoglobin A1C 5.3 0.0 - 5.6 %   CBC with platelets   Result Value Ref Range    WBC Count 8.5 4.0 - 11.0 10e3/uL    RBC Count 5.07 3.80 - 5.20 10e6/uL    Hemoglobin 15.1 11.7 - 15.7 g/dL    Hematocrit 45.2 35.0 - 47.0 %    MCV 89 78 - 100 fL    MCH 29.8 26.5 - 33.0 pg    MCHC 33.4 31.5 - 36.5 g/dL    RDW 13.9 10.0 - 15.0 %    Platelet Count 357 150 - 450 10e3/uL   Erythrocyte sedimentation rate auto   Result Value Ref Range    Erythrocyte Sedimentation Rate 7 0 - 20 mm/hr       ASSESSMENT/PLAN:   (Z00.00) Routine general medical examination at a health care facility  (primary encounter diagnosis)  Comment: is fasting for screening labs   Plan: REVIEW OF HEALTH MAINTENANCE PROTOCOL ORDERS            (E66.01) Morbid (severe) obesity due to excess calories (H)  Comment: refilled , works for her GERD at this dose   Plan: omeprazole (PRILOSEC) 40 MG DR capsule            (I10) Essential hypertension  Comment: her BP is well controlled on these meds   Plan: lisinopril (ZESTRIL) 40 MG tablet,         hydrochlorothiazide (MICROZIDE) 12.5 MG         capsule, Comprehensive metabolic panel (BMP +         Alb, Alk Phos, ALT, AST, Total. Bili, TP), TSH         with free T4 reflex        Will check labs     (E78.2) Mixed hyperlipidemia  Comment: will screen lipids   Plan: Lipid panel reflex to direct LDL Non-fasting            (Z98.84) S/P laparoscopic sleeve gastrectomy  Comment: refilled   Plan: omeprazole (PRILOSEC)  "40 MG DR capsule            (F41.9) Anxiety  Comment: effexor is controlling her symptoms and no side effects   Plan: venlafaxine (EFFEXOR XR) 150 MG 24 hr capsule        Refilled     (Z13.220) Screening for hyperlipidemia  Comment: will screen lipids   Plan: Lipid panel reflex to direct LDL Non-fasting            (K21.9) Gastroesophageal reflux disease without esophagitis  Comment: refilled doing well   Plan: omeprazole (PRILOSEC) 40 MG DR capsule            (Z83.3) Family history of diabetes mellitus  Comment: will screen   Plan: Hemoglobin A1c            (I10) Primary hypertension  Comment: BP is well controlled   Plan: amLODIPine (NORVASC) 10 MG tablet            (M25.50) Polyarthralgia  Comment: sees rheumatology and has labs ordered by them , she will check today   Plan: as above     Patient has been advised of split billing requirements and indicates understanding: Yes      COUNSELING:  Reviewed preventive health counseling, as reflected in patient instructions       Regular exercise       Healthy diet/nutrition       Vision screening       Hearing screening      BMI:   Estimated body mass index is 40.33 kg/m  as calculated from the following:    Height as of 11/29/22: 1.702 m (5' 7\").    Weight as of 11/29/22: 116.8 kg (257 lb 8 oz).         She reports that she quit smoking about 21 months ago. Her smoking use included cigarettes and vaping device. She started smoking about 19 years ago. She has a 10.00 pack-year smoking history. She has never used smokeless tobacco.      Polly Mcbride MD  [facility]  "

## 2023-03-22 LAB — CCP AB SER IA-ACNC: 1.2 U/ML

## 2023-03-24 ENCOUNTER — TELEPHONE (OUTPATIENT)
Dept: NEUROLOGY | Facility: CLINIC | Age: 43
End: 2023-03-24
Payer: COMMERCIAL

## 2023-03-24 NOTE — TELEPHONE ENCOUNTER
Patient needs appointment to f/u and review [facility]  Left message asking if she would be available to come in Monday 3/27 at 11:30 am or on Wednesday 3/29 at 8 am in held spot

## 2023-03-28 ENCOUNTER — TRANSFERRED RECORDS (OUTPATIENT)
Dept: HEALTH INFORMATION MANAGEMENT | Facility: CLINIC | Age: 43
End: 2023-03-28
Payer: COMMERCIAL

## 2023-04-02 ASSESSMENT — SLEEP AND FATIGUE QUESTIONNAIRES
HOW LIKELY ARE YOU TO NOD OFF OR FALL ASLEEP IN A CAR, WHILE STOPPED FOR A FEW MINUTES IN TRAFFIC: WOULD NEVER DOZE
HOW LIKELY ARE YOU TO NOD OFF OR FALL ASLEEP WHILE SITTING AND TALKING TO SOMEONE: WOULD NEVER DOZE
HOW LIKELY ARE YOU TO NOD OFF OR FALL ASLEEP WHEN YOU ARE A PASSENGER IN A CAR FOR AN HOUR WITHOUT A BREAK: SLIGHT CHANCE OF DOZING
HOW LIKELY ARE YOU TO NOD OFF OR FALL ASLEEP WHILE SITTING AND READING: MODERATE CHANCE OF DOZING
HOW LIKELY ARE YOU TO NOD OFF OR FALL ASLEEP WHILE LYING DOWN TO REST IN THE AFTERNOON WHEN CIRCUMSTANCES PERMIT: HIGH CHANCE OF DOZING
HOW LIKELY ARE YOU TO NOD OFF OR FALL ASLEEP WHILE SITTING INACTIVE IN A PUBLIC PLACE: SLIGHT CHANCE OF DOZING
HOW LIKELY ARE YOU TO NOD OFF OR FALL ASLEEP WHILE SITTING QUIETLY AFTER LUNCH WITHOUT ALCOHOL: SLIGHT CHANCE OF DOZING
HOW LIKELY ARE YOU TO NOD OFF OR FALL ASLEEP WHILE WATCHING TV: MODERATE CHANCE OF DOZING

## 2023-04-03 ENCOUNTER — VIRTUAL VISIT (OUTPATIENT)
Dept: SLEEP MEDICINE | Facility: CLINIC | Age: 43
End: 2023-04-03
Payer: COMMERCIAL

## 2023-04-03 VITALS — HEIGHT: 67 IN | WEIGHT: 260 LBS | BODY MASS INDEX: 40.81 KG/M2

## 2023-04-03 DIAGNOSIS — E66.2 OBESITY HYPOVENTILATION SYNDROME (H): Chronic | ICD-10-CM

## 2023-04-03 DIAGNOSIS — G47.33 OSA (OBSTRUCTIVE SLEEP APNEA): Primary | Chronic | ICD-10-CM

## 2023-04-03 PROCEDURE — 99213 OFFICE O/P EST LOW 20 MIN: CPT | Mod: VID | Performed by: PHYSICIAN ASSISTANT

## 2023-04-03 ASSESSMENT — PAIN SCALES - GENERAL: PAINLEVEL: SEVERE PAIN (6)

## 2023-04-03 NOTE — NURSING NOTE
Is the patient currently in the state of [place]? YES    Visit mode:VIDEO    If the visit is dropped, the patient can be reconnected by: VIDEO VISIT: Text to cell phone: [phone]    Will anyone else be joining the visit? NO      How would you like to obtain your AVS? MyChart    Are changes needed to the allergy or medication list? NO    Reason for visit: return  Jeff Oviedo

## 2023-04-03 NOTE — PROGRESS NOTES
Virtual Visit Details    Type of service:  Video Visit     Originating Location (pt. Location): Home    Distant Location (provider location):  On-site  Platform used for Video Visit: [facility]      Sleep Apnea - Follow-up Visit:    Impression/Plan:  Moderate obstructive sleep apnea-  Suboptimal CPAP compliance. Daytime symptoms are improved.   Continue current treatment. Patient counseled to use CPAP with all sleep.   Comprehensive DME order placed.      Danitza Soto will follow up in about 1 year or sooner if any concerns.     20 minutes spent on day of encounter doing chart review,  history and exam, counseling, coordinating plan of care, documentation and further activities as noted above.      Ebenezer Simons PA-C  Sleep Medicine    History of Present Illness:  Chief Complaint   Patient presents with     Video Visit     CPAP Follow Up       Danitza Soto presents for follow-up of their moderate sleep apnea, managed with CPAP.     Initially presented with snoring, witnessed apnea and excessive daytime sleepiness.    4/10/2017 - Home Sleep Apnea Testing - AHI 15.5/hr; Supine AHI 12.9/hr; SpO2 <= 88% for 33.8 minutes.    Titration Sleep Study 4/19/2017 - (288.0 lbs) CPAP was titrated to a pressure of 11 with an AHI of 3.7. Time Spent in REM supine at this pressure was 33.5.  Recommending Auto-CPAP 7 - 15 cmH2O.    Do you use a CPAP Machine at home: Yes  Overall, on a scale of 0-10 how would you rate your CPAP (0 poor, 10 great): 9    What type of mask do you use: Nasal mask  Is your mask comfortable: No  If not, why: Doesn't fit right    Is your mask leaking: Yes  If yes, where do you feel it: Around the nose  How many night per week does the mask leak (0-7): 7    Do you notice snoring with mask on: Yes  Do you notice gasping arousals with mask on: Yes  Are you having significant oral or nasal dryness: Yes  Is the pressure setting comfortable: Yes  If not, why:      What is your typical bedtime: 10p  How long does  it take you to go to sleep on PAP therapy: Not sure  What time do you typically get out of bed for the day: 630a  How many hours on average per night are you using PAP therapy: I can for a full night when i have a good mask  How many hours are you sleeping per night: 3-6  Do you feel well rested in the morning: No      Respironics  Auto-PAP 10.0 - 15.0 cmH2O 30 day usage data:    10% of days with > 4 hours of use. 25/30 days with no use.   Average use 54 minutes per day.   Average leak 29.73 LPM.  Average % of night in large leak 0%.    CPAP 90% pressure 12cm.   AHI 8.88 events per hour.      Current problems with PAP use include:  1. Recently obtained a new machine. Mask issues.     EPWORTH SLEEPINESS SCALE         4/2/2023     3:53 PM    Car[place] Sleepiness Scale ( [place]  8964-4004<br>ESS - [place]/English - Final version - 21 Nov 07 - [facility] [place].)   Sitting and reading Moderate chance of dozing   Watching TV Moderate chance of dozing   Sitting, inactive in a public place (e.g. a theatre or a meeting) Slight chance of dozing   As a passenger in a car for an hour without a break Slight chance of dozing   Lying down to rest in the afternoon when circumstances permit High chance of dozing   Sitting and talking to someone Would never doze   Sitting quietly after a lunch without alcohol Slight chance of dozing   In a car, while stopped for a few minutes in traffic Would never doze   Carleton Score (MC) 10   Carleton Score (Sleep) 10       INSOMNIA SEVERITY INDEX (JAZLYN)          4/2/2023     3:50 PM   Insomnia Severity Index (JAZLYN)   Difficulty falling asleep 3   Difficulty staying asleep 3   Problems waking up too early 3   How SATISFIED/DISSATISFIED are you with your CURRENT sleep pattern? 3   How NOTICEABLE to others do you think your sleep problem is in terms of impairing the quality of your life? 3   How WORRIED/DISTRESSED are you about your current sleep problem? 2   To what extent do you consider your  sleep problem to INTERFERE with your daily functioning (e.g. daytime fatigue, mood, ability to function at work/daily chores, concentration, memory, mood, etc.) CURRENTLY? 3   JAZLYN Total Score 20       Guidelines for Scoring/Interpretation:  Total score categories:  0-7 = No clinically significant insomnia   8-14 = Subthreshold insomnia   15-21 = Clinical insomnia (moderate severity)  22-28 = Clinical insomnia (severe)  Used via courtesy of www.[employer].va.gov with permission from Justin Starr PhD., [facility]        Past medical/surgical history, family history, social history, medications and allergies were reviewed.        Problem List:  Patient Active Problem List    Diagnosis Date Noted     Memory loss 06/29/2022     Priority: Medium     Gastroesophageal reflux disease with esophagitis without hemorrhage 06/29/2022     Priority: Medium     Fatigue, unspecified type 06/29/2022     Priority: Medium     Insomnia, unspecified type 02/11/2022     Priority: Medium     Strain of lumbar region, initial encounter 05/06/2021     Priority: Medium     Slipped at work 5/4- excused form work till 5/7- Realtive rest helping. Return to work 5/10 unrestricted        Strain of hip and thigh, right, initial encounter 05/06/2021     Priority: Medium     Slipped at work 5/4- excused form work till 5/7- Realtive rest helping. Return to work 5/10 unrestricted        S/P laparoscopic sleeve gastrectomy 02/03/2021     Priority: Medium     2018 Dr. Tse        Moderate episode of recurrent major depressive disorder (H) 09/18/2020     Priority: Medium     S/P ablation of atrial fibrillation 10/03/2019     Priority: Medium     Recurrent major depressive disorder, in full remission (H) 04/08/2019     Priority: Medium     Morbid (severe) obesity due to excess calories (H) 11/27/2018     Priority: Medium     Sp sleeve gastrectomy       LEONARDO (obstructive sleep apnea) 04/13/2017     Priority: Medium     4/10/2017 - Home Sleep Apnea  "Testing - AHI 15.5/hr; Supine AHI 12.9/hr; SpO2 <= 88% for 33.8 minutes.  Titration Sleep Study 4/19/2017 - (288.0 lbs) CPAP was titrated to a pressure of 11 with an AHI of 3.7.  Time Spent in REM supine at this pressure was 33.5.  Recommending Auto-CPAP 7 - 15 cmH2O.       Obesity hypoventilation syndrome (H) 04/13/2017     Priority: Medium     Mixed hyperlipidemia 07/06/2015     Priority: Medium     Papanicolaou smear of cervix with low grade squamous intraepithelial lesion (LGSIL) 06/17/2014     Priority: Medium     6/17/14: LSIL, + HPV, unable to type.   8/20/14:[facility]:BRISEYDA I. Plan cotest pap & HPV in 1 year.   1/20116 Pap Ascus Neg HPV. Plan: [facility].   5/13/16 Colposcopy not completed. Cancelled by patient  3/21/17 NIL/Neg HPV. Plan: cotest in 1 year per Dr. Mcbride  9/28/18 Tele enc: Dr. Mcbride, recommending new plan. Cotest due Jan or Feb 2019. Tracking updated. ([facility])  04/02/19 Patient is lost to pap tracking follow-up.   04/08/19: NIL pap, Neg HR HPV result. Plan cotest in 3 years per provider.        Acne 02/24/2012     Priority: Medium     Primary hypertension 01/12/2011     Priority: Medium     CARDIOVASCULAR SCREENING; LDL GOAL LESS THAN 130 10/31/2010     Priority: Medium     Tobacco use disorder 05/17/2006     Priority: Medium     Anxiety      Priority: Medium     Gastroesophageal reflux disease without esophagitis      Priority: Medium        Ht 1.702 m (5' 7\")   Wt 117.9 kg (260 lb)   LMP 04/01/2022   BMI 40.72 kg/m              "

## 2023-04-03 NOTE — PATIENT INSTRUCTIONS
Your Body mass index is 40.72 kg/m .  Weight management is a personal decision.  If you are interested in exploring weight loss strategies, the following discussion covers the approaches that may be successful. Body mass index (BMI) is one way to tell whether you are at a healthy weight, overweight, or obese. It measures your weight in relation to your height.  A BMI of 18.5 to 24.9 is in the healthy range. A person with a BMI of 25 to 29.9 is considered overweight, and someone with a BMI of 30 or greater is considered obese. More than two-thirds of American adults are considered overweight or obese.  Being overweight or obese increases the risk for further weight gain. Excess weight may lead to heart disease and diabetes.  Creating and following plans for healthy eating and physical activity may help you improve your health.  Weight control is part of healthy lifestyle and includes exercise, emotional health, and healthy eating habits. Careful eating habits lifelong are the mainstay of weight control. Though there are significant health benefits from weight loss, long-term weight loss with diet alone may be very difficult to achieve- studies show long-term success with dietary management in less than 10% of people. Attaining a healthy weight may be especially difficult to achieve in those with severe obesity. In some cases, medications, devices and surgical management might be considered.  What can you do?  If you are overweight or obese and are interested in methods for weight loss, you should discuss this with your provider.     Consider reducing daily calorie intake by 500 calories.     Keep a food journal.     Avoiding skipping meals, consider cutting portions instead.    Diet combined with exercise helps maintain muscle while optimizing fat loss. Strength training is particularly important for building and maintaining muscle mass. Exercise helps reduce stress, increase energy, and improves fitness.  Increasing exercise without diet control, however, may not burn enough calories to loose weight.       Start walking three days a week 10-20 minutes at a time    Work towards walking thirty minutes five days a week     Eventually, increase the speed of your walking for 1-2 minutes at time    In addition, we recommend that you review healthy lifestyles and methods for weight loss available through the National Institutes of Health patient information sites:  http://win.niddk.nih.gov/publications/index.htm    And look into health and wellness programs that may be available through your health insurance provider, employer, local community center, or sriram club.

## 2023-04-13 ENCOUNTER — MYC MEDICAL ADVICE (OUTPATIENT)
Dept: SLEEP MEDICINE | Facility: CLINIC | Age: 43
End: 2023-04-13
Payer: COMMERCIAL

## 2023-04-13 RX ORDER — ZOLPIDEM TARTRATE 5 MG/1
TABLET ORAL
Qty: 30 TABLET | Refills: 0 | Status: SHIPPED | OUTPATIENT
Start: 2023-04-13 | End: 2023-08-09

## 2023-04-14 ENCOUNTER — TELEPHONE (OUTPATIENT)
Dept: NEUROLOGY | Facility: CLINIC | Age: 43
End: 2023-04-14

## 2023-04-14 NOTE — TELEPHONE ENCOUNTER
Called Patient and left message asking them to call back so I can get them scheduled to f/u on Neuropsych

## 2023-06-06 ENCOUNTER — TRANSFERRED RECORDS (OUTPATIENT)
Dept: HEALTH INFORMATION MANAGEMENT | Facility: CLINIC | Age: 43
End: 2023-06-06
Payer: COMMERCIAL

## 2023-07-19 NOTE — NURSING NOTE
Future [facility] message sent reminding patient of 1 year follow up appointment.     Melody Bonds MA

## 2023-08-02 ASSESSMENT — PATIENT HEALTH QUESTIONNAIRE - PHQ9
SUM OF ALL RESPONSES TO PHQ QUESTIONS 1-9: 4
10. IF YOU CHECKED OFF ANY PROBLEMS, HOW DIFFICULT HAVE THESE PROBLEMS MADE IT FOR YOU TO DO YOUR WORK, TAKE CARE OF THINGS AT HOME, OR GET ALONG WITH OTHER PEOPLE: SOMEWHAT DIFFICULT
SUM OF ALL RESPONSES TO PHQ QUESTIONS 1-9: 4

## 2023-08-03 ENCOUNTER — VIRTUAL VISIT (OUTPATIENT)
Dept: FAMILY MEDICINE | Facility: CLINIC | Age: 43
End: 2023-08-03

## 2023-08-03 DIAGNOSIS — I10 ESSENTIAL HYPERTENSION: ICD-10-CM

## 2023-08-03 DIAGNOSIS — F41.9 ANXIETY: ICD-10-CM

## 2023-08-03 DIAGNOSIS — I10 PRIMARY HYPERTENSION: ICD-10-CM

## 2023-08-03 DIAGNOSIS — I10 PRIMARY HYPERTENSION: Primary | ICD-10-CM

## 2023-08-03 PROCEDURE — 99213 OFFICE O/P EST LOW 20 MIN: CPT | Mod: VID | Performed by: FAMILY MEDICINE

## 2023-08-03 RX ORDER — LISINOPRIL 40 MG/1
40 TABLET ORAL EVERY MORNING
Qty: 90 TABLET | Refills: 1 | Status: SHIPPED | OUTPATIENT
Start: 2023-08-03 | End: 2024-02-06

## 2023-08-03 RX ORDER — HYDROCHLOROTHIAZIDE 12.5 MG/1
12.5 CAPSULE ORAL EVERY MORNING
Qty: 90 CAPSULE | Refills: 1 | Status: SHIPPED | OUTPATIENT
Start: 2023-08-03 | End: 2023-09-15

## 2023-08-03 RX ORDER — AMLODIPINE BESYLATE 10 MG/1
10 TABLET ORAL DAILY
Qty: 90 TABLET | Refills: 1 | Status: SHIPPED | OUTPATIENT
Start: 2023-08-03 | End: 2024-07-24

## 2023-08-03 RX ORDER — VENLAFAXINE HYDROCHLORIDE 150 MG/1
CAPSULE, EXTENDED RELEASE ORAL
Qty: 90 CAPSULE | Refills: 3 | Status: SHIPPED | OUTPATIENT
Start: 2023-08-03 | End: 2024-07-19

## 2023-08-03 NOTE — PROGRESS NOTES
"Trish is a 43 year old who is being evaluated via a billable video visit.      How would you like to obtain your AVS? MyChart  If the video visit is dropped, the invitation should be resent by: Text to cell phone: [phone]  Will anyone else be joining your video visit? No          Assessment & Plan     1. Anxiety  Plan:  - venlafaxine (EFFEXOR XR) 150 MG 24 hr capsule; TAKE 1 CAPSULE(150 MG) BY MOUTH DAILY  Dispense: 90 capsule; Refill: 3   refill given for the year  2. Essential hypertension- (controlled -pt reports 's/80's without amlodipine )  Plan: refill given for following two  - lisinopril (ZESTRIL) 40 MG tablet; Take 1 tablet (40 mg) by mouth every morning  Dispense: 90 tablet; Refill: 1  - hydrochlorothiazide (MICROZIDE) 12.5 MG capsule; Take 1 capsule (12.5 mg) by mouth every morning TAKE 1 CAPSULE(12.5 MG) BY MOUTH DAILY  Dispense: 90 capsule; Refill: 1        Prescription drug management         BMI:   Estimated body mass index is 40.72 kg/m  as calculated from the following:    Height as of 4/3/23: 1.702 m (5' 7\").    Weight as of 4/3/23: 117.9 kg (260 lb).           Zoila Echevarria MD  [facility]    Subjective   Trish is a 43 year old, presenting for the following health issues:  No chief complaint on file.      Mental Health Problem    History of Present Illness       Mental Health Follow-up:  Patient presents to follow-up on Depression.Patient's depression since last visit has been:  Good  The patient is not having other symptoms associated with depression.      Any significant life events: No  Patient is not feeling anxious or having panic attacks.  Patient has no concerns about alcohol or drug use.    She eats 2-3 servings of fruits and vegetables daily.She consumes 0 sweetened beverage(s) daily.She exercises with enough effort to increase her heart rate 9 or less minutes per day.  She exercises with enough effort to increase her heart rate 3 or less days per " week.   She is taking medications regularly.     No concerns  with Effexor- doing well- wants refilled for 1 yr  she denies suicidal thoughts or ideation.reports no side effects from medications. Would like to continue.    Needs refill on Blood pressure medications as well    She has stopped amlodipine bedtime dose as mostly was forgetting and reports Blood pressure is 120's/80's without amlodipine  Review of Systems   Constitutional, HEENT, cardiovascular, pulmonary, GI, , musculoskeletal, neuro, skin, endocrine and psych systems are negative, except as otherwise noted.      Objective           Vitals:  No vitals were obtained today due to virtual visit.    Physical Exam   GENERAL: Healthy, alert and no distress  EYES: Eyes grossly normal to inspection.  No discharge or erythema, or obvious scleral/conjunctival abnormalities.  RESP: No audible wheeze, cough, or visible cyanosis.  No visible retractions or increased work of breathing.    SKIN: Visible skin clear. No significant rash, abnormal pigmentation or lesions.  NEURO: Cranial nerves grossly intact.  Mentation and speech appropriate for age.  PSYCH: Mentation appears normal, affect normal/bright, judgement and insight intact, normal speech and appearance well-groomed.          Video-Visit Details    Type of service:  Video Visit     Originating Location (pt. Location): Home    Distant Location (provider location):  On-site  Platform used for Video Visit: [employer]

## 2023-08-03 NOTE — TELEPHONE ENCOUNTER
"Requested Prescriptions   Pending Prescriptions Disp Refills    amLODIPine (NORVASC) 10 MG tablet [Pharmacy Med Name: Amlodipine Besylate 10mg Tablet] 90 tablet 1     Sig: Take 1 tablet by mouth daily.       Calcium Channel Blockers Protocol  Failed - 8/3/2023  3:01 PM        Failed - Medication is active on med list        Passed - Blood pressure under 140/90 in past 12 months     BP Readings from Last 3 Encounters:   03/21/23 120/81   11/29/22 134/85   08/18/22 139/88                 Passed - Recent (12 mo) or future (30 days) visit within the authorizing provider's specialty     Patient has had an office visit with the authorizing provider or a provider within the authorizing providers department within the previous 12 mos or has a future within next 30 days. See \"Patient Info\" tab in inbasket, or \"Choose Columns\" in Meds & Orders section of the refill encounter.              Passed - Patient is age 18 or older        Passed - No active pregnancy on record        Passed - Normal serum creatinine on file in past 12 months     Recent Labs   Lab Test 03/21/23  0816   CR 0.64       Ok to refill medication if creatinine is low          Passed - No positive pregnancy test in past 12 months           Routing refill request to provider for review/approval because:  Failed protocol   Jam Bruno RN  [facility]             "

## 2023-08-08 ENCOUNTER — MYC MEDICAL ADVICE (OUTPATIENT)
Dept: SLEEP MEDICINE | Facility: CLINIC | Age: 43
End: 2023-08-08

## 2023-08-08 DIAGNOSIS — E66.2 OBESITY HYPOVENTILATION SYNDROME (H): Primary | Chronic | ICD-10-CM

## 2023-08-08 DIAGNOSIS — G47.33 OSA (OBSTRUCTIVE SLEEP APNEA): Chronic | ICD-10-CM

## 2023-08-09 NOTE — TELEPHONE ENCOUNTER
Patient requesting a refill of Ambien to get through to her appointment with you.  Had been prescribed 5mg, but is taking 10mg 1-2 nights per week (her report).      Pending Prescriptions:                       Disp   Refills    zolpidem (AMBIEN) 5 MG tablet             30 tab*0            Sig: Take tablet by mouth 15 minutes prior to sleep as           needed. Pharmacist to review side effects.          Last Written Prescription Date:  4/13/23  Last Fill Quantity: 30   # refills: 0  Last Office Visit: 4/3/23  Future Office visit: 9/11/23      Routing refill request to provider for review/approval because:  Drug not on the [facility], [facility] or [facility] refill protocol or controlled substance

## 2023-08-10 RX ORDER — ZOLPIDEM TARTRATE 5 MG/1
TABLET ORAL
Qty: 30 TABLET | Refills: 0 | Status: SHIPPED | OUTPATIENT
Start: 2023-08-10 | End: 2024-01-16

## 2023-08-11 NOTE — TELEPHONE ENCOUNTER
Provider approved medication.  Patient notified by private VM that rx was approved and sent in to her pharmacy.  Trish Castillo, CMA

## 2023-09-11 ENCOUNTER — VIRTUAL VISIT (OUTPATIENT)
Dept: SLEEP MEDICINE | Facility: CLINIC | Age: 43
End: 2023-09-11
Payer: COMMERCIAL

## 2023-09-11 VITALS
WEIGHT: 260 LBS | SYSTOLIC BLOOD PRESSURE: 143 MMHG | HEART RATE: 98 BPM | DIASTOLIC BLOOD PRESSURE: 89 MMHG | BODY MASS INDEX: 40.81 KG/M2 | HEIGHT: 67 IN

## 2023-09-11 DIAGNOSIS — G47.33 OSA (OBSTRUCTIVE SLEEP APNEA): ICD-10-CM

## 2023-09-11 DIAGNOSIS — G47.09 OTHER INSOMNIA: Primary | ICD-10-CM

## 2023-09-11 PROBLEM — G47.00 INSOMNIA, UNSPECIFIED TYPE: Chronic | Status: ACTIVE | Noted: 2022-02-11

## 2023-09-11 PROCEDURE — 99214 OFFICE O/P EST MOD 30 MIN: CPT | Mod: VID | Performed by: PHYSICIAN ASSISTANT

## 2023-09-11 ASSESSMENT — SLEEP AND FATIGUE QUESTIONNAIRES
HOW LIKELY ARE YOU TO NOD OFF OR FALL ASLEEP WHILE SITTING AND READING: WOULD NEVER DOZE
HOW LIKELY ARE YOU TO NOD OFF OR FALL ASLEEP WHILE LYING DOWN TO REST IN THE AFTERNOON WHEN CIRCUMSTANCES PERMIT: HIGH CHANCE OF DOZING
HOW LIKELY ARE YOU TO NOD OFF OR FALL ASLEEP WHILE SITTING QUIETLY AFTER LUNCH WITHOUT ALCOHOL: MODERATE CHANCE OF DOZING
HOW LIKELY ARE YOU TO NOD OFF OR FALL ASLEEP IN A CAR, WHILE STOPPED FOR A FEW MINUTES IN TRAFFIC: WOULD NEVER DOZE
HOW LIKELY ARE YOU TO NOD OFF OR FALL ASLEEP WHILE SITTING AND TALKING TO SOMEONE: WOULD NEVER DOZE
HOW LIKELY ARE YOU TO NOD OFF OR FALL ASLEEP WHILE WATCHING TV: MODERATE CHANCE OF DOZING
HOW LIKELY ARE YOU TO NOD OFF OR FALL ASLEEP WHILE SITTING INACTIVE IN A PUBLIC PLACE: WOULD NEVER DOZE
HOW LIKELY ARE YOU TO NOD OFF OR FALL ASLEEP WHEN YOU ARE A PASSENGER IN A CAR FOR AN HOUR WITHOUT A BREAK: MODERATE CHANCE OF DOZING

## 2023-09-11 ASSESSMENT — PAIN SCALES - GENERAL: PAINLEVEL: SEVERE PAIN (6)

## 2023-09-11 NOTE — PATIENT INSTRUCTIONS
[facility]  Search engine: https://mms.aadsm.org/members/directory/search_bootstrap.php?org_id=ADSM&   Certified in Dental Sleep Medicine    Anthony Shepard  Degree: DDS  [address]  [address]  [place] [address]  Professional Phone: [phone]  Website: [email or web address]    Zaki Wagner  Degree: DDS  [facility]  [address]  [address]  [place] [address]  Professional Phone: [phone]: [phone]    Dinorah Rivera  [facility]  [address]  [place] [address]  Professional Phone: [phone]  Website: [email or web address]      Boaz Dailey  Degree: DDS  [address]  [address]  [place] [address]  Professional Phone: [phone]  Fax: [phone]    Segun Solorzano  Degree: DDS  [facility]  [address]  [address]  [place] [address]  Professional Phone: [phone]  Website: [email or web address]      [facility]-you should verify insurance coverage  [address]  [address]  [place] [address]  Professional Phone: [phone]  Website: [email or web address]      Rebecca García--DOES NOT ACCEPT INSURANCE  Degree: DDS--you should verify insurance coverage  [facility]  [address] [facility]  [address]  [place] [address]  Professional Phone: [phone]     Patricia Copeland  Degree: DDS, PhD  [facility]  [address] [place] [address] [id number] [place] [address]   [address] [facility],   [address]   [place] [address]   Appointments: [phone]   Fax: [phone]   [facility]   [address] [place] [address]   [address]   [place] [address]   Appointments: [phone]   Fax: [phone]                Mateusz  Lesly  Degree: MARGARITA  [address] [place] [address]  Professional Phone: [phone]  Fax: [phone]  Website: [email or web address]      Wayne Phelps  Degree: DDS  [facility]  [address] [place] [address]    Mariona Mulet Pradera  Degree: MARGARITA, MS  [facility]  [address] [place] [address]  Professional Phone: [phone]      Melinda Arvizu  Degree: MARGARITA, MS  [facility]  [address] [facility] [address] [facility]  [address]  [place] [address]  Professional Phone: [phone]    Michelle Zaidi  Degree: MARNIS  [facility]  [address]  [address]  [place] [address]  [place] Office     Miguel Saleh  Degree: MARGARITA  [facility]  [address] [place] [address]  Professional Phone: [phone]  Website: [email or web address] Behavioral HospitalStream Global Services      Kwadwo Wood  Degree: MARGARITA  [facility]  [address] [place] [address]  Professional Phone: [phone]  Fax: [phone]      Adrián Bravo  Degree: MARGARITA  [facility]  [address]  [address]  [place] [address]    Taiwo Gilbert   Degree: MARGARITA   [facility]   [address]   [address]  [place] [address]  Phone [phone]  Website: [email or web address]                 ACCEPT MEDICARE  Roger Minaya DDS  [address], [place] [address]  [phone]; [phone] (fax)  [employer]    Osmani Rodriguez DDS, MS   [facility]   [address]   [address]   [place] [address]   Appointments: [phone]   Fax: [phone]     Reji Grace DDS   [address]   [place] [address]  Appointments [phone]      ADDITIONAL PROVIDERS    Alexey East DDS   [address],   [address]   [place] [address]   Appointments: [phone]   Fax: [phone]       Jon Sauer DDS, [facility]  Professional [address] [address]   Appointments: [phone] Ext: [phone]  Fax: [phone]   [email or web address] Insomnia Program      Treating Insomnia  Good sleeping habits are a key part of treatment. If needed, some medications may help you sleep better at first. Making healthy lifestyle changes and learning to relax can improve your sleep. Treating insomnia takes commitment, but trust that your efforts will pay off. Talk to your doctor before taking any medication.    Healthy Lifestyle  Your lifestyle affects your health and your sleep. Here are some healthy habits:  Keep a regular sleep schedule. Go to bed and get up at the same time each day.  Exercise regularly. It may help you reduce stress. Avoid strenuous exercise for two to four hours before bedtime.  Avoid or limit naps.  Use your bed only for sleep and sex.  Don t spend too much time in bed trying to fall asleep. If you can t fall asleep, get up and do something until you become tired and drowsy.  Avoid or limit caffeine and nicotine. They can keep you awake at night. Also avoid alcohol. It may help you fall asleep at first, but your sleep will not be restful.    Before Bedtime  To sleep better every night, try these tips:  Have a bedtime routine to let your body and mind know when it s time to sleep.  Going to bed should be relaxing so try to do only relaxing things around bedtime. Sleep will come sooner.  If your worries don t let you sleep, write them down in a diary. Then close it, and go to bed.  Make sure the room is not too hot or too cold. If it s not dark enough, an eye mask can help. If it s noisy, try using earplugs.    Learn to Relax  Stress, anxiety, and body tension may keep you awake at night. To unwind before bedtime, try reading a book, meditation, or yoga. Also, try the following:  Deep breathing. Sit or lie back in a chair. Take a slow, deep breath. Hold it for 5  counts. Then breathe out slowly through your mouth. Keep doing this until you feel relaxed.  Imagery. Think of the last fun trip you took. In your mind, walk through the trip from start to finish. Put as much detail into the memory as you can remember. It will help you relax.    Cognitive Behavioral Treatment (CBT)  CBT is the most effective treatment for long-term insomnia. It tries to address the underlying causes of your sleep problems, including your habits and how you think about sleep.      Individual Therapy   Riley Paul    [phone]     [address] [address]      Online Programs   www.MindJolt (pronounced shut eye). There is a fee for this program. Enter the code  Venetie  if you decide to enroll in this program.    www.sleepIO.com (pronounced sleep ee oh). There is a fee for this program. Enter the code  Venetie  if you decide to enroll in this program.     Suggested Resources  Insomnia Treatment Books   Overcoming Insomnia by Sj Gonzáles and April Hall (2008)  No More Sleepless Nights by Nadeem Espinoza and Fatuma Henderson (1996)  Say Miguel to Insomnia by Rigoberto Diego (2009)  The Insomnia Workbook by Marsha Pineda and Justin Starr (2009)  The Insomnia Answer by Alexander Davis and Quan Mccall (2006)      Stress Management and Relaxation Books  The Relaxation and Stress Reduction Workbook by Esmer Salcedo, Ashley Marvin and Ignacio Carver (2008)  Stress Management Workbook: Techniques and Self-Assessment Procedures by Susan Thacker and Pepe Moon (1997)  A Mindfulness-Based Stress Reduction Workbook by Milton Madison and Nancy Moyer (2010)  The Complete Stress Management Workbook by Arya Welch and Isaac Guillen (1996)  Assert Yourself by Florence Adamson and Camacho Adamson (1977)    Relaxation Resources for Computer Download   These websites offer resources to help you relax. This list is for information only.  [facility] is not responsible for the quality of services or the actions of any person or organization.  Progressive Muscle Relaxation (PMR):   http://www.Specle/progressive-muscle-relaxation-exercise.html   http://studentsupport.[facility]/counseling/resources/self-help/relaxation-and-stress-management/   Deep Breathing Exercises:  http://www.Specle/breathing-awareness.html     Meditation:   wwwSobrr  www.GobblerguidedIdle GamingmeditationIdle Gamingsite.com You may have to pay for some of these resources.    Guided Imagery:  http://www.Specle/guided-imagery-scripts.html   http://ThePort Network/library/bhbdddsxnk-ruqlhw-ostgcwv/     Counseling / Behavioral Health  [facility]  Visit www.[email or web address].org or call [phone] to find a clinic close to you.      This is not a prescription and these resources are optional. You must pay for any costs when using these resources. Please ask your insurance carrier if you can be reimbursed for these resources. If so, you are responsible for sending the needed details to your insurance carrier. These resources may also be tax deductible as medical expenses. Check with your .     These programs and publications are not affiliated in any way with [facility].

## 2023-09-11 NOTE — PROGRESS NOTES
Virtual Visit Details    Type of service:  Video Visit     Originating Location (pt. Location): Home    Distant Location (provider location):  On-site  Platform used for Video Visit: Well    Sleep Apnea - Follow-up Visit:      Moderate Sleep apnea with hypoventilation. Not Tolerating PAP well. Daytime symptoms are stable.   We reviewed options. She has lost some weight since her last sleep study.  She wants to transition to a mandibular advancement device   Sleep Dental referral placed.   Follow up in about 6 months to re-evaluate sleep apnea and hypoventilation.   Insomnia, sleep onset and sleep maintenance, likely due to a variety of factors including inadequate sleep hygiene and possible delayed sleep phase. Co-occurring anxiety and depression identified and insomnia might be a secondary symptom. We reviewed the pros and cons of pharmacological versus cognitive behavioral treatment. Patient is interested in CBT-I and referral placed.      Danitza Soto will follow up in about 6 month(s).     30 minutes spent on day of encounter doing chart review,  history and exam, counseling, coordinating plan of care, documentation and further activities as noted above.       Ebenezer Simons PA-C  Sleep Medicine       History of Present Illness:  Chief Complaint   Patient presents with    RECHECK       Danitza Soto presents for follow-up of their moderate sleep apnea, managed with CPAP. Short term Ambien prescribed on 4/13/2023.     Initially presented with snoring, witnessed apnea and excessive daytime sleepiness.    4/10/2017 - Home Sleep Apnea Testing - AHI 15.5/hr; Supine AHI 12.9/hr; SpO2 <= 88% for 33.8 minutes.    Titration Sleep Study 4/19/2017 - (288.0 lbs) CPAP was titrated to a pressure of 11 with an AHI of 3.7. Time Spent in REM supine at this pressure was 33.5.  Recommending Auto-CPAP 7 - 15 cmH2O.    Overall, the patient reports they are doing fair. She takes Ambien once a week.   Patient is not having  medication side effects.     During work days bedtime is typically 9 pm. Usually it takes about 120 minutes to fall asleep. With Ambien it takes her 15-20 minutes. Difficulty staying asleep. They are typically getting out of bed at 7:30-8:30 pm.    On non-work days bedtime is typically 9-10 pm. Usually it takes about 120 minutes plus to fall asleep. They are typically getting out of bed at 6-9 am.      The patient is usually getting 3-6 hours of sleep per night.  Patient is napping.   Patient is using caffeine. Diet Mountain Dew 80 oz. Stops at 5 or 6 pm    JAZLYN Total Score: 23     Do you use a CPAP Machine at home: No. Insurance declined paying for parts because of non-use.     What type of mask do you use:  nasal mask  Is your mask comfortable:    If not, why:        EPWORTH SLEEPINESS SCALE         9/11/2023     8:18 AM    [place] Sleepiness Scale ( [place]IA [place]  3281-6337<br>ESS - [place]/English - Final version - 21 Nov 07 - [facility] [place].)   Sitting and reading Would never doze   Watching TV Moderate chance of dozing   Sitting, inactive in a public place (e.g. a theatre or a meeting) Would never doze   As a passenger in a car for an hour without a break Moderate chance of dozing   Lying down to rest in the afternoon when circumstances permit High chance of dozing   Sitting and talking to someone Would never doze   Sitting quietly after a lunch without alcohol Moderate chance of dozing   In a car, while stopped for a few minutes in traffic Would never doze   Altavista Score (MC) 9   Altavista Score (Sleep) 9       INSOMNIA SEVERITY INDEX (JAZLYN)          9/11/2023     8:17 AM   Insomnia Severity Index (JAZLYN)   Difficulty falling asleep 4   Difficulty staying asleep 3   Problems waking up too early 3   How SATISFIED/DISSATISFIED are you with your CURRENT sleep pattern? 4   How NOTICEABLE to others do you think your sleep problem is in terms of impairing the quality of your life? 3   How WORRIED/DISTRESSED are you  about your current sleep problem? 3   To what extent do you consider your sleep problem to INTERFERE with your daily functioning (e.g. daytime fatigue, mood, ability to function at work/daily chores, concentration, memory, mood, etc.) CURRENTLY? 3   JAZLYN Total Score 23       Guidelines for Scoring/Interpretation:  Total score categories:  0-7 = No clinically significant insomnia   8-14 = Subthreshold insomnia   15-21 = Clinical insomnia (moderate severity)  22-28 = Clinical insomnia (severe)  Used via courtesy of www.orderTopiaealth.va.gov with permission from Justin Starr PhD., [facility]        Past medical/surgical history, family history, social history, medications and allergies were reviewed.        Problem List:  Patient Active Problem List    Diagnosis Date Noted    Memory loss 06/29/2022     Priority: Medium    Gastroesophageal reflux disease with esophagitis without hemorrhage 06/29/2022     Priority: Medium    Fatigue, unspecified type 06/29/2022     Priority: Medium    Insomnia, unspecified type 02/11/2022     Priority: Medium    Strain of lumbar region, initial encounter 05/06/2021     Priority: Medium     Slipped at work 5/4- excused form work till 5/7- Realtive rest helping. Return to work 5/10 unrestricted       Strain of hip and thigh, right, initial encounter 05/06/2021     Priority: Medium     Slipped at work 5/4- excused form work till 5/7- Realtive rest helping. Return to work 5/10 unrestricted       S/P laparoscopic sleeve gastrectomy 02/03/2021     Priority: Medium     2018 Dr. Tse       Moderate episode of recurrent major depressive disorder (H) 09/18/2020     Priority: Medium    S/P ablation of atrial fibrillation 10/03/2019     Priority: Medium    Recurrent major depressive disorder, in full remission (H) 04/08/2019     Priority: Medium    Morbid (severe) obesity due to excess calories (H) 11/27/2018     Priority: Medium     Sp sleeve gastrectomy      LEONARDO (obstructive sleep apnea)  "04/13/2017     Priority: Medium     4/10/2017 - Home Sleep Apnea Testing - AHI 15.5/hr; Supine AHI 12.9/hr; SpO2 <= 88% for 33.8 minutes.  Titration Sleep Study 4/19/2017 - (288.0 lbs) CPAP was titrated to a pressure of 11 with an AHI of 3.7.  Time Spent in REM supine at this pressure was 33.5.  Recommending Auto-CPAP 7 - 15 cmH2O.      Obesity hypoventilation syndrome (H) 04/13/2017     Priority: Medium    Mixed hyperlipidemia 07/06/2015     Priority: Medium    Papanicolaou smear of cervix with low grade squamous intraepithelial lesion (LGSIL) 06/17/2014     Priority: Medium     6/17/14: LSIL, + HPV, unable to type.   8/20/14:[place]:BRISEYDA I. Plan cotest pap & HPV in 1 year.   1/20116 Pap Ascus Neg HPV. Plan: [place].   5/13/16 Colposcopy not completed. Cancelled by patient  3/21/17 NIL/Neg HPV. Plan: cotest in 1 year per Dr. Mcbride  9/28/18 Tele enc: Dr. Mcbride, recommending new plan. Cotest due Jan or Feb 2019. Tracking updated. ([facility])  04/02/19 Patient is lost to pap tracking follow-up.   04/08/19: NIL pap, Neg HR HPV result. Plan cotest in 3 years per provider.       Acne 02/24/2012     Priority: Medium    Primary hypertension 01/12/2011     Priority: Medium    CARDIOVASCULAR SCREENING; LDL GOAL LESS THAN 130 10/31/2010     Priority: Medium    Tobacco use disorder 05/17/2006     Priority: Medium    Anxiety      Priority: Medium    Gastroesophageal reflux disease without esophagitis      Priority: Medium        BP (!) 143/89   Pulse 98   Ht 1.702 m (5' 7\")   Wt 117.9 kg (260 lb)   LMP 04/01/2022   BMI 40.72 kg/m     "

## 2023-09-11 NOTE — NURSING NOTE
Is the patient currently in the state of [place]? YES    Visit mode:VIDEO    If the visit is dropped, the patient can be reconnected by: VIDEO VISIT: Send to e-mail at: [email or web address]    Will anyone else be joining the visit? NO  (If patient encounters technical issues they should call [phone]    How would you like to obtain your AVS? MyChart    Are changes needed to the allergy or medication list? Pt stated no changes to allergies and Pt stated no med changes    Reason for visit: RECHECK  Has patient had flu shot for current/most recent flu season? If so, when? No    Maribell ROTH

## 2023-09-14 NOTE — PROGRESS NOTES
Chief complaint: Palpitations    HPI: Ms. Danitza Soto is a 38 year old  female with PMH significant for depression, GERD, LEONARDO, essential hypertension, morbid obesity S/P gastric bypass 11/2018 and paroxysmal atrial fibrillation.    The patient was last seen in cardiology for paroxysmal atrial fibrillation diagnosis in 9/2018.  Her symptoms were mild at that time therefore rate or rhythm control strategy was not pursued.  The patient underwent gastric sleeve surgery in 11/2018 and has lost 70 pounds.  The patient noticed more frequent atrial fibrillation episodes since the surgery almost weekly now lasting up to 8-10 hours.  She finds works stress as a prominent trigger for episodes.  Associated symptoms include chest pressure, chest pain and dizziness.  She denies chest discomfort with exertion at other times.  The patient denies a history of dyspnea, PND, orthopnea, pedal edema, and syncope  Current medications include lisinopril 20 mg, fish oil.  She has cut down on lisinopril from 40 mg since she has lost significant weight.  She quit smoking in 2018 (10 pack years).  No history of alcohol abuse. No history of diabetes.  Family history is negative for CAD.  Prior cardiac tests include CT coronary angiogram in 2013 which did not show evidence of CAD.  Echocardiogram dated 9/2018 showed normal LV and RV function with no significant valve disease.  Moderate LVH was reported. I personnally reviewed the images and I donot agree with the LVH diagnosis. I donot think she has LVH based on echo. Zio patch in 8/2018 showed 4% atrial fibrillation burden heart rate ranged  beats per minute.  Longest episode lasting for 8 hours.    I have reviewed her ECG 11/28/2018 which shows normal sinus rhythm with single PAC, normal MS/QRS/QTC intervals. No evidence of LVH on ECG.    Medications, personal, family, and social history reviewed with patient and revised.    Interval history 5/14/2019:  Patient returns for  follow-up to recheck on paroxysmal atrial fibrillation.  Since I saw 3 months ago, patient developed depression and started on Effexor which improved her mood.  I have started her on flecainide and metoprolol 3 months ago for frequent paroxysmal atrial fibrillation episodes.  She noticed significant decline in the frequency of the episodes however she still reports at least 2 episodes per month sometimes lasting for several hours.  She reports associated dizziness however denies chest pain, shortness of breath, lower extremity edema or syncope.  Since gastric bypass patient lost significant weight with improvement in blood pressure and sleep apnea.  She no longer uses CPAP machine.  She is overall feeling well.    Interval history 8/14/2019:  The patient returns for follow-up to recheck on paroxysmal atrial fibrillation.  When I saw patient last time 3 months ago I increased dose of flecainide to 100 mg twice daily due to symptomatic palpitations.  The patient reports doing well during the first 2 months however had 3 episodes within the last month longest episode lasting for 3 hours with associated tiredness.  Denies chest pain, dyspnea on exertion, dizziness, syncope.  The patient has did a lot of research on atrial fibrillation ablation and she expressed her interest in undergoing procedure.    Interval history 2/18/2020:  Patient underwent catheter ablation of atrial fibrillation on 10/3/2019.  Patient feels great since the ablation.  No recurrence of A. fib since then.  She is currently on flecainide 100 mg twice daily, metoprolol 50 mg and apixaban 5 mg twice daily.  Blood pressure is well controlled with lisinopril 20 mg.  Patient is currently asymptomatic from cardiac standpoint.  Denies chest pain, shortness of breath, palpitations, dizziness, lower extremity edema or syncope.    Interval history 9/1/2020:  Patient reports no new cardiac symptoms since being seen in February 2020.  Denies palpitations,  chest pain, shortness of breath.  Patient reports high blood pressure at home ~140/80 mmHg despite being on lisinopril 40 mg.  She reports being back on CPAP due to weight gain though she had gastric bypass surgery.  She continues to be on topiramate and naltrexone for obesity.  No alcohol or drug abuse.    Interval history 11/12/2021:  Patient is being seen today for annual follow-up. She reports occasional flutters less than 1 minute (once a month). Otherwise she has not felt any recurrent atrial fibrillation since catheter ablation. Denies chest pain, shortness of breath, dizziness, syncope or lower extremity edema.  Recently Effexor dose was increased from 75 mg to 150 mg mg daily. Patient's blood pressure is mildly elevated today. She does not monitor blood pressure at home.    Interval history 12/21/2022:  Patient is being seen today for follow-up.  Over the last 3 months or so she has started feeling palpitations lasting for 5 to 10 minutes.  This is new to her since ablation.  Sometimes episodes are clustered few days in a row.  Over the last month she had 3 or 4 episodes like that.  She does not have any other cardiac symptoms.  No alcohol.  Reports normal blood pressure at home at 120/80 mmHg.  She has not been using her CPAP because the mask does not fit her very well.  She needs to get a new mask.    Interval history 9/15/2023:  Patient is being seen today for annual follow-up.  As you know she has moderate sleep apnea but she cannot tolerate CPAP well.  She was recommended mandibular device.    Patient tells me that over the last 1 to 2 months she has noticed more palpitations associated with dizziness.  Palpitations can be on and off throughout all day.  She feels almost every day.  Denies chest pain, syncope, shortness of breath.  She has noticed that her blood pressure is not well controlled.  In clinic today her diastolic blood pressure is mildly high at 86 mmHg.  Systolic blood pressures 129  mmHg.    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 90 tablet 1    Ascorbic Acid (VITAMIN C) 500 MG CAPS Take by mouth every evening      calcium carbonate (OS-CHIKI) 500 MG tablet Take 1 tablet by mouth every evening      Cyanocobalamin (B-12) 1000 MCG TBCR Take by mouth every evening      hydrochlorothiazide (MICROZIDE) 12.5 MG capsule Take 1 capsule (12.5 mg) by mouth every morning TAKE 1 CAPSULE(12.5 MG) BY MOUTH DAILY 90 capsule 1    ibuprofen (ADVIL/MOTRIN) 200 MG tablet Take 200 mg by mouth every 4 hours as needed for pain      lisinopril (ZESTRIL) 40 MG tablet Take 1 tablet (40 mg) by mouth every morning 90 tablet 1    magnesium 250 MG tablet Take 1 tablet by mouth every evening      Multiple Vitamins-Iron (MULTI-DAY PLUS IRON PO) Take by mouth every morning      omeprazole (PRILOSEC) 40 MG DR capsule Take 1 capsule (40 mg) by mouth every morning 90 capsule 1    venlafaxine (EFFEXOR XR) 150 MG 24 hr capsule TAKE 1 CAPSULE(150 MG) BY MOUTH DAILY 90 capsule 3    vitamin B1 (THIAMINE) 250 MG tablet Take 250 mg by mouth every evening      Vitamin D (Cholecalciferol) 25 MCG (1000 UT) TABS Take by mouth every evening      zolpidem  (AMBIEN) 5 MG tablet Take tablet by mouth 15 minutes prior to sleep as needed. Pharmacist to review side effects. 30 tablet 0       PAST SURGICAL HISTORY:  Past Surgical History:   Procedure Laterality Date    EP ABLATION FOCAL AFIB N/A 10/03/2019    Procedure: EP ABLATION FOCAL AFIB;  Surgeon: Harpreet Arevalo MD;  [facility]    ESOPHAGOSCOPY, GASTROSCOPY, DUODENOSCOPY (EGD), COMBINED N/A 2022    Procedure: ESOPHAGOGASTRODUODENOSCOPY, WITH BIOPSY;  Surgeon: Jeff Patino DO;  Location: [facility]    HC TOOTH EXTRACTION W/FORCEP      Chelan Teeth Extraction    LAPAROSCOPIC GASTRIC SLEEVE N/A 2018    Procedure: Laparoscopic Sleeve Gastrectomy Latex Free;  Surgeon: Artis Tse MD;  [facility]    LAPAROSCOPIC HERNIORRHAPHY HIATAL  2018    Procedure: LAPAROSCOPIC  HIATAL Hernia Repair;  Surgeon: Artis Tse MD;  [facility]    SALPINGO-OOPHORECTOMY, COMBINED Left 10/20/2021    Mucinous cystadenoma       ALLERGIES:     Allergies   Allergen Reactions    Bupropion Other (See Comments)     Other reaction(s): Chest Pain,Panic attacks, heart/chest pain    Wellbutrin [Bupropion]      Wellbutrin: Panic attacks, heart/chest pain       FAMILY HISTORY:  Family History   Problem Relation Age of Onset    Heart Disease Mother         ,mother had emphesema and  at 62    Hypertension Mother     Allergies Mother     Lipids Mother     Obesity Mother     Respiratory Mother         Emphysema    Hypertension Father     Lipids Father     Cancer Father     Prostate Cancer Father     Other Cancer Father     Allergies Sister     Genitourinary Problems Sister     Diabetes Maternal Grandmother         Type 2?    Hypertension Maternal Grandmother     Circulatory Maternal Grandmother         Due to diabetes    Eye Disorder Maternal Grandmother         Macular degeneration/Macular degeneration    Obesity Maternal Grandmother     Alcohol/Drug Maternal Grandfather     Obesity Maternal Grandfather   "   Cerebrovascular Disease Paternal Grandmother     Psychotic Disorder Paternal Grandfather         Committed Suicide    Depression Paternal Grandfather     Anesthesia Reaction No family hx of     Bleeding Disorder No family hx of     Venous thrombosis No family hx of          SOCIAL HISTORY:  Social History     Tobacco Use    Smoking status: Former     Packs/day: 1.00     Years: 10.00     Pack years: 10.00     Types: Cigarettes, Vaping Device     Start date: 2004     Quit date: 2021     Years since quittin.2    Smokeless tobacco: Never   Vaping Use    Vaping Use: Every day    Substances: Nicotine, Flavoring    Devices: Disposable   Substance Use Topics    Alcohol use: Not Currently     Alcohol/week: 0.0 standard drinks of alcohol     Comment: Occas.    Drug use: No     ROS:   Constitutional: No fever, chills, or sweats. Weight stable.   Cardiovascular: As per HPI.       Exam:  /86   Pulse 89   Ht 1.689 m (5' 6.5\")   Wt 117 kg (258 lb)   LMP 2022   SpO2 98%   BMI 41.02 kg/m    GENERAL APPEARANCE: alert and no distress  HEENT: no icterus, no central cyanosis  LYMPH/NECK: no adenopathy, no asymmetry, JVP not elevated, no carotid bruits.  RESPIRATORY: lungs clear to auscultation - no rales, rhonchi or wheezes, no use of accessory muscles, no retractions, respirations are unlabored, normal respiratory rate  CARDIOVASCULAR: regular rhythm, normal S1, S2, no S3 or S4 and no murmur, click or rub, precordium quiet with normal PMI.  Extremities: No edema  NEURO: alert, normal speech,and affect  SKIN: no ecchymoses, no rashes     I have reviewed the labs and personally reviewed the imaging below and made my comment in the assessment and plan.      EP PROCEDURE NOTE 10/3/2019  1. Left Atrial Wide Area Circumferential radiofrequency Ablation.  2. Trans-septal Puncture x2  3. Intracardiac Echocardiography.  4. Invasive Hemodynamic Monitoring.  5. 3D electro-anatomic Mapping using Carto3.  6. " Esophageal temperature monitoring.    I have reviewed the labs and personally reviewed the imaging below and made my comment in the assessment and plan.    Labs:  CBC RESULTS:   Lab Results   Component Value Date    WBC 8.5 03/21/2023    WBC 7.4 05/18/2021    RBC 5.07 03/21/2023    RBC 4.90 05/18/2021    HGB 15.1 03/21/2023    HGB 14.4 05/18/2021    HCT 45.2 03/21/2023    HCT 43.7 05/18/2021    MCV 89 03/21/2023    MCV 89 05/18/2021    MCH 29.8 03/21/2023    MCH 29.4 05/18/2021    MCHC 33.4 03/21/2023    MCHC 33.0 05/18/2021    RDW 13.9 03/21/2023    RDW 13.8 05/18/2021     03/21/2023     05/18/2021       BMP RESULTS:  Lab Results   Component Value Date     03/21/2023     05/18/2021    POTASSIUM 4.0 03/21/2023    POTASSIUM 3.7 05/04/2022    POTASSIUM 3.7 05/18/2021    CHLORIDE 102 03/21/2023    CHLORIDE 105 05/04/2022    CHLORIDE 110 (H) 05/18/2021    CO2 23 03/21/2023    CO2 23 05/04/2022    CO2 28 05/18/2021    ANIONGAP 13 03/21/2023    ANIONGAP 8 05/04/2022    ANIONGAP 3 05/18/2021     (H) 03/21/2023    GLC 79 05/04/2022    GLC 81 05/18/2021    BUN 8.8 03/21/2023    BUN 9 05/04/2022    BUN 9 05/18/2021    CR 0.64 03/21/2023    CR 0.68 05/18/2021    GFRESTIMATED >90 03/21/2023    GFRESTIMATED >90 05/18/2021    GFRESTBLACK >90 05/18/2021    CHIKI 9.3 03/21/2023    CHIKI 9.1 05/18/2021        INR RESULTS:  Lab Results   Component Value Date    INR 0.98 11/06/2018       Echocardiogram 9/26/2018  Global and regional left ventricular function is normal with an EF of 60-65%.  Moderate concentric wall thickening consistent with left ventricular  hypertrophy is present.  Right ventricular function, chamber size, wall motion, and thickness are  normal.  Mild biatrial enlargement is present.  No significant valve abnormalities present.  The inferior vena cava was normal in size    Nuclear stress test with Lexiscan 9/6/2018  Normal myocardial SPECT study with a summed stress score of 0.     CT  coronary artery angiogram 6/27/2013  No evidence of coronary artery disease    EKG 11/28/2018 normal.    Assessment and Plan:   Ms. Danitza Soto is a 38 year old  female with PMH significant for depression, GERD, LEONARDO, essential hypertension, morbid obesity S/P gastric bypass 11/2018 and paroxysmal atrial fibrillation refractory to medical treatment status post PVI on 10/3/2019.    Patient is being seen here for annual follow-up.  Reports palpitations associated with dizziness more often over the last few months.  Almost every day.  Feels milder than the A-fib that she felt prior to ablation.    1.  History of paroxysmal atrial fibrillation status post PVI in 2019  -Sinus rhythm in clinic today.    -Recommended Zio patch for 2 weeks  -Start metoprolol 25 mg daily    2.  Hypertension:   -Not well controlled.  Currently on amlodipine 10 mg and lisinopril hydrochlorothiazide 40/12.5 mg  -We will increase hydrochlorothiazide from 12.5 to 25 mg daily    3.  Obstructive sleep apnea: She works with sleep medicine.     4.  Hyperlipidemia:  -Patient's LDL was severely elevated at 183 in March of this year.  -We will recheck LDL along with bmp in 2 weeks.    Return to clinic to be determined based on Zio patch result.    Total time spent 30 minutes including precharting, face-to-face clinic visit and medical documentation.       Leena RONDON MD  [facility] Division of Cardiology  Pager [phone]

## 2023-09-15 ENCOUNTER — OFFICE VISIT (OUTPATIENT)
Dept: CARDIOLOGY | Facility: CLINIC | Age: 43
End: 2023-09-15
Payer: COMMERCIAL

## 2023-09-15 ENCOUNTER — ANCILLARY PROCEDURE (OUTPATIENT)
Dept: CARDIOLOGY | Facility: CLINIC | Age: 43
End: 2023-09-15
Attending: INTERNAL MEDICINE
Payer: COMMERCIAL

## 2023-09-15 VITALS
SYSTOLIC BLOOD PRESSURE: 129 MMHG | BODY MASS INDEX: 40.49 KG/M2 | WEIGHT: 258 LBS | HEART RATE: 89 BPM | DIASTOLIC BLOOD PRESSURE: 86 MMHG | OXYGEN SATURATION: 98 % | HEIGHT: 67 IN

## 2023-09-15 DIAGNOSIS — I48.0 PAF (PAROXYSMAL ATRIAL FIBRILLATION) (H): Primary | ICD-10-CM

## 2023-09-15 DIAGNOSIS — E66.01 MORBID OBESITY (H): ICD-10-CM

## 2023-09-15 DIAGNOSIS — I10 ESSENTIAL HYPERTENSION: ICD-10-CM

## 2023-09-15 DIAGNOSIS — G47.33 OBSTRUCTIVE SLEEP APNEA SYNDROME: ICD-10-CM

## 2023-09-15 DIAGNOSIS — I48.0 PAF (PAROXYSMAL ATRIAL FIBRILLATION) (H): ICD-10-CM

## 2023-09-15 PROCEDURE — 99214 OFFICE O/P EST MOD 30 MIN: CPT | Performed by: INTERNAL MEDICINE

## 2023-09-15 PROCEDURE — 93246 EXT ECG>7D<15D RECORDING: CPT | Performed by: INTERNAL MEDICINE

## 2023-09-15 PROCEDURE — 93248 EXT ECG>7D<15D REV&INTERPJ: CPT | Performed by: INTERNAL MEDICINE

## 2023-09-15 RX ORDER — METOPROLOL SUCCINATE 25 MG/1
25 TABLET, EXTENDED RELEASE ORAL DAILY
Qty: 90 TABLET | Refills: 3 | Status: SHIPPED | OUTPATIENT
Start: 2023-09-15 | End: 2024-05-14

## 2023-09-15 RX ORDER — CEPHALEXIN 500 MG/1
1 TABLET ORAL 2 TIMES DAILY
COMMUNITY
Start: 2023-08-27 | End: 2024-04-16

## 2023-09-15 RX ORDER — HYDROCHLOROTHIAZIDE 25 MG/1
25 TABLET ORAL DAILY
Qty: 90 TABLET | Refills: 3 | Status: SHIPPED | OUTPATIENT
Start: 2023-09-15 | End: 2024-05-14

## 2023-09-15 NOTE — LETTER
9/15/2023      RE: Danitza Soto  [address]  [facility] [address]       Dear Colleague,    Thank you for the opportunity to participate in the care of your patient, Danitza Soto, at the [facility] HEART [facility]INIC [facility] at [facility]. Please see a copy of my visit note below.    Chief complaint: Palpitations    HPI: Ms. Danitza Soto is a 38 year old  female with PMH significant for depression, GERD, LEONARDO, essential hypertension, morbid obesity S/P gastric bypass 11/2018 and paroxysmal atrial fibrillation.    The patient was last seen in cardiology for paroxysmal atrial fibrillation diagnosis in 9/2018.  Her symptoms were mild at that time therefore rate or rhythm control strategy was not pursued.  The patient underwent gastric sleeve surgery in 11/2018 and has lost 70 pounds.  The patient noticed more frequent atrial fibrillation episodes since the surgery almost weekly now lasting up to 8-10 hours.  She finds works stress as a prominent trigger for episodes.  Associated symptoms include chest pressure, chest pain and dizziness.  She denies chest discomfort with exertion at other times.  The patient denies a history of dyspnea, PND, orthopnea, pedal edema, and syncope  Current medications include lisinopril 20 mg, fish oil.  She has cut down on lisinopril from 40 mg since she has lost significant weight.  She quit smoking in 2018 (10 pack years).  No history of alcohol abuse. No history of diabetes.  Family history is negative for CAD.  Prior cardiac tests include CT coronary angiogram in 2013 which did not show evidence of CAD.  Echocardiogram dated 9/2018 showed normal LV and RV function with no significant valve disease.  Moderate LVH was reported. I personnally reviewed the images and I donot agree with the LVH diagnosis. I donot think she has LVH based on echo. Zio patch in 8/2018 showed 4% atrial fibrillation burden heart rate ranged   beats per minute.  Longest episode lasting for 8 hours.    I have reviewed her ECG 11/28/2018 which shows normal sinus rhythm with single PAC, normal NJ/QRS/QTC intervals. No evidence of LVH on ECG.    Medications, personal, family, and social history reviewed with patient and revised.    Interval history 5/14/2019:  Patient returns for follow-up to recheck on paroxysmal atrial fibrillation.  Since I saw 3 months ago, patient developed depression and started on Effexor which improved her mood.  I have started her on flecainide and metoprolol 3 months ago for frequent paroxysmal atrial fibrillation episodes.  She noticed significant decline in the frequency of the episodes however she still reports at least 2 episodes per month sometimes lasting for several hours.  She reports associated dizziness however denies chest pain, shortness of breath, lower extremity edema or syncope.  Since gastric bypass patient lost significant weight with improvement in blood pressure and sleep apnea.  She no longer uses CPAP machine.  She is overall feeling well.    Interval history 8/14/2019:  The patient returns for follow-up to recheck on paroxysmal atrial fibrillation.  When I saw patient last time 3 months ago I increased dose of flecainide to 100 mg twice daily due to symptomatic palpitations.  The patient reports doing well during the first 2 months however had 3 episodes within the last month longest episode lasting for 3 hours with associated tiredness.  Denies chest pain, dyspnea on exertion, dizziness, syncope.  The patient has did a lot of research on atrial fibrillation ablation and she expressed her interest in undergoing procedure.    Interval history 2/18/2020:  Patient underwent catheter ablation of atrial fibrillation on 10/3/2019.  Patient feels great since the ablation.  No recurrence of A. fib since then.  She is currently on flecainide 100 mg twice daily, metoprolol 50 mg and apixaban 5 mg twice daily.   Blood pressure is well controlled with lisinopril 20 mg.  Patient is currently asymptomatic from cardiac standpoint.  Denies chest pain, shortness of breath, palpitations, dizziness, lower extremity edema or syncope.    Interval history 9/1/2020:  Patient reports no new cardiac symptoms since being seen in February 2020.  Denies palpitations, chest pain, shortness of breath.  Patient reports high blood pressure at home ~140/80 mmHg despite being on lisinopril 40 mg.  She reports being back on CPAP due to weight gain though she had gastric bypass surgery.  She continues to be on topiramate and naltrexone for obesity.  No alcohol or drug abuse.    Interval history 11/12/2021:  Patient is being seen today for annual follow-up. She reports occasional flutters less than 1 minute (once a month). Otherwise she has not felt any recurrent atrial fibrillation since catheter ablation. Denies chest pain, shortness of breath, dizziness, syncope or lower extremity edema.  Recently Effexor dose was increased from 75 mg to 150 mg mg daily. Patient's blood pressure is mildly elevated today. She does not monitor blood pressure at home.    Interval history 12/21/2022:  Patient is being seen today for follow-up.  Over the last 3 months or so she has started feeling palpitations lasting for 5 to 10 minutes.  This is new to her since ablation.  Sometimes episodes are clustered few days in a row.  Over the last month she had 3 or 4 episodes like that.  She does not have any other cardiac symptoms.  No alcohol.  Reports normal blood pressure at home at 120/80 mmHg.  She has not been using her CPAP because the mask does not fit her very well.  She needs to get a new mask.    Interval history 9/15/2023:  Patient is being seen today for annual follow-up.  As you know she has moderate sleep apnea but she cannot tolerate CPAP well.  She was recommended mandibular device.    Patient tells me that over the last 1 to 2 months she has noticed more  palpitations associated with dizziness.  Palpitations can be on and off throughout all day.  She feels almost every day.  Denies chest pain, syncope, shortness of breath.  She has noticed that her blood pressure is not well controlled.  In clinic today her diastolic blood pressure is mildly high at 86 mmHg.  Systolic blood pressures 129 mmHg.    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 90 tablet 1     Ascorbic Acid (VITAMIN C) 500 MG CAPS Take by mouth every evening       calcium carbonate (OS-CHIKI) 500 MG tablet Take 1 tablet by mouth every evening       Cyanocobalamin (B-12) 1000 MCG TBCR Take by mouth every evening       hydrochlorothiazide (MICROZIDE) 12.5 MG capsule Take 1 capsule (12.5 mg) by mouth every morning TAKE 1 CAPSULE(12.5 MG) BY MOUTH DAILY 90 capsule 1     ibuprofen (ADVIL/MOTRIN) 200 MG tablet Take 200 mg by mouth every 4 hours as needed for pain       lisinopril (ZESTRIL) 40 MG tablet Take 1 tablet (40 mg) by mouth every morning 90 tablet 1     magnesium 250 MG tablet Take 1 tablet by mouth every evening       Multiple Vitamins-Iron (MULTI-DAY PLUS IRON PO) Take by mouth every morning       omeprazole  (PRILOSEC) 40 MG DR capsule Take 1 capsule (40 mg) by mouth every morning 90 capsule 1     venlafaxine (EFFEXOR XR) 150 MG 24 hr capsule TAKE 1 CAPSULE(150 MG) BY MOUTH DAILY 90 capsule 3     vitamin B1 (THIAMINE) 250 MG tablet Take 250 mg by mouth every evening       Vitamin D (Cholecalciferol) 25 MCG (1000 UT) TABS Take by mouth every evening       zolpidem (AMBIEN) 5 MG tablet Take tablet by mouth 15 minutes prior to sleep as needed. Pharmacist to review side effects. 30 tablet 0       PAST SURGICAL HISTORY:  Past Surgical History:   Procedure Laterality Date     EP ABLATION FOCAL AFIB N/A 10/03/2019    Procedure: EP ABLATION FOCAL AFIB;  Surgeon: Harpreet Arevalo MD;  Location:  OR     ESOPHAGOSCOPY, GASTROSCOPY, DUODENOSCOPY (EGD), COMBINED N/A 2022    Procedure: ESOPHAGOGASTRODUODENOSCOPY, WITH BIOPSY;  Surgeon: Jeff Patino DO;  Location:  GI     HC TOOTH EXTRACTION W/FORCEP      [place] Teeth Extraction     LAPAROSCOPIC GASTRIC SLEEVE N/A 2018    Procedure: Laparoscopic Sleeve Gastrectomy Latex Free;  Surgeon: Artis Tse MD;  Location:  OR     LAPAROSCOPIC HERNIORRHAPHY HIATAL  2018    Procedure: LAPAROSCOPIC  HIATAL Hernia Repair;  Surgeon: Artis Tse MD;  Location:  OR     SALPINGO-OOPHORECTOMY, COMBINED Left 10/20/2021    Mucinous cystadenoma       ALLERGIES:     Allergies   Allergen Reactions     Bupropion Other (See Comments)     Other reaction(s): Chest Pain,Panic attacks, heart/chest pain     Wellbutrin [Bupropion]      Wellbutrin: Panic attacks, heart/chest pain       FAMILY HISTORY:  Family History   Problem Relation Age of Onset     Heart Disease Mother         ,mother had emphesema and  at 62     Hypertension Mother      Allergies Mother      Lipids Mother      Obesity Mother      Respiratory Mother         Emphysema     Hypertension Father      Lipids Father      Cancer Father      Prostate Cancer Father      Other Cancer Father       "Allergies Sister      Genitourinary Problems Sister      Diabetes Maternal Grandmother         Type 2?     Hypertension Maternal Grandmother      Circulatory Maternal Grandmother         Due to diabetes     Eye Disorder Maternal Grandmother         Macular degeneration/Macular degeneration     Obesity Maternal Grandmother      Alcohol/Drug Maternal Grandfather      Obesity Maternal Grandfather      Cerebrovascular Disease Paternal Grandmother      Psychotic Disorder Paternal Grandfather         Committed Suicide     Depression Paternal Grandfather      Anesthesia Reaction No family hx of      Bleeding Disorder No family hx of      Venous thrombosis No family hx of          SOCIAL HISTORY:  Social History     Tobacco Use     Smoking status: Former     Packs/day: 1.00     Years: 10.00     Pack years: 10.00     Types: Cigarettes, Vaping Device     Start date: 2004     Quit date: 2021     Years since quittin.2     Smokeless tobacco: Never   Vaping Use     Vaping Use: Every day     Substances: Nicotine, Flavoring     Devices: Disposable   Substance Use Topics     Alcohol use: Not Currently     Alcohol/week: 0.0 standard drinks of alcohol     Comment: Occas.     Drug use: No     ROS:   Constitutional: No fever, chills, or sweats. Weight stable.   Cardiovascular: As per HPI.       Exam:  /86   Pulse 89   Ht 1.689 m (5' 6.5\")   Wt 117 kg (258 lb)   LMP 2022   SpO2 98%   BMI 41.02 kg/m    GENERAL APPEARANCE: alert and no distress  HEENT: no icterus, no central cyanosis  LYMPH/NECK: no adenopathy, no asymmetry, JVP not elevated, no carotid bruits.  RESPIRATORY: lungs clear to auscultation - no rales, rhonchi or wheezes, no use of accessory muscles, no retractions, respirations are unlabored, normal respiratory rate  CARDIOVASCULAR: regular rhythm, normal S1, S2, no S3 or S4 and no murmur, click or rub, precordium quiet with normal PMI.  Extremities: No edema  NEURO: alert, normal speech,and " affect  SKIN: no ecchymoses, no rashes     I have reviewed the labs and personally reviewed the imaging below and made my comment in the assessment and plan.      EP PROCEDURE NOTE 10/3/2019  1. Left Atrial Wide Area Circumferential radiofrequency Ablation.  2. Trans-septal Puncture x2  3. Intracardiac Echocardiography.  4. Invasive Hemodynamic Monitoring.  5. 3D electro-anatomic Mapping using Carto3.  6. Esophageal temperature monitoring.    I have reviewed the labs and personally reviewed the imaging below and made my comment in the assessment and plan.    Labs:  CBC RESULTS:   Lab Results   Component Value Date    WBC 8.5 03/21/2023    WBC 7.4 05/18/2021    RBC 5.07 03/21/2023    RBC 4.90 05/18/2021    HGB 15.1 03/21/2023    HGB 14.4 05/18/2021    HCT 45.2 03/21/2023    HCT 43.7 05/18/2021    MCV 89 03/21/2023    MCV 89 05/18/2021    MCH 29.8 03/21/2023    MCH 29.4 05/18/2021    MCHC 33.4 03/21/2023    MCHC 33.0 05/18/2021    RDW 13.9 03/21/2023    RDW 13.8 05/18/2021     03/21/2023     05/18/2021       BMP RESULTS:  Lab Results   Component Value Date     03/21/2023     05/18/2021    POTASSIUM 4.0 03/21/2023    POTASSIUM 3.7 05/04/2022    POTASSIUM 3.7 05/18/2021    CHLORIDE 102 03/21/2023    CHLORIDE 105 05/04/2022    CHLORIDE 110 (H) 05/18/2021    CO2 23 03/21/2023    CO2 23 05/04/2022    CO2 28 05/18/2021    ANIONGAP 13 03/21/2023    ANIONGAP 8 05/04/2022    ANIONGAP 3 05/18/2021     (H) 03/21/2023    GLC 79 05/04/2022    GLC 81 05/18/2021    BUN 8.8 03/21/2023    BUN 9 05/04/2022    BUN 9 05/18/2021    CR 0.64 03/21/2023    CR 0.68 05/18/2021    GFRESTIMATED >90 03/21/2023    GFRESTIMATED >90 05/18/2021    GFRESTBLACK >90 05/18/2021    CHIKI 9.3 03/21/2023    CHIKI 9.1 05/18/2021        INR RESULTS:  Lab Results   Component Value Date    INR 0.98 11/06/2018       Echocardiogram 9/26/2018  Global and regional left ventricular function is normal with an EF of 60-65%.  Moderate  concentric wall thickening consistent with left ventricular  hypertrophy is present.  Right ventricular function, chamber size, wall motion, and thickness are  normal.  Mild biatrial enlargement is present.  No significant valve abnormalities present.  The inferior vena cava was normal in size    Nuclear stress test with Lexiscan 9/6/2018  Normal myocardial SPECT study with a summed stress score of 0.     CT coronary artery angiogram 6/27/2013  No evidence of coronary artery disease    EKG 11/28/2018 normal.    Assessment and Plan:   Ms. Danitza Soto is a 38 year old  female with PMH significant for depression, GERD, LEONARDO, essential hypertension, morbid obesity S/P gastric bypass 11/2018 and paroxysmal atrial fibrillation refractory to medical treatment status post PVI on 10/3/2019.    Patient is being seen here for annual follow-up.  Reports palpitations associated with dizziness more often over the last few months.  Almost every day.  Feels milder than the A-fib that she felt prior to ablation.    1.  History of paroxysmal atrial fibrillation status post PVI in 2019  -Sinus rhythm in clinic today.    -Recommended Zio patch for 2 weeks  -Start metoprolol 25 mg daily    2.  Hypertension:   -Not well controlled.  Currently on amlodipine 10 mg and lisinopril hydrochlorothiazide 40/12.5 mg  -We will increase hydrochlorothiazide from 12.5 to 25 mg daily    3.  Obstructive sleep apnea: She works with sleep medicine.     4.  Hyperlipidemia:  -Patient's LDL was severely elevated at 183 in March of this year.  -We will recheck LDL along with bmp in 2 weeks.    Return to clinic to be determined based on Zio patch result.    Total time spent 30 minutes including precharting, face-to-face clinic visit and medical documentation.       Leena RONDON MD  [facility] Division of Cardiology  Pager [phone]      Please do not hesitate to contact me if you have any questions/concerns.     Sincerely,     Leena Rondon MD

## 2023-09-15 NOTE — PATIENT INSTRUCTIONS
Thank you for coming to the [facility] at [place]; please note the following instructions:    1. Labs in 2 weeks    2.  We have applied a Zio Patch (heart) monitor for you to wear for 14 days.  You may remove the heart monitor on 9/29/23 at 10:15am.  Please see the enclosed instructions for further information, as well as instructions for removal of the heart monitor and return mailing directions.  If you should have questions regarding your monitor, please call "[employer] at [phone].  The Cardiology Nurse will contact you with your results (please see result notification details at bottom of summary).    *PLEASE DO NOT SHOWER OR INDUCE EXCESSIVE SWEATING IN THE FIRST 24 HOURS OF WEARING*    Please also call your insurance company for any billing questions regarding the monitor.    3. Increase your hydrochlorothiazide to 25 mg daily.    4. START: Metoprolol succinate extended release 25 mg daily.          If you have any questions regarding your visit, please contact your care team:     CARDIOLOGY  TELEPHONE NUMBER   Hannah ALLENMary, Registered Nurse  Lou GTZ, Registered Nurse  Elena HUGHES, Registered Medical Assistant  Marlene DESHPANDE, Certified Medical Assistant  Kristina AZAR, Visit Facilitator [phone] (select option 1)    *After hours: [phone]   For Scheduling Appts:     [phone] (select option 1)    *After hours: [phone]   For the Device Clinic (Pacemakers and ICD's)  Hanh CORTES Registered Nurse   During business hours: [phone]    *After business hours:  [phone] (select option 4)      Normal test result notifications will be released via [employer] or mailed within 7 business days.  All other test results, will be communicated via telephone once reviewed by your cardiologist.    If you need a medication refill, please contact your pharmacy.  Please allow 3 business days for your refill to be completed.    As always, thank you for trusting us with your health care  needs!

## 2023-09-15 NOTE — NURSING NOTE
"Chief Complaint   Patient presents with    Follow Up     Blood pressure higher than normal, dizziness, some afib       Initial /86   Pulse 89   Ht 1.689 m (5' 6.5\")   Wt 117 kg (258 lb)   LMP 04/01/2022   SpO2 98%   BMI 41.02 kg/m   Estimated body mass index is 41.02 kg/m  as calculated from the following:    Height as of this encounter: 1.689 m (5' 6.5\").    Weight as of this encounter: 117 kg (258 lb)..  BP completed using cuff size: shea Linares CMA (AAMA)    "

## 2023-09-29 ENCOUNTER — MYC MEDICAL ADVICE (OUTPATIENT)
Dept: CARDIOLOGY | Facility: CLINIC | Age: 43
End: 2023-09-29

## 2023-09-29 ENCOUNTER — LAB (OUTPATIENT)
Dept: LAB | Facility: CLINIC | Age: 43
End: 2023-09-29
Payer: COMMERCIAL

## 2023-09-29 DIAGNOSIS — I48.0 PAF (PAROXYSMAL ATRIAL FIBRILLATION) (H): ICD-10-CM

## 2023-09-29 DIAGNOSIS — E78.5 HYPERLIPIDEMIA LDL GOAL <100: Primary | ICD-10-CM

## 2023-09-29 LAB
ANION GAP SERPL CALCULATED.3IONS-SCNC: 13 MMOL/L (ref 7–15)
BUN SERPL-MCNC: 9.2 MG/DL (ref 6–20)
CALCIUM SERPL-MCNC: 9 MG/DL (ref 8.6–10)
CHLORIDE SERPL-SCNC: 100 MMOL/L (ref 98–107)
CHOLEST SERPL-MCNC: 262 MG/DL
CREAT SERPL-MCNC: 0.65 MG/DL (ref 0.51–0.95)
DEPRECATED HCO3 PLAS-SCNC: 25 MMOL/L (ref 22–29)
EGFRCR SERPLBLD CKD-EPI 2021: >90 ML/MIN/1.73M2
GLUCOSE SERPL-MCNC: 114 MG/DL (ref 70–99)
HDLC SERPL-MCNC: 44 MG/DL
LDLC SERPL CALC-MCNC: 193 MG/DL
NONHDLC SERPL-MCNC: 218 MG/DL
POTASSIUM SERPL-SCNC: 3.9 MMOL/L (ref 3.4–5.3)
SODIUM SERPL-SCNC: 138 MMOL/L (ref 135–145)
TRIGL SERPL-MCNC: 127 MG/DL

## 2023-09-29 PROCEDURE — 36415 COLL VENOUS BLD VENIPUNCTURE: CPT

## 2023-09-29 PROCEDURE — 80061 LIPID PANEL: CPT

## 2023-09-29 PROCEDURE — 80048 BASIC METABOLIC PNL TOTAL CA: CPT

## 2023-09-29 NOTE — TELEPHONE ENCOUNTER
Leena Heath MD  [facility] Cardiology  Trish,    Your cholesterol is very high. You would benefit from statin.  Normal kidney test. I hope BP is better.    Let me know if you decide about statin to lower cholsterol.    Dr NELA Calvert message sent to patient to follow up if patient would be interested in starting a statin.    Lou Mcdowell, RN, BSN  Cardiology RN Care Coordinator   [place]   Phone: [phone]  Fax: [phone] ([place]) [phone] ([place])

## 2023-10-02 RX ORDER — ROSUVASTATIN CALCIUM 10 MG/1
10 TABLET, COATED ORAL DAILY
Qty: 90 TABLET | Refills: 3 | Status: SHIPPED | OUTPATIENT
Start: 2023-10-02 | End: 2023-10-27

## 2023-10-02 NOTE — TELEPHONE ENCOUNTER
Pt has agreed to start a statin.  Will route to Dr. Heath to initiate Rx.    Hannah Conte, RN  Cardiology Care Coordinator  [facility]  [phone] option 1

## 2023-10-03 NOTE — TELEPHONE ENCOUNTER
Leena Heath MD Jessie D CowleyYesterday (9:04 AM)     IC  Crestor 10 mg is your statin. Rare side effects( some people complain of muscle pain, rare risk of diabetes) otherwise well tolerated.         This [employer] message has not been read.     Above [employer] message was sent to patient. Attempted to call patient as [employer] message was not read. Left message for patient.    Lou Mcdowell RN, BSN  Cardiology RN Care Coordinator   [place]   Phone: [phone]  Fax: [phone] ([place]) [phone] ([place])

## 2023-10-04 NOTE — TELEPHONE ENCOUNTER
Patient saw [employer] message. Prescription already sent in. No response or questions at this time.    Lou Mcdowell, RN, BSN  Cardiology RN Care Coordinator   [place]   Phone: [phone]  Fax: [phone] ([place]) [phone] ([place])

## 2023-10-11 ENCOUNTER — MYC MEDICAL ADVICE (OUTPATIENT)
Dept: CARDIOLOGY | Facility: CLINIC | Age: 43
End: 2023-10-11
Payer: COMMERCIAL

## 2023-10-11 DIAGNOSIS — I48.0 PAF (PAROXYSMAL ATRIAL FIBRILLATION) (H): ICD-10-CM

## 2023-10-11 DIAGNOSIS — Z98.890 STATUS POST CATHETER ABLATION OF ATRIAL FIBRILLATION: ICD-10-CM

## 2023-10-11 DIAGNOSIS — E78.5 HYPERLIPIDEMIA LDL GOAL <100: Primary | ICD-10-CM

## 2023-10-11 DIAGNOSIS — I10 ESSENTIAL HYPERTENSION: ICD-10-CM

## 2023-10-11 NOTE — TELEPHONE ENCOUNTER
Leena Heath MD  P  Cardiology  Please check how she is feeling since being on metoprolol.  If she is feeling well then we can see her in 1 year.  I think I did not recommend 1 year appointment when I saw her last time so that needs to be scheduled.    Thank you.    Leena Velazquez MD  P  Cardiology  Trish,    I looked at your heart monitor.  You do not have atrial fibrillation.  You had short fluttering episodes from the top chambers of your heart that we call SVT which is not life-threatening. They are not very long.  I think metoprolol will help with these short SVTs as well.  These SVTs are not uncommon in patients with history of atrial fibrillation.  I hope you are feeling better since being on metoprolol.    Let me know if you have questions.  We can continue to see you once a year if you like.    DR NELA Calvert message sent to patient with above information. If patient is feeling well on metoprolol, patient to follow up in 1 year. Order to be placed once confirmed with patient on how she is feeling.    Lou Mcdowell RN, BSN  Cardiology RN Care Coordinator   [place]   Phone: [phone]  Fax: [phone] ([place]) [phone] ([place])

## 2023-10-11 NOTE — TELEPHONE ENCOUNTER
Patient reports that she is feeling better with metoprolol. Patient feels comfortable following with Dr. Heath in 1 year. Order placed for patient to follow up in 1 year.     Lou Mcdowell RN, BSN  Cardiology RN Care Coordinator   [place]   Phone: [phone]  Fax: [phone] ([place]) [phone] ([place])

## 2023-10-27 ENCOUNTER — MYC MEDICAL ADVICE (OUTPATIENT)
Dept: CARDIOLOGY | Facility: CLINIC | Age: 43
End: 2023-10-27
Payer: COMMERCIAL

## 2023-10-27 DIAGNOSIS — E78.5 HYPERLIPIDEMIA LDL GOAL <100: Primary | ICD-10-CM

## 2023-10-27 RX ORDER — ATORVASTATIN CALCIUM 10 MG/1
10 TABLET, FILM COATED ORAL DAILY
Qty: 30 TABLET | Refills: 1 | Status: SHIPPED | OUTPATIENT
Start: 2023-10-27 | End: 2023-12-20

## 2023-11-01 DIAGNOSIS — K21.9 GASTROESOPHAGEAL REFLUX DISEASE WITHOUT ESOPHAGITIS: ICD-10-CM

## 2023-11-01 DIAGNOSIS — Z98.84 S/P LAPAROSCOPIC SLEEVE GASTRECTOMY: ICD-10-CM

## 2023-11-01 DIAGNOSIS — E66.01 MORBID (SEVERE) OBESITY DUE TO EXCESS CALORIES (H): ICD-10-CM

## 2023-11-01 RX ORDER — OMEPRAZOLE 40 MG/1
CAPSULE, DELAYED RELEASE ORAL
Qty: 90 CAPSULE | Refills: 0 | Status: SHIPPED | OUTPATIENT
Start: 2023-11-01 | End: 2024-01-12

## 2023-11-28 ENCOUNTER — VIRTUAL VISIT (OUTPATIENT)
Dept: FAMILY MEDICINE | Facility: CLINIC | Age: 43
End: 2023-11-28
Payer: COMMERCIAL

## 2023-11-28 DIAGNOSIS — G47.33 OSA (OBSTRUCTIVE SLEEP APNEA): Primary | ICD-10-CM

## 2023-11-28 DIAGNOSIS — F33.1 MODERATE EPISODE OF RECURRENT MAJOR DEPRESSIVE DISORDER (H): ICD-10-CM

## 2023-11-28 DIAGNOSIS — R53.83 FATIGUE, UNSPECIFIED TYPE: ICD-10-CM

## 2023-11-28 PROCEDURE — 99214 OFFICE O/P EST MOD 30 MIN: CPT | Mod: VID | Performed by: FAMILY MEDICINE

## 2023-11-28 ASSESSMENT — PATIENT HEALTH QUESTIONNAIRE - PHQ9
10. IF YOU CHECKED OFF ANY PROBLEMS, HOW DIFFICULT HAVE THESE PROBLEMS MADE IT FOR YOU TO DO YOUR WORK, TAKE CARE OF THINGS AT HOME, OR GET ALONG WITH OTHER PEOPLE: VERY DIFFICULT
SUM OF ALL RESPONSES TO PHQ QUESTIONS 1-9: 11
SUM OF ALL RESPONSES TO PHQ QUESTIONS 1-9: 11

## 2023-11-28 NOTE — PROGRESS NOTES
"Trish is a 43 year old who is being evaluated via a billable video visit.      How would you like to obtain your AVS? MyChart  If the video visit is dropped, the invitation should be resent by: Text to cell phone: [phone]  Will anyone else be joining your video visit? No          Assessment & Plan     Moderate episode of recurrent major depressive disorder (H)  Seems to be well controlled on the Effexor 150 mg daily , no side effects and pt does not feel depressed , just fatigued     LEONARDO (obstructive sleep apnea)  She has seen her sleep specialist and they have discussed how she does not tolerate her CPAP machine , she has tried different masks and not working   They have recommended she sees a Sleep dentist for a mandibular advancement device and they placed a referral but pt has not been contacted for the appointment   She will call her sleep specialist / send a [employer] message about this referral or discuss with her dentist   - Sleep Dental Referral; Future    Fatigue, unspecified type  Most likely related to the fact that she has LEONARDO and not using her CPAP , not tolerating it , so would need to follow up with sleep specialist and more specific get the appointment with a Sleep dentist for the mandibular advancement device     She has already seen the neurologist and has done the neuropsychological testing , memory is good   Seen cardiology , started on a statin and also anti HTN meds adjusted , BP is well controlled now       RTC if no improving or worsening.  Pt is aware  and comfortable with the current plan.       BMI:   Estimated body mass index is 41.02 kg/m  as calculated from the following:    Height as of 9/15/23: 1.689 m (5' 6.5\").    Weight as of 9/15/23: 117 kg (258 lb).           Polly Mcbride MD  [facility]   Trish is a 43 year old, presenting for the following health issues:  No chief complaint on file.        11/28/2023    12:48 PM   Additional Questions "   Roomed by José Miguel OCONNOR         11/28/2023    12:48 PM   Patient Reported Additional Medications   Patient reports taking the following new medications Atrovastatin       HPI             Review of Systems   Constitutional, HEENT, cardiovascular, pulmonary, GI, , musculoskeletal, neuro, skin, endocrine and psych systems are negative, except as otherwise noted.      Objective           Vitals:  No vitals were obtained today due to virtual visit.    Physical Exam   GENERAL: Healthy, alert and no distress  EYES: Eyes grossly normal to inspection.  No discharge or erythema, or obvious scleral/conjunctival abnormalities.  RESP: No audible wheeze, cough, or visible cyanosis.  No visible retractions or increased work of breathing.    SKIN: Visible skin clear. No significant rash, abnormal pigmentation or lesions.  NEURO: Cranial nerves grossly intact.  Mentation and speech appropriate for age.  PSYCH: Mentation appears normal, affect normal/bright, judgement and insight intact, normal speech and appearance well-groomed.    No results found for this or any previous visit (from the past 24 hour(s)).            Video-Visit Details    Type of service:  Video Visit     Originating Location (pt. Location): Home    Distant Location (provider location):  On-site  Platform used for Video Visit: [employer]

## 2023-12-15 DIAGNOSIS — E78.5 HYPERLIPIDEMIA LDL GOAL <100: ICD-10-CM

## 2023-12-19 NOTE — TELEPHONE ENCOUNTER
"atorvastatin (LIPITOR) 10 MG tablet   Last Written Prescription Date:  10/27/2023  Last Fill Quantity: 30,   # refills: 1  Last Office Visit : 9/15/2023  [facility]  Future Office visit:  None    Routing refill request to provider for review/approval because:  Refill needs review- further refills at current dose?   - LDL (H) drawn 9/29/2023 with comments from Dr Heath \"I sent you lipitor 10 mg. Only 30 tablet with one refill to give it a try. Give some time before you start this. Let the reaction go away\"       LDL Cholesterol Calculated   Date Value Ref Range Status   09/29/2023 193 (H) <=100 mg/dL Final   03/21/2017 155 (H) <100 mg/dL Final     Comment:     Above desirable:  100-129 mg/dl   Borderline High:  130-159 mg/dL   High:             160-189 mg/dL   Very high:       >189 mg/dl         "

## 2023-12-20 RX ORDER — ATORVASTATIN CALCIUM 10 MG/1
10 TABLET, FILM COATED ORAL DAILY
Qty: 90 TABLET | Refills: 1 | Status: SHIPPED | OUTPATIENT
Start: 2023-12-20 | End: 2024-07-24

## 2023-12-20 NOTE — TELEPHONE ENCOUNTER
Signed Prescriptions:                        Disp   Refills    atorvastatin (LIPITOR) 10 MG tablet        90 tab*1        Sig: Take 1 tablet by mouth daily.  Authorizing Provider: DAIANA RONDON  Ordering User: HANNAH CONTE    Message sent to patient regarding fasting cholesterol receck after starting statin.    Hannah Conte RN  Cardiology Care Coordinator  [facility]  [phone] option 1

## 2024-01-12 DIAGNOSIS — Z98.84 S/P LAPAROSCOPIC SLEEVE GASTRECTOMY: ICD-10-CM

## 2024-01-12 DIAGNOSIS — E66.01 MORBID (SEVERE) OBESITY DUE TO EXCESS CALORIES (H): ICD-10-CM

## 2024-01-12 DIAGNOSIS — K21.9 GASTROESOPHAGEAL REFLUX DISEASE WITHOUT ESOPHAGITIS: ICD-10-CM

## 2024-01-12 RX ORDER — OMEPRAZOLE 40 MG/1
CAPSULE, DELAYED RELEASE ORAL
Qty: 90 CAPSULE | Refills: 2 | Status: SHIPPED | OUTPATIENT
Start: 2024-01-12 | End: 2024-07-24

## 2024-01-12 RX ORDER — ZOLPIDEM TARTRATE 5 MG/1
TABLET ORAL
Qty: 30 TABLET | OUTPATIENT
Start: 2024-01-12

## 2024-01-16 ENCOUNTER — MYC REFILL (OUTPATIENT)
Dept: SLEEP MEDICINE | Facility: CLINIC | Age: 44
End: 2024-01-16
Payer: COMMERCIAL

## 2024-01-17 NOTE — TELEPHONE ENCOUNTER
Pending Prescriptions:                       Disp   Refills    zolpidem (AMBIEN) 5 MG tablet             30 tab*0            Sig: Take tablet by mouth 15 minutes prior to sleep as           needed. Pharmacist to review side effects.          Last Written Prescription Date: 8/10/23   Last Fill Quantity: 30   # refills: 0  Last Office Visit: 9/11/23  Future Office visit: NA      Routing refill request to provider for review/approval because:  Drug not on the [facility], [facility] or [facility] refill protocol or controlled substance

## 2024-01-18 RX ORDER — ZOLPIDEM TARTRATE 5 MG/1
TABLET ORAL
Qty: 30 TABLET | Refills: 0 | Status: SHIPPED | OUTPATIENT
Start: 2024-01-18

## 2024-01-19 NOTE — TELEPHONE ENCOUNTER
Provider Communication    Who is calling:  [facility] in which provider is associated:  Patients pharmacy    Reason for call: Need dosing and frequency for Zolpidem prescription received on 1/18/24       Okay to leave detailed message?:  Yes at Other phone number:  [phone]

## 2024-02-04 ENCOUNTER — HEALTH MAINTENANCE LETTER (OUTPATIENT)
Age: 44
End: 2024-02-04

## 2024-02-05 DIAGNOSIS — I10 ESSENTIAL HYPERTENSION: ICD-10-CM

## 2024-02-06 RX ORDER — LISINOPRIL 40 MG/1
40 TABLET ORAL EVERY MORNING
Qty: 90 TABLET | Refills: 1 | Status: SHIPPED | OUTPATIENT
Start: 2024-02-06 | End: 2024-07-24

## 2024-02-20 ENCOUNTER — PATIENT OUTREACH (OUTPATIENT)
Dept: CARE COORDINATION | Facility: CLINIC | Age: 44
End: 2024-02-20
Payer: COMMERCIAL

## 2024-03-04 DIAGNOSIS — E78.5 HYPERLIPIDEMIA LDL GOAL <100: ICD-10-CM

## 2024-03-05 ENCOUNTER — PATIENT OUTREACH (OUTPATIENT)
Dept: CARE COORDINATION | Facility: CLINIC | Age: 44
End: 2024-03-05
Payer: COMMERCIAL

## 2024-03-08 RX ORDER — ATORVASTATIN CALCIUM 10 MG/1
10 TABLET, FILM COATED ORAL DAILY
Qty: 90 TABLET | Refills: 0 | OUTPATIENT
Start: 2024-03-08

## 2024-04-16 ENCOUNTER — VIRTUAL VISIT (OUTPATIENT)
Dept: FAMILY MEDICINE | Facility: CLINIC | Age: 44
End: 2024-04-16
Payer: COMMERCIAL

## 2024-04-16 DIAGNOSIS — E78.00 HYPERCHOLESTEREMIA: ICD-10-CM

## 2024-04-16 DIAGNOSIS — R73.01 ELEVATED FASTING GLUCOSE: ICD-10-CM

## 2024-04-16 DIAGNOSIS — R53.83 FATIGUE, UNSPECIFIED TYPE: ICD-10-CM

## 2024-04-16 DIAGNOSIS — L73.9 FOLLICULITIS: Primary | ICD-10-CM

## 2024-04-16 PROBLEM — E66.01 MORBID (SEVERE) OBESITY DUE TO EXCESS CALORIES (H): Chronic | Status: RESOLVED | Noted: 2018-11-27 | Resolved: 2024-04-16

## 2024-04-16 PROBLEM — E66.2 OBESITY HYPOVENTILATION SYNDROME (H): Chronic | Status: RESOLVED | Noted: 2017-04-13 | Resolved: 2024-04-16

## 2024-04-16 PROBLEM — F33.1 MODERATE EPISODE OF RECURRENT MAJOR DEPRESSIVE DISORDER (H): Chronic | Status: RESOLVED | Noted: 2020-09-18 | Resolved: 2024-04-16

## 2024-04-16 PROBLEM — F33.42 RECURRENT MAJOR DEPRESSIVE DISORDER, IN FULL REMISSION (H): Status: RESOLVED | Noted: 2019-04-08 | Resolved: 2024-04-16

## 2024-04-16 PROCEDURE — 99214 OFFICE O/P EST MOD 30 MIN: CPT | Mod: 95 | Performed by: FAMILY MEDICINE

## 2024-04-16 RX ORDER — DOXYCYCLINE HYCLATE 100 MG
100 TABLET ORAL 2 TIMES DAILY
Qty: 20 TABLET | Refills: 0 | Status: SHIPPED | OUTPATIENT
Start: 2024-04-16

## 2024-04-16 NOTE — PROGRESS NOTES
"Trish is a 43 year old who is being evaluated via a billable video visit.    How would you like to obtain your AVS? MyChart  If the video visit is dropped, the invitation should be resent by: Text to cell phone: [phone]  Will anyone else be joining your video visit? No      Assessment & Plan     Folliculitis  This time small boil in the right axillary area , two months ago was seen at an [facility] because she had a large boil in the left axillary area   - doxycycline hyclate (VIBRA-TABS) 100 MG tablet; Take 1 tablet (100 mg) by mouth 2 times daily  Will treat with Abx     (E78.00) Hypercholesteremia  Comment: will check fasting lipids , cardiology started her on a satin and she has not rechecked lipids after that orders placed   Plan: Lipid panel reflex to direct LDL Fasting,         Comprehensive metabolic panel (BMP + Alb, Alk         Phos, ALT, AST, Total. Bili, TP)        Will do labs before her annual PE and then we will discuss at the visit     (R53.83) Fatigue, unspecified type  Comment: also , since she has had fatigue for a while  will check all the labs as below   Plan: Comprehensive metabolic panel (BMP + Alb, Alk         Phos, ALT, AST, Total. Bili, TP), TSH with free        T4 reflex, CBC with platelets, Vitamin D         Deficiency, Vitamin B12            (R73.01) Elevated fasting glucose  Comment: will screen   Plan: Hemoglobin A1c            Pt will do fasting labs before her annual PE with me           BMI  Estimated body mass index is 41.02 kg/m  as calculated from the following:    Height as of 9/15/23: 1.689 m (5' 6.5\").    Weight as of 9/15/23: 117 kg (258 lb).             Subjective   Trish is a 43 year old, presenting for the following health issues:  No chief complaint on file.        4/16/2024    12:22 PM   Additional Questions   Roomed by BOBBY Sampson     History of Present Illness       Reason for visit:  Reoccurring boils over the last year resulting in needing antibiotics  Symptom " "onset:  1-3 days ago  Symptoms include:  Boil in my right armpit  Symptom intensity:  Moderate  Symptom progression:  Worsening  Had these symptoms before:  Yes  What makes it worse:  Boil getting bigger and causing slight fever and lethargy  What makes it better:  Antibiotics    She eats 0-1 servings of fruits and vegetables daily.She consumes 0 sweetened beverage(s) daily.She exercises with enough effort to increase her heart rate 9 or less minutes per day.  She exercises with enough effort to increase her heart rate 3 or less days per week.   She is taking medications regularly.       Derm Concern  Onset/Duration: Pt reports \"a few days ago\"  Description  Location: right arm pit  Character: round, raised, painful, red - Pt reports 1 \"boil\", Pt denies drainage  Itching: no  Intensity:  mild  Progression of Symptoms:  worsening  Accompanying signs and symptoms:   Fever: No  Body aches or joint pain: No  Sore throat symptoms: No  Recent cold symptoms: No  History:           Previous episodes of similar rash: 3 times in the last 6-7 months per Pt report  New exposures:  None  Recent travel: No  Exposure to similar rash: No  Therapies tried and outcome: ibuprofen - Pt reports effective for pain (Pt reports antibiotics were helpful in the past)        Review of Systems  Constitutional, HEENT, cardiovascular, pulmonary, GI, , musculoskeletal, neuro, skin, endocrine and psych systems are negative, except as otherwise noted.      Objective           Vitals:  No vitals were obtained today due to virtual visit.    Physical Exam   GENERAL: alert and no distress  EYES: Eyes grossly normal to inspection.  No discharge or erythema, or obvious scleral/conjunctival abnormalities.  RESP: No audible wheeze, cough, or visible cyanosis.    SKIN: Visible skin clear. No significant rash, abnormal pigmentation or lesions.  NEURO: Cranial nerves grossly intact.  Mentation and speech appropriate for age.  PSYCH: Appropriate affect, " tone, and pace of words    No results found for this or any previous visit (from the past 24 hour(s)).      Video-Visit Details    Type of service:  Video Visit   Originating Location (pt. Location): Home    Distant Location (provider location):  On-site  Platform used for Video Visit: Elida  Signed Electronically by: Polly Mcbride MD

## 2024-05-03 DIAGNOSIS — I48.0 PAF (PAROXYSMAL ATRIAL FIBRILLATION) (H): ICD-10-CM

## 2024-05-14 RX ORDER — HYDROCHLOROTHIAZIDE 25 MG/1
25 TABLET ORAL DAILY
Qty: 90 TABLET | Refills: 1 | Status: SHIPPED | OUTPATIENT
Start: 2024-05-14 | End: 2024-07-24

## 2024-05-14 RX ORDER — METOPROLOL SUCCINATE 25 MG/1
25 TABLET, EXTENDED RELEASE ORAL DAILY
Qty: 90 TABLET | Refills: 1 | Status: SHIPPED | OUTPATIENT
Start: 2024-05-14 | End: 2024-07-24

## 2024-05-14 NOTE — TELEPHONE ENCOUNTER
LVD:  /15/2023  [facility] Leena Vela MD  Cardiovascular Disease PAF (paroxysmal atrial fibrillation)      Refilled per protocol.  Last Comprehensive Metabolic Panel:  Lab Results   Component Value Date     09/29/2023    POTASSIUM 3.9 09/29/2023    CHLORIDE 100 09/29/2023    CO2 25 09/29/2023    ANIONGAP 13 09/29/2023     (H) 09/29/2023    BUN 9.2 09/29/2023    CR 0.65 09/29/2023    GFRESTIMATED >90 09/29/2023    CHIKI 9.0 09/29/2023

## 2024-05-22 DIAGNOSIS — F41.9 ANXIETY: ICD-10-CM

## 2024-05-23 RX ORDER — VENLAFAXINE HYDROCHLORIDE 150 MG/1
CAPSULE, EXTENDED RELEASE ORAL
Qty: 90 CAPSULE | Refills: 2 | OUTPATIENT
Start: 2024-05-23

## 2024-05-30 ENCOUNTER — PATIENT OUTREACH (OUTPATIENT)
Dept: CARE COORDINATION | Facility: CLINIC | Age: 44
End: 2024-05-30
Payer: COMMERCIAL

## 2024-06-03 ENCOUNTER — OFFICE VISIT (OUTPATIENT)
Dept: CARDIOLOGY | Facility: CLINIC | Age: 44
End: 2024-06-03
Payer: COMMERCIAL

## 2024-06-03 VITALS
HEIGHT: 67 IN | OXYGEN SATURATION: 98 % | HEART RATE: 82 BPM | DIASTOLIC BLOOD PRESSURE: 91 MMHG | SYSTOLIC BLOOD PRESSURE: 138 MMHG | BODY MASS INDEX: 41.75 KG/M2 | WEIGHT: 266 LBS

## 2024-06-03 DIAGNOSIS — E78.5 HYPERLIPIDEMIA LDL GOAL <100: ICD-10-CM

## 2024-06-03 DIAGNOSIS — E66.01 MORBID OBESITY (H): ICD-10-CM

## 2024-06-03 DIAGNOSIS — I10 UNCONTROLLED HYPERTENSION: Primary | ICD-10-CM

## 2024-06-03 DIAGNOSIS — I10 BENIGN ESSENTIAL HYPERTENSION: ICD-10-CM

## 2024-06-03 DIAGNOSIS — I48.0 PAROXYSMAL ATRIAL FIBRILLATION (H): ICD-10-CM

## 2024-06-03 PROCEDURE — 99215 OFFICE O/P EST HI 40 MIN: CPT | Performed by: INTERNAL MEDICINE

## 2024-06-03 NOTE — NURSING NOTE
"Chief Complaint   Patient presents with    Follow Up       Initial BP (!) 142/90   Pulse 85   Ht 1.689 m (5' 6.5\")   Wt 120.7 kg (266 lb)   LMP 04/01/2022 (Exact Date)   SpO2 98%   BMI 42.29 kg/m   Estimated body mass index is 42.29 kg/m  as calculated from the following:    Height as of this encounter: 1.689 m (5' 6.5\").    Weight as of this encounter: 120.7 kg (266 lb)..  BP completed using cuff size: shea Linares CMA (AAMA)    "

## 2024-06-03 NOTE — PROGRESS NOTES
Chief complaint: Palpitations    HPI: Ms. Danitza Soto is a 38 year old  female with PMH significant for depression, GERD, LEONARDO, essential hypertension, morbid obesity S/P gastric bypass 11/2018 and paroxysmal atrial fibrillation.    The patient was last seen in cardiology for paroxysmal atrial fibrillation diagnosis in 9/2018.  Her symptoms were mild at that time therefore rate or rhythm control strategy was not pursued.  The patient underwent gastric sleeve surgery in 11/2018 and has lost 70 pounds.  The patient noticed more frequent atrial fibrillation episodes since the surgery almost weekly now lasting up to 8-10 hours.  She finds works stress as a prominent trigger for episodes.  Associated symptoms include chest pressure, chest pain and dizziness.  She denies chest discomfort with exertion at other times.  The patient denies a history of dyspnea, PND, orthopnea, pedal edema, and syncope  Current medications include lisinopril 20 mg, fish oil.  She has cut down on lisinopril from 40 mg since she has lost significant weight.  She quit smoking in 2018 (10 pack years).  No history of alcohol abuse. No history of diabetes.  Family history is negative for CAD.  Prior cardiac tests include CT coronary angiogram in 2013 which did not show evidence of CAD.  Echocardiogram dated 9/2018 showed normal LV and RV function with no significant valve disease.  Moderate LVH was reported. I personnally reviewed the images and I donot agree with the LVH diagnosis. I donot think she has LVH based on echo. Zio patch in 8/2018 showed 4% atrial fibrillation burden heart rate ranged  beats per minute.  Longest episode lasting for 8 hours.    I have reviewed her ECG 11/28/2018 which shows normal sinus rhythm with single PAC, normal W[place]/QRS/QTC intervals. No evidence of LVH on ECG.    Medications, personal, family, and social history reviewed with patient and revised.    Interval history 5/14/2019:  Patient returns for  follow-up to recheck on paroxysmal atrial fibrillation.  Since I saw 3 months ago, patient developed depression and started on Effexor which improved her mood.  I have started her on flecainide and metoprolol 3 months ago for frequent paroxysmal atrial fibrillation episodes.  She noticed significant decline in the frequency of the episodes however she still reports at least 2 episodes per month sometimes lasting for several hours.  She reports associated dizziness however denies chest pain, shortness of breath, lower extremity edema or syncope.  Since gastric bypass patient lost significant weight with improvement in blood pressure and sleep apnea.  She no longer uses CPAP machine.  She is overall feeling well.    Interval history 8/14/2019:  The patient returns for follow-up to recheck on paroxysmal atrial fibrillation.  When I saw patient last time 3 months ago I increased dose of flecainide to 100 mg twice daily due to symptomatic palpitations.  The patient reports doing well during the first 2 months however had 3 episodes within the last month longest episode lasting for 3 hours with associated tiredness.  Denies chest pain, dyspnea on exertion, dizziness, syncope.  The patient has did a lot of research on atrial fibrillation ablation and she expressed her interest in undergoing procedure.    Interval history 2/18/2020:  Patient underwent catheter ablation of atrial fibrillation on 10/3/2019.  Patient feels great since the ablation.  No recurrence of A. fib since then.  She is currently on flecainide 100 mg twice daily, metoprolol 50 mg and apixaban 5 mg twice daily.  Blood pressure is well controlled with lisinopril 20 mg.  Patient is currently asymptomatic from cardiac standpoint.  Denies chest pain, shortness of breath, palpitations, dizziness, lower extremity edema or syncope.    Interval history 9/1/2020:  Patient reports no new cardiac symptoms since being seen in February 2020.  Denies palpitations,  chest pain, shortness of breath.  Patient reports high blood pressure at home ~140/80 mmHg despite being on lisinopril 40 mg.  She reports being back on CPAP due to weight gain though she had gastric bypass surgery.  She continues to be on topiramate and naltrexone for obesity.  No alcohol or drug abuse.    Interval history 11/12/2021:  Patient is being seen today for annual follow-up. She reports occasional flutters less than 1 minute (once a month). Otherwise she has not felt any recurrent atrial fibrillation since catheter ablation. Denies chest pain, shortness of breath, dizziness, syncope or lower extremity edema.  Recently Effexor dose was increased from 75 mg to 150 mg mg daily. Patient's blood pressure is mildly elevated today. She does not monitor blood pressure at home.    Interval history 12/21/2022:  Patient is being seen today for follow-up.  Over the last 3 months or so she has started feeling palpitations lasting for 5 to 10 minutes.  This is new to her since ablation.  Sometimes episodes are clustered few days in a row.  Over the last month she had 3 or 4 episodes like that.  She does not have any other cardiac symptoms.  No alcohol.  Reports normal blood pressure at home at 120/80 mmHg.  She has not been using her CPAP because the mask does not fit her very well.  She needs to get a new mask.    Interval history 9/15/2023:  Patient is being seen today for annual follow-up.  As you know she has moderate sleep apnea but she cannot tolerate CPAP well.  She was recommended mandibular device.    Patient tells me that over the last 1 to 2 months she has noticed more palpitations associated with dizziness.  Palpitations can be on and off throughout all day.  She feels almost every day.  Denies chest pain, syncope, shortness of breath.  She has noticed that her blood pressure is not well controlled.  In clinic today her diastolic blood pressure is mildly high at 86 mmHg.  Systolic blood pressures 129  mmHg.    Interval history 6/3/2024:  Patient is being seen today for dizzy episodes over the last few weeks or so.  She feels dizzy sitting or standing.  Does not change with head movements.  Otherwise denies palpitations, chest pain, shortness of breath, syncope or lower extremity edema.  She tells me that she has stopped taking amlodipine several months ago because she forgets taking it.  She does not use drugs.  No alcohol no smoking but she vapes.    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 90 tablet 1    Ascorbic Acid (VITAMIN C) 500 MG CAPS Take by mouth every evening      calcium carbonate (OS-CHIKI) 500 MG tablet Take 1 tablet by mouth every evening      Cyanocobalamin (B-12) 1000 MCG TBCR Take by mouth every evening      doxycycline hyclate (VIBRA-TABS) 100 MG tablet Take 1 tablet (100 mg) by mouth 2 times daily 20 tablet 0    hydrochlorothiazide (HYDRODIURIL) 25 MG tablet Take 1 tablet by mouth daily. 90 tablet 1    ibuprofen (ADVIL/MOTRIN) 200 MG tablet Take 200 mg by mouth every 4 hours as needed for pain      lisinopril (ZESTRIL) 40 MG tablet Take 1 tablet by mouth every morning. 90 tablet 1    magnesium 250 MG tablet Take 1 tablet by mouth every evening      metoprolol succinate ER (TOPROL XL) 25 MG 24 hr tablet Take 1 tablet by mouth daily. 90 tablet 1    Multiple Vitamins-Iron (MULTI-DAY PLUS IRON PO) Take by mouth every morning       omeprazole (PRILOSEC) 40 MG DR capsule Take 1 capsule by mouth every morning. 90 capsule 2    venlafaxine (EFFEXOR XR) 150 MG 24 hr capsule TAKE 1 CAPSULE(150 MG) BY MOUTH DAILY 90 capsule 3    vitamin B1 (THIAMINE) 250 MG tablet Take 250 mg by mouth every evening      Vitamin D (Cholecalciferol) 25 MCG (1000 UT) TABS Take by mouth every evening      zolpidem (AMBIEN) 5 MG tablet Take tablet by mouth 15 minutes prior to sleep as needed. Pharmacist to review side effects. 30 tablet 0    atorvastatin (LIPITOR) 10 MG tablet Take 1 tablet by mouth daily. (Patient not taking: Reported on 6/3/2024) 90 tablet 1       PAST SURGICAL HISTORY:  Past Surgical History:   Procedure Laterality Date    EP ABLATION FOCAL AFIB N/A 10/03/2019    Procedure: EP ABLATION FOCAL AFIB;  Surgeon: Harpreet Arevalo MD;  Location:  OR    ESOPHAGOSCOPY, GASTROSCOPY, DUODENOSCOPY (EGD), COMBINED N/A 2022    Procedure: ESOPHAGOGASTRODUODENOSCOPY, WITH BIOPSY;  Surgeon: Jeff Patino DO;  Location:  GI    HC TOOTH EXTRACTION W/FORCEP      Homer Teeth Extraction    LAPAROSCOPIC GASTRIC SLEEVE N/A 2018    Procedure: Laparoscopic Sleeve Gastrectomy Latex Free;  Surgeon: Artis Tse MD;  Location:  OR    LAPAROSCOPIC HERNIORRHAPHY HIATAL  2018    Procedure: LAPAROSCOPIC  HIATAL Hernia Repair;  Surgeon: Artis Tse MD;  Location:  OR    SALPINGO-OOPHORECTOMY, COMBINED Left 10/20/2021    Mucinous cystadenoma       ALLERGIES:     Allergies   Allergen Reactions    Crestor [Rosuvastatin] Itching    Bupropion Other (See Comments)     Other reaction(s): Chest Pain,Panic attacks, heart/chest pain    Wellbutrin [Bupropion]      Wellbutrin: Panic attacks, heart/chest pain       FAMILY HISTORY:  Family History   Problem Relation Age of Onset    Heart Disease Mother         ,mother had emphesema and  at 62    Hypertension Mother     Allergies Mother     Lipids Mother     Obesity Mother     Respiratory Mother   "       Emphysema    Hypertension Father     Lipids Father     Cancer Father     Prostate Cancer Father     Other Cancer Father     Allergies Sister     Genitourinary Problems Sister     Diabetes Maternal Grandmother         Type 2?    Hypertension Maternal Grandmother     Circulatory Maternal Grandmother         Due to diabetes    Eye Disorder Maternal Grandmother         Macular degeneration/Macular degeneration    Obesity Maternal Grandmother     Alcohol/Drug Maternal Grandfather     Obesity Maternal Grandfather     Cerebrovascular Disease Paternal Grandmother     Psychotic Disorder Paternal Grandfather         Committed Suicide    Depression Paternal Grandfather     Anesthesia Reaction No family hx of     Bleeding Disorder No family hx of     Venous thrombosis No family hx of          SOCIAL HISTORY:  Social History     Tobacco Use    Smoking status: Every Day     Current packs/day: 0.00     Average packs/day: 1 pack/day for 17.4 years (17.4 ttl pk-yrs)     Types: Vaping Device, Cigarettes     Start date: 1/1/2004     Last attempt to quit: 6/1/2021     Years since quitting: 3.0    Smokeless tobacco: Never   Vaping Use    Vaping status: Every Day    Substances: Nicotine, Flavoring    Devices: Disposable   Substance Use Topics    Alcohol use: Not Currently     Alcohol/week: 0.0 standard drinks of alcohol     Comment: Occas.    Drug use: No     ROS:   Constitutional: No fever, chills, or sweats. Weight stable.   Cardiovascular: As per HPI.       Exam:  BP (!) 138/91 (BP Location: Left arm, Patient Position: Sitting, Cuff Size: Adult Large)   Pulse 82   Ht 1.689 m (5' 6.5\")   Wt 120.7 kg (266 lb)   LMP 04/01/2022 (Exact Date)   SpO2 98%   BMI 42.29 kg/m    GENERAL APPEARANCE: alert and no distress  HEENT: no icterus, no central cyanosis  LYMPH/NECK: no adenopathy, no asymmetry, JVP not elevated, no carotid bruits.  RESPIRATORY: lungs clear to auscultation - no rales, rhonchi or wheezes, no use of accessory " muscles, no retractions, respirations are unlabored, normal respiratory rate  CARDIOVASCULAR: regular rhythm, normal S1, S2, no S3 or S4 and no murmur, click or rub, precordium quiet with normal PMI.  Extremities: No edema  NEURO: alert, normal speech,and affect  SKIN: no ecchymoses, no rashes     I have reviewed the labs and personally reviewed the imaging below and made my comment in the assessment and plan.      EP PROCEDURE NOTE 10/3/2019  1. Left Atrial Wide Area Circumferential radiofrequency Ablation.  2. Trans-septal Puncture x2  3. Intracardiac Echocardiography.  4. Invasive Hemodynamic Monitoring.  5. 3D electro-anatomic Mapping using Carto3.  6. Esophageal temperature monitoring.    I have reviewed the labs and personally reviewed the imaging below and made my comment in the assessment and plan.    Labs:  CBC RESULTS:   Lab Results   Component Value Date    WBC 8.5 03/21/2023    WBC 7.4 05/18/2021    RBC 5.07 03/21/2023    RBC 4.90 05/18/2021    HGB 15.1 03/21/2023    HGB 14.4 05/18/2021    HCT 45.2 03/21/2023    HCT 43.7 05/18/2021    MCV 89 03/21/2023    MCV 89 05/18/2021    MCH 29.8 03/21/2023    MCH 29.4 05/18/2021    MCHC 33.4 03/21/2023    MCHC 33.0 05/18/2021    RDW 13.9 03/21/2023    RDW 13.8 05/18/2021     03/21/2023     05/18/2021       BMP RESULTS:  Lab Results   Component Value Date     09/29/2023     05/18/2021    POTASSIUM 3.9 09/29/2023    POTASSIUM 3.7 05/04/2022    POTASSIUM 3.7 05/18/2021    CHLORIDE 100 09/29/2023    CHLORIDE 105 05/04/2022    CHLORIDE 110 (H) 05/18/2021    CO2 25 09/29/2023    CO2 23 05/04/2022    CO2 28 05/18/2021    ANIONGAP 13 09/29/2023    ANIONGAP 8 05/04/2022    ANIONGAP 3 05/18/2021     (H) 09/29/2023    GLC 79 05/04/2022    GLC 81 05/18/2021    BUN 9.2 09/29/2023    BUN 9 05/04/2022    BUN 9 05/18/2021    CR 0.65 09/29/2023    CR 0.68 05/18/2021    GFRESTIMATED >90 09/29/2023    GFRESTIMATED >90 05/18/2021    GFRESTBLACK >90  05/18/2021    CHIKI 9.0 09/29/2023    CHIKI 9.1 05/18/2021        INR RESULTS:  Lab Results   Component Value Date    INR 0.98 11/06/2018       Echocardiogram 9/26/2018  Global and regional left ventricular function is normal with an EF of 60-65%.  Moderate concentric wall thickening consistent with left ventricular  hypertrophy is present.  Right ventricular function, chamber size, wall motion, and thickness are  normal.  Mild biatrial enlargement is present.  No significant valve abnormalities present.  The inferior vena cava was normal in size    Nuclear stress test with Lexiscan 9/6/2018  Normal myocardial SPECT study with a summed stress score of 0.     CT coronary artery angiogram 6/27/2013  No evidence of coronary artery disease    EKG 11/28/2018 normal.    Assessment and Plan:   Ms. Danitza Soto is a 43year old  female with PMH significant for depression, GERD, LEONARDO, essential hypertension, morbid obesity S/P gastric bypass 11/2018 and paroxysmal atrial fibrillation refractory to medical treatment status post PVI on 10/3/2019.    Patient is being seen here for dizziness over the last few weeks.  More noticeable to her over the last 2 days.  Unchanged with body positions or head movement.  She has kind of constant dizziness.  Has little headache.  No palpitations or any other cardiac symptoms.  Does not check her blood pressure at home.  Patient has stopped taking amlodipine several months ago.  Compliant with other medications.  Blood pressure is high in clinic today.      1.  History of paroxysmal atrial fibrillation status post PVI in 2019  -Sinus rhythm in clinic today.    -Continue metoprolol 25 mg daily.  Palpitations (likely related to short SVTs documented on Zio patch ) has improved since being on metoprolol.  No documented A-fib since last being seen.  She is feeling overall well with regards to palpitations/A-fib.    2.  Hypertension:   -Not well controlled.  Currently on lisinopril  hydrochlorothiazide 40/25 mg  -Dizzy spells are likely related to uncontrolled blood pressure.  Restart amlodipine 5 mg.  Monitor blood pressure at home and reach out to us in a month if BP is over 130/80 mmHg.    3.  Obstructive sleep apnea: Moderate.  Does not use CPAP.    4.  Hyperlipidemia:  -Cannot tolerate statins.  -Recheck lipids with PCP next week.  I mention to her about injectable PCSK9 inhibitors if LDL is over 190.  I will obtain a CT calcium score and likely start her on PCSK9 inhibitors.  She is open to injectable cholesterol-lowering treatment.    5.  Morbid obesity: Recommend to discuss with PCP if she can take semaglutide.    Return to clinic 1 year.    Total time spent 40 minutes including precharting, face-to-face clinic visit and medical documentation.       Leena RONDON MD  [facility] Division of Cardiology  Pager [phone]

## 2024-06-03 NOTE — PATIENT INSTRUCTIONS
Thank you for coming to the [facility] at [place]; please note the following instructions:    1. Cardiology Providers: Dr. Stern Can would like you to return for a cardiac follow up in 1 year (June 2025).  We will contact you regarding your appointment when the time draws closer or you may call [phone] option #1 to arrange an appointment. Mean while, if you should have any questions or concerns regarding your heart health, please contact us. Thank you for choosing [facility] for your care.    2. Send us a An[employer]St[employer] message when you have completed your cholesterol check with your primary and we will let you know thoughts about Ozempic.     3. Talk to your primary care provider regarding weight loss.     4. Change how you take your amlodipine to 5 mg from 10 mg per day. Please let us know if your dizziness improves after about a month. If your blood pressure is still over 130/80 after 1 month, you can increase it to 10 mg.       If you have any questions regarding your visit, please contact your care team:     CARDIOLOGY  TELEPHONE NUMBER   Hannah ALLENMary, Registered Nurse  Lou GTZ, Registered Nurse  Ashley COTE, Registered Nurse  Elena HUGHES, Registered Medical Assistant  Marlene DESHPANDE, Certified Medical Assistant  Kristina AZAR, Clinic Assistant [phone] (select option 1)    *After hours: [phone]   For Scheduling Appts:     [phone] (select option 1)    *After hours: [phone]   For the Device Clinic (Pacemakers and ICD's)  Hanh CORTES, Registered Nurse   During business hours: [phone]    *After business hours:  [phone] (select option 4)      Normal test result notifications will be released via An[employer]St[employer] or mailed within 7 business days.  All other test results, will be communicated via telephone once reviewed by your cardiologist.    If you need a medication refill, please contact your pharmacy.  Please allow 3 business days for your refill to be completed.    As always, thank  you for trusting us with your health care needs!

## 2024-06-03 NOTE — LETTER
6/3/2024      RE: Danitza Soto  [address] [facility] [address]       Dear Colleague,    Thank you for the opportunity to participate in the care of your patient, Danitza Soto, at the [facility] at [facility]. Please see a copy of my visit note below.    Chief complaint: Palpitations    HPI: Ms. Danitza Soto is a 38 year old  female with PMH significant for depression, GERD, LEONARDO, essential hypertension, morbid obesity S/P gastric bypass 11/2018 and paroxysmal atrial fibrillation.    The patient was last seen in cardiology for paroxysmal atrial fibrillation diagnosis in 9/2018.  Her symptoms were mild at that time therefore rate or rhythm control strategy was not pursued.  The patient underwent gastric sleeve surgery in 11/2018 and has lost 70 pounds.  The patient noticed more frequent atrial fibrillation episodes since the surgery almost weekly now lasting up to 8-10 hours.  She finds works stress as a prominent trigger for episodes.  Associated symptoms include chest pressure, chest pain and dizziness.  She denies chest discomfort with exertion at other times.  The patient denies a history of dyspnea, PND, orthopnea, pedal edema, and syncope  Current medications include lisinopril 20 mg, fish oil.  She has cut down on lisinopril from 40 mg since she has lost significant weight.  She quit smoking in 2018 (10 pack years).  No history of alcohol abuse. No history of diabetes.  Family history is negative for CAD.  Prior cardiac tests include CT coronary angiogram in 2013 which did not show evidence of CAD.  Echocardiogram dated 9/2018 showed normal LV and RV function with no significant valve disease.  Moderate LVH was reported. I personnally reviewed the images and I donot agree with the LVH diagnosis. I donot think she has LVH based on echo. Zio patch in 8/2018 showed 4% atrial fibrillation burden heart rate ranged   beats per minute.  Longest episode lasting for 8 hours.    I have reviewed her ECG 11/28/2018 which shows normal sinus rhythm with single PAC, normal RI/QRS/QTC intervals. No evidence of LVH on ECG.    Medications, personal, family, and social history reviewed with patient and revised.    Interval history 5/14/2019:  Patient returns for follow-up to recheck on paroxysmal atrial fibrillation.  Since I saw 3 months ago, patient developed depression and started on Effexor which improved her mood.  I have started her on flecainide and metoprolol 3 months ago for frequent paroxysmal atrial fibrillation episodes.  She noticed significant decline in the frequency of the episodes however she still reports at least 2 episodes per month sometimes lasting for several hours.  She reports associated dizziness however denies chest pain, shortness of breath, lower extremity edema or syncope.  Since gastric bypass patient lost significant weight with improvement in blood pressure and sleep apnea.  She no longer uses CPAP machine.  She is overall feeling well.    Interval history 8/14/2019:  The patient returns for follow-up to recheck on paroxysmal atrial fibrillation.  When I saw patient last time 3 months ago I increased dose of flecainide to 100 mg twice daily due to symptomatic palpitations.  The patient reports doing well during the first 2 months however had 3 episodes within the last month longest episode lasting for 3 hours with associated tiredness.  Denies chest pain, dyspnea on exertion, dizziness, syncope.  The patient has did a lot of research on atrial fibrillation ablation and she expressed her interest in undergoing procedure.    Interval history 2/18/2020:  Patient underwent catheter ablation of atrial fibrillation on 10/3/2019.  Patient feels great since the ablation.  No recurrence of A. fib since then.  She is currently on flecainide 100 mg twice daily, metoprolol 50 mg and apixaban 5 mg twice daily.  Blood  pressure is well controlled with lisinopril 20 mg.  Patient is currently asymptomatic from cardiac standpoint.  Denies chest pain, shortness of breath, palpitations, dizziness, lower extremity edema or syncope.    Interval history 9/1/2020:  Patient reports no new cardiac symptoms since being seen in February 2020.  Denies palpitations, chest pain, shortness of breath.  Patient reports high blood pressure at home ~140/80 mmHg despite being on lisinopril 40 mg.  She reports being back on CPAP due to weight gain though she had gastric bypass surgery.  She continues to be on topiramate and naltrexone for obesity.  No alcohol or drug abuse.    Interval history 11/12/2021:  Patient is being seen today for annual follow-up. She reports occasional flutters less than 1 minute (once a month). Otherwise she has not felt any recurrent atrial fibrillation since catheter ablation. Denies chest pain, shortness of breath, dizziness, syncope or lower extremity edema.  Recently Effexor dose was increased from 75 mg to 150 mg mg daily. Patient's blood pressure is mildly elevated today. She does not monitor blood pressure at home.    Interval history 12/21/2022:  Patient is being seen today for follow-up.  Over the last 3 months or so she has started feeling palpitations lasting for 5 to 10 minutes.  This is new to her since ablation.  Sometimes episodes are clustered few days in a row.  Over the last month she had 3 or 4 episodes like that.  She does not have any other cardiac symptoms.  No alcohol.  Reports normal blood pressure at home at 120/80 mmHg.  She has not been using her CPAP because the mask does not fit her very well.  She needs to get a new mask.    Interval history 9/15/2023:  Patient is being seen today for annual follow-up.  As you know she has moderate sleep apnea but she cannot tolerate CPAP well.  She was recommended mandibular device.    Patient tells me that over the last 1 to 2 months she has noticed more  palpitations associated with dizziness.  Palpitations can be on and off throughout all day.  She feels almost every day.  Denies chest pain, syncope, shortness of breath.  She has noticed that her blood pressure is not well controlled.  In clinic today her diastolic blood pressure is mildly high at 86 mmHg.  Systolic blood pressures 129 mmHg.    Interval history 6/3/2024:  Patient is being seen today for dizzy episodes over the last few weeks or so.  She feels dizzy sitting or standing.  Does not change with head movements.  Otherwise denies palpitations, chest pain, shortness of breath, syncope or lower extremity edema.  She tells me that she has stopped taking amlodipine several months ago because she forgets taking it.  She does not use drugs.  No alcohol no smoking but she vapes.    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 90 tablet 1     Ascorbic Acid (VITAMIN C) 500 MG CAPS Take by mouth every evening       calcium carbonate (OS-CHIKI) 500 MG tablet Take 1 tablet by mouth every evening       Cyanocobalamin (B-12) 1000 MCG TBCR Take by mouth every evening       doxycycline hyclate (VIBRA-TABS) 100 MG tablet Take 1 tablet (100 mg) by mouth 2 times daily 20 tablet 0     hydrochlorothiazide (HYDRODIURIL) 25 MG tablet Take 1 tablet by mouth daily. 90 tablet 1     ibuprofen (ADVIL/MOTRIN) 200 MG tablet Take 200 mg by mouth every 4 hours  as needed for pain       lisinopril (ZESTRIL) 40 MG tablet Take 1 tablet by mouth every morning. 90 tablet 1     magnesium 250 MG tablet Take 1 tablet by mouth every evening       metoprolol succinate ER (TOPROL XL) 25 MG 24 hr tablet Take 1 tablet by mouth daily. 90 tablet 1     Multiple Vitamins-Iron (MULTI-DAY PLUS IRON PO) Take by mouth every morning       omeprazole (PRILOSEC) 40 MG DR capsule Take 1 capsule by mouth every morning. 90 capsule 2     venlafaxine (EFFEXOR XR) 150 MG 24 hr capsule TAKE 1 CAPSULE(150 MG) BY MOUTH DAILY 90 capsule 3     vitamin B1 (THIAMINE) 250 MG tablet Take 250 mg by mouth every evening       Vitamin D (Cholecalciferol) 25 MCG (1000 UT) TABS Take by mouth every evening       zolpidem (AMBIEN) 5 MG tablet Take tablet by mouth 15 minutes prior to sleep as needed. Pharmacist to review side effects. 30 tablet 0     atorvastatin (LIPITOR) 10 MG tablet Take 1 tablet by mouth daily. (Patient not taking: Reported on 6/3/2024) 90 tablet 1       PAST SURGICAL HISTORY:  Past Surgical History:   Procedure Laterality Date     EP ABLATION FOCAL AFIB N/A 10/03/2019    Procedure: EP ABLATION FOCAL AFIB;  Surgeon: Harpreet Arevalo MD;  Location:  OR     ESOPHAGOSCOPY, GASTROSCOPY, DUODENOSCOPY (EGD), COMBINED N/A 11/29/2022    Procedure: ESOPHAGOGASTRODUODENOSCOPY, WITH BIOPSY;  Surgeon: Jeff Patino DO;  Location:  GI     HC TOOTH EXTRACTION W/FORCEP  1995    [place] Teeth Extraction     LAPAROSCOPIC GASTRIC SLEEVE N/A 11/27/2018    Procedure: Laparoscopic Sleeve Gastrectomy Latex Free;  Surgeon: Artis Tse MD;  Location:  OR     LAPAROSCOPIC HERNIORRHAPHY HIATAL  11/27/2018    Procedure: LAPAROSCOPIC  HIATAL Hernia Repair;  Surgeon: Artis Tse MD;  Location:  OR     SALPINGO-OOPHORECTOMY, COMBINED Left 10/20/2021    Mucinous cystadenoma       ALLERGIES:     Allergies   Allergen Reactions     Crestor [Rosuvastatin] Itching     Bupropion Other (See Comments)     Other  reaction(s): Chest Pain,Panic attacks, heart/chest pain     Wellbutrin [Bupropion]      Wellbutrin: Panic attacks, heart/chest pain       FAMILY HISTORY:  Family History   Problem Relation Age of Onset     Heart Disease Mother         ,mother had emphesema and  at 62     Hypertension Mother      Allergies Mother      Lipids Mother      Obesity Mother      Respiratory Mother         Emphysema     Hypertension Father      Lipids Father      Cancer Father      Prostate Cancer Father      Other Cancer Father      Allergies Sister      Genitourinary Problems Sister      Diabetes Maternal Grandmother         Type 2?     Hypertension Maternal Grandmother      Circulatory Maternal Grandmother         Due to diabetes     Eye Disorder Maternal Grandmother         Macular degeneration/Macular degeneration     Obesity Maternal Grandmother      Alcohol/Drug Maternal Grandfather      Obesity Maternal Grandfather      Cerebrovascular Disease Paternal Grandmother      Psychotic Disorder Paternal Grandfather         Committed Suicide     Depression Paternal Grandfather      Anesthesia Reaction No family hx of      Bleeding Disorder No family hx of      Venous thrombosis No family hx of          SOCIAL HISTORY:  Social History     Tobacco Use     Smoking status: Every Day     Current packs/day: 0.00     Average packs/day: 1 pack/day for 17.4 years (17.4 ttl pk-yrs)     Types: Vaping Device, Cigarettes     Start date: 2004     Last attempt to quit: 2021     Years since quitting: 3.0     Smokeless tobacco: Never   Vaping Use     Vaping status: Every Day     Substances: Nicotine, Flavoring     Devices: Disposable   Substance Use Topics     Alcohol use: Not Currently     Alcohol/week: 0.0 standard drinks of alcohol     Comment: Occas.     Drug use: No     ROS:   Constitutional: No fever, chills, or sweats. Weight stable.   Cardiovascular: As per HPI.       Exam:  BP (!) 138/91 (BP Location: Left arm, Patient Position:  "Sitting, Cuff Size: Adult Large)   Pulse 82   Ht 1.689 m (5' 6.5\")   Wt 120.7 kg (266 lb)   LMP 04/01/2022 (Exact Date)   SpO2 98%   BMI 42.29 kg/m    GENERAL APPEARANCE: alert and no distress  HEENT: no icterus, no central cyanosis  LYMPH/NECK: no adenopathy, no asymmetry, JVP not elevated, no carotid bruits.  RESPIRATORY: lungs clear to auscultation - no rales, rhonchi or wheezes, no use of accessory muscles, no retractions, respirations are unlabored, normal respiratory rate  CARDIOVASCULAR: regular rhythm, normal S1, S2, no S3 or S4 and no murmur, click or rub, precordium quiet with normal PMI.  Extremities: No edema  NEURO: alert, normal speech,and affect  SKIN: no ecchymoses, no rashes     I have reviewed the labs and personally reviewed the imaging below and made my comment in the assessment and plan.      EP PROCEDURE NOTE 10/3/2019  1. Left Atrial Wide Area Circumferential radiofrequency Ablation.  2. Trans-septal Puncture x2  3. Intracardiac Echocardiography.  4. Invasive Hemodynamic Monitoring.  5. 3D electro-anatomic Mapping using Carto3.  6. Esophageal temperature monitoring.    I have reviewed the labs and personally reviewed the imaging below and made my comment in the assessment and plan.    Labs:  CBC RESULTS:   Lab Results   Component Value Date    WBC 8.5 03/21/2023    WBC 7.4 05/18/2021    RBC 5.07 03/21/2023    RBC 4.90 05/18/2021    HGB 15.1 03/21/2023    HGB 14.4 05/18/2021    HCT 45.2 03/21/2023    HCT 43.7 05/18/2021    MCV 89 03/21/2023    MCV 89 05/18/2021    MCH 29.8 03/21/2023    MCH 29.4 05/18/2021    MCHC 33.4 03/21/2023    MCHC 33.0 05/18/2021    RDW 13.9 03/21/2023    RDW 13.8 05/18/2021     03/21/2023     05/18/2021       BMP RESULTS:  Lab Results   Component Value Date     09/29/2023     05/18/2021    POTASSIUM 3.9 09/29/2023    POTASSIUM 3.7 05/04/2022    POTASSIUM 3.7 05/18/2021    CHLORIDE 100 09/29/2023    CHLORIDE 105 05/04/2022    CHLORIDE 110 " (H) 05/18/2021    CO2 25 09/29/2023    CO2 23 05/04/2022    CO2 28 05/18/2021    ANIONGAP 13 09/29/2023    ANIONGAP 8 05/04/2022    ANIONGAP 3 05/18/2021     (H) 09/29/2023    GLC 79 05/04/2022    GLC 81 05/18/2021    BUN 9.2 09/29/2023    BUN 9 05/04/2022    BUN 9 05/18/2021    CR 0.65 09/29/2023    CR 0.68 05/18/2021    GFRESTIMATED >90 09/29/2023    GFRESTIMATED >90 05/18/2021    GFRESTBLACK >90 05/18/2021    CHIKI 9.0 09/29/2023    CHIKI 9.1 05/18/2021        INR RESULTS:  Lab Results   Component Value Date    INR 0.98 11/06/2018       Echocardiogram 9/26/2018  Global and regional left ventricular function is normal with an EF of 60-65%.  Moderate concentric wall thickening consistent with left ventricular  hypertrophy is present.  Right ventricular function, chamber size, wall motion, and thickness are  normal.  Mild biatrial enlargement is present.  No significant valve abnormalities present.  The inferior vena cava was normal in size    Nuclear stress test with Lexiscan 9/6/2018  Normal myocardial SPECT study with a summed stress score of 0.     CT coronary artery angiogram 6/27/2013  No evidence of coronary artery disease    EKG 11/28/2018 normal.    Assessment and Plan:   Ms. Danitza Soto is a 43year old  female with PMH significant for depression, GERD, LEONARDO, essential hypertension, morbid obesity S/P gastric bypass 11/2018 and paroxysmal atrial fibrillation refractory to medical treatment status post PVI on 10/3/2019.    Patient is being seen here for dizziness over the last few weeks.  More noticeable to her over the last 2 days.  Unchanged with body positions or head movement.  She has kind of constant dizziness.  Has little headache.  No palpitations or any other cardiac symptoms.  Does not check her blood pressure at home.  Patient has stopped taking amlodipine several months ago.  Compliant with other medications.  Blood pressure is high in clinic today.      1.  History of paroxysmal atrial  fibrillation status post PVI in 2019  -Sinus rhythm in clinic today.    -Continue metoprolol 25 mg daily.  Palpitations (likely related to short SVTs documented on Zio patch ) has improved since being on metoprolol.  No documented A-fib since last being seen.  She is feeling overall well with regards to palpitations/A-fib.    2.  Hypertension:   -Not well controlled.  Currently on lisinopril hydrochlorothiazide 40/25 mg  -Dizzy spells are likely related to uncontrolled blood pressure.  Restart amlodipine 5 mg.  Monitor blood pressure at home and reach out to us in a month if BP is over 130/80 mmHg.    3.  Obstructive sleep apnea: Moderate.  Does not use CPAP.    4.  Hyperlipidemia:  -Cannot tolerate statins.  -Recheck lipids with PCP next week.  I mention to her about injectable PCSK9 inhibitors if LDL is over 190.  I will obtain a CT calcium score and likely start her on PCSK9 inhibitors.  She is open to injectable cholesterol-lowering treatment.    5.  Morbid obesity: Recommend to discuss with PCP if she can take semaglutide.    Return to clinic 1 year.    Total time spent 40 minutes including precharting, face-to-face clinic visit and medical documentation.       Leena RONDON MD  [facility] Division of Cardiology  Pager [phone]

## 2024-06-08 ENCOUNTER — LAB (OUTPATIENT)
Dept: LAB | Facility: CLINIC | Age: 44
End: 2024-06-08
Payer: COMMERCIAL

## 2024-06-08 DIAGNOSIS — R73.01 ELEVATED FASTING GLUCOSE: ICD-10-CM

## 2024-06-08 DIAGNOSIS — E78.00 HYPERCHOLESTEREMIA: ICD-10-CM

## 2024-06-08 DIAGNOSIS — L73.9 FOLLICULITIS: ICD-10-CM

## 2024-06-08 DIAGNOSIS — R53.83 FATIGUE, UNSPECIFIED TYPE: ICD-10-CM

## 2024-06-08 LAB
ALBUMIN SERPL BCG-MCNC: 4.1 G/DL (ref 3.5–5.2)
ALP SERPL-CCNC: 83 U/L (ref 40–150)
ALT SERPL W P-5'-P-CCNC: 15 U/L (ref 0–50)
ANION GAP SERPL CALCULATED.3IONS-SCNC: 11 MMOL/L (ref 7–15)
AST SERPL W P-5'-P-CCNC: 17 U/L (ref 0–45)
BILIRUB SERPL-MCNC: 0.4 MG/DL
BUN SERPL-MCNC: 6.6 MG/DL (ref 6–20)
CALCIUM SERPL-MCNC: 9.1 MG/DL (ref 8.6–10)
CHLORIDE SERPL-SCNC: 102 MMOL/L (ref 98–107)
CHOLEST SERPL-MCNC: 271 MG/DL
CREAT SERPL-MCNC: 0.66 MG/DL (ref 0.51–0.95)
DEPRECATED HCO3 PLAS-SCNC: 24 MMOL/L (ref 22–29)
EGFRCR SERPLBLD CKD-EPI 2021: >90 ML/MIN/1.73M2
ERYTHROCYTE [DISTWIDTH] IN BLOOD BY AUTOMATED COUNT: 13.8 % (ref 10–15)
FASTING STATUS PATIENT QL REPORTED: YES
FASTING STATUS PATIENT QL REPORTED: YES
GLUCOSE SERPL-MCNC: 118 MG/DL (ref 70–99)
HBA1C MFR BLD: 5.6 % (ref 0–5.6)
HCT VFR BLD AUTO: 46.6 % (ref 35–47)
HDLC SERPL-MCNC: 40 MG/DL
HGB BLD-MCNC: 15.3 G/DL (ref 11.7–15.7)
LDLC SERPL CALC-MCNC: 203 MG/DL
MCH RBC QN AUTO: 29.1 PG (ref 26.5–33)
MCHC RBC AUTO-ENTMCNC: 32.8 G/DL (ref 31.5–36.5)
MCV RBC AUTO: 89 FL (ref 78–100)
NONHDLC SERPL-MCNC: 231 MG/DL
PLATELET # BLD AUTO: 362 10E3/UL (ref 150–450)
POTASSIUM SERPL-SCNC: 3.9 MMOL/L (ref 3.4–5.3)
PROT SERPL-MCNC: 6.8 G/DL (ref 6.4–8.3)
RBC # BLD AUTO: 5.26 10E6/UL (ref 3.8–5.2)
SODIUM SERPL-SCNC: 137 MMOL/L (ref 135–145)
TRIGL SERPL-MCNC: 140 MG/DL
TSH SERPL DL<=0.005 MIU/L-ACNC: 3.85 UIU/ML (ref 0.3–4.2)
VIT B12 SERPL-MCNC: 680 PG/ML (ref 232–1245)
VIT D+METAB SERPL-MCNC: 43 NG/ML (ref 20–50)
WBC # BLD AUTO: 7.2 10E3/UL (ref 4–11)

## 2024-06-08 PROCEDURE — 85027 COMPLETE CBC AUTOMATED: CPT

## 2024-06-08 PROCEDURE — 80053 COMPREHEN METABOLIC PANEL: CPT

## 2024-06-08 PROCEDURE — 84443 ASSAY THYROID STIM HORMONE: CPT

## 2024-06-08 PROCEDURE — 83036 HEMOGLOBIN GLYCOSYLATED A1C: CPT

## 2024-06-08 PROCEDURE — 80061 LIPID PANEL: CPT

## 2024-06-08 PROCEDURE — 82607 VITAMIN B-12: CPT

## 2024-06-08 PROCEDURE — 36415 COLL VENOUS BLD VENIPUNCTURE: CPT

## 2024-06-08 PROCEDURE — 82306 VITAMIN D 25 HYDROXY: CPT

## 2024-06-09 ENCOUNTER — MYC MEDICAL ADVICE (OUTPATIENT)
Dept: CARDIOLOGY | Facility: CLINIC | Age: 44
End: 2024-06-09

## 2024-06-09 DIAGNOSIS — Z78.9 STATIN INTOLERANCE: ICD-10-CM

## 2024-06-09 DIAGNOSIS — E78.5 HYPERLIPIDEMIA LDL GOAL <100: Primary | ICD-10-CM

## 2024-06-10 RX ORDER — EVOLOCUMAB 140 MG/ML
140 INJECTION, SOLUTION SUBCUTANEOUS
Qty: 6 ML | Refills: 3 | Status: SHIPPED | OUTPATIENT
Start: 2024-06-10 | End: 2024-10-02

## 2024-06-10 NOTE — TELEPHONE ENCOUNTER
Leena Rondon MD You32 minutes ago (9:49 AM)     IC  Can you send her Repatha twice weekly.  Recheck lipids in 3 months.     MD Trish Abrams33 minutes ago (9:48 AM)     IC  Will send you the shots.  Twice a month.  We need to check your cholesterol in 3 months.     Dr RONDON    Fasting lab order entered to be done in 3 months. Repatha prescription sent to pharmacy for injection twice a month. [employer] message sent to patient.    Lou Mcdowell RN, BSN  Cardiology RN Care Coordinator   [place]   Phone: [phone]  Fax: [phone] ([place]) [phone] ([place])

## 2024-06-12 ENCOUNTER — TELEPHONE (OUTPATIENT)
Dept: CARDIOLOGY | Facility: CLINIC | Age: 44
End: 2024-06-12

## 2024-06-12 NOTE — TELEPHONE ENCOUNTER
PA request for Repatha-  No active insurance found in chart, calling [facility] [phone]  to see if they have updated insurance.     Per [facility] tech- at this time they also could not find any updated pharmacy benefit insurance information on this patient.  Checked [facility]'s website and no medicaid coverage was found.     Pharmacy has reached out to patient to inquire about pharmacy insurance coverage and have not heard back.  Bernadine stated that once the patient contacts them they will process Repatha through insurance and will alert clinic if PA is needed.

## 2024-06-12 NOTE — TELEPHONE ENCOUNTER
Attempted to call patient. Left message for patient.    Lou Mcdowell, RN, BSN  Cardiology RN Care Coordinator   [place]   Phone: [phone]  Fax: [phone] ([place]) [phone] ([place])

## 2024-06-14 ENCOUNTER — MYC MEDICAL ADVICE (OUTPATIENT)
Dept: FAMILY MEDICINE | Facility: CLINIC | Age: 44
End: 2024-06-14

## 2024-06-14 DIAGNOSIS — E66.01 MORBID OBESITY (H): Primary | ICD-10-CM

## 2024-06-14 NOTE — TELEPHONE ENCOUNTER
Patient saw [employer] message. No response or questions at this time.    Lou Mcdowell, RN, BSN  Cardiology RN Care Coordinator   [place]   Phone: [phone]  Fax: [phone] ([place]) [phone] ([place])

## 2024-06-18 NOTE — TELEPHONE ENCOUNTER
Calling [facility] [phone] to see if they have any updated insurance information for patient, [employer]'s currently shows patient is inactive    Per [facility] pharmacist they do not have any active insurance at this time, the current insurance is inactive as well.

## 2024-06-19 NOTE — TELEPHONE ENCOUNTER
REFERRAL INFORMATION:  Referring Provider:   Referring Clinic:   Reason for Visit/Diagnosis: BMI 42.3, patient had gastric sleeve in 2018 with Dr. Tse       FUTURE VISIT INFORMATION:  Appointment Date: 10/2/2024  Appointment Time: 11 AM     NOTES RECORD STATUS  DETAILS   OFFICE NOTE from Referring Provider N/A    OFFICE NOTE from [facility] - [place]:  6/3/24 - CARDIO OV with Dr. Kumar [place] - [place]:  3/21/23 - PCC OV with Dr. Mcbride    [facility]:  12/7/21 - WEIGHT OV with Clotilde Franklin NP  12/5/19 - WEIGHT OV with PRECIOUS Young  10/4/18 - WEIGHT OV with Dr. Tse   HOSPITAL DISCHARGE SUMMARY/ ED VISITS  N/A    OPERATIVE REPORT Internal [facility]:  11/27/18 - OP Note for LAPAROSCOPIC SLEEVE GASTRECTOMY LATEX FREE with Dr. Tse   PERTINENT LABS Internal    IMAGING (CT, MRI, US, XR)  Internal [facility]:  11/22/19 - US Abdomen      no

## 2024-06-23 ENCOUNTER — HEALTH MAINTENANCE LETTER (OUTPATIENT)
Age: 44
End: 2024-06-23

## 2024-06-25 NOTE — TELEPHONE ENCOUNTER
Called [facility] again, still no updated on patient's active coverage.     Gave the tech my phone number in case the patient does call them back with new insurance. Will postpone encounter until the pharmacy or clinic contacts me to start a PA.

## 2024-07-10 ENCOUNTER — MYC MEDICAL ADVICE (OUTPATIENT)
Dept: CARDIOLOGY | Facility: CLINIC | Age: 44
End: 2024-07-10

## 2024-07-15 ENCOUNTER — PATIENT OUTREACH (OUTPATIENT)
Dept: CARE COORDINATION | Facility: CLINIC | Age: 44
End: 2024-07-15

## 2024-07-15 NOTE — TELEPHONE ENCOUNTER
Attempted to call patient. Left message for patient.    Lou Mcdowell, RN, BSN  Cardiology RN Care Coordinator   [place]   Phone: [phone]  Fax: [phone] ([place]) [phone] ([place])

## 2024-07-16 NOTE — TELEPHONE ENCOUNTER
Second attempt to call patient. Unable to reach patient.    Lou Mcdowell, RN, BSN  Cardiology RN Care Coordinator   [place]   Phone: [phone]  Fax: [phone] ([place]) [phone] ([place])

## 2024-07-17 NOTE — TELEPHONE ENCOUNTER
Multiple attempts to call patient. [employer] message was sent and read by patient. No response at this time.    Lou Mcdowell, RN, BSN  Cardiology RN Care Coordinator   [place]   Phone: [phone]  Fax: [phone] ([place]) [phone] ([place])

## 2024-07-18 DIAGNOSIS — F41.9 ANXIETY: ICD-10-CM

## 2024-07-19 RX ORDER — VENLAFAXINE HYDROCHLORIDE 150 MG/1
CAPSULE, EXTENDED RELEASE ORAL
Qty: 90 CAPSULE | Refills: 1 | Status: SHIPPED | OUTPATIENT
Start: 2024-07-19

## 2024-07-24 ENCOUNTER — ORDERS ONLY (AUTO-RELEASED) (OUTPATIENT)
Dept: FAMILY MEDICINE | Facility: CLINIC | Age: 44
End: 2024-07-24

## 2024-07-24 ENCOUNTER — OFFICE VISIT (OUTPATIENT)
Dept: FAMILY MEDICINE | Facility: CLINIC | Age: 44
End: 2024-07-24

## 2024-07-24 VITALS
RESPIRATION RATE: 16 BRPM | DIASTOLIC BLOOD PRESSURE: 80 MMHG | TEMPERATURE: 97.3 F | OXYGEN SATURATION: 96 % | BODY MASS INDEX: 41.28 KG/M2 | HEART RATE: 77 BPM | WEIGHT: 263 LBS | SYSTOLIC BLOOD PRESSURE: 117 MMHG | HEIGHT: 67 IN

## 2024-07-24 DIAGNOSIS — Z23 ENCOUNTER FOR IMMUNIZATION: ICD-10-CM

## 2024-07-24 DIAGNOSIS — Z00.00 ENCOUNTER FOR ROUTINE ADULT HEALTH EXAMINATION WITHOUT ABNORMAL FINDINGS: Primary | ICD-10-CM

## 2024-07-24 DIAGNOSIS — I48.0 PAF (PAROXYSMAL ATRIAL FIBRILLATION) (H): ICD-10-CM

## 2024-07-24 DIAGNOSIS — Z12.11 COLON CANCER SCREENING: ICD-10-CM

## 2024-07-24 DIAGNOSIS — Z12.31 VISIT FOR SCREENING MAMMOGRAM: ICD-10-CM

## 2024-07-24 DIAGNOSIS — E66.01 MORBID (SEVERE) OBESITY DUE TO EXCESS CALORIES (H): ICD-10-CM

## 2024-07-24 DIAGNOSIS — Z98.84 S/P LAPAROSCOPIC SLEEVE GASTRECTOMY: ICD-10-CM

## 2024-07-24 DIAGNOSIS — K21.9 GASTROESOPHAGEAL REFLUX DISEASE WITHOUT ESOPHAGITIS: ICD-10-CM

## 2024-07-24 DIAGNOSIS — I10 ESSENTIAL HYPERTENSION: ICD-10-CM

## 2024-07-24 PROCEDURE — 90715 TDAP VACCINE 7 YRS/> IM: CPT | Performed by: FAMILY MEDICINE

## 2024-07-24 PROCEDURE — 99214 OFFICE O/P EST MOD 30 MIN: CPT | Mod: 25 | Performed by: FAMILY MEDICINE

## 2024-07-24 PROCEDURE — 99396 PREV VISIT EST AGE 40-64: CPT | Performed by: FAMILY MEDICINE

## 2024-07-24 PROCEDURE — 90471 IMMUNIZATION ADMIN: CPT | Performed by: FAMILY MEDICINE

## 2024-07-24 RX ORDER — HYDROCHLOROTHIAZIDE 25 MG/1
25 TABLET ORAL DAILY
Qty: 90 TABLET | Refills: 1 | Status: SHIPPED | OUTPATIENT
Start: 2024-07-24

## 2024-07-24 RX ORDER — LISINOPRIL 40 MG/1
40 TABLET ORAL EVERY MORNING
Qty: 90 TABLET | Refills: 1 | Status: SHIPPED | OUTPATIENT
Start: 2024-07-24

## 2024-07-24 RX ORDER — AMLODIPINE BESYLATE 10 MG/1
10 TABLET ORAL DAILY
Qty: 90 TABLET | Refills: 1 | Status: SHIPPED | OUTPATIENT
Start: 2024-07-24

## 2024-07-24 RX ORDER — OMEPRAZOLE 40 MG/1
40 CAPSULE, DELAYED RELEASE ORAL DAILY
Qty: 90 CAPSULE | Refills: 2 | Status: SHIPPED | OUTPATIENT
Start: 2024-07-24

## 2024-07-24 RX ORDER — METOPROLOL SUCCINATE 25 MG/1
25 TABLET, EXTENDED RELEASE ORAL DAILY
Qty: 90 TABLET | Refills: 1 | Status: SHIPPED | OUTPATIENT
Start: 2024-07-24

## 2024-07-24 ASSESSMENT — PATIENT HEALTH QUESTIONNAIRE - PHQ9
SUM OF ALL RESPONSES TO PHQ QUESTIONS 1-9: 7
SUM OF ALL RESPONSES TO PHQ QUESTIONS 1-9: 7
10. IF YOU CHECKED OFF ANY PROBLEMS, HOW DIFFICULT HAVE THESE PROBLEMS MADE IT FOR YOU TO DO YOUR WORK, TAKE CARE OF THINGS AT HOME, OR GET ALONG WITH OTHER PEOPLE: VERY DIFFICULT

## 2024-07-24 ASSESSMENT — ANXIETY QUESTIONNAIRES
IF YOU CHECKED OFF ANY PROBLEMS ON THIS QUESTIONNAIRE, HOW DIFFICULT HAVE THESE PROBLEMS MADE IT FOR YOU TO DO YOUR WORK, TAKE CARE OF THINGS AT HOME, OR GET ALONG WITH OTHER PEOPLE: NOT DIFFICULT AT ALL
1. FEELING NERVOUS, ANXIOUS, OR ON EDGE: NOT AT ALL
8. IF YOU CHECKED OFF ANY PROBLEMS, HOW DIFFICULT HAVE THESE MADE IT FOR YOU TO DO YOUR WORK, TAKE CARE OF THINGS AT HOME, OR GET ALONG WITH OTHER PEOPLE?: NOT DIFFICULT AT ALL
GAD7 TOTAL SCORE: 0
2. NOT BEING ABLE TO STOP OR CONTROL WORRYING: NOT AT ALL
GAD7 TOTAL SCORE: 0
GAD7 TOTAL SCORE: 0
7. FEELING AFRAID AS IF SOMETHING AWFUL MIGHT HAPPEN: NOT AT ALL
3. WORRYING TOO MUCH ABOUT DIFFERENT THINGS: NOT AT ALL
7. FEELING AFRAID AS IF SOMETHING AWFUL MIGHT HAPPEN: NOT AT ALL
5. BEING SO RESTLESS THAT IT IS HARD TO SIT STILL: NOT AT ALL
4. TROUBLE RELAXING: NOT AT ALL
6. BECOMING EASILY ANNOYED OR IRRITABLE: NOT AT ALL

## 2024-07-24 ASSESSMENT — PAIN SCALES - GENERAL: PAINLEVEL: MODERATE PAIN (4)

## 2024-07-24 NOTE — PROGRESS NOTES
"Preventive Care Visit  [facility]  Polly Mcbride MD, Family Medicine  Jul 24, 2024      Assessment & Plan     Visit for screening mammogram  Order placed as pt is due for a mammogram   - MA Screening Bilateral w/ Missael; Future    Encounter for routine adult health examination without abnormal findings  Will do a skin check on her moles   - Adult Dermatology  Referral; Future    Colon cancer screening  Prefers to do the cologuard   - COLOGUARD([employer]); Future    Encounter for immunization  Up dated her tetanus immunization   - TDAP 10-64Y (ADACEL,BOOSTRIX)    Essential hypertension  Her BP is well controlled and no side effects , I have refilled her medication   hydrochlorothiazide (HYDRODIURIL) 25 MG tablet; Take 1 tablet (25 mg) by mouth daily  - lisinopril (ZESTRIL) 40 MG tablet; Take 1 tablet (40 mg) by mouth every morning    PAF (paroxysmal atrial fibrillation) (H)  Doing well and has been stable on the metoprolol   - metoprolol succinate ER (TOPROL XL) 25 MG 24 hr tablet; Take 1 tablet (25 mg) by mouth daily  -  Morbid (severe) obesity due to excess calories (H)  Refilled , doing well on the omeprazole 40 mg daily , no side effects   - omeprazole (PRILOSEC) 40 MG DR capsule; Take 1 capsule (40 mg) by mouth daily    S/P laparoscopic sleeve gastrectomy  Doing well , has seen GI in the past   - omeprazole (PRILOSEC) 40 MG DR capsule; Take 1 capsule (40 mg) by mouth daily    Gastroesophageal reflux disease without esophagitis  Refilled   - omeprazole (PRILOSEC) 40 MG DR capsule; Take 1 capsule (40 mg) by mouth daily    Primary hypertension  She has been on the amlodipine as well and controlling her BP , no side effects , refilled   - amLODIPine (NORVASC) 10 MG tablet; Take 1 tablet (10 mg) by mouth daily          BMI  Estimated body mass index is 41.5 kg/m  as calculated from the following:    Height as of this encounter: 1.695 m (5' 6.75\").    Weight as of this encounter: 119.3 " kg (263 lb).             Nicol Chambers is a 43 year old, presenting for the following:  Physical  Fatigue insomnia , no hot flashes ,   Dizziness in the past and now t is better         7/24/2024     8:56 AM   Additional Questions   Roomed by José Miguel OCONNOR        Via the Health Maintenance questionnaire, the patient has reported the following services have been completed -Mammogram: [facility] 2022-08-15, this information has not been sent to the abstraction team.  Health Care Directive  Patient does not have a Health Care Directive or Living Will: Advance Directive received and scanned. Click on Code in the patient header to view.  Answers submitted by the patient for this visit:  Patient Health Questionnaire (Submitted on 7/24/2024)  If you checked off any problems, how difficult have these problems made it for you to do your work, take care of things at home, or get along with other people?: Very difficult  PHQ9 TOTAL SCORE: 7  GBARIELA-7 (Submitted on 7/24/2024)  GABRIELA 7 TOTAL SCORE: 0    HPI  .         No data to display                  3/21/2023   Nutrition   Three or more servings of calcium each day? No   Diet: regular (no restrictions)            3/21/2023   Exercise   Frequency of exercise: None            11/28/2023   Social Factors   Worry food won't last until get money to buy more No   Food not last or not have enough money for food? No   Do you have housing? (Housing is defined as stable permanent housing and does not include staying ouside in a car, in a tent, in an abandoned building, in an overnight shelter, or couch-surfing.) Yes   Are you worried about losing your housing? No   Lack of transportation? No   Unable to get utilities (heat,electricity)? No            3/21/2023   Dental   Dentist two times every year? No             Today's PHQ-9 Score:       7/24/2024     8:26 AM   PHQ-9 SCORE   PHQ-9 Total Score MyChart 7 (Mild depression)   PHQ-9 Total Score 7 7/24/2024    Substance Use   If I could quit smoking, I would Neutral   I want to quit somking, worry about health affects Neutral   Willing to make a plan to quit smoking Neutral   Willing to cut down before quitting Neutral        Social History     Tobacco Use    Smoking status: Former     Current packs/day: 0.00     Average packs/day: 1 pack/day for 17.4 years (17.4 ttl pk-yrs)     Types: Cigarettes     Start date: 1/1/2004     Quit date: 6/1/2021     Years since quitting: 3.1    Smokeless tobacco: Never   Vaping Use    Vaping status: Every Day    Substances: Nicotine, Flavoring    Devices: Disposable   Substance Use Topics    Alcohol use: Not Currently     Comment: Occas.    Drug use: No           12/2/2021   LAST FHS-7 RESULTS   1st degree relative breast or ovarian cancer No   Any relative bilateral breast cancer No   Any male have breast cancer No   Any ONE woman have BOTH breast AND ovarian cancer No   Any woman with breast cancer before 50yrs No   2 or more relatives with breast AND/OR ovarian cancer No   2 or more relatives with breast AND/OR bowel cancer No           Mammogram Screening - Mammogram every 1-2 years updated in Health Maintenance based on mutual decision making      History of abnormal Pap smear: No - age 30-64 HPV with reflex Pap every 5 years recommended        Latest Ref Rng & Units 4/8/2019     8:44 AM 4/8/2019     8:40 AM 3/21/2017    12:45 PM   PAP / HPV   PAP (Historical)  NIL      HPV 16 DNA NEG^Negative  Negative  Negative    HPV 18 DNA NEG^Negative  Negative  Negative    Other HR HPV NEG^Negative  Negative  Negative      ASCVD Risk   The 10-year ASCVD risk score (Adriana FLORES, et al., 2019) is: 3.4%    Values used to calculate the score:      Age: 43 years      Sex: Female      Is Non- : No      Diabetic: No      Tobacco smoker: No      Systolic Blood Pressure: 138 mmHg      Is BP treated: Yes      HDL Cholesterol: 40 mg/dL      Total Cholesterol: 271 mg/dL        Reviewed and updated as needed this visit by Provider                    Past Medical History:   Diagnosis Date    Antiplatelet or antithrombotic long-term use     resolved    Arrhythmia     BV (bacterial vaginosis) 9/18/2020    Depressive disorder 1990    Esophageal reflux     Hearing problem 2018    Hyperlipidemia LDL goal <130 07/06/2015    Hypertension     LSIL (low grade squamous intraepithelial lesion) on Pap smear 06/2014    + HPV, unable to type colp - BRISEYDA I    LEONARDO on CPAP     Other anxiety states      Past Surgical History:   Procedure Laterality Date    EP ABLATION FOCAL AFIB N/A 10/03/2019    Procedure: EP ABLATION FOCAL AFIB;  Surgeon: Harpreet Arevalo MD;  Location:  OR    ESOPHAGOSCOPY, GASTROSCOPY, DUODENOSCOPY (EGD), COMBINED N/A 11/29/2022    Procedure: ESOPHAGOGASTRODUODENOSCOPY, WITH BIOPSY;  Surgeon: Jeff Patino DO;  Location:  GI    HC TOOTH EXTRACTION W/FORCEP  1995    [place] Teeth Extraction    LAPAROSCOPIC GASTRIC SLEEVE N/A 11/27/2018    Procedure: Laparoscopic Sleeve Gastrectomy Latex Free;  Surgeon: Artis Tse MD;  Location:  OR    LAPAROSCOPIC HERNIORRHAPHY HIATAL  11/27/2018    Procedure: LAPAROSCOPIC  HIATAL Hernia Repair;  Surgeon: Artis Tse MD;  Location:  OR    SALPINGO-OOPHORECTOMY, COMBINED Left 10/20/2021    Mucinous cystadenoma     Lab work is in process  Labs reviewed in EPIC  BP Readings from Last 3 Encounters:   07/24/24 117/80   06/03/24 (!) 138/91   09/15/23 129/86    Wt Readings from Last 3 Encounters:   07/24/24 119.3 kg (263 lb)   06/03/24 120.7 kg (266 lb)   09/15/23 117 kg (258 lb)                  Patient Active Problem List   Diagnosis    Anxiety    Gastroesophageal reflux disease without esophagitis    Tobacco use disorder    CARDIOVASCULAR SCREENING; LDL GOAL LESS THAN 130    Primary hypertension    Acne    Papanicolaou smear of cervix with low grade squamous intraepithelial lesion (LGSIL)    Mixed hyperlipidemia    LEONARDO (obstructive  sleep apnea)    S/P ablation of atrial fibrillation    S/P laparoscopic sleeve gastrectomy    Strain of lumbar region, initial encounter    Strain of hip and thigh, right, initial encounter    Insomnia, unspecified type    Memory loss    Gastroesophageal reflux disease with esophagitis without hemorrhage    Fatigue, unspecified type     Past Surgical History:   Procedure Laterality Date    EP ABLATION FOCAL AFIB N/A 10/03/2019    Procedure: EP ABLATION FOCAL AFIB;  Surgeon: Harpreet Arevalo MD;  Location:  OR    ESOPHAGOSCOPY, GASTROSCOPY, DUODENOSCOPY (EGD), COMBINED N/A 2022    Procedure: ESOPHAGOGASTRODUODENOSCOPY, WITH BIOPSY;  Surgeon: Jeff Patino DO;  Location:  GI    HC TOOTH EXTRACTION W/FORCEP      [place] Teeth Extraction    LAPAROSCOPIC GASTRIC SLEEVE N/A 2018    Procedure: Laparoscopic Sleeve Gastrectomy Latex Free;  Surgeon: Artis Tse MD;  Location:  OR    LAPAROSCOPIC HERNIORRHAPHY HIATAL  2018    Procedure: LAPAROSCOPIC  HIATAL Hernia Repair;  Surgeon: Artis Tse MD;  Location:  OR    SALPINGO-OOPHORECTOMY, COMBINED Left 10/20/2021    Mucinous cystadenoma       Social History     Tobacco Use    Smoking status: Former     Current packs/day: 0.00     Average packs/day: 1 pack/day for 17.4 years (17.4 ttl pk-yrs)     Types: Cigarettes     Start date: 2004     Quit date: 2021     Years since quitting: 3.1    Smokeless tobacco: Never   Substance Use Topics    Alcohol use: Not Currently     Comment: Occas.     Family History   Problem Relation Age of Onset    Heart Disease Mother         ,mother had emphesema and  at 62    Hypertension Mother     Allergies Mother     Lipids Mother     Obesity Mother     Respiratory Mother         Emphysema    Hypertension Father     Lipids Father     Cancer Father     Prostate Cancer Father     Other Cancer Father     Allergies Sister     Genitourinary Problems Sister     Diabetes Maternal Grandmother          Type 2?    Hypertension Maternal Grandmother     Circulatory Maternal Grandmother         Due to diabetes    Eye Disorder Maternal Grandmother         Macular degeneration/Macular degeneration    Obesity Maternal Grandmother     Alcohol/Drug Maternal Grandfather     Obesity Maternal Grandfather     Cerebrovascular Disease Paternal Grandmother     Psychotic Disorder Paternal Grandfather         Committed Suicide    Depression Paternal Grandfather     Anesthesia Reaction No family hx of     Bleeding Disorder No family hx of     Venous thrombosis No family hx of          Current Outpatient Medications   Medication Sig Dispense Refill    amLODIPine (NORVASC) 10 MG tablet Take 1 tablet (10 mg) by mouth daily 90 tablet 1    Ascorbic Acid (VITAMIN C) 500 MG CAPS Take by mouth every evening      calcium carbonate (OS-CHIKI) 500 MG tablet Take 1 tablet by mouth every evening      Cyanocobalamin (B-12) 1000 MCG TBCR Take by mouth every evening      doxycycline hyclate (VIBRA-TABS) 100 MG tablet Take 1 tablet (100 mg) by mouth 2 times daily 20 tablet 0    hydrochlorothiazide (HYDRODIURIL) 25 MG tablet Take 1 tablet (25 mg) by mouth daily 90 tablet 1    ibuprofen (ADVIL/MOTRIN) 200 MG tablet Take 200 mg by mouth every 4 hours as needed for pain      lisinopril (ZESTRIL) 40 MG tablet Take 1 tablet (40 mg) by mouth every morning 90 tablet 1    magnesium 250 MG tablet Take 1 tablet by mouth every evening      metoprolol succinate ER (TOPROL XL) 25 MG 24 hr tablet Take 1 tablet (25 mg) by mouth daily 90 tablet 1    Multiple Vitamins-Iron (MULTI-DAY PLUS IRON PO) Take by mouth every morning      omeprazole (PRILOSEC) 40 MG DR capsule Take 1 capsule (40 mg) by mouth daily 90 capsule 2    venlafaxine (EFFEXOR XR) 150 MG 24 hr capsule Take 1 capsule by mouth daily. 90 capsule 1    vitamin B1 (THIAMINE) 250 MG tablet Take 250 mg by mouth every evening      Vitamin D (Cholecalciferol) 25 MCG (1000 UT) TABS Take by mouth every evening       "zolpidem (AMBIEN) 5 MG tablet Take tablet by mouth 15 minutes prior to sleep as needed. Pharmacist to review side effects. 30 tablet 0    evolocumab (REPATHA SURECLICK) 140 MG/ML prefilled autoinjector Inject 1 mL (140 mg) Subcutaneous every 14 days (Patient not taking: Reported on 7/24/2024) 6 mL 3     Allergies   Allergen Reactions    Crestor [Rosuvastatin] Itching    Bupropion Other (See Comments)     Other reaction(s): Chest Pain,Panic attacks, heart/chest pain    Wellbutrin [Bupropion]      Wellbutrin: Panic attacks, heart/chest pain     Recent Labs   Lab Test 06/08/24  0958 09/29/23  0840 03/21/23  0816 05/04/22  1144 05/04/22  1144 01/11/22  1230 05/18/21  1029 09/17/20  0950 12/05/19  1021   A1C 5.6  --  5.3  --   --   --  5.2  --  5.4   * 193* 185*  --   --   --   --   --   --    HDL 40* 44* 42*  --   --   --   --   --   --    TRIG 140 127 161*  --   --   --   --   --   --    ALT 15  --  11  --  20   < > 20  --  21   CR 0.66 0.65 0.64  --  0.67   < > 0.68  --  0.69   GFRESTIMATED >90 >90 >90  --  >90   < > >90  --  >90   GFRESTBLACK  --   --   --   --   --   --  >90  --  >90   POTASSIUM 3.9 3.9 4.0   < > 3.7   < > 3.7   < > 3.8   TSH 3.85  --  4.19  --  4.09*   < > 0.64  --   --     < > = values in this interval not displayed.          Review of Systems  Constitutional, HEENT, cardiovascular, pulmonary, GI, , musculoskeletal, neuro, skin, endocrine and psych systems are negative, except as otherwise noted.     Objective    Exam  LMP 04/01/2022 (Exact Date)    Estimated body mass index is 42.29 kg/m  as calculated from the following:    Height as of 6/3/24: 1.689 m (5' 6.5\").    Weight as of 6/3/24: 120.7 kg (266 lb).    Physical Exam  GENERAL: alert and no distress  EYES: Eyes grossly normal to inspection, PERRL and conjunctivae and sclerae normal  HENT: ear canals and TM's normal, nose and mouth without ulcers or lesions  NECK: no adenopathy, no asymmetry, masses, or scars  RESP: lungs clear to " auscultation - no rales, rhonchi or wheezes  BREAST: normal without masses, tenderness or nipple discharge and no palpable axillary masses or adenopathy  CV: regular rate and rhythm, normal S1 S2, no S3 or S4, no murmur, click or rub, no peripheral edema  ABDOMEN: soft, nontender, no hepatosplenomegaly, no masses and bowel sounds normal  MS: no gross musculoskeletal defects noted, no edema  SKIN: no suspicious lesions or rashes  NEURO: Normal strength and tone, mentation intact and speech normal  PSYCH: mentation appears normal, affect normal/bright        Signed Electronically by: Polly Mcbride MD

## 2024-07-24 NOTE — NURSING NOTE
Pt received TDAP vaccine per provider, Dr. Mcbride's, orders.   Prior to immunization administration, verified patients identity using patient s name and date of birth. Pt given VIS form prior to immunization administration.     Patient instructed to remain in clinic for 15 minutes afterwards, and to report any adverse reactions.     Performed by Adrián Oviedo RN on 7/24/2024.

## 2024-09-19 ENCOUNTER — MYC MEDICAL ADVICE (OUTPATIENT)
Dept: FAMILY MEDICINE | Facility: CLINIC | Age: 44
End: 2024-09-19

## 2024-10-02 ENCOUNTER — TELEPHONE (OUTPATIENT)
Dept: ENDOCRINOLOGY | Facility: CLINIC | Age: 44
End: 2024-10-02

## 2024-10-02 ENCOUNTER — VIRTUAL VISIT (OUTPATIENT)
Dept: FAMILY MEDICINE | Facility: CLINIC | Age: 44
End: 2024-10-02
Payer: COMMERCIAL

## 2024-10-02 ENCOUNTER — PRE VISIT (OUTPATIENT)
Dept: ENDOCRINOLOGY | Facility: CLINIC | Age: 44
End: 2024-10-02

## 2024-10-02 ENCOUNTER — VIRTUAL VISIT (OUTPATIENT)
Dept: ENDOCRINOLOGY | Facility: CLINIC | Age: 44
End: 2024-10-02
Payer: COMMERCIAL

## 2024-10-02 VITALS — HEIGHT: 67 IN | WEIGHT: 260 LBS | BODY MASS INDEX: 40.81 KG/M2

## 2024-10-02 DIAGNOSIS — E66.01 MORBID OBESITY (H): ICD-10-CM

## 2024-10-02 DIAGNOSIS — R53.83 FATIGUE, UNSPECIFIED TYPE: Primary | ICD-10-CM

## 2024-10-02 DIAGNOSIS — Z98.84 S/P LAPAROSCOPIC SLEEVE GASTRECTOMY: ICD-10-CM

## 2024-10-02 DIAGNOSIS — E66.813 CLASS 3 OBESITY WITH ALVEOLAR HYPOVENTILATION, SERIOUS COMORBIDITY, AND BODY MASS INDEX (BMI) OF 40.0 TO 44.9 IN ADULT (H): Primary | ICD-10-CM

## 2024-10-02 DIAGNOSIS — M25.50 MULTIPLE JOINT PAIN: ICD-10-CM

## 2024-10-02 DIAGNOSIS — U09.9 LONG COVID: ICD-10-CM

## 2024-10-02 DIAGNOSIS — E66.2 CLASS 3 OBESITY WITH ALVEOLAR HYPOVENTILATION, SERIOUS COMORBIDITY, AND BODY MASS INDEX (BMI) OF 40.0 TO 44.9 IN ADULT (H): Primary | ICD-10-CM

## 2024-10-02 PROCEDURE — 99213 OFFICE O/P EST LOW 20 MIN: CPT | Mod: 95 | Performed by: PHYSICIAN ASSISTANT

## 2024-10-02 PROCEDURE — 99214 OFFICE O/P EST MOD 30 MIN: CPT | Mod: 95 | Performed by: FAMILY MEDICINE

## 2024-10-02 RX ORDER — SEMAGLUTIDE 0.25 MG/.5ML
0.25 INJECTION, SOLUTION SUBCUTANEOUS WEEKLY
Qty: 2 ML | Refills: 0 | Status: SHIPPED | OUTPATIENT
Start: 2024-10-02 | End: 2024-10-17

## 2024-10-02 RX ORDER — SEMAGLUTIDE 0.5 MG/.5ML
0.5 INJECTION, SOLUTION SUBCUTANEOUS WEEKLY
Qty: 2 ML | Refills: 3 | Status: SHIPPED | OUTPATIENT
Start: 2024-10-30 | End: 2024-10-17

## 2024-10-02 ASSESSMENT — PAIN SCALES - PAIN ENJOYMENT GENERAL ACTIVITY SCALE (PEG)
PEG_TOTALSCORE: 7.33
INTERFERED_ENJOYMENT_LIFE: 8
INTERFERED_GENERAL_ACTIVITY: 8
AVG_PAIN_PASTWEEK: 6
AVG_PAIN_PASTWEEK: 6
INTERFERED_GENERAL_ACTIVITY: 8
PEG_TOTALSCORE: 7.33
INTERFERED_ENJOYMENT_LIFE: 8

## 2024-10-02 ASSESSMENT — ANXIETY QUESTIONNAIRES
6. BECOMING EASILY ANNOYED OR IRRITABLE: NOT AT ALL
1. FEELING NERVOUS, ANXIOUS, OR ON EDGE: NOT AT ALL
7. FEELING AFRAID AS IF SOMETHING AWFUL MIGHT HAPPEN: NOT AT ALL
5. BEING SO RESTLESS THAT IT IS HARD TO SIT STILL: NOT AT ALL
7. FEELING AFRAID AS IF SOMETHING AWFUL MIGHT HAPPEN: NOT AT ALL
2. NOT BEING ABLE TO STOP OR CONTROL WORRYING: NOT AT ALL
GAD7 TOTAL SCORE: 2
IF YOU CHECKED OFF ANY PROBLEMS ON THIS QUESTIONNAIRE, HOW DIFFICULT HAVE THESE PROBLEMS MADE IT FOR YOU TO DO YOUR WORK, TAKE CARE OF THINGS AT HOME, OR GET ALONG WITH OTHER PEOPLE: SOMEWHAT DIFFICULT
3. WORRYING TOO MUCH ABOUT DIFFERENT THINGS: NOT AT ALL
GAD7 TOTAL SCORE: 2
GAD7 TOTAL SCORE: 2
4. TROUBLE RELAXING: MORE THAN HALF THE DAYS
8. IF YOU CHECKED OFF ANY PROBLEMS, HOW DIFFICULT HAVE THESE MADE IT FOR YOU TO DO YOUR WORK, TAKE CARE OF THINGS AT HOME, OR GET ALONG WITH OTHER PEOPLE?: SOMEWHAT DIFFICULT

## 2024-10-02 ASSESSMENT — PAIN SCALES - GENERAL: PAINLEVEL: SEVERE PAIN (6)

## 2024-10-02 NOTE — LETTER
10/2/2024       RE: Danitza Soto  [address] [facility] [address]     Dear Colleague,    Thank you for referring your patient, Danitza Soto, to the [facility]. Please see a copy of my visit note below.    New Re-establish Bariatric Surgery Note    RE: Danitza Soto  MR#: [id number]  : 1980  VISIT DATE: Oct 2, 2024      Dear Polly Mcbried,    I had the pleasure of seeing your patient, Danitza Soto, in my post-bariatric surgery assessment clinic.    Assessment & Plan  Problem List Items Addressed This Visit    None  Visit Diagnoses       Morbid obesity (H)               Sleeve  Lost from 308 to 228   Weight regain to 260 today.  Weight stable since last visit .  Last visit with me  and was taking naltrexone and topiramate, lost to follow up and stopped meds   Tried Wegovy and lost 20 lbs but didn't follow up due to stress in life. 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  GI did EGD which was normal.  Requires PPI BID. Continue PPI prescribed by PCP    Follow up:  MTM 6 weeks phone  Sandy 3-4 months return MWM     20 minutes spent by me on the date of the encounter doing chart review, history and exam, documentation and further activities per the note    CHIEF COMPLAINT: Post-bariatric surgery follow-up. 6 years s/p sleeve .    HISTORY OF PRESENT ILLNESS:      10/1/2024     3:32 PM   Questions Regarding Prior Weight Loss Surgery Reviewed With Patient   I had the following weight loss procedure Sleeve Gastrectomy   What year was your surgery? 2018   How has your weight changed since your last visit? I have gained weight   Do you currently have any of the following Sleep Apnea    Heartburn, acid reflux, or GERD (acid reflux disease)?    Hypertension (high blood pressure)?     Hyperlipidemia (high cholesterol, triglycerides)?   Do you have any concerns today? Weight gain       Anti-obesity medications:     Current:   None    Failed/contraindicated:   Topriamate avoid due to fatigue/brain fog at higher doses. Could consider low dose retrial if needed  Avoid phentermine due to HTN and insomnia  Naltrexone could retrial if needed. Tolerated in the past.       Recent diet changes:     -Protein? >60 grams per day   -Water? 48-64 oz daily     Recent exercise/activity changes: no intentional exercise, active at work.     Recent stressors: stress improved the last 1-2 years.  Was high stress [id number]    Recent sleep changes: struggles with insomnia    Vitamins/Labs: MVI BID, no longer taking D and B12    GERD symptoms?: yes takes PPI BID    Weight History:      10/1/2024     3:32 PM   --   What is your highest lifetime weight? 315   What is your lowest weight since surgery? (In pounds) 228     Initial Weight (lbs): 308 lbs  Weight: 117.9 kg (260 lb)  Last Visits Weight: 120.2 kg (265 lb)  Cumulative weight loss (lbs): 48  Weight Loss Percentage: 15.58%        10/1/2024     3:32 PM   Questions Regarding Co-Morbidities and Health Concerns Reviewed With Patient   Pre-diabetes Never   Diabetes II Never   High Blood Pressure Stayed the same   High cholesterol Stayed the same   Heartburn/Reflux Worsened   Sleep apnea Improved   PCOS Never   Back pain Worsened   Joint pain Worsened   Lower leg swelling Never           10/1/2024     3:32 PM   Eating Habits   How many meals do you eat per day? 2   Do you snack between meals? Sometimes   How much food are you eating at each meal? Greater than 1 cup   Are you able to separate your meals and liquids by at least 30 minutes? Sometimes   Are you able to avoid liquid calories? Yes           10/1/2024     3:32 PM   Exercise Questions Reviewed With Patient   How often do you exercise? 1 to 2 times per week   What is the duration of your exercise (in minutes)? 30  Minutes   What types of exercise do you do? walking    climbing stairs at work   What keeps you from being more active? Pain    My ability to walk or move around is limited    Too tired       Social History:      10/1/2024     3:32 PM   --   Are you smoking? Yes   How much are you smoking? I vape   Are you drinking alcohol? No       Medications:  Current Outpatient Medications   Medication Sig Dispense Refill     amLODIPine (NORVASC) 10 MG tablet Take 1 tablet (10 mg) by mouth daily 90 tablet 1     Ascorbic Acid (VITAMIN C) 500 MG CAPS Take by mouth every evening       calcium carbonate (OS-CHIKI) 500 MG tablet Take 1 tablet by mouth every evening       Cyanocobalamin (B-12) 1000 MCG TBCR Take by mouth every evening       doxycycline hyclate (VIBRA-TABS) 100 MG tablet Take 1 tablet (100 mg) by mouth 2 times daily 20 tablet 0     evolocumab (REPATHA SURECLICK) 140 MG/ML prefilled autoinjector Inject 1 mL (140 mg) Subcutaneous every 14 days (Patient not taking: Reported on 7/24/2024) 6 mL 3     hydrochlorothiazide (HYDRODIURIL) 25 MG tablet Take 1 tablet (25 mg) by mouth daily 90 tablet 1     ibuprofen (ADVIL/MOTRIN) 200 MG tablet Take 200 mg by mouth every 4 hours as needed for pain       lisinopril (ZESTRIL) 40 MG tablet Take 1 tablet (40 mg) by mouth every morning 90 tablet 1     magnesium 250 MG tablet Take 1 tablet by mouth every evening       metoprolol succinate ER (TOPROL XL) 25 MG 24 hr tablet Take 1 tablet (25 mg) by mouth daily 90 tablet 1     Multiple Vitamins-Iron (MULTI-DAY PLUS IRON PO) Take by mouth every morning       omeprazole (PRILOSEC) 40 MG DR capsule Take 1 capsule (40 mg) by mouth daily 90 capsule 2     venlafaxine (EFFEXOR XR) 150 MG 24 hr capsule Take 1 capsule by mouth daily. 90 capsule 1     vitamin B1 (THIAMINE) 250 MG tablet Take 250 mg by mouth every evening       Vitamin D (Cholecalciferol) 25 MCG (1000 UT) TABS Take by mouth every evening       zolpidem (AMBIEN) 5 MG tablet Take tablet  by mouth 15 minutes prior to sleep as needed. Pharmacist to review side effects. 30 tablet 0     No current facility-administered medications for this visit.         10/1/2024     3:32 PM   --   Do you avoid NSAIDs such as (Ibuprofen, Aleve, Naproxen, Advil)? Yes       ROS:  GI:       10/1/2024     3:32 PM   --   Vomiting No   Diarrhea No   Constipation Yes   Swallowing trouble No   Abdominal pain No   Heartburn Yes     Skin:       10/1/2024     3:32 PM   BAR RBS ROS - SKIN   Rash in skin folds No     Psych:       10/1/2024     3:32 PM   --   Depression No   Anxiety No     Female Only:       10/1/2024     3:32 PM   BAR RBS ROS -    Female only None of the above   Stress urinary incontinence No       Lab on 06/08/2024   Component Date Value Ref Range Status     Cholesterol 06/08/2024 271 (H)  <200 mg/dL Final     Triglycerides 06/08/2024 140  <150 mg/dL Final     Direct Measure HDL 06/08/2024 40 (L)  >=50 mg/dL Final     LDL Cholesterol Calculated 06/08/2024 203 (H)  <=100 mg/dL Final     Non HDL Cholesterol 06/08/2024 231 (H)  <130 mg/dL Final     Patient Fasting > 8hrs? 06/08/2024 Yes   Final     Sodium 06/08/2024 137  135 - 145 mmol/L Final    Reference intervals for this test were updated on 09/26/2023 to more accurately reflect our healthy population. There may be differences in the flagging of prior results with similar values performed with this method. Interpretation of those prior results can be made in the context of the updated reference intervals.      Potassium 06/08/2024 3.9  3.4 - 5.3 mmol/L Final     Carbon Dioxide (CO2) 06/08/2024 24  22 - 29 mmol/L Final     Anion Gap 06/08/2024 11  7 - 15 mmol/L Final     Urea Nitrogen 06/08/2024 6.6  6.0 - 20.0 mg/dL Final     Creatinine 06/08/2024 0.66  0.51 - 0.95 mg/dL Final     GFR Estimate 06/08/2024 >90  >60 mL/min/1.73m2 Final     Calcium 06/08/2024 9.1  8.6 - 10.0 mg/dL Final     Chloride 06/08/2024 102  98 - 107 mmol/L Final     Glucose 06/08/2024 118  (H)  70 - 99 mg/dL Final     Alkaline Phosphatase 06/08/2024 83  40 - 150 U/L Final     AST 06/08/2024 17  0 - 45 U/L Final    Reference intervals for this test were updated on 6/12/2023 to more accurately reflect our healthy population. There may be differences in the flagging of prior results with similar values performed with this method. Interpretation of those prior results can be made in the context of the updated reference intervals.     ALT 06/08/2024 15  0 - 50 U/L Final    Reference intervals for this test were updated on 6/12/2023 to more accurately reflect our healthy population. There may be differences in the flagging of prior results with similar values performed with this method. Interpretation of those prior results can be made in the context of the updated reference intervals.       Protein Total 06/08/2024 6.8  6.4 - 8.3 g/dL Final     Albumin 06/08/2024 4.1  3.5 - 5.2 g/dL Final     Bilirubin Total 06/08/2024 0.4  <=1.2 mg/dL Final     Patient Fasting > 8hrs? 06/08/2024 Yes   Final     TSH 06/08/2024 3.85  0.30 - 4.20 uIU/mL Final     WBC Count 06/08/2024 7.2  4.0 - 11.0 10e3/uL Final     RBC Count 06/08/2024 5.26 (H)  3.80 - 5.20 10e6/uL Final     Hemoglobin 06/08/2024 15.3  11.7 - 15.7 g/dL Final     Hematocrit 06/08/2024 46.6  35.0 - 47.0 % Final     MCV 06/08/2024 89  78 - 100 fL Final     MCH 06/08/2024 29.1  26.5 - 33.0 pg Final     MCHC 06/08/2024 32.8  31.5 - 36.5 g/dL Final     RDW 06/08/2024 13.8  10.0 - 15.0 % Final     Platelet Count 06/08/2024 362  150 - 450 10e3/uL Final     Vitamin D, Total (25-Hydroxy) 06/08/2024 43  20 - 50 ng/mL Final    optimum levels     Vitamin B12 06/08/2024 680  232 - 1,245 pg/mL Final     Hemoglobin A1C 06/08/2024 5.6  0.0 - 5.6 % Final    Normal <5.7%   Prediabetes 5.7-6.4%    Diabetes 6.5% or higher     Note: Adopted from ADA consensus guidelines.           12/5/2019     8:50 AM 10/1/2024     3:33 PM   CLAYTON Score (Last Two)   CLAYTON Raw Score 35 38  "  Activation Score 72.1 83.7   CLAYTON Level 3 4         PHYSICAL EXAM:  Objective   Ht 1.702 m (5' 7.01\")   Wt 117.9 kg (260 lb)   LMP 04/01/2022 (Exact Date)   BMI 40.71 kg/m    Vitals - Patient Reported  Pain Score: Severe Pain (6)  Pain Loc: Low Back (joints)        Physical Exam   GENERAL: alert and no distress  EYES: Eyes grossly normal to inspection.  No discharge or erythema, or obvious scleral/conjunctival abnormalities.  RESP: No audible wheeze, cough, or visible cyanosis.    SKIN: Visible skin clear. No significant rash, abnormal pigmentation or lesions.  NEURO: Cranial nerves grossly intact.  Mentation and speech appropriate for age.  PSYCH: Appropriate affect, tone, and pace of words        Sincerely,    Sandy Cervantes PA-C  Virtual Visit Details    Type of service:  Video Visit     Originating Location (pt. Location): Home    Distant Location (provider location):  Off-site  Platform used for Video Visit: Elida      Again, thank you for allowing me to participate in the care of your patient.      Sincerely,    Sandy Cervantes PA-C    "

## 2024-10-02 NOTE — NURSING NOTE
Is the patient currently in the state of [place]? YES    Location: home    Visit mode:VIDEO    If the visit is dropped, the patient can be reconnected by: VIDEO VISIT: Text to cell phone:   Telephone Information:   Mobile [phone]    and VIDEO VISIT: Send to e-mail at: [email or web address]    Will anyone else be joining the visit? NO  (If patient encounters technical issues they should call [phone])    How would you like to obtain your AVS? MyChart    Are changes needed to the allergy or medication list? No    Are refills needed on medications prescribed by this physician? NO    Reason for visit: Consult    Dana ROTH    QNR Status: complete

## 2024-10-02 NOTE — TELEPHONE ENCOUNTER
PA Initiation    Medication: WEGOVY 0.25 MG/0.5ML SC SOAJ  Insurance Company: [employer] ([employer]) - Phone [phone] Fax [phone]  Pharmacy Filling the Rx: [facility] - [place], [place] - [address]  Filling Pharmacy Phone:    Filling Pharmacy Fax:    Start Date: 10/2/2024    Key: [id number]

## 2024-10-02 NOTE — PROGRESS NOTES
"Trish is a 44 year old who is being evaluated via a billable video visit.    How would you like to obtain your AVS? MyChart  If the video visit is dropped, the invitation should be resent by: Text to cell phone: [phone]  Will anyone else be joining your video visit? No      Assessment & Plan     Fatigue, unspecified type  Has had fatigue , which has been ongoing . Also hx of heart palpitations , she saw cardiology a while ago. Has had issues with insomnia as well   She has had a sleep study done and has LEONARDO and needs a CPAP , but pt has had profound fatigue at times and hx of covid with positive test months ago - we discussed referral for the long covid clinic and I placed the referral   - Adult Post Covid Clinic  Referral; Future    Multiple joint pain  We discussed since her pain is mostly in the hands and feet , some in the elbows , knees , shoulders , she would need to see a rheumatologist but she has seen one a couple of yrs ago or so   Does not think symptoms are worse as far as pain , there is no redness     Long COVID  Has had covid and thinks a lot of her symptoms could be linked to long covid - fatigue , insomnia , palpitations, dizziness etc   Placed a referral for the long covid clinic   - Adult Post Covid Clinic  Referral; Future          BMI  Estimated body mass index is 40.71 kg/m  as calculated from the following:    Height as of an earlier encounter on 10/2/24: 1.702 m (5' 7.01\").    Weight as of an earlier encounter on 10/2/24: 117.9 kg (260 lb).             Subjective   Trish is a 44 year old, presenting for the following health issues:  Fatigue, Insomnia, and Joint Pain        10/2/2024     1:35 PM   Additional Questions   Roomed by Lis ROSALES     Via the Health Maintenance questionnaire, the patient has reported the following services have been completed -Mammogram: [place] Friday 2022-08-30, this information has not been sent to the abstraction team.  History of Present " Illness       Reason for visit:  Fatigue, mental fog, joint pain, insomnia    She eats 0-1 servings of fruits and vegetables daily.She consumes 0 sweetened beverage(s) daily.She exercises with enough effort to increase her heart rate 9 or less minutes per day.  She exercises with enough effort to increase her heart rate 3 or less days per week.   She is taking medications regularly.               Review of Systems  Constitutional, HEENT, cardiovascular, pulmonary, GI, , musculoskeletal, neuro, skin, endocrine and psych systems are negative, except as otherwise noted.      Objective    Vitals - Patient Reported  Pain Score: Severe Pain (6)  Pain Loc: Low Back (joints)        Physical Exam   GENERAL: alert and no distress  EYES: Eyes grossly normal to inspection.  No discharge or erythema, or obvious scleral/conjunctival abnormalities.  RESP: No audible wheeze, cough, or visible cyanosis.    SKIN: Visible skin clear. No significant rash, abnormal pigmentation or lesions.  NEURO: Cranial nerves grossly intact.  Mentation and speech appropriate for age.  PSYCH: Appropriate affect, tone, and pace of words    No results found for any visits on 10/02/24.      Video-Visit Details    Type of service:  Video Visit   Originating Location (pt. Location): Home    Distant Location (provider location):  On-site  Platform used for Video Visit: Elida  Signed Electronically by: Polly Mcbride MD

## 2024-10-02 NOTE — PROGRESS NOTES
New Re-establish Bariatric Surgery Note    RE: Danitza Soto  MR#: [id number]  : 1980  VISIT DATE: Oct 2, 2024      Dear Polly Mcbride,    I had the pleasure of seeing your patient, Danitza Soto, in my post-bariatric surgery assessment clinic.    Assessment & Plan   Problem List Items Addressed This Visit    None  Visit Diagnoses       Morbid obesity (H)               Sleeve  Lost from 308 to 228   Weight regain to 260 today.  Weight stable since last visit .  Last visit with me  and was taking naltrexone and topiramate, lost to follow up and stopped meds   Tried Wegovy and lost 20 lbs but didn't follow up due to stress in life. 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  GI did EGD which was normal.  Requires PPI BID. Continue PPI prescribed by PCP    Follow up:  MTM 6 weeks phone  Sandy 3-4 months return MWM     20 minutes spent by me on the date of the encounter doing chart review, history and exam, documentation and further activities per the note    CHIEF COMPLAINT: Post-bariatric surgery follow-up. 6 years s/p sleeve .    HISTORY OF PRESENT ILLNESS:      10/1/2024     3:32 PM   Questions Regarding Prior Weight Loss Surgery Reviewed With Patient   I had the following weight loss procedure Sleeve Gastrectomy   What year was your surgery? 2018   How has your weight changed since your last visit? I have gained weight   Do you currently have any of the following Sleep Apnea    Heartburn, acid reflux, or GERD (acid reflux disease)?    Hypertension (high blood pressure)?    Hyperlipidemia (high cholesterol, triglycerides)?   Do you have any concerns today? Weight gain       Anti-obesity medications:     Current:   None    Failed/contraindicated:   Topriamate avoid due to fatigue/brain fog at higher doses. Could consider low dose retrial if needed  Avoid phentermine due to HTN and  insomnia  Naltrexone could retrial if needed. Tolerated in the past.       Recent diet changes:     -Protein? >60 grams per day   -Water? 48-64 oz daily     Recent exercise/activity changes: no intentional exercise, active at work.     Recent stressors: stress improved the last 1-2 years.  Was high stress [phone]    Recent sleep changes: struggles with insomnia    Vitamins/Labs: MVI BID, no longer taking D and B12    GERD symptoms?: yes takes PPI BID    Weight History:      10/1/2024     3:32 PM   --   What is your highest lifetime weight? 315   What is your lowest weight since surgery? (In pounds) 228     Initial Weight (lbs): 308 lbs  Weight: 117.9 kg (260 lb)  Last Visits Weight: 120.2 kg (265 lb)  Cumulative weight loss (lbs): 48  Weight Loss Percentage: 15.58%        10/1/2024     3:32 PM   Questions Regarding Co-Morbidities and Health Concerns Reviewed With Patient   Pre-diabetes Never   Diabetes II Never   High Blood Pressure Stayed the same   High cholesterol Stayed the same   Heartburn/Reflux Worsened   Sleep apnea Improved   PCOS Never   Back pain Worsened   Joint pain Worsened   Lower leg swelling Never           10/1/2024     3:32 PM   Eating Habits   How many meals do you eat per day? 2   Do you snack between meals? Sometimes   How much food are you eating at each meal? Greater than 1 cup   Are you able to separate your meals and liquids by at least 30 minutes? Sometimes   Are you able to avoid liquid calories? Yes           10/1/2024     3:32 PM   Exercise Questions Reviewed With Patient   How often do you exercise? 1 to 2 times per week   What is the duration of your exercise (in minutes)? 30 Minutes   What types of exercise do you do? walking    climbing stairs at work   What keeps you from being more active? Pain    My ability to walk or move around is limited    Too tired       Social History:      10/1/2024     3:32 PM   --   Are you smoking? Yes   How much are you smoking? I vape   Are you  drinking alcohol? No       Medications:  Current Outpatient Medications   Medication Sig Dispense Refill    amLODIPine (NORVASC) 10 MG tablet Take 1 tablet (10 mg) by mouth daily 90 tablet 1    Ascorbic Acid (VITAMIN C) 500 MG CAPS Take by mouth every evening      calcium carbonate (OS-CHIKI) 500 MG tablet Take 1 tablet by mouth every evening      Cyanocobalamin (B-12) 1000 MCG TBCR Take by mouth every evening      doxycycline hyclate (VIBRA-TABS) 100 MG tablet Take 1 tablet (100 mg) by mouth 2 times daily 20 tablet 0    evolocumab (REPATHA SURECLICK) 140 MG/ML prefilled autoinjector Inject 1 mL (140 mg) Subcutaneous every 14 days (Patient not taking: Reported on 7/24/2024) 6 mL 3    hydrochlorothiazide (HYDRODIURIL) 25 MG tablet Take 1 tablet (25 mg) by mouth daily 90 tablet 1    ibuprofen (ADVIL/MOTRIN) 200 MG tablet Take 200 mg by mouth every 4 hours as needed for pain      lisinopril (ZESTRIL) 40 MG tablet Take 1 tablet (40 mg) by mouth every morning 90 tablet 1    magnesium 250 MG tablet Take 1 tablet by mouth every evening      metoprolol succinate ER (TOPROL XL) 25 MG 24 hr tablet Take 1 tablet (25 mg) by mouth daily 90 tablet 1    Multiple Vitamins-Iron (MULTI-DAY PLUS IRON PO) Take by mouth every morning      omeprazole (PRILOSEC) 40 MG DR capsule Take 1 capsule (40 mg) by mouth daily 90 capsule 2    venlafaxine (EFFEXOR XR) 150 MG 24 hr capsule Take 1 capsule by mouth daily. 90 capsule 1    vitamin B1 (THIAMINE) 250 MG tablet Take 250 mg by mouth every evening      Vitamin D (Cholecalciferol) 25 MCG (1000 UT) TABS Take by mouth every evening      zolpidem (AMBIEN) 5 MG tablet Take tablet by mouth 15 minutes prior to sleep as needed. Pharmacist to review side effects. 30 tablet 0     No current facility-administered medications for this visit.         10/1/2024     3:32 PM   --   Do you avoid NSAIDs such as (Ibuprofen, Aleve, Naproxen, Advil)? Yes       ROS:  GI:       10/1/2024     3:32 PM   --   Vomiting No    Diarrhea No   Constipation Yes   Swallowing trouble No   Abdominal pain No   Heartburn Yes     Skin:       10/1/2024     3:32 PM   BAR RBS ROS - SKIN   Rash in skin folds No     Psych:       10/1/2024     3:32 PM   --   Depression No   Anxiety No     Female Only:       10/1/2024     3:32 PM   BAR RBS ROS -    Female only None of the above   Stress urinary incontinence No       Lab on 06/08/2024   Component Date Value Ref Range Status    Cholesterol 06/08/2024 271 (H)  <200 mg/dL Final    Triglycerides 06/08/2024 140  <150 mg/dL Final    Direct Measure HDL 06/08/2024 40 (L)  >=50 mg/dL Final    LDL Cholesterol Calculated 06/08/2024 203 (H)  <=100 mg/dL Final    Non HDL Cholesterol 06/08/2024 231 (H)  <130 mg/dL Final    Patient Fasting > 8hrs? 06/08/2024 Yes   Final    Sodium 06/08/2024 137  135 - 145 mmol/L Final    Reference intervals for this test were updated on 09/26/2023 to more accurately reflect our healthy population. There may be differences in the flagging of prior results with similar values performed with this method. Interpretation of those prior results can be made in the context of the updated reference intervals.     Potassium 06/08/2024 3.9  3.4 - 5.3 mmol/L Final    Carbon Dioxide (CO2) 06/08/2024 24  22 - 29 mmol/L Final    Anion Gap 06/08/2024 11  7 - 15 mmol/L Final    Urea Nitrogen 06/08/2024 6.6  6.0 - 20.0 mg/dL Final    Creatinine 06/08/2024 0.66  0.51 - 0.95 mg/dL Final    GFR Estimate 06/08/2024 >90  >60 mL/min/1.73m2 Final    Calcium 06/08/2024 9.1  8.6 - 10.0 mg/dL Final    Chloride 06/08/2024 102  98 - 107 mmol/L Final    Glucose 06/08/2024 118 (H)  70 - 99 mg/dL Final    Alkaline Phosphatase 06/08/2024 83  40 - 150 U/L Final    AST 06/08/2024 17  0 - 45 U/L Final    Reference intervals for this test were updated on 6/12/2023 to more accurately reflect our healthy population. There may be differences in the flagging of prior results with similar values performed with this method.  "Interpretation of those prior results can be made in the context of the updated reference intervals.    ALT 06/08/2024 15  0 - 50 U/L Final    Reference intervals for this test were updated on 6/12/2023 to more accurately reflect our healthy population. There may be differences in the flagging of prior results with similar values performed with this method. Interpretation of those prior results can be made in the context of the updated reference intervals.      Protein Total 06/08/2024 6.8  6.4 - 8.3 g/dL Final    Albumin 06/08/2024 4.1  3.5 - 5.2 g/dL Final    Bilirubin Total 06/08/2024 0.4  <=1.2 mg/dL Final    Patient Fasting > 8hrs? 06/08/2024 Yes   Final    TSH 06/08/2024 3.85  0.30 - 4.20 uIU/mL Final    WBC Count 06/08/2024 7.2  4.0 - 11.0 10e3/uL Final    RBC Count 06/08/2024 5.26 (H)  3.80 - 5.20 10e6/uL Final    Hemoglobin 06/08/2024 15.3  11.7 - 15.7 g/dL Final    Hematocrit 06/08/2024 46.6  35.0 - 47.0 % Final    MCV 06/08/2024 89  78 - 100 fL Final    MCH 06/08/2024 29.1  26.5 - 33.0 pg Final    MCHC 06/08/2024 32.8  31.5 - 36.5 g/dL Final    RDW 06/08/2024 13.8  10.0 - 15.0 % Final    Platelet Count 06/08/2024 362  150 - 450 10e3/uL Final    Vitamin D, Total (25-Hydroxy) 06/08/2024 43  20 - 50 ng/mL Final    optimum levels    Vitamin B12 06/08/2024 680  232 - 1,245 pg/mL Final    Hemoglobin A1C 06/08/2024 5.6  0.0 - 5.6 % Final    Normal <5.7%   Prediabetes 5.7-6.4%    Diabetes 6.5% or higher     Note: Adopted from ADA consensus guidelines.           12/5/2019     8:50 AM 10/1/2024     3:33 PM   CLAYTON Score (Last Two)   CLAYTON Raw Score 35 38   Activation Score 72.1 83.7   CLAYTON Level 3 4         PHYSICAL EXAM:  Objective    Ht 1.702 m (5' 7.01\")   Wt 117.9 kg (260 lb)   LMP 04/01/2022 (Exact Date)   BMI 40.71 kg/m    Vitals - Patient Reported  Pain Score: Severe Pain (6)  Pain Loc: Low Back (joints)        Physical Exam   GENERAL: alert and no distress  EYES: Eyes grossly normal to inspection.  No " discharge or erythema, or obvious scleral/conjunctival abnormalities.  RESP: No audible wheeze, cough, or visible cyanosis.    SKIN: Visible skin clear. No significant rash, abnormal pigmentation or lesions.  NEURO: Cranial nerves grossly intact.  Mentation and speech appropriate for age.  PSYCH: Appropriate affect, tone, and pace of words        Sincerely,    Sandy Cervantes PA-C  Virtual Visit Details    Type of service:  Video Visit     Originating Location (pt. Location): Home    Distant Location (provider location):  Off-site  Platform used for Video Visit: Elida

## 2024-10-02 NOTE — TELEPHONE ENCOUNTER
Prior Authorization Approval    Medication: WEGOVY 0.25 MG/0.5ML SC SOAJ  Authorization Effective Date: 10/2/2024  Authorization Expiration Date: 4/30/2025  Approved Dose/Quantity: 2ml per 28 days  Reference #: Key: [id number]   Insurance Company: [employer] ([employer]) - Phone [phone] Fax [phone]  Expected CoPay: $ 397.63  CoPay Card Available: Yes    Financial Assistance Needed: yes  Which [facility] filling the [facility] - [place], [place] - [address]   Pharmacy Notified: yes  Patient Notified: yes

## 2024-10-08 ENCOUNTER — VIRTUAL VISIT (OUTPATIENT)
Dept: PHYSICAL MEDICINE AND REHAB | Facility: CLINIC | Age: 44
End: 2024-10-08
Attending: FAMILY MEDICINE
Payer: COMMERCIAL

## 2024-10-08 VITALS — BODY MASS INDEX: 39.24 KG/M2 | HEIGHT: 67 IN | WEIGHT: 250 LBS

## 2024-10-08 DIAGNOSIS — M25.50 POST-COVID CHRONIC JOINT PAIN: ICD-10-CM

## 2024-10-08 DIAGNOSIS — U09.9 POST-COVID CHRONIC CONCENTRATION DEFICIT: Primary | ICD-10-CM

## 2024-10-08 DIAGNOSIS — U09.9 LONG COVID: ICD-10-CM

## 2024-10-08 DIAGNOSIS — U09.9 POST-COVID CHRONIC JOINT PAIN: ICD-10-CM

## 2024-10-08 DIAGNOSIS — R41.840 POST-COVID CHRONIC CONCENTRATION DEFICIT: Primary | ICD-10-CM

## 2024-10-08 DIAGNOSIS — G89.29 POST-COVID CHRONIC JOINT PAIN: ICD-10-CM

## 2024-10-08 DIAGNOSIS — R41.841 COGNITIVE COMMUNICATION DEFICIT: ICD-10-CM

## 2024-10-08 DIAGNOSIS — R53.83 FATIGUE, UNSPECIFIED TYPE: ICD-10-CM

## 2024-10-08 PROCEDURE — 99204 OFFICE O/P NEW MOD 45 MIN: CPT | Mod: 95 | Performed by: PHYSICIAN ASSISTANT

## 2024-10-08 SDOH — SOCIAL STABILITY: SOCIAL NETWORK: I HAVE TROUBLE DOING ALL OF MY REGULAR LEISURE ACTIVITIES WITH OTHERS: USUALLY

## 2024-10-08 SDOH — SOCIAL STABILITY: SOCIAL NETWORK: PROMIS ABILITY TO PARTICIPATE IN SOCIAL ROLES & ACTIVITIES T-SCORE: 34

## 2024-10-08 SDOH — SOCIAL STABILITY: SOCIAL NETWORK: I HAVE TROUBLE DOING ALL OF MY USUAL WORK (INCLUDE WORK AT HOME): ALWAYS

## 2024-10-08 SDOH — SOCIAL STABILITY: SOCIAL NETWORK: I HAVE TROUBLE DOING ALL OF THE ACTIVITIES WITH FRIENDS THAT I WANT TO DO: ALWAYS

## 2024-10-08 SDOH — SOCIAL STABILITY: SOCIAL NETWORK

## 2024-10-08 SDOH — SOCIAL STABILITY: SOCIAL NETWORK: I HAVE TROUBLE DOING ALL OF THE FAMILY ACTIVITIES THAT I WANT TO DO: ALWAYS

## 2024-10-08 ASSESSMENT — PATIENT HEALTH QUESTIONNAIRE - PHQ9
10. IF YOU CHECKED OFF ANY PROBLEMS, HOW DIFFICULT HAVE THESE PROBLEMS MADE IT FOR YOU TO DO YOUR WORK, TAKE CARE OF THINGS AT HOME, OR GET ALONG WITH OTHER PEOPLE: VERY DIFFICULT
SUM OF ALL RESPONSES TO PHQ QUESTIONS 1-9: 10
SUM OF ALL RESPONSES TO PHQ QUESTIONS 1-9: 10

## 2024-10-08 ASSESSMENT — PAIN SCALES - GENERAL: PAINLEVEL: SEVERE PAIN (6)

## 2024-10-08 NOTE — PATIENT INSTRUCTIONS
Valerian root-  Sleepy time extra tea.   Labs  Occupational therapy  Speech therapy  Return 6 weeks - [phone] to schedule    Post COVID Self Care Suggestions:     Fatigue Management:       https://www.archives-pmr.org/action/showPdf?use=-[email or web address]%2819%3169473-5       Self Care:      [email or web address]  Recovery World Health Organization:    https://apps.who.int/iris/bitstream/handle/66879/144479/LHV-FLYX-6907-545-72267-20[address]-eng.pdf  Breathing exercises:    [email or web address]://[email or web address].org/health/conditions-and-diseases/coronavirus/coronavirus-recovery-breathing-exercises      Assessment of sense of smell and taste    Smell training:  Flowery - Oumou  Fruity - Lemon  Spicy - Cloves  Resinous - Eucalyptus      1 - Pour a few droplets of one of the oils on to a cotton pad or ball  2 - Do not try to sniff the pad immediately; leave it for a few minutes for the fragrance to develop  3 - Hold the first stick/pad up to your nose, about an inch away.  The order in which you test the oils does not matter  4 - Relax and try to inhale naturally through the nose - sniffing too quickly and deeply is likely to result in you not being able to detect anything  5 - Try this a couple more times, then rest for five minutes  6 - Move on to the next oil and repeat as above.    After three months switch to a new set of odors: menthol, thyme, tangerine, and kamar, training with them twice daily.    After another three months, switch to a third new set of odors: green tea, bergamot, rosemary, and tammy, again training with them twice daily.    Studies:   [place] Paxlovid Caroline  [email or web address]/[email or web address]    Cognitive Post-COVID study  z.[email or web address]/[email or web address]    Supplements:  Fatigue- COQ10 100mg 3x day  ( side effects GI)  Brain fog-N-acetylcysteine 600 mg daily (side effects GI)

## 2024-10-08 NOTE — PROGRESS NOTES
Danitza Soto is a 44 year old female who presents to be evaluated for a billable video visit.    Video-Visit Details    Video visit Start time: 11:03 AM    Type of service:  Video Visit    Video End Time: 11:40 AM    Originating Location (pt. Location): Home    Distant Location (provider location):  Off- Site    Platform used for Video Visit: [facility]    Assessment/ Impression:   1. 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  Discussed rechecking iron and ferritin as this was low several years ago.  Also discussed referral to speech therapy for evaluation.  Discussed due to patient to trial valerian root tea this may help.  Encouraged to continue working sleep medicine but discussed that sleep deprivation will contribute to concentration as well as fatigue.   - Iron; Future  - Ferritin; Future  - Speech Therapy  Referral; Future    2. Fatigue, unspecified type  Patient with chronic fatigue that has been longstanding.  She does have a history of sleep apnea and is already on CPAP treatment however discussed with iron deficiency in the past could be contributing to several of her symptoms.Discussed energy conservation and provided information on fatigue management.  Also discussed referral to Occupational therapy for the COVID-19 program.  - Adult Post Covid Clinic  Referral  - Iron; Future  - Ferritin; Future  - Occupational Therapy  Referral; Future    3. Post-COVID chronic joint pain  Patient with joint pain it is in multiple locations including hand wrist elbow and knee.  Patient most recently has been having significant hand pain to the point she has altered how she does things to prevent herself from moving her hand in certain ways.  This started past spring and she has not had repeated autoimmune labs.  Discussed also meeting with  orthopedic/sports medicine specialist who specializes in hands.  - Anti Nuclear Cynthia IgG by IFA with Reflex; Future  - Rheumatoid factor; Future  - CK total; Future  - CRP inflammation; Future  - Erythrocyte sedimentation rate auto; Future  - Orthopedic  Referral; Future    4. Long COVID  Discussed COVID and Post COVID with patient.  Educational materials provided and all questions answered.  - Adult Post Covid Clinic  Referral    Plan:  I reviewed present knowledge on long-Covid.  Education was provided and question were answered.  Orders/Referrals as above  Will advised patient on test results  I will follow up with Danitza RAMIRO [place] in 6 weeks. I will review progress and consider need for any other therapeutic interventions. If there are any questions and/or concerns she will call the clinic.      On day of encounter time spent in chart review and with patient in consultation, exam, education, coordination of care, review of outside charts/data and documentation:  55 minutes     I have attempted to proof read for major spelling errors and apologize for any minor errors I may have missed.      This note was dictated using voice recognition software. Any grammatical or context distortions are unintentional and inherent to the software.    _____________  Estela Avery PA-C  [facility] PHYSICAL MEDICINE AND REHABILITATION CLINIC [place]    Subjective   This 44 year old female presents to the [facility] Rehabilitation Medicine Post-COVID clinic as a new consult to evaluate continuing symptoms after COVID infection initially diagnosed 11/21/2020.  Danitza Soto presented to their primary care physician on 11/21/20 complaining of fatigue, decreased taste, and decreased smell. Treatment was symptomatic.  She tested positive late December 2023 and had symptoms of fatigue, sleep, insomnia, low fever, generalize aches and pains.  Danitza Soto experienced complications of  "chronic fatigue.  Continuing symptoms include fatigue, generalized aches, weakness, palpitations, dizziness, difficulty sleeping, numbness, tingling, brain fog, and distractibility.  Patient has fatigue but has been seeing a sleep provider for years. They do have sleep apnea but this has progress since CPAP. They do have ambien and melatonin.  She does notice leg \"static feeling\" and has to get up an move.  Patient did also trial Magensium as well.  Patient will wake up tired as well.  Patient will need to take a break after 30 min to 1hr.   Patient has mental exhaustion as well.   Patient will have trouble with finding words.  Patient has some trouble with through process as well.   Patient will often forget conversations as well. Patient has joint pain in her elbow, shoulder, knees. She also feels this in between her finger bones.  She has pain but no swelling.  Patient does have pain in her hands daily and has worked to avoid.   Patient does have palliations but was placed on metoprolol.   Zio was done to confirm this was not recurrent Afib but did show short runs of SVT.  Patient does sometimes getting lightheaded when standing and is sporadically.   Past medical history is significant for GERD, mixed hyperlipidemia, status post laparoscopic gastric sleeve, status post ablation for atrial fibrillation, insomnia atrial fibrillation, smoking history, depression, obesity/morbid obesity, and sleep apnea. The patient was vaccinated against COVID x2, last vaccination 2/25/2021 .  Previous activity was full time work.     History of COVID-19 infection: 11/21/2020, 12/2023  Date of first symptoms: 11/21/2020, 12/2023  Diagnosis: PCR and antigen  Hospitalization: No  Treatment: Symptomatic  Current Symptoms: See subjective  Goals of Care: increase energy, decrease depression, improve thinking, and improve quality of life          10/8/2024    10:35 AM   PHQ Assesment Total Score(s)   PHQ-9 Score 10           10/2/2024    " 11:56 AM   GABRIELA-7 Results   GABRIELA 7 TOTAL SCORE 2 (minimal anxiety)   GABRIELA-7 Total Score 2         10/8/2024    10:35 AM   PTSD Screen Score   Have you ever experienced this kind of event? No         10/8/2024    10:38 AM   PROMIS-29   PROMIS Physical Function T-Score 34 (moderate dysfunction)   PROMIS Anxiety T-Score 40 (within normal limits)   PROMIS Depression T-Score 52 (within normal limits)   PROMIS Fatigue T-Score 76 (severe)   PROMIS Sleep Disturbance T-Score 73 (severe)   PROMIS Ability to Participate in Social Roles & Activities T-Score 34 (moderate dysfunction)   PROMIS Pain Interference T-Score 72 (severe)   PROMIS Pain Intensity 6       Past Medical History:   Diagnosis Date    Antiplatelet or antithrombotic long-term use     resolved    Arrhythmia     BV (bacterial vaginosis) 9/18/2020    Depressive disorder 1990    Esophageal reflux     Hearing problem 2018    Hyperlipidemia LDL goal <130 07/06/2015    Hypertension     LSIL (low grade squamous intraepithelial lesion) on Pap smear 06/2014    + HPV, unable to type colp - BRISEYDA I    LEONARDO on CPAP     Other anxiety states        Past Surgical History:   Procedure Laterality Date    EP ABLATION FOCAL AFIB N/A 10/03/2019    Procedure: EP ABLATION FOCAL AFIB;  Surgeon: Harpreet Arevalo MD;  Location:  OR    ESOPHAGOSCOPY, GASTROSCOPY, DUODENOSCOPY (EGD), COMBINED N/A 11/29/2022    Procedure: ESOPHAGOGASTRODUODENOSCOPY, WITH BIOPSY;  Surgeon: Jeff Patino DO;  Location:  GI    HC TOOTH EXTRACTION W/FORCEP  1995    Bruni Teeth Extraction    LAPAROSCOPIC GASTRIC SLEEVE N/A 11/27/2018    Procedure: Laparoscopic Sleeve Gastrectomy Latex Free;  Surgeon: Artis Tse MD;  Location:  OR    LAPAROSCOPIC HERNIORRHAPHY HIATAL  11/27/2018    Procedure: LAPAROSCOPIC  HIATAL Hernia Repair;  Surgeon: Artis Tse MD;  Location:  OR    SALPINGO-OOPHORECTOMY, COMBINED Left 10/20/2021    Mucinous cystadenoma       Family History   Problem Relation Age of  Onset    Heart Disease Mother         ,mother had emphesema and  at 62    Hypertension Mother     Allergies Mother     Lipids Mother     Obesity Mother     Respiratory Mother         Emphysema    Hypertension Father     Lipids Father     Cancer Father     Prostate Cancer Father     Other Cancer Father     Allergies Sister     Genitourinary Problems Sister     Diabetes Maternal Grandmother         Type 2?    Hypertension Maternal Grandmother     Circulatory Maternal Grandmother         Due to diabetes    Eye Disorder Maternal Grandmother         Macular degeneration/Macular degeneration    Obesity Maternal Grandmother     Alcohol/Drug Maternal Grandfather     Obesity Maternal Grandfather     Cerebrovascular Disease Paternal Grandmother     Psychotic Disorder Paternal Grandfather         Committed Suicide    Depression Paternal Grandfather     Anesthesia Reaction No family hx of     Bleeding Disorder No family hx of     Venous thrombosis No family hx of        Social History     Tobacco Use    Smoking status: Former     Current packs/day: 0.00     Average packs/day: 1 pack/day for 17.4 years (17.4 ttl pk-yrs)     Types: Cigarettes     Start date: 2004     Quit date: 2021     Years since quitting: 3.3    Smokeless tobacco: Never   Vaping Use    Vaping status: Every Day    Substances: Nicotine    Devices: Disposable   Substance Use Topics    Alcohol use: Not Currently     Comment: Occas.    Drug use: No         Current Outpatient Medications:     amLODIPine (NORVASC) 10 MG tablet, Take 1 tablet (10 mg) by mouth daily, Disp: 90 tablet, Rfl: 1    Ascorbic Acid (VITAMIN C) 500 MG CAPS, Take by mouth every evening, Disp: , Rfl:     calcium carbonate (OS-CHIKI) 500 MG tablet, Take 1 tablet by mouth every evening, Disp: , Rfl:     Cyanocobalamin (B-12) 1000 MCG TBCR, Take by mouth every evening, Disp: , Rfl:     doxycycline hyclate (VIBRA-TABS) 100 MG tablet, Take 1 tablet (100 mg) by mouth 2 times daily, Disp: 20  tablet, Rfl: 0    hydrochlorothiazide (HYDRODIURIL) 25 MG tablet, Take 1 tablet (25 mg) by mouth daily, Disp: 90 tablet, Rfl: 1    ibuprofen (ADVIL/MOTRIN) 200 MG tablet, Take 200 mg by mouth every 4 hours as needed for pain, Disp: , Rfl:     lisinopril (ZESTRIL) 40 MG tablet, Take 1 tablet (40 mg) by mouth every morning, Disp: 90 tablet, Rfl: 1    magnesium 250 MG tablet, Take 1 tablet by mouth every evening, Disp: , Rfl:     metoprolol succinate ER (TOPROL XL) 25 MG 24 hr tablet, Take 1 tablet (25 mg) by mouth daily, Disp: 90 tablet, Rfl: 1    Multiple Vitamins-Iron (MULTI-DAY PLUS IRON PO), Take by mouth every morning, Disp: , Rfl:     omeprazole (PRILOSEC) 40 MG DR capsule, Take 1 capsule (40 mg) by mouth daily, Disp: 90 capsule, Rfl: 2    Semaglutide-Weight Management (WEGOVY) 0.25 MG/0.5ML pen, Inject 0.25 mg subcutaneously once a week., Disp: 2 mL, Rfl: 0    [START ON 10/30/2024] Semaglutide-Weight Management (WEGOVY) 0.5 MG/0.5ML pen, Inject 0.5 mg subcutaneously once a week., Disp: 2 mL, Rfl: 3    venlafaxine (EFFEXOR XR) 150 MG 24 hr capsule, Take 1 capsule by mouth daily., Disp: 90 capsule, Rfl: 1    vitamin B1 (THIAMINE) 250 MG tablet, Take 250 mg by mouth every evening, Disp: , Rfl:     Vitamin D (Cholecalciferol) 25 MCG (1000 UT) TABS, Take by mouth every evening, Disp: , Rfl:     zolpidem (AMBIEN) 5 MG tablet, Take tablet by mouth 15 minutes prior to sleep as needed. Pharmacist to review side effects., Disp: 30 tablet, Rfl: 0    Review of Systems   Constitutional, HEENT, cardiovascular, pulmonary, GI, , musculoskeletal, neuro, skin, endocrine and psych systems are negative, except as otherwise noted.      Objective    Vitals:  No vitals were obtained today due to virtual visit.    Physical Exam   EYES: Eyes grossly normal to inspection.  No discharge or erythema, or obvious scleral/conjunctival abnormalities.  SKIN: Visible skin clear. No significant rash, abnormal pigmentation or lesions.  NEURO:  "Cranial nerves grossly intact.  Mentation and speech appropriate for age.  GENERAL: Healthy, alert and no distress  RESP: No audible wheeze, cough, or visible cyanosis.  No visible retractions or increased work of breathing.    PSYCH: Mentation appears normal, affect normal/bright, judgement and insight intact, normal speech and appearance well-groomed.            No results found for: \"CRP\"   Erythrocyte Sedimentation Rate   Date Value Ref Range Status   03/21/2023 7 0 - 20 mm/hr Final      Last Renal Panel:  Sodium   Date Value Ref Range Status   06/08/2024 137 135 - 145 mmol/L Final     Comment:     Reference intervals for this test were updated on 09/26/2023 to more accurately reflect our healthy population. There may be differences in the flagging of prior results with similar values performed with this method. Interpretation of those prior results can be made in the context of the updated reference intervals.    05/18/2021 141 133 - 144 mmol/L Final     Potassium   Date Value Ref Range Status   06/08/2024 3.9 3.4 - 5.3 mmol/L Final   05/04/2022 3.7 3.4 - 5.3 mmol/L Final   05/18/2021 3.7 3.4 - 5.3 mmol/L Final     Chloride   Date Value Ref Range Status   06/08/2024 102 98 - 107 mmol/L Final   05/04/2022 105 94 - 109 mmol/L Final   05/18/2021 110 (H) 94 - 109 mmol/L Final     Carbon Dioxide   Date Value Ref Range Status   05/18/2021 28 20 - 32 mmol/L Final     Carbon Dioxide (CO2)   Date Value Ref Range Status   06/08/2024 24 22 - 29 mmol/L Final   05/04/2022 23 20 - 32 mmol/L Final     Anion Gap   Date Value Ref Range Status   06/08/2024 11 7 - 15 mmol/L Final   05/04/2022 8 3 - 14 mmol/L Final   05/18/2021 3 3 - 14 mmol/L Final     Glucose   Date Value Ref Range Status   06/08/2024 118 (H) 70 - 99 mg/dL Final   05/04/2022 79 70 - 99 mg/dL Final   05/18/2021 81 70 - 99 mg/dL Final     Urea Nitrogen   Date Value Ref Range Status   06/08/2024 6.6 6.0 - 20.0 mg/dL Final   05/04/2022 9 7 - 30 mg/dL Final " "  05/18/2021 9 7 - 30 mg/dL Final     Creatinine   Date Value Ref Range Status   06/08/2024 0.66 0.51 - 0.95 mg/dL Final   05/18/2021 0.68 0.52 - 1.04 mg/dL Final     GFR Estimate   Date Value Ref Range Status   06/08/2024 >90 >60 mL/min/1.73m2 Final   05/18/2021 >90 >60 mL/min/[1.73_m2] Final     Comment:     Non  GFR Calc  Starting 12/18/2018, serum creatinine based estimated GFR (eGFR) will be   calculated using the Chronic Kidney Disease Epidemiology Collaboration   (CKD-EPI) equation.       Calcium   Date Value Ref Range Status   06/08/2024 9.1 8.6 - 10.0 mg/dL Final   05/18/2021 9.1 8.5 - 10.1 mg/dL Final     Phosphorus   Date Value Ref Range Status   11/28/2018 3.5 2.5 - 4.5 mg/dL Final     Albumin   Date Value Ref Range Status   06/08/2024 4.1 3.5 - 5.2 g/dL Final   05/04/2022 3.7 3.4 - 5.0 g/dL Final   05/18/2021 3.7 3.4 - 5.0 g/dL Final      Lab Results   Component Value Date    WBC 7.2 06/08/2024    WBC 7.4 05/18/2021     Lab Results   Component Value Date    RBC 5.26 06/08/2024    RBC 4.90 05/18/2021     Lab Results   Component Value Date    HGB 15.3 06/08/2024    HGB 14.4 05/18/2021     Lab Results   Component Value Date    HCT 46.6 06/08/2024    HCT 43.7 05/18/2021     No components found for: \"MCT\"  Lab Results   Component Value Date    MCV 89 06/08/2024    MCV 89 05/18/2021     Lab Results   Component Value Date    MCH 29.1 06/08/2024    MCH 29.4 05/18/2021     Lab Results   Component Value Date    MCHC 32.8 06/08/2024    MCHC 33.0 05/18/2021     Lab Results   Component Value Date    RDW 13.8 06/08/2024    RDW 13.8 05/18/2021     Lab Results   Component Value Date     06/08/2024     05/18/2021      Lab Results   Component Value Date    A1C 5.6 06/08/2024    A1C 5.3 03/21/2023    A1C 5.2 05/18/2021    A1C 5.4 12/05/2019    A1C 6.1 07/24/2018    A1C 5.4 10/08/2013      TSH   Date Value Ref Range Status   06/08/2024 3.85 0.30 - 4.20 uIU/mL Final   05/04/2022 4.09 (H) 0.40 - " 4.00 mU/L Final   05/18/2021 0.64 0.40 - 4.00 mU/L Final      Lab Results   Component Value Date    VITDT 43 06/08/2024    VITDT 43 05/04/2022    VITDT 40 05/18/2021    VITDT 36 12/05/2019    VITDT 31 03/07/2019      Recent Labs   Lab Test 05/04/22  1144 11/28/18  0554 11/27/18  1120   MAG 2.3 2.2 1.9     Last Comprehensive Metabolic Panel:  Sodium   Date Value Ref Range Status   06/08/2024 137 135 - 145 mmol/L Final     Comment:     Reference intervals for this test were updated on 09/26/2023 to more accurately reflect our healthy population. There may be differences in the flagging of prior results with similar values performed with this method. Interpretation of those prior results can be made in the context of the updated reference intervals.    05/18/2021 141 133 - 144 mmol/L Final     Potassium   Date Value Ref Range Status   06/08/2024 3.9 3.4 - 5.3 mmol/L Final   05/04/2022 3.7 3.4 - 5.3 mmol/L Final   05/18/2021 3.7 3.4 - 5.3 mmol/L Final     Chloride   Date Value Ref Range Status   06/08/2024 102 98 - 107 mmol/L Final   05/04/2022 105 94 - 109 mmol/L Final   05/18/2021 110 (H) 94 - 109 mmol/L Final     Carbon Dioxide   Date Value Ref Range Status   05/18/2021 28 20 - 32 mmol/L Final     Carbon Dioxide (CO2)   Date Value Ref Range Status   06/08/2024 24 22 - 29 mmol/L Final   05/04/2022 23 20 - 32 mmol/L Final     Anion Gap   Date Value Ref Range Status   06/08/2024 11 7 - 15 mmol/L Final   05/04/2022 8 3 - 14 mmol/L Final   05/18/2021 3 3 - 14 mmol/L Final     Glucose   Date Value Ref Range Status   06/08/2024 118 (H) 70 - 99 mg/dL Final   05/04/2022 79 70 - 99 mg/dL Final   05/18/2021 81 70 - 99 mg/dL Final     Urea Nitrogen   Date Value Ref Range Status   06/08/2024 6.6 6.0 - 20.0 mg/dL Final   05/04/2022 9 7 - 30 mg/dL Final   05/18/2021 9 7 - 30 mg/dL Final     Creatinine   Date Value Ref Range Status   06/08/2024 0.66 0.51 - 0.95 mg/dL Final   05/18/2021 0.68 0.52 - 1.04 mg/dL Final     GFR Estimate    Date Value Ref Range Status   06/08/2024 >90 >60 mL/min/1.73m2 Final   05/18/2021 >90 >60 mL/min/[1.73_m2] Final     Comment:     Non  GFR Calc  Starting 12/18/2018, serum creatinine based estimated GFR (eGFR) will be   calculated using the Chronic Kidney Disease Epidemiology Collaboration   (CKD-EPI) equation.       Calcium   Date Value Ref Range Status   06/08/2024 9.1 8.6 - 10.0 mg/dL Final   05/18/2021 9.1 8.5 - 10.1 mg/dL Final     Bilirubin Total   Date Value Ref Range Status   06/08/2024 0.4 <=1.2 mg/dL Final   05/18/2021 0.4 0.2 - 1.3 mg/dL Final     Alkaline Phosphatase   Date Value Ref Range Status   06/08/2024 83 40 - 150 U/L Final   05/18/2021 81 40 - 150 U/L Final     ALT   Date Value Ref Range Status   06/08/2024 15 0 - 50 U/L Final     Comment:     Reference intervals for this test were updated on 6/12/2023 to more accurately reflect our healthy population. There may be differences in the flagging of prior results with similar values performed with this method. Interpretation of those prior results can be made in the context of the updated reference intervals.     05/18/2021 20 0 - 50 U/L Final     AST   Date Value Ref Range Status   06/08/2024 17 0 - 45 U/L Final     Comment:     Reference intervals for this test were updated on 6/12/2023 to more accurately reflect our healthy population. There may be differences in the flagging of prior results with similar values performed with this method. Interpretation of those prior results can be made in the context of the updated reference intervals.   05/18/2021 11 0 - 45 U/L Final     Most Recent D-dimer:No lab results found.      Imaging:    I personally reviewed the following imaging results today and those on care everywhere, if indicated      Leave this EKG monitoring 9/15/2023      Ultrasound thyroid 5/12/2022  Impression:  1. TR4 7 mm left superior thyroid nodule. No follow-up recommended.  2. TR4 6 mm Isthmus thyroid nodule. No  follow-up recommended.  3. TR3 6 mm right mid thyroid nodule. No follow-up recommended.  4. 3.2 cm left level 7 lesion, which may represent a reactive cervical  lymph node. Consider follow-up to assess for resolution.  Reviewed imaging from [facility] sites     Medical Records Reviewed:    Reviewed consults/documents from [facility] including  sleep medicine practice, neuropsychiatry rheumatology, GI, OB/GYN, endocrinology and Cardiology

## 2024-10-08 NOTE — NURSING NOTE
Current patient location: [address] [place] [id number]    Is the patient currently in the state of [place]? YES    Visit mode:VIDEO    If the visit is dropped, the patient can be reconnected by: VIDEO VISIT: Text to cell phone:   Telephone Information:   Mobile [phone]       Will anyone else be joining the visit? NO  (If patient encounters technical issues they should call [phone]    Are changes needed to the allergy or medication list? No    Are refills needed on medications prescribed by this physician? NO    Rooming Documentation:  Questionnaire(s) completed    Reason for visit: Consult    Danitza Meade VVF    Depression Response    Patient completed the PHQ-9 assessment for depression and scored >9? Yes  Question 9 on the PHQ-9 was positive for suicidality? No  Does patient have current mental health provider? Yes    Is this a virtual visit? Yes   Does patient have suicidal ideation (positive question 9)? No - offer to place Mental Health Referral.  Patient declined referral/not needed    I personally notified the following: visit provider

## 2024-10-08 NOTE — LETTER
10/8/2024       RE: Danitza Soto  [address] [facility] [id number]     Dear Colleague,    Thank you for referring your patient, Danitza Soto, to the [facility] [place] at [facility]. Please see a copy of my visit note below.    Danitza Soto is a 44 year old female who presents to be evaluated for a billable video visit.    Video-Visit Details    Video visit Start time: 11:03 AM    Type of service:  Video Visit    Video End Time: 11:40 AM    Originating Location (pt. Location): Home    Distant Location (provider location):  Off- Site    Platform used for Video Visit: [employer]    Assessment/ Impression:   1. 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  Discussed rechecking iron and ferritin as this was low several years ago.  Also discussed referral to speech therapy for evaluation.  Discussed due to patient to trial valerian root tea this may help.  Encouraged to continue working sleep medicine but discussed that sleep deprivation will contribute to concentration as well as fatigue.   - Iron; Future  - Ferritin; Future  - Speech Therapy  Referral; Future    2. Fatigue, unspecified type  Patient with chronic fatigue that has been longstanding.  She does have a history of sleep apnea and is already on CPAP treatment however discussed with iron deficiency in the past could be contributing to several of her symptoms.Discussed energy conservation and provided information on fatigue management.  Also discussed referral to Occupational therapy for the COVID-19 program.  - Adult Post Covid Clinic  Referral  - Iron; Future  - Ferritin; Future  - Occupational Therapy  Referral; Future    3. Post-COVID chronic joint pain  Patient with joint  pain it is in multiple locations including hand wrist elbow and knee.  Patient most recently has been having significant hand pain to the point she has altered how she does things to prevent herself from moving her hand in certain ways.  This started past spring and she has not had repeated autoimmune labs.  Discussed also meeting with orthopedic/sports medicine specialist who specializes in hands.  - Anti Nuclear Cynthia IgG by IFA with Reflex; Future  - Rheumatoid factor; Future  - CK total; Future  - CRP inflammation; Future  - Erythrocyte sedimentation rate auto; Future  - Orthopedic  Referral; Future    4. Long COVID  Discussed COVID and Post COVID with patient.  Educational materials provided and all questions answered.  - Adult Post Covid Clinic  Referral    Plan:  I reviewed present knowledge on long-Covid.  Education was provided and question were answered.  Orders/Referrals as above  Will advised patient on test results  I will follow up with Danitza Soto in 6 weeks. I will review progress and consider need for any other therapeutic interventions. If there are any questions and/or concerns she will call the clinic.      On day of encounter time spent in chart review and with patient in consultation, exam, education, coordination of care, review of outside charts/data and documentation:  55 minutes     I have attempted to proof read for major spelling errors and apologize for any minor errors I may have missed.      This note was dictated using voice recognition software. Any grammatical or context distortions are unintentional and inherent to the software.    _____________  Estela Avery PA-C  [facility] PHYSICAL MEDICINE AND REHABILITATION [facility]IN[facility] [place]    Subjective   This 44 year old female presents to the [facility] Rehabilitation Medicine Post-COVID clinic as a new consult to evaluate continuing symptoms after COVID infection initially diagnosed  "11/21/2020.  Danitza Soto presented to their primary care physician on 11/21/20 complaining of fatigue, decreased taste, and decreased smell. Treatment was symptomatic.  She tested positive late December 2023 and had symptoms of fatigue, sleep, insomnia, low fever, generalize aches and pains.  Danitza Soto experienced complications of chronic fatigue.  Continuing symptoms include fatigue, generalized aches, weakness, palpitations, dizziness, difficulty sleeping, numbness, tingling, brain fog, and distractibility.  Patient has fatigue but has been seeing a sleep provider for years. They do have sleep apnea but this has progress since CPAP. They do have ambien and melatonin.  She does notice leg \"static feeling\" and has to get up an move.  Patient did also trial Magensium as well.  Patient will wake up tired as well.  Patient will need to take a break after 30 min to 1hr.   Patient has mental exhaustion as well.   Patient will have trouble with finding words.  Patient has some trouble with through process as well.   Patient will often forget conversations as well. Patient has joint pain in her elbow, shoulder, knees. She also feels this in between her finger bones.  She has pain but no swelling.  Patient does have pain in her hands daily and has worked to avoid.   Patient does have palliations but was placed on metoprolol.   Zio was done to confirm this was not recurrent Afib but did show short runs of SVT.  Patient does sometimes getting lightheaded when standing and is sporadically.   Past medical history is significant for GERD, mixed hyperlipidemia, status post laparoscopic gastric sleeve, status post ablation for atrial fibrillation, insomnia atrial fibrillation, smoking history, depression, obesity/morbid obesity, and sleep apnea. The patient was vaccinated against COVID x2, last vaccination 2/25/2021 .  Previous activity was full time work.     History of COVID-19 infection: 11/21/2020, 12/2023  Date of " first symptoms: 11/21/2020, 12/2023  Diagnosis: PCR and antigen  Hospitalization: No  Treatment: Symptomatic  Current Symptoms: See subjective  Goals of Care: increase energy, decrease depression, improve thinking, and improve quality of life          10/8/2024    10:35 AM   PHQ Assesment Total Score(s)   PHQ-9 Score 10           10/2/2024    11:56 AM   GABRIELA-7 Results   GABRIELA 7 TOTAL SCORE 2 (minimal anxiety)   GABRIELA-7 Total Score 2         10/8/2024    10:35 AM   PTSD Screen Score   Have you ever experienced this kind of event? No         10/8/2024    10:38 AM   PROMIS-29   PROMIS Physical Function T-Score 34 (moderate dysfunction)   PROMIS Anxiety T-Score 40 (within normal limits)   PROMIS Depression T-Score 52 (within normal limits)   PROMIS Fatigue T-Score 76 (severe)   PROMIS Sleep Disturbance T-Score 73 (severe)   PROMIS Ability to Participate in Social Roles & Activities T-Score 34 (moderate dysfunction)   PROMIS Pain Interference T-Score 72 (severe)   PROMIS Pain Intensity 6       Past Medical History:   Diagnosis Date     Antiplatelet or antithrombotic long-term use     resolved     Arrhythmia      BV (bacterial vaginosis) 9/18/2020     Depressive disorder 1990     Esophageal reflux      Hearing problem 2018     Hyperlipidemia LDL goal <130 07/06/2015     Hypertension      LSIL (low grade squamous intraepithelial lesion) on Pap smear 06/2014    + HPV, unable to type colp - BRISEYDA I     LEONARDO on CPAP      Other anxiety states        Past Surgical History:   Procedure Laterality Date     EP ABLATION FOCAL AFIB N/A 10/03/2019    Procedure: EP ABLATION FOCAL AFIB;  Surgeon: Harpreet Arevalo MD;  Location:  OR     ESOPHAGOSCOPY, GASTROSCOPY, DUODENOSCOPY (EGD), COMBINED N/A 11/29/2022    Procedure: ESOPHAGOGASTRODUODENOSCOPY, WITH BIOPSY;  Surgeon: Jeff Patino DO;  Location: [facility]     HC TOOTH EXTRACTION W/FORCEP  1995    [place] Teeth Extraction     LAPAROSCOPIC GASTRIC SLEEVE N/A 11/27/2018    Procedure: Laparoscopic  Sleeve Gastrectomy Latex Free;  Surgeon: Artis Tse MD;  Location: [facility]     LAPAROSCOPIC HERNIORRHAPHY HIATAL  2018    Procedure: LAPAROSCOPIC  HIATAL Hernia Repair;  Surgeon: Artis Tse MD;  Location: [facility]     SALPINGO-OOPHORECTOMY, COMBINED Left 10/20/2021    Mucinous cystadenoma       Family History   Problem Relation Age of Onset     Heart Disease Mother         ,mother had emphesema and  at 62     Hypertension Mother      Allergies Mother      Lipids Mother      Obesity Mother      Respiratory Mother         Emphysema     Hypertension Father      Lipids Father      Cancer Father      Prostate Cancer Father      Other Cancer Father      Allergies Sister      Genitourinary Problems Sister      Diabetes Maternal Grandmother         Type 2?     Hypertension Maternal Grandmother      Circulatory Maternal Grandmother         Due to diabetes     Eye Disorder Maternal Grandmother         Macular degeneration/Macular degeneration     Obesity Maternal Grandmother      Alcohol/Drug Maternal Grandfather      Obesity Maternal Grandfather      Cerebrovascular Disease Paternal Grandmother      Psychotic Disorder Paternal Grandfather         Committed Suicide     Depression Paternal Grandfather      Anesthesia Reaction No family hx of      Bleeding Disorder No family hx of      Venous thrombosis No family hx of        Social History     Tobacco Use     Smoking status: Former     Current packs/day: 0.00     Average packs/day: 1 pack/day for 17.4 years (17.4 ttl pk-yrs)     Types: Cigarettes     Start date: 2004     Quit date: 2021     Years since quitting: 3.3     Smokeless tobacco: Never   Vaping Use     Vaping status: Every Day     Substances: Nicotine     Devices: Disposable   Substance Use Topics     Alcohol use: Not Currently     Comment: Occas.     Drug use: No         Current Outpatient Medications:      amLODIPine (NORVASC) 10 MG tablet, Take 1 tablet (10 mg) by mouth daily,  Disp: 90 tablet, Rfl: 1     Ascorbic Acid (VITAMIN C) 500 MG CAPS, Take by mouth every evening, Disp: , Rfl:      calcium carbonate (OS-CHIKI) 500 MG tablet, Take 1 tablet by mouth every evening, Disp: , Rfl:      Cyanocobalamin (B-12) 1000 MCG TBCR, Take by mouth every evening, Disp: , Rfl:      doxycycline hyclate (VIBRA-TABS) 100 MG tablet, Take 1 tablet (100 mg) by mouth 2 times daily, Disp: 20 tablet, Rfl: 0     hydrochlorothiazide (HYDRODIURIL) 25 MG tablet, Take 1 tablet (25 mg) by mouth daily, Disp: 90 tablet, Rfl: 1     ibuprofen (ADVIL/MOTRIN) 200 MG tablet, Take 200 mg by mouth every 4 hours as needed for pain, Disp: , Rfl:      lisinopril (ZESTRIL) 40 MG tablet, Take 1 tablet (40 mg) by mouth every morning, Disp: 90 tablet, Rfl: 1     magnesium 250 MG tablet, Take 1 tablet by mouth every evening, Disp: , Rfl:      metoprolol succinate ER (TOPROL XL) 25 MG 24 hr tablet, Take 1 tablet (25 mg) by mouth daily, Disp: 90 tablet, Rfl: 1     Multiple Vitamins-Iron (MULTI-DAY PLUS IRON PO), Take by mouth every morning, Disp: , Rfl:      omeprazole (PRILOSEC) 40 MG DR capsule, Take 1 capsule (40 mg) by mouth daily, Disp: 90 capsule, Rfl: 2     Semaglutide-Weight Management (WEGOVY) 0.25 MG/0.5ML pen, Inject 0.25 mg subcutaneously once a week., Disp: 2 mL, Rfl: 0     [START ON 10/30/2024] Semaglutide-Weight Management (WEGOVY) 0.5 MG/0.5ML pen, Inject 0.5 mg subcutaneously once a week., Disp: 2 mL, Rfl: 3     venlafaxine (EFFEXOR XR) 150 MG 24 hr capsule, Take 1 capsule by mouth daily., Disp: 90 capsule, Rfl: 1     vitamin B1 (THIAMINE) 250 MG tablet, Take 250 mg by mouth every evening, Disp: , Rfl:      Vitamin D (Cholecalciferol) 25 MCG (1000 UT) TABS, Take by mouth every evening, Disp: , Rfl:      zolpidem (AMBIEN) 5 MG tablet, Take tablet by mouth 15 minutes prior to sleep as needed. Pharmacist to review side effects., Disp: 30 tablet, Rfl: 0    Review of Systems   Constitutional, HEENT, cardiovascular,  "pulmonary, GI, , musculoskeletal, neuro, skin, endocrine and psych systems are negative, except as otherwise noted.      Objective    Vitals:  No vitals were obtained today due to virtual visit.    Physical Exam   EYES: Eyes grossly normal to inspection.  No discharge or erythema, or obvious scleral/conjunctival abnormalities.  SKIN: Visible skin clear. No significant rash, abnormal pigmentation or lesions.  NEURO: Cranial nerves grossly intact.  Mentation and speech appropriate for age.  GENERAL: Healthy, alert and no distress  RESP: No audible wheeze, cough, or visible cyanosis.  No visible retractions or increased work of breathing.    PSYCH: Mentation appears normal, affect normal/bright, judgement and insight intact, normal speech and appearance well-groomed.            No results found for: \"CRP\"   Erythrocyte Sedimentation Rate   Date Value Ref Range Status   03/21/2023 7 0 - 20 mm/hr Final      Last Renal Panel:  Sodium   Date Value Ref Range Status   06/08/2024 137 135 - 145 mmol/L Final     Comment:     Reference intervals for this test were updated on 09/26/2023 to more accurately reflect our healthy population. There may be differences in the flagging of prior results with similar values performed with this method. Interpretation of those prior results can be made in the context of the updated reference intervals.    05/18/2021 141 133 - 144 mmol/L Final     Potassium   Date Value Ref Range Status   06/08/2024 3.9 3.4 - 5.3 mmol/L Final   05/04/2022 3.7 3.4 - 5.3 mmol/L Final   05/18/2021 3.7 3.4 - 5.3 mmol/L Final     Chloride   Date Value Ref Range Status   06/08/2024 102 98 - 107 mmol/L Final   05/04/2022 105 94 - 109 mmol/L Final   05/18/2021 110 (H) 94 - 109 mmol/L Final     Carbon Dioxide   Date Value Ref Range Status   05/18/2021 28 20 - 32 mmol/L Final     Carbon Dioxide (CO2)   Date Value Ref Range Status   06/08/2024 24 22 - 29 mmol/L Final   05/04/2022 23 20 - 32 mmol/L Final     Anion Gap " "  Date Value Ref Range Status   06/08/2024 11 7 - 15 mmol/L Final   05/04/2022 8 3 - 14 mmol/L Final   05/18/2021 3 3 - 14 mmol/L Final     Glucose   Date Value Ref Range Status   06/08/2024 118 (H) 70 - 99 mg/dL Final   05/04/2022 79 70 - 99 mg/dL Final   05/18/2021 81 70 - 99 mg/dL Final     Urea Nitrogen   Date Value Ref Range Status   06/08/2024 6.6 6.0 - 20.0 mg/dL Final   05/04/2022 9 7 - 30 mg/dL Final   05/18/2021 9 7 - 30 mg/dL Final     Creatinine   Date Value Ref Range Status   06/08/2024 0.66 0.51 - 0.95 mg/dL Final   05/18/2021 0.68 0.52 - 1.04 mg/dL Final     GFR Estimate   Date Value Ref Range Status   06/08/2024 >90 >60 mL/min/1.73m2 Final   05/18/2021 >90 >60 mL/min/[1.73_m2] Final     Comment:     Non  GFR Calc  Starting 12/18/2018, serum creatinine based estimated GFR (eGFR) will be   calculated using the Chronic Kidney Disease Epidemiology Collaboration   (CKD-EPI) equation.       Calcium   Date Value Ref Range Status   06/08/2024 9.1 8.6 - 10.0 mg/dL Final   05/18/2021 9.1 8.5 - 10.1 mg/dL Final     Phosphorus   Date Value Ref Range Status   11/28/2018 3.5 2.5 - 4.5 mg/dL Final     Albumin   Date Value Ref Range Status   06/08/2024 4.1 3.5 - 5.2 g/dL Final   05/04/2022 3.7 3.4 - 5.0 g/dL Final   05/18/2021 3.7 3.4 - 5.0 g/dL Final      Lab Results   Component Value Date    WBC 7.2 06/08/2024    WBC 7.4 05/18/2021     Lab Results   Component Value Date    RBC 5.26 06/08/2024    RBC 4.90 05/18/2021     Lab Results   Component Value Date    HGB 15.3 06/08/2024    HGB 14.4 05/18/2021     Lab Results   Component Value Date    HCT 46.6 06/08/2024    HCT 43.7 05/18/2021     No components found for: \"MCT\"  Lab Results   Component Value Date    MCV 89 06/08/2024    MCV 89 05/18/2021     Lab Results   Component Value Date    MCH 29.1 06/08/2024    MCH 29.4 05/18/2021     Lab Results   Component Value Date    MCHC 32.8 06/08/2024    MCHC 33.0 05/18/2021     Lab Results   Component Value " Date    RDW 13.8 06/08/2024    RDW 13.8 05/18/2021     Lab Results   Component Value Date     06/08/2024     05/18/2021      Lab Results   Component Value Date    A1C 5.6 06/08/2024    A1C 5.3 03/21/2023    A1C 5.2 05/18/2021    A1C 5.4 12/05/2019    A1C 6.1 07/24/2018    A1C 5.4 10/08/2013      TSH   Date Value Ref Range Status   06/08/2024 3.85 0.30 - 4.20 uIU/mL Final   05/04/2022 4.09 (H) 0.40 - 4.00 mU/L Final   05/18/2021 0.64 0.40 - 4.00 mU/L Final      Lab Results   Component Value Date    VITDT 43 06/08/2024    VITDT 43 05/04/2022    VITDT 40 05/18/2021    VITDT 36 12/05/2019    VITDT 31 03/07/2019      Recent Labs   Lab Test 05/04/22  1144 11/28/18  0554 11/27/18  1120   MAG 2.3 2.2 1.9     Last Comprehensive Metabolic Panel:  Sodium   Date Value Ref Range Status   06/08/2024 137 135 - 145 mmol/L Final     Comment:     Reference intervals for this test were updated on 09/26/2023 to more accurately reflect our healthy population. There may be differences in the flagging of prior results with similar values performed with this method. Interpretation of those prior results can be made in the context of the updated reference intervals.    05/18/2021 141 133 - 144 mmol/L Final     Potassium   Date Value Ref Range Status   06/08/2024 3.9 3.4 - 5.3 mmol/L Final   05/04/2022 3.7 3.4 - 5.3 mmol/L Final   05/18/2021 3.7 3.4 - 5.3 mmol/L Final     Chloride   Date Value Ref Range Status   06/08/2024 102 98 - 107 mmol/L Final   05/04/2022 105 94 - 109 mmol/L Final   05/18/2021 110 (H) 94 - 109 mmol/L Final     Carbon Dioxide   Date Value Ref Range Status   05/18/2021 28 20 - 32 mmol/L Final     Carbon Dioxide (CO2)   Date Value Ref Range Status   06/08/2024 24 22 - 29 mmol/L Final   05/04/2022 23 20 - 32 mmol/L Final     Anion Gap   Date Value Ref Range Status   06/08/2024 11 7 - 15 mmol/L Final   05/04/2022 8 3 - 14 mmol/L Final   05/18/2021 3 3 - 14 mmol/L Final     Glucose   Date Value Ref Range Status    06/08/2024 118 (H) 70 - 99 mg/dL Final   05/04/2022 79 70 - 99 mg/dL Final   05/18/2021 81 70 - 99 mg/dL Final     Urea Nitrogen   Date Value Ref Range Status   06/08/2024 6.6 6.0 - 20.0 mg/dL Final   05/04/2022 9 7 - 30 mg/dL Final   05/18/2021 9 7 - 30 mg/dL Final     Creatinine   Date Value Ref Range Status   06/08/2024 0.66 0.51 - 0.95 mg/dL Final   05/18/2021 0.68 0.52 - 1.04 mg/dL Final     GFR Estimate   Date Value Ref Range Status   06/08/2024 >90 >60 mL/min/1.73m2 Final   05/18/2021 >90 >60 mL/min/[1.73_m2] Final     Comment:     Non  GFR Calc  Starting 12/18/2018, serum creatinine based estimated GFR (eGFR) will be   calculated using the Chronic Kidney Disease Epidemiology Collaboration   (CKD-EPI) equation.       Calcium   Date Value Ref Range Status   06/08/2024 9.1 8.6 - 10.0 mg/dL Final   05/18/2021 9.1 8.5 - 10.1 mg/dL Final     Bilirubin Total   Date Value Ref Range Status   06/08/2024 0.4 <=1.2 mg/dL Final   05/18/2021 0.4 0.2 - 1.3 mg/dL Final     Alkaline Phosphatase   Date Value Ref Range Status   06/08/2024 83 40 - 150 U/L Final   05/18/2021 81 40 - 150 U/L Final     ALT   Date Value Ref Range Status   06/08/2024 15 0 - 50 U/L Final     Comment:     Reference intervals for this test were updated on 6/12/2023 to more accurately reflect our healthy population. There may be differences in the flagging of prior results with similar values performed with this method. Interpretation of those prior results can be made in the context of the updated reference intervals.     05/18/2021 20 0 - 50 U/L Final     AST   Date Value Ref Range Status   06/08/2024 17 0 - 45 U/L Final     Comment:     Reference intervals for this test were updated on 6/12/2023 to more accurately reflect our healthy population. There may be differences in the flagging of prior results with similar values performed with this method. Interpretation of those prior results can be made in the context of the updated  reference intervals.   05/18/2021 11 0 - 45 U/L Final     Most Recent D-dimer:No lab results found.      Imaging:    I personally reviewed the following imaging results today and those on care everywhere, if indicated      Leave this EKG monitoring 9/15/2023      Ultrasound thyroid 5/12/2022  Impression:  1. TR4 7 mm left superior thyroid nodule. No follow-up recommended.  2. TR4 6 mm Isthmus thyroid nodule. No follow-up recommended.  3. TR3 6 mm right mid thyroid nodule. No follow-up recommended.  4. 3.2 cm left level 7 lesion, which may represent a reactive cervical  lymph node. Consider follow-up to assess for resolution.  Reviewed imaging from [facility] sites     Medical Records Reviewed:    Reviewed consults/documents from [facility] including  sleep medicine practice, neuropsychiatry rheumatology, GI, OB/GYN, endocrinology and Cardiology       Again, thank you for allowing me to participate in the care of your patient.      Sincerely,    Estela Avery PA-C

## 2024-10-09 ENCOUNTER — PATIENT OUTREACH (OUTPATIENT)
Dept: CARE COORDINATION | Facility: CLINIC | Age: 44
End: 2024-10-09

## 2024-10-11 ENCOUNTER — THERAPY VISIT (OUTPATIENT)
Dept: OCCUPATIONAL THERAPY | Facility: CLINIC | Age: 44
End: 2024-10-11
Attending: PHYSICIAN ASSISTANT
Payer: COMMERCIAL

## 2024-10-11 ENCOUNTER — PATIENT OUTREACH (OUTPATIENT)
Dept: CARE COORDINATION | Facility: CLINIC | Age: 44
End: 2024-10-11

## 2024-10-11 DIAGNOSIS — R53.83 FATIGUE, UNSPECIFIED TYPE: Primary | ICD-10-CM

## 2024-10-11 DIAGNOSIS — H83.3X9 SOUND SENSITIVITY, UNSPECIFIED LATERALITY: ICD-10-CM

## 2024-10-11 PROCEDURE — 97530 THERAPEUTIC ACTIVITIES: CPT | Mod: GO | Performed by: OCCUPATIONAL THERAPIST

## 2024-10-11 PROCEDURE — 97165 OT EVAL LOW COMPLEX 30 MIN: CPT | Mod: GO | Performed by: OCCUPATIONAL THERAPIST

## 2024-10-14 ENCOUNTER — THERAPY VISIT (OUTPATIENT)
Dept: SPEECH THERAPY | Facility: CLINIC | Age: 44
End: 2024-10-14
Attending: PHYSICIAN ASSISTANT
Payer: COMMERCIAL

## 2024-10-14 DIAGNOSIS — U09.9 POST-COVID CHRONIC CONCENTRATION DEFICIT: ICD-10-CM

## 2024-10-14 DIAGNOSIS — R41.840 POST-COVID CHRONIC CONCENTRATION DEFICIT: ICD-10-CM

## 2024-10-14 DIAGNOSIS — R41.841 COGNITIVE COMMUNICATION DEFICIT: Primary | ICD-10-CM

## 2024-10-14 PROCEDURE — 92523 SPEECH SOUND LANG COMPREHEN: CPT | Mod: GN

## 2024-10-14 NOTE — PROGRESS NOTES
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     She was admitted 2/1/21-2/3/21 with a new PE, started on Rivaroxaban. Switched to Eliquis 3/25/21 with RUE DVT.     She was admitted 4/26/21-5/11/21 with sickle cell pain crisis complicated by worsening PE in setting of low Apixaban levels, acute hypoxic respiratory failure, pneumonia, acute chest syndrome s/p exchange transfusion on 4/30 and 5/4. After 2nd exchange her oxygen requirement dramatically improved from 20L to 1-2L 5/5 and she was off oxygen as of 5/6.      She was admitted 7/13/21-7/25/21 for acute on chronic PE, switched to dabigitran + ASA.      Due to worsening hypoxia she underwent VQ scan 11/10/21 which showed acute on chronic PE for which she was admitted 11/10-11/21. During admission was switched to warfarin. Echo WNL and no signs of pulm HTN on right heart cath. She did receive 1 unit pRBC for hgb 6.4 during admission.    ED visits had decreased although she was utilizing infusion center most days of the week for pain management. Infusion center visits were limited to two times per week starting ~1/24/22.    She was admitted 1/29-1/31/22 for sickle cell pain crisis. She remained subtherapeutic on warfarin alone and was bridged with enoxaparin. Desferal was resumed during this admission and has been continued through home infusion.      She was seen today for routine hematology follow-up.     Interval History:  Jennifer is experiencing acute pain today. States this is \"all " "SPEECH LANGUAGE PATHOLOGY EVALUATION              Subjective      Presenting condition or subjective complaint: Long Covid symptoms  Danitza \"rTish\" Charles, is an pleasant 44 year old woman who was referred to outpatient speech language pathology for long covid s/sx. Trish first contracted COVID in 11/2020, and then again in 12/2023. Since initial diagnosis, Trish experiences short term memory difficulties, word finding difficulties, processing changes. Trish endorses that she has chronic fatigue and notices these s/sx gets worse when she is tired. Trish has developed her own compensatory strategies such as describing an item/semantic function. She saw OT on 10/11/2024 in which will target her ongoing fatigue.   Date of onset: 10/08/24 (order date)    Relevant medical history: Depression; Dizziness; Heart problems; High blood pressure; Overweight; Sleep disorder like apnea; Smoking   Active Ambulatory Problems     Diagnosis Date Noted    Anxiety     Gastroesophageal reflux disease without esophagitis     Tobacco use disorder 05/17/2006    CARDIOVASCULAR SCREENING; LDL GOAL LESS THAN 130 10/31/2010    Primary hypertension 01/12/2011    Acne 02/24/2012    Papanicolaou smear of cervix with low grade squamous intraepithelial lesion (LGSIL) 06/17/2014    Mixed hyperlipidemia 07/06/2015    LEONARDO (obstructive sleep apnea) 04/13/2017    S/P ablation of atrial fibrillation 10/03/2019    S/P laparoscopic sleeve gastrectomy 02/03/2021    Strain of lumbar region, initial encounter 05/06/2021    Strain of hip and thigh, right, initial encounter 05/06/2021    Insomnia, unspecified type 02/11/2022    Memory loss 06/29/2022    Gastroesophageal reflux disease with esophagitis without hemorrhage 06/29/2022    Fatigue, unspecified type 06/29/2022     Resolved Ambulatory Problems     Diagnosis Date Noted    Intermittent asthma 03/29/2010    Lateral epicondylitis 02/24/2012    Right elbow pain 07/16/2012    Right lateral " "over\". After our visit today she will be able to get in to the infusion center for IV fluids and pain medication. She was seen in the ED once this week for uncomplicated sickle cell crisis and was able to discharge home without admission. Her blood pressure is high today. She states she checks it at home every other day and notes it has been high but is unable to tell me exactly what the readings have been. She denies chest pain, shortness of breath of fevers/chills. She has been compliant with her medications including now taking aspirin BID. She has no additional concerns today.    Current Outpatient Medications   Medication Sig Dispense Refill     acetaminophen (TYLENOL) 325 MG tablet Take 2 tablets (650 mg) by mouth every 6 hours as needed for mild pain 120 tablet 3     albuterol (PROAIR HFA/PROVENTIL HFA/VENTOLIN HFA) 108 (90 Base) MCG/ACT inhaler Inhale 2 puffs into the lungs every 6 hours as needed for shortness of breath / dyspnea or wheezing 8.5 g 3     albuterol (PROVENTIL) (2.5 MG/3ML) 0.083% neb solution Take 1 vial (2.5 mg) by nebulization every 6 hours as needed for shortness of breath / dyspnea or wheezing 12 mL 4     aspirin (ASA) 81 MG chewable tablet Take 1 tablet (81 mg) by mouth 2 times daily 60 tablet 11     budesonide-formoterol (SYMBICORT) 160-4.5 MCG/ACT Inhaler Inhale 2 puffs into the lungs 2 times daily 10.2 g 3     deferasirox (JADENU) 360 MG tablet Take 4 tablets (1,440 mg) by mouth every evening 120 tablet 4     diphenhydrAMINE (BENADRYL) 25 MG capsule Take 1-2 capsules (25-50 mg) by mouth nightly as needed for sleep 60 capsule 3     EPINEPHrine (ANY BX GENERIC EQUIV) 0.3 MG/0.3ML injection 2-pack Inject 0.3 mLs (0.3 mg) into the muscle as needed for anaphylaxis (Patient not taking: Reported on 2/21/2022) 1 each 1     Hydroxyurea 1000 MG TABS Take 3,000 mg by mouth daily 90 tablet 3     medroxyPROGESTERone (DEPO-PROVERA) 150 MG/ML IM injection Inject 150 mg into the muscle       morphine " epicondylitis 07/16/2012    Elbow pain 08/30/2012    Left shoulder pain 01/20/2016    Obesity hypoventilation syndrome (H) 04/13/2017    Lumbago 08/01/2018    Left anterior knee pain 08/01/2018    Paroxysmal atrial fibrillation (H) 10/23/2018    Morbid (severe) obesity due to excess calories (H) 11/27/2018    Recurrent major depressive disorder, in full remission (H) 04/08/2019    Moderate episode of recurrent major depressive disorder (H) 09/18/2020    BV (bacterial vaginosis) 09/18/2020    Lumbar spine pain 02/14/2022     Past Medical History:   Diagnosis Date    Antiplatelet or antithrombotic long-term use     Arrhythmia     Depressive disorder 1990    Esophageal reflux     Hearing problem 2018    Hyperlipidemia LDL goal <130 07/06/2015    Hypertension     LSIL (low grade squamous intraepithelial lesion) on Pap smear 06/2014    LEONARDO on CPAP     Other anxiety states       Dates & types of surgery: Weight loss sleeve 11/2018; hysterectomy 04/22; ovary taken out 2021?  Past Surgical History:   Procedure Laterality Date    EP ABLATION FOCAL AFIB N/A 10/03/2019    Procedure: EP ABLATION FOCAL AFIB;  Surgeon: Harpreet Arevalo MD;  Location:  OR    ESOPHAGOSCOPY, GASTROSCOPY, DUODENOSCOPY (EGD), COMBINED N/A 11/29/2022    Procedure: ESOPHAGOGASTRODUODENOSCOPY, WITH BIOPSY;  Surgeon: Jeff Patino DO;  Location:  GI    HC TOOTH EXTRACTION W/FORCEP  1995    [facility] Teeth Extraction    LAPAROSCOPIC GASTRIC SLEEVE N/A 11/27/2018    Procedure: Laparoscopic Sleeve Gastrectomy Latex Free;  Surgeon: Artis Tse MD;  Location:  OR    LAPAROSCOPIC HERNIORRHAPHY HIATAL  11/27/2018    Procedure: LAPAROSCOPIC  HIATAL Hernia Repair;  Surgeon: Artis Tse MD;  Location:  OR    SALPINGO-OOPHORECTOMY, COMBINED Left 10/20/2021    Mucinous cystadenoma        Prior diagnostic imaging/testing results:       Prior therapy history for the same diagnosis, illness or injury: No      Living Environment  Social support:  (MS CONTIN) 15 MG CR tablet Take 1 tablet (15 mg) by mouth every 12 hours 60 tablet 0     ondansetron (ZOFRAN) 8 MG tablet Take 1 tablet (8 mg) by mouth every 8 hours as needed 30 tablet 1     oxyCODONE IR (ROXICODONE) 15 MG tablet Take 1 tablet (15 mg) by mouth every 4 hours as needed for severe pain Goal 4 per day. Max 6 per day. 45 tablet 0       Past Medical History  Past Medical History:   Diagnosis Date     Anxiety      Bleeding disorder (H)      Blood clotting disorder (H)      Cerebral infarction (H) 2015     Cognitive developmental delay     low IQ. Please recognize when managing pain and planning with her     Depressive disorder      Hemiplegia and hemiparesis following cerebral infarction affecting right dominant side (H)     right hand contractures     Iron overload due to repeated red blood cell transfusions      Migraines      Multiple subsegmental pulmonary emboli without acute cor pulmonale (H) 02/01/2021     Oppositional defiant behavior      Superficial venous thrombosis of arm, right 03/25/2021     Uncomplicated asthma      Past Surgical History:   Procedure Laterality Date     AS INSERT TUNNELED CV 2 CATH W/O PORT/PUMP       CHOLECYSTECTOMY       CV RIGHT HEART CATH MEASUREMENTS RECORDED N/A 11/18/2021    Procedure: Right Heart Cath;  Surgeon: Jackson Stauffer MD;  Location:  HEART CARDIAC CATH LAB     INSERT PORT VASCULAR ACCESS Left 4/21/2021    Procedure: INSERTION, VASCULAR ACCESS PORT (NOT SURE ON SIDE UNTIL REMOVAL);  Surgeon: Rajan More MD;  Location: [facility] OR     IR CHEST PORT PLACEMENT > 5 YRS OF AGE  4/21/2021     IR CVC NON TUNNEL LINE REMOVAL  5/6/2021     IR CVC NON TUNNEL PLACEMENT  04/07/2020     IR CVC NON TUNNEL PLACEMENT  4/30/2021     IR PORT REMOVAL LEFT  4/21/2021     REMOVE PORT VASCULAR ACCESS Left 4/21/2021    Procedure: REMOVAL, VASCULAR ACCESS PORT LEFT;  Surgeon: Rajan More MD;  Location: [facility] OR     REPAIR TENDON ELBOW Right 10/02/2019    Procedure: Right  With a significant other or spouse   Help at home: None  Equipment owned:       Employment: Yes Designated Manager behavioral health  Hobbies/Interests: Reading    Patient goals for therapy: Not be fatigued all the time.       Objective     AUDITORY COMPREHENSION (understanding of spoken language)  One step commands: intact, over 90% accuracy  Two step commands: intact, over 90% accuracy  Biographical/personal yes/no questions: intact, over 90% accuracy  Comprehension of sentences: intact, over 90% accuracy  Comprehension of paragraph: intact, over 90% accuracy  Auditory comprehension level of impairment: no impairment     VERBAL EXPRESSION (use of spoken language to express information)  Automatic Speech Tasks: days of the week: intact  months of the year: intact   Confrontational Naming: pictures: intact, BNT short form: 14/15, increases to 15/15 with semantic cues. BNT short form norms mean=13.75, SD=1.29   Responsive Naming: sentence completion: intact   Word Finding Skills: generative naming: intact, CLQT - pt scored mean of '8'. CLQT generative naming norms ages 18-69: mean=6.57, SD=1.49   Conversational Speech: connected speech: intact    Verbal expression level of impairment: no impairment    READING COMPREHENSION (understanding written language)  Functional reading tasks: intake forms    Reading comprehension level of impairment: no impairment    WRITTEN EXPRESSION (use of writing skills to express information)  Did not formally assess. Trish did not endorse written expression difficulties.       COGNITIVE STATUS  Affect/mental status: L   Behavioral observations: [facility]   Short term memory: Recent verbal memory: 11/15. CLQT story retelling - pt scored mean of '8'. CLQT story retelling norms mean=7.96, SD=1.61   Executive function: sequencing (complex) of 15 words in alphabetical order: 15/15    Cognition level of impairment:  short term memory difficulties   Additional cognitive evaluation:  defer to OT  Elbow Flexor Lengthening, Flexor Pronator Slide Of Wrist and Finger, Thumb Adductor Lengthening;  Surgeon: Anai Franco MD;  Location: [facility] OR     TONSILLECTOMY Bilateral 10/02/2019    Procedure: Bilateral Tonsillectomy;  Surgeon: Farhana Guy MD;  Location: [facility] OR     ZC BREAST REDUCTION (INCLUDES LIPO) TIER 3 Bilateral 04/23/2019     Allergies   Allergen Reactions     Contrast Dye      Hives and breathing issues     Fish-Derived Products Hives     Seafood Hives     Diagnostic X-Ray Materials      Gadolinium      Social History   Social History     Tobacco Use     Smoking status: Never Smoker     Smokeless tobacco: Never Used   Substance Use Topics     Alcohol use: Not Currently     Alcohol/week: 0.0 standard drinks     Drug use: Never      Past medical history and social history were reviewed.    Physical Examination:  BP (!) 149/91   Pulse 118   Temp 98.3  F (36.8  C)   Resp 18   Wt 76.6 kg (168 lb 12.8 oz)   SpO2 92%   BMI 28.97 kg/m    Wt Readings from Last 10 Encounters:   03/11/22 76.6 kg (168 lb 12.8 oz)   03/06/22 77.6 kg (171 lb)   02/21/22 77.6 kg (171 lb 1.6 oz)   02/17/22 76.4 kg (168 lb 8 oz)   02/13/22 77.1 kg (170 lb)   02/04/22 78.7 kg (173 lb 9.6 oz)   01/31/22 76.9 kg (169 lb 9.6 oz)   01/23/22 74.8 kg (165 lb)   01/17/22 75.2 kg (165 lb 12.8 oz)   01/12/22 77.9 kg (171 lb 12.8 oz)     General: Well-appearing female, tearful, appears uncomfortable in exam room.  Eyes: EOMI, PERRL. No scleral icterus.  Respiratory:  Normal respiratory effort. Lungs clear to auscultation.  Cardiac: Tachycardic rate, normal rhythm. No murmur.  Neurologic: Cranial nerves II through XII are grossly intact. Contractured right hand 2/2 stroke history  Skin: No rashes, petechiae, or bruising noted on exposed skin.    Laboratory Data:  Results for HIMANSHU AL (MRN [id number]) as of 3/11/2022 11:44   Ref. Range 3/6/2022 23:22   Sodium Latest Ref Range: 133 - 144 mmol/L 139   Potassium Latest  and/or should pt return to SLP.     Assessment & Plan   CLINICAL IMPRESSIONS   Medical Diagnosis: Post-COVID chronic concentration deficit Cognitive communication deficit    Treatment Diagnosis: Post-COVID chronic concentration deficit Cognitive communication deficit   Impression/Assessment: Pt is a 44 year old female with word finding, recall, and processing/attention/concentration complaints. The following significant findings have been identified: impaired cognition, characterized by reduced short term memory. Identified deficits interfere with their ability for ADL's as compared to previous level of function.    PLAN OF CARE  Treatment Interventions:  Pt politely declined intervention at this time when offered for memory intervention and memory strategies training.  She would like to focus on OT and return in future as needed. She has her own system to recall things - such as describing. Therefore, pt is discharged at this time from SLP.         Long Term Goals:      N/a    Frequency of Treatment: x1 visit  Duration of Treatment: x1 visit     Recommended Referrals to Other Professionals:  none at this time. Pt already seeing OT.   Education Assessment:   Learner/Method: Patient;Listening  Education Comments: SLP provided education regarding evaluation findings and proposed POC.  Pt verbalized understanding.    Risks and benefits of evaluation/treatment have been explained.   Patient/Family/caregiver agrees with Plan of Care.     Evaluation Time:    Sound production with lang comprehension and expression minutes (79974): 28     Present: Not applicable     Signing Clinician: Marilee García, PAWAN García, MS, CCC-SLP  Speech Language Pathologist         Ref Range: 3.4 - 5.3 mmol/L 3.3 (L)   Chloride Latest Ref Range: 94 - 109 mmol/L 109   Carbon Dioxide Latest Ref Range: 20 - 32 mmol/L 19 (L)   Urea Nitrogen Latest Ref Range: 7 - 30 mg/dL 5 (L)   Creatinine Latest Ref Range: 0.52 - 1.04 mg/dL 0.58   GFR Estimate Latest Ref Range: >60 mL/min/1.73m2 >90   Calcium Latest Ref Range: 8.5 - 10.1 mg/dL 8.9   Anion Gap Latest Ref Range: 3 - 14 mmol/L 11   Albumin Latest Ref Range: 3.4 - 5.0 g/dL 4.0   Protein Total Latest Ref Range: 6.8 - 8.8 g/dL 7.8   Bilirubin Total Latest Ref Range: 0.2 - 1.3 mg/dL 3.2 (H)   Alkaline Phosphatase Latest Ref Range: 40 - 150 U/L 86   ALT Latest Ref Range: 0 - 50 U/L 54 (H)   AST Latest Ref Range: 0 - 45 U/L 76 (H)   HCG Qualitative Serum Latest Ref Range: Negative  Negative   Glucose Latest Ref Range: 70 - 99 mg/dL 102 (H)   WBC Latest Ref Range: 4.0 - 11.0 10e3/uL 11.8 (H)   Hemoglobin Latest Ref Range: 11.7 - 15.7 g/dL 8.2 (L)   Hematocrit Latest Ref Range: 35.0 - 47.0 % 23.4 (L)   Platelet Count Latest Ref Range: 150 - 450 10e3/uL 346   RBC Count Latest Ref Range: 3.80 - 5.20 10e6/uL 2.53 (L)   MCV Latest Ref Range: 78 - 100 fL 93   MCH Latest Ref Range: 26.5 - 33.0 pg 32.4   MCHC Latest Ref Range: 31.5 - 36.5 g/dL 35.0   RDW Latest Ref Range: 10.0 - 15.0 % 28.3 (H)   % Neutrophils Latest Units: % 15   % Lymphocytes Latest Units: % 73   % Monocytes Latest Units: % 5   % Eosinophils Latest Units: % 5   % Basophils Latest Units: % 2   Absolute Basophils Latest Ref Range: 0.0 - 0.2 10e3/uL 0.2   NRBC/W Latest Ref Range: <=0 % 67 (H)   Absolute Neutrophil Latest Ref Range: 1.6 - 8.3 10e3/uL 1.8   Absolute Lymphocytes Latest Ref Range: 0.8 - 5.3 10e3/uL 8.6 (H)   Absolute Monocytes Latest Ref Range: 0.0 - 1.3 10e3/uL 0.6   Absolute Eosinophils Latest Ref Range: 0.0 - 0.7 10e3/uL 0.6   Absolute NRBCs Latest Ref Range: <=0.0 10e3/uL 7.9 (H)   RBC Morphology Unknown Confirmed RBC Indices   Platelet Morphology Latest Ref Range: Automated Count  Confirmed. Platelet morphology is normal.  Automated Count Confirmed. Platelet morphology is normal.   Polychromasia Latest Ref Range: None Seen  Moderate (A)   Sickle Cells Latest Ref Range: None Seen  Marked (A)   Target Cells Latest Ref Range: None Seen  Moderate (A)   Elliptocytes Latest Ref Range: None Seen  Slight (A)   Juvencio Cells Latest Ref Range: None Seen  Moderate (A)   % Retic Latest Ref Range: 0.5 - 2.0 % 24.6 (H)   Absolute Retic Latest Ref Range: 0.025 - 0.095 10e6/uL 0.621 (H)   ABO/Rh(D) Unknown O POS   Antibody Screen Latest Ref Range: Negative  Negative   SPECIMEN EXPIRATION DATE Unknown [id number]       Most recent labs reviewed and interpreted by me today.    Assessment and Plan:  1. Sickle Cell HgbSS Disease  2. Chronic Pain    Overall Jennifer has been doing well pain management, adhering to twice a week infusion center visits and attempting to remain out of the ED as much as possible.  Today will be her first infusion center visit this week.  She was also seen in the ED and discharged on 3/6/2022 for uncomplicated sickle cell pain crisis.  She spoke to Dr. Duncan 2 weeks ago regarding her anticoagulation.  She had been on warfarin for several months after failing other anticoagulants.  She had persistent issues achieving a therapeutic window on her warfarin dosing.  Lovenox administration is difficult due to her right hand contracture.  After careful consideration the decision was made to discontinue warfarin and Lovenox as it was felt that the risk outweighed the benefit since she was unable to achieve a therapeutic level.  She began aspirin 81 mg twice daily and states she is taking this consistently.    As she was in significant pain during her visit today we did not revisit the discussion to start Suboxone which was previously recommended in the setting of opioid intolerance fairly poor pain control and chronic opioid use.    Plan:  -Continue Hydrea to 3000mg daily to help lessen  frequency of sickle cell pain  -Continue MS Contin and Oxycodone at home. Oxycodone refilled today.  -Infusion center visits limited to two times per week. Continue diligent home management with current medications, heat, rest, compression, warm baths.   -If unable to manage at home can go to ED but continue to not do IV narcotics in the emergency room. Ketamine previously added to pain plan in ED  -Continue Desferal and Jadenu for iron overload.  -Continue aspirin BID.   -RTC with Dr. Duncan 3/21/22 as scheduled. I will see her in clinic in approximately 6 weeks and will plan to review sickle cell health maintenance at that time.    40 minutes spent on the date of the encounter doing chart review, review of test results, patient visit and documentation     Patricia Mantilla CNP  [facility]  [address] [place] [id number]  [phone]

## 2024-10-16 ENCOUNTER — LAB (OUTPATIENT)
Dept: LAB | Facility: CLINIC | Age: 44
End: 2024-10-16
Payer: COMMERCIAL

## 2024-10-16 ENCOUNTER — MYC MEDICAL ADVICE (OUTPATIENT)
Dept: FAMILY MEDICINE | Facility: CLINIC | Age: 44
End: 2024-10-16

## 2024-10-16 ENCOUNTER — MYC MEDICAL ADVICE (OUTPATIENT)
Dept: ENDOCRINOLOGY | Facility: CLINIC | Age: 44
End: 2024-10-16

## 2024-10-16 DIAGNOSIS — E04.1 THYROID NODULE: Primary | ICD-10-CM

## 2024-10-16 DIAGNOSIS — Z98.84 S/P LAPAROSCOPIC SLEEVE GASTRECTOMY: ICD-10-CM

## 2024-10-16 DIAGNOSIS — U09.9 POST-COVID CHRONIC JOINT PAIN: ICD-10-CM

## 2024-10-16 DIAGNOSIS — R41.841 COGNITIVE COMMUNICATION DEFICIT: ICD-10-CM

## 2024-10-16 DIAGNOSIS — E78.5 HYPERLIPIDEMIA LDL GOAL <100: ICD-10-CM

## 2024-10-16 DIAGNOSIS — E66.2 CLASS 3 OBESITY WITH ALVEOLAR HYPOVENTILATION, SERIOUS COMORBIDITY, AND BODY MASS INDEX (BMI) OF 40.0 TO 44.9 IN ADULT (H): ICD-10-CM

## 2024-10-16 DIAGNOSIS — M25.50 POST-COVID CHRONIC JOINT PAIN: ICD-10-CM

## 2024-10-16 DIAGNOSIS — R53.83 FATIGUE, UNSPECIFIED TYPE: ICD-10-CM

## 2024-10-16 DIAGNOSIS — Z78.9 STATIN INTOLERANCE: ICD-10-CM

## 2024-10-16 DIAGNOSIS — E66.813 CLASS 3 OBESITY WITH ALVEOLAR HYPOVENTILATION, SERIOUS COMORBIDITY, AND BODY MASS INDEX (BMI) OF 40.0 TO 44.9 IN ADULT (H): ICD-10-CM

## 2024-10-16 DIAGNOSIS — U09.9 POST-COVID CHRONIC CONCENTRATION DEFICIT: ICD-10-CM

## 2024-10-16 DIAGNOSIS — R41.840 POST-COVID CHRONIC CONCENTRATION DEFICIT: ICD-10-CM

## 2024-10-16 DIAGNOSIS — G89.29 POST-COVID CHRONIC JOINT PAIN: ICD-10-CM

## 2024-10-16 LAB
CHOLEST SERPL-MCNC: 283 MG/DL
CK SERPL-CCNC: 74 U/L (ref 26–192)
CRP SERPL-MCNC: 3.27 MG/L
ERYTHROCYTE [SEDIMENTATION RATE] IN BLOOD BY WESTERGREN METHOD: 7 MM/HR (ref 0–20)
FASTING STATUS PATIENT QL REPORTED: YES
FERRITIN SERPL-MCNC: 97 NG/ML (ref 6–175)
HDLC SERPL-MCNC: 39 MG/DL
IRON SERPL-MCNC: 96 UG/DL (ref 37–145)
LDLC SERPL CALC-MCNC: 207 MG/DL
NONHDLC SERPL-MCNC: 244 MG/DL
PTH-INTACT SERPL-MCNC: 75 PG/ML (ref 15–65)
RHEUMATOID FACT SERPL-ACNC: <10 IU/ML
TRIGL SERPL-MCNC: 187 MG/DL

## 2024-10-16 PROCEDURE — 82550 ASSAY OF CK (CPK): CPT

## 2024-10-16 PROCEDURE — 84590 ASSAY OF VITAMIN A: CPT | Mod: 90

## 2024-10-16 PROCEDURE — 86140 C-REACTIVE PROTEIN: CPT

## 2024-10-16 PROCEDURE — 85652 RBC SED RATE AUTOMATED: CPT

## 2024-10-16 PROCEDURE — 86431 RHEUMATOID FACTOR QUANT: CPT

## 2024-10-16 PROCEDURE — 86038 ANTINUCLEAR ANTIBODIES: CPT

## 2024-10-16 PROCEDURE — 80061 LIPID PANEL: CPT

## 2024-10-16 PROCEDURE — 83970 ASSAY OF PARATHORMONE: CPT

## 2024-10-16 PROCEDURE — 82728 ASSAY OF FERRITIN: CPT

## 2024-10-16 PROCEDURE — 36415 COLL VENOUS BLD VENIPUNCTURE: CPT

## 2024-10-16 PROCEDURE — 83540 ASSAY OF IRON: CPT

## 2024-10-16 PROCEDURE — 99000 SPECIMEN HANDLING OFFICE-LAB: CPT

## 2024-10-17 LAB — ANA SER QL IF: NEGATIVE

## 2024-10-17 RX ORDER — SEMAGLUTIDE 0.25 MG/.5ML
0.25 INJECTION, SOLUTION SUBCUTANEOUS WEEKLY
Qty: 2 ML | Refills: 0 | Status: SHIPPED | OUTPATIENT
Start: 2024-10-17

## 2024-10-17 RX ORDER — SEMAGLUTIDE 0.5 MG/.5ML
0.5 INJECTION, SOLUTION SUBCUTANEOUS WEEKLY
Qty: 2 ML | Refills: 3 | Status: SHIPPED | OUTPATIENT
Start: 2024-10-30

## 2024-10-18 LAB
ANNOTATION COMMENT IMP: NORMAL
RETINYL PALMITATE SERPL-MCNC: 0.02 MG/L
VIT A SERPL-MCNC: 0.56 MG/L

## 2024-10-18 NOTE — PROGRESS NOTES
"OCCUPATIONAL THERAPY EVALUATION  Type of Visit: Evaluation              Subjective   Trish is a 44 year old, right hand dominant female presenting for OP OT evaluation at the request of Estela Avery PA-C regarding long COVID-19.  Per PA note dated 10/8/24 \"This 44 year old female presents to the [facility] Medicine Post-COVID clinic as a new consult to evaluate continuing symptoms after COVID infection initially diagnosed 11/21/2020.  Danitza Soto presented to their primary care physician on 11/21/20 complaining of fatigue, decreased taste, and decreased smell. Treatment was symptomatic.  She tested positive late December 2023 and had symptoms of fatigue, sleep, insomnia, low fever, generalize aches and pains.  Danitza Soto experienced complications of chronic fatigue.  Continuing symptoms include fatigue, generalized aches, weakness, palpitations, dizziness, difficulty sleeping, numbness, tingling, brain fog, and distractibility.  Patient has fatigue but has been seeing a sleep provider for years. They do have sleep apnea but this has progress since CPAP. They do have ambien and melatonin.  She does notice leg \"static feeling\" and has to get up an move.  Patient did also trial Magensium as well.  Patient will wake up tired as well.  Patient will need to take a break after 30 min to 1hr.   Patient has mental exhaustion as well.   Patient will have trouble with finding words.  Patient has some trouble with through process as well.   Patient will often forget conversations as well. Patient has joint pain in her elbow, shoulder, knees. She also feels this in between her finger bones.  She has pain but no swelling.  Patient does have pain in her hands daily and has worked to avoid.   Patient does have palliations but was placed on metoprolol.   Zio was done to confirm this was not recurrent Afib but did show short runs of SVT.  Patient does sometimes getting lightheaded when " "standing and is sporadically.   Past medical history is significant for GERD, mixed hyperlipidemia, status post laparoscopic gastric sleeve, status post ablation for atrial fibrillation, insomnia atrial fibrillation, smoking history, depression, obesity/morbid obesity, and sleep apnea. The patient was vaccinated against COVID x2, last vaccination 2/25/2021 .  Previous activity was full time work.\"      Presenting condition or subjective complaint: Long Covid symptoms  Date of onset: 10/08/24    Relevant medical history: Depression; Dizziness; Heart problems; High blood pressure; Overweight; Sleep disorder like apnea; Smoking   Past Medical History:   Diagnosis Date    Antiplatelet or antithrombotic long-term use     resolved    Arrhythmia     BV (bacterial vaginosis) 9/18/2020    Depressive disorder 1990    Esophageal reflux     Hearing problem 2018    Hyperlipidemia LDL goal <130 07/06/2015    Hypertension     LSIL (low grade squamous intraepithelial lesion) on Pap smear 06/2014    + HPV, unable to type colp - BRISEYDA I    LEONARDO on CPAP     Other anxiety states      Dates & types of surgery: Weight loss sleeve 11/2018; hysterectomy 04/22; ovary taken out 2021?  Past Surgical History:   Procedure Laterality Date    EP ABLATION FOCAL AFIB N/A 10/03/2019    Procedure: EP ABLATION FOCAL AFIB;  Surgeon: Harpreet Arevalo MD;  Location:  OR    ESOPHAGOSCOPY, GASTROSCOPY, DUODENOSCOPY (EGD), COMBINED N/A 11/29/2022    Procedure: ESOPHAGOGASTRODUODENOSCOPY, WITH BIOPSY;  Surgeon: Jeff Patino DO;  Location:  GI    HC TOOTH EXTRACTION W/FORCEP  1995    [place] Teeth Extraction    LAPAROSCOPIC GASTRIC SLEEVE N/A 11/27/2018    Procedure: Laparoscopic Sleeve Gastrectomy Latex Free;  Surgeon: Artis Tse MD;  Location:  OR    LAPAROSCOPIC HERNIORRHAPHY HIATAL  11/27/2018    Procedure: LAPAROSCOPIC  HIATAL Hernia Repair;  Surgeon: Artis Tse MD;  Location:  OR    SALPINGO-OOPHORECTOMY, COMBINED Left " 10/20/2021    Mucinous cystadenoma         Prior diagnostic imaging/testing results:   No     Prior therapy history for the same diagnosis, illness or injury: No      Prior Level of Function  Transfers: Independent  Ambulation: Independent  ADL: Independent  IADL: Driving, Finances, Housekeeping, Laundry, Meal preparation, Medication management, Work    Living Environment  Social support: With a significant other or spouse   Type of home: House; Multi-level   Stairs to enter the home: Yes 5 Is there a railing: Yes     Ramp: No   Stairs inside the home: Yes 13 Is there a railing: Yes     Help at home: None  Equipment owned:       Employment: Yes Designated Manager behavioral health  Hobbies/Interests: Reading    Patient goals for therapy: Not be fatigued all the time.    Pain assessment: Pain denied     Objective     Cognitive Status Examination  Orientation: Oriented to person, place and time   Level of Consciousness: Alert  Follows Commands and Answers Questions: 100% of the time, Follows multi step instructions  Personal Safety and Judgement: Intact  Memory: Intact  Attention: Reports problems attending, Sustained attention impaired, Selective attention impaired, difficulty ignoring irrelevant stimuli, Alternating attention impaired, difficulty shifting between tasks, Divided attention impaired, difficulty with simultaneous tasks, Difficulty with dual tasking   Organization/Problem Solving: Reports problems with organization  Executive Function: Working memory impaired, decreased storage of information for performing tasks    VISUAL SKILLS  Visual Acuity: No deficits identified, Wears glasses  Visual Field: Appears normal  Visual Attention: Appears normal  Oculomotor: Appears normal     SENSATION: UE Sensation WNL    POSTURE: WFL  RANGE OF MOTION: UE AROM WFL  STRENGTH: UE Strength WNL, Decreased functional endurance with need for intermittent rest breaks  and general limited tolerance for activity.   MUSCLE TONE:  WNL  COORDINATION: WFL  BALANCE: WFL    FUNCTIONAL MOBILITY  Assistive Device(s): None  Ambulation: N/A   Wheelchair: N/A     BED MOBILITY: WNL    TRANSFERS: WNL    BATHING: WNL  Equipment:  No equipment needed for safe completion     UPPER BODY DRESSING: WNL  Equipment:  No equipment needed for safe completion    LOWER BODY DRESSING: WNL  Equipment:  No equipment needed for safe completion    TOILETING: WNL  Equipment:  No equipment needed for safe completion    GROOMING: WNL  Equipment:  No equipment needed for safe completion    EATING/SELF FEEDING: WNL   Equipment:  No equipment needed for safe completion    ACTIVITY TOLERANCE: Decreased tolerance for physical and cognitive activity with need for rest breaks and general pacing strategies.     Assessment & Plan   CLINICAL IMPRESSIONS  Medical Diagnosis: Post COVID Fatigue    Treatment Diagnosis: Decreased baseline ease and efficiency in I/ADL's    Impression/Assessment: Pt is a 44 year old female presenting to Occupational Therapy due to post COVID-19 .  The following significant findings have been identified: Impaired activity tolerance and Impaired strength.  These identified deficits interfere with their ability to perform work tasks, recreational activities, household chores, driving , community mobility, meal planning and preparation, and community or volunteer activities as compared to previous level of function.     Clinical Decision Making (Complexity):  Assessment of Occupational Performance: 3-5 Performance Deficits  Occupational Performance Limitations: health management and maintenance, home establishment and management, meal preparation and cleanup, work, leisure activities, and social participation  Clinical Decision Making (Complexity): Low complexity    PLAN OF CARE  Treatment Interventions:  Interventions: Self-Care/Home Management, Therapeutic Activity, Therapeutic Exercise    Long Term Goals   OT Goal 1  Goal Identifier: Fatigue  Management  Goal Description: Patient will demonstrate independent integration of fatigue managemet strategies as measured by a score of 30+ (accounting for reported baseline fatigue) on the FACIT as needed for return to efficient participation in IADL's  Target Date: 01/04/25  OT Goal 2  Goal Identifier: Adaptive Response  Goal Description: Patient will demonstrate improved adaptive response and tolerance for sensory stimulation across environments as needed for increased efficiency in I/ADL participation through completion of vagal nerve strategies as clinically indicated  Target Date: 01/04/25      Frequency of Treatment: 2x/month  Duration of Treatment: Up to 3 months     Recommended Referrals to Other Professionals:  None  Education Assessment: Learner/Method: Patient  Education Comments: OT role and POC, SSP education     Risks and benefits of evaluation/treatment have been explained.   Patient/Family/caregiver agrees with Plan of Care.     Evaluation Time:    OT Eval, Low Complexity Minutes (70903): 20   Present: Not applicable     Signing Clinician: Jeanie Maxwell OT

## 2024-10-22 ENCOUNTER — MYC MEDICAL ADVICE (OUTPATIENT)
Dept: CARDIOLOGY | Facility: CLINIC | Age: 44
End: 2024-10-22

## 2024-10-22 DIAGNOSIS — Z78.9 STATIN INTOLERANCE: ICD-10-CM

## 2024-10-22 DIAGNOSIS — I48.0 PAROXYSMAL ATRIAL FIBRILLATION (H): ICD-10-CM

## 2024-10-22 DIAGNOSIS — E78.5 HYPERLIPIDEMIA LDL GOAL <100: Primary | ICD-10-CM

## 2024-10-22 DIAGNOSIS — I10 BENIGN ESSENTIAL HYPERTENSION: ICD-10-CM

## 2024-10-22 NOTE — TELEPHONE ENCOUNTER
From: Leena Heath MD   Sent: 10/17/2024   9:40 AM CDT   To: [facility]     Jacob your LDL cholesterol is very high at 207.  I am concerned that this will lead to plaques building up in your arteries supplying your heart and your brain and elsewhere.  We need to lower this cholesterol.  Since you could not tolerate statins we can put you on injectable cholesterol-lowering medication.  You will need to inject yourself every 2 weeks.  There is also an alternative injectable ones that is injectable every 6 months but you need to come to clinic for that.   I think also you would benefit from genetic testing.  I think you have familial hypercholesterolemia.  I can refer you to a genetic counselor.  Let me know if you would like to do these.     Dr CAN   Cardiology     [facility] message sent to patient.    Lou Mcdowell, RN, BSN  Cardiology RN Care Coordinator   [place]   Phone: [phone]  Fax: [phone] ([place]) [phone] ([place])

## 2024-10-23 ENCOUNTER — ANCILLARY PROCEDURE (OUTPATIENT)
Dept: ULTRASOUND IMAGING | Facility: CLINIC | Age: 44
End: 2024-10-23
Attending: FAMILY MEDICINE
Payer: COMMERCIAL

## 2024-10-23 ENCOUNTER — TELEPHONE (OUTPATIENT)
Dept: CARDIOLOGY | Facility: CLINIC | Age: 44
End: 2024-10-23

## 2024-10-23 DIAGNOSIS — E04.1 THYROID NODULE: ICD-10-CM

## 2024-10-23 PROCEDURE — 76536 US EXAM OF HEAD AND NECK: CPT | Mod: TC | Performed by: RADIOLOGY

## 2024-10-23 NOTE — TELEPHONE ENCOUNTER
Please do.     You  Can, MD Leena1 hour ago (2:28 PM)     CK  Patient interested in genetic counselor. I can place genetic referral if okay. In regards to medications. We tried to get her set up with Repatha in the past but her insurance was not covering it. She says her insurance is now up to date so I was going to try again to see if we can get Repatha approved. Sound okay?     Genetics referral placed. Repatha prescription sent in to preferred pharmacy.     Lou Mcdowell RN, BSN  Cardiology RN Care Coordinator   [place]   Phone: [phone]  Fax: [phone] ([place]) [phone] ([place])

## 2024-10-23 NOTE — TELEPHONE ENCOUNTER
PA request for Repatha 140mg/ml      Central Prior Authorization Team   Phone: [phone]    PA Initiation    Medication: Repatha 140mg/ml  Insurance Company: [employer] Non-Specialty PA's - Phone [phone] Fax [phone]  Pharmacy Filling the Rx: [facility] [place], [place] - [address]  Filling Pharmacy Phone: [phone]  Filling Pharmacy Fax:    Start Date: 10/23/2024

## 2024-10-23 NOTE — TELEPHONE ENCOUNTER
See other [employer] message from 10/16/24. Message was sent as patient was unable to pursue Repatha due to insurance coverage. Verifying no insurance changes has occurred.    Lou Mcdowell, RN, BSN  Cardiology RN Care Coordinator   [place]   Phone: [phone]  Fax: [phone] ([place]) [phone] ([place])

## 2024-10-24 NOTE — TELEPHONE ENCOUNTER
Prior Authorization Approval    Authorization Effective Date: 10/23/2024  Authorization Expiration Date: 10/23/2025  Medication: Repatha 140mg/ml  Approved Dose/Quantity:   Reference #:     Insurance Company: [employer] Non-Specialty PA's - Phone [phone] Fax [phone]  Expected CoPay:       CoPay Card Available:      Foundation Assistance Needed:    Which Pharmacy is filling the prescription (Not needed for infusion/clinic administered): [facility] [place], [place] - [address]  Pharmacy Notified:  yes  Patient Notified:  yes- Pharmacy will contact patient when ready to /ship

## 2024-10-25 NOTE — TELEPHONE ENCOUNTER
Elba Dean  YouYesterday (10:02 AM)     MC  Hello!  Repatha has been approved, copay is $20 for the patient. The pharmacy is going to contact her when this is ready for , thank you!  Elba LANG approved.     Lou Mcdowell, RN, BSN  Cardiology RN Care Coordinator   [place]   Phone: [phone]  Fax: [phone] ([place]) [phone] ([place])

## 2024-10-25 NOTE — TELEPHONE ENCOUNTER
Elba Dean  YouYesterday (10:02 AM)     MC  Hello!  Repatha has been approved, copay is $20 for the patient. The pharmacy is going to contact her when this is ready for , thank you!  Elba LANG approved. Pharmacy to contact patient when ready. [employer] message sent to patient.    Lou Mcdowell, RN, BSN  Cardiology RN Care Coordinator   [place]   Phone: [phone]  Fax: [phone] ([facility]) [phone] ([place])

## 2024-10-28 ENCOUNTER — THERAPY VISIT (OUTPATIENT)
Dept: OCCUPATIONAL THERAPY | Facility: CLINIC | Age: 44
End: 2024-10-28
Attending: PHYSICIAN ASSISTANT
Payer: COMMERCIAL

## 2024-10-28 DIAGNOSIS — R53.83 FATIGUE, UNSPECIFIED TYPE: Primary | ICD-10-CM

## 2024-10-28 DIAGNOSIS — H83.3X9 SOUND SENSITIVITY, UNSPECIFIED LATERALITY: ICD-10-CM

## 2024-10-28 PROCEDURE — 97535 SELF CARE MNGMENT TRAINING: CPT | Mod: GO | Performed by: OCCUPATIONAL THERAPIST

## 2024-10-28 NOTE — TELEPHONE ENCOUNTER
Patient saw [employer] message. No questions at this time.    Lou Mcdowell, RN, BSN  Cardiology RN Care Coordinator   [place]   Phone: [phone]  Fax: [phone] ([place]) [phone] ([place])

## 2024-10-29 ENCOUNTER — TELEPHONE (OUTPATIENT)
Dept: ENDOCRINOLOGY | Facility: CLINIC | Age: 44
End: 2024-10-29
Payer: COMMERCIAL

## 2024-10-29 NOTE — TELEPHONE ENCOUNTER
Patient confirmed scheduled appointment:  Date: 3/15/25  Time: 915am  Visit type: ret mwm  Provider: thong younger

## 2024-11-05 DIAGNOSIS — I48.0 PAF (PAROXYSMAL ATRIAL FIBRILLATION) (H): ICD-10-CM

## 2024-11-05 RX ORDER — HYDROCHLOROTHIAZIDE 25 MG/1
25 TABLET ORAL DAILY
Qty: 90 TABLET | Refills: 0 | OUTPATIENT
Start: 2024-11-05

## 2024-11-25 ENCOUNTER — THERAPY VISIT (OUTPATIENT)
Dept: OCCUPATIONAL THERAPY | Facility: CLINIC | Age: 44
End: 2024-11-25
Payer: COMMERCIAL

## 2024-11-25 DIAGNOSIS — R53.83 FATIGUE, UNSPECIFIED TYPE: Primary | ICD-10-CM

## 2024-11-25 DIAGNOSIS — H83.3X9 SOUND SENSITIVITY, UNSPECIFIED LATERALITY: ICD-10-CM

## 2024-11-25 PROCEDURE — 97535 SELF CARE MNGMENT TRAINING: CPT | Mod: GO | Performed by: OCCUPATIONAL THERAPIST

## 2024-12-05 ENCOUNTER — MYC MEDICAL ADVICE (OUTPATIENT)
Dept: FAMILY MEDICINE | Facility: CLINIC | Age: 44
End: 2024-12-05
Payer: COMMERCIAL

## 2024-12-05 NOTE — TELEPHONE ENCOUNTER
Forms/Letter Request    Type of form/letter: [employer] Questionaire       Do we have the form/letter: Yes:     Who is the form from? Patient     Where did/will the form come from? form was sent via [employer]    When is form/letter needed by: asap    How would you like the form/letter returned: Mail to Patient's Home Address and Copy for scanning    Patient Notified form requests are processed in 5-7 business days:Yes    placed with [place] to Prep and then to PCP

## 2024-12-10 NOTE — TELEPHONE ENCOUNTER
Can you tell pt that she would need to set up an appointment - it can be video visit with me to go over the forms ?  Thanks

## 2024-12-11 ENCOUNTER — VIRTUAL VISIT (OUTPATIENT)
Dept: FAMILY MEDICINE | Facility: CLINIC | Age: 44
End: 2024-12-11
Payer: COMMERCIAL

## 2024-12-11 DIAGNOSIS — U09.9 LONG COVID: Primary | ICD-10-CM

## 2024-12-11 DIAGNOSIS — G47.00 INSOMNIA, UNSPECIFIED TYPE: ICD-10-CM

## 2024-12-11 DIAGNOSIS — R53.83 FATIGUE, UNSPECIFIED TYPE: ICD-10-CM

## 2024-12-11 DIAGNOSIS — M25.50 MULTIPLE JOINT PAIN: ICD-10-CM

## 2024-12-11 PROCEDURE — 99214 OFFICE O/P EST MOD 30 MIN: CPT | Mod: 95 | Performed by: FAMILY MEDICINE

## 2024-12-11 ASSESSMENT — PATIENT HEALTH QUESTIONNAIRE - PHQ9
10. IF YOU CHECKED OFF ANY PROBLEMS, HOW DIFFICULT HAVE THESE PROBLEMS MADE IT FOR YOU TO DO YOUR WORK, TAKE CARE OF THINGS AT HOME, OR GET ALONG WITH OTHER PEOPLE: SOMEWHAT DIFFICULT
SUM OF ALL RESPONSES TO PHQ QUESTIONS 1-9: 9
SUM OF ALL RESPONSES TO PHQ QUESTIONS 1-9: 9

## 2024-12-11 NOTE — PROGRESS NOTES
"Trish is a 44 year old who is being evaluated via a billable video visit.    How would you like to obtain your AVS? MyChart  If the video visit is dropped, the invitation should be resent by: Text to cell phone: [phone]  Will anyone else be joining your video visit? 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    Weight as of 10/8/24: 113.4 kg (250 lb).         Subjective   Trish is a 44 year old, presenting for the following health issues:  Forms        12/11/2024    10:09 AM   Additional Questions   Roomed by Lis ROSALES         12/11/2024   Forms   Any forms needing to be completed Yes        History of Present Illness       Reason for visit:  Filling out ADA paperwork for work   She is taking medications regularly.               Review of Systems  Constitutional, HEENT, cardiovascular, pulmonary, GI, , musculoskeletal, neuro, skin, endocrine and psych systems are negative, except as otherwise noted.      Objective           Vitals:  No vitals were obtained today due to virtual visit.    Physical Exam   GENERAL: alert and no distress  EYES: Eyes grossly normal to inspection.  No discharge or " erythema, or obvious scleral/conjunctival abnormalities.  RESP: No audible wheeze, cough, or visible cyanosis.    SKIN: Visible skin clear. No significant rash, abnormal pigmentation or lesions.  NEURO: Cranial nerves grossly intact.  Mentation and speech appropriate for age.  PSYCH: Appropriate affect, tone, and pace of words    No results found for this or any previous visit (from the past 24 hours).      Video-Visit Details    Type of service:  Video Visit   Originating Location (pt. Location): Home    Distant Location (provider location):  On-site  Platform used for Video Visit: Elida  Signed Electronically by: Polly Mcbride MD

## 2024-12-11 NOTE — TELEPHONE ENCOUNTER
Form copied for clinical records and sent to stat scan.   Original copy mailed to patient's home address per patient request.    Park MARTINEZ

## 2024-12-18 ENCOUNTER — PATIENT OUTREACH (OUTPATIENT)
Dept: CARE COORDINATION | Facility: CLINIC | Age: 44
End: 2024-12-18
Payer: COMMERCIAL

## 2024-12-30 ENCOUNTER — TELEPHONE (OUTPATIENT)
Dept: PHYSICAL MEDICINE AND REHAB | Facility: CLINIC | Age: 44
End: 2024-12-30
Payer: COMMERCIAL

## 2024-12-30 NOTE — TELEPHONE ENCOUNTER
Left Voicemail (1st Attempt) and Sent Mychart (1st Attempt) for the patient to call back and schedule the following:    Appointment type: Post-COVID return video  Provider: Estela Avery  Return date: next available  Specialty phone number: [phone]  Additional appointment(s) needed: NA  Additonal Notes:       Kristina Ruiz on 12/30/2024 at 3:27 PM

## 2025-01-01 DIAGNOSIS — I48.0 PAF (PAROXYSMAL ATRIAL FIBRILLATION) (H): ICD-10-CM

## 2025-01-01 DIAGNOSIS — I10 ESSENTIAL HYPERTENSION: ICD-10-CM

## 2025-01-02 ENCOUNTER — VIRTUAL VISIT (OUTPATIENT)
Dept: PHYSICAL MEDICINE AND REHAB | Facility: CLINIC | Age: 45
End: 2025-01-02
Payer: COMMERCIAL

## 2025-01-02 VITALS — BODY MASS INDEX: 42.75 KG/M2 | HEIGHT: 66 IN | WEIGHT: 266 LBS

## 2025-01-02 DIAGNOSIS — G47.00 INSOMNIA, UNSPECIFIED TYPE: Chronic | ICD-10-CM

## 2025-01-02 RX ORDER — TRAZODONE HYDROCHLORIDE 50 MG/1
TABLET, FILM COATED ORAL
Qty: 30 TABLET | Refills: 1 | Status: SHIPPED | OUTPATIENT
Start: 2025-01-02

## 2025-01-02 RX ORDER — METOPROLOL SUCCINATE 25 MG/1
25 TABLET, EXTENDED RELEASE ORAL DAILY
Qty: 90 TABLET | Refills: 0 | Status: SHIPPED | OUTPATIENT
Start: 2025-01-02

## 2025-01-02 RX ORDER — LISINOPRIL 40 MG/1
40 TABLET ORAL EVERY MORNING
Qty: 90 TABLET | Refills: 0 | Status: SHIPPED | OUTPATIENT
Start: 2025-01-02

## 2025-01-02 SDOH — SOCIAL STABILITY: SOCIAL NETWORK: I HAVE TROUBLE DOING ALL OF MY USUAL WORK (INCLUDE WORK AT HOME): USUALLY

## 2025-01-02 SDOH — SOCIAL STABILITY: SOCIAL NETWORK: I HAVE TROUBLE DOING ALL OF THE ACTIVITIES WITH FRIENDS THAT I WANT TO DO: USUALLY

## 2025-01-02 SDOH — SOCIAL STABILITY: SOCIAL NETWORK

## 2025-01-02 SDOH — SOCIAL STABILITY: SOCIAL NETWORK: PROMIS ABILITY TO PARTICIPATE IN SOCIAL ROLES & ACTIVITIES T-SCORE: 40

## 2025-01-02 SDOH — SOCIAL STABILITY: SOCIAL NETWORK: I HAVE TROUBLE DOING ALL OF MY REGULAR LEISURE ACTIVITIES WITH OTHERS: SOMETIMES

## 2025-01-02 SDOH — SOCIAL STABILITY: SOCIAL NETWORK: I HAVE TROUBLE DOING ALL OF THE FAMILY ACTIVITIES THAT I WANT TO DO: USUALLY

## 2025-01-02 ASSESSMENT — ANXIETY QUESTIONNAIRES
7. FEELING AFRAID AS IF SOMETHING AWFUL MIGHT HAPPEN: NOT AT ALL
2. NOT BEING ABLE TO STOP OR CONTROL WORRYING: NOT AT ALL
5. BEING SO RESTLESS THAT IT IS HARD TO SIT STILL: NOT AT ALL
7. FEELING AFRAID AS IF SOMETHING AWFUL MIGHT HAPPEN: NOT AT ALL
GAD7 TOTAL SCORE: 0
IF YOU CHECKED OFF ANY PROBLEMS ON THIS QUESTIONNAIRE, HOW DIFFICULT HAVE THESE PROBLEMS MADE IT FOR YOU TO DO YOUR WORK, TAKE CARE OF THINGS AT HOME, OR GET ALONG WITH OTHER PEOPLE: NOT DIFFICULT AT ALL
3. WORRYING TOO MUCH ABOUT DIFFERENT THINGS: NOT AT ALL
1. FEELING NERVOUS, ANXIOUS, OR ON EDGE: NOT AT ALL
4. TROUBLE RELAXING: NOT AT ALL
GAD7 TOTAL SCORE: 0
6. BECOMING EASILY ANNOYED OR IRRITABLE: NOT AT ALL
GAD7 TOTAL SCORE: 0
8. IF YOU CHECKED OFF ANY PROBLEMS, HOW DIFFICULT HAVE THESE MADE IT FOR YOU TO DO YOUR WORK, TAKE CARE OF THINGS AT HOME, OR GET ALONG WITH OTHER PEOPLE?: NOT DIFFICULT AT ALL

## 2025-01-02 ASSESSMENT — PAIN SCALES - GENERAL: PAINLEVEL_OUTOF10: MODERATE PAIN (5)

## 2025-01-02 NOTE — NURSING NOTE
Current patient location:  parked car    Is the patient currently in the state of [place]? YES    Visit mode:VIDEO    If the visit is dropped, the patient can be reconnected by:VIDEO VISIT: Text to cell phone:   Telephone Information:   Mobile [phone]       Will anyone else be joining the visit? NO  (If patient encounters technical issues they should call [phone] :[phone])    Are changes needed to the allergy or medication list? No    Are refills needed on medications prescribed by this physician? NO    Rooming Documentation:  Questionnaire(s) completed    Reason for visit: Follow Up    Trupti ROTH

## 2025-01-02 NOTE — PROGRESS NOTES
Danitza Soto is a 44 year old female who presents to be evaluated for a billable video visit.    Video-Visit Details    Video visit Start time: 12:42 PM    Type of service:  Video Visit    Video End Time: 12:55 PM    Originating Location (pt. Location): Home    Distant Location (provider location):  Off- Site    Platform used for Video Visit: [facility]    Assessment/Impression   1. Post-COVID chronic concentration deficit/Cognitive communication deficit/Fatigue, unspecified/ Insomnia, unspecified  Patient with fatigue and concentration difficulties since COVID. Continue Occupational therapy and will work on having patients sleep addressed.   Patient with long history of insomnia and suspect this is contributing to patient's fatigue and concentration difficulties.  Encouraged to continue trazodone  - traZODone (DESYREL) 50 MG tablet; Take 1 tablet at bedtime.  Dispense: 30 tablet; Refill: 1    2. Post-COVID chronic joint pain  Patient with  improving joint pain it is in multiple locations including hand wrist elbow and knee.   Will continue to monitor. If she worsens will refer to Orthopedic hand specialist.     3. Long COVID  Discussed COVID and Post COVID with patient.  Educational materials provided and all questions answered.  - Adult Post Covid Clinic  Referral    Plan:  I reviewed present knowledge on long-Covid.  Education was provided and question were answered.  Orders/Referrals as above  I will advised patient on test results  I will follow up with Danitza Soto in 3 months I will review progress and consider need for any other therapeutic interventions. If there are any questions and/or concerns she will call the clinic.      On day of encounter time spent in chart review and with patient in consultation, exam, education,coordination of care,  review of outside charts/documentation and documentation:  25 minutes     I have attempted to proof read for major spelling errors and apologize for any  minor errors I may have missed.     This note was dictated using voice recognition software. Any grammatical or context distortions are unintentional and inherent to the software.  _________________________________  DESTINI Diego [facility] PHYSICAL MEDICINE AND REHABILITATION CLINIC [place]    Subjective   HPI   Patient returns for a follow up. Patient feels her brain fog is improving but concentration is still poor.  She is working with Occupational therapy and this is going well.   She feels the trazodone is helping her sleep fatigue and concentration.    Patient  feel her hands are improving due to avoid certain movements.  She still has some joint pain.   She is unclear if fatigue is prevalent still due to being ill. Overall, patient still having fatigue and concentration difficulties.     Current concerns: Health Concerns        1/2/2025     8:57 AM   PHQ Assesment Total Score(s)   PHQ-9 Score 6        Patient-reported           1/2/2025     8:57 AM   GABRIELA-7 Results   GABRIELA 7 TOTAL SCORE 0 (minimal anxiety)   GABRIELA-7 Total Score 0        Patient-reported         1/2/2025     8:57 AM   PTSD Screen Score   Have you ever experienced this kind of event? No         1/2/2025     9:00 AM   PROMIS-29   PROMIS Physical Function T-Score 39 (moderate dysfunction)   PROMIS Anxiety T-Score 40 (within normal limits)   PROMIS Depression T-Score 49 (within normal limits)   PROMIS Fatigue T-Score 67 (moderate)   PROMIS Sleep Disturbance T-Score 52 (within normal limits)   PROMIS Ability to Participate in Social Roles & Activities T-Score 40 (mild dysfunction)   PROMIS Pain Interference T-Score 56 (mild)   PROMIS Pain Intensity 5         Past Medical History:   Diagnosis Date    Antiplatelet or antithrombotic long-term use     resolved    Arrhythmia     BV (bacterial vaginosis) 9/18/2020    Depressive disorder 1990    Esophageal reflux     Hearing problem 2018    Hyperlipidemia LDL goal <130 07/06/2015     Hypertension     LSIL (low grade squamous intraepithelial lesion) on Pap smear 2014    + HPV, unable to type colp - BRISEYDA I    LEONARDO on CPAP     Other anxiety states        Past Surgical History:   Procedure Laterality Date    EP ABLATION FOCAL AFIB N/A 10/03/2019    Procedure: EP ABLATION FOCAL AFIB;  Surgeon: Harpreet Arevalo MD;  [facility]    ESOPHAGOSCOPY, GASTROSCOPY, DUODENOSCOPY (EGD), COMBINED N/A 2022    Procedure: ESOPHAGOGASTRODUODENOSCOPY, WITH BIOPSY;  Surgeon: Jeff Patino DO;  Location: [facility] GI    HC TOOTH EXTRACTION W/FORCEP      [place] Teeth Extraction    LAPAROSCOPIC GASTRIC SLEEVE N/A 2018    Procedure: Laparoscopic Sleeve Gastrectomy Latex Free;  Surgeon: Artis Tse MD;  Location: [facility]    LAPAROSCOPIC HERNIORRHAPHY HIATAL  2018    Procedure: LAPAROSCOPIC  HIATAL Hernia Repair;  Surgeon: Artis Tse MD;  Location: [facility] OR    SALPINGO-OOPHORECTOMY, COMBINED Left 10/20/2021    Mucinous cystadenoma       Family History   Problem Relation Age of Onset    Heart Disease Mother         ,mother had emphesema and  at 62    Hypertension Mother     Allergies Mother     Lipids Mother     Obesity Mother     Respiratory Mother         Emphysema    Hypertension Father     Lipids Father     Cancer Father     Prostate Cancer Father     Other Cancer Father     Allergies Sister     Genitourinary Problems Sister     Diabetes Maternal Grandmother         Type 2?    Hypertension Maternal Grandmother     Circulatory Maternal Grandmother         Due to diabetes    Eye Disorder Maternal Grandmother         Macular degeneration/Macular degeneration    Obesity Maternal Grandmother     Alcohol/Drug Maternal Grandfather     Obesity Maternal Grandfather     Cerebrovascular Disease Paternal Grandmother     Psychotic Disorder Paternal Grandfather         Committed Suicide    Depression Paternal Grandfather     Anesthesia Reaction No family hx of     Bleeding Disorder No family hx  of     Venous thrombosis No family hx of        Social History     Tobacco Use    Smoking status: Former     Current packs/day: 0.00     Average packs/day: 1 pack/day for 17.4 years (17.4 ttl pk-yrs)     Types: Cigarettes     Start date: 1/1/2004     Quit date: 6/1/2021     Years since quitting: 3.5    Smokeless tobacco: Never   Vaping Use    Vaping status: Every Day    Substances: Nicotine    Devices: Disposable   Substance Use Topics    Alcohol use: Not Currently     Comment: Occas.    Drug use: No         Current Outpatient Medications:     amLODIPine (NORVASC) 10 MG tablet, Take 1 tablet (10 mg) by mouth daily, Disp: 90 tablet, Rfl: 1    Ascorbic Acid (VITAMIN C) 500 MG CAPS, Take by mouth every evening, Disp: , Rfl:     calcium carbonate (OS-CHIKI) 500 MG tablet, Take 1 tablet by mouth every evening, Disp: , Rfl:     Cyanocobalamin (B-12) 1000 MCG TBCR, Take by mouth every evening, Disp: , Rfl:     doxycycline hyclate (VIBRA-TABS) 100 MG tablet, Take 1 tablet (100 mg) by mouth 2 times daily, Disp: 20 tablet, Rfl: 0    evolocumab (REPATHA) 140 MG/ML prefilled autoinjector, Inject 1 mL (140 mg) subcutaneously every 14 days., Disp: 6 mL, Rfl: 3    hydrochlorothiazide (HYDRODIURIL) 25 MG tablet, Take 1 tablet (25 mg) by mouth daily, Disp: 90 tablet, Rfl: 1    ibuprofen (ADVIL/MOTRIN) 200 MG tablet, Take 200 mg by mouth every 4 hours as needed for pain, Disp: , Rfl:     lisinopril (ZESTRIL) 40 MG tablet, Take 1 tablet (40 mg) by mouth every morning, Disp: 90 tablet, Rfl: 1    magnesium 250 MG tablet, Take 1 tablet by mouth every evening, Disp: , Rfl:     metoprolol succinate ER (TOPROL XL) 25 MG 24 hr tablet, Take 1 tablet by mouth daily., Disp: 90 tablet, Rfl: 0    Multiple Vitamins-Iron (MULTI-DAY PLUS IRON PO), Take by mouth every morning, Disp: , Rfl:     omeprazole (PRILOSEC) 40 MG DR capsule, Take 1 capsule (40 mg) by mouth daily, Disp: 90 capsule, Rfl: 2    Semaglutide-Weight Management (WEGOVY) 0.25 MG/0.5ML  pen, Inject 0.25 mg subcutaneously once a week., Disp: 2 mL, Rfl: 0    Semaglutide-Weight Management (WEGOVY) 0.5 MG/0.5ML pen, Inject 0.5 mg subcutaneously once a week., Disp: 2 mL, Rfl: 3    traZODone (DESYREL) 50 MG tablet, Take 1/2 tablet at bedtime for 14 days. If no side effects may increase to 1 full tablet., Disp: 30 tablet, Rfl: 1    venlafaxine (EFFEXOR XR) 150 MG 24 hr capsule, Take 1 capsule by mouth daily., Disp: 90 capsule, Rfl: 1    vitamin B1 (THIAMINE) 250 MG tablet, Take 250 mg by mouth every evening, Disp: , Rfl:     Vitamin D (Cholecalciferol) 25 MCG (1000 UT) TABS, Take by mouth every evening, Disp: , Rfl:     zolpidem (AMBIEN) 5 MG tablet, Take tablet by mouth 15 minutes prior to sleep as needed. Pharmacist to review side effects., Disp: 30 tablet, Rfl: 0    Review of Systems   Constitutional, HEENT, cardiovascular, pulmonary, GI, , musculoskeletal, neuro, skin, endocrine and psych systems are negative, except as otherwise noted.      Objective         Vitals:  No vitals were obtained today due to virtual visit.    Physical Exam   EYES: Eyes grossly normal to inspection.  No discharge or erythema, or obvious scleral/conjunctival abnormalities.  SKIN: Visible skin clear. No significant rash, abnormal pigmentation or lesions.  NEURO: Cranial nerves grossly intact.  Mentation and speech appropriate for age.  GENERAL: Healthy, alert and no distress  RESP: No audible wheeze, cough, or visible cyanosis.  No visible retractions or increased work of breathing.    PSYCH: Mentation appears normal, affect normal/bright, judgement and insight intact, normal speech and appearance well-groomed.    Labs :  No recent labs    Imaging:    I personally reviewed the following imaging results today and those on care everywhere, if indicated     Nothing new to review    Reviewed imaging from [facility] sites     Medical Records Reviewed:    Reviewed consults/documents from [facility]  including   Family Practice and OT

## 2025-01-02 NOTE — PATIENT INSTRUCTIONS
Post COVID Self Care Suggestions:     Fatigue Management:       https://www.archives-pmr.org/action/showPdf?[email or web address]       Self Care:      [email or web address]  Recovery World Health Organization:    https://apps.who.int/iris/DuraFizztream/handle/37356/321734/ZLU-QKPC-0729-689-58943-95274-eng.pdf  Breathing exercises:    [email or web address]/health/conditions-[email or web address]-[email or web address]/coronavirus/coronavirus-recovery-breathing-exercises      Assessment of sense of smell and taste    Smell training:  Flowery - Oumou  Fruity - Lemon  Spicy - Cloves  Resinous - Eucalyptus      1 - Pour a few droplets of one of the oils on to a cotton pad or ball  2 - Do not try to sniff the pad immediately; leave it for a few minutes for the fragrance to develop  3 - Hold the first stick/pad up to your nose, about an inch away.  The order in which you test the oils does not matter  4 - Relax and try to inhale naturally through the nose - sniffing too quickly and deeply is likely to result in you not being able to detect anything  5 - Try this a couple more times, then rest for five minutes  6 - Move on to the next oil and repeat as above.    After three months switch to a new set of odors: menthol, thyme, tangerine, and kamar, training with them twice daily.    After another three months, switch to a third new set of odors: green tea, bergamot, rosemary, and tammy, again training with them twice daily.    Studies:   [place] [facility]axlov[facility] [place]  [email or web address]/cii/research/paxlc-study/?mibextid=[email or web address]    Cognitive Post-COVID study  z.[facility]/covid-ema    Supplements:  Fatigue- COQ10 100mg 3x day  ( side effects GI)  Brain fog-N-acetylcysteine 600 mg daily (side effects GI)

## 2025-01-02 NOTE — LETTER
1/2/2025       RE: Danitza Soto  [address] [address]     Dear Colleague,    Thank you for referring your patient, Danitza Soto, to the [facility] PHYSICAL MEDICINE AND REHABILITATION CLINIC [place] at [facility]. Please see a copy of my visit note below.    Danitza Soto is a 44 year old female who presents to be evaluated for a billable video visit.    Video-Visit Details    Video visit Start time: 12:42 PM    Type of service:  Video Visit    Video End Time: 12:55 PM    Originating Location (pt. Location): Home    Distant Location (provider location):  Off- Site    Platform used for Video Visit: [employer]    Assessment/Impression   1. Post-COVID chronic concentration deficit/Cognitive communication deficit/Fatigue, unspecified/ Insomnia, unspecified  Patient with fatigue and concentration difficulties since COVID. Continue Occupational therapy and will work on having patients sleep addressed.   Patient with long history of insomnia and suspect this is contributing to patient's fatigue and concentration difficulties.  Encouraged to continue trazodone  - traZODone (DESYREL) 50 MG tablet; Take 1 tablet at bedtime.  Dispense: 30 tablet; Refill: 1    2. Post-COVID chronic joint pain  Patient with  improving joint pain it is in multiple locations including hand wrist elbow and knee.   Will continue to monitor. If she worsens will refer to Orthopedic hand specialist.     3. Long COVID  Discussed COVID and Post COVID with patient.  Educational materials provided and all questions answered.  - Adult Post Covid Clinic  Referral    Plan:  I reviewed present knowledge on long-Covid.  Education was provided and question were answered.  Orders/Referrals as above  I will advised patient on test results  I will follow up with Danitza Soto in 3 months I will review progress and consider need for any other therapeutic interventions.  If there are any questions and/or concerns she will call the clinic.      On day of encounter time spent in chart review and with patient in consultation, exam, education,coordination of care,  review of outside charts/documentation and documentation:  25 minutes     I have attempted to proof read for major spelling errors and apologize for any minor errors I may have missed.     This note was dictated using voice recognition software. Any grammatical or context distortions are unintentional and inherent to the software.  _________________________________  DESTINI Diego [facility] PHYSICAL MEDICINE AND REHABILITATION CLINIC [place]    Subjective   HPI   Patient returns for a follow up. Patient feels her brain fog is improving but concentration is still poor.  She is working with Occupational therapy and this is going well.   She feels the trazodone is helping her sleep fatigue and concentration.    Patient  feel her hands are improving due to avoid certain movements.  She still has some joint pain.   She is unclear if fatigue is prevalent still due to being ill. Overall, patient still having fatigue and concentration difficulties.     Current concerns: Health Concerns        1/2/2025     8:57 AM   PHQ Assesment Total Score(s)   PHQ-9 Score 6        Patient-reported           1/2/2025     8:57 AM   GABRIELA-7 Results   GABRIELA 7 TOTAL SCORE 0 (minimal anxiety)   GABRIELA-7 Total Score 0        Patient-reported         1/2/2025     8:57 AM   PTSD Screen Score   Have you ever experienced this kind of event? No         1/2/2025     9:00 AM   PROMIS-29   PROMIS Physical Function T-Score 39 (moderate dysfunction)   PROMIS Anxiety T-Score 40 (within normal limits)   PROMIS Depression T-Score 49 (within normal limits)   PROMIS Fatigue T-Score 67 (moderate)   PROMIS Sleep Disturbance T-Score 52 (within normal limits)   PROMIS Ability to Participate in Social Roles & Activities T-Score 40 (mild dysfunction)   PROMIS  Pain Interference T-Score 56 (mild)   PROMIS Pain Intensity 5         Past Medical History:   Diagnosis Date     Antiplatelet or antithrombotic long-term use     resolved     Arrhythmia      BV (bacterial vaginosis) 2020     Depressive disorder      Esophageal reflux      Hearing problem      Hyperlipidemia LDL goal <130 2015     Hypertension      LSIL (low grade squamous intraepithelial lesion) on Pap smear 2014    + HPV, unable to type colp - BRISEYDA I     LEONARDO on CPAP      Other anxiety states        Past Surgical History:   Procedure Laterality Date     EP ABLATION FOCAL AFIB N/A 10/03/2019    Procedure: EP ABLATION FOCAL AFIB;  Surgeon: Harpreet Arevalo MD;  [facility]     ESOPHAGOSCOPY, GASTROSCOPY, DUODENOSCOPY (EGD), COMBINED N/A 2022    Procedure: ESOPHAGOGASTRODUODENOSCOPY, WITH BIOPSY;  Surgeon: Jeff Patino DO;  Location: [facility]     HC TOOTH EXTRACTION W/FORCEP      [place] Teeth Extraction     LAPAROSCOPIC GASTRIC SLEEVE N/A 2018    Procedure: Laparoscopic Sleeve Gastrectomy Latex Free;  Surgeon: Artis Tse MD;  Location[facility]     LAPAROSCOPIC HERNIORRHAPHY HIATAL  2018    Procedure: LAPAROSCOPIC  HIATAL Hernia Repair;  Surgeon: Artis Tse MD;  Location: [facility]     SALPINGO-OOPHORECTOMY, COMBINED Left 10/20/2021    Mucinous cystadenoma       Family History   Problem Relation Age of Onset     Heart Disease Mother         ,mother had emphesema and  at 62     Hypertension Mother      Allergies Mother      Lipids Mother      Obesity Mother      Respiratory Mother         Emphysema     Hypertension Father      Lipids Father      Cancer Father      Prostate Cancer Father      Other Cancer Father      Allergies Sister      Genitourinary Problems Sister      Diabetes Maternal Grandmother         Type 2?     Hypertension Maternal Grandmother      Circulatory Maternal Grandmother         Due to diabetes     Eye Disorder Maternal Grandmother          Macular degeneration/Macular degeneration     Obesity Maternal Grandmother      Alcohol/Drug Maternal Grandfather      Obesity Maternal Grandfather      Cerebrovascular Disease Paternal Grandmother      Psychotic Disorder Paternal Grandfather         Committed Suicide     Depression Paternal Grandfather      Anesthesia Reaction No family hx of      Bleeding Disorder No family hx of      Venous thrombosis No family hx of        Social History     Tobacco Use     Smoking status: Former     Current packs/day: 0.00     Average packs/day: 1 pack/day for 17.4 years (17.4 ttl pk-yrs)     Types: Cigarettes     Start date: 1/1/2004     Quit date: 6/1/2021     Years since quitting: 3.5     Smokeless tobacco: Never   Vaping Use     Vaping status: Every Day     Substances: Nicotine     Devices: Disposable   Substance Use Topics     Alcohol use: Not Currently     Comment: Occas.     Drug use: No         Current Outpatient Medications:      amLODIPine (NORVASC) 10 MG tablet, Take 1 tablet (10 mg) by mouth daily, Disp: 90 tablet, Rfl: 1     Ascorbic Acid (VITAMIN C) 500 MG CAPS, Take by mouth every evening, Disp: , Rfl:      calcium carbonate (OS-CHIKI) 500 MG tablet, Take 1 tablet by mouth every evening, Disp: , Rfl:      Cyanocobalamin (B-12) 1000 MCG TBCR, Take by mouth every evening, Disp: , Rfl:      doxycycline hyclate (VIBRA-TABS) 100 MG tablet, Take 1 tablet (100 mg) by mouth 2 times daily, Disp: 20 tablet, Rfl: 0     evolocumab (REPATHA) 140 MG/ML prefilled autoinjector, Inject 1 mL (140 mg) subcutaneously every 14 days., Disp: 6 mL, Rfl: 3     hydrochlorothiazide (HYDRODIURIL) 25 MG tablet, Take 1 tablet (25 mg) by mouth daily, Disp: 90 tablet, Rfl: 1     ibuprofen (ADVIL/MOTRIN) 200 MG tablet, Take 200 mg by mouth every 4 hours as needed for pain, Disp: , Rfl:      lisinopril (ZESTRIL) 40 MG tablet, Take 1 tablet (40 mg) by mouth every morning, Disp: 90 tablet, Rfl: 1     magnesium 250 MG tablet, Take 1 tablet by mouth  every evening, Disp: , Rfl:      metoprolol succinate ER (TOPROL XL) 25 MG 24 hr tablet, Take 1 tablet by mouth daily., Disp: 90 tablet, Rfl: 0     Multiple Vitamins-Iron (MULTI-DAY PLUS IRON PO), Take by mouth every morning, Disp: , Rfl:      omeprazole (PRILOSEC) 40 MG DR capsule, Take 1 capsule (40 mg) by mouth daily, Disp: 90 capsule, Rfl: 2     Semaglutide-Weight Management (WEGOVY) 0.25 MG/0.5ML pen, Inject 0.25 mg subcutaneously once a week., Disp: 2 mL, Rfl: 0     Semaglutide-Weight Management (WEGOVY) 0.5 MG/0.5ML pen, Inject 0.5 mg subcutaneously once a week., Disp: 2 mL, Rfl: 3     traZODone (DESYREL) 50 MG tablet, Take 1/2 tablet at bedtime for 14 days. If no side effects may increase to 1 full tablet., Disp: 30 tablet, Rfl: 1     venlafaxine (EFFEXOR XR) 150 MG 24 hr capsule, Take 1 capsule by mouth daily., Disp: 90 capsule, Rfl: 1     vitamin B1 (THIAMINE) 250 MG tablet, Take 250 mg by mouth every evening, Disp: , Rfl:      Vitamin D (Cholecalciferol) 25 MCG (1000 UT) TABS, Take by mouth every evening, Disp: , Rfl:      zolpidem (AMBIEN) 5 MG tablet, Take tablet by mouth 15 minutes prior to sleep as needed. Pharmacist to review side effects., Disp: 30 tablet, Rfl: 0    Review of Systems   Constitutional, HEENT, cardiovascular, pulmonary, GI, , musculoskeletal, neuro, skin, endocrine and psych systems are negative, except as otherwise noted.      Objective         Vitals:  No vitals were obtained today due to virtual visit.    Physical Exam   EYES: Eyes grossly normal to inspection.  No discharge or erythema, or obvious scleral/conjunctival abnormalities.  SKIN: Visible skin clear. No significant rash, abnormal pigmentation or lesions.  NEURO: Cranial nerves grossly intact.  Mentation and speech appropriate for age.  GENERAL: Healthy, alert and no distress  RESP: No audible wheeze, cough, or visible cyanosis.  No visible retractions or increased work of breathing.    PSYCH: Mentation appears normal,  affect normal/bright, judgement and insight intact, normal speech and appearance well-groomed.    Labs :  No recent labs    Imaging:    I personally reviewed the following imaging results today and those on care everywhere, if indicated     Nothing new to review    Reviewed imaging from [facility] sites     Medical Records Reviewed:    Reviewed consults/documents from [facility] including   Family Practice and OT       Again, thank you for allowing me to participate in the care of your patient.      Sincerely,    Estela Avery PA-C

## 2025-01-06 ENCOUNTER — THERAPY VISIT (OUTPATIENT)
Dept: OCCUPATIONAL THERAPY | Facility: CLINIC | Age: 45
End: 2025-01-06
Payer: COMMERCIAL

## 2025-01-06 DIAGNOSIS — H83.3X9 SOUND SENSITIVITY, UNSPECIFIED LATERALITY: ICD-10-CM

## 2025-01-06 DIAGNOSIS — R53.83 FATIGUE, UNSPECIFIED TYPE: Primary | ICD-10-CM

## 2025-01-06 PROCEDURE — 97535 SELF CARE MNGMENT TRAINING: CPT | Mod: GO | Performed by: OCCUPATIONAL THERAPIST

## 2025-01-21 ENCOUNTER — TELEPHONE (OUTPATIENT)
Dept: PHYSICAL MEDICINE AND REHAB | Facility: CLINIC | Age: 45
End: 2025-01-21
Payer: COMMERCIAL

## 2025-01-21 NOTE — TELEPHONE ENCOUNTER
Left Voicemail (2nd Attempt) for the patient to call back and schedule the following:    Appointment type: Post Covid return video  Provider: Estela Avery  Return date: around 4/2/2025   Specialty phone number: [phone]  Additional appointment(s) needed: NA  Additonal Notes: 3 month follow up    Kristina Ruiz on 1/21/2025 at 3:28 PM

## 2025-01-27 NOTE — PROGRESS NOTES
Virtual Visit Details    Type of service:  Video Visit     Originating Location (pt. Location): Other In car at work  Distant Location (provider location):  Off-site  Platform used for Video Visit: Elida    Here is a copy of the progress note from your recent genetic counseling visit to [facility] on Date: 2025.    PROGRESS NOTE: Trish was referred by  Leena Heath MD  for genetic counseling due to her history of high cholesterol and suspicion for familial cholesterolemia (FH).  I had the opportunity to talk with Trish today to discuss the genetic component of FH and testing options available to her.     MEDICAL HISTORY: Trish is a 44 year-old female with high cholesterol and suspicion for FH. She has had high cholesterol for at least a decade. Her most recent lipid panel on 10/16/24 measured her total cholesterol at 283 mg/dL and her LDL at 207 mg/dL. She has been intolerant to statins in the past and has started on Repatha.    Her other medical history is significant for atrial fibrillation s/p ablation, GERD, LEONARDO, essential hypertension, and morbid obesity S/P gastric bypass.    FAMILY HISTORY: A detailed family history was obtained today and was significant for the following cardiac history:    Siblings  Sister, 51: thought to have high cholesterol and HTN  Sister, 48: cholesterol levels are unknown, Trish is estranged from her  Maternal history  Mother, 62:  at 62 from emphysema. She had a history of high cholesterol and HTN.  Two aunts who  in their late 60's and 70's from emphysema. One living aunt with limited information known.  Grandparents  from cancer and natural causes  Paternal history   Father,  at 74 from pancreatic cancer. He had a history of high cholesterol and HTN.  Aunt,  in her late 60's. She had quadruple bypass in her 60's.  Grandparents  from suicide and natural causes.  No biological children    There is no  additional history of cardiomyopathy, arrhythmias, heart attacks, fainting, sudden cardiac death, genetic conditions, or birth defects. (Scanned pedigree may be under media tab)    DISCUSSION:  High cholesterol can be caused by both genetic and non-genetic factors.  There are lots of well known diet and lifestyle choices that are known to increase risk for high cholesterol.  However, in some families, there is an underlying genetic predisposition for high cholesterol. The genetic component can be either causative, meaning that it causes disease, or contributory, meaning that it increases risk but does not cause it out right.     One example of a causative gene is familial hypercholesterolemia (FH), a genetic condition characterized by abnormally high concentrations of low density lipoprotein cholesterol (LDL-C) in the blood since birth.  FH predisposes affected individuals to premature coronary heart disease (CHD), stroke, and death. Individuals with FH have a significantly increased risk for CHD, estimated to be 20 times greater than that of the general population. If untreated, men with FH have a 50% risk and women with FH have a 30% risk of having a cardiovascular event by ages 50 and 60 years, respectively. The prevalence of FH is estimated to be around 1:250 individuals in the general population, with an even higher prevalence in certain ethnic groups.     Heterozygous FH (HeFH) is inherited in an autosomal dominant manner, meaning that only one mutation is needed to cause disease. Almost all affected individuals inherit the familial mutation from an affected parent. In addition, all children of an individual with HeFH have a 50% chance of inheriting the mutation. Homozygous FH (HoFH) is rare (prevalence 1:1,000,000) and occurs when an individual inherits two mutations. Therefore, if both parents have HeFH, each of their children would have a 25% risk of inheriting HoFH and a 50% risk of inheriting HeFH.    A  diagnosis of FH is typically made on a clinical basis. There are several sets of validated diagnostic criteria available, including the [place] MEDPED Program, the [place] FH Registry, and the [place]. Genetic testing is also available to confirm the diagnosis.     Currently 4 genes have been found to be related to FH.  Genetic testing currently identifies mutations in greater than 90% of definitive FH cases and 30% of probable FH cases. Reviewed capabilities, limitations, and logistics of testing.  DNA sample via saliva or blood is collected and sent to testing lab for evaluation of selected genes. The results could directly impact care and treatment.  (Don't include)    Explained three possible outcomes of genetic testing including: positive identification of a mutation, no mutation identified, and identification of a variant of unknown significance (VUS). If a mutation is identified, presymptomatic testing would be available to at risk family members. If no mutation is identified, it does not rule out the possibility of a genetic component to this disease. Family members could still be at risk for developing the same condition. If a VUS is identified, it is unclear if the mutation is disease causing or just a normal variation. It may take time and possibly additional testing to determine the meaning of a VUS result.     Test results take approximately 2-4 weeks on average. Discussed cost of testing through commercial labs. Explained that the lab will work with insurance to determine coverage and contact patient if out of pocket costs are expected to exceed $100. If so, patient has the option to pay reported price, cancel testing or elect self pay pricing (typcially around $250).     Discussed pros and cons of genetic testing. Explained that results could determine the cause for FH. If a mutation is identified, presymptomatic testing is available to all at risk relatives. Reviewed possible  issues associated with presymptomatic testing including genetic discrimination, current laws to prevent discrimination (ie. GOLDIE), insurance issues, and emotional and psychosocial outcomes of testing.     Due to the familial risk, we also recommend that at risk family members undergo lipid or molecular analysis of cholesterol levels.     All questions answered at this time.      PLAN: Danitza elected to proceed with genetic testing.  Requisition was completed and verbal consent was obtained, due to virtual visit.  A saliva sample kit will be sent directly to the patient.  Once sample is collected, kit will be mailed to [facility] laboratory.  I will contact patient when results are available.     TOTAL TIME SPENT: I spent 45 minutes on the day of the encounter doing chart review, collecting medical and family history, counseling, documentation and further activities as noted above.    Hina Reno MS, [facility]  Licensed, Certified Genetic Counselor  [facility]  [facility]

## 2025-01-29 ENCOUNTER — VIRTUAL VISIT (OUTPATIENT)
Dept: CARDIOLOGY | Facility: CLINIC | Age: 45
End: 2025-01-29
Attending: INTERNAL MEDICINE
Payer: COMMERCIAL

## 2025-01-29 VITALS — HEIGHT: 67 IN | BODY MASS INDEX: 38.14 KG/M2 | WEIGHT: 243 LBS

## 2025-01-29 DIAGNOSIS — Z78.9 STATIN INTOLERANCE: ICD-10-CM

## 2025-01-29 DIAGNOSIS — I48.0 PAROXYSMAL ATRIAL FIBRILLATION (H): ICD-10-CM

## 2025-01-29 DIAGNOSIS — I10 BENIGN ESSENTIAL HYPERTENSION: ICD-10-CM

## 2025-01-29 DIAGNOSIS — E78.5 HYPERLIPIDEMIA LDL GOAL <100: Primary | ICD-10-CM

## 2025-01-29 PROCEDURE — 96041 GENETIC COUNSELING SVC EA 30: CPT | Mod: GT,95

## 2025-01-29 PROCEDURE — 999N000069 HC STATISTIC GENETIC COUNSELING, < 16 MIN: Mod: GT,95

## 2025-01-29 ASSESSMENT — PAIN SCALES - GENERAL: PAINLEVEL_OUTOF10: MODERATE PAIN (5)

## 2025-01-29 NOTE — NURSING NOTE
Current patient location: At work in [place] per pt    Is the patient currently in the state of [place]? YES    Visit mode: VIDEO    If the visit is dropped, the patient can be reconnected by:VIDEO VISIT: Text to cell phone:   Telephone Information:   Mobile [phone]       Will anyone else be joining the visit? NO  (If patient encounters technical issues they should call [phone]    Are changes needed to the allergy or medication list? Pt stated no med changes    Are refills needed on medications prescribed by this physician? NO    Rooming Documentation:  Not applicable    Reason for visit: Consult    No other vitals to report per pt    Marsha LOPEZF

## 2025-01-29 NOTE — LETTER
2025      RE: Danitza Soto  [address] [id number]       Dear Colleague,    Thank you for the opportunity to participate in the care of your patient, Danitza Soto, at the [facility]. Please see a copy of my visit note below.    Virtual Visit Details    Type of service:  Video Visit     Originating Location (pt. Location): Other In car at work  Distant Location (provider location):  Off-site  Platform used for Video Visit: Elida    Here is a copy of the progress note from your recent genetic counseling visit to [facility] on Date: 2025.    PROGRESS NOTE: Trish was referred by  Leena Heath MD  for genetic counseling due to her history of high cholesterol and suspicion for familial cholesterolemia (FH).  I had the opportunity to talk with Trish today to discuss the genetic component of FH and testing options available to her.     MEDICAL HISTORY: Trish is a 44 year-old female with high cholesterol and suspicion for FH. She has had high cholesterol for at least a decade. Her most recent lipid panel on 10/16/24 measured her total cholesterol at 283 mg/dL and her LDL at 207 mg/dL. She has been intolerant to statins in the past and has started on Repatha.    Her other medical history is significant for atrial fibrillation s/p ablation, GERD, LEONARDO, essential hypertension, and morbid obesity S/P gastric bypass.    FAMILY HISTORY: A detailed family history was obtained today and was significant for the following cardiac history:    Siblings  Sister, 51: thought to have high cholesterol and HTN  Sister, 48: cholesterol levels are unknown, Trish is estranged from her  Maternal history  Mother, 62:  at 62 from emphysema. She had a history of high cholesterol and HTN.  Two aunts who  in their late 60's and 70's from emphysema. One living aunt with  limited information known.  Grandparents  from cancer and natural causes  Paternal history   Father,  at 74 from pancreatic cancer. He had a history of high cholesterol and HTN.  Aunt,  in her late 60's. She had quadruple bypass in her 60's.  Grandparents  from suicide and natural causes.  No biological children    There is no additional history of cardiomyopathy, arrhythmias, heart attacks, fainting, sudden cardiac death, genetic conditions, or birth defects. (Scanned pedigree may be under media tab)    DISCUSSION:  High cholesterol can be caused by both genetic and non-genetic factors.  There are lots of well known diet and lifestyle choices that are known to increase risk for high cholesterol.  However, in some families, there is an underlying genetic predisposition for high cholesterol. The genetic component can be either causative, meaning that it causes disease, or contributory, meaning that it increases risk but does not cause it out right.     One example of a causative gene is familial hypercholesterolemia (FH), a genetic condition characterized by abnormally high concentrations of low density lipoprotein cholesterol (LDL-C) in the blood since birth.  FH predisposes affected individuals to premature coronary heart disease (CHD), stroke, and death. Individuals with FH have a significantly increased risk for CHD, estimated to be 20 times greater than that of the general population. If untreated, men with FH have a 50% risk and women with FH have a 30% risk of having a cardiovascular event by ages 50 and 60 years, respectively. The prevalence of FH is estimated to be around 1:250 individuals in the general population, with an even higher prevalence in certain ethnic groups.     Heterozygous FH (HeFH) is inherited in an autosomal dominant manner, meaning that only one mutation is needed to cause disease. Almost all affected individuals inherit the familial mutation from an affected parent. In  addition, all children of an individual with HeFH have a 50% chance of inheriting the mutation. Homozygous FH (HoFH) is rare (prevalence 1:1,000,000) and occurs when an individual inherits two mutations. Therefore, if both parents have HeFH, each of their children would have a 25% risk of inheriting HoFH and a 50% risk of inheriting HeFH.    A diagnosis of FH is typically made on a clinical basis. There are several sets of validated diagnostic criteria available, including the [place] MEDPED Program, the [place] FH Registry, and the [place] [facility]. Genetic testing is also available to confirm the diagnosis.     Currently 4 genes have been found to be related to FH.  Genetic testing currently identifies mutations in greater than 90% of definitive FH cases and 30% of probable FH cases. Reviewed capabilities, limitations, and logistics of testing.  DNA sample via saliva or blood is collected and sent to testing lab for evaluation of selected genes. The results could directly impact care and treatment.  (Don't include)    Explained three possible outcomes of genetic testing including: positive identification of a mutation, no mutation identified, and identification of a variant of unknown significance (VUS). If a mutation is identified, presymptomatic testing would be available to at risk family members. If no mutation is identified, it does not rule out the possibility of a genetic component to this disease. Family members could still be at risk for developing the same condition. If a VUS is identified, it is unclear if the mutation is disease causing or just a normal variation. It may take time and possibly additional testing to determine the meaning of a VUS result.     Test results take approximately 2-4 weeks on average. Discussed cost of testing through commercial labs. Explained that the lab will work with insurance to determine coverage and contact patient if out of pocket costs are expected to  exceed $100. If so, patient has the option to pay reported price, cancel testing or elect self pay pricing (typcially around $250).     Discussed pros and cons of genetic testing. Explained that results could determine the cause for FH. If a mutation is identified, presymptomatic testing is available to all at risk relatives. Reviewed possible issues associated with presymptomatic testing including genetic discrimination, current laws to prevent discrimination (ie. GOLDIE), insurance issues, and emotional and psychosocial outcomes of testing.     Due to the familial risk, we also recommend that at risk family members undergo lipid or molecular analysis of cholesterol levels.     All questions answered at this time.      PLAN: Danitza elected to proceed with genetic testing.  Requisition was completed and verbal consent was obtained, due to virtual visit.  A saliva sample kit will be sent directly to the patient.  Once sample is collected, kit will be mailed to [facility].  I will contact patient when results are available.     TOTAL TIME SPENT: I spent 45 minutes on the day of the encounter doing chart review, collecting medical and family history, counseling, documentation and further activities as noted above.    Hina Reno MS, [facility]  Licensed, Certified Genetic Counselor  [facility]  [facility]        Please do not hesitate to contact me if you have any questions/concerns.     Sincerely,     Hina Reno GC

## 2025-01-29 NOTE — PATIENT INSTRUCTIONS
Indication for Genetic Counseling:     High cholesterol can be caused by both genetic and non-genetic factors.  There are lots of well known diet and lifestyle choices that are known to increase risk for high cholesterol.  However, in some families, there is an underlying genetic predisposition for high cholesterol. The genetic component can be either causative, meaning that it causes disease, or contributory, meaning that it increases risk but does not cause it out right.     One example of a causative gene is familial hypercholesterolemia (FH), a genetic condition characterized by abnormally high concentrations of low density lipoprotein cholesterol (LDL-C) in the blood since birth.  FH predisposes affected individuals to premature coronary heart disease (CHD), stroke, and death. Individuals with FH have a significantly increased risk for CHD, estimated to be 20 times greater than that of the general population. If untreated, men with FH have a 50% risk and women with FH have a 30% risk of having a cardiovascular event by ages 50 and 60 years, respectively. The prevalence of FH is estimated to be around 1:250  individuals in the general population, with an even higher prevalence in certain ethnic groups.     Inheritance:   Humans have over 20,000 genes that instruct our bodies how to function.  We have two copies of each gene because we inherit one from our mother and one from our father.  Heterozygous FH (HeFH) is inherited in an autosomal dominant manner, meaning that only one mutation is needed to cause disease. Almost all affected individuals inherit the familial mutation from an affected parent. In addition, all children of an individual with HeFH have a 50% chance of inheriting the mutation. Homozygous FH (HoFH) is rare (prevalence 1:1,000,000) and occurs when an individual inherits two mutations. Therefore, if both parents have HeFH, each of their children would have a 25% risk of inheriting HoFH and a 50%  risk of inheriting HeFH.    Testing Options:   Genetic testing is available to assess a panel of genes known to cause this condition.  This test reads through the DNA (sequencing) of these genes to look for spelling mistakes or mutations that could cause the condition.      There are three types of results you could receive from this test.     -Positive result (mutation/pathogenic variant identified) - confirms diagnosis and provides an answer to why this happened.  In addition, identifying a mutation allows family members to have testing to determine their risk.     -Negative result (mutation not identified) - no genetic changes were identified.  This does not rule out a genetic cause for the condition because not all genetic causes for this condition are known.  It is predicted that this panel will identify mutations in 30% of probable FH cases and close to 90% of definitive FH cases.   -Variant of uncertain significance (VUS) - a genetic change was identified, but there is not enough information to determine whether it is disease-causing or normal human genetic variation.     Although genetic testing may identify a mutation, it cannot provide information about the severity of symptoms or the progression of disease.  We cannot predict age of onset or severity of symptoms due to reduced penetrance and variable expressivity.    Logistics:   Genetic testing involves collecting a sample of DNA, thru blood, saliva, or cheek cells.  The sample will be sent to a laboratory to extract the DNA and sequence the genes for mutations.  The laboratory will work with your insurance company to determine the out of pocket (OOP) cost and will notify you if the OOP cost is greater than $100.  Remember to ask the lab about financial assistance pricing and self pay options as well.  Sometimes those are much lower than insurance pricing.  When testing is initiated, results take about 2-4 weeks to return. I will contact you over the  phone when results are available.     Genetic Information and Nondiscrimination Act:  The Genetic Information and Nondiscrimination Act of 2008 (GOLDIE) is a federal law that protects individuals from genetic discrimination in health insurance and employment. Genetic discrimination is defined as the misuse of genetic information. This law does not address potential discrimination regarding life insurance or disability insurance.      This is especially relevant for at risk individuals who are considering presymptomatic testing.    Screening Recommendations:  Recommend that first degree relatives, including parents, siblings and children, be screened regularly for cholesterol levels starting in childhood.    Resources:  The FH Foundation - familyheart.org    General   American Heart Association - americanheart.org  Genetics Home Reference - ghr.nlm.nih.gov  Genetic Information and Nondiscrimination Act - ginahelp.org    Contact Information:  Hina Reno MS, [facility]  Licensed, Certified Genetic Counselor  [facility]  [facility]     Office:  [phone]  Appointments:  [phone]  Fax: [phone]  Email: [email or web address]

## 2025-02-07 DIAGNOSIS — F41.9 ANXIETY: ICD-10-CM

## 2025-02-10 RX ORDER — VENLAFAXINE HYDROCHLORIDE 150 MG/1
CAPSULE, EXTENDED RELEASE ORAL
Qty: 90 CAPSULE | Refills: 1 | Status: SHIPPED | OUTPATIENT
Start: 2025-02-10

## 2025-02-17 ASSESSMENT — PATIENT HEALTH QUESTIONNAIRE - PHQ9
SUM OF ALL RESPONSES TO PHQ QUESTIONS 1-9: 6
10. IF YOU CHECKED OFF ANY PROBLEMS, HOW DIFFICULT HAVE THESE PROBLEMS MADE IT FOR YOU TO DO YOUR WORK, TAKE CARE OF THINGS AT HOME, OR GET ALONG WITH OTHER PEOPLE: SOMEWHAT DIFFICULT
SUM OF ALL RESPONSES TO PHQ QUESTIONS 1-9: 6

## 2025-02-18 ENCOUNTER — VIRTUAL VISIT (OUTPATIENT)
Dept: FAMILY MEDICINE | Facility: CLINIC | Age: 45
End: 2025-02-18
Payer: COMMERCIAL

## 2025-02-18 DIAGNOSIS — M54.41 ACUTE BILATERAL LOW BACK PAIN WITH RIGHT-SIDED SCIATICA: Primary | ICD-10-CM

## 2025-02-18 DIAGNOSIS — F33.1 MODERATE RECURRENT MAJOR DEPRESSION (H): ICD-10-CM

## 2025-02-18 DIAGNOSIS — G47.00 INSOMNIA, UNSPECIFIED TYPE: Chronic | ICD-10-CM

## 2025-02-18 PROCEDURE — 98006 SYNCH AUDIO-VIDEO EST MOD 30: CPT | Performed by: FAMILY MEDICINE

## 2025-02-18 RX ORDER — TRAZODONE HYDROCHLORIDE 50 MG/1
TABLET ORAL
Qty: 30 TABLET | Refills: 1 | Status: SHIPPED | OUTPATIENT
Start: 2025-02-18

## 2025-02-18 NOTE — PROGRESS NOTES
"Trish is a 44 year old who is being evaluated via a billable video visit.    How would you like to obtain your AVS? MyChart  If the video visit is dropped, the invitation should be resent by: Text to cell phone: [phone]  Will anyone else be joining your video visit? No      Assessment & Plan     Insomnia, unspecified type  She has been started on this by the [facility] and has been on the 50 mg at bedtime for insomnia and has worked really well , no side effects at the 50 mg dose and I have refilled her Rx today , no concerns .  - traZODone (DESYREL) 50 MG tablet; Take 1 tablet at bedtime.    Acute bilateral low back pain with right-sided sciatica  She has seen a specialist at the spine clinic about 2 or 3 yrs ago and had a steroid injection back then which made a big difference , she is interested in the injection again as she has been having low back pain, some radiation down the right buttocks , no numbness no tingling , we discussed and I placed the referral for the spine specialist   - Spine  Referral; Future      RTC if no improving or worsening.  Pt is aware  and comfortable with the current plan.      BMI  Estimated body mass index is 38.06 kg/m  as calculated from the following:    Height as of 1/29/25: 1.702 m (5' 7\").    Weight as of 1/29/25: 110.2 kg (243 lb).             Subjective   Trish is a 44 year old, presenting for the following health issues:  No chief complaint on file.        2/18/2025     2:02 PM   Additional Questions   Roomed by Sweta GARCIA     History of Present Illness       Back Pain:  She presents for follow up of back pain. Patient's back pain is a chronic problem.  Location of back pain:  Right lower back, left lower back, right middle of back and right hip  Description of back pain: dull ache and shooting  Back pain spreads: right buttocks    Since patient first noticed back pain, pain is: always present, but gets better and worse  Does back pain interfere with " her job:  Yes       She eats 0-1 servings of fruits and vegetables daily.She consumes 0 sweetened beverage(s) daily.She exercises with enough effort to increase her heart rate 9 or less minutes per day.  She exercises with enough effort to increase her heart rate 3 or less days per week.   She is taking medications regularly.       Low back pain   Insomnia         Review of Systems  Constitutional, HEENT, cardiovascular, pulmonary, GI, , musculoskeletal, neuro, skin, endocrine and psych systems are negative, except as otherwise noted.      Objective           Vitals:  No vitals were obtained today due to virtual visit.    Physical Exam   GENERAL: alert and no distress  EYES: Eyes grossly normal to inspection.  No discharge or erythema, or obvious scleral/conjunctival abnormalities.  RESP: No audible wheeze, cough, or visible cyanosis.    SKIN: Visible skin clear. No significant rash, abnormal pigmentation or lesions.  NEURO: Cranial nerves grossly intact.  Mentation and speech appropriate for age.  PSYCH: Appropriate affect, tone, and pace of words    No results found for this or any previous visit (from the past 24 hours).      Video-Visit Details    Type of service:  Video Visit   Originating Location (pt. Location): Home    Distant Location (provider location):  On-site  Platform used for Video Visit: Elida  Signed Electronically by: Polly Mcbride MD

## 2025-02-19 ENCOUNTER — PATIENT OUTREACH (OUTPATIENT)
Dept: CARE COORDINATION | Facility: CLINIC | Age: 45
End: 2025-02-19
Payer: COMMERCIAL

## 2025-03-08 NOTE — PROGRESS NOTES
Today's Date: 3/11/2025     Patient seen at the request of Polly Mcbride for an opinion and evaluation of Acute bilateral low back pain with right-sided sciatica.      HISTORY OF PRESENT ILLNESS:  Danitza Soto is a 44 year old female who presents with a chief complaint of low back pain with radiculopathy.      She was seen today in the clinic. She reports pain which began when she fell on concrete stairs and hit her back/buttocks when she was 17 years old.  She describes chronic low back pain ever since that time.  The pain has become more constant as she has gotten older.    Around 2 years ago she was following with an outside provider in regards to low back pain with LEFT-sided lumbar radiculopathy.  At the time, she was falling when her left leg would give out.  She had a L5-S1 IL LJ (at [facility] as ordered by Fan Luna, PEVELYN). After the LJ the falls/left leg giving out resolved.  She is no longer having left sided radiculopathy.    More recently, she describes bilateral low back pain which is now radiating to to the RIGHT buttock.    She denies lower extremity numbness, tingling, or pain.     She did PT in the past, but did not find it helpful.  She is not interested in repeating PT at this point.        Today, she describes  -bilateral low back pain, radiating to right gluteus    Aggravating factors include: Sitting, exercise  Relieving factors include: twisting when laying in bed, medication    Today, she rates the pain 7/10.  Patient states sleep is sometimes affected.    She denies falls, weakness, bowel or bladder incontinence, saddle anesthesia, unintentional weight loss, fevers/chills, night sweats.           PRIOR INJURIES/TREATMENT:   Ice/Heat: None  Brace: None  Physical Therapy:   PT for low back pain in 2022, per pt wasn't helpful      - Current Pain Medications -   Ibuprofen -  PRN but tries to avoid NSAIDs since history of lap gastric sleeve procedure in 2018  Flexeril -  for more severe  pain  *Trazodone - for sleep  *Venlafaxine      - Prior/Trialed Pain Medications -   Tylenol-does not help  *Ambien    Prior Procedures:  Date    Procedure   Improvement (%)  4/7/2023  L5-S1 ILESI ([facility]) helped significantly              Prior Related Surgery: none   Other (acupuncture, OMT, CMM, TENS, DME, etc.):   Chiropractor, remotely helped with knee issues     Specialists Seen - (with most recent, available notes and clinic visits reviewed)   1. PM&R [facility]  2.  Rheumatology-polyarthralgia, fatigue  3.  Neurology-cognitive complaints  4.  Neurosurgery-chronic midline low back pain  5. MARIAH Lew ([facility]?) in 2023      IMAGING - reviewed   MR LUMBAR SPINE WITHOUT CONTRAST 1.5T 3/24/2023 ([facility])  LATION: None.    INTERPRETATION:    Numbering: Last fully formed disc space is designated L5-S1.    Spinal Cord: The distal cord and conus demonstrates normal signal, terminating at mid L1.    Osseous Structures: No acute fracture or osseous destructive lesion. No spondylolysis. Type I discogenic degenerative endplate changes at L5-S1 and partially visualized anteriorly at T11-12.    Soft tissues: No soft tissue abnormality.    Other: No abnormality of the visualized structures.    L5-S1: Moderately advanced disc degeneration and mild right greater than left facet arthropathy. No central stenosis or descending neural impingement. Mild right foraminal stenosis.    L4-5: Mild disc desiccation with grade 1 borderline degenerative spondylolisthesis with moderate facet arthropathy. No central stenosis or neural impingement.    L3-4 and L2-3: Normal disc height, hydration and dorsal disc contour. Mild facet arthropathy. No stenosis or impingement.    L1-2 and T12-L1: Normal disc and facet morphology without stenosis or impingement.    T11-12: Moderate disc degeneration with a small central protrusion without stenosis or impingement.    CONCLUSION: Lumbar spondylosis in lordotic alignment and the  following specific findings:    1. L5-S1 moderately advanced disc degeneration with type I discogenic degenerative endplate changes.    2. Mild to moderate facet arthropathy greatest at L4-5 with borderline degenerative spondylolisthesis.    3. No acute fracture or osseous destructive lesion, central stenosis or neural impingement.        XR LUMBAR SPINE 2-3 VIEWS 1/25/2022                          IMPRESSION: 5 lumbar vertebral bodies. Normal vertebral body heights  and alignment. Mild to moderate degenerative disc disease at T11-T12  and L5-S1. Lower lumbar facet osteoarthrosis. Surgical clips within  the left upper quadrant. Mild colonic loading may indicate  constipation.          Review Of Systems:  I am responding to those symptoms which are directly relevant to the specific indication for my consultation. I recommend that the patient follow up with their primary or referring provider to pursue any other symptoms which may be of concern.       Medical History:  She  has a past medical history of Antiplatelet or antithrombotic long-term use, Arrhythmia, BV (bacterial vaginosis) (9/18/2020), Depressive disorder (1990), Esophageal reflux, Hearing problem (2018), Hyperlipidemia LDL goal <130 (07/06/2015), Hypertension, LSIL (low grade squamous intraepithelial lesion) on Pap smear (06/2014), LEONARDO on CPAP, and Other anxiety states.     She  has a past surgical history that includes TOOTH EXTRACTION W/FORCEP (1995); Laparoscopic gastric sleeve (N/A, 11/27/2018); Laparoscopic herniorrhaphy hiatal (11/27/2018); Ablation Atrial Fibrillation (N/A, 10/03/2019); Salpingo-oophorectomy, combined (Left, 10/20/2021); and Esophagoscopy, gastroscopy, duodenoscopy (EGD), combined (N/A, 11/29/2022).    Family History  Her family history includes Alcohol/Drug in her maternal grandfather; Allergies in her mother and sister; Cancer in her father; Cerebrovascular Disease in her paternal grandmother; Circulatory in her maternal  grandmother; Depression in her paternal grandfather; Diabetes in her maternal grandmother; Eye Disorder in her maternal grandmother; Genitourinary Problems in her sister; Heart Disease in her mother; Hypertension in her father, maternal grandmother, and mother; Lipids in her father and mother; Obesity in her maternal grandfather, maternal grandmother, and mother; Other Cancer in her father; Prostate Cancer in her father; Psychotic Disorder in her paternal grandfather; Respiratory in her mother.     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 Performs ADLs/IADLs independently but difficult due to pain.  She  reports that she quit smoking about 3 years ago. Her smoking use included cigarettes. She started smoking about 21 years ago. She has a 17.4 pack-year smoking history. She has been exposed to tobacco smoke. She has never used smokeless tobacco. She reports that she does not currently use alcohol. She reports that she does not use drugs.        Current Medications:   She has a current medication list which includes the following prescription(s): amlodipine, vitamin c, calcium carbonate, b-12, doxycycline hyclate, evolocumab, hydrochlorothiazide, ibuprofen, lisinopril, magnesium, metoprolol succinate er, multiple vitamins-iron, omeprazole, wegovy, wegovy, trazodone, venlafaxine, vitamin b1, vitamin d (cholecalciferol), and zolpidem.     Allergies:    -- Crestor [Rosuvastatin] -- Itching   -- Bupropion -- Other (See Comments)    --  Other reaction(s): Chest Pain,Panic attacks,             heart/chest pain   -- Wellbutrin [Bupropion]     --  Wellbutrin: Panic attacks, heart/chest pain    PHYSICAL EXAMINATION:  /78 (BP Location: Right arm, Patient Position: Sitting, Cuff Size: Adult Regular)   Pulse 71   LMP 04/01/2022 (Exact Date)   SpO2 98%    --CONSTITUTIONAL: Vital signs as above. No acute distress. The patient is  well nourished and well groomed.  --PSYCHIATRIC: The patient is awake, alert, oriented to person, place, time and answering questions appropriately with clear speech. Appropriate mood and affect   --SKIN:  Posterior torso is clean, dry, intact without rashes.   --RESPIRATORY: Normal rhythm and effort. No abnormal accessory muscle breathing patterns noted.   --GROSS MOTOR: Easily arises from a seated position. Toe walking and heel walking are normal.  Standing at the counter for stability, is able to stand on one leg and perform 5 reps lifting onto toes on both sides  --STANDING EXAMINATION: Gait is non-antalgic. Normal lumbar lordosis noted, no lateral shift.  --LOWER EXTREMITY MOTOR TESTING:  Plantar flexion left 5/5, right 5/5   Dorsiflexion left 5/5, right 5/5   Great toe MTP extension left 5/5, right 5/5  Knee flexion left 5/5, right 5/5  Knee extension left 5/5, right 5/5   Hip flexion left 5/5, right 5/5  --MUSCULOSKELETAL: Lumbar spine inspection reveals no evidence of deformity. Range of motion is not limited in lumbar flexion, extension, lateral rotation but all elicit pain. No tenderness to palpation lumbar spine. + pain right sided lumbosacral junction, SI joint, sacrum. Straight leg raise positive on the right and negative on the left. Facet loading maneuvers are positive bilaterally .  --SACROILIAC JOINT: +pain with palpation of right SI joint. Mirta positive. Vinh's negative bilaterally. Gaenslen's negative bilaterally. Thigh thrust Test neg bilat. Sacroiliac Joint Compression Test neg bilat. Sacral Thrust/Yeoman's Test positive bilaterally.  --HIPS: Full range of motion bilaterally. FABERs neg bilat.  No pain with logrolling. No pain with palpation of the LEFT greater trochanters.  Pain with palpation RIGHT anterior hip.   --NEUROLOGIC: 2/4 patellar and achilles reflexes bilaterally.  Sensation to light touch is intact in the bilateral L4, L5, and S1 dermatomes.  No clonus.    --VASCULAR:  Warm  lower limbs bilaterally. There is no pitting edema of the bilateral lower extremities.       ASSESSMENT:  Danitza Soto is a pleasant 44 year old female who presents with   -Chronic low back pain since age 17  -History of left-sided lumbar radiculopathy, improved after L5-S1 ILESI in 2023  -Acute on chronic bilateral low back pain with new right sided pain      Differential includes lumbar radiculopathy (L5  vs  S1?), lumbar facet mediated pain, right SI joint pain, right intra-articular hip pain, right greater trochanter bursitis    Complicating comorbities include:  # Long COVID  # Insomnia, depression, anxiety  # LEONARDO on CPAP  # Obesity on Wegovy, s/p lap sleeve gastrectomy 2018  # s/p ablation of atrial fibrillation  # History of tobacco use disorder      PLAN:  -MRI of the lumbar spine from 2023 was reviewed with the patient today.  Given the changing distribution of pain, will update the MRI of the lumbar spine.  We will fax the order to [facility] because she prefers an open MRI.  -Consider x-rays of the pelvis/hips  -She feels the pain is adequately controlled without adjusting pain regimen.  No medication changes made today  -Pending the MRI findings, she would like to pursue LJ  -She declined a referral for PT today.  We did discuss other types of PT which could be considered including [facility] PT and pool therapy  -I asked her to schedule a follow-up to review the MRI findings 2 to 3 days after the MRI  -RTC for review of the MRI    Ready to learn, no apparent learning barriers.  Education provided on treatment plan according to patient's preferred learning style.  Patient verbalizes understanding.   __________________________________  Hortensia Paul NP  Physical Medicine & Rehabilitation        43 minutes spent by me on the date of the encounter doing chart review, history and exam, documentation and further activities per the note

## 2025-03-11 ENCOUNTER — OFFICE VISIT (OUTPATIENT)
Dept: PHYSICAL MEDICINE AND REHAB | Facility: CLINIC | Age: 45
End: 2025-03-11
Attending: FAMILY MEDICINE
Payer: COMMERCIAL

## 2025-03-11 ENCOUNTER — TELEPHONE (OUTPATIENT)
Dept: PHYSICAL MEDICINE AND REHAB | Facility: CLINIC | Age: 45
End: 2025-03-11

## 2025-03-11 VITALS — DIASTOLIC BLOOD PRESSURE: 78 MMHG | HEART RATE: 71 BPM | OXYGEN SATURATION: 98 % | SYSTOLIC BLOOD PRESSURE: 112 MMHG

## 2025-03-11 DIAGNOSIS — G89.29 CHRONIC BILATERAL LOW BACK PAIN, UNSPECIFIED WHETHER SCIATICA PRESENT: ICD-10-CM

## 2025-03-11 DIAGNOSIS — M54.50 CHRONIC BILATERAL LOW BACK PAIN, UNSPECIFIED WHETHER SCIATICA PRESENT: ICD-10-CM

## 2025-03-11 DIAGNOSIS — M54.41 ACUTE BILATERAL LOW BACK PAIN WITH RIGHT-SIDED SCIATICA: Primary | ICD-10-CM

## 2025-03-11 DIAGNOSIS — M54.16 LUMBAR RADICULOPATHY: ICD-10-CM

## 2025-03-11 PROCEDURE — 3074F SYST BP LT 130 MM HG: CPT | Performed by: NURSE PRACTITIONER

## 2025-03-11 PROCEDURE — 3078F DIAST BP <80 MM HG: CPT | Performed by: NURSE PRACTITIONER

## 2025-03-11 PROCEDURE — 99215 OFFICE O/P EST HI 40 MIN: CPT | Performed by: NURSE PRACTITIONER

## 2025-03-11 NOTE — TELEPHONE ENCOUNTER
----- Message from Hortensia Paul sent at 3/11/2025  8:23 AM CDT -----  Hi, Can you please fax the lumbar MRI order to [facility] for open MRI?  Thanks,  Hortensia

## 2025-03-11 NOTE — PATIENT INSTRUCTIONS
It was a pleasure seeing you in the clinic today.  As discussed, we made the following updates to your plan of care:     A MRI order was added today, which you can schedule after today's visit.  Otherwise, Radiology will call you to schedule. You may also call [facility] Imaging Scheduling directly if you do not hear from them in the next couple of days.    Imaging scheduling  [facility] [phone] [facility] ([facility]), [facility]ics, including [facility], [facility] [phone] [facility], [facility] including [facility], [place], and [facility] [phone] [facility], [facility], [facility], [facility] clinics, including [place], [facility], and [facility] [phone] [facility], [facility], [facility] Clinics, including [place], [facility], [facility]     Thank you,  Hortensia Paul NP  Physical Medicine and Rehabilitation, Medical Spine  PM&R clinic Phone: [phone]  PM&R clinic Fax: [phone]

## 2025-03-11 NOTE — LETTER
3/11/2025       RE: Danitza Soto  [address] [facility] [address]     Dear Colleague,    Thank you for referring your patient, Danitza Soto, to the [facility] PHYSICAL MEDICINE AND REHABILITATION CLINIC [place] at [facility]. Please see a copy of my visit note below.    Today's Date: 3/11/2025     Patient seen at the request of Polly Mcbride for an opinion and evaluation of Acute bilateral low back pain with right-sided sciatica.      HISTORY OF PRESENT ILLNESS:  Danitza Soto is a 44 year old female who presents with a chief complaint of low back pain with radiculopathy.      She was seen today in the clinic. She reports pain which began when she fell on concrete stairs and hit her back/buttocks when she was 17 years old.  She describes chronic low back pain ever since that time.  The pain has become more constant as she has gotten older.    Around 2 years ago she was following with an outside provider in regards to low back pain with LEFT-sided lumbar radiculopathy.  At the time, she was falling when her left leg would give out.  She had a L5-S1 IL LJ (at [facility] as ordered by MARIAH Lew). After the LJ the falls/left leg giving out resolved.  She is no longer having left sided radiculopathy.    More recently, she describes bilateral low back pain which is now radiating to to the RIGHT buttock.    She denies lower extremity numbness, tingling, or pain.     She did PT in the past, but did not find it helpful.  She is not interested in repeating PT at this point.        Today, she describes  -bilateral low back pain, radiating to right gluteus    Aggravating factors include: Sitting, exercise  Relieving factors include: twisting when laying in bed, medication    Today, she rates the pain 7/10.  Patient states sleep is sometimes affected.    She denies falls, weakness, bowel or bladder incontinence, saddle anesthesia,  unintentional weight loss, fevers/chills, night sweats.           PRIOR INJURIES/TREATMENT:   Ice/Heat: None  Brace: None  Physical Therapy:   PT for low back pain in 2022, per pt wasn't helpful      - Current Pain Medications -   Ibuprofen -  PRN but tries to avoid NSAIDs since history of lap gastric sleeve procedure in 2018  Flexeril -  for more severe pain  *Trazodone - for sleep  *Venlafaxine      - Prior/Trialed Pain Medications -   Tylenol-does not help  *Ambien    Prior Procedures:  Date    Procedure   Improvement (%)  4/7/2023  L5-S1 ILESI ([facility]) helped significantly              Prior Related Surgery: none   Other (acupuncture, OMT, CMM, TENS, DME, etc.):   Chiropractor, remotely helped with knee issues     Specialists Seen - (with most recent, available notes and clinic visits reviewed)   1. PM&R Covid clinic [facility]  2.  Rheumatology-polyarthralgia, fatigue  3.  Neurology-cognitive complaints  4.  Neurosurgery-chronic midline low back pain  5. MARIAH Lew ([facility]?) in 2023      IMAGING - reviewed   MR LUMBAR SPINE WITHOUT CONTRAST 1.5T 3/24/2023 ([facility])  LATION: None.    INTERPRETATION:    Numbering: Last fully formed disc space is designated L5-S1.    Spinal Cord: The distal cord and conus demonstrates normal signal, terminating at mid L1.    Osseous Structures: No acute fracture or osseous destructive lesion. No spondylolysis. Type I discogenic degenerative endplate changes at L5-S1 and partially visualized anteriorly at T11-12.    Soft tissues: No soft tissue abnormality.    Other: No abnormality of the visualized structures.    L5-S1: Moderately advanced disc degeneration and mild right greater than left facet arthropathy. No central stenosis or descending neural impingement. Mild right foraminal stenosis.    L4-5: Mild disc desiccation with grade 1 borderline degenerative spondylolisthesis with moderate facet arthropathy. No central stenosis or neural impingement.    L3-4 and L2-3: Normal  disc height, hydration and dorsal disc contour. Mild facet arthropathy. No stenosis or impingement.    L1-2 and T12-L1: Normal disc and facet morphology without stenosis or impingement.    T11-12: Moderate disc degeneration with a small central protrusion without stenosis or impingement.    CONCLUSION: Lumbar spondylosis in lordotic alignment and the following specific findings:    1. L5-S1 moderately advanced disc degeneration with type I discogenic degenerative endplate changes.    2. Mild to moderate facet arthropathy greatest at L4-5 with borderline degenerative spondylolisthesis.    3. No acute fracture or osseous destructive lesion, central stenosis or neural impingement.        XR LUMBAR SPINE 2-3 VIEWS 1/25/2022                          IMPRESSION: 5 lumbar vertebral bodies. Normal vertebral body heights  and alignment. Mild to moderate degenerative disc disease at T11-T12  and L5-S1. Lower lumbar facet osteoarthrosis. Surgical clips within  the left upper quadrant. Mild colonic loading may indicate  constipation.          Review Of Systems:  I am responding to those symptoms which are directly relevant to the specific indication for my consultation. I recommend that the patient follow up with their primary or referring provider to pursue any other symptoms which may be of concern.       Medical History:  She  has a past medical history of Antiplatelet or antithrombotic long-term use, Arrhythmia, BV (bacterial vaginosis) (9/18/2020), Depressive disorder (1990), Esophageal reflux, Hearing problem (2018), Hyperlipidemia LDL goal <130 (07/06/2015), Hypertension, LSIL (low grade squamous intraepithelial lesion) on Pap smear (06/2014), LEONARDO on CPAP, and Other anxiety states.     She  has a past surgical history that includes TOOTH EXTRACTION W/FORCEP (1995); Laparoscopic gastric sleeve (N/A, 11/27/2018); Laparoscopic herniorrhaphy hiatal (11/27/2018); Ablation Atrial Fibrillation (N/A, 10/03/2019);  Salpingo-oophorectomy, combined (Left, 10/20/2021); and Esophagoscopy, gastroscopy, duodenoscopy (EGD), combined (N/A, 11/29/2022).    Family History  Her family history includes Alcohol/Drug in her maternal grandfather; Allergies in her mother and sister; Cancer in her father; Cerebrovascular Disease in her paternal grandmother; Circulatory in her maternal grandmother; Depression in her paternal grandfather; Diabetes in her maternal grandmother; Eye Disorder in her maternal grandmother; Genitourinary Problems in her sister; Heart Disease in her mother; Hypertension in her father, maternal grandmother, and mother; Lipids in her father and mother; Obesity in her maternal grandfather, maternal grandmother, and mother; Other Cancer in her father; Prostate Cancer in her father; Psychotic Disorder in her paternal grandfather; Respiratory in her mother.     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 Performs ADLs/IADLs independently but difficult due to pain.  She  reports that she quit smoking about 3 years ago. Her smoking use included cigarettes. She started smoking about 21 years ago. She has a 17.4 pack-year smoking history. She has been exposed to tobacco smoke. She has never used smokeless tobacco. She reports that she does not currently use alcohol. She reports that she does not use drugs.        Current Medications:   She has a current medication list which includes the following prescription(s): amlodipine, vitamin c, calcium carbonate, b-12, doxycycline hyclate, evolocumab, hydrochlorothiazide, ibuprofen, lisinopril, magnesium, metoprolol succinate er, multiple vitamins-iron, omeprazole, wegovy, wegovy, trazodone, venlafaxine, vitamin b1, vitamin d (cholecalciferol), and zolpidem.     Allergies:    -- Crestor [Rosuvastatin] -- Itching   -- Bupropion -- Other (See Comments)    --  Other reaction(s): Chest Pain,Panic  attacks,             heart/chest pain   -- Wellbutrin [Bupropion]     --  Wellbutrin: Panic attacks, heart/chest pain    PHYSICAL EXAMINATION:  /78 (BP Location: Right arm, Patient Position: Sitting, Cuff Size: Adult Regular)   Pulse 71   LMP 04/01/2022 (Exact Date)   SpO2 98%    --CONSTITUTIONAL: Vital signs as above. No acute distress. The patient is well nourished and well groomed.  --PSYCHIATRIC: The patient is awake, alert, oriented to person, place, time and answering questions appropriately with clear speech. Appropriate mood and affect   --SKIN:  Posterior torso is clean, dry, intact without rashes.   --RESPIRATORY: Normal rhythm and effort. No abnormal accessory muscle breathing patterns noted.   --GROSS MOTOR: Easily arises from a seated position. Toe walking and heel walking are normal.  Standing at the counter for stability, is able to stand on one leg and perform 5 reps lifting onto toes on both sides  --STANDING EXAMINATION: Gait is non-antalgic. Normal lumbar lordosis noted, no lateral shift.  --LOWER EXTREMITY MOTOR TESTING:  Plantar flexion left 5/5, right 5/5   Dorsiflexion left 5/5, right 5/5   Great toe MTP extension left 5/5, right 5/5  Knee flexion left 5/5, right 5/5  Knee extension left 5/5, right 5/5   Hip flexion left 5/5, right 5/5  --MUSCULOSKELETAL: Lumbar spine inspection reveals no evidence of deformity. Range of motion is not limited in lumbar flexion, extension, lateral rotation but all elicit pain. No tenderness to palpation lumbar spine. + pain right sided lumbosacral junction, SI joint, sacrum. Straight leg raise positive on the right and negative on the left. Facet loading maneuvers are positive bilaterally .  --SACROILIAC JOINT: +pain with palpation of right SI joint. Mirta positive. Vinh's negative bilaterally. Gaenslen's negative bilaterally. Thigh thrust Test neg bilat. Sacroiliac Joint Compression Test neg bilat. Sacral Thrust/Yeoman's Test positive  bilaterally.  --HIPS: Full range of motion bilaterally. FABERs neg bilat.  No pain with logrolling. No pain with palpation of the LEFT greater trochanters.  Pain with palpation RIGHT anterior hip.   --NEUROLOGIC: 2/4 patellar and achilles reflexes bilaterally.  Sensation to light touch is intact in the bilateral L4, L5, and S1 dermatomes.  No clonus.    --VASCULAR:  Warm lower limbs bilaterally. There is no pitting edema of the bilateral lower extremities.       ASSESSMENT:  Danitza Soto is a pleasant 44 year old female who presents with   -Chronic low back pain since age 17  -History of left-sided lumbar radiculopathy, improved after L5-S1 ILESI in 2023  -Acute on chronic bilateral low back pain with new right sided pain      Differential includes lumbar radiculopathy (L5  vs  S1?), lumbar facet mediated pain, right SI joint pain, right intra-articular hip pain, right greater trochanter bursitis    Complicating comorbities include:  # Long COVID  # Insomnia, depression, anxiety  # LEONARDO on CPAP  # Obesity on Wegovy, s/p lap sleeve gastrectomy 2018  # s/p ablation of atrial fibrillation  # History of tobacco use disorder      PLAN:  -MRI of the lumbar spine from 2023 was reviewed with the patient today.  Given the changing distribution of pain, will update the MRI of the lumbar spine.  We will fax the order to [facility] because she prefers an open MRI.  -Consider x-rays of the pelvis/hips  -She feels the pain is adequately controlled without adjusting pain regimen.  No medication changes made today  -Pending the MRI findings, she would like to pursue LJ  -She declined a referral for PT today.  We did discuss other types of PT which could be considered including [facility] PT and pool therapy  -I asked her to schedule a follow-up to review the MRI findings 2 to 3 days after the MRI  -RTC for review of the MRI    Ready to learn, no apparent learning barriers.  Education provided on treatment plan according to  patient's preferred learning style.  Patient verbalizes understanding.   __________________________________  Hortensia Paul NP  Physical Medicine & Rehabilitation        43 minutes spent by me on the date of the encounter doing chart review, history and exam, documentation and further activities per the note         Again, thank you for allowing me to participate in the care of your patient.      Sincerely,    JOSE LUIS Campos CNP

## 2025-03-13 ENCOUNTER — VIRTUAL VISIT (OUTPATIENT)
Dept: ENDOCRINOLOGY | Facility: CLINIC | Age: 45
End: 2025-03-13
Payer: COMMERCIAL

## 2025-03-13 VITALS — BODY MASS INDEX: 36.96 KG/M2 | WEIGHT: 235.5 LBS | HEIGHT: 67 IN

## 2025-03-13 DIAGNOSIS — E66.2 CLASS 3 OBESITY WITH ALVEOLAR HYPOVENTILATION, SERIOUS COMORBIDITY, AND BODY MASS INDEX (BMI) OF 40.0 TO 44.9 IN ADULT (H): ICD-10-CM

## 2025-03-13 DIAGNOSIS — E66.813 CLASS 3 OBESITY WITH ALVEOLAR HYPOVENTILATION, SERIOUS COMORBIDITY, AND BODY MASS INDEX (BMI) OF 40.0 TO 44.9 IN ADULT (H): ICD-10-CM

## 2025-03-13 RX ORDER — SEMAGLUTIDE 0.5 MG/.5ML
0.5 INJECTION, SOLUTION SUBCUTANEOUS WEEKLY
Qty: 2 ML | Refills: 3 | Status: SHIPPED | OUTPATIENT
Start: 2025-03-13

## 2025-03-13 NOTE — NURSING NOTE
Current patient location:  [place] at the emergency vet with pt cat    Is the patient currently in the state of [place]? YES    Visit mode:VIDEO    If the visit is dropped, the patient can be reconnected by: VIDEO VISIT: Text to cell phone:   Telephone Information:   Mobile [phone]       Will anyone else be joining the visit? NO  (If patient encounters technical issues they should call [phone])    Are changes needed to the allergy or medication list? Pt stated no changes to allergies and Pt stated no med changes    Are refills needed on medications prescribed by this physician? NO    Reason for visit: RECHECK    Mara ROTH

## 2025-03-13 NOTE — LETTER
3/13/2025       RE: Danitza Soto  [address] [facility] [address]     Dear Colleague,    Thank you for referring your patient, Danitza Soto, to the [facility] WEIGHT MANAGEMENT CLINIC [place] at [facility]. Please see a copy of my visit note below.      Return Medical Weight Management Note     Danitza Soto  MRN:  [id number]  :  1980  BASHIR:  3/13/2025    Dear Polly Mcbride MD,    I had the pleasure of seeing your patient Danitza Soto. She is a 44 year old female who I am continuing to see for treatment of obesity related to:        2018     7:25 AM   --   I have the following health issues associated with obesity High Blood Pressure    High Cholesterol    Sleep Apnea    GERD (Reflux)    Weight Bearing Joint Pain       Assessment & Plan  Problem List Items Addressed This Visit    None       Comprehensive weight management follow up visit    Sleeve  Lost from 308 to 228   Weight regain to 260 lbs at last visit 10/2/24  Last visit we restarted Wegovy and she has lost 25 lbs to 235 lbs today.  Doing well on 0.5mg weekly.  Feels effective and no s/e    GERD after surgery. GI did EGD which was normal. Requires PPI BID. Continue PPI prescribed by PCP     Currently taking the following medications that can help with weight loss/weight mgmt:  Wegovy 0.5mg    Past weight loss medication history and considerations:  Topriamate avoid due to fatigue/brain fog at higher doses. Could consider low dose retrial if needed  Avoid phentermine due to HTN and insomnia  Naltrexone could retrial if needed. Tolerated in the past    Plan today:  Continue Wegovy 0.5mg weekly as it is still feeling effective and she is lowing weight.  If insurance requires increase to 1mg she is agreeable to this plan.    Follow up plan:  3-4 months return MWM video Sandy      INTERVAL HISTORY:    CURRENT WEIGHT:   235 lbs 8 oz    Initial Weight (lbs): 308  lbs  Last Visits Weight: 117.9 kg (260 lb)  Cumulative weight loss (lbs): 72.5  Weight Loss Percentage: 23.54%        3/10/2025     4:08 PM   Changes and Difficulties   I have made the following changes to my diet since my last visit: Wegovy   With regards to my diet, I am still struggling with: Morning   I have made the following changes to my activity/exercise since my last visit: No   With regards to my activity/exercise, I am still struggling with: No             3/10/2025     4:08 PM   Weight Loss Medication History Reviewed With Patient   Which weight loss medications are you currently taking on a regular basis? Wegovy   Are you having any side effects from the weight loss medication that we have prescribed you? No       Lab on 10/16/2024   Component Date Value Ref Range Status     Cholesterol 10/16/2024 283 (H)  <200 mg/dL Final     Triglycerides 10/16/2024 187 (H)  <150 mg/dL Final     Direct Measure HDL 10/16/2024 39 (L)  >=50 mg/dL Final     LDL Cholesterol Calculated 10/16/2024 207 (H)  <100 mg/dL Final     Non HDL Cholesterol 10/16/2024 244 (H)  <130 mg/dL Final     Patient Fasting > 8hrs? 10/16/2024 Yes   Final     Parathyroid Hormone Intact 10/16/2024 75 (H)  15 - 65 pg/mL Final     Vitamin A 10/16/2024 0.56  0.30 - 1.20 mg/L Final     Retinol Palmitate 10/16/2024 0.02  0.00 - 0.10 mg/L Final     Vitamin A Interp 10/16/2024 Normal   Final      This test was developed and its performance characteristics   determined by [facility]. It has not been cleared or   approved by the US Food and Drug Administration. This test   was performed in a CLIA certified laboratory and is   intended for clinical purposes.  Performed By: [facility]  [address] [place] [id number]  : Boaz Bacon MD, PhD  CLIA Number: [id number]     Iron 10/16/2024 96  37 - 145 ug/dL Final     Ferritin 10/16/2024 97  6 - 175 ng/mL Final     RENEA interpretation 10/16/2024 Negative  Negative  Final      Negative:              <1:40  Borderline Positive:   1:40 - 1:80  Positive:              >1:80     Rheumatoid Factor 10/16/2024 <10  <14 IU/mL Final     CK 10/16/2024 74  26 - 192 U/L Final     CRP Inflammation 10/16/2024 3.27  <5.00 mg/L Final     Erythrocyte Sedimentation Rate 10/16/2024 7  0 - 20 mm/hr Final       MEDICATIONS:   Current Outpatient Medications   Medication Sig Dispense Refill     amLODIPine (NORVASC) 10 MG tablet Take 1 tablet (10 mg) by mouth daily 90 tablet 1     Ascorbic Acid (VITAMIN C) 500 MG CAPS Take by mouth every evening       calcium carbonate (OS-CHIKI) 500 MG tablet Take 1 tablet by mouth every evening       Cyanocobalamin (B-12) 1000 MCG TBCR Take by mouth every evening       doxycycline hyclate (VIBRA-TABS) 100 MG tablet Take 1 tablet (100 mg) by mouth 2 times daily 20 tablet 0     evolocumab (REPATHA) 140 MG/ML prefilled autoinjector Inject 1 mL (140 mg) subcutaneously every 14 days. 6 mL 3     hydrochlorothiazide (HYDRODIURIL) 25 MG tablet Take 1 tablet (25 mg) by mouth daily 90 tablet 1     ibuprofen (ADVIL/MOTRIN) 200 MG tablet Take 200 mg by mouth every 4 hours as needed for pain       lisinopril (ZESTRIL) 40 MG tablet Take 1 tablet by mouth every morning. 90 tablet 0     magnesium 250 MG tablet Take 1 tablet by mouth every evening       metoprolol succinate ER (TOPROL XL) 25 MG 24 hr tablet Take 1 tablet by mouth daily. 90 tablet 0     Multiple Vitamins-Iron (MULTI-DAY PLUS IRON PO) Take by mouth every morning       omeprazole (PRILOSEC) 40 MG DR capsule Take 1 capsule (40 mg) by mouth daily 90 capsule 2     Semaglutide-Weight Management (WEGOVY) 0.25 MG/0.5ML pen Inject 0.25 mg subcutaneously once a week. 2 mL 0     Semaglutide-Weight Management (WEGOVY) 0.5 MG/0.5ML pen Inject 0.5 mg subcutaneously once a week. 2 mL 3     traZODone (DESYREL) 50 MG tablet Take 1 tablet at bedtime. 30 tablet 1     venlafaxine (EFFEXOR XR) 150 MG 24 hr capsule Take 1 capsule by mouth  "daily. 90 capsule 1     vitamin B1 (THIAMINE) 250 MG tablet Take 250 mg by mouth every evening       Vitamin D (Cholecalciferol) 25 MCG (1000 UT) TABS Take by mouth every evening       zolpidem (AMBIEN) 5 MG tablet Take tablet by mouth 15 minutes prior to sleep as needed. Pharmacist to review side effects. 30 tablet 0         PHYSICAL EXAM:  Objective   Ht 1.702 m (5' 7.01\")   Wt 106.8 kg (235 lb 8 oz)   LMP 04/01/2022 (Exact Date)   BMI 36.88 kg/m             Vitals:  No vitals were obtained today due to virtual visit.    GENERAL: alert and no distress  EYES: Eyes grossly normal to inspection.  No discharge or erythema, or obvious scleral/conjunctival abnormalities.  RESP: No audible wheeze, cough, or visible cyanosis.    SKIN: Visible skin clear. No significant rash, abnormal pigmentation or lesions.  NEURO: Cranial nerves grossly intact.  Mentation and speech appropriate for age.  PSYCH: Appropriate affect, tone, and pace of words        Sincerely,    Sandy Cervantes PA-C      10 minutes spent by me on the date of the encounter doing chart review, history and exam, documentation and further activities per the note    Virtual Visit Details    Type of service:  Video Visit     Originating Location (pt. Location): Home    Distant Location (provider location):  Off-site  Platform used for Video Visit: PowerOasis    The longitudinal plan of care for the diagnosis(es)/condition(s) as documented were addressed during this visit. Due to the added complexity in care, I will continue to support Trish in the subsequent management and with ongoing continuity of care.      Again, thank you for allowing me to participate in the care of your patient.      Sincerely,    Sandy Cervantes PA-C    "

## 2025-03-13 NOTE — PROGRESS NOTES
Return Medical Weight Management Note     Danitza Soto  MRN:  [id number]  :  1980  BASHIR:  3/13/2025    Dear Polly Mcbride MD,    I had the pleasure of seeing your patient Danitza Soto. She is a 44 year old female who I am continuing to see for treatment of obesity related to:        2018     7:25 AM   --   I have the following health issues associated with obesity High Blood Pressure    High Cholesterol    Sleep Apnea    GERD (Reflux)    Weight Bearing Joint Pain       Assessment & Plan   Problem List Items Addressed This Visit    None       Comprehensive weight management follow up visit    Slee2018 Lost from 308 to 228   Weight regain to 260 lbs at last visit 10/2/24  Last visit we restarted Wegovy and she has lost 25 lbs to 235 lbs today.  Doing well on 0.5mg weekly.  Feels effective and no s/e    GERD after surgery. GI did EGD which was normal. Requires PPI BID. Continue PPI prescribed by PCP     Currently taking the following medications that can help with weight loss/weight mgmt:  Wegovy 0.5mg    Past weight loss medication history and considerations:  Topriamate avoid due to fatigue/brain fog at higher doses. Could consider low dose retrial if needed  Avoid phentermine due to HTN and insomnia  Naltrexone could retrial if needed. Tolerated in the past    Plan today:  Continue Wegovy 0.5mg weekly as it is still feeling effective and she is lowing weight.  If insurance requires increase to 1mg she is agreeable to this plan.    Follow up plan:  3-4 months return MWM video Sandy      INTERVAL HISTORY:    CURRENT WEIGHT:   235 lbs 8 oz    Initial Weight (lbs): 308 lbs  Last Visits Weight: 117.9 kg (260 lb)  Cumulative weight loss (lbs): 72.5  Weight Loss Percentage: 23.54%        3/10/2025     4:08 PM   Changes and Difficulties   I have made the following changes to my diet since my last visit: Wegovy   With regards to my diet, I am still struggling with: Morning   I have made the  following changes to my activity/exercise since my last visit: No   With regards to my activity/exercise, I am still struggling with: No             3/10/2025     4:08 PM   Weight Loss Medication History Reviewed With Patient   Which weight loss medications are you currently taking on a regular basis? Wegovy   Are you having any side effects from the weight loss medication that we have prescribed you? No       Lab on 10/16/2024   Component Date Value Ref Range Status    Cholesterol 10/16/2024 283 (H)  <200 mg/dL Final    Triglycerides 10/16/2024 187 (H)  <150 mg/dL Final    Direct Measure HDL 10/16/2024 39 (L)  >=50 mg/dL Final    LDL Cholesterol Calculated 10/16/2024 207 (H)  <100 mg/dL Final    Non HDL Cholesterol 10/16/2024 244 (H)  <130 mg/dL Final    Patient Fasting > 8hrs? 10/16/2024 Yes   Final    Parathyroid Hormone Intact 10/16/2024 75 (H)  15 - 65 pg/mL Final    Vitamin A 10/16/2024 0.56  0.30 - 1.20 mg/L Final    Retinol Palmitate 10/16/2024 0.02  0.00 - 0.10 mg/L Final    Vitamin A Interp 10/16/2024 Normal   Final      This test was developed and its performance characteristics   determined by [facility]. It has not been cleared or   approved by the US Food and Drug Administration. This test   was performed in a CLIA certified laboratory and is   intended for clinical purposes.  Performed By: [facility]  [address] [place] [id number]  : Boaz Bacon MD, PhD  CLIA Number: [id number]    Iron 10/16/2024 96  37 - 145 ug/dL Final    Ferritin 10/16/2024 97  6 - 175 ng/mL Final    RENEA interpretation 10/16/2024 Negative  Negative Final      Negative:              <1:40  Borderline Positive:   1:40 - 1:80  Positive:              >1:80    Rheumatoid Factor 10/16/2024 <10  <14 IU/mL Final    CK 10/16/2024 74  26 - 192 U/L Final    CRP Inflammation 10/16/2024 3.27  <5.00 mg/L Final    Erythrocyte Sedimentation Rate 10/16/2024 7  0 - 20 mm/hr Final        MEDICATIONS:   Current Outpatient Medications   Medication Sig Dispense Refill    amLODIPine (NORVASC) 10 MG tablet Take 1 tablet (10 mg) by mouth daily 90 tablet 1    Ascorbic Acid (VITAMIN C) 500 MG CAPS Take by mouth every evening      calcium carbonate (OS-CHIKI) 500 MG tablet Take 1 tablet by mouth every evening      Cyanocobalamin (B-12) 1000 MCG TBCR Take by mouth every evening      doxycycline hyclate (VIBRA-TABS) 100 MG tablet Take 1 tablet (100 mg) by mouth 2 times daily 20 tablet 0    evolocumab (REPATHA) 140 MG/ML prefilled autoinjector Inject 1 mL (140 mg) subcutaneously every 14 days. 6 mL 3    hydrochlorothiazide (HYDRODIURIL) 25 MG tablet Take 1 tablet (25 mg) by mouth daily 90 tablet 1    ibuprofen (ADVIL/MOTRIN) 200 MG tablet Take 200 mg by mouth every 4 hours as needed for pain      lisinopril (ZESTRIL) 40 MG tablet Take 1 tablet by mouth every morning. 90 tablet 0    magnesium 250 MG tablet Take 1 tablet by mouth every evening      metoprolol succinate ER (TOPROL XL) 25 MG 24 hr tablet Take 1 tablet by mouth daily. 90 tablet 0    Multiple Vitamins-Iron (MULTI-DAY PLUS IRON PO) Take by mouth every morning      omeprazole (PRILOSEC) 40 MG DR capsule Take 1 capsule (40 mg) by mouth daily 90 capsule 2    Semaglutide-Weight Management (WEGOVY) 0.25 MG/0.5ML pen Inject 0.25 mg subcutaneously once a week. 2 mL 0    Semaglutide-Weight Management (WEGOVY) 0.5 MG/0.5ML pen Inject 0.5 mg subcutaneously once a week. 2 mL 3    traZODone (DESYREL) 50 MG tablet Take 1 tablet at bedtime. 30 tablet 1    venlafaxine (EFFEXOR XR) 150 MG 24 hr capsule Take 1 capsule by mouth daily. 90 capsule 1    vitamin B1 (THIAMINE) 250 MG tablet Take 250 mg by mouth every evening      Vitamin D (Cholecalciferol) 25 MCG (1000 UT) TABS Take by mouth every evening      zolpidem (AMBIEN) 5 MG tablet Take tablet by mouth 15 minutes prior to sleep as needed. Pharmacist to review side effects. 30 tablet 0         PHYSICAL  "EXAM:  Objective    Ht 1.702 m (5' 7.01\")   Wt 106.8 kg (235 lb 8 oz)   LMP 04/01/2022 (Exact Date)   BMI 36.88 kg/m             Vitals:  No vitals were obtained today due to virtual visit.    GENERAL: alert and no distress  EYES: Eyes grossly normal to inspection.  No discharge or erythema, or obvious scleral/conjunctival abnormalities.  RESP: No audible wheeze, cough, or visible cyanosis.    SKIN: Visible skin clear. No significant rash, abnormal pigmentation or lesions.  NEURO: Cranial nerves grossly intact.  Mentation and speech appropriate for age.  PSYCH: Appropriate affect, tone, and pace of words        Sincerely,    Sandy Cervantes PA-C      10 minutes spent by me on the date of the encounter doing chart review, history and exam, documentation and further activities per the note    Virtual Visit Details    Type of service:  Video Visit     Originating Location (pt. Location): Home    Distant Location (provider location):  Off-site  Platform used for Video Visit: Elida    The longitudinal plan of care for the diagnosis(es)/condition(s) as documented were addressed during this visit. Due to the added complexity in care, I will continue to support Trish in the subsequent management and with ongoing continuity of care.    "

## 2025-03-17 ENCOUNTER — TELEPHONE (OUTPATIENT)
Dept: CARDIOLOGY | Facility: CLINIC | Age: 45
End: 2025-03-17
Payer: COMMERCIAL

## 2025-03-17 NOTE — TELEPHONE ENCOUNTER
Called Trish to review genetic testing results, no answer. LVM and invited call back.    Hina Reno MS, [facility] [place]  Licensed, Certified Genetic Counselor  [facility]  [facility]

## 2025-03-17 NOTE — TELEPHONE ENCOUNTER
Spoke with Danitza today to review results of genetic testing. She underwent genetic testing on 1/29/25 due to her diagnosis of high cholesterol and the suspicion of familial hypercholesterolemia (FH).     Genetic testing for the FH panel thru [facility] revealed that Trish does NOT carry a mutation in the 4 genes analyzed. It is predicted that this panel will identify mutations in 30% of probable FH cases and close to 90% of definitive FH cases.     Therefore, this negative result does not rule out a genetic basis for pts high cholesterol, but eliminates the option of presymptomatic testing in at risk family members, at this time.    However, since this condition could still be genetic, clinical screening is recommended in all first degree relatives (children, siblings, parents).  Clinical screening should include lipid levels to assess their current status.     Explained that with continued research and genetic knowledge the detection rate for identifying mutations may increase over time. Therefore, it is worth touching base in the years to come to learn about new testing options.     A summary letter and copy of the results will be sent to patient. All questions answered at this time.     Hina Reno MS, [facility] [place]  Licensed, Certified Genetic Counselor  [facility]  [facility]

## 2025-03-19 ENCOUNTER — TELEPHONE (OUTPATIENT)
Dept: ENDOCRINOLOGY | Facility: CLINIC | Age: 45
End: 2025-03-19
Payer: COMMERCIAL

## 2025-03-19 NOTE — TELEPHONE ENCOUNTER
Left Voicemail (1st Attempt) for the patient to call back and schedule the following:     Appointment type: return   Provider: Sandy Cervantes   Return date: next available  Specialty phone number: [phone]   Additional appointment(s) needed:   Additonal Notes:

## 2025-04-07 ENCOUNTER — TELEPHONE (OUTPATIENT)
Dept: PHYSICAL MEDICINE AND REHAB | Facility: CLINIC | Age: 45
End: 2025-04-07
Payer: COMMERCIAL

## 2025-04-07 PROBLEM — G89.29 CHRONIC BILATERAL LOW BACK PAIN, UNSPECIFIED WHETHER SCIATICA PRESENT: Status: ACTIVE | Noted: 2025-04-04

## 2025-04-07 PROBLEM — M54.50 CHRONIC BILATERAL LOW BACK PAIN, UNSPECIFIED WHETHER SCIATICA PRESENT: Status: ACTIVE | Noted: 2025-04-04

## 2025-04-07 PROBLEM — M47.816 FACET ARTHROPATHY, LUMBAR: Status: ACTIVE | Noted: 2025-04-04

## 2025-04-07 NOTE — TELEPHONE ENCOUNTER
Called patient to schedule procedure with Dr. Cleaning    Date of Procedure: 5/5/25    Arrival time given: Yes: Arrival Time 8:45am       Procedure Location: [facility]     Verified Location with Patient:  Yes  Address provided to the patient     Pre-op H&P Required:  No: Local anesthesia        Post-Op/Follow Up Appt:  Not Indicated in Request      Informed patient they will need a  to drive them home:  Yes    Patients : Spouse     Patient is aware that pre-op RN from the procedure center will call 2-3 days prior to scheduled procedure to confirm arrival time and review any instructions:  Yes       Additional Comments: N/A        Thuy Kennedy MA on 4/7/2025 at 11:27 AM      P: [phone]

## 2025-04-12 DIAGNOSIS — G47.00 INSOMNIA, UNSPECIFIED TYPE: Chronic | ICD-10-CM

## 2025-04-14 RX ORDER — TRAZODONE HYDROCHLORIDE 50 MG/1
TABLET ORAL
Qty: 30 TABLET | Refills: 1 | Status: SHIPPED | OUTPATIENT
Start: 2025-04-14

## 2025-04-16 ENCOUNTER — TELEPHONE (OUTPATIENT)
Dept: ENDOCRINOLOGY | Facility: CLINIC | Age: 45
End: 2025-04-16
Payer: COMMERCIAL

## 2025-04-16 NOTE — TELEPHONE ENCOUNTER
Prior Authorization Approval    Medication: WEGOVY 0.5 MG/0.5ML SC SOAJ  Authorization Effective Date: 4/16/2025  Authorization Expiration Date: 4/16/2026  Approved Dose/Quantity: uud  Reference #: Key: [id number]   Insurance Company: [employer]ty [employer] - Phone [phone] Fax [phone]  Expected CoPay: $    CoPay Card Available:      Financial Assistance Needed:    Which Pharmacy is filling the prescription:      Key: [id number]

## 2025-05-05 ENCOUNTER — ANCILLARY PROCEDURE (OUTPATIENT)
Dept: RADIOLOGY | Facility: AMBULATORY SURGERY CENTER | Age: 45
End: 2025-05-05
Attending: PHYSICAL MEDICINE & REHABILITATION
Payer: COMMERCIAL

## 2025-05-05 ENCOUNTER — HOSPITAL ENCOUNTER (OUTPATIENT)
Facility: AMBULATORY SURGERY CENTER | Age: 45
Discharge: HOME | End: 2025-05-05
Attending: PHYSICAL MEDICINE & REHABILITATION | Admitting: PHYSICAL MEDICINE & REHABILITATION
Payer: COMMERCIAL

## 2025-05-05 VITALS
BODY MASS INDEX: 36.1 KG/M2 | OXYGEN SATURATION: 99 % | DIASTOLIC BLOOD PRESSURE: 69 MMHG | TEMPERATURE: 97.8 F | HEIGHT: 67 IN | SYSTOLIC BLOOD PRESSURE: 102 MMHG | WEIGHT: 230 LBS | RESPIRATION RATE: 18 BRPM | HEART RATE: 84 BPM

## 2025-05-05 DIAGNOSIS — R52 PAIN: ICD-10-CM

## 2025-05-05 PROCEDURE — 64493 INJ PARAVERT F JNT L/S 1 LEV: CPT | Mod: 50 | Performed by: PHYSICAL MEDICINE & REHABILITATION

## 2025-05-05 PROCEDURE — 64493 INJ PARAVERT F JNT L/S 1 LEV: CPT | Mod: 50,KX

## 2025-05-05 PROCEDURE — 64494 INJ PARAVERT F JNT L/S 2 LEV: CPT | Mod: 50 | Performed by: PHYSICAL MEDICINE & REHABILITATION

## 2025-05-05 RX ORDER — IOPAMIDOL 408 MG/ML
INJECTION, SOLUTION INTRATHECAL PRN
Status: DISCONTINUED | OUTPATIENT
Start: 2025-05-05 | End: 2025-05-05 | Stop reason: HOSPADM

## 2025-05-05 NOTE — DISCHARGE INSTRUCTIONS
Home Care Instructions after a Medial Branch Block      In a medial branch block, a local anesthetic (numbing medicine) is injected near the medial branch nerve. This stops the transmission of pain signals from the facet joint. If this reduces your pain and improves your mobility, it may tell the doctor which facet joint is causing the pain. This procedure is a diagnostic procedure and is typically short lasting, with intentions of doing a radiofrequency ablation. With this injection, a steroid to increase the longevity of the blocks effect is rarely used.    Activity  -You may resume most normal activity levels with the exception of strenuous activity. It is important for us to know if your pain with normal activity is relieved after this injection.  -DO NOT shower for 24 hours  -DO NOT remove bandaid for 24 hours    Pain  -You may experience soreness at the injection site for one or two days  -You may use an ice pack for 20 minutes every 2 hours for the first 24 hours  -You may use a heating pad after the first 24 hours  -You may use Tylenol (acetaminophen) every 4 hours or other pain medicines as     directed by your physician    You may experience numbness radiating into your legs or arms (depending on the procedure location). This numbness may last several hours. Until sensation returns to normal; please use caution in walking, climbing stairs, and stepping out of your vehicle, etc.        PLEASE KEEP TRACK OF YOUR SYMPTOMS AND NOTE YOUR IMPROVEMENT FOR YOUR DOCTOR.     Fill out the pain diary and fax it back to us. You do not need to call us if the pain diary was faxed. [phone] is the FAX number  If you do not have access to a fax machine, attach it to Remoov.  You do not need to call us if the pain diary was attached to Remoov.   If you cannot fax or send via Remoov, then call our call center and request to speak with a nurse for follow up after a procedure       Please contact us if you  have:  -Severe pain  -Fever more than 101.5 degrees Fahrenheit  -Signs of infection at the injection site (redness, swelling, or drainage)    FOR PM&R PATIENTS:  For patients seen by the PM and R service, please call [phone]. (Monday through Friday 8a-5p).  After business hours and weekends call [phone] and ask for the PM and R doctor on call). If you need to fax a pain diary to PM&R the fax number is [phone]. If you are unable to fax, uploading to [employer] is encouraged, then send to provider. If you have procedure scheduling questions please call [phone] Option #2.

## 2025-05-05 NOTE — OP NOTE
PROCEDURE NOTE: 1st Diagnostic Lumbar Medial Branch Block    PROCEDURE DATE: 5/5/2025    PATIENT NAME: Danitza Soto  YOB: 1980    ATTENDING PHYSICIAN: Molly Hanson MD   RESIDENT/FELLOW PHYSICIAN: Lb Holliday MD, PGY-IV     PREOPERATIVE DIAGNOSIS:   Other, lumbar spondylosis  Lumbar facet pain  POSTOPERATIVE DIAGNOSIS: same    PROCEDURE PERFORMED: 1st diagnostic medial branch block at the Bilateral L4, L5, and Sacral ala to treat the Bilateral  L4-5 and L5-S1 facet joints ( L3, L4 medial branch nerve blocks and L5 dorsal rami nerve blocks)      FLUOROSCOPY WAS USED.     INDICATIONS FOR PROCEDURE:   Danitza Soto is a 44 year old female with a clinical picture consistent with bilateral axial low back pain and lumbar facet arthropathy who presents for diagnostic medial branch block at the levels noted above to assess if there is a facetogenic component to her low back pain.     PROCEDURE AND FINDINGS:    Danitza was greeted in the pre-procedure holding area. The risk, benefits and alternatives to the procedure were again reviewed with the patient and written informed consent was placed in the chart. An IV line was not placed.  A 500 mL bag of NS was not connected to the patient. She was taken to the procedure room and positioned prone on the fluoroscopy table.  Routine monitors were applied including blood pressure cuff, and pulse oximetry. Prior to the procedure a time out was completed, verifying correct patient, procedure, site, positioning, and implants and/or special equipment.     The skin was prepped and draped in the usual sterile fashion. An AP fluoroscopic view was obtained to identify the vertebral bodies, the transverse processes and the superior articulating processes at the L4, L5, and Sacral ala aforementioned levels on the bilateral side(s). The skin overlying the junction of the TP-SAP at the aforementioned levels was anesthetized using a 27-gauge 1-1/4 inch needle with 1%  preservative-free lidocaine for a total volume of 0.5 ml per level. Next, a 25-gauge 3 1/2 inch Quincke spinal needle was advanced under an AP fluoroscopic view to the junction of the superior articular process and transverse process(es). Correct needle position was then confirmed with additional AP, ipsilateral oblique and/or lateral views.      After negative aspiration, 0.3 mls of Isovue-M contrast was injected at each site under live fluoroscopy; no vascular run off was identified and the contrast spread was adequate. At this point, after negative aspiration, 0.5mL of 0.5% Bupivacaine, was injected at each site.  The needle was then re-styletted removed. The needle insertion site was dressed appropriately.     Danitza was taken to the recovery room where she was monitored for a brief period of time. She tolerated the procedure well and was discharged home in stable condition with post procedural instructions.    Before the procedure, she reported a pain score of 8/10.   After the procedure, she reported a pain score of 6/10, some difficulty differentiating postprocedural discomfort    Follow-up will be Determined after block sheet reviewed.    COMPLICATIONS: None    COMMENTS: None

## 2025-05-09 DIAGNOSIS — I48.0 PAF (PAROXYSMAL ATRIAL FIBRILLATION) (H): ICD-10-CM

## 2025-05-09 DIAGNOSIS — E66.01 MORBID (SEVERE) OBESITY DUE TO EXCESS CALORIES (H): ICD-10-CM

## 2025-05-09 DIAGNOSIS — Z98.84 S/P LAPAROSCOPIC SLEEVE GASTRECTOMY: ICD-10-CM

## 2025-05-09 DIAGNOSIS — I10 ESSENTIAL HYPERTENSION: ICD-10-CM

## 2025-05-09 DIAGNOSIS — K21.9 GASTROESOPHAGEAL REFLUX DISEASE WITHOUT ESOPHAGITIS: ICD-10-CM

## 2025-05-12 ENCOUNTER — MYC REFILL (OUTPATIENT)
Dept: FAMILY MEDICINE | Facility: CLINIC | Age: 45
End: 2025-05-12
Payer: COMMERCIAL

## 2025-05-12 DIAGNOSIS — I48.0 PAF (PAROXYSMAL ATRIAL FIBRILLATION) (H): ICD-10-CM

## 2025-05-12 RX ORDER — HYDROCHLOROTHIAZIDE 25 MG/1
25 TABLET ORAL DAILY
Qty: 90 TABLET | Refills: 1 | OUTPATIENT
Start: 2025-05-12

## 2025-05-12 RX ORDER — LISINOPRIL 40 MG/1
40 TABLET ORAL EVERY MORNING
Qty: 90 TABLET | Refills: 0 | Status: SHIPPED | OUTPATIENT
Start: 2025-05-12

## 2025-05-12 RX ORDER — OMEPRAZOLE 40 MG/1
40 CAPSULE, DELAYED RELEASE ORAL DAILY
Qty: 90 CAPSULE | Refills: 2 | Status: SHIPPED | OUTPATIENT
Start: 2025-05-12

## 2025-05-12 RX ORDER — HYDROCHLOROTHIAZIDE 25 MG/1
25 TABLET ORAL DAILY
Qty: 90 TABLET | Refills: 0 | Status: SHIPPED | OUTPATIENT
Start: 2025-05-12

## 2025-05-13 NOTE — PROGRESS NOTES
"Trish is a 44 year old who is being evaluated via a billable video visit.      Video-Visit Details    Type of service:  Video Visit   Originating Location (pt. Location): Home  Joined the call at 5/14/2025, 8:01:58 am.  Left the call at 5/14/2025, 8:11:31 am.  You were on the call for 9 minutes 32 seconds.  Distant Location (provider location):  Off-site  Platform used for Video Visit: Elida      PM&R Follow-Up Visit -     Date of Initial Visit: 3/11/2025  LOV: 5/5/2025-procedure visit  TD: 5/14/2025     Recall: Danitza Soto is a 44 year old female who presents with a chief complaint of low back pain with radiculopathy.        INTERVAL HISTORY:  Patient was last seen in clinic 5/5/2025 for medial branch block #1 treating the bilateral L4-5 and L5-S1 facet joints with Dr. Hanson.     She was seen today via telehealth to clarify the MBB #1 pain diary.    On the pain diary, she had reported pain levels after the procedure which had decreased from 7-8/10 before the procedure and decreasing down to 0-1/10 after the procedure.  She reported improvements in the low back, hip, and buttock pain after MBB #1.  However, she also reported ongoing pain from the \"top of [the] right hip down toward [the] right buttock\".  She was seen today clarify.    Today, she states she feels there are 2 different types of pain occurring.  She says the MBB #1 provided a significant and meaningful decrease in the level of chronic low back pain.  She remarks that after the procedure she did multiple chores around the house and did some work outside as well.  She comments that typically she would have to take breaks in between each of these activities, but she was able to do them consecutively which was an improvement for her.  She also comments that while the right hip and buttock pain remained persistent after MBB #1, this is a less bothersome area of pain for her and she prefers to investigate/address this area further after pursuing the " MBB/RFA pathway to address the low back pain.  As such, she is interested in moving onto MBB #2.    At the last visit we had ordered MRIs of the cervical and thoracic spine.  Her preference was to go to [facility] in order to have open MRIs.  However, she says she has not yet received a call from them.          RECALL HISTORY OF PRESENT ILLNESS:    3/11/2025  Danitza Soto is a 44 year old female who presents with a chief complaint of low back pain with radiculopathy.      She was seen today in the clinic. She reports pain which began when she fell on concrete stairs and hit her back/buttocks when she was 17 years old.  She describes chronic low back pain ever since that time.  The pain has become more constant as she has gotten older.    Around 2 years ago she was following with an outside provider in regards to low back pain with LEFT-sided lumbar radiculopathy.  At the time, she was falling when her left leg would give out.  She had a L5-S1 IL LJ (at [facility] as ordered by MARIAH Lew). After the LJ the falls/left leg giving out resolved.  She is no longer having left sided radiculopathy.    More recently, she describes bilateral low back pain which is now radiating to to the RIGHT buttock.    She denies lower extremity numbness, tingling, or pain.     She did PT in the past, but did not find it helpful.  She is not interested in repeating PT at this point.        Today, she describes  -bilateral low back pain, radiating to right gluteus    Aggravating factors include: Sitting, exercise  Relieving factors include: twisting when laying in bed, medication    Today, she rates the pain 7/10.  Patient states sleep is sometimes affected.    She denies falls, weakness, bowel or bladder incontinence, saddle anesthesia, unintentional weight loss, fevers/chills, night sweats.       4/4/2025:  Patient was last seen in clinic 3/11/2025. At that visit, the plan was to update MRI of the lumbar spine.    She was seen  today via telehealth for a review of the imaging and to discuss next steps.    At the last visit she described:  -Bilateral low back pain, radiating to the right gluteus  -History of left lumbar radiculopathy 2 years ago (resolved)    Today she states that since last time she was seen in clinic she began having intermittent numbness affecting the entirety of both lower extremities including both feet.  The numbness can affect either the left or right lower extremity.  Laying supine brings on the numbness.  Sitting for a prolonged period of time brings on a lesser degree of numbness as well. She purchased a pillow top mattress cover which helped somewhat. As prior, she denies weakness, falls, bowel or bladder incontinence, or saddle anesthesia.    She does endorse some chronic neck pain over the years, which she attributes to a fall on concrete stairs at age 17.  In her 20s she had severe headaches, but only experiences occasional headaches now.  The neck pain is tight and sore near the base of her skull.  Sometimes she wakes up with one or both of her arms feeling numb. (Affecting the entire arm and all fingers.)  She manages the neck pain with occasional chiropractic treatments a couple times per year. She thinks she may have had some neck imaging done in the past, but not in the last 10 years (none available for review at today's visit).  She denies balance problems, recent falls, dexterity changes, or upper extremity weakness.    She is interested in interventional procedures to help with the low back pain, which is her most bothersome symptom.  She states on average her pain level is 6-7/10.      PRIOR INJURIES/TREATMENT:   Ice/Heat: None  Brace: None  Physical Therapy:   PT for low back pain in 2022, per pt wasn't helpful      - Current Pain Medications -   Ibuprofen -  PRN but tries to avoid NSAIDs since history of lap gastric sleeve procedure in 2018  Flexeril -  for more severe pain  *Trazodone - for  sleep  *Venlafaxine      - Prior/Trialed Pain Medications -   Tylenol-does not help  *Ambien    Prior Procedures:  Date    Procedure   Improvement (%)  4/7/2023  L5-S1 ILESI ([facility]) helped significantly   5/5/2025  MBB#1 cassie L4-5, L5-S1 facets  Significant, meaningful relief            Prior Related Surgery: none   Other (acupuncture, OMT, CMM, TENS, DME, etc.):   Chiropractor, remotely helped with knee issues     Specialists Seen - (with most recent, available notes and clinic visits reviewed)   1. PM&R Covid clinic [facility]  2.  Rheumatology-polyarthralgia, fatigue  3.  Neurology-cognitive complaints  4.  Neurosurgery-chronic midline low back pain  5. [facility]IA[facility] Lew ([facility]?) in 2023      IMAGING - reviewed   MRI OF THE LUMBAR SPINE WITHOUT CONTRAST-[facility]-3/20/2025    INTERPRETATION: Comparison MRI lumbar spine examination 3/24/2023.    1 to 2 mm anterolisthesis of L4 on L5 with mild generalized levocurvature and accentuated lumbar lordosis. No acute fracture or spondylolysis. Fibrofatty degenerative endplate changes are demonstrated at L5-S1. Conus is normal and terminates at L1. Imaged upper sacrum and paraspinal soft tissue structures are unremarkable. Disc desiccation and annular bulging are demonstrated at L5-S1 and T11-12 with isolated annular bulging at L4-5. Disc space narrowing is severe at L5-S1 and moderate at T11-12.    L5-S1: Unchanged annular bulging without central stenosis or traversing neural compression. Mild right facet hypertrophy with patent foramina.    L4-5: Unchanged annular bulging without central stenosis or traversing neural compression. Mild bilateral facet arthrosis with patent foramina.    L3-4: Normal posterior disc margins and mild left facet arthrosis/right facet hypertrophy. Patent foramina.    L2-3: Normal posterior disc margins and mild left facet hypertrophy. Patent foramina.    L1-2 and T12-L1: Normal posterior disc margins and facets. Patent  foramina.    T11-12: Unchanged annular bulging without central stenosis or cord compression. Facets are unremarkable and the foramina are patent.    CONCLUSION: Multilevel degenerative thoracolumbar spondylosis with the following notable findings:    1. No disc herniation, significant central stenosis, acute fracture or spondylolysis.    2. No significant lumbar foraminal narrowing.    3. Mild lateral L4-5 and left L3-4 facet arthrosis.      MR LUMBAR SPINE WITHOUT CONTRAST 1.5T 3/24/2023 ([facility])  LATION: None.    INTERPRETATION:    Numbering: Last fully formed disc space is designated L5-S1.    Spinal Cord: The distal cord and conus demonstrates normal signal, terminating at mid L1.    Osseous Structures: No acute fracture or osseous destructive lesion. No spondylolysis. Type I discogenic degenerative endplate changes at L5-S1 and partially visualized anteriorly at T11-12.    Soft tissues: No soft tissue abnormality.    Other: No abnormality of the visualized structures.    L5-S1: Moderately advanced disc degeneration and mild right greater than left facet arthropathy. No central stenosis or descending neural impingement. Mild right foraminal stenosis.    L4-5: Mild disc desiccation with grade 1 borderline degenerative spondylolisthesis with moderate facet arthropathy. No central stenosis or neural impingement.    L3-4 and L2-3: Normal disc height, hydration and dorsal disc contour. Mild facet arthropathy. No stenosis or impingement.    L1-2 and T12-L1: Normal disc and facet morphology without stenosis or impingement.    T11-12: Moderate disc degeneration with a small central protrusion without stenosis or impingement.    CONCLUSION: Lumbar spondylosis in lordotic alignment and the following specific findings:    1. L5-S1 moderately advanced disc degeneration with type I discogenic degenerative endplate changes.    2. Mild to moderate facet arthropathy greatest at L4-5 with borderline degenerative  spondylolisthesis.    3. No acute fracture or osseous destructive lesion, central stenosis or neural impingement.        XR LUMBAR SPINE 2-3 VIEWS 1/25/2022                          IMPRESSION: 5 lumbar vertebral bodies. Normal vertebral body heights  and alignment. Mild to moderate degenerative disc disease at T11-T12  and L5-S1. Lower lumbar facet osteoarthrosis. Surgical clips within  the left upper quadrant. Mild colonic loading may indicate  constipation.          Review Of Systems:  I am responding to those symptoms which are directly relevant to the specific indication for my consultation. I recommend that the patient follow up with their primary or referring provider to pursue any other symptoms which may be of concern.       Medical History:  She  has a past medical history of Antiplatelet or antithrombotic long-term use, Arrhythmia, BV (bacterial vaginosis) (9/18/2020), Depressive disorder (1990), Esophageal reflux, Hearing problem (2018), Hyperlipidemia LDL goal <130 (07/06/2015), Hypertension, LSIL (low grade squamous intraepithelial lesion) on Pap smear (06/2014), LEONARDO on CPAP, and Other anxiety states.     She  has a past surgical history that includes TOOTH EXTRACTION W/FORCEP (1995); Laparoscopic gastric sleeve (N/A, 11/27/2018); Laparoscopic herniorrhaphy hiatal (11/27/2018); Ablation Atrial Fibrillation (N/A, 10/03/2019); Salpingo-oophorectomy, combined (Left, 10/20/2021); and Esophagoscopy, gastroscopy, duodenoscopy (EGD), combined (N/A, 11/29/2022).    Family History  Her family history includes Alcohol/Drug in her maternal grandfather; Allergies in her mother and sister; Cancer in her father; Cerebrovascular Disease in her paternal grandmother; Circulatory in her maternal grandmother; Depression in her paternal grandfather; Diabetes in her maternal grandmother; Eye Disorder in her maternal grandmother; Genitourinary Problems in her sister; Heart Disease in her mother; Hypertension in her father,  maternal grandmother, and mother; Lipids in her father and mother; Obesity in her maternal grandfather, maternal grandmother, and mother; Other Cancer in her father; Prostate Cancer in her father; Psychotic Disorder in her paternal grandfather; Respiratory in her mother.     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 Performs ADLs/IADLs independently but difficult due to pain.  She  reports that she quit smoking about 3 years ago. Her smoking use included cigarettes. She started smoking about 21 years ago. She has a 17.4 pack-year smoking history. She has been exposed to tobacco smoke. She has never used smokeless tobacco. She reports that she does not currently use alcohol. She reports that she does not use drugs.        Current Medications:   She has a current medication list which includes the following prescription(s): amlodipine, vitamin c, calcium carbonate, b-12, doxycycline hyclate, evolocumab, hydrochlorothiazide, ibuprofen, lisinopril, magnesium, metoprolol succinate er, multiple vitamins-iron, omeprazole, wegovy, wegovy, trazodone, venlafaxine, vitamin b1, vitamin d (cholecalciferol), and zolpidem.     Allergies:    -- Crestor [Rosuvastatin] -- Itching   -- Bupropion -- Other (See Comments)    --  Other reaction(s): Chest Pain,Panic attacks,             heart/chest pain   -- Wellbutrin [Bupropion]     --  Wellbutrin: Panic attacks, heart/chest pain    PHYSICAL EXAMINATION: *limited by nature of virtual visit   No vital signs taken for visit  --CONSTITUTIONAL: No acute distress.   --PSYCHIATRIC: The patient is awake, alert, oriented to person, place, time and answering questions appropriately with clear speech. Appropriate mood and affect         ASSESSMENT:  Danitza Soto is a pleasant 44 year old female who presents with   -Chronic bilateral low back pain since age 17  -History of left-sided lumbar  radiculopathy, improved after L5-S1 ILESI in 2023  -Acute on chronic bilateral low back pain with new right sided pain  -Intermittent BLE numbness  - Chronic neck pain        Differential includes lumbar radiculopathy (L5  vs  S1?), lumbar facet mediated pain, right SI joint pain, right intra-articular hip pain, right greater trochanter bursitis, muscle spasm/myofascial pain, piriformis pain, cervical facet mediated pain    Complicating comorbities include:  # Long COVID  # Insomnia, depression, anxiety  # LEONARDO on CPAP  # Obesity on Wegovy, s/p lap sleeve gastrectomy 2018  # s/p ablation of atrial fibrillation  # History of tobacco use disorder    MRI lumbar spine 3/20/2025 shows:  -Severe disc space narrowing at L5-S1  -Lumbar spondylosis with facet hypertrophy  -No significant central canal stenosis or significant foraminal stenosis      PLAN:  -Medial branch block #1 targeting the bilateral L4-5 and L5-S1 facet joints on 5/5/2025 provided a significant and meaningful level of pain relief meeting or exceeding 80% improvement. There is some right hip/gluteal pain which is a less bothersome symptom for her than the axial/mechanical low back pain. MBB #2 was added.    - Requested MRI cervical and thoracic spine orders be sent to [facility] again so she may have an open MRI.  Will also send that x-ray pelvis/hip orders as well  -Consider EMG  -She has declined PT referral  -RTC for MBB #2        Ready to learn, no apparent learning barriers.  Education provided on treatment plan according to patient's preferred learning style.  Patient verbalizes understanding.   __________________________________  Hortensia Paul NP  Physical Medicine & Rehabilitation        35 minutes spent by me on the date of the encounter doing chart review, history and exam, documentation and further activities per the note

## 2025-05-14 ENCOUNTER — TELEPHONE (OUTPATIENT)
Dept: PHYSICAL MEDICINE AND REHAB | Facility: CLINIC | Age: 45
End: 2025-05-14

## 2025-05-14 ENCOUNTER — VIRTUAL VISIT (OUTPATIENT)
Dept: PHYSICAL MEDICINE AND REHAB | Facility: CLINIC | Age: 45
End: 2025-05-14
Payer: COMMERCIAL

## 2025-05-14 DIAGNOSIS — G89.29 CHRONIC BILATERAL LOW BACK PAIN, UNSPECIFIED WHETHER SCIATICA PRESENT: Primary | ICD-10-CM

## 2025-05-14 DIAGNOSIS — Z91.81 PERSONAL HISTORY OF FALL: ICD-10-CM

## 2025-05-14 DIAGNOSIS — M54.50 CHRONIC BILATERAL LOW BACK PAIN, UNSPECIFIED WHETHER SCIATICA PRESENT: Primary | ICD-10-CM

## 2025-05-14 DIAGNOSIS — M54.2 NECK PAIN: ICD-10-CM

## 2025-05-14 DIAGNOSIS — M47.816 FACET ARTHROPATHY, LUMBAR: ICD-10-CM

## 2025-05-14 DIAGNOSIS — M25.551 HIP PAIN, RIGHT: ICD-10-CM

## 2025-05-14 DIAGNOSIS — R20.0 NUMBNESS: ICD-10-CM

## 2025-05-14 NOTE — LETTER
"5/14/2025       RE: Danitza Soto  [address] [facility] [address]     Dear Colleague,    Thank you for referring your patient, Danitza Soto, to the [facility] PHYSICAL MEDICINE AND [facility]HABILITATION [facility]IN[facility] [place] at [facility]. Please see a copy of my visit note below.    Trish is a 44 year old who is being evaluated via a billable video visit.      Video-Visit Details    Type of service:  Video Visit   Originating Location (pt. Location): Home  Joined the call at 5/14/2025, 8:01:58 am.  Left the call at 5/14/2025, 8:11:31 am.  You were on the call for 9 minutes 32 seconds.  Distant Location (provider location):  Off-site  Platform used for Video Visit: [employer]      PM&R Follow-Up Visit -     Date of Initial Visit: 3/11/2025  LOV: 5/5/2025-procedure visit  TD: 5/14/2025     Recall: Danitza Soto is a 44 year old female who presents with a chief complaint of low back pain with radiculopathy.        INTERVAL HISTORY:  Patient was last seen in clinic 5/5/2025 for medial branch block #1 treating the bilateral L4-5 and L5-S1 facet joints with Dr. Hanson.     She was seen today via telehealth to clarify the MBB #1 pain diary.    On the pain diary, she had reported pain levels after the procedure which had decreased from 7-8/10 before the procedure and decreasing down to 0-1/10 after the procedure.  She reported improvements in the low back, hip, and buttock pain after MBB #1.  However, she also reported ongoing pain from the \"top of [the] right hip down toward [the] right buttock\".  She was seen today clarify.    Today, she states she feels there are 2 different types of pain occurring.  She says the MBB #1 provided a significant and meaningful decrease in the level of chronic low back pain.  She remarks that after the procedure she did multiple chores around the house and did some work outside as well.  She comments that typically she would have " to take breaks in between each of these activities, but she was able to do them consecutively which was an improvement for her.  She also comments that while the right hip and buttock pain remained persistent after MBB #1, this is a less bothersome area of pain for her and she prefers to investigate/address this area further after pursuing the MBB/RFA pathway to address the low back pain.  As such, she is interested in moving onto MBB #2.    At the last visit we had ordered MRIs of the cervical and thoracic spine.  Her preference was to go to [facility] in order to have open MRIs.  However, she says she has not yet received a call from them.          RECALL HISTORY OF PRESENT ILLNESS:    3/11/2025  Danitza Soto is a 44 year old female who presents with a chief complaint of low back pain with radiculopathy.      She was seen today in the clinic. She reports pain which began when she fell on concrete stairs and hit her back/buttocks when she was 17 years old.  She describes chronic low back pain ever since that time.  The pain has become more constant as she has gotten older.    Around 2 years ago she was following with an outside provider in regards to low back pain with LEFT-sided lumbar radiculopathy.  At the time, she was falling when her left leg would give out.  She had a L5-S1 IL LJ (at [facility] as ordered by MARIAH Lew). After the LJ the falls/left leg giving out resolved.  She is no longer having left sided radiculopathy.    More recently, she describes bilateral low back pain which is now radiating to to the RIGHT buttock.    She denies lower extremity numbness, tingling, or pain.     She did PT in the past, but did not find it helpful.  She is not interested in repeating PT at this point.        Today, she describes  -bilateral low back pain, radiating to right gluteus    Aggravating factors include: Sitting, exercise  Relieving factors include: twisting when laying in bed, medication    Today, she  rates the pain 7/10.  Patient states sleep is sometimes affected.    She denies falls, weakness, bowel or bladder incontinence, saddle anesthesia, unintentional weight loss, fevers/chills, night sweats.       4/4/2025:  Patient was last seen in clinic 3/11/2025. At that visit, the plan was to update MRI of the lumbar spine.    She was seen today via telehealth for a review of the imaging and to discuss next steps.    At the last visit she described:  -Bilateral low back pain, radiating to the right gluteus  -History of left lumbar radiculopathy 2 years ago (resolved)    Today she states that since last time she was seen in clinic she began having intermittent numbness affecting the entirety of both lower extremities including both feet.  The numbness can affect either the left or right lower extremity.  Laying supine brings on the numbness.  Sitting for a prolonged period of time brings on a lesser degree of numbness as well. She purchased a pillow top mattress cover which helped somewhat. As prior, she denies weakness, falls, bowel or bladder incontinence, or saddle anesthesia.    She does endorse some chronic neck pain over the years, which she attributes to a fall on concrete stairs at age 17.  In her 20s she had severe headaches, but only experiences occasional headaches now.  The neck pain is tight and sore near the base of her skull.  Sometimes she wakes up with one or both of her arms feeling numb. (Affecting the entire arm and all fingers.)  She manages the neck pain with occasional chiropractic treatments a couple times per year. She thinks she may have had some neck imaging done in the past, but not in the last 10 years (none available for review at today's visit).  She denies balance problems, recent falls, dexterity changes, or upper extremity weakness.    She is interested in interventional procedures to help with the low back pain, which is her most bothersome symptom.  She states on average her pain  level is 6-7/10.      PRIOR INJURIES/TREATMENT:   Ice/Heat: None  Brace: None  Physical Therapy:   PT for low back pain in 2022, per pt wasn't helpful      - Current Pain Medications -   Ibuprofen -  PRN but tries to avoid NSAIDs since history of lap gastric sleeve procedure in 2018  Flexeril -  for more severe pain  *Trazodone - for sleep  *Venlafaxine      - Prior/Trialed Pain Medications -   Tylenol-does not help  *Ambien    Prior Procedures:  Date    Procedure   Improvement (%)  4/7/2023  L5-S1 ILESI ([facility]) helped significantly   5/5/2025  MBB#1 cassie L4-5, L5-S1 facets  Significant, meaningful relief            Prior Related Surgery: none   Other (acupuncture, OMT, CMM, TENS, DME, etc.):   Chiropractor, remotely helped with knee issues     Specialists Seen - (with most recent, available notes and clinic visits reviewed)   1. PM&R Covid clinic [facility]  2.  Rheumatology-polyarthralgia, fatigue  3.  Neurology-cognitive complaints  4.  Neurosurgery-chronic midline low back pain  5. MARIAH Lew ([facility]?) in 2023      IMAGING - reviewed   MRI OF THE LUMBAR SPINE WITHOUT CONTRAST-[facility]-3/20/2025    INTERPRETATION: Comparison MRI lumbar spine examination 3/24/2023.    1 to 2 mm anterolisthesis of L4 on L5 with mild generalized levocurvature and accentuated lumbar lordosis. No acute fracture or spondylolysis. Fibrofatty degenerative endplate changes are demonstrated at L5-S1. Conus is normal and terminates at L1. Imaged upper sacrum and paraspinal soft tissue structures are unremarkable. Disc desiccation and annular bulging are demonstrated at L5-S1 and T11-12 with isolated annular bulging at L4-5. Disc space narrowing is severe at L5-S1 and moderate at T11-12.    L5-S1: Unchanged annular bulging without central stenosis or traversing neural compression. Mild right facet hypertrophy with patent foramina.    L4-5: Unchanged annular bulging without central stenosis or traversing neural compression. Mild  bilateral facet arthrosis with patent foramina.    L3-4: Normal posterior disc margins and mild left facet arthrosis/right facet hypertrophy. Patent foramina.    L2-3: Normal posterior disc margins and mild left facet hypertrophy. Patent foramina.    L1-2 and T12-L1: Normal posterior disc margins and facets. Patent foramina.    T11-12: Unchanged annular bulging without central stenosis or cord compression. Facets are unremarkable and the foramina are patent.    CONCLUSION: Multilevel degenerative thoracolumbar spondylosis with the following notable findings:    1. No disc herniation, significant central stenosis, acute fracture or spondylolysis.    2. No significant lumbar foraminal narrowing.    3. Mild lateral L4-5 and left L3-4 facet arthrosis.      MR LUMBAR SPINE WITHOUT CONTRAST 1.5T 3/24/2023 ([facility])  LATION: None.    INTERPRETATION:    Numbering: Last fully formed disc space is designated L5-S1.    Spinal Cord: The distal cord and conus demonstrates normal signal, terminating at mid L1.    Osseous Structures: No acute fracture or osseous destructive lesion. No spondylolysis. Type I discogenic degenerative endplate changes at L5-S1 and partially visualized anteriorly at T11-12.    Soft tissues: No soft tissue abnormality.    Other: No abnormality of the visualized structures.    L5-S1: Moderately advanced disc degeneration and mild right greater than left facet arthropathy. No central stenosis or descending neural impingement. Mild right foraminal stenosis.    L4-5: Mild disc desiccation with grade 1 borderline degenerative spondylolisthesis with moderate facet arthropathy. No central stenosis or neural impingement.    L3-4 and L2-3: Normal disc height, hydration and dorsal disc contour. Mild facet arthropathy. No stenosis or impingement.    L1-2 and T12-L1: Normal disc and facet morphology without stenosis or impingement.    T11-12: Moderate disc degeneration with a small central protrusion without stenosis  or impingement.    CONCLUSION: Lumbar spondylosis in lordotic alignment and the following specific findings:    1. L5-S1 moderately advanced disc degeneration with type I discogenic degenerative endplate changes.    2. Mild to moderate facet arthropathy greatest at L4-5 with borderline degenerative spondylolisthesis.    3. No acute fracture or osseous destructive lesion, central stenosis or neural impingement.        XR LUMBAR SPINE 2-3 VIEWS 1/25/2022                          IMPRESSION: 5 lumbar vertebral bodies. Normal vertebral body heights  and alignment. Mild to moderate degenerative disc disease at T11-T12  and L5-S1. Lower lumbar facet osteoarthrosis. Surgical clips within  the left upper quadrant. Mild colonic loading may indicate  constipation.          Review Of Systems:  I am responding to those symptoms which are directly relevant to the specific indication for my consultation. I recommend that the patient follow up with their primary or referring provider to pursue any other symptoms which may be of concern.       Medical History:  She  has a past medical history of Antiplatelet or antithrombotic long-term use, Arrhythmia, BV (bacterial vaginosis) (9/18/2020), Depressive disorder (1990), Esophageal reflux, Hearing problem (2018), Hyperlipidemia LDL goal <130 (07/06/2015), Hypertension, LSIL (low grade squamous intraepithelial lesion) on Pap smear (06/2014), LEONARDO on CPAP, and Other anxiety states.     She  has a past surgical history that includes TOOTH EXTRACTION W/FORCEP (1995); Laparoscopic gastric sleeve (N/A, 11/27/2018); Laparoscopic herniorrhaphy hiatal (11/27/2018); Ablation Atrial Fibrillation (N/A, 10/03/2019); Salpingo-oophorectomy, combined (Left, 10/20/2021); and Esophagoscopy, gastroscopy, duodenoscopy (EGD), combined (N/A, 11/29/2022).    Family History  Her family history includes Alcohol/Drug in her maternal grandfather; Allergies in her mother and sister; Cancer in her father;  Cerebrovascular Disease in her paternal grandmother; Circulatory in her maternal grandmother; Depression in her paternal grandfather; Diabetes in her maternal grandmother; Eye Disorder in her maternal grandmother; Genitourinary Problems in her sister; Heart Disease in her mother; Hypertension in her father, maternal grandmother, and mother; Lipids in her father and mother; Obesity in her maternal grandfather, maternal grandmother, and mother; Other Cancer in her father; Prostate Cancer in her father; Psychotic Disorder in her paternal grandfather; Respiratory in her mother.     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 Performs ADLs/IADLs independently but difficult due to pain.  She  reports that she quit smoking about 3 years ago. Her smoking use included cigarettes. She started smoking about 21 years ago. She has a 17.4 pack-year smoking history. She has been exposed to tobacco smoke. She has never used smokeless tobacco. She reports that she does not currently use alcohol. She reports that she does not use drugs.        Current Medications:   She has a current medication list which includes the following prescription(s): amlodipine, vitamin c, calcium carbonate, b-12, doxycycline hyclate, evolocumab, hydrochlorothiazide, ibuprofen, lisinopril, magnesium, metoprolol succinate er, multiple vitamins-iron, omeprazole, wegovy, wegovy, trazodone, venlafaxine, vitamin b1, vitamin d (cholecalciferol), and zolpidem.     Allergies:    -- Crestor [Rosuvastatin] -- Itching   -- Bupropion -- Other (See Comments)    --  Other reaction(s): Chest Pain,Panic attacks,             heart/chest pain   -- Wellbutrin [Bupropion]     --  Wellbutrin: Panic attacks, heart/chest pain    PHYSICAL EXAMINATION: *limited by nature of virtual visit   No vital signs taken for visit  --CONSTITUTIONAL: No acute distress.   --PSYCHIATRIC: The patient is  awake, alert, oriented to person, place, time and answering questions appropriately with clear speech. Appropriate mood and affect         ASSESSMENT:  Danitza Soto is a pleasant 44 year old female who presents with   -Chronic bilateral low back pain since age 17  -History of left-sided lumbar radiculopathy, improved after L5-S1 ILESI in 2023  -Acute on chronic bilateral low back pain with new right sided pain  -Intermittent BLE numbness  - Chronic neck pain        Differential includes lumbar radiculopathy (L5  vs  S1?), lumbar facet mediated pain, right SI joint pain, right intra-articular hip pain, right greater trochanter bursitis, muscle spasm/myofascial pain, piriformis pain, cervical facet mediated pain    Complicating comorbities include:  # Long COVID  # Insomnia, depression, anxiety  # LEONARDO on CPAP  # Obesity on Wegovy, s/p lap sleeve gastrectomy 2018  # s/p ablation of atrial fibrillation  # History of tobacco use disorder    MRI lumbar spine 3/20/2025 shows:  -Severe disc space narrowing at L5-S1  -Lumbar spondylosis with facet hypertrophy  -No significant central canal stenosis or significant foraminal stenosis      PLAN:  -Medial branch block #1 targeting the bilateral L4-5 and L5-S1 facet joints on 5/5/2025 provided a significant and meaningful level of pain relief meeting or exceeding 80% improvement. There is some right hip/gluteal pain which is a less bothersome symptom for her than the axial/mechanical low back pain. MBB #2 was added.    - Requested MRI cervical and thoracic spine orders be sent to [facility] again so she may have an open MRI.  Will also send that x-ray pelvis/hip orders as well  -Consider EMG  -She has declined PT referral  -RTC for MBB #2        Ready to learn, no apparent learning barriers.  Education provided on treatment plan according to patient's preferred learning style.  Patient verbalizes understanding.   __________________________________  Hortensia aPul NP  Physical  Medicine & Rehabilitation        35 minutes spent by me on the date of the encounter doing chart review, history and exam, documentation and further activities per the note       Again, thank you for allowing me to participate in the care of your patient.      Sincerely,    JOSE LUIS Campos CNP

## 2025-05-14 NOTE — TELEPHONE ENCOUNTER
LVM for pt notifying Order has been faxed to [facility].   MR cervical and MR of Thoracic.  
I have seen and examined the baby and reviewed all labs. I reviewed prenatal history with mother;   My exam is documented above    Well  via ;   Routine  care;   Feeding and  care were discussed today. Parent questions were answered    Janki Keyes MD

## 2025-05-14 NOTE — NURSING NOTE
Current patient location: [address] [place] [id number]    Is the patient currently in the state of [place]? YES    Visit mode: VIDEO    If the visit is dropped, the patient can be reconnected by:TELEPHONE VISIT: Phone number:   Telephone Information:   Mobile [phone]       Will anyone else be joining the visit? NO  (If patient encounters technical issues they should call [phone])    Are changes needed to the allergy or medication list? Pt stated no med changes    Are refills needed on medications prescribed by this physician? NO    Rooming Documentation:  Questionnaire(s) completed    Reason for visit: NICKI LOPEZF

## 2025-05-15 ENCOUNTER — TELEPHONE (OUTPATIENT)
Dept: PHYSICAL MEDICINE AND REHAB | Facility: CLINIC | Age: 45
End: 2025-05-15
Payer: COMMERCIAL

## 2025-05-15 NOTE — TELEPHONE ENCOUNTER
Phoned the patient and left VM. Stated to call the pain clinic to schedule injection procedure at [phone].

## 2025-05-19 ENCOUNTER — MYC MEDICAL ADVICE (OUTPATIENT)
Dept: ENDOCRINOLOGY | Facility: CLINIC | Age: 45
End: 2025-05-19
Payer: COMMERCIAL

## 2025-05-19 DIAGNOSIS — E66.813 CLASS 3 OBESITY WITH ALVEOLAR HYPOVENTILATION, SERIOUS COMORBIDITY, AND BODY MASS INDEX (BMI) OF 40.0 TO 44.9 IN ADULT (H): Primary | ICD-10-CM

## 2025-05-19 DIAGNOSIS — E66.2 CLASS 3 OBESITY WITH ALVEOLAR HYPOVENTILATION, SERIOUS COMORBIDITY, AND BODY MASS INDEX (BMI) OF 40.0 TO 44.9 IN ADULT (H): Primary | ICD-10-CM

## 2025-05-21 ENCOUNTER — TELEPHONE (OUTPATIENT)
Dept: PHYSICAL MEDICINE AND REHAB | Facility: CLINIC | Age: 45
End: 2025-05-21
Payer: COMMERCIAL

## 2025-05-22 ENCOUNTER — TRANSFERRED RECORDS (OUTPATIENT)
Dept: HEALTH INFORMATION MANAGEMENT | Facility: CLINIC | Age: 45
End: 2025-05-22
Payer: COMMERCIAL

## 2025-05-27 ENCOUNTER — RESULTS FOLLOW-UP (OUTPATIENT)
Dept: NEUROLOGY | Facility: CLINIC | Age: 45
End: 2025-05-27

## 2025-05-31 NOTE — PROGRESS NOTES
Chief complaint: Palpitations    HPI: Ms. Danitza Soto is a 38 year old  female with PMH significant for depression, GERD, LEONARDO, essential hypertension, morbid obesity S/P gastric bypass 11/2018 and paroxysmal atrial fibrillation.    The patient was last seen in cardiology for paroxysmal atrial fibrillation diagnosis in 9/2018.  Her symptoms were mild at that time therefore rate or rhythm control strategy was not pursued.  The patient underwent gastric sleeve surgery in 11/2018 and has lost 70 pounds.  The patient noticed more frequent atrial fibrillation episodes since the surgery almost weekly now lasting up to 8-10 hours.  She finds works stress as a prominent trigger for episodes.  Associated symptoms include chest pressure, chest pain and dizziness.  She denies chest discomfort with exertion at other times.  The patient denies a history of dyspnea, PND, orthopnea, pedal edema, and syncope  Current medications include lisinopril 20 mg, fish oil.  She has cut down on lisinopril from 40 mg since she has lost significant weight.  She quit smoking in 2018 (10 pack years).  No history of alcohol abuse. No history of diabetes.  Family history is negative for CAD.  Prior cardiac tests include CT coronary angiogram in 2013 which did not show evidence of CAD.  Echocardiogram dated 9/2018 showed normal LV and RV function with no significant valve disease.  Moderate LVH was reported. I personnally reviewed the images and I donot agree with the LVH diagnosis. I donot think she has LVH based on echo. Zio patch in 8/2018 showed 4% atrial fibrillation burden heart rate ranged  beats per minute.  Longest episode lasting for 8 hours.    I have reviewed her ECG 11/28/2018 which shows normal sinus rhythm with single PAC, normal KS/QRS/QTC intervals. No evidence of LVH on ECG.    Medications, personal, family, and social history reviewed with patient and revised.    Interval history 5/14/2019:  Patient returns for  follow-up to recheck on paroxysmal atrial fibrillation.  Since I saw 3 months ago, patient developed depression and started on Effexor which improved her mood.  I have started her on flecainide and metoprolol 3 months ago for frequent paroxysmal atrial fibrillation episodes.  She noticed significant decline in the frequency of the episodes however she still reports at least 2 episodes per month sometimes lasting for several hours.  She reports associated dizziness however denies chest pain, shortness of breath, lower extremity edema or syncope.  Since gastric bypass patient lost significant weight with improvement in blood pressure and sleep apnea.  She no longer uses CPAP machine.  She is overall feeling well.    Interval history 8/14/2019:  The patient returns for follow-up to recheck on paroxysmal atrial fibrillation.  When I saw patient last time 3 months ago I increased dose of flecainide to 100 mg twice daily due to symptomatic palpitations.  The patient reports doing well during the first 2 months however had 3 episodes within the last month longest episode lasting for 3 hours with associated tiredness.  Denies chest pain, dyspnea on exertion, dizziness, syncope.  The patient has did a lot of research on atrial fibrillation ablation and she expressed her interest in undergoing procedure.    Interval history 2/18/2020:  Patient underwent catheter ablation of atrial fibrillation on 10/3/2019.  Patient feels great since the ablation.  No recurrence of A. fib since then.  She is currently on flecainide 100 mg twice daily, metoprolol 50 mg and apixaban 5 mg twice daily.  Blood pressure is well controlled with lisinopril 20 mg.  Patient is currently asymptomatic from cardiac standpoint.  Denies chest pain, shortness of breath, palpitations, dizziness, lower extremity edema or syncope.    Interval history 9/1/2020:  Patient reports no new cardiac symptoms since being seen in February 2020.  Denies palpitations,  chest pain, shortness of breath.  Patient reports high blood pressure at home ~140/80 mmHg despite being on lisinopril 40 mg.  She reports being back on CPAP due to weight gain though she had gastric bypass surgery.  She continues to be on topiramate and naltrexone for obesity.  No alcohol or drug abuse.    Interval history 11/12/2021:  Patient is being seen today for annual follow-up. She reports occasional flutters less than 1 minute (once a month). Otherwise she has not felt any recurrent atrial fibrillation since catheter ablation. Denies chest pain, shortness of breath, dizziness, syncope or lower extremity edema.  Recently Effexor dose was increased from 75 mg to 150 mg mg daily. Patient's blood pressure is mildly elevated today. She does not monitor blood pressure at home.    Interval history 12/21/2022:  Patient is being seen today for follow-up.  Over the last 3 months or so she has started feeling palpitations lasting for 5 to 10 minutes.  This is new to her since ablation.  Sometimes episodes are clustered few days in a row.  Over the last month she had 3 or 4 episodes like that.  She does not have any other cardiac symptoms.  No alcohol.  Reports normal blood pressure at home at 120/80 mmHg.  She has not been using her CPAP because the mask does not fit her very well.  She needs to get a new mask.    Interval history 9/15/2023:  Patient is being seen today for annual follow-up.  As you know she has moderate sleep apnea but she cannot tolerate CPAP well.  She was recommended mandibular device.    Patient tells me that over the last 1 to 2 months she has noticed more palpitations associated with dizziness.  Palpitations can be on and off throughout all day.  She feels almost every day.  Denies chest pain, syncope, shortness of breath.  She has noticed that her blood pressure is not well controlled.  In clinic today her diastolic blood pressure is mildly high at 86 mmHg.  Systolic blood pressures 129  mmHg.    Interval history 6/3/2024:  Patient is being seen today for dizzy episodes over the last few weeks or so.  She feels dizzy sitting or standing.  Does not change with head movements.  Otherwise denies palpitations, chest pain, shortness of breath, syncope or lower extremity edema.  She tells me that she has stopped taking amlodipine several months ago because she forgets taking it.  She does not use drugs.  No alcohol no smoking but she vapes.    Interval history 6/2/2025  History of Present Illness-  - Trish Soto, 44-year-old female.  - Diagnosed with long COVID, experiencing occasional heart palpitations.  - Concerned about potential recurrence of atrial fibrillation (A-fib), but recent episodes do not last as long and are less severe.  - Heart palpitations occur once every month or two, sometimes more frequently, lasting a few minutes.  - Previously wore a heart monitor; no current need for another.  - On metoprolol, which is helping with symptoms.  - Underwent genetic testing for cholesterol, results were negative for markers.  - On Repatha for at least six months for hyperlipidemia.  - No chest pain or shortness of breath reported.  - No recent CT of the chest.     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 colp - BRISEYDA I    LEONARDO on CPAP     Other anxiety states        CURRENT MEDICATIONS:  Current Outpatient Medications   Medication Sig Dispense Refill    amLODIPine (NORVASC) 10 MG tablet Take 1 tablet (10 mg) by mouth daily 90 tablet 1    Ascorbic Acid (VITAMIN C) 500 MG CAPS Take by mouth every evening      calcium carbonate (OS-CHIKI) 500 MG  tablet Take 1 tablet by mouth every evening      Cyanocobalamin (B-12) 1000 MCG TBCR Take by mouth every evening      doxycycline hyclate (VIBRA-TABS) 100 MG tablet Take 1 tablet (100 mg) by mouth 2 times daily 20 tablet 0    evolocumab (REPATHA) 140 MG/ML prefilled autoinjector Inject 1 mL (140 mg) subcutaneously every 14 days. 6 mL 3    hydrochlorothiazide (HYDRODIURIL) 25 MG tablet Take 1 tablet by mouth daily. 90 tablet 0    ibuprofen (ADVIL/MOTRIN) 200 MG tablet Take 200 mg by mouth every 4 hours as needed for pain      lisinopril (ZESTRIL) 40 MG tablet Take 1 tablet by mouth every morning. 90 tablet 0    magnesium 250 MG tablet Take 1 tablet by mouth every evening      metoprolol succinate ER (TOPROL XL) 25 MG 24 hr tablet Take 1 tablet by mouth daily. 90 tablet 0    Multiple Vitamins-Iron (MULTI-DAY PLUS IRON PO) Take by mouth every morning      omeprazole (PRILOSEC) 40 MG DR capsule Take 1 capsule by mouth daily. 90 capsule 2    Semaglutide-Weight Management (WEGOVY) 0.5 MG/0.5ML pen Inject 0.5 mg subcutaneously once a week. 2 mL 3    Semaglutide-Weight Management (WEGOVY) 1 MG/0.5ML pen Inject 1 mg subcutaneously once a week. 2 mL 0    traZODone (DESYREL) 50 MG tablet Take 1 tablet by mouth at bedtime. 30 tablet 1    venlafaxine (EFFEXOR XR) 150 MG 24 hr capsule Take 1 capsule by mouth daily. 90 capsule 1    vitamin B1 (THIAMINE) 250 MG tablet Take 250 mg by mouth every evening      Vitamin D (Cholecalciferol) 25 MCG (1000 UT) TABS Take by mouth every evening      zolpidem (AMBIEN) 5 MG tablet Take tablet by mouth 15 minutes prior to sleep as needed. Pharmacist to review side effects. 30 tablet 0       PAST SURGICAL HISTORY:  Past Surgical History:   Procedure Laterality Date    EP ABLATION FOCAL AFIB N/A 10/03/2019    Procedure: EP ABLATION FOCAL AFIB;  Surgeon: Harpreet Arevalo MD;  Location:  OR    ESOPHAGOSCOPY, GASTROSCOPY, DUODENOSCOPY (EGD), COMBINED N/A 11/29/2022    Procedure:  ESOPHAGOGASTRODUODENOSCOPY, WITH BIOPSY;  Surgeon: Jeff Patino DO;  Location: [facility]    HC TOOTH EXTRACTION W/FORCEP      [place] Teeth Extraction    INJECT BLOCK MEDIAL BRANCH CERVICAL/THORACIC/LUMBAR Bilateral 2025    Procedure: Medial Branch Block #1 for bilateral L4-5 and L5-S1 facet joints;  Surgeon: Molly Hanson MD;  Location: [facility] OR    LAPAROSCOPIC GASTRIC SLEEVE N/A 2018    Procedure: Laparoscopic Sleeve Gastrectomy Latex Free;  Surgeon: Artis Tse MD;  Location:  OR    LAPAROSCOPIC HERNIORRHAPHY HIATAL  2018    Procedure: LAPAROSCOPIC  HIATAL Hernia Repair;  Surgeon: Artis Tse MD;  Location: [facility]    SALPINGO-OOPHORECTOMY, COMBINED Left 10/20/2021    Mucinous cystadenoma       ALLERGIES:     Allergies   Allergen Reactions    Crestor [Rosuvastatin] Itching    Bupropion Other (See Comments)     Other reaction(s): Chest Pain,Panic attacks, heart/chest pain    Wellbutrin [Bupropion]      Wellbutrin: Panic attacks, heart/chest pain       FAMILY HISTORY:  Family History   Problem Relation Age of Onset    Heart Disease Mother         ,mother had emphesema and  at 62    Hypertension Mother     Allergies Mother     Lipids Mother     Obesity Mother     Respiratory Mother         Emphysema    Hypertension Father     Lipids Father     Cancer Father     Prostate Cancer Father     Other Cancer Father     Allergies Sister     Genitourinary Problems Sister     Diabetes Maternal Grandmother         Type 2?    Hypertension Maternal Grandmother     Circulatory Maternal Grandmother         Due to diabetes    Eye Disorder Maternal Grandmother         Macular degeneration/Macular degeneration    Obesity Maternal Grandmother     Alcohol/Drug Maternal Grandfather     Obesity Maternal Grandfather     Cerebrovascular Disease Paternal Grandmother     Psychotic Disorder Paternal Grandfather         Committed Suicide    Depression Paternal Grandfather     Anesthesia Reaction No  family hx of     Bleeding Disorder No family hx of     Venous thrombosis No family hx of          SOCIAL HISTORY:  Social History     Tobacco Use    Smoking status: Former     Current packs/day: 0.00     Average packs/day: 1 pack/day for 17.4 years (17.4 ttl pk-yrs)     Types: Cigarettes     Start date: 2004     Quit date: 2021     Years since quittin.0     Passive exposure: Current (per pt)    Smokeless tobacco: Never   Vaping Use    Vaping status: Every Day    Substances: Nicotine    Devices: Disposable   Substance Use Topics    Alcohol use: Not Currently     Comment: Occas.    Drug use: No     ROS:   Constitutional: No fever, chills, or sweats. Weight stable.   Cardiovascular: As per HPI.       Exam:  /78 (BP Location: Left arm, Patient Position: Chair, Cuff Size: Adult Large)   Pulse 75   Wt 103.9 kg (229 lb)   LMP 2022 (Exact Date)   SpO2 97%   BMI 35.86 kg/m    GENERAL APPEARANCE: alert and no distress  HEENT: no icterus, no central cyanosis  LYMPH/NECK: no adenopathy, no asymmetry, JVP not elevated, no carotid bruits.  RESPIRATORY: lungs clear to auscultation - no rales, rhonchi or wheezes, no use of accessory muscles, no retractions, respirations are unlabored, normal respiratory rate  CARDIOVASCULAR: regular rhythm, normal S1, S2, no S3 or S4 and no murmur, click or rub, precordium quiet with normal PMI.  Extremities: No edema  NEURO: alert, normal speech,and affect  SKIN: no ecchymoses, no rashes     I have reviewed the labs and personally reviewed the imaging below and made my comment in the assessment and plan.      EP PROCEDURE NOTE 10/3/2019  1. Left Atrial Wide Area Circumferential radiofrequency Ablation.  2. Trans-septal Puncture x2  3. Intracardiac Echocardiography.  4. Invasive Hemodynamic Monitoring.  5. 3D electro-anatomic Mapping using Carto3.  6. Esophageal temperature monitoring.    I have reviewed the labs and personally reviewed the imaging below and made my  comment in the assessment and plan.    Labs:  CBC RESULTS:   Lab Results   Component Value Date    WBC 7.2 06/08/2024    WBC 7.4 05/18/2021    RBC 5.26 (H) 06/08/2024    RBC 4.90 05/18/2021    HGB 15.3 06/08/2024    HGB 14.4 05/18/2021    HCT 46.6 06/08/2024    HCT 43.7 05/18/2021    MCV 89 06/08/2024    MCV 89 05/18/2021    MCH 29.1 06/08/2024    MCH 29.4 05/18/2021    MCHC 32.8 06/08/2024    MCHC 33.0 05/18/2021    RDW 13.8 06/08/2024    RDW 13.8 05/18/2021     06/08/2024     05/18/2021       BMP RESULTS:  Lab Results   Component Value Date     06/08/2024     05/18/2021    POTASSIUM 3.9 06/08/2024    POTASSIUM 3.7 05/04/2022    POTASSIUM 3.7 05/18/2021    CHLORIDE 102 06/08/2024    CHLORIDE 105 05/04/2022    CHLORIDE 110 (H) 05/18/2021    CO2 24 06/08/2024    CO2 23 05/04/2022    CO2 28 05/18/2021    ANIONGAP 11 06/08/2024    ANIONGAP 8 05/04/2022    ANIONGAP 3 05/18/2021     (H) 06/08/2024    GLC 79 05/04/2022    GLC 81 05/18/2021    BUN 6.6 06/08/2024    BUN 9 05/04/2022    BUN 9 05/18/2021    CR 0.66 06/08/2024    CR 0.68 05/18/2021    GFRESTIMATED >90 06/08/2024    GFRESTIMATED >90 05/18/2021    GFRESTBLACK >90 05/18/2021    CHIKI 9.1 06/08/2024    CHIKI 9.1 05/18/2021        INR RESULTS:  Lab Results   Component Value Date    INR 0.98 11/06/2018       Echocardiogram 9/26/2018  Global and regional left ventricular function is normal with an EF of 60-65%.  Moderate concentric wall thickening consistent with left ventricular  hypertrophy is present.  Right ventricular function, chamber size, wall motion, and thickness are  normal.  Mild biatrial enlargement is present.  No significant valve abnormalities present.  The inferior vena cava was normal in size    Nuclear stress test with Lexiscan 9/6/2018  Normal myocardial SPECT study with a summed stress score of 0.     CT coronary artery angiogram 6/27/2013  No evidence of coronary artery disease    EKG 11/28/2018 normal.    Assessment  and Plan:   Ms. Danitza Soto is a 44year old  female with PMH significant for depression, GERD, LEONARDO, essential hypertension, morbid obesity S/P gastric bypass 11/2018 and paroxysmal atrial fibrillation refractory to medical treatment status post PVI on 10/3/2019.    Patient is being seen today for routine visit.  She is overall feeling well from cardiac standpoint.  She is in sinus rhythm.  Patient does not report any concerning symptoms today.  Vitals are normal.  She tells me that she has stopped taking amlodipine a while ago.  Blood pressure is well-controlled on lisinopril hydrochlorothiazide and metoprolol.  I think likely from recent weight loss with Ozempic.    1.  History of paroxysmal atrial fibrillation status post PVI in 2019  -Sinus rhythm in clinic today.    -Continue metoprolol 25 mg daily.    2.  Hypertension:   - Well-controlled.  Currently on lisinopril hydrochlorothiazide 40/25 mg  - Has lost weight since being on Ozempic    3.  Obstructive sleep apnea: Moderate.  Does not use CPAP.    4.  Hyperlipidemia:  -Cannot tolerate statins.  -She has seen genetic counseling and genetic testing came back negative per patient report.  -Started Repatha 6 months ago.  Tolerating well.    -Will check lipids today.  - Discussed obtaining CT CAC.  She is agreeable.  Schedule for CT calcium screening    5.  Morbid obesity: On Ozempic now and lost several pounds over the last 1 year.  Tolerating well.    No medication changes today.  Return to clinic 1 year.    Total time spent 36 minutes including precharting, face-to-face clinic visit and medical documentation.     Leena RONDON MD  [facility] Division of Cardiology  Pager [phone]

## 2025-06-02 ENCOUNTER — OFFICE VISIT (OUTPATIENT)
Dept: CARDIOLOGY | Facility: CLINIC | Age: 45
End: 2025-06-02
Payer: COMMERCIAL

## 2025-06-02 ENCOUNTER — RESULTS FOLLOW-UP (OUTPATIENT)
Dept: NEUROLOGY | Facility: CLINIC | Age: 45
End: 2025-06-02

## 2025-06-02 VITALS
DIASTOLIC BLOOD PRESSURE: 78 MMHG | BODY MASS INDEX: 35.86 KG/M2 | WEIGHT: 229 LBS | OXYGEN SATURATION: 97 % | HEART RATE: 75 BPM | SYSTOLIC BLOOD PRESSURE: 113 MMHG

## 2025-06-02 DIAGNOSIS — I48.0 PAROXYSMAL ATRIAL FIBRILLATION (H): Primary | ICD-10-CM

## 2025-06-02 DIAGNOSIS — E66.01 MORBID OBESITY (H): ICD-10-CM

## 2025-06-02 DIAGNOSIS — E78.5 HYPERLIPIDEMIA LDL GOAL <100: ICD-10-CM

## 2025-06-02 DIAGNOSIS — I10 BENIGN ESSENTIAL HYPERTENSION: ICD-10-CM

## 2025-06-02 LAB
CHOLEST SERPL-MCNC: 171 MG/DL
FASTING STATUS PATIENT QL REPORTED: YES
HDLC SERPL-MCNC: 41 MG/DL
LDLC SERPL CALC-MCNC: 105 MG/DL
NONHDLC SERPL-MCNC: 130 MG/DL
TRIGL SERPL-MCNC: 123 MG/DL

## 2025-06-02 PROCEDURE — 3078F DIAST BP <80 MM HG: CPT | Performed by: INTERNAL MEDICINE

## 2025-06-02 PROCEDURE — 80061 LIPID PANEL: CPT | Performed by: INTERNAL MEDICINE

## 2025-06-02 PROCEDURE — 3074F SYST BP LT 130 MM HG: CPT | Performed by: INTERNAL MEDICINE

## 2025-06-02 PROCEDURE — 99214 OFFICE O/P EST MOD 30 MIN: CPT | Performed by: INTERNAL MEDICINE

## 2025-06-02 PROCEDURE — 36415 COLL VENOUS BLD VENIPUNCTURE: CPT | Performed by: INTERNAL MEDICINE

## 2025-06-02 NOTE — NURSING NOTE
"Chief Complaint   Patient presents with    RECHECK     annual    Hyperlipidemia       Initial /78 (BP Location: Left arm, Patient Position: Chair, Cuff Size: Adult Large)   Pulse 75   Wt 103.9 kg (229 lb)   LMP 04/01/2022 (Exact Date)   SpO2 97%   BMI 35.86 kg/m   Estimated body mass index is 35.86 kg/m  as calculated from the following:    Height as of 5/5/25: 1.702 m (5' 7.01\").    Weight as of this encounter: 103.9 kg (229 lb)..  BP completed using cuff size: shea Cunningham, Visit Facilitator    "

## 2025-06-02 NOTE — PATIENT INSTRUCTIONS
Thank you for coming to the [facility] [place]; please note the following instructions:    1. Fasting labs today    2. CT Calcium Scan - Please call [phone] to schedule. Insurance does not typically cover this test.    3. Depending on what labs show, may start a medication called Zetia.    4. Follow up with Dr. Heath in 1 year        If you have any questions regarding your visit please contact your care team:     Cardiology  Telephone Number   Hannah ALLEN., RN  Lou GTZ, RN  Ashley COTE, RN  Shabana RAMIREZ, NAPOLEON HUGHES, NAPOLEON AZAR, Clinic Facilitator  Colleen GTZ, Clinic Facilitator [phone] (option 1)   For scheduling appts:     [phone] (select option 1)       For the Device Clinic (Pacemakers and ICD's)  RN's :  Hanh Cochran   During business hours: [phone]    *After business hours:  [phone] (select option 4)      Normal test result notifications will be released via Pix[employer] or mailed within 7 business days.  All other test results, will be communicated via telephone once reviewed by your cardiologist.    If you need a medication refill please contact your pharmacy.  Please allow 3 business days for your refill to be completed.    As always, thank you for trusting us with your health care needs!

## 2025-06-02 NOTE — LETTER
6/2/2025      RE: Danitza Soto  [address] [facility] [address]       Dear Colleague,    Thank you for the opportunity to participate in the care of your patient, Danitza Soto, at the [facility]. Please see a copy of my visit note below.    Chief complaint: Palpitations    HPI: Ms. Danitza Soto is a 38 year old  female with PMH significant for depression, GERD, LEONARDO, essential hypertension, morbid obesity S/P gastric bypass 11/2018 and paroxysmal atrial fibrillation.    The patient was last seen in cardiology for paroxysmal atrial fibrillation diagnosis in 9/2018.  Her symptoms were mild at that time therefore rate or rhythm control strategy was not pursued.  The patient underwent gastric sleeve surgery in 11/2018 and has lost 70 pounds.  The patient noticed more frequent atrial fibrillation episodes since the surgery almost weekly now lasting up to 8-10 hours.  She finds works stress as a prominent trigger for episodes.  Associated symptoms include chest pressure, chest pain and dizziness.  She denies chest discomfort with exertion at other times.  The patient denies a history of dyspnea, PND, orthopnea, pedal edema, and syncope  Current medications include lisinopril 20 mg, fish oil.  She has cut down on lisinopril from 40 mg since she has lost significant weight.  She quit smoking in 2018 (10 pack years).  No history of alcohol abuse. No history of diabetes.  Family history is negative for CAD.  Prior cardiac tests include CT coronary angiogram in 2013 which did not show evidence of CAD.  Echocardiogram dated 9/2018 showed normal LV and RV function with no significant valve disease.  Moderate LVH was reported. I personnally reviewed the images and I donot agree with the LVH diagnosis. I donot think she has LVH based on echo. Zio patch in 8/2018 showed 4% atrial fibrillation burden heart rate ranged   beats per minute.  Longest episode lasting for 8 hours.    I have reviewed her ECG 11/28/2018 which shows normal sinus rhythm with single PAC, normal NE/QRS/QTC intervals. No evidence of LVH on ECG.    Medications, personal, family, and social history reviewed with patient and revised.    Interval history 5/14/2019:  Patient returns for follow-up to recheck on paroxysmal atrial fibrillation.  Since I saw 3 months ago, patient developed depression and started on Effexor which improved her mood.  I have started her on flecainide and metoprolol 3 months ago for frequent paroxysmal atrial fibrillation episodes.  She noticed significant decline in the frequency of the episodes however she still reports at least 2 episodes per month sometimes lasting for several hours.  She reports associated dizziness however denies chest pain, shortness of breath, lower extremity edema or syncope.  Since gastric bypass patient lost significant weight with improvement in blood pressure and sleep apnea.  She no longer uses CPAP machine.  She is overall feeling well.    Interval history 8/14/2019:  The patient returns for follow-up to recheck on paroxysmal atrial fibrillation.  When I saw patient last time 3 months ago I increased dose of flecainide to 100 mg twice daily due to symptomatic palpitations.  The patient reports doing well during the first 2 months however had 3 episodes within the last month longest episode lasting for 3 hours with associated tiredness.  Denies chest pain, dyspnea on exertion, dizziness, syncope.  The patient has did a lot of research on atrial fibrillation ablation and she expressed her interest in undergoing procedure.    Interval history 2/18/2020:  Patient underwent catheter ablation of atrial fibrillation on 10/3/2019.  Patient feels great since the ablation.  No recurrence of A. fib since then.  She is currently on flecainide 100 mg twice daily, metoprolol 50 mg and apixaban 5 mg twice daily.  Blood  pressure is well controlled with lisinopril 20 mg.  Patient is currently asymptomatic from cardiac standpoint.  Denies chest pain, shortness of breath, palpitations, dizziness, lower extremity edema or syncope.    Interval history 9/1/2020:  Patient reports no new cardiac symptoms since being seen in February 2020.  Denies palpitations, chest pain, shortness of breath.  Patient reports high blood pressure at home ~140/80 mmHg despite being on lisinopril 40 mg.  She reports being back on CPAP due to weight gain though she had gastric bypass surgery.  She continues to be on topiramate and naltrexone for obesity.  No alcohol or drug abuse.    Interval history 11/12/2021:  Patient is being seen today for annual follow-up. She reports occasional flutters less than 1 minute (once a month). Otherwise she has not felt any recurrent atrial fibrillation since catheter ablation. Denies chest pain, shortness of breath, dizziness, syncope or lower extremity edema.  Recently Effexor dose was increased from 75 mg to 150 mg mg daily. Patient's blood pressure is mildly elevated today. She does not monitor blood pressure at home.    Interval history 12/21/2022:  Patient is being seen today for follow-up.  Over the last 3 months or so she has started feeling palpitations lasting for 5 to 10 minutes.  This is new to her since ablation.  Sometimes episodes are clustered few days in a row.  Over the last month she had 3 or 4 episodes like that.  She does not have any other cardiac symptoms.  No alcohol.  Reports normal blood pressure at home at 120/80 mmHg.  She has not been using her CPAP because the mask does not fit her very well.  She needs to get a new mask.    Interval history 9/15/2023:  Patient is being seen today for annual follow-up.  As you know she has moderate sleep apnea but she cannot tolerate CPAP well.  She was recommended mandibular device.    Patient tells me that over the last 1 to 2 months she has noticed more  palpitations associated with dizziness.  Palpitations can be on and off throughout all day.  She feels almost every day.  Denies chest pain, syncope, shortness of breath.  She has noticed that her blood pressure is not well controlled.  In clinic today her diastolic blood pressure is mildly high at 86 mmHg.  Systolic blood pressures 129 mmHg.    Interval history 6/3/2024:  Patient is being seen today for dizzy episodes over the last few weeks or so.  She feels dizzy sitting or standing.  Does not change with head movements.  Otherwise denies palpitations, chest pain, shortness of breath, syncope or lower extremity edema.  She tells me that she has stopped taking amlodipine several months ago because she forgets taking it.  She does not use drugs.  No alcohol no smoking but she vapes.    Interval history 6/2/2025  History of Present Illness-  - Trish Veraley, 44-year-old female.  - Diagnosed with long COVID, experiencing occasional heart palpitations.  - Concerned about potential recurrence of atrial fibrillation (A-fib), but recent episodes do not last as long and are less severe.  - Heart palpitations occur once every month or two, sometimes more frequently, lasting a few minutes.  - Previously wore a heart monitor; no current need for another.  - On metoprolol, which is helping with symptoms.  - Underwent genetic testing for cholesterol, results were negative for markers.  - On Repatha for at least six months for hyperlipidemia.  - No chest pain or shortness of breath reported.  - No recent CT of the chest.     PAST MEDICAL HISTORY:  Past Medical History:   Diagnosis Date     Antiplatelet or antithrombotic long-term use     resolved     Arrhythmia      BV (bacterial vaginosis) 9/18/2020     Depressive disorder 1990     Esophageal reflux      Hearing problem 2018     Hyperlipidemia LDL goal <130 07/06/2015     Hypertension      LSIL (low grade squamous intraepithelial lesion) on Pap smear 06/2014    + HPV, unable  to type colp - BRISEYDA I     LEONARDO on CPAP      Other anxiety states        CURRENT MEDICATIONS:  Current Outpatient Medications   Medication Sig Dispense Refill     amLODIPine (NORVASC) 10 MG tablet Take 1 tablet (10 mg) by mouth daily 90 tablet 1     Ascorbic Acid (VITAMIN C) 500 MG CAPS Take by mouth every evening       calcium carbonate (OS-CHIKI) 500 MG tablet Take 1 tablet by mouth every evening       Cyanocobalamin (B-12) 1000 MCG TBCR Take by mouth every evening       doxycycline hyclate (VIBRA-TABS) 100 MG tablet Take 1 tablet (100 mg) by mouth 2 times daily 20 tablet 0     evolocumab (REPATHA) 140 MG/ML prefilled autoinjector Inject 1 mL (140 mg) subcutaneously every 14 days. 6 mL 3     hydrochlorothiazide (HYDRODIURIL) 25 MG tablet Take 1 tablet by mouth daily. 90 tablet 0     ibuprofen (ADVIL/MOTRIN) 200 MG tablet Take 200 mg by mouth every 4 hours as needed for pain       lisinopril (ZESTRIL) 40 MG tablet Take 1 tablet by mouth every morning. 90 tablet 0     magnesium 250 MG tablet Take 1 tablet by mouth every evening       metoprolol succinate ER (TOPROL XL) 25 MG 24 hr tablet Take 1 tablet by mouth daily. 90 tablet 0     Multiple Vitamins-Iron (MULTI-DAY PLUS IRON PO) Take by mouth every morning       omeprazole (PRILOSEC) 40 MG DR capsule Take 1 capsule by mouth daily. 90 capsule 2     Semaglutide-Weight Management (WEGOVY) 0.5 MG/0.5ML pen Inject 0.5 mg subcutaneously once a week. 2 mL 3     Semaglutide-Weight Management (WEGOVY) 1 MG/0.5ML pen Inject 1 mg subcutaneously once a week. 2 mL 0     traZODone (DESYREL) 50 MG tablet Take 1 tablet by mouth at bedtime. 30 tablet 1     venlafaxine (EFFEXOR XR) 150 MG 24 hr capsule Take 1 capsule by mouth daily. 90 capsule 1     vitamin B1 (THIAMINE) 250 MG tablet Take 250 mg by mouth every evening       Vitamin D (Cholecalciferol) 25 MCG (1000 UT) TABS Take by mouth every evening       zolpidem (AMBIEN) 5 MG tablet Take tablet by mouth 15 minutes prior to sleep as  needed. Pharmacist to review side effects. 30 tablet 0       PAST SURGICAL HISTORY:  Past Surgical History:   Procedure Laterality Date     EP ABLATION FOCAL AFIB N/A 10/03/2019    Procedure: EP ABLATION FOCAL AFIB;  Surgeon: Harpreet Arevalo MD;  Location:  OR     ESOPHAGOSCOPY, GASTROSCOPY, DUODENOSCOPY (EGD), COMBINED N/A 2022    Procedure: ESOPHAGOGASTRODUODENOSCOPY, WITH BIOPSY;  Surgeon: Jeff Patino DO;  Location:  GI     HC TOOTH EXTRACTION W/FORCEP      Gilson Teeth Extraction     INJECT BLOCK MEDIAL BRANCH CERVICAL/THORACIC/LUMBAR Bilateral 2025    Procedure: Medial Branch Block #1 for bilateral L4-5 and L5-S1 facet joints;  Surgeon: Molly Hanson MD;  [place] [facility] [place]     LAPAROSCOPIC GASTRIC SLEEVE N/A 2018    Procedure: Laparoscopic Sleeve Gastrectomy Latex Free;  Surgeon: Artis Tse MD;  Location:  OR     LAPAROSCOPIC HERNIORRHAPHY HIATAL  2018    Procedure: LAPAROSCOPIC  HIATAL Hernia Repair;  Surgeon: Artis Tse MD;  Location: [facility] OR     SALPINGO-OOPHORECTOMY, COMBINED Left 10/20/2021    Mucinous cystadenoma       ALLERGIES:     Allergies   Allergen Reactions     Crestor [Rosuvastatin] Itching     Bupropion Other (See Comments)     Other reaction(s): Chest Pain,Panic attacks, heart/chest pain     Wellbutrin [Bupropion]      Wellbutrin: Panic attacks, heart/chest pain       FAMILY HISTORY:  Family History   Problem Relation Age of Onset     Heart Disease Mother         ,mother had emphesema and  at 62     Hypertension Mother      Allergies Mother      Lipids Mother      Obesity Mother      Respiratory Mother         Emphysema     Hypertension Father      Lipids Father      Cancer Father      Prostate Cancer Father      Other Cancer Father      Allergies Sister      Genitourinary Problems Sister      Diabetes Maternal Grandmother         Type 2?     Hypertension Maternal Grandmother      Circulatory Maternal Grandmother         Due to  diabetes     Eye Disorder Maternal Grandmother         Macular degeneration/Macular degeneration     Obesity Maternal Grandmother      Alcohol/Drug Maternal Grandfather      Obesity Maternal Grandfather      Cerebrovascular Disease Paternal Grandmother      Psychotic Disorder Paternal Grandfather         Committed Suicide     Depression Paternal Grandfather      Anesthesia Reaction No family hx of      Bleeding Disorder No family hx of      Venous thrombosis No family hx of          SOCIAL HISTORY:  Social History     Tobacco Use     Smoking status: Former     Current packs/day: 0.00     Average packs/day: 1 pack/day for 17.4 years (17.4 ttl pk-yrs)     Types: Cigarettes     Start date: 2004     Quit date: 2021     Years since quittin.0     Passive exposure: Current (per pt)     Smokeless tobacco: Never   Vaping Use     Vaping status: Every Day     Substances: Nicotine     Devices: Disposable   Substance Use Topics     Alcohol use: Not Currently     Comment: Occas.     Drug use: No     ROS:   Constitutional: No fever, chills, or sweats. Weight stable.   Cardiovascular: As per HPI.       Exam:  /78 (BP Location: Left arm, Patient Position: Chair, Cuff Size: Adult Large)   Pulse 75   Wt 103.9 kg (229 lb)   LMP 2022 (Exact Date)   SpO2 97%   BMI 35.86 kg/m    GENERAL APPEARANCE: alert and no distress  HEENT: no icterus, no central cyanosis  LYMPH/NECK: no adenopathy, no asymmetry, JVP not elevated, no carotid bruits.  RESPIRATORY: lungs clear to auscultation - no rales, rhonchi or wheezes, no use of accessory muscles, no retractions, respirations are unlabored, normal respiratory rate  CARDIOVASCULAR: regular rhythm, normal S1, S2, no S3 or S4 and no murmur, click or rub, precordium quiet with normal PMI.  Extremities: No edema  NEURO: alert, normal speech,and affect  SKIN: no ecchymoses, no rashes     I have reviewed the labs and personally reviewed the imaging below and made my comment in  the assessment and plan.      EP PROCEDURE NOTE 10/3/2019  1. Left Atrial Wide Area Circumferential radiofrequency Ablation.  2. Trans-septal Puncture x2  3. Intracardiac Echocardiography.  4. Invasive Hemodynamic Monitoring.  5. 3D electro-anatomic Mapping using Carto3.  6. Esophageal temperature monitoring.    I have reviewed the labs and personally reviewed the imaging below and made my comment in the assessment and plan.    Labs:  CBC RESULTS:   Lab Results   Component Value Date    WBC 7.2 06/08/2024    WBC 7.4 05/18/2021    RBC 5.26 (H) 06/08/2024    RBC 4.90 05/18/2021    HGB 15.3 06/08/2024    HGB 14.4 05/18/2021    HCT 46.6 06/08/2024    HCT 43.7 05/18/2021    MCV 89 06/08/2024    MCV 89 05/18/2021    MCH 29.1 06/08/2024    MCH 29.4 05/18/2021    MCHC 32.8 06/08/2024    MCHC 33.0 05/18/2021    RDW 13.8 06/08/2024    RDW 13.8 05/18/2021     06/08/2024     05/18/2021       BMP RESULTS:  Lab Results   Component Value Date     06/08/2024     05/18/2021    POTASSIUM 3.9 06/08/2024    POTASSIUM 3.7 05/04/2022    POTASSIUM 3.7 05/18/2021    CHLORIDE 102 06/08/2024    CHLORIDE 105 05/04/2022    CHLORIDE 110 (H) 05/18/2021    CO2 24 06/08/2024    CO2 23 05/04/2022    CO2 28 05/18/2021    ANIONGAP 11 06/08/2024    ANIONGAP 8 05/04/2022    ANIONGAP 3 05/18/2021     (H) 06/08/2024    GLC 79 05/04/2022    GLC 81 05/18/2021    BUN 6.6 06/08/2024    BUN 9 05/04/2022    BUN 9 05/18/2021    CR 0.66 06/08/2024    CR 0.68 05/18/2021    GFRESTIMATED >90 06/08/2024    GFRESTIMATED >90 05/18/2021    GFRESTBLACK >90 05/18/2021    CHIKI 9.1 06/08/2024    CHIKI 9.1 05/18/2021        INR RESULTS:  Lab Results   Component Value Date    INR 0.98 11/06/2018       Echocardiogram 9/26/2018  Global and regional left ventricular function is normal with an EF of 60-65%.  Moderate concentric wall thickening consistent with left ventricular  hypertrophy is present.  Right ventricular function, chamber size, wall  motion, and thickness are  normal.  Mild biatrial enlargement is present.  No significant valve abnormalities present.  The inferior vena cava was normal in size    Nuclear stress test with Lexiscan 9/6/2018  Normal myocardial SPECT study with a summed stress score of 0.     CT coronary artery angiogram 6/27/2013  No evidence of coronary artery disease    EKG 11/28/2018 normal.    Assessment and Plan:   Ms. Danitza Soto is a 44year old  female with PMH significant for depression, GERD, LEONARDO, essential hypertension, morbid obesity S/P gastric bypass 11/2018 and paroxysmal atrial fibrillation refractory to medical treatment status post PVI on 10/3/2019.    Patient is being seen today for routine visit.  She is overall feeling well from cardiac standpoint.  She is in sinus rhythm.  Patient does not report any concerning symptoms today.  Vitals are normal.  She tells me that she has stopped taking amlodipine a while ago.  Blood pressure is well-controlled on lisinopril hydrochlorothiazide and metoprolol.  I think likely from recent weight loss with Ozempic.    1.  History of paroxysmal atrial fibrillation status post PVI in 2019  -Sinus rhythm in clinic today.    -Continue metoprolol 25 mg daily.    2.  Hypertension:   - Well-controlled.  Currently on lisinopril hydrochlorothiazide 40/25 mg  - Has lost weight since being on Ozempic    3.  Obstructive sleep apnea: Moderate.  Does not use CPAP.    4.  Hyperlipidemia:  -Cannot tolerate statins.  -She has seen genetic counseling and genetic testing came back negative per patient report.  -Started Repatha 6 months ago.  Tolerating well.    -Will check lipids today.  - Discussed obtaining CT CAC.  She is agreeable.  Schedule for CT calcium screening    5.  Morbid obesity: On Ozempic now and lost several pounds over the last 1 year.  Tolerating well.    No medication changes today.  Return to clinic 1 year.    Total time spent 36 minutes including precharting, face-to-face  clinic visit and medical documentation.     Leena RONDON MD  [facility] Division of Cardiology  Pager [phone]    Please do not hesitate to contact me if you have any questions/concerns.     Sincerely,     Leena Rondon MD

## 2025-06-05 SDOH — SOCIAL STABILITY: SOCIAL NETWORK: I HAVE TROUBLE DOING ALL OF THE FAMILY ACTIVITIES THAT I WANT TO DO: ALWAYS

## 2025-06-05 SDOH — SOCIAL STABILITY: SOCIAL NETWORK: I HAVE TROUBLE DOING ALL OF THE ACTIVITIES WITH FRIENDS THAT I WANT TO DO: ALWAYS

## 2025-06-05 SDOH — SOCIAL STABILITY: SOCIAL NETWORK

## 2025-06-05 SDOH — SOCIAL STABILITY: SOCIAL NETWORK: PROMIS ABILITY TO PARTICIPATE IN SOCIAL ROLES & ACTIVITIES T-SCORE: 29

## 2025-06-05 SDOH — SOCIAL STABILITY: SOCIAL NETWORK: I HAVE TROUBLE DOING ALL OF MY REGULAR LEISURE ACTIVITIES WITH OTHERS: ALWAYS

## 2025-06-05 SDOH — SOCIAL STABILITY: SOCIAL NETWORK: I HAVE TROUBLE DOING ALL OF MY USUAL WORK (INCLUDE WORK AT HOME): ALWAYS

## 2025-06-05 ASSESSMENT — ANXIETY QUESTIONNAIRES
8. IF YOU CHECKED OFF ANY PROBLEMS, HOW DIFFICULT HAVE THESE MADE IT FOR YOU TO DO YOUR WORK, TAKE CARE OF THINGS AT HOME, OR GET ALONG WITH OTHER PEOPLE?: NOT DIFFICULT AT ALL
5. BEING SO RESTLESS THAT IT IS HARD TO SIT STILL: NOT AT ALL
GAD7 TOTAL SCORE: 0
3. WORRYING TOO MUCH ABOUT DIFFERENT THINGS: NOT AT ALL
GAD7 TOTAL SCORE: 0
IF YOU CHECKED OFF ANY PROBLEMS ON THIS QUESTIONNAIRE, HOW DIFFICULT HAVE THESE PROBLEMS MADE IT FOR YOU TO DO YOUR WORK, TAKE CARE OF THINGS AT HOME, OR GET ALONG WITH OTHER PEOPLE: NOT DIFFICULT AT ALL
GAD7 TOTAL SCORE: 0
7. FEELING AFRAID AS IF SOMETHING AWFUL MIGHT HAPPEN: NOT AT ALL
7. FEELING AFRAID AS IF SOMETHING AWFUL MIGHT HAPPEN: NOT AT ALL
4. TROUBLE RELAXING: NOT AT ALL
1. FEELING NERVOUS, ANXIOUS, OR ON EDGE: NOT AT ALL
6. BECOMING EASILY ANNOYED OR IRRITABLE: NOT AT ALL
2. NOT BEING ABLE TO STOP OR CONTROL WORRYING: NOT AT ALL

## 2025-06-05 ASSESSMENT — PATIENT HEALTH QUESTIONNAIRE - PHQ9
SUM OF ALL RESPONSES TO PHQ QUESTIONS 1-9: 5
SUM OF ALL RESPONSES TO PHQ QUESTIONS 1-9: 5
10. IF YOU CHECKED OFF ANY PROBLEMS, HOW DIFFICULT HAVE THESE PROBLEMS MADE IT FOR YOU TO DO YOUR WORK, TAKE CARE OF THINGS AT HOME, OR GET ALONG WITH OTHER PEOPLE: VERY DIFFICULT

## 2025-06-07 DIAGNOSIS — F41.9 ANXIETY: ICD-10-CM

## 2025-06-09 ENCOUNTER — VIRTUAL VISIT (OUTPATIENT)
Dept: PHYSICAL MEDICINE AND REHAB | Facility: CLINIC | Age: 45
End: 2025-06-09
Payer: COMMERCIAL

## 2025-06-09 VITALS — BODY MASS INDEX: 35.94 KG/M2 | HEIGHT: 67 IN | WEIGHT: 229 LBS

## 2025-06-09 DIAGNOSIS — R41.840 POST-COVID CHRONIC CONCENTRATION DEFICIT: Primary | ICD-10-CM

## 2025-06-09 DIAGNOSIS — U09.9 POST-COVID CHRONIC CONCENTRATION DEFICIT: Primary | ICD-10-CM

## 2025-06-09 DIAGNOSIS — M25.50 POST-COVID CHRONIC JOINT PAIN: ICD-10-CM

## 2025-06-09 DIAGNOSIS — R41.841 COGNITIVE COMMUNICATION DEFICIT: ICD-10-CM

## 2025-06-09 DIAGNOSIS — G89.29 POST-COVID CHRONIC JOINT PAIN: ICD-10-CM

## 2025-06-09 DIAGNOSIS — U09.9 POST-COVID CHRONIC JOINT PAIN: ICD-10-CM

## 2025-06-09 DIAGNOSIS — R53.83 FATIGUE, UNSPECIFIED TYPE: ICD-10-CM

## 2025-06-09 DIAGNOSIS — U09.9 LONG COVID: ICD-10-CM

## 2025-06-09 PROCEDURE — 1125F AMNT PAIN NOTED PAIN PRSNT: CPT | Mod: 95 | Performed by: PHYSICIAN ASSISTANT

## 2025-06-09 PROCEDURE — 98005 SYNCH AUDIO-VIDEO EST LOW 20: CPT | Performed by: PHYSICIAN ASSISTANT

## 2025-06-09 RX ORDER — VENLAFAXINE HYDROCHLORIDE 150 MG/1
150 CAPSULE, EXTENDED RELEASE ORAL DAILY
Qty: 90 CAPSULE | Refills: 0 | OUTPATIENT
Start: 2025-06-09

## 2025-06-09 ASSESSMENT — PAIN SCALES - GENERAL: PAINLEVEL_OUTOF10: SEVERE PAIN (7)

## 2025-06-09 NOTE — NURSING NOTE
Current patient location: [address] [place] [id number]    Is the patient currently in the state of [place]? YES    Visit mode: VIDEO    If the visit is dropped, the patient can be reconnected by:VIDEO VISIT: Text to cell phone:   Telephone Information:   Mobile [phone]       Will anyone else be joining the visit? NO  (If patient encounters technical issues they should call [phone]    Are changes needed to the allergy or medication list? No    Are refills needed on medications prescribed by this physician? NO    Rooming Documentation:  Questionnaire(s) completed    Reason for visit: Consult    Trupti LOPEZF

## 2025-06-09 NOTE — LETTER
6/9/2025       RE: Danitza Soto  [address] [address]     Dear Colleague,    Thank you for referring your patient, Danitza Soto, to the [facility] PHYSICAL MEDICINE AND REHABILITATION CLINIC [place] at [facility]. Please see a copy of my visit note below.    Danitza Soto is a 44 year old female who presents to be evaluated for a billable video visit.    Video-Visit Details    Video visit Start time:7:19 AM    Type of service:  Video Visit    Video End Time:7:27 AM    Originating Location (pt. Location): Home    Distant Location (provider location):  Off- Site    Platform used for Video Visit: [employer]    Assessment/Impression   1. Post-COVID chronic concentration deficit/Cognitive communication deficit/Fatigue, unspecified/   Patient with fatigue and concentration difficulties since COVID. Continue Occupational therapy and will work on having patients sleep addressed.   Patient with long history of insomnia and suspect this is contributing to patient's fatigue and concentration difficulties.  Encouraged to continue trazodone as this is helping sleep disturbance.   Due to continued fatigue, concentration and joint pain will trail Low dose naltrexone. Discussed side effects and will start at 1mg with potential to increase in 2-3 weeks if no side effects. Patient will reach out via [facility] [employer] regarding increase.     2. Post-COVID chronic joint pain  Patient with  improving joint pain it is in multiple locations including hand wrist elbow and knee.   Will continue to monitor. As above will start LDN.     3. Long COVID  Discussed COVID and Post COVID with patient.  Educational materials provided and all questions answered.    Plan:  I reviewed present knowledge on long-Covid.  Education was provided and question were answered.  Orders/Referrals as above  I will advised patient on test results  I will follow up with Danitza RUBIO Charles in 3  months I will review progress and consider need for any other therapeutic interventions. If there are any questions and/or concerns she will call the clinic.      On day of encounter time spent in chart review and with patient in consultation, exam, education,coordination of care,  review of outside charts/documentation and documentation:  17 minutes     I have attempted to proof read for major spelling errors and apologize for any minor errors I may have missed.     This note was dictated using voice recognition software. Any grammatical or context distortions are unintentional and inherent to the software.  _________________________________  Estela Avery PA-C  M [facility] PHYSICAL MEDICINE AND REHABILITATION CLINIC [place]    Subjective   HPI   Patient returns for a follow up.  Patient is taking trazadone now and feels its helping and trazadone is helping a lot.  feels her brain fog is improving but concentration is much improved now that they are sleeping better. .  She completed Occupational therapy.   She still has some joint pain but avoiding certain movements helps.    She has notices some swelling in the back of her neck but not severe.   Overall, patient still having fatigue ,concentration difficulties and joint pain.      Current concerns: Health Concerns        6/5/2025     9:58 AM   PHQ Assesment Total Score(s)   PHQ-9 Score 5        Patient-reported           6/5/2025     9:58 AM   GABRIELA-7 Results   GABRIELA 7 TOTAL SCORE 0 (minimal anxiety)   GABRIELA-7 Total Score 0        Patient-reported         6/5/2025     9:59 AM   PTSD Screen Score   Have you ever experienced this kind of event? No         6/5/2025    10:01 AM   PROMIS-29   PROMIS Physical Function T-Score 37 (moderate dysfunction)   PROMIS Anxiety T-Score 40 (within normal limits)   PROMIS Depression T-Score 41 (within normal limits)   PROMIS Fatigue T-Score 67 (moderate)   PROMIS Sleep Disturbance T-Score 46 (within normal limits)    PROMIS Ability to Participate in Social Roles & Activities T-Score 29 (severe dysfunction)   PROMIS Pain Interference T-Score 72 (severe)   PROMIS Pain Intensity 8         Past Medical History:   Diagnosis Date     Antiplatelet or antithrombotic long-term use     resolved     Arrhythmia      BV (bacterial vaginosis) 2020     Depressive disorder      Esophageal reflux      Hearing problem      Hyperlipidemia LDL goal <130 2015     Hypertension      LSIL (low grade squamous intraepithelial lesion) on Pap smear 2014    + HPV, unable to type colp - BRISEYDA I     LEONARDO on CPAP      Other anxiety states        Past Surgical History:   Procedure Laterality Date     EP ABLATION FOCAL AFIB N/A 10/03/2019    Procedure: EP ABLATION FOCAL AFIB;  Surgeon: Harpreet Arevalo MD;  Location:  OR     ESOPHAGOSCOPY, GASTROSCOPY, DUODENOSCOPY (EGD), COMBINED N/A 2022    Procedure: ESOPHAGOGASTRODUODENOSCOPY, WITH BIOPSY;  Surgeon: Jeff Patino DO;  Location:  GI     HC TOOTH EXTRACTION W/FORCEP      [place] Teeth Extraction     INJECT BLOCK MEDIAL BRANCH CERVICAL/THORACIC/LUMBAR Bilateral 2025    Procedure: Medial Branch Block #1 for bilateral L4-5 and L5-S1 facet joints;  Surgeon: Molly Hanson MD;  Location: [facility] [place]     LAPAROSCOPIC GASTRIC SLEEVE N/A 2018    Procedure: Laparoscopic Sleeve Gastrectomy Latex Free;  Surgeon: Artis Tse MD;  Location:  OR     LAPAROSCOPIC HERNIORRHAPHY HIATAL  2018    Procedure: LAPAROSCOPIC  HIATAL Hernia Repair;  Surgeon: Artis Tse MD;  [facility]     SALPINGO-OOPHORECTOMY, COMBINED Left 10/20/2021    Mucinous cystadenoma       Family History   Problem Relation Age of Onset     Heart Disease Mother         ,mother had emphesema and  at 62     Hypertension Mother      Allergies Mother      Lipids Mother      Obesity Mother      Respiratory Mother         Emphysema     Hypertension Father      Lipids Father      Cancer  Father      Prostate Cancer Father      Other Cancer Father      Allergies Sister      Genitourinary Problems Sister      Diabetes Maternal Grandmother         Type 2?     Hypertension Maternal Grandmother      Circulatory Maternal Grandmother         Due to diabetes     Eye Disorder Maternal Grandmother         Macular degeneration/Macular degeneration     Obesity Maternal Grandmother      Alcohol/Drug Maternal Grandfather      Obesity Maternal Grandfather      Cerebrovascular Disease Paternal Grandmother      Psychotic Disorder Paternal Grandfather         Committed Suicide     Depression Paternal Grandfather      Anesthesia Reaction No family hx of      Bleeding Disorder No family hx of      Venous thrombosis No family hx of        Social History     Tobacco Use     Smoking status: Former     Current packs/day: 0.00     Average packs/day: 1 pack/day for 17.4 years (17.4 ttl pk-yrs)     Types: Cigarettes     Start date: 2004     Quit date: 2021     Years since quittin.0     Passive exposure: Current (per pt)     Smokeless tobacco: Never   Vaping Use     Vaping status: Every Day     Substances: Nicotine     Devices: Disposable   Substance Use Topics     Alcohol use: Not Currently     Comment: Occas.     Drug use: No         Current Outpatient Medications:      Ascorbic Acid (VITAMIN C) 500 MG CAPS, Take by mouth every evening, Disp: , Rfl:      calcium carbonate (OS-CHIKI) 500 MG tablet, Take 1 tablet by mouth every evening, Disp: , Rfl:      Cyanocobalamin (B-12) 1000 MCG TBCR, Take by mouth every evening, Disp: , Rfl:      doxycycline hyclate (VIBRA-TABS) 100 MG tablet, Take 1 tablet (100 mg) by mouth 2 times daily, Disp: 20 tablet, Rfl: 0     evolocumab (REPATHA) 140 MG/ML prefilled autoinjector, Inject 1 mL (140 mg) subcutaneously every 14 days., Disp: 6 mL, Rfl: 3     hydrochlorothiazide (HYDRODIURIL) 25 MG tablet, Take 1 tablet by mouth daily., Disp: 90 tablet, Rfl: 0     ibuprofen (ADVIL/MOTRIN)  200 MG tablet, Take 200 mg by mouth every 4 hours as needed for pain, Disp: , Rfl:      lisinopril (ZESTRIL) 40 MG tablet, Take 1 tablet by mouth every morning., Disp: 90 tablet, Rfl: 0     magnesium 250 MG tablet, Take 1 tablet by mouth every evening, Disp: , Rfl:      metoprolol succinate ER (TOPROL XL) 25 MG 24 hr tablet, Take 1 tablet by mouth daily., Disp: 90 tablet, Rfl: 0     Multiple Vitamins-Iron (MULTI-DAY PLUS IRON PO), Take by mouth every morning, Disp: , Rfl:      omeprazole (PRILOSEC) 40 MG DR capsule, Take 1 capsule by mouth daily., Disp: 90 capsule, Rfl: 2     Semaglutide-Weight Management (WEGOVY) 0.5 MG/0.5ML pen, Inject 0.5 mg subcutaneously once a week., Disp: 2 mL, Rfl: 3     Semaglutide-Weight Management (WEGOVY) 1 MG/0.5ML pen, Inject 1 mg subcutaneously once a week., Disp: 2 mL, Rfl: 0     traZODone (DESYREL) 50 MG tablet, Take 1 tablet by mouth at bedtime., Disp: 30 tablet, Rfl: 1     venlafaxine (EFFEXOR XR) 150 MG 24 hr capsule, Take 1 capsule by mouth daily., Disp: 90 capsule, Rfl: 1     vitamin B1 (THIAMINE) 250 MG tablet, Take 250 mg by mouth every evening, Disp: , Rfl:      Vitamin D (Cholecalciferol) 25 MCG (1000 UT) TABS, Take by mouth every evening, Disp: , Rfl:      zolpidem (AMBIEN) 5 MG tablet, Take tablet by mouth 15 minutes prior to sleep as needed. Pharmacist to review side effects., Disp: 30 tablet, Rfl: 0    Review of Systems   Constitutional, HEENT, cardiovascular, pulmonary, GI, , musculoskeletal, neuro, skin, endocrine and psych systems are negative, except as otherwise noted.      Objective         Vitals:  No vitals were obtained today due to virtual visit.    Physical Exam   GENERAL: Healthy, alert and no distress  EYES: Eyes grossly normal to inspection.  No discharge or erythema, or obvious scleral/conjunctival abnormalities.  NEURO: Cranial nerves grossly intact.  Mentation and speech appropriate for age.  RESP: No audible wheeze, cough, or visible cyanosis.  No  visible retractions or increased work of breathing.    PSYCH: Mentation appears normal, affect normal/bright, judgement and insight intact, normal speech and appearance well-groomed.  SKIN: Visible skin clear. No significant rash, abnormal pigmentation or lesions.    Labs :  No recent labs    Imaging:    I personally reviewed the following imaging results today and those on care everywhere, if indicated     Open Cervical spine MRI 5/22/25      Open Thoracic spine MRI 5/22/25    Reviewed imaging from [facility] sites     Medical Records Reviewed:    Reviewed consults/documents from [facility] including  Family Practice, Spine, Endocrinology, Cardiology and Neurology       Again, thank you for allowing me to participate in the care of your patient.      Sincerely,    Estela Avery PA-C

## 2025-06-09 NOTE — PROGRESS NOTES
Danitza Soto is a 44 year old female who presents to be evaluated for a billable video visit.    Video-Visit Details    Video visit Start time:7:19 AM    Type of service:  Video Visit    Video End Time:7:27 AM    Originating Location (pt. Location): Home    Distant Location (provider location):  Off- Site    Platform used for Video Visit: [facility]    Assessment/Impression   1. Post-COVID chronic concentration deficit/Cognitive communication deficit/Fatigue, unspecified/   Patient with fatigue and concentration difficulties since COVID. Continue Occupational therapy and will work on having patients sleep addressed.   Patient with long history of insomnia and suspect this is contributing to patient's fatigue and concentration difficulties.  Encouraged to continue trazodone as this is helping sleep disturbance.   Due to continued fatigue, concentration and joint pain will trail Low dose naltrexone. Discussed side effects and will start at 1mg with potential to increase in 2-3 weeks if no side effects. Patient will reach out via [employer] regarding increase.     2. Post-COVID chronic joint pain  Patient with  improving joint pain it is in multiple locations including hand wrist elbow and knee.   Will continue to monitor. As above will start LDN.     3. Long COVID  Discussed COVID and Post COVID with patient.  Educational materials provided and all questions answered.    Plan:  I reviewed present knowledge on long-Covid.  Education was provided and question were answered.  Orders/Referrals as above  I will advised patient on test results  I will follow up with Danitza Soto in 3 months I will review progress and consider need for any other therapeutic interventions. If there are any questions and/or concerns she will call the clinic.      On day of encounter time spent in chart review and with patient in consultation, exam, education,coordination of care,  review of outside charts/documentation and documentation:  17  minutes     I have attempted to proof read for major spelling errors and apologize for any minor errors I may have missed.     This note was dictated using voice recognition software. Any grammatical or context distortions are unintentional and inherent to the software.  _________________________________  Estela Avery PA-C  M [facility] PHYSICAL MEDICINE AND REHABILITATION CLINIC [place]    Subjective   HPI   Patient returns for a follow up.  Patient is taking trazadone now and feels its helping and trazadone is helping a lot.  feels her brain fog is improving but concentration is much improved now that they are sleeping better. .  She completed Occupational therapy.   She still has some joint pain but avoiding certain movements helps.    She has notices some swelling in the back of her neck but not severe.   Overall, patient still having fatigue ,concentration difficulties and joint pain.      Current concerns: Health Concerns        6/5/2025     9:58 AM   PHQ Assesment Total Score(s)   PHQ-9 Score 5        Patient-reported           6/5/2025     9:58 AM   GABRIELA-7 Results   GABRIELA 7 TOTAL SCORE 0 (minimal anxiety)   GABRIELA-7 Total Score 0        Patient-reported         6/5/2025     9:59 AM   PTSD Screen Score   Have you ever experienced this kind of event? No         6/5/2025    10:01 AM   PROMIS-29   PROMIS Physical Function T-Score 37 (moderate dysfunction)   PROMIS Anxiety T-Score 40 (within normal limits)   PROMIS Depression T-Score 41 (within normal limits)   PROMIS Fatigue T-Score 67 (moderate)   PROMIS Sleep Disturbance T-Score 46 (within normal limits)   PROMIS Ability to Participate in Social Roles & Activities T-Score 29 (severe dysfunction)   PROMIS Pain Interference T-Score 72 (severe)   PROMIS Pain Intensity 8         Past Medical History:   Diagnosis Date    Antiplatelet or antithrombotic long-term use     resolved    Arrhythmia     BV (bacterial vaginosis) 9/18/2020    Depressive disorder      Esophageal reflux     Hearing problem     Hyperlipidemia LDL goal <130 2015    Hypertension     LSIL (low grade squamous intraepithelial lesion) on Pap smear 2014    + HPV, unable to type colp - BRISEYDA I    LENOARDO on CPAP     Other anxiety states        Past Surgical History:   Procedure Laterality Date    EP ABLATION FOCAL AFIB N/A 10/03/2019    Procedure: EP ABLATION FOCAL AFIB;  Surgeon: Harpreet Arevalo MD;  [facility]    ESOPHAGOSCOPY, GASTROSCOPY, DUODENOSCOPY (EGD), COMBINED N/A 2022    Procedure: ESOPHAGOGASTRODUODENOSCOPY, WITH BIOPSY;  Surgeon: Jeff Patino DO;  Location:  GI    HC TOOTH EXTRACTION W/FORCEP      [place] Teeth Extraction    INJECT BLOCK MEDIAL BRANCH CERVICAL/THORACIC/LUMBAR Bilateral 2025    Procedure: Medial Branch Block #1 for bilateral L4-5 and L5-S1 facet joints;  Surgeon: Molly Hanson MD;  Location: [facility] OR    LAPAROSCOPIC GASTRIC SLEEVE N/A 2018    Procedure: Laparoscopic Sleeve Gastrectomy Latex Free;  Surgeon: Artis Tse MD;  Location: [facility] OR    LAPAROSCOPIC HERNIORRHAPHY HIATAL  2018    Procedure: LAPAROSCOPIC  HIATAL Hernia Repair;  Surgeon: Artis Tse MD;  Location: [facility] OR    SALPINGO-OOPHORECTOMY, COMBINED Left 10/20/2021    Mucinous cystadenoma       Family History   Problem Relation Age of Onset    Heart Disease Mother         ,mother had emphesema and  at 62    Hypertension Mother     Allergies Mother     Lipids Mother     Obesity Mother     Respiratory Mother         Emphysema    Hypertension Father     Lipids Father     Cancer Father     Prostate Cancer Father     Other Cancer Father     Allergies Sister     Genitourinary Problems Sister     Diabetes Maternal Grandmother         Type 2?    Hypertension Maternal Grandmother     Circulatory Maternal Grandmother         Due to diabetes    Eye Disorder Maternal Grandmother         Macular degeneration/Macular degeneration    Obesity Maternal Grandmother      Alcohol/Drug Maternal Grandfather     Obesity Maternal Grandfather     Cerebrovascular Disease Paternal Grandmother     Psychotic Disorder Paternal Grandfather         Committed Suicide    Depression Paternal Grandfather     Anesthesia Reaction No family hx of     Bleeding Disorder No family hx of     Venous thrombosis No family hx of        Social History     Tobacco Use    Smoking status: Former     Current packs/day: 0.00     Average packs/day: 1 pack/day for 17.4 years (17.4 ttl pk-yrs)     Types: Cigarettes     Start date: 2004     Quit date: 2021     Years since quittin.0     Passive exposure: Current (per pt)    Smokeless tobacco: Never   Vaping Use    Vaping status: Every Day    Substances: Nicotine    Devices: Disposable   Substance Use Topics    Alcohol use: Not Currently     Comment: Occas.    Drug use: No         Current Outpatient Medications:     Ascorbic Acid (VITAMIN C) 500 MG CAPS, Take by mouth every evening, Disp: , Rfl:     calcium carbonate (OS-CHIKI) 500 MG tablet, Take 1 tablet by mouth every evening, Disp: , Rfl:     Cyanocobalamin (B-12) 1000 MCG TBCR, Take by mouth every evening, Disp: , Rfl:     doxycycline hyclate (VIBRA-TABS) 100 MG tablet, Take 1 tablet (100 mg) by mouth 2 times daily, Disp: 20 tablet, Rfl: 0    evolocumab (REPATHA) 140 MG/ML prefilled autoinjector, Inject 1 mL (140 mg) subcutaneously every 14 days., Disp: 6 mL, Rfl: 3    hydrochlorothiazide (HYDRODIURIL) 25 MG tablet, Take 1 tablet by mouth daily., Disp: 90 tablet, Rfl: 0    ibuprofen (ADVIL/MOTRIN) 200 MG tablet, Take 200 mg by mouth every 4 hours as needed for pain, Disp: , Rfl:     lisinopril (ZESTRIL) 40 MG tablet, Take 1 tablet by mouth every morning., Disp: 90 tablet, Rfl: 0    magnesium 250 MG tablet, Take 1 tablet by mouth every evening, Disp: , Rfl:     metoprolol succinate ER (TOPROL XL) 25 MG 24 hr tablet, Take 1 tablet by mouth daily., Disp: 90 tablet, Rfl: 0    Multiple Vitamins-Iron  (MULTI-DAY PLUS IRON PO), Take by mouth every morning, Disp: , Rfl:     omeprazole (PRILOSEC) 40 MG DR capsule, Take 1 capsule by mouth daily., Disp: 90 capsule, Rfl: 2    Semaglutide-Weight Management (WEGOVY) 0.5 MG/0.5ML pen, Inject 0.5 mg subcutaneously once a week., Disp: 2 mL, Rfl: 3    Semaglutide-Weight Management (WEGOVY) 1 MG/0.5ML pen, Inject 1 mg subcutaneously once a week., Disp: 2 mL, Rfl: 0    traZODone (DESYREL) 50 MG tablet, Take 1 tablet by mouth at bedtime., Disp: 30 tablet, Rfl: 1    venlafaxine (EFFEXOR XR) 150 MG 24 hr capsule, Take 1 capsule by mouth daily., Disp: 90 capsule, Rfl: 1    vitamin B1 (THIAMINE) 250 MG tablet, Take 250 mg by mouth every evening, Disp: , Rfl:     Vitamin D (Cholecalciferol) 25 MCG (1000 UT) TABS, Take by mouth every evening, Disp: , Rfl:     zolpidem (AMBIEN) 5 MG tablet, Take tablet by mouth 15 minutes prior to sleep as needed. Pharmacist to review side effects., Disp: 30 tablet, Rfl: 0    Review of Systems   Constitutional, HEENT, cardiovascular, pulmonary, GI, , musculoskeletal, neuro, skin, endocrine and psych systems are negative, except as otherwise noted.      Objective         Vitals:  No vitals were obtained today due to virtual visit.    Physical Exam   GENERAL: Healthy, alert and no distress  EYES: Eyes grossly normal to inspection.  No discharge or erythema, or obvious scleral/conjunctival abnormalities.  NEURO: Cranial nerves grossly intact.  Mentation and speech appropriate for age.  RESP: No audible wheeze, cough, or visible cyanosis.  No visible retractions or increased work of breathing.    PSYCH: Mentation appears normal, affect normal/bright, judgement and insight intact, normal speech and appearance well-groomed.  SKIN: Visible skin clear. No significant rash, abnormal pigmentation or lesions.    Labs :  No recent labs    Imaging:    I personally reviewed the following imaging results today and those on care everywhere, if indicated     Open  Cervical spine MRI 5/22/25      Open Thoracic spine MRI 5/22/25    Reviewed imaging from [facility] sites     Medical Records Reviewed:    Reviewed consults/documents from [facility] including  Family Practice, Spine, Endocrinology, Cardiology and Neurology

## 2025-06-10 ENCOUNTER — TELEPHONE (OUTPATIENT)
Dept: PHYSICAL MEDICINE AND REHAB | Facility: CLINIC | Age: 45
End: 2025-06-10
Payer: COMMERCIAL

## 2025-06-13 DIAGNOSIS — E66.813 CLASS 3 OBESITY WITH ALVEOLAR HYPOVENTILATION, SERIOUS COMORBIDITY, AND BODY MASS INDEX (BMI) OF 40.0 TO 44.9 IN ADULT (H): ICD-10-CM

## 2025-06-13 DIAGNOSIS — E66.2 CLASS 3 OBESITY WITH ALVEOLAR HYPOVENTILATION, SERIOUS COMORBIDITY, AND BODY MASS INDEX (BMI) OF 40.0 TO 44.9 IN ADULT (H): ICD-10-CM

## 2025-06-16 ENCOUNTER — MYC MEDICAL ADVICE (OUTPATIENT)
Dept: ENDOCRINOLOGY | Facility: CLINIC | Age: 45
End: 2025-06-16
Payer: COMMERCIAL

## 2025-06-16 DIAGNOSIS — E66.813 CLASS 3 OBESITY WITH ALVEOLAR HYPOVENTILATION, SERIOUS COMORBIDITY, AND BODY MASS INDEX (BMI) OF 40.0 TO 44.9 IN ADULT (H): ICD-10-CM

## 2025-06-16 DIAGNOSIS — E66.2 CLASS 3 OBESITY WITH ALVEOLAR HYPOVENTILATION, SERIOUS COMORBIDITY, AND BODY MASS INDEX (BMI) OF 40.0 TO 44.9 IN ADULT (H): ICD-10-CM

## 2025-06-16 RX ORDER — SEMAGLUTIDE 1 MG/.5ML
INJECTION, SOLUTION SUBCUTANEOUS
Qty: 2 ML | OUTPATIENT
Start: 2025-06-16

## 2025-06-16 NOTE — TELEPHONE ENCOUNTER
Last Written Prescription:  Semaglutide-Weight Management (WEGOVY) 1 MG/0.5ML pen  1 mg, WEEKLY           Summary: Inject 1 mg subcutaneously once a week., Disp-2 mL, R-0, E-Prescribe  Dose, Route, Frequency: 1 mg, Subcutaneous, WEEKLYStart: 05/19/2025Ordered On: 05/19/2025Pharmacy: [place], [place] - [address] EReportDx Associated: Taking: Long-term: Med Note:                Directions: Inject 1 mg subcutaneously once a week.  [place]ing [place] [facility] [place] [facility]  Authorized By: Sandy Cervantes PA-C  Dispense: 2 mL  Refills: 0 ordered       ----------------------  Last Visit Date: 03/13/2025 Virtual Visit Melissa - CINDA Cervantes   Future Visit Date: 11/20/25  ----------------------    Last Comprehensive Metabolic Panel:  Lab Results   Component Value Date     06/08/2024    POTASSIUM 3.9 06/08/2024    CHLORIDE 102 06/08/2024    CO2 24 06/08/2024    ANIONGAP 11 06/08/2024     (H) 06/08/2024    BUN 6.6 06/08/2024    CR 0.66 06/08/2024    GFRESTIMATED >90 06/08/2024    CHIKI 9.1 06/08/2024       Refill decision: Medication refilled per  Medication Refill in Ambulatory Care  policy.   []  If no future appointment scheduled: Route to Clinic Coordinators to contact the pt for appointment.      Refill decision: Medication unable to be refilled by RN due to: Overdue labs/test:   and Other:  Overdue Creat, Was to f/u in 3-4 months, not scheduled until 11/20/25        Request from pharmacy:  Requested Prescriptions   Pending Prescriptions Disp Refills    WEGOVY 1 MG/0.5ML pen [Pharmacy Med Name: WEGOVY 1MG/0.5ML SOAJ] 2 mL 0     Sig: INJECT 1 MG UNDER THE SKIN ONCE A WEEK.       GLP-1 Agonists Protocol Failed - 6/16/2025  2:13 PM        Failed - Medication is active on med list and the sig matches. RN to manually verify dose and sig if red X/fail.     If the protocol passes (green check), you do not need to verify med dose and sig.    A prescription matches if they are the same  clinical intention.    For Example: once daily and every morning are the same.    The protocol can not identify upper and lower case letters as matching and will fail.     For Example: Take 1 tablet (50 mg) by mouth daily     TAKE 1 TABLET (50 MG) BY MOUTH DAILY    For all fails (red x), verify dose and sig.    If the refill does match what is on file, the RN can still proceed to approve the refill request.       If they do not match, route to the appropriate provider.             Failed - Has GFR on file in past 12 months and most recent value is normal        Passed - Recent (6 month) or future (90 days) visit with the authorizing provider's specialty (provided they have been seen in the past 9 months)     The patient must have completed an in-person or virtual visit within the past 6 months or has a future visit scheduled within the next 90 days with the authorizing provider s specialty.  Urgent care and e-visits do not quality as an office visit for this protocol.          Passed - Medication indicated for associated diagnosis     Medication is associated with one or more of the following diagnoses:    Type 2 diabetes mellitus  Obesity          Passed - Patient is age 18 or older        Passed - No active pregnancy on record        Passed - No positive pregnancy test in past 12 months         Adamaris B, RN  [facility]/Red Flag Triage & Med Refill Team

## 2025-06-18 ENCOUNTER — VIRTUAL VISIT (OUTPATIENT)
Dept: FAMILY MEDICINE | Facility: CLINIC | Age: 45
End: 2025-06-18
Payer: COMMERCIAL

## 2025-06-18 DIAGNOSIS — G47.00 INSOMNIA, UNSPECIFIED TYPE: Chronic | ICD-10-CM

## 2025-06-18 DIAGNOSIS — F41.9 ANXIETY: ICD-10-CM

## 2025-06-18 PROCEDURE — 98006 SYNCH AUDIO-VIDEO EST MOD 30: CPT | Performed by: FAMILY MEDICINE

## 2025-06-18 PROCEDURE — 1125F AMNT PAIN NOTED PAIN PRSNT: CPT | Mod: 95 | Performed by: FAMILY MEDICINE

## 2025-06-18 RX ORDER — NALTREXONE HYDROCHLORIDE 50 MG/1
1 TABLET, FILM COATED ORAL DAILY
COMMUNITY

## 2025-06-18 RX ORDER — TRAZODONE HYDROCHLORIDE 50 MG/1
TABLET ORAL
Qty: 90 TABLET | Refills: 1 | Status: SHIPPED | OUTPATIENT
Start: 2025-06-18

## 2025-06-18 RX ORDER — VENLAFAXINE HYDROCHLORIDE 150 MG/1
CAPSULE, EXTENDED RELEASE ORAL
Qty: 90 CAPSULE | Refills: 1 | Status: SHIPPED | OUTPATIENT
Start: 2025-06-18

## 2025-06-18 NOTE — PROGRESS NOTES
"Trish is a 44 year old who is being evaluated via a billable video visit.    How would you like to obtain your AVS? MyChart  If the video visit is dropped, the invitation should be resent by: Text to cell phone: [phone]  Will anyone else be joining your video visit? No      Assessment & Plan     Anxiety  Her anxiety is well controlled on the venlafaxine 150 mg daily , no side effects and doing well on this , has been on it for a while , I refilled her Rx   - REVIEW OF HEALTH MAINTENANCE PROTOCOL ORDERS  - venlafaxine (EFFEXOR XR) 150 MG 24 hr capsule; Take 1 capsule by mouth daily.    Insomnia, unspecified type  She has been sleeping much better ever since starting the trazodone 50 mg and no side effects so far, has been on the combination for a few months now , no concerns and I placed the refill now   - traZODone (DESYREL) 50 MG tablet; Take 1 tablet by mouth at bedtime.    She has seen the provider at the long Covid clinic and has been started on the low dose ( 1 mg ) naltrexone daily to help with the long covid joints pain and fatigue     Will follow up in 6 months sooner if any concerns   Pt is aware  and comfortable with the current plan.      BMI  Estimated body mass index is 35.86 kg/m  as calculated from the following:    Height as of 6/9/25: 1.702 m (5' 7.01\").    Weight as of 6/9/25: 103.9 kg (229 lb).             Subjective   Trish is a 44 year old, presenting for the following health issues:   Follow Up (Medication follow up)        6/18/2025     9:33 AM   Additional Questions   Roomed by BOBBY Murphy     History of Present Illness       Mental Health Follow-up:  Patient presents to follow-up on Depression.Patient's depression since last visit has been:  Good  The patient is not having other symptoms associated with depression.      Any significant life events: No  Patient is not feeling anxious or having panic attacks.  Patient has no concerns about alcohol or drug use.    She eats 0-1 servings " of fruits and vegetables daily.She consumes 0 sweetened beverage(s) daily.She exercises with enough effort to increase her heart rate 9 or less minutes per day.  She exercises with enough effort to increase her heart rate 3 or less days per week.   She is taking medications regularly.            Review of Systems  Constitutional, HEENT, cardiovascular, pulmonary, GI, , musculoskeletal, neuro, skin, endocrine and psych systems are negative, except as otherwise noted.      Objective    Vitals - Patient Reported  Weight (Patient Reported): 103.7 kg (228 lb 9.6 oz)  Pain Score: Moderate Pain (6)  Pain Loc: Low Back      Vitals:  No vitals were obtained today due to virtual visit.    Physical Exam   GENERAL: alert and no distress  EYES: Eyes grossly normal to inspection.  No discharge or erythema, or obvious scleral/conjunctival abnormalities.  RESP: No audible wheeze, cough, or visible cyanosis.    SKIN: Visible skin clear. No significant rash, abnormal pigmentation or lesions.  NEURO: Cranial nerves grossly intact.  Mentation and speech appropriate for age.  PSYCH: Appropriate affect, tone, and pace of words    No results found for this or any previous visit (from the past 24 hours).      Video-Visit Details    Type of service:  Video Visit   Originating Location (pt. Location): Home    Distant Location (provider location):  On-site  Platform used for Video Visit: Elida  Signed Electronically by: Polly Mcbride MD

## 2025-06-23 ENCOUNTER — HOSPITAL ENCOUNTER (OUTPATIENT)
Facility: AMBULATORY SURGERY CENTER | Age: 45
Discharge: HOME | End: 2025-06-23
Attending: PHYSICAL MEDICINE & REHABILITATION | Admitting: PHYSICAL MEDICINE & REHABILITATION
Payer: COMMERCIAL

## 2025-06-23 ENCOUNTER — ANCILLARY PROCEDURE (OUTPATIENT)
Dept: RADIOLOGY | Facility: AMBULATORY SURGERY CENTER | Age: 45
End: 2025-06-23
Attending: PHYSICAL MEDICINE & REHABILITATION
Payer: COMMERCIAL

## 2025-06-23 VITALS
RESPIRATION RATE: 16 BRPM | OXYGEN SATURATION: 96 % | BODY MASS INDEX: 35.79 KG/M2 | HEIGHT: 67 IN | SYSTOLIC BLOOD PRESSURE: 138 MMHG | HEART RATE: 70 BPM | TEMPERATURE: 96.8 F | DIASTOLIC BLOOD PRESSURE: 98 MMHG | WEIGHT: 228 LBS

## 2025-06-23 DIAGNOSIS — R52 PAIN: ICD-10-CM

## 2025-06-23 PROCEDURE — 64493 INJ PARAVERT F JNT L/S 1 LEV: CPT | Mod: 50,KX

## 2025-06-23 PROCEDURE — 64493 INJ PARAVERT F JNT L/S 1 LEV: CPT | Mod: 50 | Performed by: PHYSICAL MEDICINE & REHABILITATION

## 2025-06-23 PROCEDURE — 64494 INJ PARAVERT F JNT L/S 2 LEV: CPT | Mod: 50 | Performed by: PHYSICAL MEDICINE & REHABILITATION

## 2025-06-23 RX ORDER — IOPAMIDOL 408 MG/ML
INJECTION, SOLUTION INTRATHECAL DAILY PRN
Status: DISCONTINUED | OUTPATIENT
Start: 2025-06-23 | End: 2025-06-23 | Stop reason: HOSPADM

## 2025-06-23 RX ORDER — LIDOCAINE HYDROCHLORIDE 20 MG/ML
INJECTION, SOLUTION INFILTRATION; PERINEURAL DAILY PRN
Status: DISCONTINUED | OUTPATIENT
Start: 2025-06-23 | End: 2025-06-23 | Stop reason: HOSPADM

## 2025-06-23 NOTE — DISCHARGE INSTRUCTIONS
Home Care Instructions after a Medial Branch Block      In a medial branch block, a local anesthetic (numbing medicine) is injected near the medial branch nerve. This stops the transmission of pain signals from the facet joint. If this reduces your pain and improves your mobility, it may tell the doctor which facet joint is causing the pain. This procedure is a diagnostic procedure and is typically short lasting, with intentions of doing a radiofrequency ablation. With this injection, a steroid to increase the longevity of the blocks effect is rarely used.    Activity  -You may resume most normal activity levels with the exception of strenuous activity. It is important for us to know if your pain with normal activity is relieved after this injection.  -DO NOT shower for 24 hours  -DO NOT remove bandaid for 24 hours    Pain  -You may experience soreness at the injection site for one or two days  -You may use an ice pack for 20 minutes every 2 hours for the first 24 hours  -You may use a heating pad after the first 24 hours  -You may use Tylenol (acetaminophen) every 4 hours or other pain medicines as     directed by your physician    You may experience numbness radiating into your legs or arms (depending on the procedure location). This numbness may last several hours. Until sensation returns to normal; please use caution in walking, climbing stairs, and stepping out of your vehicle, etc.    PLEASE KEEP TRACK OF YOUR SYMPTOMS AND NOTE YOUR IMPROVEMENT FOR YOUR DOCTOR.     Fill out the pain diary and fax it back to us. You do not need to call us if the pain diary was faxed. [phone] is the FAX number  If you do not have access to a fax machine, attach it to [employer].  You do not need to call us if the pain diary was attached to [employer].   If you cannot fax or send via [employer], then call our call center and request to speak with a nurse for follow up after a procedure       Please contact us if you have:  -Severe  pain  -Fever more than 101.5 degrees Fahrenheit  -Signs of infection at the injection site (redness, swelling, or drainage)    FOR PM&R PATIENTS:  For patients seen by the PM and R service, please call [phone]. (Monday through Friday 8a-5p).  After business hours and weekends call [phone] and ask for the PM and R doctor on call). If you need to fax a pain diary to PM&R the fax number is [phone]. If you are unable to fax, uploading to [employer] is encouraged, then send to provider. If you have procedure scheduling questions please call [phone] Option #2.

## 2025-07-01 ENCOUNTER — MYC MEDICAL ADVICE (OUTPATIENT)
Dept: PHYSICAL MEDICINE AND REHAB | Facility: CLINIC | Age: 45
End: 2025-07-01
Payer: COMMERCIAL

## 2025-07-01 DIAGNOSIS — M54.50 CHRONIC BILATERAL LOW BACK PAIN, UNSPECIFIED WHETHER SCIATICA PRESENT: Primary | ICD-10-CM

## 2025-07-01 DIAGNOSIS — G89.29 CHRONIC BILATERAL LOW BACK PAIN, UNSPECIFIED WHETHER SCIATICA PRESENT: Primary | ICD-10-CM

## 2025-07-01 DIAGNOSIS — M47.816 FACET ARTHROPATHY, LUMBAR: ICD-10-CM

## 2025-07-02 ENCOUNTER — PATIENT OUTREACH (OUTPATIENT)
Dept: CARE COORDINATION | Facility: CLINIC | Age: 45
End: 2025-07-02
Payer: COMMERCIAL

## 2025-07-02 ENCOUNTER — MYC MEDICAL ADVICE (OUTPATIENT)
Dept: PHYSICAL MEDICINE AND REHAB | Facility: CLINIC | Age: 45
End: 2025-07-02
Payer: COMMERCIAL

## 2025-07-02 ENCOUNTER — TELEPHONE (OUTPATIENT)
Dept: PHYSICAL MEDICINE AND REHAB | Facility: CLINIC | Age: 45
End: 2025-07-02
Payer: COMMERCIAL

## 2025-07-02 DIAGNOSIS — M25.50 POST-COVID CHRONIC JOINT PAIN: ICD-10-CM

## 2025-07-02 DIAGNOSIS — U09.9 POST-COVID CHRONIC JOINT PAIN: ICD-10-CM

## 2025-07-02 DIAGNOSIS — R53.83 FATIGUE, UNSPECIFIED TYPE: ICD-10-CM

## 2025-07-02 DIAGNOSIS — G89.29 POST-COVID CHRONIC JOINT PAIN: ICD-10-CM

## 2025-07-02 DIAGNOSIS — R41.841 COGNITIVE COMMUNICATION DEFICIT: ICD-10-CM

## 2025-07-02 NOTE — TELEPHONE ENCOUNTER
Called patient to schedule procedure with Dr. Hanson    Date of Procedure: 7/24/25    Arrival time given: Yes: Arrival Time 7:30am      Procedure Location: [facility]     Verified Location with Patient:  Yes  Address provided to the patient    Pre-op H&P Required:  No: Local anesthesia       Post-Op/Follow Up Appt:  Yes: 9/10/25 @8:40am    Informed patient they will need a  to drive them home:  Yes    Patients : Spouse     Patient is aware that pre-op RN from the procedure center will call 2-3 days prior to scheduled procedure to confirm arrival time and review any instructions:  Yes       Additional Comments: N/A        Thuy Kennedy MA on 7/2/2025 at 1:12 PM      P: [phone]

## 2025-07-14 DIAGNOSIS — I48.0 PAF (PAROXYSMAL ATRIAL FIBRILLATION) (H): ICD-10-CM

## 2025-07-14 RX ORDER — HYDROCHLOROTHIAZIDE 25 MG/1
25 TABLET ORAL DAILY
Qty: 90 TABLET | Refills: 0 | Status: SHIPPED | OUTPATIENT
Start: 2025-07-14

## 2025-07-15 ENCOUNTER — PATIENT OUTREACH (OUTPATIENT)
Dept: CARE COORDINATION | Facility: CLINIC | Age: 45
End: 2025-07-15
Payer: COMMERCIAL

## 2025-07-16 ENCOUNTER — MYC MEDICAL ADVICE (OUTPATIENT)
Dept: PHYSICAL MEDICINE AND REHAB | Facility: CLINIC | Age: 45
End: 2025-07-16
Payer: COMMERCIAL

## 2025-07-16 NOTE — TELEPHONE ENCOUNTER
Called patient to let her know information was sent to her by My Chart, including the need to stop her Wegovy injections for 7 full days prior to her procedure 7/24/25 with Dr. Hanson. Also notified patient she will get additional information 1-3 days before her procedure from the [facility] nurse.    Patient verbalized understanding and will review the My Chart message.     Advised patient to reach out with any questions.     No further questions or concerns at this time.     CAYLA GutierrezN RN  RN Care Coordinator for Physical Medicine and Rehabilitation  [facility] [address] [address]  Main clinic office: [phone]  Main clinic fax: [phone]

## 2025-07-24 ENCOUNTER — ANCILLARY PROCEDURE (OUTPATIENT)
Dept: RADIOLOGY | Facility: AMBULATORY SURGERY CENTER | Age: 45
End: 2025-07-24
Attending: PHYSICAL MEDICINE & REHABILITATION
Payer: COMMERCIAL

## 2025-07-24 DIAGNOSIS — R52 PAIN: ICD-10-CM

## 2025-08-08 ENCOUNTER — MYC MEDICAL ADVICE (OUTPATIENT)
Dept: CARDIOLOGY | Facility: CLINIC | Age: 45
End: 2025-08-08

## 2025-08-17 DIAGNOSIS — I10 ESSENTIAL HYPERTENSION: ICD-10-CM

## 2025-08-19 RX ORDER — LISINOPRIL 40 MG/1
TABLET ORAL
Qty: 90 TABLET | Refills: 0 | Status: SHIPPED | OUTPATIENT
Start: 2025-08-19

## 2025-09-03 DIAGNOSIS — I48.0 PAF (PAROXYSMAL ATRIAL FIBRILLATION) (H): ICD-10-CM

## 2025-09-03 RX ORDER — METOPROLOL SUCCINATE 25 MG/1
25 TABLET, EXTENDED RELEASE ORAL DAILY
Qty: 90 TABLET | Refills: 1 | Status: SHIPPED | OUTPATIENT
Start: 2025-09-03

## (undated) DEVICE — INTRODUCER SHEATH FAST-CATH 9FRX12CM 406116

## (undated) DEVICE — CATH EP EP-XT 6FRX125CM 2-5-2 DECAPOLAR 201007

## (undated) DEVICE — ENDO TROCAR OPTICAL ACCESS KII Z-THRD 15X100MM C0R37

## (undated) DEVICE — COVER CAMERA IN-LIGHT DISP LT-C02

## (undated) DEVICE — GLOVE PROTEXIS POWDER FREE SMT 7.5  2D72PT75X

## (undated) DEVICE — INTRO SHEATH MICRO PLATINUM TIP 4FRX40CM 7274

## (undated) DEVICE — CLIP APPLIER ENDO 5MM M/L LIGAMAX EL5ML

## (undated) DEVICE — DRSG PRIMAPORE 02X3" 7133

## (undated) DEVICE — LINEN TOWEL PACK X5 5464

## (undated) DEVICE — PREP CHLORAPREP W/ORANGE TINT 10.5ML 260715

## (undated) DEVICE — PAD DEFIBRILLATOR ELECTRODE 4.25INX6IN ADULT 2001M-C

## (undated) DEVICE — SU ENDO STITCH SURGIDAC 2-0 ES-9 7" TRIPLE STITCH 170041

## (undated) DEVICE — INTRO SHEATH 4FRX10CM PINNACLE RSS402

## (undated) DEVICE — SET IV EXT 8IN MICRO POWER INJ 7N8300

## (undated) DEVICE — STPL SKIN 35W ROTATING HEAD PRW35

## (undated) DEVICE — STPL POWERED ECHELON LONG 60MM PLEE60A

## (undated) DEVICE — CATH MAPPING 7FR D-CURVE PENTARAY NAV D128211

## (undated) DEVICE — INTRODUCER SHEATH FAST-CATH CATH-LOCK 8FRX12CM 406706

## (undated) DEVICE — GLOVE BIOGEL PI ULTRATOUCH SZ 7.0 41170

## (undated) DEVICE — INTRODUCER SHEATH FAST-CATH CATH-LOCK 7FRX12CM 406702

## (undated) DEVICE — CATH SOUNDSTAR 8FRX90CM 10439011

## (undated) DEVICE — ANTIFOG SOLUTION W/FOAM PAD 31142527

## (undated) DEVICE — ESU LIGASURE MARYLAND VESSEL LAP 44CM XLONG LF1944

## (undated) DEVICE — DRSG PRIMAPORE 03 1/8X6" 66000318

## (undated) DEVICE — TUBE SET SMARKABLATE IRRIGATION

## (undated) DEVICE — DEVICE ENDO STITCH APPLIER 10MM 173016

## (undated) DEVICE — LINEN TOWEL PACK X6 WHITE 5487

## (undated) DEVICE — PATCH CARTO 3 EXTERNAL REFERENCE 3D MAPPING CREFP6

## (undated) DEVICE — ENDO POUCH UNIVERSAL RETRIEVAL SYSTEM INZII 12/15MM CD004

## (undated) DEVICE — ESU GROUND PAD ADULT W/CORD E7507

## (undated) DEVICE — STPL RELOAD REG TISSUE ECHELON 60 X 3.6MM BLUE GST60B

## (undated) DEVICE — TRAY PAIN INJECTION 97A 640

## (undated) DEVICE — Device

## (undated) DEVICE — NDL TRANSSEPTAL BRK XS 18GA 71CM BRK-1 CVD G407209

## (undated) DEVICE — KIT VENOUS FLUSH 60210177

## (undated) DEVICE — LINEN TOWEL PACK X30 5481

## (undated) DEVICE — PREP CHLORAPREP 26ML TINTED ORANGE  260815

## (undated) DEVICE — LIGHT HANDLE X1 31140133

## (undated) DEVICE — NDL INSUFFLATION 13GA 150MM C2202

## (undated) DEVICE — DRAPE SHEET REV FOLD 3/4 9349

## (undated) DEVICE — ENDO TROCAR FIRST ENTRY KII FIOS Z-THRD 05X100MM CTF03

## (undated) DEVICE — PACK HEART LEFT CUSTOM

## (undated) DEVICE — INTRODUCER SHEATH FAST-CATH SWARTZ 8.5FRX63CM SL1 CVD 406849

## (undated) DEVICE — SOL WATER IRRIG 1000ML BOTTLE 07139-09

## (undated) DEVICE — CATH THERMOCOOL SMARTTOUCH SF DF CURVE

## (undated) RX ORDER — KETOROLAC TROMETHAMINE 30 MG/ML
INJECTION, SOLUTION INTRAMUSCULAR; INTRAVENOUS
Status: DISPENSED
Start: 2018-11-27

## (undated) RX ORDER — DEXAMETHASONE SODIUM PHOSPHATE 4 MG/ML
INJECTION, SOLUTION INTRA-ARTICULAR; INTRALESIONAL; INTRAMUSCULAR; INTRAVENOUS; SOFT TISSUE
Status: DISPENSED
Start: 2018-11-27

## (undated) RX ORDER — PROPOFOL 10 MG/ML
INJECTION, EMULSION INTRAVENOUS
Status: DISPENSED
Start: 2018-11-27

## (undated) RX ORDER — SODIUM CHLORIDE, SODIUM LACTATE, POTASSIUM CHLORIDE, CALCIUM CHLORIDE 600; 310; 30; 20 MG/100ML; MG/100ML; MG/100ML; MG/100ML
INJECTION, SOLUTION INTRAVENOUS
Status: DISPENSED
Start: 2018-11-27

## (undated) RX ORDER — DEXAMETHASONE SODIUM PHOSPHATE 4 MG/ML
INJECTION, SOLUTION INTRA-ARTICULAR; INTRALESIONAL; INTRAMUSCULAR; INTRAVENOUS; SOFT TISSUE
Status: DISPENSED
Start: 2019-10-03

## (undated) RX ORDER — PHENYLEPHRINE HCL IN 0.9% NACL 1 MG/10 ML
SYRINGE (ML) INTRAVENOUS
Status: DISPENSED
Start: 2018-11-27

## (undated) RX ORDER — FENTANYL CITRATE 50 UG/ML
INJECTION, SOLUTION INTRAMUSCULAR; INTRAVENOUS
Status: DISPENSED
Start: 2018-11-27

## (undated) RX ORDER — BUPIVACAINE HYDROCHLORIDE 2.5 MG/ML
INJECTION, SOLUTION EPIDURAL; INFILTRATION; INTRACAUDAL
Status: DISPENSED
Start: 2018-11-27

## (undated) RX ORDER — ACETAMINOPHEN 325 MG/1
TABLET ORAL
Status: DISPENSED
Start: 2018-11-27

## (undated) RX ORDER — KETAMINE HCL IN 0.9 % NACL 50 MG/5 ML
SYRINGE (ML) INTRAVENOUS
Status: DISPENSED
Start: 2018-11-27

## (undated) RX ORDER — REGADENOSON 0.08 MG/ML
INJECTION, SOLUTION INTRAVENOUS
Status: DISPENSED
Start: 2018-09-06

## (undated) RX ORDER — EPHEDRINE SULFATE 50 MG/ML
INJECTION, SOLUTION INTRAMUSCULAR; INTRAVENOUS; SUBCUTANEOUS
Status: DISPENSED
Start: 2018-11-27

## (undated) RX ORDER — PROTAMINE SULFATE 10 MG/ML
INJECTION, SOLUTION INTRAVENOUS
Status: DISPENSED
Start: 2019-10-03

## (undated) RX ORDER — LABETALOL HYDROCHLORIDE 5 MG/ML
INJECTION, SOLUTION INTRAVENOUS
Status: DISPENSED
Start: 2018-11-27

## (undated) RX ORDER — CEFAZOLIN SODIUM IN 0.9 % NACL 3 G/100 ML
INTRAVENOUS SOLUTION, PIGGYBACK (ML) INTRAVENOUS
Status: DISPENSED
Start: 2018-11-27

## (undated) RX ORDER — ONDANSETRON 2 MG/ML
INJECTION INTRAMUSCULAR; INTRAVENOUS
Status: DISPENSED
Start: 2019-10-03

## (undated) RX ORDER — GLYCOPYRROLATE 0.2 MG/ML
INJECTION, SOLUTION INTRAMUSCULAR; INTRAVENOUS
Status: DISPENSED
Start: 2018-11-27

## (undated) RX ORDER — FENTANYL CITRATE 50 UG/ML
INJECTION, SOLUTION INTRAMUSCULAR; INTRAVENOUS
Status: DISPENSED
Start: 2019-10-03

## (undated) RX ORDER — HEPARIN SODIUM 1000 [USP'U]/ML
INJECTION, SOLUTION INTRAVENOUS; SUBCUTANEOUS
Status: DISPENSED
Start: 2019-10-03

## (undated) RX ORDER — HYDROMORPHONE HYDROCHLORIDE 1 MG/ML
INJECTION, SOLUTION INTRAMUSCULAR; INTRAVENOUS; SUBCUTANEOUS
Status: DISPENSED
Start: 2018-11-27

## (undated) RX ORDER — PROPOFOL 10 MG/ML
INJECTION, EMULSION INTRAVENOUS
Status: DISPENSED
Start: 2022-11-29

## (undated) RX ORDER — ONDANSETRON 2 MG/ML
INJECTION INTRAMUSCULAR; INTRAVENOUS
Status: DISPENSED
Start: 2018-11-27

## (undated) RX ORDER — ESMOLOL HYDROCHLORIDE 10 MG/ML
INJECTION INTRAVENOUS
Status: DISPENSED
Start: 2018-11-27

## (undated) RX ORDER — LIDOCAINE HYDROCHLORIDE 20 MG/ML
INJECTION, SOLUTION EPIDURAL; INFILTRATION; INTRACAUDAL; PERINEURAL
Status: DISPENSED
Start: 2018-11-27

## (undated) RX ORDER — METOCLOPRAMIDE HYDROCHLORIDE 5 MG/ML
INJECTION INTRAMUSCULAR; INTRAVENOUS
Status: DISPENSED
Start: 2018-11-27

## (undated) RX ORDER — GABAPENTIN 300 MG/1
CAPSULE ORAL
Status: DISPENSED
Start: 2018-11-27

## (undated) RX ORDER — PROPOFOL 10 MG/ML
INJECTION, EMULSION INTRAVENOUS
Status: DISPENSED
Start: 2019-10-03

## (undated) RX ORDER — PHENYLEPHRINE HCL IN 0.9% NACL 1 MG/10 ML
SYRINGE (ML) INTRAVENOUS
Status: DISPENSED
Start: 2019-10-03

## (undated) RX ORDER — EPHEDRINE SULFATE 50 MG/ML
INJECTION, SOLUTION INTRAMUSCULAR; INTRAVENOUS; SUBCUTANEOUS
Status: DISPENSED
Start: 2022-11-29

## (undated) RX ORDER — METOPROLOL TARTRATE 1 MG/ML
INJECTION, SOLUTION INTRAVENOUS
Status: DISPENSED
Start: 2018-11-27